# Patient Record
Sex: FEMALE | Race: WHITE | NOT HISPANIC OR LATINO | Employment: FULL TIME | URBAN - METROPOLITAN AREA
[De-identification: names, ages, dates, MRNs, and addresses within clinical notes are randomized per-mention and may not be internally consistent; named-entity substitution may affect disease eponyms.]

---
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
## 2017-01-23 ENCOUNTER — OFFICE VISIT (OUTPATIENT)
Dept: FAMILY MEDICINE | Facility: CLINIC | Age: 52
End: 2017-01-23
Payer: COMMERCIAL

## 2017-01-23 VITALS
TEMPERATURE: 97.1 F | DIASTOLIC BLOOD PRESSURE: 67 MMHG | WEIGHT: 208.5 LBS | BODY MASS INDEX: 31.6 KG/M2 | SYSTOLIC BLOOD PRESSURE: 94 MMHG | HEART RATE: 70 BPM | OXYGEN SATURATION: 95 % | HEIGHT: 68 IN

## 2017-01-23 DIAGNOSIS — F25.9 SCHIZOAFFECTIVE DISORDER, UNSPECIFIED TYPE (H): ICD-10-CM

## 2017-01-23 DIAGNOSIS — E03.2 HYPOTHYROIDISM DUE TO MEDICATION: ICD-10-CM

## 2017-01-23 DIAGNOSIS — F10.21 RECOVERING ALCOHOLIC IN REMISSION (H): ICD-10-CM

## 2017-01-23 DIAGNOSIS — F31.81 BIPOLAR 2 DISORDER (H): ICD-10-CM

## 2017-01-23 DIAGNOSIS — K21.9 GASTROESOPHAGEAL REFLUX DISEASE WITHOUT ESOPHAGITIS: ICD-10-CM

## 2017-01-23 DIAGNOSIS — R73.03 PREDIABETES: ICD-10-CM

## 2017-01-23 DIAGNOSIS — Z00.00 ENCOUNTER FOR ROUTINE ADULT HEALTH EXAMINATION WITHOUT ABNORMAL FINDINGS: ICD-10-CM

## 2017-01-23 DIAGNOSIS — F17.200 NEEDS SMOKING CESSATION EDUCATION: Primary | ICD-10-CM

## 2017-01-23 DIAGNOSIS — M85.80 OSTEOPENIA: ICD-10-CM

## 2017-01-23 DIAGNOSIS — N95.1 POST MENOPAUSAL SYNDROME: ICD-10-CM

## 2017-01-23 DIAGNOSIS — F90.0 ADHD, PREDOMINANTLY INATTENTIVE TYPE: ICD-10-CM

## 2017-01-23 LAB
ALBUMIN SERPL-MCNC: 3.7 G/DL (ref 3.4–5)
ALBUMIN UR-MCNC: NEGATIVE MG/DL
ALP SERPL-CCNC: 128 U/L (ref 40–150)
ALT SERPL W P-5'-P-CCNC: 24 U/L (ref 0–50)
ANION GAP SERPL CALCULATED.3IONS-SCNC: 5 MMOL/L (ref 3–14)
APPEARANCE UR: CLEAR
AST SERPL W P-5'-P-CCNC: 18 U/L (ref 0–45)
BACTERIA #/AREA URNS HPF: ABNORMAL /HPF
BILIRUB SERPL-MCNC: 0.2 MG/DL (ref 0.2–1.3)
BILIRUB UR QL STRIP: NEGATIVE
BUN SERPL-MCNC: 17 MG/DL (ref 7–30)
CALCIUM SERPL-MCNC: 9.4 MG/DL (ref 8.5–10.1)
CHLORIDE SERPL-SCNC: 106 MMOL/L (ref 94–109)
CHOLEST SERPL-MCNC: 159 MG/DL
CO2 SERPL-SCNC: 29 MMOL/L (ref 20–32)
COLOR UR AUTO: YELLOW
CREAT SERPL-MCNC: 0.85 MG/DL (ref 0.52–1.04)
ERYTHROCYTE [DISTWIDTH] IN BLOOD BY AUTOMATED COUNT: 13.9 % (ref 10–15)
GFR SERPL CREATININE-BSD FRML MDRD: 69 ML/MIN/1.7M2
GLUCOSE SERPL-MCNC: 75 MG/DL (ref 70–99)
GLUCOSE UR STRIP-MCNC: NEGATIVE MG/DL
HCT VFR BLD AUTO: 37.7 % (ref 35–47)
HDLC SERPL-MCNC: 54 MG/DL
HGB BLD-MCNC: 11.5 G/DL (ref 11.7–15.7)
HGB UR QL STRIP: NEGATIVE
KETONES UR STRIP-MCNC: NEGATIVE MG/DL
LDLC SERPL CALC-MCNC: 85 MG/DL
LEUKOCYTE ESTERASE UR QL STRIP: ABNORMAL
MCH RBC QN AUTO: 30.9 PG (ref 26.5–33)
MCHC RBC AUTO-ENTMCNC: 30.5 G/DL (ref 31.5–36.5)
MCV RBC AUTO: 101 FL (ref 78–100)
NITRATE UR QL: NEGATIVE
NON-SQ EPI CELLS #/AREA URNS LPF: ABNORMAL /LPF
NONHDLC SERPL-MCNC: 105 MG/DL
PH UR STRIP: 8 PH (ref 5–7)
PLATELET # BLD AUTO: 285 10E9/L (ref 150–450)
POTASSIUM SERPL-SCNC: 4.2 MMOL/L (ref 3.4–5.3)
PROT SERPL-MCNC: 7.4 G/DL (ref 6.8–8.8)
RBC # BLD AUTO: 3.72 10E12/L (ref 3.8–5.2)
RBC #/AREA URNS AUTO: ABNORMAL /HPF (ref 0–2)
SODIUM SERPL-SCNC: 140 MMOL/L (ref 133–144)
SP GR UR STRIP: 1.01 (ref 1–1.03)
T4 FREE SERPL-MCNC: 0.88 NG/DL (ref 0.76–1.46)
TRIGL SERPL-MCNC: 98 MG/DL
TSH SERPL DL<=0.005 MIU/L-ACNC: 8.37 MU/L (ref 0.4–4)
URN SPEC COLLECT METH UR: ABNORMAL
UROBILINOGEN UR STRIP-ACNC: 0.2 EU/DL (ref 0.2–1)
WBC # BLD AUTO: 5.2 10E9/L (ref 4–11)
WBC #/AREA URNS AUTO: ABNORMAL /HPF (ref 0–2)

## 2017-01-23 PROCEDURE — 85027 COMPLETE CBC AUTOMATED: CPT | Performed by: INTERNAL MEDICINE

## 2017-01-23 PROCEDURE — 84443 ASSAY THYROID STIM HORMONE: CPT | Performed by: INTERNAL MEDICINE

## 2017-01-23 PROCEDURE — 36415 COLL VENOUS BLD VENIPUNCTURE: CPT | Performed by: INTERNAL MEDICINE

## 2017-01-23 PROCEDURE — 82306 VITAMIN D 25 HYDROXY: CPT | Performed by: INTERNAL MEDICINE

## 2017-01-23 PROCEDURE — 84439 ASSAY OF FREE THYROXINE: CPT | Performed by: INTERNAL MEDICINE

## 2017-01-23 PROCEDURE — 80053 COMPREHEN METABOLIC PANEL: CPT | Performed by: INTERNAL MEDICINE

## 2017-01-23 PROCEDURE — 99396 PREV VISIT EST AGE 40-64: CPT | Performed by: INTERNAL MEDICINE

## 2017-01-23 PROCEDURE — 81001 URINALYSIS AUTO W/SCOPE: CPT | Performed by: INTERNAL MEDICINE

## 2017-01-23 PROCEDURE — 80061 LIPID PANEL: CPT | Performed by: INTERNAL MEDICINE

## 2017-01-23 NOTE — Clinical Note
[facility]  [address]  [address]  [place]  [address]  Tel: [phone]    February 1, 2017    Gudelia Quintanilla  [address]  [place] [address]      Dear Ms. Quintanilla,    As you will note from your lab studies in general things look good  However,There are a few abnormalities that we need to address. Your thyroid isn't functioning at an appropriate level we need to address this. Bill note that your cholesterol in general is normal however ideally speaking your LDL would be better at less than 100. You could achieve this by reducing the fats in your diet. Your hemoglobin is slightly lower than normal we need to evaluate this further. Please note is not seriously low but I would like to do some other tasks. So please make an appointment at your earliest convenience to address this    If you have any further questions or problems, please contact our office.      Sincerely,    Todd Manzanares MD/saige    Results for orders placed or performed in visit on 01/23/17   CBC with platelets   Result Value Ref Range    WBC 5.2 4.0 - 11.0 10e9/L    RBC Count 3.72 (L) 3.8 - 5.2 10e12/L    Hemoglobin 11.5 (L) 11.7 - 15.7 g/dL    Hematocrit 37.7 35.0 - 47.0 %     (H) 78 - 100 fl    MCH 30.9 26.5 - 33.0 pg    MCHC 30.5 (L) 31.5 - 36.5 g/dL    RDW 13.9 10.0 - 15.0 %    Platelet Count 285 150 - 450 10e9/L   Comprehensive metabolic panel   Result Value Ref Range    Sodium 140 133 - 144 mmol/L    Potassium 4.2 3.4 - 5.3 mmol/L    Chloride 106 94 - 109 mmol/L    Carbon Dioxide 29 20 - 32 mmol/L    Anion Gap 5 3 - 14 mmol/L    Glucose 75 70 - 99 mg/dL    Urea Nitrogen 17 7 - 30 mg/dL    Creatinine 0.85 0.52 - 1.04 mg/dL    GFR Estimate 69 >60 mL/min/1.7m2    GFR Estimate If Black 84 >60 mL/min/1.7m2    Calcium 9.4 8.5 - 10.1 mg/dL    Bilirubin Total 0.2 0.2 - 1.3 mg/dL    Albumin 3.7 3.4 - 5.0 g/dL    Protein Total 7.4 6.8 - 8.8 g/dL    Alkaline Phosphatase 128 40 - 150 U/L    ALT 24 0 - 50 U/L     AST 18 0 - 45 U/L   Lipid panel reflex to direct LDL   Result Value Ref Range    Cholesterol 159 <200 mg/dL    Triglycerides 98 <150 mg/dL    HDL Cholesterol 54 >49 mg/dL    LDL Cholesterol Calculated 85 <100 mg/dL    Non HDL Cholesterol 105 <130 mg/dL   TSH with free T4 reflex   Result Value Ref Range    TSH 8.37 (H) 0.40 - 4.00 mU/L   Vitamin D Deficiency   Result Value Ref Range    Vitamin D Deficiency screening 42 20 - 75 ug/L   UA reflex to Microscopic and Culture   Result Value Ref Range    Color Urine Yellow     Appearance Urine Clear     Glucose Urine Negative NEG mg/dL    Bilirubin Urine Negative NEG    Ketones Urine Negative NEG mg/dL    Specific Gravity Urine 1.015 1.003 - 1.035    Blood Urine Negative NEG    pH Urine 8.0 (H) 5.0 - 7.0 pH    Protein Albumin Urine Negative NEG mg/dL    Urobilinogen Urine 0.2 0.2 - 1.0 EU/dL    Nitrite Urine Negative NEG    Leukocyte Esterase Urine Small (A) NEG    Source Midstream Urine    Urine Microscopic   Result Value Ref Range    WBC Urine 2-5 (A) 0 - 2 /HPF    RBC Urine O - 2 0 - 2 /HPF    Squamous Epithelial /LPF Urine Few FEW /LPF    Bacteria Urine Few (A) NEG /HPF   T4 free   Result Value Ref Range    T4 Free 0.88 0.76 - 1.46 ng/dL           Enclosure: Lab Results

## 2017-01-23 NOTE — NURSING NOTE
"Chief Complaint   Patient presents with     Physical       Initial BP 94/67 mmHg  Pulse 70  Temp(Src) 97.1  F (36.2  C) (Tympanic)  Ht 5' 8\" (1.727 m)  Wt 208 lb 8 oz (94.575 kg)  BMI 31.71 kg/m2  SpO2 95%  LMP 07/20/2006  Breastfeeding? No Estimated body mass index is 31.71 kg/(m^2) as calculated from the following:    Height as of this encounter: 5' 8\" (1.727 m).    Weight as of this encounter: 208 lb 8 oz (94.575 kg).  BP completed using cuff size: large(ZEE)    ABRAM Valero MA January 23, 2017 9:17 AM      "
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
## 2017-01-24 LAB — DEPRECATED CALCIDIOL+CALCIFEROL SERPL-MC: 42 UG/L (ref 20–75)

## 2017-01-24 ASSESSMENT — PATIENT HEALTH QUESTIONNAIRE - PHQ9: SUM OF ALL RESPONSES TO PHQ QUESTIONS 1-9: 13

## 2017-02-06 ENCOUNTER — OFFICE VISIT (OUTPATIENT)
Dept: FAMILY MEDICINE | Facility: CLINIC | Age: 52
End: 2017-02-06
Payer: COMMERCIAL

## 2017-02-06 VITALS
HEIGHT: 68 IN | WEIGHT: 202.9 LBS | BODY MASS INDEX: 30.75 KG/M2 | HEART RATE: 67 BPM | DIASTOLIC BLOOD PRESSURE: 61 MMHG | SYSTOLIC BLOOD PRESSURE: 97 MMHG | TEMPERATURE: 97.2 F | OXYGEN SATURATION: 100 %

## 2017-02-06 DIAGNOSIS — J31.0 CHRONIC RHINITIS: ICD-10-CM

## 2017-02-06 DIAGNOSIS — K21.9 GASTROESOPHAGEAL REFLUX DISEASE WITHOUT ESOPHAGITIS: ICD-10-CM

## 2017-02-06 DIAGNOSIS — G47.33 OSA ON CPAP: ICD-10-CM

## 2017-02-06 DIAGNOSIS — F31.81 BIPOLAR 2 DISORDER (H): ICD-10-CM

## 2017-02-06 DIAGNOSIS — F10.21 RECOVERING ALCOHOLIC IN REMISSION (H): ICD-10-CM

## 2017-02-06 DIAGNOSIS — E66.09 NON MORBID OBESITY DUE TO EXCESS CALORIES: ICD-10-CM

## 2017-02-06 DIAGNOSIS — Z72.0 TOBACCO ABUSE: ICD-10-CM

## 2017-02-06 DIAGNOSIS — E03.2 HYPOTHYROIDISM DUE TO MEDICATION: Primary | ICD-10-CM

## 2017-02-06 DIAGNOSIS — D50.0 IRON DEFICIENCY ANEMIA DUE TO CHRONIC BLOOD LOSS: ICD-10-CM

## 2017-02-06 DIAGNOSIS — R73.03 PREDIABETES: ICD-10-CM

## 2017-02-06 DIAGNOSIS — F17.200 NEEDS SMOKING CESSATION EDUCATION: ICD-10-CM

## 2017-02-06 PROCEDURE — 99213 OFFICE O/P EST LOW 20 MIN: CPT | Performed by: INTERNAL MEDICINE

## 2017-02-06 RX ORDER — LEVOTHYROXINE SODIUM 100 UG/1
100 TABLET ORAL DAILY
Qty: 90 TABLET | Refills: 3 | Status: SHIPPED | OUTPATIENT
Start: 2017-02-06 | End: 2017-08-14

## 2017-02-06 RX ORDER — FLUTICASONE PROPIONATE 50 MCG
2 SPRAY, SUSPENSION (ML) NASAL 2 TIMES DAILY
Qty: 6 BOTTLE | Refills: 3 | Status: SHIPPED | OUTPATIENT
Start: 2017-02-06 | End: 2017-11-14

## 2017-02-06 NOTE — NURSING NOTE
"Chief Complaint   Patient presents with     Follow Up For       Initial BP 97/61 mmHg  Pulse 67  Temp(Src) 97.2  F (36.2  C) (Tympanic)  Ht 5' 8\" (1.727 m)  Wt 202 lb 14.4 oz (92.035 kg)  BMI 30.86 kg/m2  SpO2 100%  LMP 07/20/2006  Breastfeeding? No Estimated body mass index is 30.86 kg/(m^2) as calculated from the following:    Height as of this encounter: 5' 8\" (1.727 m).    Weight as of this encounter: 202 lb 14.4 oz (92.035 kg).  Medication Reconciliation: complete     Large Cuff (L) arm    CDiana Valero MA February 6, 2017 8:58 AM      "

## 2017-02-06 NOTE — PROGRESS NOTES
SUBJECTIVE:                                                    Gudelia Quintanilla is a 51 year old female who presents to clinic today for the following health issues:    Patient presents to the clinic for a follow up on her lab results. She states that she discontinued olanzapine and one other medication. She is smoking 3-5 cigarettes a day and will cut back on 1 cigarette every 2 weeks and she cannot  Smoke while waiting for the bus. She will be increasing her Levothyroxine dosage.    Problem list and histories reviewed & adjusted, as indicated.  Additional history:     Current Outpatient Prescriptions   Medication Sig Dispense Refill     levothyroxine (SYNTHROID, LEVOTHROID) 75 MCG tablet Take 1 tablet (75 mcg) by mouth daily 60 tablet 1     ARIPiprazole (ABILIFY) 30 MG tablet TAKE 1 TABLET BY MOUTH DAILY.  2     OLANZapine (ZYPREXA) 10 MG tablet Take 10 mg by mouth At Bedtime  3     OLANZapine (ZYPREXA) 20 MG tablet Take 20 mg by mouth At Bedtime  3     order for DME Equipment being ordered: compression stockings  Knee high  Compression stockings  20/30 2 Units 0     polyethylene glycol (MIRALAX/GLYCOLAX) packet Take 17 g by mouth 2 times daily as needed for constipation 30 packet 6     simethicone (MYLICON) 80 MG chewable tablet Take 2 tablets (160 mg) by mouth every 6 hours as needed for flatulence or cramping 180 tablet 1     levothyroxine (SYNTHROID, LEVOTHROID) 75 MCG tablet Take 1 tablet (75 mcg) by mouth daily 90 tablet 0     nicotine (NICOTROL) 10 MG inhaler Inhale 2 cartridge into the lungs daily as needed for smoking cessation 6 Box 1     cholecalciferol (VITAMIN D) 1000 UNIT tablet Take 1 tablet (1,000 Units) by mouth daily 100 tablet 3     armodafinil (NUVIGIL) 250 MG TABS Take 1 tablet (250 mg) by mouth every morning 30 tablet 0     lithium 300 MG tablet Takes 300 mg every morning and 600 mg at Noon       pantoprazole (PROTONIX) 40 MG enteric coated tablet Take 1 tablet (40 mg) by mouth 2 times daily  "Take 1.5 hours prior to last meal of the day, and take at bedtime 60 tablet 5     furosemide (LASIX) 40 MG tablet Take 1 tablet (40 mg) by mouth daily 30 tablet 5     fluticasone (FLONASE) 50 MCG/ACT nasal spray Spray 2 sprays into both nostrils daily as needed for rhinitis or allergies 16 g 5     order for DME Equipment being ordered: CPAP  Please dispense Cpap and its supply 1 Units prn     Doxepin HCl 150 MG CAPS Take 300 mg by mouth At Bedtime 30 capsule 0     albuterol (PROAIR HFA, PROVENTIL HFA, VENTOLIN HFA) 108 (90 BASE) MCG/ACT inhaler Inhale 2 puffs into the lungs every 6 hours as needed for shortness of breath / dyspnea or wheezing 1 Inhaler 1     Allergies   Allergen Reactions     1-Methyl 2-Pyrrolidone      Other reaction(s): Swelling     Iodine Swelling     Other reaction(s): Angioedema  Other reaction(s): Swelling  Facial swelling.     Morphine Anxiety and Nausea and Vomiting     Other reaction(s): Behavioral Disturbances       ROS:  Constitutional, HEENT, cardiovascular, pulmonary, gi and gu systems are negative, except as otherwise noted.    This document serves as a record of the services and decisions personally performed and made by Todd Manzanares MD. It was created on his/her behalf by Homa Alvarez, a trained medical scribe. The creation of this document is based the provider's statements to the medical scribe.  Rock Alvarez 9:21 AM, February 6, 2017    OBJECTIVE:                                                    BP 97/61 mmHg  Pulse 67  Temp(Src) 97.2  F (36.2  C) (Tympanic)  Ht 1.727 m (5' 8\")  Wt 92.035 kg (202 lb 14.4 oz)  BMI 30.86 kg/m2  SpO2 100%  LMP 07/20/2006  Breastfeeding? No  Body mass index is 30.86 kg/(m^2).    Reflexes were delayed  Reviewed all labs       ASSESSMENT/PLAN:                                                    1. Hypothyroidism due to medication  -Discussed increasing Levothyroxine  - levothyroxine (SYNTHROID/LEVOTHROID) 100 MCG tablet; Take " 1 tablet (100 mcg) by mouth daily  Dispense: 90 tablet; Refill: 3    2. Prediabetes  Discussed exercise plan and weight management.    3. 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)    4. Gastroesophageal reflux disease without esophagitis  Managed with Pantoprazole 40 mg 1 tablet twice daily    5. Bipolar 2 disorder (H)    6. Non morbid obesity due to excess calories    7. Iron deficiency anemia due to chronic blood loss    8. ROMEO on CPAP    9. Tobacco abuse    10. Needs smoking cessation education  Will be cutting back 1 cigarette every 2 weeks, cannot smoke while waiting for bus    11. Chronic rhinitis  - fluticasone (FLONASE) 50 MCG/ACT spray; Spray 2 sprays into both nostrils 2 times daily  Dispense: 6 Bottle; Refill: 3    Follow up in 2 months to reevaluate thyroid    The information in this document, created by the medical scribe for me, accurately reflects the services I personally performed and the decisions made by me. I have reviewed and approved this document for accuracy prior to leaving the patient care area.  Todd Manzanares MD  20 min  9:21 AM, 02/06/2017    Todd Manzanares MD  [facility]

## 2017-02-07 ASSESSMENT — PATIENT HEALTH QUESTIONNAIRE - PHQ9: SUM OF ALL RESPONSES TO PHQ QUESTIONS 1-9: 9

## 2017-02-10 ENCOUNTER — TELEPHONE (OUTPATIENT)
Dept: FAMILY MEDICINE | Facility: CLINIC | Age: 52
End: 2017-02-10

## 2017-02-10 NOTE — TELEPHONE ENCOUNTER
Reason for Call:  Other call back    Detailed comments: Loya Kasie would like to know when pt had their most recent gf4 lab so they can treat with tamiflu    Phone Number: [phone]    Best Time:     Can we leave a detailed message on this number? YES    Call taken on 2/10/2017 at 10:52 AM by Elias Black

## 2017-02-13 ENCOUNTER — TELEPHONE (OUTPATIENT)
Dept: FAMILY MEDICINE | Facility: CLINIC | Age: 52
End: 2017-02-13

## 2017-02-13 NOTE — TELEPHONE ENCOUNTER
Reason for Call:  Other prescription    Detailed comments: Patient states her Pharmacy faxed over to us a PA   Request for her Pantoprazole 40mg, not seeing that we received it she will  Call the Pharmacy to have them resend it. Patient states it's from [facility]    Phone Number Patient can be reached at: Cell number on file:    Telephone Information:   Mobile [phone]       Best Time: She needs this PA taken care of ASAP, if possible can we put a  Rush on it?  Thank You    Can we leave a detailed message on this number? NO    Call taken on 2/13/2017 at 10:17 AM by Joey Mora

## 2017-02-15 NOTE — TELEPHONE ENCOUNTER
PA has been approved for the pantoprazole 40 mg from today's date and good for 1 year. I called the patient to let her know.    Kleber Mcleod, CMA

## 2017-04-05 ENCOUNTER — OFFICE VISIT (OUTPATIENT)
Dept: FAMILY MEDICINE | Facility: CLINIC | Age: 52
End: 2017-04-05
Payer: COMMERCIAL

## 2017-04-05 VITALS
WEIGHT: 205.8 LBS | BODY MASS INDEX: 31.29 KG/M2 | OXYGEN SATURATION: 97 % | HEART RATE: 73 BPM | SYSTOLIC BLOOD PRESSURE: 124 MMHG | DIASTOLIC BLOOD PRESSURE: 72 MMHG | TEMPERATURE: 97.3 F | RESPIRATION RATE: 18 BRPM

## 2017-04-05 DIAGNOSIS — L08.9 INFECTION OF SKIN OF FINGER: Primary | ICD-10-CM

## 2017-04-05 LAB
GRAM STN SPEC: NORMAL
MICRO REPORT STATUS: NORMAL
SPECIMEN SOURCE: NORMAL

## 2017-04-05 PROCEDURE — 87205 SMEAR GRAM STAIN: CPT | Performed by: NURSE PRACTITIONER

## 2017-04-05 PROCEDURE — 87077 CULTURE AEROBIC IDENTIFY: CPT | Performed by: NURSE PRACTITIONER

## 2017-04-05 PROCEDURE — 99213 OFFICE O/P EST LOW 20 MIN: CPT | Performed by: NURSE PRACTITIONER

## 2017-04-05 PROCEDURE — 87070 CULTURE OTHR SPECIMN AEROBIC: CPT | Performed by: NURSE PRACTITIONER

## 2017-04-05 PROCEDURE — 87186 SC STD MICRODIL/AGAR DIL: CPT | Performed by: NURSE PRACTITIONER

## 2017-04-05 RX ORDER — SULFAMETHOXAZOLE/TRIMETHOPRIM 800-160 MG
1 TABLET ORAL 2 TIMES DAILY
Qty: 20 TABLET | Refills: 0 | Status: SHIPPED | OUTPATIENT
Start: 2017-04-05 | End: 2017-04-05

## 2017-04-05 RX ORDER — SULFAMETHOXAZOLE/TRIMETHOPRIM 800-160 MG
1 TABLET ORAL 2 TIMES DAILY
Qty: 20 TABLET | Refills: 0 | Status: SHIPPED | OUTPATIENT
Start: 2017-04-05 | End: 2017-07-18

## 2017-04-05 NOTE — PATIENT INSTRUCTIONS
For your finger infection, take the antibiotics as prescribed.  Start right now!  You can take ibuprofen or tylenol as needed for pain.  If the wound starts to drain, keep it covered and change the bandage frequently.  For the throbbing pain, elevate your hand and rest for the next 1-2 days.    If any signs of worsening, fever, etc, I would ask you to go to a [facility] ([facility]).    I did do a wound culture. The results will be back in 2-3 business days. If the culture shows that you need a different antibiotic, I will change the antibiotic for you.    Wear rubber gloves when working.

## 2017-04-05 NOTE — MR AVS SNAPSHOT
After Visit Summary   4/5/2017    Gudelia Quintanilla    MRN: [id number]           Patient Information     Date Of Birth          1965        Visit Information        Provider Department      4/5/2017 11:00 AM Viviana Anguiano APRN CNP [facility]        Today's Diagnoses     Infection of skin of finger    -  1      Care Instructions    For your finger infection, take the antibiotics as prescribed.  Start right now!  You can take ibuprofen or tylenol as needed for pain.  If the wound starts to drain, keep it covered and change the bandage frequently.  For the throbbing pain, elevate your hand and rest for the next 1-2 days.    If any signs of worsening, fever, etc, I would ask you to go to a [place] [facility] ([facility]).    I did do a wound culture. The results will be back in 2-3 business days. If the culture shows that you need a different antibiotic, I will change the antibiotic for you.    Wear rubber gloves when working.        Follow-ups after your visit        Your next 10 appointments already scheduled     Apr 05, 2017 11:00 AM CDT   [facility] with INEZ Art C[facility]   [facility]    [address] [place] [id number] [phone]           Bring a current list of meds and any records pertaining to this visit.  For Physicals, please bring immunization records and any forms needing to be filled out.  Please arrive 10 minutes early to complete paperwork.              Who to contact     If you have questions or need follow up information about today's clinic visit or your schedule please contact [facility] directly at [phone].  Normal or non-critical lab and imaging results will be communicated to you by MyChart, letter or phone within 4 business days after the clinic has received the results. If you do not hear from us within 7 days, please  "contact the clinic through [employer] or phone. If you have a critical or abnormal lab result, we will notify you by phone as soon as possible.  Submit refill requests through E[employer] or call your pharmacy and they will forward the refill request to us. Please allow 3 business days for your refill to be completed.          Additional Information About Your Visit        WikidataharPitzi Information     Enevo lets you send messages to your doctor, view your test results, renew your prescriptions, schedule appointments and more. To sign up, go to [email or web address] . Click on \"Log in\" on the left side of the screen, which will take you to the Welcome page. Then click on \"Sign up Now\" on the right side of the page.     You will be asked to enter the access code listed below, as well as some personal information. Please follow the directions to create your username and password.     Your access code is: [email or web address]  Expires: 2017 10:58 AM     Your access code will  in 90 days. If you need help or a new code, please call your [place] clinic or [phone].        Care EveryWhere ID     This is your Care EveryWhere ID. This could be used by other organizations to access your [facility] medical records  [id number]        Your Vitals Were     Pulse Temperature Respirations Last Period Pulse Oximetry Breastfeeding?    73 97.3  F (36.3  C) (Oral) 18 2006 97% No    BMI (Body Mass Index)                   31.29 kg/m2            Blood Pressure from Last 3 Encounters:   17 124/72   17 97/61   17 94/67    Weight from Last 3 Encounters:   17 205 lb 12.8 oz (93.4 kg)   17 202 lb 14.4 oz (92 kg)   17 208 lb 8 oz (94.6 kg)              Today, you had the following     No orders found for display         Today's Medication Changes          These changes are accurate as of: 17 10:59 AM.  If you have any questions, ask your nurse or doctor.               Start taking these medicines.  "       Dose/Directions    sulfamethoxazole-trimethoprim 800-160 MG per tablet   Commonly known as:  BACTRIM DS/SEPTRA DS   Used for:  Infection of skin of finger   Started by:  Viviana Anguiano APRN CNP        Dose:  1 tablet   Take 1 tablet by mouth 2 times daily   Quantity:  20 tablet   Refills:  0            Where to get your medicines      These medications were sent to [facility] - [place], [place] - [address]  [address] [facility], [place] [address]     Phone:  [phone]     sulfamethoxazole-trimethoprim 800-160 MG per tablet                Primary Care Provider Office Phone # Fax #    Todd Manzanares [phone]       [facility] [address] [facility] [address]  [facility] [address]        Thank you!     Thank you for choosing [facility]  for your care. Our goal is always to provide you with excellent care. Hearing back from our patients is one way we can continue to improve our services. Please take a few minutes to complete the written survey that you may receive in the mail after your visit with us. Thank you!             Your Updated Medication List - Protect others around you: Learn how to safely use, store and throw away your medicines at www.disposemymeds.org.          This list is accurate as of: 4/5/17 10:59 AM.  Always use your most recent med list.                   Brand Name Dispense Instructions for use    albuterol 108 (90 BASE) MCG/ACT Inhaler    PROAIR HFA/PROVENTIL HFA/VENTOLIN HFA    1 Inhaler    Inhale 2 puffs into the lungs every 6 hours as needed for shortness of breath / dyspnea or wheezing       ARIPiprazole 30 MG tablet    ABILIFY     TAKE 1 TABLET BY MOUTH DAILY.       armodafinil 250 MG Tabs tablet    NUVIGIL    30 tablet    Take 1 tablet (250 mg) by mouth every morning       cholecalciferol 1000 UNIT tablet    vitamin D    100 tablet    Take 1 tablet (1,000 Units) by mouth daily       Doxepin HCl 150 MG Caps     30  capsule    Take 300 mg by mouth At Bedtime       * fluticasone 50 MCG/ACT spray    FLONASE    16 g    Spray 2 sprays into both nostrils daily as needed for rhinitis or allergies       * fluticasone 50 MCG/ACT spray    FLONASE    6 Bottle    Spray 2 sprays into both nostrils 2 times daily       furosemide 40 MG tablet    LASIX    30 tablet    Take 1 tablet (40 mg) by mouth daily       * levothyroxine 75 MCG tablet    SYNTHROID/LEVOTHROID    90 tablet    Take 1 tablet (75 mcg) by mouth daily       * levothyroxine 75 MCG tablet    SYNTHROID/LEVOTHROID    60 tablet    Take 1 tablet (75 mcg) by mouth daily       * levothyroxine 100 MCG tablet    SYNTHROID/LEVOTHROID    90 tablet    Take 1 tablet (100 mcg) by mouth daily       lithium 300 MG tablet      Takes 300 mg every morning and 600 mg at Noon       nicotine 10 MG Inhaler    NICOTROL    6 Box    Inhale 2 cartridge into the lungs daily as needed for smoking cessation       * OLANZapine 10 MG tablet    zyPREXA     Take 10 mg by mouth At Bedtime Reported on 4/5/2017       * OLANZapine 20 MG tablet    zyPREXA     Take 20 mg by mouth At Bedtime Reported on 4/5/2017       * order for DME     1 Units    Equipment being ordered: CPAP Please dispense Cpap and its supply       * order for DME     2 Units    Equipment being ordered: compression stockings Knee high Compression stockings 20/30       pantoprazole 40 MG EC tablet    PROTONIX    60 tablet    Take 1 tablet (40 mg) by mouth 2 times daily Take 1.5 hours prior to last meal of the day, and take at bedtime       polyethylene glycol Packet    MIRALAX/GLYCOLAX    30 packet    Take 17 g by mouth 2 times daily as needed for constipation       simethicone 80 MG chewable tablet    MYLICON    180 tablet    Take 2 tablets (160 mg) by mouth every 6 hours as needed for flatulence or cramping       sulfamethoxazole-trimethoprim 800-160 MG per tablet    BACTRIM DS/SEPTRA DS    20 tablet    Take 1 tablet by mouth 2 times daily       *  Notice:  This list has 9 medication(s) that are the same as other medications prescribed for you. Read the directions carefully, and ask your doctor or other care provider to review them with you.

## 2017-04-05 NOTE — PROGRESS NOTES
"  SUBJECTIVE:                                                    Gudelia Quintanilla is a 52 year old female who presents to clinic today for the following health issues:    Finger Problem       Duration: 3 days ago Gudelia had a small bump on her right hand little finger. This started as something that looked like a \"bite.\" she picked at it and squeezed at it. The wound quickly got worse. Today, the wound/bump is swollen, red, painful. There is redness and swelling going up into her right hand.  \"the thing that scared me too is that there are 3-5 people in my house that have scabies right now.\"    Description (location/character/radiation): right finger    Intensity:  moderate    Accompanying signs and symptoms: none    History (similar episodes/previous evaluation): No history of similar wounds. She currently lives in [facility] 40 people live there.     Precipitating or alleviating factors: None    Therapies tried and outcome: None         Past Medical History:   Diagnosis Date     Depression      GERD (gastroesophageal reflux disease)      Hemorrhoid      Menstrual disorder     s/p D&C -12/2012     ROMEO on CPAP      Other bipolar disorders     followed by Dr Collazo     Post menopausal syndrome      Tobacco abuse      Current Outpatient Prescriptions   Medication Sig Dispense Refill     levothyroxine (SYNTHROID/LEVOTHROID) 100 MCG tablet Take 1 tablet (100 mcg) by mouth daily 90 tablet 3     fluticasone (FLONASE) 50 MCG/ACT spray Spray 2 sprays into both nostrils 2 times daily 6 Bottle 3     levothyroxine (SYNTHROID, LEVOTHROID) 75 MCG tablet Take 1 tablet (75 mcg) by mouth daily 60 tablet 1     ARIPiprazole (ABILIFY) 30 MG tablet TAKE 1 TABLET BY MOUTH DAILY.  2     order for DME Equipment being ordered: compression stockings  Knee high  Compression stockings  20/30 2 Units 0     polyethylene glycol (MIRALAX/GLYCOLAX) packet Take 17 g by mouth 2 times daily as needed for constipation 30 packet 6     simethicone " (MYLICON) 80 MG chewable tablet Take 2 tablets (160 mg) by mouth every 6 hours as needed for flatulence or cramping 180 tablet 1     levothyroxine (SYNTHROID, LEVOTHROID) 75 MCG tablet Take 1 tablet (75 mcg) by mouth daily 90 tablet 0     cholecalciferol (VITAMIN D) 1000 UNIT tablet Take 1 tablet (1,000 Units) by mouth daily 100 tablet 3     armodafinil (NUVIGIL) 250 MG TABS Take 1 tablet (250 mg) by mouth every morning 30 tablet 0     lithium 300 MG tablet Takes 300 mg every morning and 600 mg at Noon       pantoprazole (PROTONIX) 40 MG enteric coated tablet Take 1 tablet (40 mg) by mouth 2 times daily Take 1.5 hours prior to last meal of the day, and take at bedtime 60 tablet 5     furosemide (LASIX) 40 MG tablet Take 1 tablet (40 mg) by mouth daily 30 tablet 5     fluticasone (FLONASE) 50 MCG/ACT nasal spray Spray 2 sprays into both nostrils daily as needed for rhinitis or allergies 16 g 5     order for DME Equipment being ordered: CPAP  Please dispense Cpap and its supply 1 Units prn     Doxepin HCl 150 MG CAPS Take 300 mg by mouth At Bedtime 30 capsule 0     albuterol (PROAIR HFA, PROVENTIL HFA, VENTOLIN HFA) 108 (90 BASE) MCG/ACT inhaler Inhale 2 puffs into the lungs every 6 hours as needed for shortness of breath / dyspnea or wheezing 1 Inhaler 1     OLANZapine (ZYPREXA) 10 MG tablet Take 10 mg by mouth At Bedtime Reported on 4/5/2017  3     OLANZapine (ZYPREXA) 20 MG tablet Take 20 mg by mouth At Bedtime Reported on 4/5/2017  3     nicotine (NICOTROL) 10 MG inhaler Inhale 2 cartridge into the lungs daily as needed for smoking cessation (Patient not taking: Reported on 4/5/2017) 6 Box 1     Social History   Substance Use Topics     Smoking status: Current Every Day Smoker     Packs/day: 0.25     Types: Cigarettes     Smokeless tobacco: Never Used      Comment: smoke 1PPD now     Alcohol use No       ROS:  7 point Review of systems negative except as stated above.    OBJECTIVE  :/72  Pulse 73  Temp  97.3  F (36.3  C) (Oral)  Resp 18  Wt 205 lb 12.8 oz (93.4 kg)  LMP 07/20/2006  SpO2 97%  Breastfeeding? No  BMI 31.29 kg/m2  GENERAL APPEARANCE: healthy, alert and no distress. Smiling.   SKIN: warm and dry.  Right hand 5th finger with an erythemetous purulent appearing superficial papule to the skin between the MP and PIP joints. ROM of her finger is limited due to swelling.  Mild swelling to this area of her finger as well as there is swelling (without erythema) starting on the dorsal aspect of her hand.  CWMS intact to all right hand fingers.  PSYCH: mentation appears normal and affect normal/bright.  Good eye contact.    ASSESSMENT:  (L08.9) Infection of skin of finger  (primary encounter diagnosis)  Comment: acute  Plan: sulfamethoxazole-trimethoprim (BACTRIM         DS/SEPTRA DS) 800-160 MG per tablet, Wound         Culture Aerobic Bacterial, Gram stain,   Wound culture obtained and sent to the lab.      Patient Instructions   For your finger infection, take the antibiotics as prescribed.  Start right now!  You can take ibuprofen or tylenol as needed for pain.  If the wound starts to drain, keep it covered and change the bandage frequently.  For the throbbing pain, elevate your hand and rest for the next 1-2 days.    If any signs of worsening, fever, etc, I would ask you to go to a [facility] ([facility]).    I did do a wound culture. The results will be back in 2-3 business days. If the culture shows that you need a different antibiotic, I will change the antibiotic for you.    Wear rubber gloves when working.

## 2017-04-05 NOTE — NURSING NOTE
"Chief Complaint   Patient presents with     Finger       Initial /72  Pulse 73  Temp 97.3  F (36.3  C) (Oral)  Resp 18  Wt 205 lb 12.8 oz (93.4 kg)  LMP 07/20/2006  SpO2 97%  Breastfeeding? No  BMI 31.29 kg/m2 Estimated body mass index is 31.29 kg/(m^2) as calculated from the following:    Height as of 2/6/17: 5' 8\" (1.727 m).    Weight as of this encounter: 205 lb 12.8 oz (93.4 kg).  Medication Reconciliation: complete     Myrna Kwon MA    "

## 2017-04-05 NOTE — LETTER
"[facility]   [address], [place]  [address]  [phone]      April 11, 2017      Gudelia Quintanilla  [address]  [place] [address]              Dear MsDiana Quintanilla,    This note is to let you know that the wound culture that was done the day you were in the clinic came back showing a bacteria called MRSA. This is a type of staphylococcus infection. The antibiotic that was prescribed for you is considered \"effective.\" By now I hope your wound is much better.    Let me know if you have any questions.      Results for orders placed or performed in visit on 04/05/17   Wound Culture Aerobic Bacterial   Result Value Ref Range    Specimen Description Finger Right Hand 5TH FINGER Wound     Culture Micro (A)      Light growth Methicillin resistant Staphylococcus aureus (MRSA) This isolate DOES   NOT demonstrate inducible clindamycin resistance in vitro. Clindamycin is   susceptible and could be used when indicated, however, erythromycin is   resistant and should not be used.  Plus Light growth Normal skin kaden      Micro Report Status FINAL 04/07/2017     Organism:       Light growth Methicillin resistant Staphylococcus aureus (MRSA) This isolate DOES   NOT demonstrate inducible clindamycin resistance in vitro. Clindamycin is   susceptible and could be used when indicated, however, erythromycin is   resistant and should not be used.         Susceptibility    Light growth methicillin resistant staphylococcus aureus (mrsa) this isolate does  not demonstrate inducible clindamycin resistance in vitro. clindamycin is  susceptible and could be used when indicat -  (no method available)     CIPROFLOXACIN <=0.5 Susceptible  ug/mL     CLINDAMYCIN <=0.25 Susceptible  ug/mL     ERYTHROMYCIN >=8 Resistant  ug/mL     GENTAMICIN <=0.5 Susceptible  ug/mL     LEVOFLOXACIN 0.25 Susceptible  ug/mL     OXACILLIN >=4 Resistant  ug/mL     PENICILLIN >=0.5 Resistant  ug/mL     TETRACYCLINE <=1 " Susceptible  ug/mL     Trimethoprim/Sulfa <=.5/9.5 Susceptible  ug/mL     VANCOMYCIN <=0.5 Susceptible  ug/mL     LINEZOLID 2 Susceptible  ug/mL   Gram stain   Result Value Ref Range    Specimen Description Finger Right Hand 5TH FINGER Wound     Gram Stain No organisms seen  No PMNs seen       Micro Report Status FINAL 04/05/2017            Sincerely,    Viviana Anguiano, CNP/nr

## 2017-04-07 ENCOUNTER — TELEPHONE (OUTPATIENT)
Dept: FAMILY MEDICINE | Facility: CLINIC | Age: 52
End: 2017-04-07

## 2017-04-07 LAB
BACTERIA SPEC CULT: ABNORMAL
MICRO REPORT STATUS: ABNORMAL
MICROORGANISM SPEC CULT: ABNORMAL
SPECIMEN SOURCE: ABNORMAL

## 2017-04-07 NOTE — TELEPHONE ENCOUNTER
Reason for Call:  Call back    Detailed comments: Patient call and would like a call back regards to  Her medication and dry mouth. She stated that her dentist say that   It is coming to the medication she is taking and it is decaying her teeth  She would like for th doctor to call he ASAP     Phone Number Patient can be reached at: Home number on file [phone] (home)    Best Time: anytime    Can we leave a detailed message on this number? YES    Call taken on 4/7/2017 at 10:12 AM by Soni Meyer

## 2017-04-10 NOTE — TELEPHONE ENCOUNTER
Please call pt & ask her to make an appt w/ psychiatrist to eval if she can change any of her meds.

## 2017-04-10 NOTE — TELEPHONE ENCOUNTER
I called patient she is seeing psychiatry Tuesday she will ask them first. Antoinette OLGUIN CMA

## 2017-05-11 ENCOUNTER — TELEPHONE (OUTPATIENT)
Dept: FAMILY MEDICINE | Facility: CLINIC | Age: 52
End: 2017-05-11

## 2017-05-11 NOTE — TELEPHONE ENCOUNTER
Reason for call:  Patient reporting a symptom    Symptom or request: diarrhea, cramping withing a couple hours after eating, green stools     Duration (how long have symptoms been present): over 5 days    Have you been treated for this before? No not as severe as right now     Additional comments:     Phone Number patient can be reached at:  Home number on file [phone] (home)    Best Time:  any    Can we leave a detailed message on this number:  YES    Call taken on 5/11/2017 at 9:13 AM by Helen Jimenez

## 2017-05-11 NOTE — TELEPHONE ENCOUNTER
"States her bowel urgency is only associated with eating.  Eats a meal, then within very short time, has a loose stool(s).  Seems to resolve quickly.    Currently eating a lot of starchy, carb foods, lots of dairy products where she lives.  \"They barely serve greens\".  States her Dad developed lactose intolerance later in life. Wonders about this.  Denies blood, abdominal pain (cramps only during expulsion).  No fever.  \"I don't feel ill at all\".  Colonoscopy 2014.  Polyp biopsied/negative pathology.    Trial of no dairy for a few days. Advised that powdered milk is in MANY foods, including english muffins, creamed soups (canned), etc.  Read labels AND may need to research a bit more.    Call if not improving with above.  Stay hydrated.  She is agreeable to all.  Ewa Frey RN        "

## 2017-05-17 DIAGNOSIS — G47.10 EXCESSIVE SLEEPINESS: ICD-10-CM

## 2017-05-17 NOTE — TELEPHONE ENCOUNTER
Pending Prescriptions:                       Disp   Refills    armodafinil (NUVIGIL) 250 MG TABS tablet  30 tab*0            Sig: Take 1 tablet (250 mg) by mouth every morning          Last Written Prescription Date: 12/24/15  Last Fill Quantity: 30,  # refills: 0   Last Office Visit with [facility], [facility] or [facility] prescribing provider: 2/6/17

## 2017-05-22 RX ORDER — ARMODAFINIL 250 MG/1
250 TABLET ORAL EVERY MORNING
Qty: 30 TABLET | Refills: 0 | Status: SHIPPED | OUTPATIENT
Start: 2017-05-22 | End: 2017-11-14

## 2017-05-30 ENCOUNTER — TRANSFERRED RECORDS (OUTPATIENT)
Dept: HEALTH INFORMATION MANAGEMENT | Facility: CLINIC | Age: 52
End: 2017-05-30

## 2017-07-18 ENCOUNTER — OFFICE VISIT (OUTPATIENT)
Dept: FAMILY MEDICINE | Facility: CLINIC | Age: 52
End: 2017-07-18
Payer: COMMERCIAL

## 2017-07-18 ENCOUNTER — TELEPHONE (OUTPATIENT)
Dept: FAMILY MEDICINE | Facility: CLINIC | Age: 52
End: 2017-07-18

## 2017-07-18 VITALS
WEIGHT: 212.6 LBS | TEMPERATURE: 97.6 F | SYSTOLIC BLOOD PRESSURE: 107 MMHG | DIASTOLIC BLOOD PRESSURE: 69 MMHG | OXYGEN SATURATION: 98 % | BODY MASS INDEX: 32.22 KG/M2 | HEART RATE: 71 BPM | HEIGHT: 68 IN

## 2017-07-18 DIAGNOSIS — G47.33 OSA ON CPAP: ICD-10-CM

## 2017-07-18 DIAGNOSIS — F33.1 MAJOR DEPRESSIVE DISORDER, RECURRENT EPISODE, MODERATE (H): ICD-10-CM

## 2017-07-18 DIAGNOSIS — E66.09 NON MORBID OBESITY DUE TO EXCESS CALORIES: ICD-10-CM

## 2017-07-18 DIAGNOSIS — F90.0 ADHD, PREDOMINANTLY INATTENTIVE TYPE: ICD-10-CM

## 2017-07-18 DIAGNOSIS — Z72.0 TOBACCO ABUSE: ICD-10-CM

## 2017-07-18 DIAGNOSIS — E03.4 HYPOTHYROIDISM DUE TO ACQUIRED ATROPHY OF THYROID: ICD-10-CM
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
## 2017-07-18 DIAGNOSIS — L02.93 CARBUNCLE: ICD-10-CM

## 2017-07-18 DIAGNOSIS — F10.21 RECOVERING ALCOHOLIC IN REMISSION (H): ICD-10-CM

## 2017-07-18 LAB — TSH SERPL DL<=0.05 MIU/L-ACNC: 17.7 MU/L (ref 0.4–4)

## 2017-07-18 PROCEDURE — 99213 OFFICE O/P EST LOW 20 MIN: CPT | Mod: 25 | Performed by: INTERNAL MEDICINE

## 2017-07-18 PROCEDURE — 36415 COLL VENOUS BLD VENIPUNCTURE: CPT | Performed by: INTERNAL MEDICINE

## 2017-07-18 PROCEDURE — 87077 CULTURE AEROBIC IDENTIFY: CPT | Performed by: INTERNAL MEDICINE

## 2017-07-18 PROCEDURE — 10060 I&D ABSCESS SIMPLE/SINGLE: CPT | Performed by: INTERNAL MEDICINE

## 2017-07-18 PROCEDURE — 87070 CULTURE OTHR SPECIMN AEROBIC: CPT | Performed by: INTERNAL MEDICINE

## 2017-07-18 PROCEDURE — 87186 SC STD MICRODIL/AGAR DIL: CPT | Performed by: INTERNAL MEDICINE

## 2017-07-18 PROCEDURE — 84443 ASSAY THYROID STIM HORMONE: CPT | Performed by: INTERNAL MEDICINE

## 2017-07-18 NOTE — LETTER
[facility]  [address] [facility]  [address]  [place]  [address]  Tel: [phone]    August 2, 2017    Gudelia Quintanilla  [address]  [place] [address]        Dear MsDiana Quintanilla    Gudelia, I have tried contacting you by phone on multiple occasions can ever get a hold of you. In follow-up of your thyroid function we are under dosing you on your thyroid and it needs some adjustment. So please come back to see me as soon as possible so we can follow up on this as I'm sure the adjustment will make you feel better. I look forward to seeing you back sometime soon     If you have any further questions or problems, please contact our office.      Sincerely,    Todd Manzanares MD/ Antoinette OLGUIN CMA  Results for orders placed or performed in visit on 07/18/17   TSH   Result Value Ref Range    TSH 17.70 (H) 0.40 - 4.00 mU/L   Wound Culture Aerobic Bacterial   Result Value Ref Range    Specimen Description Abdominal Wound     Special Requests Specimen collected in eSwab transport (white cap)     Culture Micro (A)      Moderate growth Staphylococcus aureus This isolate DOES NOT demonstrate inducible   clindamycin resistance in vitro. Clindamycin is susceptible and could be used   when indicated, however, erythromycin is resistant and should not be used.      Micro Report Status FINAL 07/21/2017     Organism:       Moderate growth Staphylococcus aureus This isolate DOES NOT demonstrate inducible   clindamycin resistance in vitro. Clindamycin is susceptible and could be used   when indicated, however, erythromycin is resistant and should not be used.         Susceptibility    Moderate growth staphylococcus aureus this isolate does not demonstrate inducible  clindamycin resistance in vitro. clindamycin is susceptible and could be used  when indicated, however, erythromycin  -  (no method available)     CIPROFLOXACIN <=0.5 Susceptible  ug/mL     CLINDAMYCIN <=0.25 Susceptible  ug/mL     ERYTHROMYCIN >=8  Resistant  ug/mL     GENTAMICIN <=0.5 Susceptible  ug/mL     LEVOFLOXACIN 0.25 Susceptible  ug/mL     OXACILLIN 0.5 Susceptible  ug/mL     PENICILLIN >=0.5 Resistant  ug/mL     TETRACYCLINE <=1 Susceptible  ug/mL     Trimethoprim/Sulfa <=.5/9.5 Susceptible  ug/mL     VANCOMYCIN 1 Susceptible  ug/mL               Enclosure: Lab Results

## 2017-07-18 NOTE — PROGRESS NOTES
"  SUBJECTIVE:                                                    Gudelia Quintanilla is a 52 year old female who presents to clinic today for the following health issues:    Mrs. Quintanilla complains of 8 days of swelling and redness following a potential bug bite. She notes she woke with pruritis on the left upper quadrant and area has increasing worsened with formation of furuncle. Patient attempted to evacuate fluid using her fingernails with only minor drainage. She notes left hand had 2 previous areas of redness and swelling which were treated with antibiotics.     And notes her energy level is \"good.\" She notes continuation of excessive dry mouth. She notes lozenges have not helped. She notes at last dentist visit, she had 8 cavities.      Problem list and histories reviewed & adjusted, as indicated.  Additional history: as documented    Current Outpatient Prescriptions   Medication Sig Dispense Refill     armodafinil (NUVIGIL) 250 MG TABS tablet Take 1 tablet (250 mg) by mouth every morning 30 tablet 0     levothyroxine (SYNTHROID/LEVOTHROID) 100 MCG tablet Take 1 tablet (100 mcg) by mouth daily 90 tablet 3     fluticasone (FLONASE) 50 MCG/ACT spray Spray 2 sprays into both nostrils 2 times daily 6 Bottle 3     ARIPiprazole (ABILIFY) 30 MG tablet TAKE 1 TABLET BY MOUTH DAILY.  2     polyethylene glycol (MIRALAX/GLYCOLAX) packet Take 17 g by mouth 2 times daily as needed for constipation 30 packet 6     simethicone (MYLICON) 80 MG chewable tablet Take 2 tablets (160 mg) by mouth every 6 hours as needed for flatulence or cramping 180 tablet 1     cholecalciferol (VITAMIN D) 1000 UNIT tablet Take 1 tablet (1,000 Units) by mouth daily 100 tablet 3     lithium 300 MG tablet Takes 300 mg every morning and 600 mg at Noon       pantoprazole (PROTONIX) 40 MG enteric coated tablet Take 1 tablet (40 mg) by mouth 2 times daily Take 1.5 hours prior to last meal of the day, and take at bedtime 60 tablet 5     furosemide (LASIX) 40 " MG tablet Take 1 tablet (40 mg) by mouth daily 30 tablet 5     order for DME Equipment being ordered: CPAP  Please dispense Cpap and its supply 1 Units prn     Doxepin HCl 150 MG CAPS Take 300 mg by mouth At Bedtime 30 capsule 0     albuterol (PROAIR HFA, PROVENTIL HFA, VENTOLIN HFA) 108 (90 BASE) MCG/ACT inhaler Inhale 2 puffs into the lungs every 6 hours as needed for shortness of breath / dyspnea or wheezing 1 Inhaler 1     [DISCONTINUED] levothyroxine (SYNTHROID, LEVOTHROID) 75 MCG tablet Take 1 tablet (75 mcg) by mouth daily 60 tablet 1     [DISCONTINUED] levothyroxine (SYNTHROID, LEVOTHROID) 75 MCG tablet Take 1 tablet (75 mcg) by mouth daily 90 tablet 0     nicotine (NICOTROL) 10 MG inhaler Inhale 2 cartridge into the lungs daily as needed for smoking cessation 6 Box 1     Allergies   Allergen Reactions     1-Methyl 2-Pyrrolidone      Other reaction(s): Swelling     Iodine Swelling     Other reaction(s): Angioedema  Other reaction(s): Swelling  Facial swelling.     Morphine Anxiety and Nausea and Vomiting     Other reaction(s): Behavioral Disturbances       Reviewed and updated as needed this visit by clinical staff  Tobacco  Allergies  Meds  Soc Hx      Reviewed and updated as needed this visit by Provider         ROS:  Constitutional: She notes weight has fluctuated significantly which is aggravated with current living situation. She notes use of Slimfast resulted in a weight loss of 25 pounds. Patient has gained 10 pounds since stopping a few months ago. Patient has reduced tobacco use to 5 cigarettes or less daily. Denies regular exercise though her job as a  keeps her active.    Constitutional, HEENT, cardiovascular, pulmonary, gi and gu systems are negative, except as otherwise noted.    This document serves as a record of the services and decisions personally performed and made by Todd Manzanares MD. It was created on his/her behalf by Azul Schrader, a trained medical scribe. The creation  "of this document is based the provider's statements to the medical scribe.  Scribe Azul Schrader 9:01 AM, July 18, 2017     OBJECTIVE:     /69 (BP Location: Right arm, Patient Position: Chair, Cuff Size: Adult Large)  Pulse 71  Temp 97.6  F (36.4  C) (Oral)  Ht 1.727 m (5' 8\")  Wt 96.4 kg (212 lb 9.6 oz)  LMP 07/20/2006  SpO2 98%  Breastfeeding? No  BMI 32.33 kg/m2  Body mass index is 32.33 kg/(m^2).  Neck was supple without adenopathy or thyromegaly her carotids were normal without . Thyroids was nontender without enlargement   Chest clear to auscultation and percussion  Cardiovascular S1 and S2 are physiologic without murmurs or gallops  Abdomen bowel sounds were normal.  There is no palpable mass or organomegaly. She has a large baseball sized area of erythema with a central pustular area which is nondraining. The skin was prepped with alcohol and using an 18 gauge needle the central area was lanced with oozing puss which was cultures, the pus was expressed with the area of induration that was larger than a golf ball. Dressed with 4x4s.   Extremities nontender without any edema  Pulses pedal pulses are as described otherwise his pulses are bilaterally symmetrical throughout without bruits  Skin without significant abnormality   DTRs normal.     ASSESSMENT/PLAN:   1. Bug bite, initial encounter  See below, #8.    2. ROMEO on CPAP    3. Tobacco abuse  Patient notes tobacco use has decreased though she is satisfied with current use.    4. 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)    5. Major depressive disorder, recurrent episode, moderate (H)    6. ADHD, predominantly inattentive type    7. Non morbid obesity due to excess calories  She is embarking on a more regimented lifestyle, moving from her assisted living environment to individual living.    8. Carbuncle  Probable initial spider bite followed by infection. Begin Keflex at dose and regimen below. Counseled proper cleaning and bandaging " techniques.  - cephalexin (KEFLEX) 250 MG capsule; Take 2 capsules (500 mg) by mouth 3 times daily  Dispense: 30 capsule; Refill: 0  - Wound Culture Aerobic Bacterial    If symptoms fail to improve over the next 10 days or are worsening return for follow up.     Except otherwise noted as above, all conditions are stable and medications are tolerated well. Continue related medications unchanged.     Todd Manzanares MD  [facility]    The information in this document, created by the medical scribe for me, accurately reflects the services I personally performed and the decisions made by me. I have reviewed and approved this document for accuracy prior to leaving the patient care area.  Todd Manzanares MD  8:58 AM, 07/18/17

## 2017-07-18 NOTE — NURSING NOTE
"Chief Complaint   Patient presents with     Insect Bites       Initial /69 (BP Location: Right arm, Patient Position: Chair, Cuff Size: Adult Large)  Pulse 71  Temp 97.6  F (36.4  C) (Oral)  Ht 5' 8\" (1.727 m)  Wt 212 lb 9.6 oz (96.4 kg)  LMP 07/20/2006  SpO2 98%  Breastfeeding? No  BMI 32.33 kg/m2 Estimated body mass index is 32.33 kg/(m^2) as calculated from the following:    Height as of this encounter: 5' 8\" (1.727 m).    Weight as of this encounter: 212 lb 9.6 oz (96.4 kg).  Medication Reconciliation: complete.  Daisha Dang CMA    "

## 2017-07-18 NOTE — MR AVS SNAPSHOT
"              After Visit Summary   7/18/2017    Gudelia Quintanilla    MRN: [id number]           Patient Information     Date Of Birth          1965        Visit Information        Provider Department      7/18/2017 9:00 AM Todd Manzanares MD [facility]        Today's Diagnoses     Bug bite, initial encounter    -  1    ROMEO on CPAP        Tobacco abuse        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)        Major depressive disorder, recurrent episode, moderate (H)        ADHD, predominantly inattentive type        Non morbid obesity due to excess calories        Carbuncle        Hypothyroidism due to acquired atrophy of thyroid           Follow-ups after your visit        Who to contact     If you have questions or need follow up information about today's clinic visit or your schedule please contact [facility] directly at [phone].  Normal or non-critical lab and imaging results will be communicated to you by MyChart, letter or phone within 4 business days after the clinic has received the results. If you do not hear from us within 7 days, please contact the clinic through MyChart or phone. If you have a critical or abnormal lab result, we will notify you by phone as soon as possible.  Submit refill requests through [employer] or call your pharmacy and they will forward the refill request to us. Please allow 3 business days for your refill to be completed.          Additional Information About Your Visit        MyChart Information     [employer] lets you send messages to your doctor, view your test results, renew your prescriptions, schedule appointments and more. To sign up, go to [email or web address] . Click on \"Log in\" on the left side of the screen, which will take you to the Welcome page. Then click on \"Sign up Now\" on the right side of the page.     You will be asked to enter the access code listed below, as well as some personal information. Please follow the directions to create your " "username and password.     Your access code is: [email or web address]  Expires: 10/17/2017  6:17 AM     Your access code will  in 90 days. If you need help or a new code, please call your [facility] clinic or [phone].        Care EveryWhere ID     This is your Care EveryWhere ID. This could be used by other organizations to access your [facility] medical records  [id number]        Your Vitals Were     Pulse Temperature Height Last Period Pulse Oximetry Breastfeeding?    71 97.6  F (36.4  C) (Oral) 5' 8\" (1.727 m) 2006 98% No    BMI (Body Mass Index)                   32.33 kg/m2            Blood Pressure from Last 3 Encounters:   17 107/69   17 124/72   17 97/61    Weight from Last 3 Encounters:   17 212 lb 9.6 oz (96.4 kg)   17 205 lb 12.8 oz (93.4 kg)   17 202 lb 14.4 oz (92 kg)              We Performed the Following     DRAIN SKIN ABSCESS SIMPLE/SINGLE     TSH     Wound Culture Aerobic Bacterial          Today's Medication Changes          These changes are accurate as of: 17 11:59 PM.  If you have any questions, ask your nurse or doctor.               Start taking these medicines.        Dose/Directions    cephalexin 250 MG capsule   Commonly known as:  KEFLEX   Used for:  Carbuncle   Started by:  Todd Manzanares MD        Dose:  500 mg   Take 2 capsules (500 mg) by mouth 3 times daily   Quantity:  30 capsule   Refills:  0         These medicines have changed or have updated prescriptions.        Dose/Directions    fluticasone 50 MCG/ACT spray   Commonly known as:  FLONASE   This may have changed:  Another medication with the same name was removed. Continue taking this medication, and follow the directions you see here.   Used for:  Chronic rhinitis   Changed by:  Todd Manzanares MD        Dose:  2 spray   Spray 2 sprays into both nostrils 2 times daily   Quantity:  6 Bottle   Refills:  3       levothyroxine 100 MCG tablet   Commonly known as:  " SYNTHROID/LEVOTHROID   This may have changed:  Another medication with the same name was removed. Continue taking this medication, and follow the directions you see here.   Used for:  Hypothyroidism due to medication   Changed by:  Todd Manzanares MD        Dose:  100 mcg   Take 1 tablet (100 mcg) by mouth daily   Quantity:  90 tablet   Refills:  3       order for DME   This may have changed:  Another medication with the same name was removed. Continue taking this medication, and follow the directions you see here.   Used for:  ROMEO on CPAP   Changed by:  Diane Salomon MD        Equipment being ordered: CPAP Please dispense Cpap and its supply   Quantity:  1 Units   Refills:  prn         Stop taking these medicines if you haven't already. Please contact your care team if you have questions.     OLANZapine 10 MG tablet   Commonly known as:  zyPREXA   Stopped by:  Todd Manzanares MD           OLANZapine 20 MG tablet   Commonly known as:  zyPREXA   Stopped by:  Todd Manzanares MD           sulfamethoxazole-trimethoprim 800-160 MG per tablet   Commonly known as:  BACTRIM DS/SEPTRA DS   Stopped by:  Todd Manzanares MD                Where to get your medicines      These medications were sent to [facility] [id number] - [place], [place] - [address] AT [facility]  [address], [place] [address]    Hours:  24-hours Phone:  [phone]     cephalexin 250 MG capsule                Primary Care Provider Office Phone # Fax #    Todd Manzanares [phone]       [facility] [address] [address]        Equal Access to Services     [facility]: Hadii aad ku hadasho Soomaali, waaxda luqadaha, qaybta kaalmada adeegyatito, darrel kay. So [facility] [phone].    ATENCIÓN: Si habla español, tiene a fitzpatrick disposición servicios gratuitos de asistencia lingüística. Llame al [phone].    We comply with applicable  federal civil rights laws and [place] laws. We do not discriminate on the basis of race, color, national origin, age, disability sex, sexual orientation or gender identity.            Thank you!     Thank you for choosing [facility]  for your care. Our goal is always to provide you with excellent care. Hearing back from our patients is one way we can continue to improve our services. Please take a few minutes to complete the written survey that you may receive in the mail after your visit with us. Thank you!             Your Updated Medication List - Protect others around you: Learn how to safely use, store and throw away your medicines at www.disposemymeds.org.          This list is accurate as of: 7/18/17 11:59 PM.  Always use your most recent med list.                   Brand Name Dispense Instructions for use Diagnosis    albuterol 108 (90 BASE) MCG/ACT Inhaler    PROAIR HFA/PROVENTIL HFA/VENTOLIN HFA    1 Inhaler    Inhale 2 puffs into the lungs every 6 hours as needed for shortness of breath / dyspnea or wheezing        ARIPiprazole 30 MG tablet    ABILIFY     TAKE 1 TABLET BY MOUTH DAILY.        armodafinil 250 MG Tabs tablet    NUVIGIL    30 tablet    Take 1 tablet (250 mg) by mouth every morning    Excessive sleepiness       cephalexin 250 MG capsule    KEFLEX    30 capsule    Take 2 capsules (500 mg) by mouth 3 times daily    Carbuncle       cholecalciferol 1000 UNIT tablet    vitamin D    100 tablet    Take 1 tablet (1,000 Units) by mouth daily    Preventive measure       Doxepin HCl 150 MG Caps     30 capsule    Take 300 mg by mouth At Bedtime        fluticasone 50 MCG/ACT spray    FLONASE    6 Bottle    Spray 2 sprays into both nostrils 2 times daily    Chronic rhinitis       furosemide 40 MG tablet    LASIX    30 tablet    Take 1 tablet (40 mg) by mouth daily    Bilateral edema of lower extremity       levothyroxine 100 MCG tablet    SYNTHROID/LEVOTHROID    90 tablet    Take 1 tablet (100  mcg) by mouth daily    Hypothyroidism due to medication       lithium 300 MG tablet      Takes 300 mg every morning and 600 mg at Noon        nicotine 10 MG Inhaler    NICOTROL    6 Box    Inhale 2 cartridge into the lungs daily as needed for smoking cessation    Tobacco use disorder       order for DME     1 Units    Equipment being ordered: CPAP Please dispense Cpap and its supply    ROMEO on CPAP       pantoprazole 40 MG EC tablet    PROTONIX    60 tablet    Take 1 tablet (40 mg) by mouth 2 times daily Take 1.5 hours prior to last meal of the day, and take at bedtime    Gastroesophageal reflux disease without esophagitis       polyethylene glycol Packet    MIRALAX/GLYCOLAX    30 packet    Take 17 g by mouth 2 times daily as needed for constipation    Chronic constipation       simethicone 80 MG chewable tablet    MYLICON    180 tablet    Take 2 tablets (160 mg) by mouth every 6 hours as needed for flatulence or cramping    Bloating symptom

## 2017-07-18 NOTE — TELEPHONE ENCOUNTER
Reason for Call:  Other OV 7/18    Detailed comments: [facility] needed OV notes faxed to [phone] because pt was put on ABX. Fax docs to [facility] and they recvd them. FYI    Phone Number Patient can be reached at: Other phone number:  [phone]    Best Time: any    Can we leave a detailed message on this number? YES    Call taken on 7/18/2017 at 1:34 PM by Brian Weller

## 2017-07-18 NOTE — TELEPHONE ENCOUNTER
Reason for Call:      Detailed comments: pt calling she needs the results of the culture faxed over to [phone]    Phone Number Patient can be reached at: Other phone number:  [phone]    Best Time: anytime     Can we leave a detailed message on this number? Yes     Call taken on 7/18/2017 at 1:53 PM by Hilda Dang

## 2017-07-21 LAB
BACTERIA SPEC CULT: ABNORMAL
Lab: ABNORMAL
MICRO REPORT STATUS: ABNORMAL
MICROORGANISM SPEC CULT: ABNORMAL
SPECIMEN SOURCE: ABNORMAL

## 2017-07-24 ENCOUNTER — MEDICAL CORRESPONDENCE (OUTPATIENT)
Dept: HEALTH INFORMATION MANAGEMENT | Facility: CLINIC | Age: 52
End: 2017-07-24

## 2017-07-25 ENCOUNTER — TRANSFERRED RECORDS (OUTPATIENT)
Dept: HEALTH INFORMATION MANAGEMENT | Facility: CLINIC | Age: 52
End: 2017-07-25

## 2017-08-11 NOTE — PROGRESS NOTES
SUBJECTIVE:                                                    Gudelia Quintanilla is a 52 year old female who presents to clinic today for the following health issues:    Patient presents to the clinic to follow up on her thyroid levels. At her last office visit her labs were checked and her thyroid levels were low. She states that she has been missing doses due to forgetfulness. She takes her medication in the morning and her morning routine isn't always regular. Misses a dose once or twice a week. Discussed options on when she should take her medication. Patient has been really upset with her hair loss due to her hypothyroidism.    Also reports that every 4 days she has diarrhea with associated cramping. She doesn't have a bowel movement daily, and she is constipated until she uses MiraLax which leads to diarrhea.  Problem list and histories reviewed & adjusted, as indicated.  Additional history: as documented    Current Outpatient Prescriptions   Medication Sig Dispense Refill     GABAPENTIN PO        cephalexin (KEFLEX) 250 MG capsule Take 2 capsules (500 mg) by mouth 3 times daily 30 capsule 0     armodafinil (NUVIGIL) 250 MG TABS tablet Take 1 tablet (250 mg) by mouth every morning 30 tablet 0     levothyroxine (SYNTHROID/LEVOTHROID) 100 MCG tablet Take 1 tablet (100 mcg) by mouth daily 90 tablet 3     fluticasone (FLONASE) 50 MCG/ACT spray Spray 2 sprays into both nostrils 2 times daily 6 Bottle 3     ARIPiprazole (ABILIFY) 30 MG tablet TAKE 1 TABLET BY MOUTH DAILY.  2     polyethylene glycol (MIRALAX/GLYCOLAX) packet Take 17 g by mouth 2 times daily as needed for constipation 30 packet 6     simethicone (MYLICON) 80 MG chewable tablet Take 2 tablets (160 mg) by mouth every 6 hours as needed for flatulence or cramping 180 tablet 1     nicotine (NICOTROL) 10 MG inhaler Inhale 2 cartridge into the lungs daily as needed for smoking cessation 6 Box 1     cholecalciferol (VITAMIN D) 1000 UNIT tablet Take 1 tablet  "(1,000 Units) by mouth daily 100 tablet 3     lithium 300 MG tablet Takes 300 mg every morning and 600 mg at Noon       pantoprazole (PROTONIX) 40 MG enteric coated tablet Take 1 tablet (40 mg) by mouth 2 times daily Take 1.5 hours prior to last meal of the day, and take at bedtime 60 tablet 5     furosemide (LASIX) 40 MG tablet Take 1 tablet (40 mg) by mouth daily 30 tablet 5     order for DME Equipment being ordered: CPAP  Please dispense Cpap and its supply 1 Units prn     Doxepin HCl 150 MG CAPS Take 300 mg by mouth At Bedtime (Patient taking differently: Take 150 mg by mouth At Bedtime ) 30 capsule 0     albuterol (PROAIR HFA, PROVENTIL HFA, VENTOLIN HFA) 108 (90 BASE) MCG/ACT inhaler Inhale 2 puffs into the lungs every 6 hours as needed for shortness of breath / dyspnea or wheezing 1 Inhaler 1     Allergies   Allergen Reactions     1-Methyl 2-Pyrrolidone      Other reaction(s): Swelling     Iodine Swelling     Other reaction(s): Angioedema  Other reaction(s): Swelling  Facial swelling.     Morphine Anxiety and Nausea and Vomiting     Other reaction(s): Behavioral Disturbances     Reviewed and updated as needed this visit by clinical staff  Tobacco  Allergies  Meds  Problems  Med Hx  Surg Hx  Fam Hx  Soc Hx        Reviewed and updated as needed this visit by Provider         ROS:  Constitutional, HEENT, cardiovascular, pulmonary, gi and gu systems are negative, except as otherwise noted.    This document serves as a record of the services and decisions personally performed and made by Todd Manzanares MD. It was created on his/her behalf by Homa Alvarez, a trained medical scribe. The creation of this document is based the provider's statements to the medical scribe.  Rock Alvarez 8:46 AM, August 14, 2017    OBJECTIVE:   /69  Pulse 71  Temp 98.1  F (36.7  C) (Oral)  Ht 1.727 m (5' 8\")  Wt 96.6 kg (213 lb)  LMP 07/20/2006  SpO2 100%  Breastfeeding? No  BMI 32.39 kg/m2  Body " mass index is 32.39 kg/(m^2).    She has notable hair loss  Neck was supple without adenopathy or thyromegaly her carotids were normal without bruits  Chest clear to auscultation and percussion  Cardiovascular S1 and S2 are physiologic without murmurs or gallops  Abdomen bowel sounds were normal.  There is no palpable mass or organomegaly  Extremities nontender without any edema, deep tendon reflexes were slightly delayed  Pulses pedal pulses are as described otherwise his pulses are bilaterally symmetrical throughout without bruits  Skin without significant abnormality, dry    Diagnostic Test Results:  No results found for this or any previous visit (from the past 24 hour(s)).    ASSESSMENT/PLAN:     1. Hypothyroidism due to medication  - levothyroxine (SYNTHROID/LEVOTHROID) 125 MCG tablet; Take 1 tablet (125 mcg) by mouth daily  Dispense: 60 tablet; Refill: 1    2. Major depressive disorder, recurrent episode, moderate (H)    3. 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)    4. Bipolar 2 disorder (H)    5. Iron deficiency anemia due to chronic blood loss  Reevaluate upon return with follow-up of hypothyroidism    6. Tobacco abuse  -Status quo, patient has cut down on the amount she smokes.    7. ROMEO on CPAP    8. Non morbid obesity due to excess calories  Reinitiating diet and activity to attempt weight loss    -Patient will start taking her dose of levothyroxine at night before bed. Her dose has also been increased as well. She will also start using MiraLax 1/2 scoop daily to improve her regularity.    The information in this document, created by the medical scribe for me, accurately reflects the services I personally performed and the decisions made by me. I have reviewed and approved this document for accuracy prior to leaving the patient care area.  Todd Manzanares MD  8:59 AM, 08/14/17    Todd Manzanares MD  [facility]

## 2017-08-14 ENCOUNTER — OFFICE VISIT (OUTPATIENT)
Dept: FAMILY MEDICINE | Facility: CLINIC | Age: 52
End: 2017-08-14
Payer: COMMERCIAL

## 2017-08-14 VITALS
TEMPERATURE: 98.1 F | BODY MASS INDEX: 32.28 KG/M2 | DIASTOLIC BLOOD PRESSURE: 69 MMHG | SYSTOLIC BLOOD PRESSURE: 118 MMHG | WEIGHT: 213 LBS | HEART RATE: 71 BPM | HEIGHT: 68 IN | OXYGEN SATURATION: 100 %

## 2017-08-14 DIAGNOSIS — F10.21 RECOVERING ALCOHOLIC IN REMISSION (H): ICD-10-CM

## 2017-08-14 DIAGNOSIS — Z72.0 TOBACCO ABUSE: ICD-10-CM

## 2017-08-14 DIAGNOSIS — E66.09 NON MORBID OBESITY DUE TO EXCESS CALORIES: ICD-10-CM

## 2017-08-14 DIAGNOSIS — E03.2 HYPOTHYROIDISM DUE TO MEDICATION: ICD-10-CM

## 2017-08-14 DIAGNOSIS — G47.33 OSA ON CPAP: Primary | ICD-10-CM

## 2017-08-14 DIAGNOSIS — F31.81 BIPOLAR 2 DISORDER (H): ICD-10-CM

## 2017-08-14 DIAGNOSIS — F33.1 MAJOR DEPRESSIVE DISORDER, RECURRENT EPISODE, MODERATE (H): ICD-10-CM

## 2017-08-14 DIAGNOSIS — D50.0 IRON DEFICIENCY ANEMIA DUE TO CHRONIC BLOOD LOSS: ICD-10-CM

## 2017-08-14 PROCEDURE — 99214 OFFICE O/P EST MOD 30 MIN: CPT | Performed by: INTERNAL MEDICINE

## 2017-08-14 RX ORDER — LEVOTHYROXINE SODIUM 125 UG/1
125 TABLET ORAL DAILY
Qty: 60 TABLET | Refills: 1 | Status: SHIPPED | OUTPATIENT
Start: 2017-08-14 | End: 2017-11-14

## 2017-08-14 ASSESSMENT — PATIENT HEALTH QUESTIONNAIRE - PHQ9: SUM OF ALL RESPONSES TO PHQ QUESTIONS 1-9: 6

## 2017-08-14 NOTE — MR AVS SNAPSHOT
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  Normal or non-critical lab and imaging results will be communicated to you by Weelehart, letter or phone within 4 business days after the clinic has received the results. If you do not hear from us within 7 days, please contact the clinic through Weelehart or phone. If you have a critical or abnormal lab result, we will notify you by phone as soon as possible.  Submit refill requests through Broadlink or call your pharmacy and they will forward the refill request to us. Please allow 3 business days for your refill to be completed.          Additional Information About Your Visit        WeeleharXODIS Information     Broadlink lets you send messages to your doctor, view your test results, renew your prescriptions, schedule appointments and more. To sign up, go to [email or web address] . Click on \"Log in\" on the left side of the screen, which will take you to the Welcome page. Then click on \"Sign up Now\" on the right side of the page.     You will be asked to enter the access code listed below, as well as some personal information. Please follow the directions to create your username and password.   " "  Your access code is: [email or web address]  Expires: 10/17/2017  6:17 AM     Your access code will  in 90 days. If you need help or a new code, please call your [facility] or [phone].        Care EveryWhere ID     This is your Care EveryWhere ID. This could be used by other organizations to access your [place] medical records  [id number]        Your Vitals Were     Pulse Temperature Height Last Period Pulse Oximetry Breastfeeding?    71 98.1  F (36.7  C) (Oral) 5' 8\" (1.727 m) 2006 100% No    BMI (Body Mass Index)                   32.39 kg/m2            Blood Pressure from Last 3 Encounters:   17 118/69   17 107/69   17 124/72    Weight from Last 3 Encounters:   17 213 lb (96.6 kg)   17 212 lb 9.6 oz (96.4 kg)   17 205 lb 12.8 oz (93.4 kg)              Today, you had the following     No orders found for display         Today's Medication Changes          These changes are accurate as of: 17 11:59 PM.  If you have any questions, ask your nurse or doctor.               These medicines have changed or have updated prescriptions.        Dose/Directions    Doxepin HCl 150 MG Caps   This may have changed:  how much to take        Dose:  300 mg   Take 300 mg by mouth At Bedtime   Quantity:  30 capsule   Refills:  0       levothyroxine 125 MCG tablet   Commonly known as:  SYNTHROID/LEVOTHROID   This may have changed:    - medication strength  - how much to take   Used for:  Hypothyroidism due to medication   Changed by:  Todd Manzanares MD        Dose:  125 mcg   Take 1 tablet (125 mcg) by mouth daily   Quantity:  60 tablet   Refills:  1            Where to get your medicines      These medications were sent to [id number] - [place], [place] - [address] AT [facility] [address], [place] [address]    Hours:  24-hours Phone:  [phone]     levothyroxine 125 MCG tablet                Primary Care Provider Office " Phone # Fax #    Todd MARY Manzanares -[phone] [address] [facility] [address]  [facility] [address]        Equal Access to Services     JAILENE REINA : Hadii aad ku hadgracielao Soomaali, waaxda luqadaha, qaybta kaalmada adeegyada, darrel lewissarah kay. So [facility] [phone].    ATENCIÓN: Si habla español, tiene a fitzpatrick disposición servicios gratuitos de asistencia lingüística. Llame al [phone].    We comply with applicable federal civil rights laws and [place] laws. We do not discriminate on the basis of race, color, national origin, age, disability sex, sexual orientation or gender identity.            Thank you!     Thank you for choosing [facility]  for your care. Our goal is always to provide you with excellent care. Hearing back from our patients is one way we can continue to improve our services. Please take a few minutes to complete the written survey that you may receive in the mail after your visit with us. Thank you!             Your Updated Medication List - Protect others around you: Learn how to safely use, store and throw away your medicines at www.disposemymeds.org.          This list is accurate as of: 8/14/17 11:59 PM.  Always use your most recent med list.                   Brand Name Dispense Instructions for use Diagnosis    albuterol 108 (90 BASE) MCG/ACT Inhaler    PROAIR HFA/PROVENTIL HFA/VENTOLIN HFA    1 Inhaler    Inhale 2 puffs into the lungs every 6 hours as needed for shortness of breath / dyspnea or wheezing        ARIPiprazole 30 MG tablet    ABILIFY     TAKE 1 TABLET BY MOUTH DAILY.        armodafinil 250 MG Tabs tablet    NUVIGIL    30 tablet    Take 1 tablet (250 mg) by mouth every morning    Excessive sleepiness       cephalexin 250 MG capsule    KEFLEX    30 capsule    Take 2 capsules (500 mg) by mouth 3 times daily    Carbuncle       Doxepin HCl 150 MG Caps     30 capsule    Take 300 mg by mouth At Bedtime        fluticasone 50  MCG/ACT spray    FLONASE    6 Bottle    Spray 2 sprays into both nostrils 2 times daily    Chronic rhinitis       furosemide 40 MG tablet    LASIX    30 tablet    Take 1 tablet (40 mg) by mouth daily    Bilateral edema of lower extremity       GABAPENTIN PO           levothyroxine 125 MCG tablet    SYNTHROID/LEVOTHROID    60 tablet    Take 1 tablet (125 mcg) by mouth daily    Hypothyroidism due to medication       lithium 300 MG tablet      Takes 300 mg every morning and 600 mg at Noon        nicotine 10 MG Inhaler    NICOTROL    6 Box    Inhale 2 cartridge into the lungs daily as needed for smoking cessation    Tobacco use disorder       order for DME     1 Units    Equipment being ordered: CPAP Please dispense Cpap and its supply    ROMEO on CPAP       pantoprazole 40 MG EC tablet    PROTONIX    60 tablet    Take 1 tablet (40 mg) by mouth 2 times daily Take 1.5 hours prior to last meal of the day, and take at bedtime    Gastroesophageal reflux disease without esophagitis       polyethylene glycol Packet    MIRALAX/GLYCOLAX    30 packet    Take 17 g by mouth 2 times daily as needed for constipation    Chronic constipation       simethicone 80 MG chewable tablet    MYLICON    180 tablet    Take 2 tablets (160 mg) by mouth every 6 hours as needed for flatulence or cramping    Bloating symptom

## 2017-08-14 NOTE — NURSING NOTE
"Chief Complaint   Patient presents with     Follow Up For     thyroid       Initial /69  Pulse 71  Temp 98.1  F (36.7  C) (Oral)  Ht 5' 8\" (1.727 m)  Wt 213 lb (96.6 kg)  LMP 07/20/2006  SpO2 100%  Breastfeeding? No  BMI 32.39 kg/m2 Estimated body mass index is 32.39 kg/(m^2) as calculated from the following:    Height as of this encounter: 5' 8\" (1.727 m).    Weight as of this encounter: 213 lb (96.6 kg).  Medication Reconciliation: complete   Antoinette OLGUIN CMA      "

## 2017-08-17 DIAGNOSIS — Z29.9 PREVENTIVE MEASURE: ICD-10-CM

## 2017-08-17 RX ORDER — CHOLECALCIFEROL (VITAMIN D3) 25 MCG
TABLET ORAL
Qty: 100 TABLET | Refills: 3 | Status: SHIPPED | OUTPATIENT
Start: 2017-08-17 | End: 2017-11-14

## 2017-08-17 NOTE — TELEPHONE ENCOUNTER
Prescription approved per [facility] Refill Protocol.  Gabbi Schultz RN

## 2017-08-17 NOTE — TELEPHONE ENCOUNTER
VITAMIN D3 1000 UNITS tablet        Last Written Prescription Date: 7/26/2016  Last Fill Quantity: 100,  # refills: 3   Last Office Visit with [facility], [facility] or [facility] prescribing provider: 8/14/2017

## 2017-09-14 ENCOUNTER — TRANSFERRED RECORDS (OUTPATIENT)
Dept: HEALTH INFORMATION MANAGEMENT | Facility: CLINIC | Age: 52
End: 2017-09-14

## 2017-09-22 ENCOUNTER — TRANSFERRED RECORDS (OUTPATIENT)
Dept: HEALTH INFORMATION MANAGEMENT | Facility: CLINIC | Age: 52
End: 2017-09-22

## 2017-11-03 ENCOUNTER — TELEPHONE (OUTPATIENT)
Dept: FAMILY MEDICINE | Facility: CLINIC | Age: 52
End: 2017-11-03

## 2017-11-03 NOTE — TELEPHONE ENCOUNTER
To PCP:    1) Patient has developed 2 boils under her right arm (armpit). She would like to know if you want to see her for this or have her try antibiotic as before.    2) She is asking for next step regarding Thyroid medication. She is very frustrated that her hair is still very thin and brittle even with change to thyroid medication.   She really does not want to be on medication. Asking what next step may be.    Please advise.  Thank you.  Gabbi Schultz, RN

## 2017-11-03 NOTE — TELEPHONE ENCOUNTER
Reason for Call:  Other call back    Detailed comments: Pt called this morning and would like a member of Dr. Manzanares's team to give her a call in regards to some boils that have developed underneath her arm and she is wondering if there is any medication she can get for it. Also, pt also has some questions in regards to her thyroid medication and whether or not she still needs to be one it or not. Please give pt a call in regards to this asap. Thank you.    Phone Number Patient can be reached at: Home number on file [phone] (home)    Best Time:     Can we leave a detailed message on this number? YES    Call taken on 11/3/2017 at 9:40 AM by Elizabeth Rosado

## 2017-11-14 ENCOUNTER — OFFICE VISIT (OUTPATIENT)
Dept: FAMILY MEDICINE | Facility: CLINIC | Age: 52
End: 2017-11-14
Payer: COMMERCIAL

## 2017-11-14 VITALS
OXYGEN SATURATION: 96 % | DIASTOLIC BLOOD PRESSURE: 61 MMHG | BODY MASS INDEX: 34.1 KG/M2 | WEIGHT: 225 LBS | TEMPERATURE: 97.2 F | SYSTOLIC BLOOD PRESSURE: 99 MMHG | HEIGHT: 68 IN | HEART RATE: 76 BPM

## 2017-11-14 DIAGNOSIS — F33.1 MAJOR DEPRESSIVE DISORDER, RECURRENT EPISODE, MODERATE (H): ICD-10-CM

## 2017-11-14 DIAGNOSIS — G47.10 EXCESSIVE SLEEPINESS: ICD-10-CM

## 2017-11-14 DIAGNOSIS — E03.2 HYPOTHYROIDISM DUE TO MEDICATION: Primary | ICD-10-CM

## 2017-11-14 DIAGNOSIS — F31.81 BIPOLAR 2 DISORDER (H): ICD-10-CM

## 2017-11-14 DIAGNOSIS — Z72.0 TOBACCO ABUSE: ICD-10-CM

## 2017-11-14 DIAGNOSIS — F10.21 RECOVERING ALCOHOLIC IN REMISSION (H): ICD-10-CM

## 2017-11-14 DIAGNOSIS — Z29.9 PREVENTIVE MEASURE: ICD-10-CM

## 2017-11-14 DIAGNOSIS — R60.0 BILATERAL EDEMA OF LOWER EXTREMITY: ICD-10-CM

## 2017-11-14 DIAGNOSIS — D50.0 IRON DEFICIENCY ANEMIA DUE TO CHRONIC BLOOD LOSS: ICD-10-CM

## 2017-11-14 PROCEDURE — 84439 ASSAY OF FREE THYROXINE: CPT | Performed by: INTERNAL MEDICINE

## 2017-11-14 PROCEDURE — 84443 ASSAY THYROID STIM HORMONE: CPT | Performed by: INTERNAL MEDICINE

## 2017-11-14 PROCEDURE — 99214 OFFICE O/P EST MOD 30 MIN: CPT | Performed by: INTERNAL MEDICINE

## 2017-11-14 PROCEDURE — 36415 COLL VENOUS BLD VENIPUNCTURE: CPT | Performed by: INTERNAL MEDICINE

## 2017-11-14 RX ORDER — ARMODAFINIL 250 MG/1
250 TABLET ORAL EVERY MORNING
Qty: 30 TABLET | Refills: 3 | Status: SHIPPED | OUTPATIENT
Start: 2017-11-14 | End: 2019-05-03

## 2017-11-14 NOTE — NURSING NOTE
"Chief Complaint   Patient presents with     Recheck Medication       Initial BP 99/61 (BP Location: Left arm, Patient Position: Sitting, Cuff Size: Adult Large)  Pulse 76  Temp 97.2  F (36.2  C) (Oral)  Ht 5' 8\" (1.727 m)  Wt 225 lb (102.1 kg)  LMP 07/20/2006  SpO2 96%  Breastfeeding? No  BMI 34.21 kg/m2 Estimated body mass index is 34.21 kg/(m^2) as calculated from the following:    Height as of this encounter: 5' 8\" (1.727 m).    Weight as of this encounter: 225 lb (102.1 kg).  Medication Reconciliation: complete   Lexus Ramírez- DEONDRE      "

## 2017-11-14 NOTE — MR AVS SNAPSHOT
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  Normal or non-critical lab and imaging results will be communicated to you by Kaonetics Technologieshart, letter or phone within 4 business days after the clinic has received the results. If you do not hear from us within 7 days, please contact the clinic through [employer] or phone. If you have a critical or abnormal lab result, we will notify you by phone as soon as possible.  Submit refill requests through The Library or call your pharmacy and they will forward the refill request to us. Please allow 3 business days for your refill to be completed.          Additional Information About Your Visit        Kaonetics Technologieshart Information     The Library lets you send messages to your doctor, view your test results, renew your prescriptions, schedule appointments and more. To sign up, go to [email or web address] . Click on \"Log in\" on the left side of the screen, which will take you to the Welcome page. Then click on \"Sign up Now\" on the right side of the page.     You will be asked to enter the access code listed below, as well as some personal information. Please follow the directions to create your username and password.   " "  Your access code is: [email or web address]  Expires: 2018  4:33 AM     Your access code will  in 90 days. If you need help or a new code, please call your [place] clinic or [phone].        Care EveryWhere ID     This is your Care EveryWhere ID. This could be used by other organizations to access your [place] medical records  [id number]        Your Vitals Were     Pulse Temperature Height Last Period Pulse Oximetry Breastfeeding?    76 97.2  F (36.2  C) (Oral) 5' 8\" (1.727 m) 2006 96% No    BMI (Body Mass Index)                   34.21 kg/m2            Blood Pressure from Last 3 Encounters:   17 99/61   17 118/69   17 107/69    Weight from Last 3 Encounters:   17 225 lb (102.1 kg)   17 213 lb (96.6 kg)   17 212 lb 9.6 oz (96.4 kg)              We Performed the Following     T4 free     TSH with free T4 reflex          Today's Medication Changes          These changes are accurate as of: 17 11:59 PM.  If you have any questions, ask your nurse or doctor.               These medicines have changed or have updated prescriptions.        Dose/Directions    cholecalciferol 1000 UNIT tablet   Commonly known as:  vitamin D3   This may have changed:  See the new instructions.   Used for:  Preventive measure   Changed by:  Todd Manzanares MD        Dose:  1000 Units   Take 1 tablet (1,000 Units) by mouth daily   Quantity:  100 tablet   Refills:  3       Doxepin HCl 150 MG Caps   This may have changed:  how much to take        Dose:  300 mg   Take 300 mg by mouth At Bedtime   Quantity:  30 capsule   Refills:  0       levothyroxine 150 MCG tablet   Commonly known as:  SYNTHROID/LEVOTHROID   This may have changed:    - medication strength  - how much to take   Used for:  Hypothyroidism due to medication   Changed by:  Todd Manzanares MD        Dose:  150 mcg   Take 1 tablet (150 mcg) by mouth daily   Quantity:  60 tablet   Refills:  1            Where to get your " medicines      These medications were sent to [facility] - [place], [place] - [address] AT [facility] [address]  [address], [place] [address]    Hours:  24-hours Phone:  [phone]     cholecalciferol 1000 UNIT tablet    furosemide 40 MG tablet    levothyroxine 150 MCG tablet         Some of these will need a paper prescription and others can be bought over the counter.  Ask your nurse if you have questions.     Bring a paper prescription for each of these medications     armodafinil 250 MG Tabs tablet                Primary Care Provider Office Phone # Fax #    Todd Manzanares -[phone] [address] [address]        Equal Access to Services     JAILENE REINA : Hadii aad ku hadasho Somehdi, waaxda luqadaha, qaybta kaalmada adeegyada, darrel wolfe . So [facility] [phone].    ATENCIÓN: Si habla español, tiene a fitzpatrick disposición servicios gratuitos de asistencia lingüística. LlGood [facility] [phone].    We comply with applicable federal civil rights laws and [place] laws. We do not discriminate on the basis of race, color, national origin, age, disability, sex, sexual orientation, or gender identity.            Thank you!     Thank you for choosing [facility]  for your care. Our goal is always to provide you with excellent care. Hearing back from our patients is one way we can continue to improve our services. Please take a few minutes to complete the written survey that you may receive in the mail after your visit with us. Thank you!             Your Updated Medication List - Protect others around you: Learn how to safely use, store and throw away your medicines at www.disposemymeds.org.          This list is accurate as of: 11/14/17 11:59 PM.  Always use your most recent med list.                   Brand Name Dispense Instructions for use Diagnosis    albuterol 108 (90 BASE) MCG/ACT  Inhaler    PROAIR HFA/PROVENTIL HFA/VENTOLIN HFA    1 Inhaler    Inhale 2 puffs into the lungs every 6 hours as needed for shortness of breath / dyspnea or wheezing        ARIPiprazole 30 MG tablet    ABILIFY     TAKE 1 TABLET BY MOUTH DAILY.        armodafinil 250 MG Tabs tablet    NUVIGIL    30 tablet    Take 1 tablet (250 mg) by mouth every morning    Excessive sleepiness       cholecalciferol 1000 UNIT tablet    vitamin D3    100 tablet    Take 1 tablet (1,000 Units) by mouth daily    Preventive measure       Doxepin HCl 150 MG Caps     30 capsule    Take 300 mg by mouth At Bedtime        furosemide 40 MG tablet    LASIX    30 tablet    Take 1 tablet (40 mg) by mouth daily    Bilateral edema of lower extremity       GABAPENTIN PO           levothyroxine 150 MCG tablet    SYNTHROID/LEVOTHROID    60 tablet    Take 1 tablet (150 mcg) by mouth daily    Hypothyroidism due to medication       lithium 300 MG tablet      300 mg Takes 300 mg every morning        nicotine 10 MG Inhaler    NICOTROL    6 Box    Inhale 2 cartridge into the lungs daily as needed for smoking cessation    Tobacco use disorder       order for DME     1 Units    Equipment being ordered: CPAP Please dispense Cpap and its supply    ROMEO on CPAP       pantoprazole 40 MG EC tablet    PROTONIX    60 tablet    Take 1 tablet (40 mg) by mouth 2 times daily Take 1.5 hours prior to last meal of the day, and take at bedtime    Gastroesophageal reflux disease without esophagitis       polyethylene glycol Packet    MIRALAX/GLYCOLAX    30 packet    Take 17 g by mouth 2 times daily as needed for constipation    Chronic constipation       simethicone 80 MG chewable tablet    MYLICON    180 tablet    Take 2 tablets (160 mg) by mouth every 6 hours as needed for flatulence or cramping    Bloating symptom

## 2017-11-14 NOTE — PROGRESS NOTES
"  SUBJECTIVE:   Gudelia Quintanilla is a 52 year old female who presents to clinic today for the following health issues:    Hypothyroidism Follow-up      Since last visit, patient describes the following symptoms: Weight increase, no hair loss, no skin changes, no constipation, no loose stools    Amount of exercise or physical activity: None    Problems taking medications regularly: No    Medication side effects: none    Diet: regular (no restrictions)    Patient notes increased mental and physical fatigue as she is completing classes and working part time as janitorial staff. Patient reports she has not been following a dietary plan and has not been using her gym pass. Patient notes she has gained back all of the 25 pounds she previously lost. Of note, she is going on 3 years sober and believes she has enough control over her life to change and maintain healthy habits. In regards to her mental mario, she has lower Lithium to 300 mg QD and Doxepin 200 mg at h.s.    Patient notes increased difficulty breathing due to issues with her nose. She states Flonase no longer works as it \"inflames her nose.\"     She has a history of sleep apnea and has only been averaging 3 hours a night of sleep with her CPAP. In order to be compliant with insurance she needs to average 4 hours a night.    Problem list and histories reviewed & adjusted, as indicated.  Additional history: as documented    Current Outpatient Prescriptions   Medication Sig Dispense Refill     GABAPENTIN PO        levothyroxine (SYNTHROID/LEVOTHROID) 125 MCG tablet Take 1 tablet (125 mcg) by mouth daily 60 tablet 1     armodafinil (NUVIGIL) 250 MG TABS tablet Take 1 tablet (250 mg) by mouth every morning 30 tablet 0     ARIPiprazole (ABILIFY) 30 MG tablet TAKE 1 TABLET BY MOUTH DAILY.  2     polyethylene glycol (MIRALAX/GLYCOLAX) packet Take 17 g by mouth 2 times daily as needed for constipation 30 packet 6     simethicone (MYLICON) 80 MG chewable tablet Take 2 " tablets (160 mg) by mouth every 6 hours as needed for flatulence or cramping 180 tablet 1     nicotine (NICOTROL) 10 MG inhaler Inhale 2 cartridge into the lungs daily as needed for smoking cessation 6 Box 1     lithium 300 MG tablet 300 mg Takes 300 mg every morning       pantoprazole (PROTONIX) 40 MG enteric coated tablet Take 1 tablet (40 mg) by mouth 2 times daily Take 1.5 hours prior to last meal of the day, and take at bedtime 60 tablet 5     furosemide (LASIX) 40 MG tablet Take 1 tablet (40 mg) by mouth daily 30 tablet 5     order for DME Equipment being ordered: CPAP  Please dispense Cpap and its supply 1 Units prn     Doxepin HCl 150 MG CAPS Take 300 mg by mouth At Bedtime (Patient taking differently: Take 200 mg by mouth At Bedtime ) 30 capsule 0     albuterol (PROAIR HFA, PROVENTIL HFA, VENTOLIN HFA) 108 (90 BASE) MCG/ACT inhaler Inhale 2 puffs into the lungs every 6 hours as needed for shortness of breath / dyspnea or wheezing 1 Inhaler 1     Allergies   Allergen Reactions     1-Methyl 2-Pyrrolidone      Other reaction(s): Swelling     Iodine Swelling     Other reaction(s): Angioedema  Other reaction(s): Swelling  Facial swelling.     Morphine Anxiety and Nausea and Vomiting     Other reaction(s): Behavioral Disturbances       Reviewed and updated as needed this visit by clinical staffTobacco  Allergies  Soc Hx      Reviewed and updated as needed this visit by Provider         ROS:  Constitutional, HEENT, cardiovascular, pulmonary, gi and gu systems are negative, except as otherwise noted.    This document serves as a record of the services and decisions personally performed and made by Todd Manzanares MD. It was created on his/her behalf by Azul Schrader, a trained medical scribe. The creation of this document is based the provider's statements to the medical scribe.  Rock Schrader 1:58 PM, November 14, 2017     OBJECTIVE:   BP 99/61 (BP Location: Left arm, Patient Position: Sitting,  "Cuff Size: Adult Large)  Pulse 76  Temp 97.2  F (36.2  C) (Oral)  Ht 1.727 m (5' 8\")  Wt 102.1 kg (225 lb)  LMP 07/20/2006  SpO2 96%  Breastfeeding? No  BMI 34.21 kg/m2  Body mass index is 34.21 kg/(m^2).   Weight gain of 20 pounds since 4/5/2017  HEENT: nasal membranes were cakes with dry crusty secretions. No significant bleeding. Throat clear   Neck was supple without adenopathy or thyromegaly her carotids were normal without bruits  Chest clear to auscultation and percussion  Cardiovascular S1 and S2 are physiologic without murmurs or gallops  Abdomen bowel sounds were normal.  There is no palpable mass or organomegaly  Extremities nontender with 1+ pretibial edema  DTRs possibly delayed  Pulses pedal pulses are as described otherwise his pulses are bilaterally symmetrical throughout without bruits  Skin without significant abnormality     ASSESSMENT/PLAN:   1. Tobacco abuse    2. 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    3. Major depressive disorder, recurrent episode, moderate (H)    4. Iron deficiency anemia due to chronic blood loss    5. Bipolar 2 disorder (H)    6. Hypothyroidism due to medication  - TSH with free T4 reflex  - levothyroxine (SYNTHROID/LEVOTHROID) 125 MCG tablet; Take 1 tablet (125 mcg) by mouth daily  Dispense: 60 tablet; Refill: 1    7. Preventive measure  - cholecalciferol (VITAMIN D3) 1000 UNIT tablet; Take 1 tablet (1,000 Units) by mouth daily  Dispense: 100 tablet; Refill: 3    8. Bilateral edema of lower extremity  - furosemide (LASIX) 40 MG tablet; Take 1 tablet (40 mg) by mouth daily  Dispense: 30 tablet; Refill: 5    Begin nasal lavage each evening. Start using bacitracin at least BID.     Except otherwise noted as above, all conditions are stable and medications are tolerated well. Continue related medications unchanged. "     Follow up via phone for possible change in hypothyroid medication.    Todd Manzanares MD  [facility]    The information in this document, created by the medical scribe for me, accurately reflects the services I personally performed and the decisions made by me. I have reviewed and approved this document for accuracy prior to leaving the patient care area.  Todd Manzanares MD  1:43 PM, 11/14/17

## 2017-11-14 NOTE — LETTER
[facility]  [address] [facility]  [address]  [place]  [address]  Tel: [phone]    November 21, 2017    Gudelia Montemayorharman  [address]  [place] [address]        Dear Ms. Dwight    Gudelia, your thyroid replacement hormone is still in need of adjustment so I sent in a new dose your pharmacy. I recommend returning to clinic in two months to reevaluate the situation and hopefully things will be stabilized by then. Give any other questions please contact me.    If you have any further questions or problems, please contact our office.      Sincerely,    Todd Manzanares MD/ Medina Daomn CMA  Results for orders placed or performed in visit on 11/14/17   TSH with free T4 reflex   Result Value Ref Range    TSH 7.32 (H) 0.40 - 4.00 mU/L   T4 free   Result Value Ref Range    T4 Free 0.86 0.76 - 1.46 ng/dL               Enclosure: Lab Results

## 2017-11-15 LAB
T4 FREE SERPL-MCNC: 0.86 NG/DL (ref 0.76–1.46)
TSH SERPL DL<=0.005 MIU/L-ACNC: 7.32 MU/L (ref 0.4–4)

## 2017-11-15 NOTE — TELEPHONE ENCOUNTER
Pending Prescriptions:                       Disp   Refills    armodafinil (NUVIGIL) 250 MG TABS tablet  30 tab*0            Sig: Take 1 tablet (250 mg) by mouth every morning    LOV: 11/14/17 Leighton      Last Written Prescription Date:  5/22/17  Last Fill Quantity: 30,   # refills: 0  Future Office visit:       Routing refill request to provider for review/approval because:  Drug not on the [facility], P or [facility] refill protocol or controlled substance    RT Caity(R)

## 2017-11-20 RX ORDER — LEVOTHYROXINE SODIUM 150 UG/1
150 TABLET ORAL DAILY
Qty: 60 TABLET | Refills: 1 | Status: SHIPPED | OUTPATIENT
Start: 2017-11-20 | End: 2018-02-20

## 2017-11-20 RX ORDER — FUROSEMIDE 40 MG
40 TABLET ORAL DAILY
Qty: 30 TABLET | Refills: 5 | Status: SHIPPED | OUTPATIENT
Start: 2017-11-20 | End: 2019-07-19

## 2017-11-21 ENCOUNTER — TRANSFERRED RECORDS (OUTPATIENT)
Dept: HEALTH INFORMATION MANAGEMENT | Facility: CLINIC | Age: 52
End: 2017-11-21

## 2017-12-12 ENCOUNTER — TRANSFERRED RECORDS (OUTPATIENT)
Dept: HEALTH INFORMATION MANAGEMENT | Facility: CLINIC | Age: 52
End: 2017-12-12

## 2017-12-27 ENCOUNTER — OFFICE VISIT (OUTPATIENT)
Dept: FAMILY MEDICINE | Facility: CLINIC | Age: 52
End: 2017-12-27
Payer: COMMERCIAL

## 2017-12-27 VITALS
BODY MASS INDEX: 35.73 KG/M2 | OXYGEN SATURATION: 100 % | SYSTOLIC BLOOD PRESSURE: 108 MMHG | DIASTOLIC BLOOD PRESSURE: 66 MMHG | RESPIRATION RATE: 20 BRPM | WEIGHT: 235 LBS | TEMPERATURE: 97.9 F | HEART RATE: 79 BPM

## 2017-12-27 DIAGNOSIS — M77.11 RIGHT LATERAL EPICONDYLITIS: Primary | ICD-10-CM

## 2017-12-27 PROCEDURE — 99213 OFFICE O/P EST LOW 20 MIN: CPT | Performed by: FAMILY MEDICINE

## 2017-12-27 NOTE — MR AVS SNAPSHOT
"              After Visit Summary   2017    Gudelia Quintanilla    MRN: [id number]           Patient Information     Date Of Birth          1965        Visit Information        Provider Department      2017 1:20 PM Keesha Bergeron MD [facility]        Today's Diagnoses     Right lateral epicondylitis    -  1       Follow-ups after your visit        Who to contact     If you have questions or need follow up information about today's clinic visit or your schedule please contact [facility] directly at [phone].  Normal or non-critical lab and imaging results will be communicated to you by AppMakrhart, letter or phone within 4 business days after the clinic has received the results. If you do not hear from us within 7 days, please contact the clinic through Ziptrt or phone. If you have a critical or abnormal lab result, we will notify you by phone as soon as possible.  Submit refill requests through Leiyoo or call your pharmacy and they will forward the refill request to us. Please allow 3 business days for your refill to be completed.          Additional Information About Your Visit        MyChart Information     Leiyoo lets you send messages to your doctor, view your test results, renew your prescriptions, schedule appointments and more. To sign up, go to [email or web address] . Click on \"Log in\" on the left side of the screen, which will take you to the Welcome page. Then click on \"Sign up Now\" on the right side of the page.     You will be asked to enter the access code listed below, as well as some personal information. Please follow the directions to create your username and password.     Your access code is: [email or web address]  Expires: 2018  4:33 AM     Your access code will  in 90 days. If you need help or a new code, please call your [facility] or [phone].        Care EveryWhere ID     This is your Care EveryWhere ID. This could be " used by other organizations to access your [facility] medical records  [id number]        Your Vitals Were     Pulse Temperature Respirations Last Period Pulse Oximetry BMI (Body Mass Index)    79 97.9  F (36.6  C) (Oral) 20 07/20/2006 100% 35.73 kg/m2       Blood Pressure from Last 3 Encounters:   12/27/17 108/66   11/14/17 99/61   08/14/17 118/69    Weight from Last 3 Encounters:   12/27/17 235 lb (106.6 kg)   11/14/17 225 lb (102.1 kg)   08/14/17 213 lb (96.6 kg)              We Performed the Following     DEPRESSION ACTION PLAN (DAP)          Today's Medication Changes          These changes are accurate as of: 12/27/17  1:49 PM.  If you have any questions, ask your nurse or doctor.               These medicines have changed or have updated prescriptions.        Dose/Directions    Doxepin HCl 150 MG Caps   This may have changed:  how much to take        Dose:  300 mg   Take 300 mg by mouth At Bedtime   Quantity:  30 capsule   Refills:  0       * order for DME   This may have changed:  Another medication with the same name was added. Make sure you understand how and when to take each.   Used for:  ROMEO on CPAP   Changed by:  Diane Salomon MD        Equipment being ordered: CPAP Please dispense Cpap and its supply   Quantity:  1 Units   Refills:  prn       * order for DME   This may have changed:  You were already taking a medication with the same name, and this prescription was added. Make sure you understand how and when to take each.   Used for:  Right lateral epicondylitis   Changed by:  Keesha Bergeron MD        Equipment being ordered: tennis elbow brace   Quantity:  1 each   Refills:  0       * Notice:  This list has 2 medication(s) that are the same as other medications prescribed for you. Read the directions carefully, and ask your doctor or other care provider to review them with you.         Where to get your medicines      Some of these will need a paper prescription and others can be bought over  the counter.  Ask your nurse if you have questions.     Bring a paper prescription for each of these medications     order for DME                Primary Care Provider Office Phone # Fax #    Todd Manzanares [phone] [address] [place] [address]        Equal Access to Services     [facility] LATOSHA : Hadii aad ku hadasho Soomaali, waaxda luqadaha, qaybta kaalmada adeegyada, waxay naomiin hayaan adebrandy bossmanmax wolfe . So [facility] [phone].    ATENCIÓN: Si habla español, tiene a fitzpatrick disposición servicios gratuitos de asistencia lingüística. Llame al [phone].    We comply with applicable federal civil rights laws and [place] laws. We do not discriminate on the basis of race, color, national origin, age, disability, sex, sexual orientation, or gender identity.            Thank you!     Thank you for choosing [facility]  for your care. Our goal is always to provide you with excellent care. Hearing back from our patients is one way we can continue to improve our services. Please take a few minutes to complete the written survey that you may receive in the mail after your visit with us. Thank you!             Your Updated Medication List - Protect others around you: Learn how to safely use, store and throw away your medicines at www.disposemymeds.org.          This list is accurate as of: 12/27/17  1:49 PM.  Always use your most recent med list.                   Brand Name Dispense Instructions for use Diagnosis    albuterol 108 (90 BASE) MCG/ACT Inhaler    PROAIR HFA/PROVENTIL HFA/VENTOLIN HFA    1 Inhaler    Inhale 2 puffs into the lungs every 6 hours as needed for shortness of breath / dyspnea or wheezing        ARIPiprazole 30 MG tablet    ABILIFY     TAKE 1 TABLET BY MOUTH DAILY.        armodafinil 250 MG Tabs tablet    NUVIGIL    30 tablet    Take 1 tablet (250 mg) by mouth every morning    Excessive sleepiness       cholecalciferol 1000 UNIT tablet     vitamin D3    100 tablet    Take 1 tablet (1,000 Units) by mouth daily    Preventive measure       Doxepin HCl 150 MG Caps     30 capsule    Take 300 mg by mouth At Bedtime        furosemide 40 MG tablet    LASIX    30 tablet    Take 1 tablet (40 mg) by mouth daily    Bilateral edema of lower extremity       GABAPENTIN PO           levothyroxine 150 MCG tablet    SYNTHROID/LEVOTHROID    60 tablet    Take 1 tablet (150 mcg) by mouth daily    Hypothyroidism due to medication       lithium 300 MG tablet      300 mg Takes 300 mg every morning        nicotine 10 MG Inhaler    NICOTROL    6 Box    Inhale 2 cartridge into the lungs daily as needed for smoking cessation    Tobacco use disorder       * order for DME     1 Units    Equipment being ordered: CPAP Please dispense Cpap and its supply    ROMEO on CPAP       * order for DME     1 each    Equipment being ordered: tennis elbow brace    Right lateral epicondylitis       pantoprazole 40 MG EC tablet    PROTONIX    60 tablet    Take 1 tablet (40 mg) by mouth 2 times daily Take 1.5 hours prior to last meal of the day, and take at bedtime    Gastroesophageal reflux disease without esophagitis       polyethylene glycol Packet    MIRALAX/GLYCOLAX    30 packet    Take 17 g by mouth 2 times daily as needed for constipation    Chronic constipation       simethicone 80 MG chewable tablet    MYLICON    180 tablet    Take 2 tablets (160 mg) by mouth every 6 hours as needed for flatulence or cramping    Bloating symptom       * Notice:  This list has 2 medication(s) that are the same as other medications prescribed for you. Read the directions carefully, and ask your doctor or other care provider to review them with you.

## 2017-12-27 NOTE — PROGRESS NOTES
HPI      ROS      Physical Exam      SUBJECTIVE:   Gudelia Quintanilla is a 52 year old female who presents to clinic today for the following health issues:    Musculoskeletal problem/pain      Duration: December 22    Description  Location: right arm    Intensity:  7/10 at its worst     Accompanying signs and symptoms: sharp pain, shooting pain     History  Previous similar problem: no   Previous evaluation:  none    Precipitating or alleviating factors:  Trauma or overuse: YES- a lot of forward and back movement   Aggravating factors include: arm movements: lifting and bending     Therapies tried and outcome: Ibuprofen last night--outcome not effective     5 days of right lateral elbow pain.  Difficult to turn handles, lifted/dump garbage, hard to lift things due to pain, not weakness, no numbness or tingling, pain along the lateral part of her elbow.  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  She is right handed.  They sent her home last night from work bc she wasn't working fast enough due to pain.      I reviewed that she has thyroid disease and recent TSH was elevated-- her PCP has increased her dose of levothyroxine.         Problem list and histories reviewed & adjusted, as indicated.  Additional history: as documented    Patient Active Problem List   Diagnosis     ROMEO on CPAP     Tobacco abuse     Post menopausal syndrome     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)     CARDIOVASCULAR SCREENING; LDL GOAL LESS THAN 160     Major depressive disorder, recurrent episode, moderate (H)     Anemia     Tubular adenoma     Iron deficiency anemia     Hyponatremia     ADHD, predominantly inattentive type     overweight BMI>30     Periumbilical hernia     Health Care Home     Psychosis     Bipolar 2 disorder (H)     Gastroesophageal reflux disease without esophagitis     Prediabetes     Elevated TSH     Hypothyroidism due to medication     Hidradenitis suppurativa of right axilla     Past Surgical  History:   Procedure Laterality Date     COLONOSCOPY  12/24/2013    Procedure: COLONOSCOPY;;  Surgeon: Guy Grant MD;  Location:  GI     COLONOSCOPY  1/9/2014    Procedure: COMBINED COLONOSCOPY, SINGLE BIOPSY/POLYPECTOMY BY BIOPSY;;  Surgeon: Guy Grant MD;  Location:  GI     ESOPHAGOSCOPY, GASTROSCOPY, DUODENOSCOPY (EGD), COMBINED  12/24/2013    Procedure: COMBINED ESOPHAGOSCOPY, GASTROSCOPY, DUODENOSCOPY (EGD), BIOPSY SINGLE OR MULTIPLE;  ESOPHAGOSCOPY, GASTROSCOPY, DUODENOSCOPY, COLONOSCOPY;  Surgeon: Guy Grant MD;  Location:  GI     GENITOURINARY SURGERY      urethra stretched     GYN SURGERY      d&c      LAPAROSCOPIC ASSISTED RECTOPEXY  3/14/2014    Procedure: LAPAROSCOPIC ASSISTED RECTOPEXY;  LAPAROSCOPIC  VENTRAL RECTOPEXY ;  Surgeon: Jennifer Connolly MD;  Location:  OR     ORTHOPEDIC SURGERY      surgery on clavicle,surgery for left leg fracture       Social History   Substance Use Topics     Smoking status: Current Every Day Smoker     Packs/day: 0.25     Types: Cigarettes     Smokeless tobacco: Never Used      Comment: smoke 1PPD now     Alcohol use No     Family History   Problem Relation Age of Onset     CANCER Mother      ovarian cancer     Hypertension Father      Breast Cancer No family hx of      Cancer - colorectal No family hx of          Current Outpatient Prescriptions   Medication Sig Dispense Refill     order for DME Equipment being ordered: tennis elbow brace 1 each 0     levothyroxine (SYNTHROID/LEVOTHROID) 150 MCG tablet Take 1 tablet (150 mcg) by mouth daily 60 tablet 1     furosemide (LASIX) 40 MG tablet Take 1 tablet (40 mg) by mouth daily 30 tablet 5     armodafinil (NUVIGIL) 250 MG TABS tablet Take 1 tablet (250 mg) by mouth every morning 30 tablet 3     GABAPENTIN PO        ARIPiprazole (ABILIFY) 30 MG tablet TAKE 1 TABLET BY MOUTH DAILY.  2     polyethylene glycol (MIRALAX/GLYCOLAX) packet Take 17 g by mouth 2 times daily as needed for constipation 30  packet 6     nicotine (NICOTROL) 10 MG inhaler Inhale 2 cartridge into the lungs daily as needed for smoking cessation 6 Box 1     lithium 300 MG tablet 300 mg Takes 300 mg every morning       pantoprazole (PROTONIX) 40 MG enteric coated tablet Take 1 tablet (40 mg) by mouth 2 times daily Take 1.5 hours prior to last meal of the day, and take at bedtime 60 tablet 5     order for DME Equipment being ordered: CPAP  Please dispense Cpap and its supply 1 Units prn     Doxepin HCl 150 MG CAPS Take 300 mg by mouth At Bedtime (Patient taking differently: Take 200 mg by mouth At Bedtime ) 30 capsule 0     albuterol (PROAIR HFA, PROVENTIL HFA, VENTOLIN HFA) 108 (90 BASE) MCG/ACT inhaler Inhale 2 puffs into the lungs every 6 hours as needed for shortness of breath / dyspnea or wheezing 1 Inhaler 1     cholecalciferol (VITAMIN D3) 1000 UNIT tablet Take 1 tablet (1,000 Units) by mouth daily (Patient not taking: Reported on 12/27/2017) 100 tablet 3     simethicone (MYLICON) 80 MG chewable tablet Take 2 tablets (160 mg) by mouth every 6 hours as needed for flatulence or cramping (Patient not taking: Reported on 12/27/2017) 180 tablet 1     Allergies   Allergen Reactions     1-Methyl 2-Pyrrolidone      Other reaction(s): Swelling     Iodine Swelling     Other reaction(s): Angioedema  Other reaction(s): Swelling  Facial swelling.     Morphine Anxiety and Nausea and Vomiting     Other reaction(s): Behavioral Disturbances         Reviewed and updated as needed this visit by clinical staffTobacco  Allergies  Meds  Med Hx  Surg Hx  Fam Hx  Soc Hx      Reviewed and updated as needed this visit by Provider         ROS:  Constitutional, HEENT, cardiovascular, pulmonary, gi and gu systems are negative, except as otherwise noted.      OBJECTIVE:   /66 (BP Location: Right arm, Patient Position: Sitting, Cuff Size: Adult Large)  Pulse 79  Temp 97.9  F (36.6  C) (Oral)  Resp 20  Wt 235 lb (106.6 kg)  LMP 07/20/2006  SpO2 100%   BMI 35.73 kg/m2  Body mass index is 35.73 kg/(m^2).  GENERAL APPEARANCE: healthy, alert and no distress  RESP: lungs clear to auscultation - no rales, rhonchi or wheezes  CV: regular rates and rhythm, normal S1 S2, no S3 or S4 and no murmur, click or rub  MS: Right elbow: no deformity, swelling or erythema, not really any significant tenderness, pain elicited in the lateral elbow with supination and wrist extension against resistance.      Diagnostic Test Results:  none     ASSESSMENT/PLAN:     Right lateral epicondylitis: discussed that this is an overuse injury likely related to her work and that the treatment is rest, ice, NSAIDs, and if needed, cortisone injection.  Elbow brace given, recommended rest at home for 1-2 days, ice, ibuprofen dosing reviewed on hand out regarding this condition from Sports Medicine Patient Advisor.  F/u 2 weeks, sooner if needed, if not improving.     Keesha Bergeron MD  [facility]

## 2017-12-27 NOTE — LETTER
My Depression Action Plan  Name: Gudelia Quintanilla   Date of Birth 1965  Date: 12/27/2017    My doctor: Todd Manzanares   My clinic: [address] [id number] [phone]          GREEN    ZONE   Good Control    What it looks like:     Things are going generally well. You have normal up s and down s. You may even feel depressed from time to time, but bad moods usually last less than a day.   What you need to do:  1. Continue to care for yourself (see self care plan)  2. Check your depression survival kit and update it as needed  3. Follow your physician s recommendations including any medication.  4. Do not stop taking medication unless you consult with your physician first.           YELLOW         ZONE Getting Worse    What it looks like:     Depression is starting to interfere with your life.     It may be hard to get out of bed; you may be starting to isolate yourself from others.    Symptoms of depression are starting to last most all day and this has happened for several days.     You may have suicidal thoughts but they are not constant.   What you need to do:     1. Call your care team, your response to treatment will improve if you keep your care team informed of your progress. Yellow periods are signs an adjustment may need to be made.     2. Continue your self-care, even if you have to fake it!    3. Talk to someone in your support network    4. Open up your depression survival kit           RED    ZONE Medical Alert - Get Help    What it looks like:     Depression is seriously interfering with your life.     You may experience these or other symptoms: You can t get out of bed most days, can t work or engage in other necessary activities, you have trouble taking care of basic hygiene, or basic responsibilities, thoughts of suicide or death that will not go away, self-injurious behavior.     What you need to do:  1. Call your care team and  request a same-day appointment. If they are not available (weekends or after hours) call your local crisis line, emergency room or 911.      Electronically signed by: Tavon Hanna, December 27, 2017    Depression Self Care Plan / Survival Kit    Self-Care for Depression  Here s the deal. Your body and mind are really not as separate as most people think.  What you do and think affects how you feel and how you feel influences what you do and think. This means if you do things that people who feel good do, it will help you feel better.  Sometimes this is all it takes.  There is also a place for medication and therapy depending on how severe your depression is, so be sure to consult with your medical provider and/ or Behavioral Health Consultant if your symptoms are worsening or not improving.     In order to better manage my stress, I will:    Exercise  Get some form of exercise, every day. This will help reduce pain and release endorphins, the  feel good  chemicals in your brain. This is almost as good as taking antidepressants!  This is not the same as joining a gym and then never going! (they count on that by the way ) It can be as simple as just going for a walk or doing some gardening, anything that will get you moving.      Hygiene   Maintain good hygiene (Get out of bed in the morning, Make your bed, Brush your teeth, Take a shower, and Get dressed like you were going to work, even if you are unemployed).  If your clothes don't fit try to get ones that do.    Diet  I will strive to eat foods that are good for me, drink plenty of water, and avoid excessive sugar, caffeine, alcohol, and other mood-altering substances.  Some foods that are helpful in depression are: complex carbohydrates, B vitamins, flaxseed, fish or fish oil, fresh fruits and vegetables.    Psychotherapy  I agree to participate in Individual Therapy (if recommended).    Medication  If prescribed medications, I agree to take them.  Missing doses can  result in serious side effects.  I understand that drinking alcohol, or other illicit drug use, may cause potential side effects.  I will not stop my medication abruptly without first discussing it with my provider.    Staying Connected With Others  I will stay in touch with my friends, family members, and my primary care provider/team.    Use your imagination  Be creative.  We all have a creative side; it doesn t matter if it s oil painting, sand castles, or mud pies! This will also kick up the endorphins.    Witness Beauty  (AKA stop and smell the roses) Take a look outside, even in mid-winter. Notice colors, textures. Watch the squirrels and birds.     Service to others  Be of service to others.  There is always someone else in need.  By helping others we can  get out of ourselves  and remember the really important things.  This also provides opportunities for practicing all the other parts of the program.    Humor  Laugh and be silly!  Adjust your TV habits for less news and crime-drama and more comedy.    Control your stress  Try breathing deep, massage therapy, biofeedback, and meditation. Find time to relax each day.     My support system    Clinic Contact:  Phone number:    Contact 1:  Phone number:    Contact 2:  Phone number:    Buddhist/:  Phone number:    Therapist:  Phone number:    Local crisis center:    Phone number:    Other community support:  Phone number:

## 2017-12-27 NOTE — LETTER
[facility]  [address] [place] [id number]  Phone: [phone]    December 27, 2017        Gudelia Quintanilla  [address]  [place] [address]          To Whom It May Concern:    RE: Gudelia Quintanilla    Patient was seen and treated today at our clinic.  I have recommended that she rest her arm and not work for the next 1-2 days.  She may return to work on Friday 12/29/17.      Please contact me for questions or concerns.      Sincerely,            Keesha Bergeron MD

## 2018-02-02 ENCOUNTER — TELEPHONE (OUTPATIENT)
Dept: FAMILY MEDICINE | Facility: CLINIC | Age: 53
End: 2018-02-02

## 2018-02-02 NOTE — TELEPHONE ENCOUNTER
Called patient:     Left VM for patient to please call clinic back to speak with a triage nurse.     Freya SALGUERO RN

## 2018-02-02 NOTE — TELEPHONE ENCOUNTER
Reason for call:  Patient reporting a symptom    Symptom or request: Boils under arm    Duration (how long have symptoms been present): 2 weeks    Have you been treated for this before? Yes    Additional comments: wondering since she has been seen for it before if she can just a prescription sent in this time.    Phone Number patient can be reached at:  Home number on file [phone] (home)    Best Time:  Before 2 pm as she has to go to work    Can we leave a detailed message on this number:  YES    Call taken on 2/2/2018 at 10:47 AM by Shereen Vincent

## 2018-02-19 NOTE — PROGRESS NOTES
SUBJECTIVE:   CC: Gudelia Quintanilla is an 52 year old woman who presents for preventive health visit.     Healthy Habits:    Do you get at least three servings of calcium containing foods daily (dairy, green leafy vegetables, etc.)? No-2    Amount of exercise or daily activities, outside of work: NONE    Problems taking medications regularly No    Medication side effects: Yes hair falling out    Have you had an eye exam in the past two years? no    Do you see a dentist twice per year? yes    Do you have sleep apnea, excessive snoring or daytime drowsiness?yes- Sleep apnea DX- Uses CPAP    The patient presents to the clinic for a wellness visit. She has a history of hypothyroidism and was last seen on 11/14/2017 where her TSH was elevated at 7.32. Her levothyroxine dose was increased to 150 mcg.    The patient has been experiencing increased hot flashes and menstrual cycle irregularities for the past five months. She has also been having increased mood swings.     The patient with a history of Bipolar 2 disorder and ADHD has been having increased episodes of mood swings and episodes of depression. She states that her wellbeing is a 4/10. She attributes some of her mood problems to her diet that is provided to her in her current living situation. She does not have an option to eat vegetables. She is also anxious about her application for public housing. She is not sure if she is taking 300 mg of Lithium every morning. She is followed by psychiatry. 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      The patient has a history of sleep apnea with CPAP use and is having difficulty sleeping. She is prescribed Nuvigil but has not taken it for the past three weeks due to insurance issues. Due to this she has had increased fatigue and limited motivation to complete daily activities. She is not currently taking vitamin D.     The patient has a history of tobacco use and is currently smoking 3-5 cigarettes a day. She  states that she does not have the desire to quit smoking at this time. She has not been taking Nicotrol.      The patient states that she has been having frequent sinus and nasal congestion with intermittent episodes of epistasis. She has been applying bacitracin to her nostrils.     The patient has been experiencing low back and neck pain that is worsened by her work as a . She has radiation of the pain to her hips bilaterally. She was given stretching and strengthening exercises but she has not been doing them. She is interested in getting chiropractic adjustments.     The patient has a history of GERD and is currently taking 40 mg of pantoprazole BID. Her symptoms are exacerbated when she eats too quickly after taking the medication.     The patient states that she has been having frequent episodes of constipation. She has also had intermittent episodes of bright red blood in her stool. She has tried taking Miralax in the past which has helped to relieve her symptoms. She is currently drinking 8-10 glasses of water a day. She denies hemorrhoids.     She denies vision changes, neck problems, back problems, headaches, chest pain, chest discomfort, heart palpitations, stomach problems, hematochezia, diarrhea, urination problems, skin changes, rashes, abnormal vaginal discharge, vaginal discomfort, bone pain, muscle pain, joint pain, and weight changes.      Today's PHQ-2 Score:   PHQ-2 ( 1999 Pf[employer]) 2/20/2018 8/14/2017   Q1: Little interest or pleasure in doing things 0 1   Q2: Feeling down, depressed or hopeless 0 0   PHQ-2 Score 0 1       Abuse: Current or Past(Physical, Sexual or Emotional)- NO  Do you feel safe in your environment - YES    Social History   Substance Use Topics     Smoking status: Current Every Day Smoker     Packs/day: 0.25     Types: Cigarettes     Smokeless tobacco: Never Used      Comment: smoke 1PPD now     Alcohol use No     If you drink alcohol do you typically have >3 drinks per  day or >7 drinks per week? No                     Reviewed orders with patient.  Reviewed health maintenance and updated orders accordingly - Yes  Patient Active Problem List   Diagnosis     ROMEO on CPAP     Tobacco abuse     Post menopausal syndrome     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)     CARDIOVASCULAR SCREENING; LDL GOAL LESS THAN 160     Major depressive disorder, recurrent episode, moderate (H)     Anemia     Tubular adenoma     Iron deficiency anemia     Hyponatremia     ADHD, predominantly inattentive type     overweight BMI>30     Periumbilical hernia     Health Care Home     Psychosis     Bipolar 2 disorder (H)     Gastroesophageal reflux disease without esophagitis     Prediabetes     Elevated TSH     Hypothyroidism due to medication     Hidradenitis suppurativa of right axilla     Vitamin D deficiency     Past Surgical History:   Procedure Laterality Date     COLONOSCOPY  12/24/2013    Procedure: COLONOSCOPY;;  Surgeon: Guy Grant MD;  Location:  GI     COLONOSCOPY  1/9/2014    Procedure: COMBINED COLONOSCOPY, SINGLE BIOPSY/POLYPECTOMY BY BIOPSY;;  Surgeon: Guy Grant MD;  Location:  GI     ESOPHAGOSCOPY, GASTROSCOPY, DUODENOSCOPY (EGD), COMBINED  12/24/2013    Procedure: COMBINED ESOPHAGOSCOPY, GASTROSCOPY, DUODENOSCOPY (EGD), BIOPSY SINGLE OR MULTIPLE;  ESOPHAGOSCOPY, GASTROSCOPY, DUODENOSCOPY, COLONOSCOPY;  Surgeon: Guy Grant MD;  Location:  GI     GENITOURINARY SURGERY      urethra stretched     GYN SURGERY      d&c      LAPAROSCOPIC ASSISTED RECTOPEXY  3/14/2014    Procedure: LAPAROSCOPIC ASSISTED RECTOPEXY;  LAPAROSCOPIC  VENTRAL RECTOPEXY ;  Surgeon: Jennifer Connolly MD;  Location:  OR     ORTHOPEDIC SURGERY      surgery on clavicle,surgery for left leg fracture       Social History   Substance Use Topics     Smoking status: Current Every Day Smoker     Packs/day: 0.25     Types: Cigarettes     Smokeless tobacco: Never Used      Comment: smoke 1PPD now     Alcohol  use No     Family History   Problem Relation Age of Onset     CANCER Mother      ovarian cancer     Hypertension Father      Breast Cancer No family hx of      Cancer - colorectal No family hx of          Current Outpatient Prescriptions   Medication Sig Dispense Refill     order for DME Equipment being ordered: tennis elbow brace 1 each 0     levothyroxine (SYNTHROID/LEVOTHROID) 150 MCG tablet Take 1 tablet (150 mcg) by mouth daily 60 tablet 1     cholecalciferol (VITAMIN D3) 1000 UNIT tablet Take 1 tablet (1,000 Units) by mouth daily 100 tablet 3     furosemide (LASIX) 40 MG tablet Take 1 tablet (40 mg) by mouth daily 30 tablet 5     armodafinil (NUVIGIL) 250 MG TABS tablet Take 1 tablet (250 mg) by mouth every morning 30 tablet 3     ARIPiprazole (ABILIFY) 30 MG tablet TAKE 1 TABLET BY MOUTH DAILY.  2     polyethylene glycol (MIRALAX/GLYCOLAX) packet Take 17 g by mouth 2 times daily as needed for constipation 30 packet 6     nicotine (NICOTROL) 10 MG inhaler Inhale 2 cartridge into the lungs daily as needed for smoking cessation 6 Box 1     pantoprazole (PROTONIX) 40 MG enteric coated tablet Take 1 tablet (40 mg) by mouth 2 times daily Take 1.5 hours prior to last meal of the day, and take at bedtime 60 tablet 5     order for DME Equipment being ordered: CPAP  Please dispense Cpap and its supply 1 Units prn     Doxepin HCl 150 MG CAPS Take 300 mg by mouth At Bedtime (Patient taking differently: Take 200 mg by mouth At Bedtime ) 30 capsule 0     albuterol (PROAIR HFA, PROVENTIL HFA, VENTOLIN HFA) 108 (90 BASE) MCG/ACT inhaler Inhale 2 puffs into the lungs every 6 hours as needed for shortness of breath / dyspnea or wheezing 1 Inhaler 1     lithium 300 MG tablet 300 mg Takes 300 mg every morning         Patient over age 50, mutual decision to screen reflected in health maintenance.    Pertinent mammograms are reviewed under the imaging tab.  History of abnormal Pap smear: NO - age 30- 65 PAP every 3 years  "recommended    Reviewed and updated as needed this visit by clinical staff  Tobacco  Allergies         Reviewed and updated as needed this visit by Provider          ROS:  C: NEGATIVE for fever, chills, change in weight  I: NEGATIVE for worrisome rashes, moles or lesions  E: NEGATIVE for vision changes or irritation  ENT: NEGATIVE for ear, mouth and throat problems  R: NEGATIVE for significant cough or SOB  B: NEGATIVE for masses, tenderness or discharge  CV: NEGATIVE for chest pain, palpitations or peripheral edema  GI: NEGATIVE for nausea, abdominal pain, heartburn, or change in bowel habits  : NEGATIVE for unusual urinary or vaginal symptoms. No vaginal bleeding.  M: NEGATIVE for significant arthralgias or myalgia  N: NEGATIVE for weakness, dizziness or paresthesias  P: NEGATIVE for changes in mood or affect   POSITIVE Nocturia x1, constipation, neck pain, low back pain, and heartburn     This document serves as a record of the services and decisions personally performed and made by Todd Manzanares MD. It was created on his behalf by Tania Ruiz, a trained medical scribe. The creation of this document is based the provider's statements to the medical scribe. 2/20/2018  OBJECTIVE:   /68 (BP Location: Left arm, Patient Position: Sitting, Cuff Size: Adult Large)  Pulse 75  Temp 97  F (36.1  C) (Oral)  Ht 1.727 m (5' 8\")  Wt 102.5 kg (226 lb)  LMP 07/20/2006  SpO2 96%  Breastfeeding? No  BMI 34.36 kg/m2  EXAM:  GENERAL APPEARANCE: healthy, alert and no distress  EYES: Eyes grossly normal to inspection, PERRL and conjunctivae and sclerae normal  HENT: ear canals and TM's normal, nose and mouth without ulcers or lesions, oropharynx clear and oral mucous membranes moist  NECK: no adenopathy, no asymmetry, masses, or scars and thyroid normal to palpation  RESP: lungs clear to auscultation - no rales, rhonchi or wheezes  BREAST: normal without masses, tenderness or nipple discharge and no palpable " axillary masses or adenopathy  CV: regular rate and rhythm, normal S1 S2, no S3 or S4, no murmur, click or rub, no peripheral edema and peripheral pulses strong  ABDOMEN: minor left quadrant discomfort without rebound or palpable mass, otherwise soft, nontender, no hepatosplenomegaly, no masses and bowel sounds normal   (female): normal female external genitalia, normal urethral meatus, vaginal vault had white creamy discharge, vaginal mucosal atrophy noted, normal cervix, adnexae, and uterus mid size without masses or abnormal discharge  RECTAL- female: no masses, no hemorrhoids  MS: no musculoskeletal defects are noted and gait is age appropriate without ataxia  SKIN: generalized dry and scaling skin, erythematous papules in left axilla otherwise no suspicious lesions or rashes  NEURO: Normal strength and tone, sensory exam grossly normal, mentation intact and speech normal  PSYCH: mentation appears normal and affect normal/bright    Spirometry completed in the clinic and results were reviewed.     Diagnostic Results:  Pending   Results for orders placed or performed in visit on 02/20/18 (from the past 24 hour(s))   CBC with platelets   Result Value Ref Range    WBC 5.1 4.0 - 11.0 10e9/L    RBC Count 3.74 (L) 3.8 - 5.2 10e12/L    Hemoglobin 11.3 (L) 11.7 - 15.7 g/dL    Hematocrit 35.7 35.0 - 47.0 %    MCV 96 78 - 100 fl    MCH 30.2 26.5 - 33.0 pg    MCHC 31.7 31.5 - 36.5 g/dL    RDW 13.6 10.0 - 15.0 %    Platelet Count 267 150 - 450 10e9/L   Spirometry, Breathing Capacity: Normal Order, Clinic Performed   Result Value Ref Range    FEV-1      FVC      FEV1/FVC      FEF 25/75     KOH prep (skin, hair or nails only)   Result Value Ref Range    Specimen Description Skin     KOH Skin Hair Nails Test No fungal elements seen      ASSESSMENT/PLAN:   Gudelia was seen today for recheck.    Diagnoses and all orders for this visit:    Encounter for routine adult health examination without abnormal findings  Lab results  pending. Up to date on immunizations.   -     *MA Screening Digital Bilateral; Future  -     UA reflex to Microscopic and Culture  -     CBC with platelets  -     Comprehensive metabolic panel  -     Lipid panel reflex to direct LDL Fasting  -     TSH with free T4 reflex  -     Vitamin D Deficiency    Hypothyroidism due to medication  Controlled. Continue medication.  -     TSH with free T4 reflex  -     levothyroxine (SYNTHROID/LEVOTHROID) 150 MCG tablet; Take 1 tablet (150 mcg) by mouth daily    Major depressive disorder, recurrent episode, moderate (H)  Controlled. Continue medication. Patient will report back to the clinic on whether or not she is still taking Lithium. She is followed by psychiatry.  Discussed restarting Nuvigil to help with difficulty sleeping and mood control.   -     Lithium level    Bipolar 2 disorder (H)  -     Lithium level    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) - controlled.    ROMEO on CPAP  Continue with CPAP use.   -     Spirometry, Breathing Capacity: Normal Order, Clinic Performed    Vitamin D deficiency  Lab results pending. Discussed restarting vitamin D supplement.   -     Vitamin D Deficiency    Iron deficiency anemia due to chronic blood loss  Lab results pending.  -     CBC with platelets    Hyponatremia  Lab results pending.   -     Comprehensive metabolic panel    Gastroesophageal reflux disease without esophagitis  Continue with pantoprazole use and avoid taking the medication right before meals.     Recurrent epistaxis  Continue applying Vaseline to nostrils. Discussed starting to use saline nasal washes.     Candidiasis of vulva and vagina  -     KOH prep (skin, hair or nails only) - negative     Hidradenitis suppurativa of right axilla    Encounter for screening mammogram for breast cancer - Schedule mammogram.    CARDIOVASCULAR SCREENING; LDL GOAL LESS THAN 160  -     Lipid panel reflex to direct LDL Fasting    Tobacco abuse - Discussed tobacco cessation. Patient not  "interested at this time.         COUNSELING:   Reviewed preventive health counseling, as reflected in patient instructions       Regular exercise       Healthy diet/nutrition       Vision screening       Hearing screening       Colon cancer screening       Consider Hep C screening for patients born between 1945 and 1965       reports that she has been smoking Cigarettes.  She has been smoking about 0.25 packs per day. She has never used smokeless tobacco.  Tobacco Cessation Action Plan: Information offered: Patient not interested at this time  Estimated body mass index is 34.36 kg/(m^2) as calculated from the following:    Height as of this encounter: 1.727 m (5' 8\").    Weight as of this encounter: 102.5 kg (226 lb).   Weight management plan: Discussed healthy diet and exercise guidelines and patient will follow up in 12 months in clinic to re-evaluate.    Counseling Resources:  ATP IV Guidelines  Pooled Cohorts Equation Calculator  Breast Cancer Risk Calculator  FRAX Risk Assessment  ICSI Preventive Guidelines  Dietary Guidelines for Americans, 2010  USDA's MyPlate  ASA Prophylaxis  Lung CA Screening    Todd Manzanares MD  [facility]    The information in this document, created by the medical scribe for me, accurately reflects the services I personally performed and the decisions made by me. I have reviewed and approved this document for accuracy prior to leaving the patient care area.  Todd Manzanares MD  8:50 AM, 02/20/18    "

## 2018-02-20 ENCOUNTER — OFFICE VISIT (OUTPATIENT)
Dept: FAMILY MEDICINE | Facility: CLINIC | Age: 53
End: 2018-02-20
Payer: COMMERCIAL

## 2018-02-20 VITALS
OXYGEN SATURATION: 96 % | DIASTOLIC BLOOD PRESSURE: 68 MMHG | BODY MASS INDEX: 34.25 KG/M2 | WEIGHT: 226 LBS | HEART RATE: 75 BPM | TEMPERATURE: 97 F | SYSTOLIC BLOOD PRESSURE: 109 MMHG | HEIGHT: 68 IN

## 2018-02-20 DIAGNOSIS — D50.0 IRON DEFICIENCY ANEMIA DUE TO CHRONIC BLOOD LOSS: ICD-10-CM

## 2018-02-20 DIAGNOSIS — E03.2 HYPOTHYROIDISM DUE TO MEDICATION: ICD-10-CM

## 2018-02-20 DIAGNOSIS — Z00.00 ENCOUNTER FOR ROUTINE ADULT HEALTH EXAMINATION WITHOUT ABNORMAL FINDINGS: Primary | ICD-10-CM

## 2018-02-20 DIAGNOSIS — K21.9 GASTROESOPHAGEAL REFLUX DISEASE WITHOUT ESOPHAGITIS: ICD-10-CM

## 2018-02-20 DIAGNOSIS — G47.33 OSA ON CPAP: ICD-10-CM

## 2018-02-20 DIAGNOSIS — F33.1 MAJOR DEPRESSIVE DISORDER, RECURRENT EPISODE, MODERATE (H): ICD-10-CM

## 2018-02-20 DIAGNOSIS — Z72.0 TOBACCO ABUSE: ICD-10-CM

## 2018-02-20 DIAGNOSIS — F10.21 RECOVERING ALCOHOLIC IN REMISSION (H): ICD-10-CM

## 2018-02-20 DIAGNOSIS — Z12.31 ENCOUNTER FOR SCREENING MAMMOGRAM FOR BREAST CANCER: ICD-10-CM

## 2018-02-20 DIAGNOSIS — E87.1 HYPONATREMIA: ICD-10-CM

## 2018-02-20 DIAGNOSIS — F31.81 BIPOLAR 2 DISORDER (H): ICD-10-CM

## 2018-02-20 DIAGNOSIS — B37.31 CANDIDIASIS OF VULVA AND VAGINA: ICD-10-CM

## 2018-02-20 DIAGNOSIS — E55.9 VITAMIN D DEFICIENCY: ICD-10-CM

## 2018-02-20 DIAGNOSIS — L73.2 HIDRADENITIS SUPPURATIVA OF RIGHT AXILLA: ICD-10-CM

## 2018-02-20 DIAGNOSIS — Z13.6 CARDIOVASCULAR SCREENING; LDL GOAL LESS THAN 160: ICD-10-CM

## 2018-02-20 DIAGNOSIS — R04.0 RECURRENT EPISTAXIS: ICD-10-CM

## 2018-02-20 LAB
ALBUMIN SERPL-MCNC: 3.5 G/DL (ref 3.4–5)
ALBUMIN UR-MCNC: NEGATIVE MG/DL
ALP SERPL-CCNC: 121 U/L (ref 40–150)
ALT SERPL W P-5'-P-CCNC: 22 U/L (ref 0–50)
ANION GAP SERPL CALCULATED.3IONS-SCNC: 7 MMOL/L (ref 3–14)
APPEARANCE UR: CLEAR
AST SERPL W P-5'-P-CCNC: 21 U/L (ref 0–45)
BILIRUB SERPL-MCNC: 0.1 MG/DL (ref 0.2–1.3)
BILIRUB UR QL STRIP: NEGATIVE
BUN SERPL-MCNC: 14 MG/DL (ref 7–30)
CALCIUM SERPL-MCNC: 8.8 MG/DL (ref 8.5–10.1)
CHLORIDE SERPL-SCNC: 106 MMOL/L (ref 94–109)
CHOLEST SERPL-MCNC: 156 MG/DL
CO2 SERPL-SCNC: 26 MMOL/L (ref 20–32)
COLOR UR AUTO: YELLOW
CREAT SERPL-MCNC: 0.72 MG/DL (ref 0.52–1.04)
ERYTHROCYTE [DISTWIDTH] IN BLOOD BY AUTOMATED COUNT: 13.6 % (ref 10–15)
FEF 25/75: NORMAL
FEV-1: NORMAL
FEV1/FVC: NORMAL
FVC: NORMAL
GFR SERPL CREATININE-BSD FRML MDRD: 84 ML/MIN/1.7M2
GLUCOSE SERPL-MCNC: 106 MG/DL (ref 70–99)
GLUCOSE UR STRIP-MCNC: NEGATIVE MG/DL
HCT VFR BLD AUTO: 35.7 % (ref 35–47)
HDLC SERPL-MCNC: 47 MG/DL
HGB BLD-MCNC: 11.3 G/DL (ref 11.7–15.7)
HGB UR QL STRIP: ABNORMAL
KETONES UR STRIP-MCNC: NEGATIVE MG/DL
KOH PREP SPEC: NORMAL
LDLC SERPL CALC-MCNC: 73 MG/DL
LEUKOCYTE ESTERASE UR QL STRIP: NEGATIVE
LITHIUM SERPL-SCNC: 0.56 MMOL/L (ref 0.6–1.2)
MCH RBC QN AUTO: 30.2 PG (ref 26.5–33)
MCHC RBC AUTO-ENTMCNC: 31.7 G/DL (ref 31.5–36.5)
MCV RBC AUTO: 96 FL (ref 78–100)
NITRATE UR QL: NEGATIVE
NON-SQ EPI CELLS #/AREA URNS LPF: NORMAL /LPF
NONHDLC SERPL-MCNC: 109 MG/DL
PH UR STRIP: 6 PH (ref 5–7)
PLATELET # BLD AUTO: 267 10E9/L (ref 150–450)
POTASSIUM SERPL-SCNC: 3.8 MMOL/L (ref 3.4–5.3)
PROT SERPL-MCNC: 7.1 G/DL (ref 6.8–8.8)
RBC # BLD AUTO: 3.74 10E12/L (ref 3.8–5.2)
RBC #/AREA URNS AUTO: NORMAL /HPF
SODIUM SERPL-SCNC: 139 MMOL/L (ref 133–144)
SOURCE: ABNORMAL
SP GR UR STRIP: 1.02 (ref 1–1.03)
SPECIMEN SOURCE: NORMAL
TRIGL SERPL-MCNC: 179 MG/DL
TSH SERPL DL<=0.005 MIU/L-ACNC: 1.66 MU/L (ref 0.4–4)
UROBILINOGEN UR STRIP-ACNC: 0.2 EU/DL (ref 0.2–1)
WBC # BLD AUTO: 5.1 10E9/L (ref 4–11)
WBC #/AREA URNS AUTO: NORMAL /HPF

## 2018-02-20 PROCEDURE — 88175 CYTOPATH C/V AUTO FLUID REDO: CPT | Performed by: INTERNAL MEDICINE

## 2018-02-20 PROCEDURE — 80053 COMPREHEN METABOLIC PANEL: CPT | Performed by: INTERNAL MEDICINE

## 2018-02-20 PROCEDURE — 81001 URINALYSIS AUTO W/SCOPE: CPT | Performed by: INTERNAL MEDICINE

## 2018-02-20 PROCEDURE — 82306 VITAMIN D 25 HYDROXY: CPT | Performed by: INTERNAL MEDICINE

## 2018-02-20 PROCEDURE — 80178 ASSAY OF LITHIUM: CPT | Performed by: INTERNAL MEDICINE

## 2018-02-20 PROCEDURE — 85027 COMPLETE CBC AUTOMATED: CPT | Performed by: INTERNAL MEDICINE

## 2018-02-20 PROCEDURE — 84443 ASSAY THYROID STIM HORMONE: CPT | Performed by: INTERNAL MEDICINE

## 2018-02-20 PROCEDURE — 36415 COLL VENOUS BLD VENIPUNCTURE: CPT | Performed by: INTERNAL MEDICINE

## 2018-02-20 PROCEDURE — 99396 PREV VISIT EST AGE 40-64: CPT | Mod: 25 | Performed by: INTERNAL MEDICINE

## 2018-02-20 PROCEDURE — 94010 BREATHING CAPACITY TEST: CPT | Performed by: INTERNAL MEDICINE

## 2018-02-20 PROCEDURE — G0476 HPV COMBO ASSAY CA SCREEN: HCPCS | Performed by: INTERNAL MEDICINE

## 2018-02-20 PROCEDURE — 80061 LIPID PANEL: CPT | Performed by: INTERNAL MEDICINE

## 2018-02-20 PROCEDURE — 87220 TISSUE EXAM FOR FUNGI: CPT | Performed by: INTERNAL MEDICINE

## 2018-02-20 PROCEDURE — 99214 OFFICE O/P EST MOD 30 MIN: CPT | Mod: 25 | Performed by: INTERNAL MEDICINE

## 2018-02-20 RX ORDER — LEVOTHYROXINE SODIUM 150 UG/1
150 TABLET ORAL DAILY
Qty: 60 TABLET | Refills: 1 | Status: SHIPPED | OUTPATIENT
Start: 2018-02-20 | End: 2019-01-16

## 2018-02-20 NOTE — MR AVS SNAPSHOT
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)        ROMEO on CPAP        Vitamin D deficiency        Iron deficiency anemia due to chronic blood loss        Hyponatremia        Gastroesophageal reflux disease without esophagitis        Recurrent epistaxis        Candidiasis of vulva and vagina        Hidradenitis suppurativa of right axilla        Encounter for screening mammogram for breast cancer        CARDIOVASCULAR SCREENING; LDL GOAL LESS THAN 160        Tobacco abuse          Care Instructions      Preventive Health Recommendations  Female Ages 50 - 64    Yearly exam: See your health care provider every year in order to  o Review health changes.   o Discuss preventive care.    o Review your medicines if your doctor has prescribed any.      Get a Pap test every three years (unless you have an abnormal result and your provider advises testing more often).    If you get Pap tests with HPV test, you only need to test every 5 years, unless you have an abnormal result.     You do not need a Pap test if your uterus was removed (hysterectomy) and you have not had cancer.    You should be tested each year for STDs (sexually transmitted diseases) if you're at risk.     Have a mammogram every 1 to 2 years.    Have a colonoscopy at age 50, or have a yearly FIT test (stool test). These exams screen for colon cancer.      Have a cholesterol test every 5 years, or more often if advised.    Have a diabetes test (fasting  "glucose) every three years. If you are at risk for diabetes, you should have this test more often.     If you are at risk for osteoporosis (brittle bone disease), think about having a bone density scan (DEXA).    Shots: Get a flu shot each year. Get a tetanus shot every 10 years.    Nutrition:     Eat at least 5 servings of fruits and vegetables each day.    Eat whole-grain bread, whole-wheat pasta and brown rice instead of white grains and rice.    Talk to your provider about Calcium and Vitamin D.     Lifestyle    Exercise at least 150 minutes a week (30 minutes a day, 5 days a week). This will help you control your weight and prevent disease.    Limit alcohol to one drink per day.    No smoking.     Wear sunscreen to prevent skin cancer.     See your dentist every six months for an exam and cleaning.    See your eye doctor every 1 to 2 years.            Follow-ups after your visit        Your next 10 appointments already scheduled     Mar 02, 2018  9:30 AM CST   (Arrive by 9:15 AM)   MA SCREENING DIGITAL BILATERAL with SHBCMA2   [facility] ([facility] [place])    [address] [place] [address], [address] [place] [id number] [phone]           Do not use any powder, lotion or deodorant under your arms or on your breast. If you do, we will ask you to remove it before your exam.  Wear comfortable, two-piece clothing.  If you have any allergies, tell your care team.  Bring any previous mammograms from other facilities or have them mailed to the breast center. Three-dimensional (3D) mammograms are available at [facility] locations in [facility], [facility], and [place]. Health locations include [facility] in [place]. Benefits of 3D mammograms include: - Improved rate of cancer detection - Decreases your chance of having to go back for more tests, which means fewer: - \"False-positive\" results (This means " "that there is an abnormal area but it isn't cancer.) - Invasive testing procedures, such as a biopsy or surgery - Can provide clearer images of the breast if you have dense breast tissue. 3D mammography is an optional exam that anyone can have with a 2D mammogram. It doesn't replace or take the place of a 2D mammogram. 2D mammograms remain an effective screening test for all women.  Not all insurance companies cover the cost of a 3D mammogram. Check with your insurance.              Who to contact     If you have questions or need follow up information about today's clinic visit or your schedule please contact [facility] directly at [phone].  Normal or non-critical lab and imaging results will be communicated to you by MADShart, letter or phone within 4 business days after the clinic has received the results. If you do not hear from us within 7 days, please contact the clinic through MADShart or phone. If you have a critical or abnormal lab result, we will notify you by phone as soon as possible.  Submit refill requests through Wheelright or call your pharmacy and they will forward the refill request to us. Please allow 3 business days for your refill to be completed.          Additional Information About Your Visit        MyChart Information     Wheelright lets you send messages to your doctor, view your test results, renew your prescriptions, schedule appointments and more. To sign up, go to [email or web address] . Click on \"Log in\" on the left side of the screen, which will take you to the Welcome page. Then click on \"Sign up Now\" on the right side of the page.     You will be asked to enter the access code listed below, as well as some personal information. Please follow the directions to create your username and password.     Your access code is: [email or web address]  Expires: 2018  4:37 AM     Your access code will  in 90 days. If you need help or a new code, please call your [facility] clinic or " "[phone].        Care EveryWhere ID     This is your Care EveryWhere ID. This could be used by other organizations to access your [facility] medical records  [id number]        Your Vitals Were     Pulse Temperature Height Last Period Pulse Oximetry Breastfeeding?    75 97  F (36.1  C) (Oral) 5' 8\" (1.727 m) 07/20/2006 96% No    BMI (Body Mass Index)                   34.36 kg/m2            Blood Pressure from Last 3 Encounters:   02/20/18 109/68   12/27/17 108/66   11/14/17 99/61    Weight from Last 3 Encounters:   02/20/18 226 lb (102.5 kg)   12/27/17 235 lb (106.6 kg)   11/14/17 225 lb (102.1 kg)              We Performed the Following     CBC with platelets     Comprehensive metabolic panel     KOH prep (skin, hair or nails only)     Lipid panel reflex to direct LDL Fasting     Lithium level     Pap imaged thin layer diagnostic with HPV (select HPV order below)     Spirometry, Breathing Capacity: Normal Order, Clinic Performed     TSH with free T4 reflex     UA reflex to Microscopic and Culture     Urine Microscopic     Vitamin D Deficiency          Today's Medication Changes          These changes are accurate as of 2/20/18 11:59 PM.  If you have any questions, ask your nurse or doctor.               These medicines have changed or have updated prescriptions.        Dose/Directions    Doxepin HCl 150 MG Caps   This may have changed:  how much to take        Dose:  300 mg   Take 300 mg by mouth At Bedtime   Quantity:  30 capsule   Refills:  0            Where to get your medicines      These medications were sent to [id number] - [place], [place] - [address] AT [facility] [address]  [address] [place] [address]    Hours:  24-hours Phone:  [phone]     levothyroxine 150 MCG tablet                Primary Care Provider Office Phone # Fax #    Todd Manzanares [phone] [address]  [facility] [address]        Equal Access to " Services     [facility]: Hadii aad ku hadgracielalee Orinmehdi, waaxda luqadaha, qaybta kaalmada sabine, darrel wolfe . So [facility] [phone].    ATENCIÓN: Si habla reina, tiene a fitzpatrick disposición servicios gratuitos de asistencia lingüística. Llame al [phone].    We comply with applicable federal civil rights laws and [place] laws. We do not discriminate on the basis of race, color, national origin, age, disability, sex, sexual orientation, or gender identity.            Thank you!     Thank you for choosing [facility]  for your care. Our goal is always to provide you with excellent care. Hearing back from our patients is one way we can continue to improve our services. Please take a few minutes to complete the written survey that you may receive in the mail after your visit with us. Thank you!             Your Updated Medication List - Protect others around you: Learn how to safely use, store and throw away your medicines at www.disposemymeds.org.          This list is accurate as of 2/20/18 11:59 PM.  Always use your most recent med list.                   Brand Name Dispense Instructions for use Diagnosis    albuterol 108 (90 BASE) MCG/ACT Inhaler    PROAIR HFA/PROVENTIL HFA/VENTOLIN HFA    1 Inhaler    Inhale 2 puffs into the lungs every 6 hours as needed for shortness of breath / dyspnea or wheezing        ARIPiprazole 30 MG tablet    ABILIFY     TAKE 1 TABLET BY MOUTH DAILY.        armodafinil 250 MG Tabs tablet    NUVIGIL    30 tablet    Take 1 tablet (250 mg) by mouth every morning    Excessive sleepiness       cholecalciferol 1000 UNIT tablet    vitamin D3    100 tablet    Take 1 tablet (1,000 Units) by mouth daily    Preventive measure       Doxepin HCl 150 MG Caps     30 capsule    Take 300 mg by mouth At Bedtime        furosemide 40 MG tablet    LASIX    30 tablet    Take 1 tablet (40 mg) by mouth daily    Bilateral edema of lower extremity       levothyroxine  150 MCG tablet    SYNTHROID/LEVOTHROID    60 tablet    Take 1 tablet (150 mcg) by mouth daily    Hypothyroidism due to medication       lithium 300 MG tablet      300 mg Takes 300 mg every morning        nicotine 10 MG Inhaler    NICOTROL    6 Box    Inhale 2 cartridge into the lungs daily as needed for smoking cessation    Tobacco use disorder       * order for DME     1 Units    Equipment being ordered: CPAP Please dispense Cpap and its supply    ROMEO on CPAP       * order for DME     1 each    Equipment being ordered: tennis elbow brace    Right lateral epicondylitis       pantoprazole 40 MG EC tablet    PROTONIX    60 tablet    Take 1 tablet (40 mg) by mouth 2 times daily Take 1.5 hours prior to last meal of the day, and take at bedtime    Gastroesophageal reflux disease without esophagitis       polyethylene glycol Packet    MIRALAX/GLYCOLAX    30 packet    Take 17 g by mouth 2 times daily as needed for constipation    Chronic constipation       * Notice:  This list has 2 medication(s) that are the same as other medications prescribed for you. Read the directions carefully, and ask your doctor or other care provider to review them with you.

## 2018-02-20 NOTE — LETTER
[facility]  [address] [facility]  [address]  [place]  [address]  Tel: [phone]    March 8, 2018    Gudelia Quintanilla  [address]  [place] [address]      Gudelia,     Neeraj is a copy of your report which showed no sign of malignancy or HPV. We should plan on repeating this in three years.   Resulted Orders   UA reflex to Microscopic and Culture   Result Value Ref Range    Color Urine Yellow     Appearance Urine Clear     Glucose Urine Negative NEG^Negative mg/dL    Bilirubin Urine Negative NEG^Negative    Ketones Urine Negative NEG^Negative mg/dL    Specific Gravity Urine 1.020 1.003 - 1.035    Blood Urine Trace (A) NEG^Negative    pH Urine 6.0 5.0 - 7.0 pH    Protein Albumin Urine Negative NEG^Negative mg/dL    Urobilinogen Urine 0.2 0.2 - 1.0 EU/dL    Nitrite Urine Negative NEG^Negative    Leukocyte Esterase Urine Negative NEG^Negative    Source Midstream Urine    CBC with platelets   Result Value Ref Range    WBC 5.1 4.0 - 11.0 10e9/L    RBC Count 3.74 (L) 3.8 - 5.2 10e12/L    Hemoglobin 11.3 (L) 11.7 - 15.7 g/dL    Hematocrit 35.7 35.0 - 47.0 %    MCV 96 78 - 100 fl    MCH 30.2 26.5 - 33.0 pg    MCHC 31.7 31.5 - 36.5 g/dL    RDW 13.6 10.0 - 15.0 %    Platelet Count 267 150 - 450 10e9/L   Comprehensive metabolic panel   Result Value Ref Range    Sodium 139 133 - 144 mmol/L    Potassium 3.8 3.4 - 5.3 mmol/L    Chloride 106 94 - 109 mmol/L    Carbon Dioxide 26 20 - 32 mmol/L    Anion Gap 7 3 - 14 mmol/L    Glucose 106 (H) 70 - 99 mg/dL    Urea Nitrogen 14 7 - 30 mg/dL    Creatinine 0.72 0.52 - 1.04 mg/dL    GFR Estimate 84 >60 mL/min/1.7m2      Comment:      Non  GFR Calc    GFR Estimate If Black >90 >60 mL/min/1.7m2      Comment:       GFR Calc    Calcium 8.8 8.5 - 10.1 mg/dL    Bilirubin Total 0.1 (L) 0.2 - 1.3 mg/dL    Albumin 3.5 3.4 - 5.0 g/dL    Protein Total 7.1 6.8 - 8.8 g/dL    Alkaline Phosphatase 121 40 - 150 U/L    ALT 22 0 - 50 U/L    AST  21 0 - 45 U/L   Lipid panel reflex to direct LDL Fasting   Result Value Ref Range    Cholesterol 156 <200 mg/dL    Triglycerides 179 (H) <150 mg/dL      Comment:      Borderline high:  150-199 mg/dl  High:             200-499 mg/dl  Very high:       >499 mg/dl      HDL Cholesterol 47 (L) >49 mg/dL    LDL Cholesterol Calculated 73 <100 mg/dL      Comment:      Desirable:       <100 mg/dl    Non HDL Cholesterol 109 <130 mg/dL   TSH with free T4 reflex   Result Value Ref Range    TSH 1.66 0.40 - 4.00 mU/L   Vitamin D Deficiency   Result Value Ref Range    Vitamin D Deficiency screening 31 20 - 75 ug/L      Comment:      Season, race, dietary intake, and treatment affect the concentration of   25-hydroxy-Vitamin D. Values may decrease during winter months and increase   during summer months. Values 20-29 ug/L may indicate Vitamin D insufficiency   and values <20 ug/L may indicate Vitamin D deficiency.  Vitamin D determination is routinely performed by an immunoassay specific for   25 hydroxyvitamin D3.  If an individual is on vitamin D2 (ergocalciferol)   supplementation, please specify 25 OH vitamin D2 and D3 level determination by   LCMSMS test VITD23.     Lithium level   Result Value Ref Range    Lithium Level 0.56 (L) 0.60 - 1.20 mmol/L   KOH prep (skin, hair or nails only)   Result Value Ref Range    Specimen Description Skin     KOH Skin Hair Nails Test No fungal elements seen    Urine Microscopic   Result Value Ref Range    WBC Urine O - 2 OTO2^O - 2 /HPF    RBC Urine O - 2 OTO2^O - 2 /HPF    Squamous Epithelial /LPF Urine Few FEW^Few /LPF   Pap imaged thin layer diagnostic with HPV (select HPV order below)   Result Value Ref Range    PAP NIL     Copath Report         Patient Name: MAYRA MELTON  MR#: [id number]  Specimen #: [id number]  Collected: 2/20/2018  Received: 2/22/2018  Reported: 2/26/2018 09:22  Ordering Phy(s): MARCELLO CONDE    For improved result formatting, select 'View Enhanced Report Format'  under   Linked Documents section.    SPECIMEN/STAIN PROCESS:  Pap Imaged thin layer prep diagnostic (SurePath, FocalPoint with guided   screening)       Pap-Cyto x 1, HPV ordered x 1    SOURCE: Cervical  ----------------------------------------------------------------   Pap Imaged thin layer prep diagnostic (SurePath, FocalPoint with guided   screening)  SPECIMEN ADEQUACY:  Satisfactory for evaluation.  -Transformation zone component absent.    CYTOLOGIC INTERPRETATION:    Negative for intraepithelial lesion or malignancy    Electronically signed out by:  MIGUEL Moulton (ASCP)    Processed and screened at [facility],   [facility]    CLINICAL HISTORY:  LMP: 7/20/2006  Previous normal pap  Date of  Last Pap: 12/18/2015,    Papanicolaou Test Limitations:  Cervical cytology is a screening test with   limited sensitivity; regular  screening is critical for cancer prevention; Pap tests are primarily   effective for the diagnosis/prevention of  squamous cell carcinoma, not adenocarcinomas or other cancers.    TESTING LAB LOCATION:  [address]  [address] [phone]    COLLECTION SITE:  Client:  [facility]  Location: [facility]     HPV High Risk Types DNA Cervical   Result Value Ref Range    HPV Source SurePath     HPV 16 DNA Negative NEG^Negative    HPV 18 DNA Negative NEG^Negative    Other HR HPV Negative NEG^Negative    Final Diagnosis This patient's sample is negative for HPV DNA.       Comment:      This test was developed and its performance characteristics determined by the   [facility]. It   has not been cleared or approved by the FDA. The laboratory is regulated under   CLIA as qualified to perform high-complexity testing. This test is used for   clinical purposes. It should not be regarded as investigational or for    research.  (Note)  METHODOLOGY:  The Roche leonid 4800 system uses automated extraction,   simultaneous amplification of HPV (L1 region) and beta-globin,    followed by  real time detection of fluorescent labeled HPV and beta   globin using specific oligonucleotide probes . The test specifically   identifies types HPV 16 DNA and HPV 18 DNA while concurrently   detecting the rest of the high risk types (31, 33, 35, 39, 45, 51,   52, 56, 58, 59, 66 or 68).  COMMENTS:  This test is not intended for use as a screening device   for women under age 30 with normal cervical cytology.  Results should   be correl  ated with cytologic and histologic findings. Close clinical   followup is recommended.      Specimen Description Cervical Cells       Comment:      [id number]         If you have any further questions or problems, please contact our office.      Sincerely,    Todd Manzanares MD / jeny          Enclosure: Lab Results

## 2018-02-20 NOTE — NURSING NOTE
"Chief Complaint   Patient presents with     RECHECK       Initial /68 (BP Location: Left arm, Patient Position: Sitting, Cuff Size: Adult Large)  Pulse 75  Temp 97  F (36.1  C) (Oral)  Ht 5' 8\" (1.727 m)  Wt 226 lb (102.5 kg)  LMP 07/20/2006  SpO2 96%  Breastfeeding? No  BMI 34.36 kg/m2 Estimated body mass index is 34.36 kg/(m^2) as calculated from the following:    Height as of this encounter: 5' 8\" (1.727 m).    Weight as of this encounter: 226 lb (102.5 kg).  Medication Reconciliation: complete   Lexus Ramírez- DEONDRE      "

## 2018-02-20 NOTE — LETTER
[facility]  [address] [facility]  [address]  [place]  [address]  Tel: [phone]    March 5, 2018    Gudelia Quintanilla  [address]  [place] [address]        Dear Gudelia Alberts,    Enclosed are your lab reports from your recent physical exam which generally are good. As I review the specific tests will make a few recommendations pertinent to those labs.    Your urinalysis was within normal limits.  I perform detail each test making sure that you do not have a yeast infection. There is no sign of yeast vaginitis.  I had checked your lithium level which was slightly lower than therapeutic. Your psychiatrist may be interested in this report to evaluate any possible changes in medication.    Your vitamin D level which is important for bone health was within the normal range.    Comprehensive metabolic panel showed that your minerals and electrolytes were normal, your kidney function tests were normal as were your liver function tests. Your fasting blood sugar was 111, any thing less than 100 is normal and if it was higher than 126 that would be worrisome for diabetes.    The TSH which is a sensitive indicator of thyroid function is within the normal range and is at a very good level, much improved from three months ago.    The lipid panel shows that your total cholesterol was 156. As we look at the components that make up this value it gives us the important details. The triglycerides were 179 up from 98 from a year ago. The triglycerides as is your fasting blood sugar are intimately related to carbohydrates in your diet. The important carbohydrates you should moderate would include juices, candies cookies and desserts, breads, pasta, cereal and starches. As you have mentioned to me that you do not have good control over the menu your strategy until you are able to manage the menu would be to moderate portion sizes. Also on modest reduction in your weight would be helpful. The HDL  or good cholesterol was 47 not as good as 54 from before. This value is intimately related to regular aerobic activity. The most important factor the LDL-cholesterol or bad cholesterol was in a very good range of 73. This suggests that your managing the fats in your diet well.    Your complete blood count shows that things are very stable your hemoglobin is 11.3 which we would consider at the lower limits of the normal range. It deserves close monitoring to make sure that there is not anything else causing it to be at this level.    In general things are looking good. A modest increase in activity and dietary discretion of the carbohydrates would be my only recommendations at this time. The byproduct of this would also probably be associated with a slight weight reduction.I recommend coming back to the office in six months, fasting. Give any questions regarding these reports please let me know.    If you have any further questions or problems, please contact our office.      Sincerely,    Todd Manzanares MD/ Medina Damon, CMA  Results for orders placed or performed in visit on 02/20/18   Spirometry, Breathing Capacity: Normal Order, Clinic Performed   Result Value Ref Range    FEV-1      FVC      FEV1/FVC      FEF 25/75     UA reflex to Microscopic and Culture   Result Value Ref Range    Color Urine Yellow     Appearance Urine Clear     Glucose Urine Negative NEG^Negative mg/dL    Bilirubin Urine Negative NEG^Negative    Ketones Urine Negative NEG^Negative mg/dL    Specific Gravity Urine 1.020 1.003 - 1.035    Blood Urine Trace (A) NEG^Negative    pH Urine 6.0 5.0 - 7.0 pH    Protein Albumin Urine Negative NEG^Negative mg/dL    Urobilinogen Urine 0.2 0.2 - 1.0 EU/dL    Nitrite Urine Negative NEG^Negative    Leukocyte Esterase Urine Negative NEG^Negative    Source Midstream Urine    CBC with platelets   Result Value Ref Range    WBC 5.1 4.0 - 11.0 10e9/L    RBC Count 3.74 (L) 3.8 - 5.2 10e12/L    Hemoglobin 11.3 (L)  11.7 - 15.7 g/dL    Hematocrit 35.7 35.0 - 47.0 %    MCV 96 78 - 100 fl    MCH 30.2 26.5 - 33.0 pg    MCHC 31.7 31.5 - 36.5 g/dL    RDW 13.6 10.0 - 15.0 %    Platelet Count 267 150 - 450 10e9/L   Comprehensive metabolic panel   Result Value Ref Range    Sodium 139 133 - 144 mmol/L    Potassium 3.8 3.4 - 5.3 mmol/L    Chloride 106 94 - 109 mmol/L    Carbon Dioxide 26 20 - 32 mmol/L    Anion Gap 7 3 - 14 mmol/L    Glucose 106 (H) 70 - 99 mg/dL    Urea Nitrogen 14 7 - 30 mg/dL    Creatinine 0.72 0.52 - 1.04 mg/dL    GFR Estimate 84 >60 mL/min/1.7m2    GFR Estimate If Black >90 >60 mL/min/1.7m2    Calcium 8.8 8.5 - 10.1 mg/dL    Bilirubin Total 0.1 (L) 0.2 - 1.3 mg/dL    Albumin 3.5 3.4 - 5.0 g/dL    Protein Total 7.1 6.8 - 8.8 g/dL    Alkaline Phosphatase 121 40 - 150 U/L    ALT 22 0 - 50 U/L    AST 21 0 - 45 U/L   Lipid panel reflex to direct LDL Fasting   Result Value Ref Range    Cholesterol 156 <200 mg/dL    Triglycerides 179 (H) <150 mg/dL    HDL Cholesterol 47 (L) >49 mg/dL    LDL Cholesterol Calculated 73 <100 mg/dL    Non HDL Cholesterol 109 <130 mg/dL   TSH with free T4 reflex   Result Value Ref Range    TSH 1.66 0.40 - 4.00 mU/L   Vitamin D Deficiency   Result Value Ref Range    Vitamin D Deficiency screening 31 20 - 75 ug/L   Lithium level   Result Value Ref Range    Lithium Level 0.56 (L) 0.60 - 1.20 mmol/L   Urine Microscopic   Result Value Ref Range    WBC Urine O - 2 OTO2^O - 2 /HPF    RBC Urine O - 2 OTO2^O - 2 /HPF    Squamous Epithelial /LPF Urine Few FEW^Few /LPF   Pap imaged thin layer diagnostic with HPV (select HPV order below)   Result Value Ref Range    PAP NIL     Copath Report         Patient Name: MAYRA MELTON  MR#: [id number]  Specimen #: [id number]  Collected: 2/20/2018  Received: 2/22/2018  Reported: 2/26/2018 09:22  Ordering Phy(s): MARCELLO CONDE    For improved result formatting, select 'View Enhanced Report Format' under   Linked Documents section.    SPECIMEN/STAIN PROCESS:  Pap  Imaged thin layer prep diagnostic (SurePath, FocalPoint with guided   screening)       Pap-Cyto x 1, HPV ordered x 1    SOURCE: Cervical  ----------------------------------------------------------------   Pap Imaged thin layer prep diagnostic (SurePath, FocalPoint with guided   screening)  SPECIMEN ADEQUACY:  Satisfactory for evaluation.  -Transformation zone component absent.    CYTOLOGIC INTERPRETATION:    Negative for intraepithelial lesion or malignancy    Electronically signed out by:  MIGUEL Moulton (ASCP)    Processed and screened at [facility],   [facility]    CLINICAL HISTORY:  LMP: 7/20/2006  Previous normal pap  Date of  Last Pap: 12/18/2015,    Papanicolaou Test Limitations:  Cervical cytology is a screening test with   limited sensitivity; regular  screening is critical for cancer prevention; Pap tests are primarily   effective for the diagnosis/prevention of  squamous cell carcinoma, not adenocarcinomas or other cancers.    TESTING LAB LOCATION:  [address]  [address] [phone]    COLLECTION SITE:  Client:  [facility]  Location: [facility] (S)     HPV High Risk Types DNA Cervical   Result Value Ref Range    HPV Source SurePath     HPV 16 DNA Negative NEG^Negative    HPV 18 DNA Negative NEG^Negative    Other HR HPV Negative NEG^Negative    Final Diagnosis This patient's sample is negative for HPV DNA.     Specimen Description Cervical Cells    KOH prep (skin, hair or nails only)   Result Value Ref Range    Specimen Description Skin     KOH Skin Hair Nails Test No fungal elements seen                Enclosure: Lab Results

## 2018-02-21 LAB — DEPRECATED CALCIDIOL+CALCIFEROL SERPL-MC: 31 UG/L (ref 20–75)

## 2018-02-21 ASSESSMENT — PATIENT HEALTH QUESTIONNAIRE - PHQ9: SUM OF ALL RESPONSES TO PHQ QUESTIONS 1-9: 9

## 2018-02-26 LAB
COPATH REPORT: NORMAL
PAP: NORMAL

## 2018-02-28 LAB
FINAL DIAGNOSIS: NORMAL
HPV HR 12 DNA CVX QL NAA+PROBE: NEGATIVE
HPV16 DNA SPEC QL NAA+PROBE: NEGATIVE
HPV18 DNA SPEC QL NAA+PROBE: NEGATIVE
SPECIMEN DESCRIPTION: NORMAL
SPECIMEN SOURCE CVX/VAG CYTO: NORMAL

## 2018-03-02 ENCOUNTER — HOSPITAL ENCOUNTER (OUTPATIENT)
Dept: MAMMOGRAPHY | Facility: CLINIC | Age: 53
Discharge: HOME | End: 2018-03-02
Attending: INTERNAL MEDICINE | Admitting: INTERNAL MEDICINE
Payer: COMMERCIAL

## 2018-03-02 DIAGNOSIS — Z00.00 ENCOUNTER FOR ROUTINE ADULT HEALTH EXAMINATION WITHOUT ABNORMAL FINDINGS: ICD-10-CM

## 2018-03-02 PROCEDURE — 77067 SCR MAMMO BI INCL CAD: CPT

## 2018-03-14 ENCOUNTER — TELEPHONE (OUTPATIENT)
Dept: FAMILY MEDICINE | Facility: CLINIC | Age: 53
End: 2018-03-14

## 2018-03-14 NOTE — TELEPHONE ENCOUNTER
Reason for Call:  Other Medication info    Detailed comments: Libertad from [facility] had faxed over a letter from their pharmacy consultant letter would say [employer].   Ht was faxed  2/27,  3/7,  and Monday.  It was a suggestion about a change/recommendation  in medication. There was form for Dr Manzanares to fill out and sign.  She will fax it again now to [phone].      Phone Number Patient can be reached at: Other phone number:  [phone]   Main #    Best Time: any    Can we leave a detailed message on this number? Not Applicable    Call taken on 3/14/2018 at 8:32 AM by Helen Jimenez

## 2018-04-26 DIAGNOSIS — E03.2 HYPOTHYROIDISM DUE TO MEDICATION: ICD-10-CM

## 2018-04-26 NOTE — TELEPHONE ENCOUNTER
"Last Written Prescription Date:  2/20/18  Last Fill Quantity: 60 tablet,  # refills: 1   Last office visit: 2/20/2018 with prescribing provider:  [place]   Future Office Visit:      Requested Prescriptions   Pending Prescriptions Disp Refills     levothyroxine (SYNTHROID/LEVOTHROID) 150 MCG tablet [Pharmacy Med Name: LEVOTHYROXINE 0.150MG (150MCG) TAB] 60 tablet 0     Sig: TAKE 1 TABLET(150 MCG) BY MOUTH DAILY    Thyroid Protocol Passed    4/26/2018  1:08 PM       Passed - Patient is 12 years or older       Passed - Recent (12 mo) or future (30 days) visit within the authorizing provider's specialty    Patient had office visit in the last 12 months or has a visit in the next 30 days with authorizing provider or within the authorizing provider's specialty.  See \"Patient Info\" tab in inbasket, or \"Choose Columns\" in Meds & Orders section of the refill encounter.           Passed - Normal TSH on file in past 12 months    Recent Labs   Lab Test  02/20/18   0846   TSH  1.66             Passed - No active pregnancy on record    If patient is pregnant or has had a positive pregnancy test, please check TSH.         Passed - No positive pregnancy test in past 12 months    If patient is pregnant or has had a positive pregnancy test, please check TSH.            "

## 2018-04-27 RX ORDER — LEVOTHYROXINE SODIUM 150 UG/1
TABLET ORAL
Qty: 90 TABLET | Refills: 0 | Status: SHIPPED | OUTPATIENT
Start: 2018-04-27 | End: 2019-07-19

## 2018-04-27 NOTE — TELEPHONE ENCOUNTER
Prescription approved per [facility] Refill Protocol.  Ivette OLGUIN RN

## 2018-05-22 ENCOUNTER — OFFICE VISIT (OUTPATIENT)
Dept: URGENT CARE | Facility: URGENT CARE | Age: 53
End: 2018-05-22
Payer: COMMERCIAL

## 2018-05-22 VITALS
WEIGHT: 226 LBS | HEIGHT: 68 IN | SYSTOLIC BLOOD PRESSURE: 125 MMHG | DIASTOLIC BLOOD PRESSURE: 81 MMHG | BODY MASS INDEX: 34.25 KG/M2 | HEART RATE: 80 BPM | TEMPERATURE: 97.4 F | OXYGEN SATURATION: 99 %

## 2018-05-22 DIAGNOSIS — L02.92 FURUNCLE OF SKIN OR SUBCUTANEOUS TISSUE: Primary | ICD-10-CM

## 2018-05-22 DIAGNOSIS — L73.2 HIDRADENITIS SUPPURATIVA OF RIGHT AXILLA: ICD-10-CM

## 2018-05-22 PROCEDURE — 99213 OFFICE O/P EST LOW 20 MIN: CPT | Performed by: PHYSICIAN ASSISTANT

## 2018-05-22 RX ORDER — SULFAMETHOXAZOLE/TRIMETHOPRIM 800-160 MG
1 TABLET ORAL 2 TIMES DAILY
Qty: 20 TABLET | Refills: 0 | Status: SHIPPED | OUTPATIENT
Start: 2018-05-22 | End: 2018-06-01

## 2018-05-22 NOTE — MR AVS SNAPSHOT
After Visit Summary   5/22/2018    Gudelia Quintanilla    MRN: [id number]           Patient Information     Date Of Birth          1965        Visit Information        Provider Department      5/22/2018 7:15 PM Therese Kuhn PA-C [facility]        Today's Diagnoses     Furuncle of skin or subcutaneous tissue    -  1    Boil          Care Instructions    1. Take Keflex three times daily with food x 10 days.  2. Take Bactrim twice daily with food x 10 days.  3. Take a probiotic while on the antibiotic- once daily. Some brands you could look for would include: Florastor, Culturelle, Floragen. Ask the pharmacist if you need help finding them.  4. No shaving for now. Change razor frequently when you resume shaving the underarms.  5. Follow up with primary care provider if no improvement in 10 days.          Follow-ups after your visit        Follow-up notes from your care team     Return if symptoms worsen or fail to improve.      Who to contact     If you have questions or need follow up information about today's clinic visit or your schedule please contact [facility] directly at [phone].  Normal or non-critical lab and imaging results will be communicated to you by MyChart, letter or phone within 4 business days after the clinic has received the results. If you do not hear from us within 7 days, please contact the clinic through [employer] or phone. If you have a critical or abnormal lab result, we will notify you by phone as soon as possible.  Submit refill requests through [employer] or call your pharmacy and they will forward the refill request to us. Please allow 3 business days for your refill to be completed.          Additional Information About Your Visit        MyChart Information     [employer] lets you send messages to your doctor, view your test results, renew your prescriptions, schedule appointments and more. To sign up, go to  [email or web address] . Click on \"Log in\" on the left side of the screen, which will take you to the Welcome page. Then click on \"Sign up Now\" on the right side of the page.     You will be asked to enter the access code listed below, as well as some personal information. Please follow the directions to create your username and password.     Your access code is: [email or web address]  Expires: 2018  5:37 AM     Your access code will  in 90 days. If you need help or a new code, please call your [facility] clinic or [phone].        Care EveryWhere ID     This is your Care EveryWhere ID. This could be used by other organizations to access your [facility] medical records  [id number]        Your Vitals Were     Pulse Temperature Height Last Period Pulse Oximetry BMI (Body Mass Index)    80 97.4  F (36.3  C) (Tympanic) 5' 8\" (1.727 m) 2006 99% 34.36 kg/m2       Blood Pressure from Last 3 Encounters:   18 125/81   18 109/68   17 108/66    Weight from Last 3 Encounters:   18 226 lb (102.5 kg)   18 226 lb (102.5 kg)   17 235 lb (106.6 kg)              Today, you had the following     No orders found for display         Today's Medication Changes          These changes are accurate as of 18  7:35 PM.  If you have any questions, ask your nurse or doctor.               Start taking these medicines.        Dose/Directions    cephalexin 250 MG capsule   Commonly known as:  KEFLEX   Used for:  Furuncle of skin or subcutaneous tissue   Started by:  Therese Kuhn PA-C        Dose:  250 mg   Take 1 capsule (250 mg) by mouth 3 times daily for 10 days   Quantity:  30 capsule   Refills:  0       sulfamethoxazole-trimethoprim 800-160 MG per tablet   Commonly known as:  BACTRIM DS   Used for:  Furuncle of skin or subcutaneous tissue, Boil   Started by:  Therese Kuhn PA-C        Dose:  1 tablet   Take 1 tablet by mouth 2 times daily for 10 days   Quantity:  20 " tablet   Refills:  0         These medicines have changed or have updated prescriptions.        Dose/Directions    Doxepin HCl 150 MG Caps   This may have changed:  how much to take        Dose:  300 mg   Take 300 mg by mouth At Bedtime   Quantity:  30 capsule   Refills:  0            Where to get your medicines      These medications were sent to [id number] - [place], [place] - [address] AT [facility] [address]  [address], [place] [address]    Hours:  24-hours Phone:  [phone]     cephalexin 250 MG capsule    sulfamethoxazole-trimethoprim 800-160 MG per tablet                Primary Care Provider Office Phone # Fax #    Todd Manzanares [phone] [address]  [place] [address]        Equal Access to Services     JAILENE REINA : Hadii aad lance hadasho Soomaali, waaxda luqadaha, qaybta kaalmada adeegyada, darrel wolfe . So [facility] [phone].    ATENCIÓN: Si habla español, tiene a fitzpatrick disposición servicios gratuitos de asistencia lingüística. Primo al [phone].    We comply with applicable federal civil rights laws and [place] laws. We do not discriminate on the basis of race, color, national origin, age, disability, sex, sexual orientation, or gender identity.            Thank you!     Thank you for choosing [facility]  for your care. Our goal is always to provide you with excellent care. Hearing back from our patients is one way we can continue to improve our services. Please take a few minutes to complete the written survey that you may receive in the mail after your visit with us. Thank you!             Your Updated Medication List - Protect others around you: Learn how to safely use, store and throw away your medicines at www.disposemymeds.org.          This list is accurate as of 5/22/18  7:35 PM.  Always use your most recent med list.                   Brand Name Dispense  Instructions for use Diagnosis    albuterol 108 (90 Base) MCG/ACT Inhaler    PROAIR HFA/PROVENTIL HFA/VENTOLIN HFA    1 Inhaler    Inhale 2 puffs into the lungs every 6 hours as needed for shortness of breath / dyspnea or wheezing        ARIPiprazole 30 MG tablet    ABILIFY     TAKE 1 TABLET BY MOUTH DAILY.        armodafinil 250 MG Tabs tablet    NUVIGIL    30 tablet    Take 1 tablet (250 mg) by mouth every morning    Excessive sleepiness       cephalexin 250 MG capsule    KEFLEX    30 capsule    Take 1 capsule (250 mg) by mouth 3 times daily for 10 days    Furuncle of skin or subcutaneous tissue       cholecalciferol 1000 UNIT tablet    vitamin D3    100 tablet    Take 1 tablet (1,000 Units) by mouth daily    Preventive measure       Doxepin HCl 150 MG Caps     30 capsule    Take 300 mg by mouth At Bedtime        furosemide 40 MG tablet    LASIX    30 tablet    Take 1 tablet (40 mg) by mouth daily    Bilateral edema of lower extremity       * levothyroxine 150 MCG tablet    SYNTHROID/LEVOTHROID    60 tablet    Take 1 tablet (150 mcg) by mouth daily    Hypothyroidism due to medication       * levothyroxine 150 MCG tablet    SYNTHROID/LEVOTHROID    90 tablet    TAKE 1 TABLET(150 MCG) BY MOUTH DAILY    Hypothyroidism due to medication       lithium 300 MG tablet      300 mg Takes 300 mg every morning        nicotine 10 MG Inhaler    NICOTROL    6 Box    Inhale 2 cartridge into the lungs daily as needed for smoking cessation    Tobacco use disorder       order for DME     1 Units    Equipment being ordered: CPAP Please dispense Cpap and its supply    ROMEO on CPAP       order for DME     1 each    Equipment being ordered: tennis elbow brace    Right lateral epicondylitis       pantoprazole 40 MG EC tablet    PROTONIX    60 tablet    Take 1 tablet (40 mg) by mouth 2 times daily Take 1.5 hours prior to last meal of the day, and take at bedtime    Gastroesophageal reflux disease without esophagitis       polyethylene  glycol Packet    MIRALAX/GLYCOLAX    30 packet    Take 17 g by mouth 2 times daily as needed for constipation    Chronic constipation       sulfamethoxazole-trimethoprim 800-160 MG per tablet    BACTRIM DS    20 tablet    Take 1 tablet by mouth 2 times daily for 10 days    Furuncle of skin or subcutaneous tissue, Boil       * Notice:  This list has 2 medication(s) that are the same as other medications prescribed for you. Read the directions carefully, and ask your doctor or other care provider to review them with you.

## 2018-05-23 NOTE — PATIENT INSTRUCTIONS
1. Take Keflex three times daily with food x 10 days.  2. Take Bactrim twice daily with food x 10 days.  3. Take a probiotic while on the antibiotic- once daily. Some brands you could look for would include: Florastor, Culturelle, Floragen. Ask the pharmacist if you need help finding them.  4. No shaving for now. Change razor frequently when you resume shaving the underarms.  5. Follow up with primary care provider if no improvement in 10 days.

## 2018-05-23 NOTE — PROGRESS NOTES
"SUBJECTIVE:   Gudelia Quintanilla is a 53 year old female presenting for evaluation of   Chief Complaint   Patient presents with     Urgent Care     Sore     c/o boils for months    .  She has had \"boils\" under her arms. She has had them for >6 months. She does not know why it all started. She says that her doctor tells her \"they are caused by dry skin.\" Last night, she noted a large on on the side of her right breast. The boils are tender. The boil on the side of the breast was draining last night- she says she popped it by accident. No fever. She does shave her armpits. She has not been changing her razor frequently. No other symptoms or rashes anywhere else on her body, she says. She denies any new deodorant. She is not diabetic. No recent hospitalizations.      ROS  See HPI    PMH:  Past Medical History:   Diagnosis Date     Depression      GERD (gastroesophageal reflux disease)      Hemorrhoid      Menstrual disorder     s/p D&C -12/2012     ROMEO on CPAP      Other bipolar disorders     followed by Dr Collazo     Post menopausal syndrome      Tobacco abuse      Patient Active Problem List    Diagnosis Date Noted     Vitamin D deficiency 02/20/2018     Priority: Medium     Hidradenitis suppurativa of right axilla 08/24/2016     Priority: Medium     Hypothyroidism due to medication 12/22/2015     Priority: Medium     lithium       Prediabetes 12/08/2015     Priority: Medium     Elevated TSH 12/08/2015     Priority: Medium     Gastroesophageal reflux disease without esophagitis 10/13/2015     Priority: Medium     Bipolar 2 disorder (H) 07/28/2015     Priority: Medium     Psychosis 05/08/2015     Priority: Medium     Health Care Home 01/13/2015     Priority: Medium     State Tier Level:  unknown  Status:  n/a-Patient has [facility]  working with her. Jessie Sands at [facility] at [phone]  Care Coordinator:  Viji Thomson      Date:  January 13, 2015           Periumbilical hernia " 06/12/2014     Priority: Medium     overweight BMI>30 05/20/2014     Priority: Medium     ADHD, predominantly inattentive type 05/16/2014     Priority: Medium     Hyponatremia 04/04/2014     Priority: Medium     Tubular adenoma 01/30/2014     Priority: Medium     Seen on colonoscopy dated 1/09/2014       Iron deficiency anemia 01/30/2014     Priority: Medium     Anemia 09/30/2013     Priority: Medium     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) 09/26/2013     Priority: Medium     CARDIOVASCULAR SCREENING; LDL GOAL LESS THAN 160 09/26/2013     Priority: Medium     Major depressive disorder, recurrent episode, moderate (H) 09/26/2013     Priority: Medium     ROMEO on CPAP      Priority: Medium     Tobacco abuse      Priority: Medium     Post menopausal syndrome      Priority: Medium         Current medications:  Current Outpatient Prescriptions   Medication Sig Dispense Refill     albuterol (PROAIR HFA, PROVENTIL HFA, VENTOLIN HFA) 108 (90 BASE) MCG/ACT inhaler Inhale 2 puffs into the lungs every 6 hours as needed for shortness of breath / dyspnea or wheezing 1 Inhaler 1     ARIPiprazole (ABILIFY) 30 MG tablet TAKE 1 TABLET BY MOUTH DAILY.  2     armodafinil (NUVIGIL) 250 MG TABS tablet Take 1 tablet (250 mg) by mouth every morning 30 tablet 3     cephalexin (KEFLEX) 250 MG capsule Take 1 capsule (250 mg) by mouth 3 times daily for 10 days 30 capsule 0     cholecalciferol (VITAMIN D3) 1000 UNIT tablet Take 1 tablet (1,000 Units) by mouth daily 100 tablet 3     Doxepin HCl 150 MG CAPS Take 300 mg by mouth At Bedtime (Patient taking differently: Take 200 mg by mouth At Bedtime ) 30 capsule 0     furosemide (LASIX) 40 MG tablet Take 1 tablet (40 mg) by mouth daily 30 tablet 5     levothyroxine (SYNTHROID/LEVOTHROID) 150 MCG tablet Take 1 tablet (150 mcg) by mouth daily 60 tablet 1     levothyroxine (SYNTHROID/LEVOTHROID) 150 MCG tablet TAKE 1 TABLET(150 MCG) BY MOUTH DAILY 90 tablet 0     lithium 300 MG tablet 300 mg Takes  300 mg every morning       nicotine (NICOTROL) 10 MG inhaler Inhale 2 cartridge into the lungs daily as needed for smoking cessation 6 Box 1     pantoprazole (PROTONIX) 40 MG enteric coated tablet Take 1 tablet (40 mg) by mouth 2 times daily Take 1.5 hours prior to last meal of the day, and take at bedtime 60 tablet 5     polyethylene glycol (MIRALAX/GLYCOLAX) packet Take 17 g by mouth 2 times daily as needed for constipation 30 packet 6     sulfamethoxazole-trimethoprim (BACTRIM DS) 800-160 MG per tablet Take 1 tablet by mouth 2 times daily for 10 days 20 tablet 0     order for DME Equipment being ordered: CPAP  Please dispense Cpap and its supply (Patient not taking: Reported on 5/22/2018) 1 Units prn     order for DME Equipment being ordered: tennis elbow brace (Patient not taking: Reported on 5/22/2018) 1 each 0       Surgical History:  Past Surgical History:   Procedure Laterality Date     COLONOSCOPY  12/24/2013    Procedure: COLONOSCOPY;;  Surgeon: Guy Grant MD;  Location:  GI     COLONOSCOPY  1/9/2014    Procedure: COMBINED COLONOSCOPY, SINGLE BIOPSY/POLYPECTOMY BY BIOPSY;;  Surgeon: Guy Grant MD;  Location:  GI     ESOPHAGOSCOPY, GASTROSCOPY, DUODENOSCOPY (EGD), COMBINED  12/24/2013    Procedure: COMBINED ESOPHAGOSCOPY, GASTROSCOPY, DUODENOSCOPY (EGD), BIOPSY SINGLE OR MULTIPLE;  ESOPHAGOSCOPY, GASTROSCOPY, DUODENOSCOPY, COLONOSCOPY;  Surgeon: Guy Grant MD;  Location:  GI     GENITOURINARY SURGERY      urethra stretched     GYN SURGERY      d&c      LAPAROSCOPIC ASSISTED RECTOPEXY  3/14/2014    Procedure: LAPAROSCOPIC ASSISTED RECTOPEXY;  LAPAROSCOPIC  VENTRAL RECTOPEXY ;  Surgeon: Jennifer Connolly MD;  Location:  OR     ORTHOPEDIC SURGERY      surgery on clavicle,surgery for left leg fracture       Family history:  Family History   Problem Relation Age of Onset     CANCER Mother      ovarian cancer     Hypertension Father      Breast Cancer No family hx of      Cancer -  "colorectal No family hx of          Social History:  Social History   Substance Use Topics     Smoking status: Current Every Day Smoker     Packs/day: 0.25     Types: Cigarettes     Smokeless tobacco: Never Used      Comment: smoke 1PPD now     Alcohol use No         OBJECTIVE  /81  Pulse 80  Temp 97.4  F (36.3  C) (Tympanic)  Ht 5' 8\" (1.727 m)  Wt 226 lb (102.5 kg)  LMP 07/20/2006  SpO2 99%  BMI 34.36 kg/m2    Physical Exam  General: alert, appears well, NAD. Afebrile.  Skin: there is a furuncle on the right lateral breast which has ruptured and has a slight overlying crust. No active drainage. No tenderness around this. There are 2 erythematous furuncles in the right axilla. There are few scars in the left axilla but no actively erythematous furuncles.  Breast: no right breast tenderness, fluctuance, or warmth.           Labs:  No results found for this or any previous visit (from the past 24 hour(s)).      ASSESSMENT:      ICD-10-CM    1. Furuncle of skin or subcutaneous tissue L02.92 cephalexin (KEFLEX) 250 MG capsule     sulfamethoxazole-trimethoprim (BACTRIM DS) 800-160 MG per tablet   2. Hidradenitis suppurativa of right axilla L73.2         Furuncles of right axilla and breast consistent with flare of hidratenitis suppurativa. Patient does not seem to be aware of this diagnosis. I briefly explained it to her. Discussed with her that I could not cure it or explain why she has it, but I would be able to treat this flare of infection with antibiotics.    Keflex and Bactrim prescribed.    Follow up with PCP if no improvement. May need Derm referral if persistent.    Return precautions provided below in patient instructions.  Patient understood and agreed to plan. Patient was appropriate for discharge.      Patient Instructions   1. Take Keflex three times daily with food x 10 days.  2. Take Bactrim twice daily with food x 10 days.  3. Take a probiotic while on the antibiotic- once daily. Some " brands you could look for would include: Florastor, Culturelle, Floragen. Ask the pharmacist if you need help finding them.  4. No shaving for now. Change razor frequently when you resume shaving the underarms.  5. Follow up with primary care provider if no improvement in 10 days.            Therese Kuhn PA-C  05/22/18 7:49 PM

## 2018-08-16 ENCOUNTER — OFFICE VISIT (OUTPATIENT)
Dept: URGENT CARE | Facility: URGENT CARE | Age: 53
End: 2018-08-16
Payer: COMMERCIAL

## 2018-08-16 VITALS
SYSTOLIC BLOOD PRESSURE: 129 MMHG | DIASTOLIC BLOOD PRESSURE: 74 MMHG | TEMPERATURE: 98.8 F | HEART RATE: 88 BPM | WEIGHT: 239 LBS | HEIGHT: 69 IN | OXYGEN SATURATION: 98 % | BODY MASS INDEX: 35.4 KG/M2

## 2018-08-16 DIAGNOSIS — L71.9 ROSACEA: Primary | ICD-10-CM

## 2018-08-16 PROCEDURE — 99213 OFFICE O/P EST LOW 20 MIN: CPT | Performed by: NURSE PRACTITIONER

## 2018-08-16 ASSESSMENT — ENCOUNTER SYMPTOMS
CHILLS: 0
MYALGIAS: 0
EYES NEGATIVE: 1
RESPIRATORY NEGATIVE: 1
FEVER: 0

## 2018-08-16 NOTE — PROGRESS NOTES
SUBJECTIVE:                                                    Gudelia Quintanilla is a 53 year old female who presents to clinic today for the following health issues:    HPI  Face rash x 6 months. Waxes and wanes. Associated symptoms include blistering, peeling cracking. Not aware of any exacerbating factors. Has tried OTC face washes and lotions without improvement. Denies history of similar rash or ever seeking medical care for rash.    Problem list and histories reviewed & adjusted, as indicated.    Past Medical History:   Diagnosis Date     Depression      GERD (gastroesophageal reflux disease)      Hemorrhoid      Menstrual disorder     s/p D&C -12/2012     ROMEO on CPAP      Other bipolar disorders     followed by Dr Collazo     Post menopausal syndrome      Tobacco abuse        Patient Active Problem List   Diagnosis     ROMEO on CPAP     Tobacco abuse     Post menopausal syndrome     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)     CARDIOVASCULAR SCREENING; LDL GOAL LESS THAN 160     Major depressive disorder, recurrent episode, moderate (H)     Anemia     Tubular adenoma     Iron deficiency anemia     Hyponatremia     ADHD, predominantly inattentive type     overweight BMI>30     Periumbilical hernia     Health Care Home     Psychosis     Bipolar 2 disorder (H)     Gastroesophageal reflux disease without esophagitis     Prediabetes     Elevated TSH     Hypothyroidism due to medication     Hidradenitis suppurativa of right axilla     Vitamin D deficiency     Past Surgical History:   Procedure Laterality Date     COLONOSCOPY  12/24/2013    Procedure: COLONOSCOPY;;  Surgeon: Guy Grant MD;  Location:  GI     COLONOSCOPY  1/9/2014    Procedure: COMBINED COLONOSCOPY, SINGLE BIOPSY/POLYPECTOMY BY BIOPSY;;  Surgeon: Guy Grant MD;  Location:  GI     ESOPHAGOSCOPY, GASTROSCOPY, DUODENOSCOPY (EGD), COMBINED  12/24/2013    Procedure: COMBINED ESOPHAGOSCOPY, GASTROSCOPY, DUODENOSCOPY (EGD), BIOPSY SINGLE OR  MULTIPLE;  ESOPHAGOSCOPY, GASTROSCOPY, DUODENOSCOPY, COLONOSCOPY;  Surgeon: Guy Grant MD;  Location:  GI     GENITOURINARY SURGERY      urethra stretched     GYN SURGERY      d&c      LAPAROSCOPIC ASSISTED RECTOPEXY  3/14/2014    Procedure: LAPAROSCOPIC ASSISTED RECTOPEXY;  LAPAROSCOPIC  VENTRAL RECTOPEXY ;  Surgeon: Jennifer Connolly MD;  Location:  OR     ORTHOPEDIC SURGERY      surgery on clavicle,surgery for left leg fracture       Social History   Substance Use Topics     Smoking status: Current Every Day Smoker     Packs/day: 0.25     Types: Cigarettes     Smokeless tobacco: Never Used      Comment: smoke 1PPD now     Alcohol use No     Family History   Problem Relation Age of Onset     Cancer Mother      ovarian cancer     Hypertension Father      Breast Cancer No family hx of      Cancer - colorectal No family hx of          Current Outpatient Prescriptions   Medication Sig Dispense Refill     albuterol (PROAIR HFA, PROVENTIL HFA, VENTOLIN HFA) 108 (90 BASE) MCG/ACT inhaler Inhale 2 puffs into the lungs every 6 hours as needed for shortness of breath / dyspnea or wheezing 1 Inhaler 1     ARIPiprazole (ABILIFY) 30 MG tablet TAKE 1 TABLET BY MOUTH DAILY.  2     armodafinil (NUVIGIL) 250 MG TABS tablet Take 1 tablet (250 mg) by mouth every morning 30 tablet 3     cholecalciferol (VITAMIN D3) 1000 UNIT tablet Take 1 tablet (1,000 Units) by mouth daily 100 tablet 3     Doxepin HCl 150 MG CAPS Take 300 mg by mouth At Bedtime (Patient taking differently: Take 200 mg by mouth At Bedtime ) 30 capsule 0     furosemide (LASIX) 40 MG tablet Take 1 tablet (40 mg) by mouth daily 30 tablet 5     levothyroxine (SYNTHROID/LEVOTHROID) 150 MCG tablet TAKE 1 TABLET(150 MCG) BY MOUTH DAILY 90 tablet 0     levothyroxine (SYNTHROID/LEVOTHROID) 150 MCG tablet Take 1 tablet (150 mcg) by mouth daily 60 tablet 1     lithium 300 MG tablet 300 mg Takes 300 mg every morning       nicotine (NICOTROL) 10 MG inhaler Inhale 2  "cartridge into the lungs daily as needed for smoking cessation 6 Box 1     order for DME Equipment being ordered: CPAP  Please dispense Cpap and its supply 1 Units prn     pantoprazole (PROTONIX) 40 MG enteric coated tablet Take 1 tablet (40 mg) by mouth 2 times daily Take 1.5 hours prior to last meal of the day, and take at bedtime 60 tablet 5     polyethylene glycol (MIRALAX/GLYCOLAX) packet Take 17 g by mouth 2 times daily as needed for constipation 30 packet 6     order for DME Equipment being ordered: tennis elbow brace (Patient not taking: Reported on 8/16/2018) 1 each 0     Allergies   Allergen Reactions     1-Methyl 2-Pyrrolidone      Other reaction(s): Swelling     Iodine Swelling     Other reaction(s): Angioedema  Other reaction(s): Swelling  Facial swelling.     Morphine Anxiety and Nausea and Vomiting     Other reaction(s): Behavioral Disturbances       Review of Systems   Constitutional: Negative for chills, fever and malaise/fatigue.   Eyes: Negative.    Respiratory: Negative.    Musculoskeletal: Negative for myalgias.   Skin: Positive for rash.   Endo/Heme/Allergies: Negative for environmental allergies.         OBJECTIVE:     /74  Pulse 88  Temp 98.8  F (37.1  C) (Tympanic)  Ht 5' 8.5\" (1.74 m)  Wt 239 lb (108.4 kg)  LMP 07/20/2006  SpO2 98%  BMI 35.81 kg/m2  Body mass index is 35.81 kg/(m^2).  Physical Exam   Constitutional: No distress.   HENT:   Head:       Neurological: She is alert.   Skin: Rash noted. Rash is maculopapular. There is erythema (dryness and flaking in areas, no crusting).   Psychiatric: She has a normal mood and affect.         Diagnostic Test Results:  none     ASSESSMENT/PLAN:       ICD-10-CM    1. Rosacea L71.9 metroNIDAZOLE (METROCREAM) 0.75 % cream     DERMATOLOGY REFERRAL       Medical Decision Making:    Differential Diagnosis:  Rash: Acne  Atopic dermatitis  Cellulitis  Inflammation  Pityriasis rosea  Rosecea    Serious Comorbid Conditions:  Adult:  " None    PLAN:    Rash:  antibiotic  Referral to Derm.    Followup:    If not improving or if condition worsens, follow up with your Primary Care Provider    Sacha Moe, MSN, ARNP, FNP-C  [facility]

## 2018-08-17 NOTE — PATIENT INSTRUCTIONS
What Is Rosacea?  Rosacea is a long-term (chronic) skin disease that causes facial redness. Rosacea appears mainly in adults. Light-skinned people who tend to flush are most often affected. It may be made worse by the following:    Sun exposure    Heat    Eating spicy foods    Drinking alcohol    Getting embarrassed  Rosacea is rarely a health risk. But the symptoms of this disease can be painful and hurt your self-image. If you have rosacea, know that treatment and self-care can help.  A disease with changing symptoms  No one knows what causes rosacea. Heredity, flushing, and the presence of mites or bacteria may play a role. Increased blood flow in the facial skin may be involved. So may the use of some medicines. Rosacea has 4 main types of symptoms that affect the skin. They are described below. Some people with rosacea also have problems with their eyes. Your eyelids may be red, swollen, or itchy. You may feel as though there is sand in your eyes. From day to day, symptoms can come and go. They may worsen or improve. They can usually be managed with proper treatment and self-care.  Symptoms of rosacea  Flushing  The central region of the face often flushes. This includes the cheeks, chin, forehead, and nose. The color can range from pink to red. Flushing can often include a burning feeling in the skin, rather than itching. The flushing can come and go. Alcohol or hot drinks can make flushing worse. There are few good treatments for those who have only flushing as the main symptoms of their rosacea. So avoiding triggers is the key.   Dilated blood vessels  Tiny enlarged (dilated) blood vessels (telangiectasia) may form a weblike pattern on 1 or more parts of the face.  Acnelike lesions  Acnelike lesions appear on the face. They are called papules, pustules, and nodules, and they tend to occur above the nose, on the cheeks, and on the chin. They may come and go. Facial redness and tiny dilated blood vessels  tend to persist.  Enlargement of the nose  With severe rosacea, redness and swelling may enlarge the nose (rhinophyma) due to thickening of the skin. Thickened skin may also occur on the forehead, chin, cheeks, and ears. This occurs most often in men.  Date Last Reviewed: 2/1/2017 2000-2017 [employer]. [address] [place] [address]. All rights reserved. This information is not intended as a substitute for professional medical care. Always follow your healthcare professional's instructions.        Treating Rosacea     Use sunscreen with at least SPF 15 or more every day.      There is no cure for rosacea. But rosacea can be controlled in most cases, particularly with medical treatment to manage your symptoms.  Your healthcare provider may prescribe 1 or more treatments to put on your skin each day. You may also be given medicines to take by mouth if you have more severe rosacea symptoms. To relieve rosacea eye symptoms, you may need prescription eye drops. Avoid things that can easily cause your rosacea to flare up. These include spicy foods, alcohol, getting embarrassed, and going from a cold environment to a warm environment. You may have surgery to fix the more severe forms of scarring rosacea of the nose. This is called rhinophyma and means the redness and swelling that cause the nose to enlarge.  Your role in medical treatment  How well your treatment works depends partly on you. Follow your healthcare provider s treatment plan. Rosacea symptoms often get better with medicines. But they tend to get worse again if you stop taking the medicines. If your symptoms continue or get worse, ask about other treatment options, including combinations of treatments.  Rosacea self-care  Besides sticking with your treatment plan, follow these tips to care for your skin:    Wash your face twice a day with a gentle facial cleanser. Rinse your skin well with warm (not hot) water. Pat your skin dry  with a cotton towel.    Don t scrub your skin or use sponges, brushes, or other abrasive tools. Doing so can irritate your skin.    Avoid harsh scrubs or astringents. These products can irritate your skin.    If you shave your face, use an electric razor.    Apply sunscreen with at least SPF 15 or more every day. Sun exposure can make rosacea symptoms worse.    Choose skin care products and cosmetics that do not irritate the skin, and are oil-free and fragrance-free.    Avoid putting steroids on the skin sores. Steroids may make rosacea worse.   Getting good results  Learning about rosacea and treating it early is the first step toward controlling this disease. With proper treatment and self-care, you can manage your symptoms and feel better about your skin. The National Rosacea Society is a great resource for information.   What causes rosacea flare-ups?  It s often hard to pinpoint the factors that cause rosacea flare-ups. Different people can be affected by different triggers. Common triggers include weather extremes, sun exposure, alcoholic or hot beverages, spicy food, physical exertion, stress, illness, some skin products, and medicines. To prevent flare-ups, keep a list of things that seem to make your rosacea worse. Then try to avoid them.  Date Last Reviewed: 2/1/2017 2000-2017 [employer]. [address] [place] [address], [place] [address]. All rights reserved. This information is not intended as a substitute for professional medical care. Always follow your healthcare professional's instructions.

## 2018-09-05 ENCOUNTER — OFFICE VISIT (OUTPATIENT)
Dept: URGENT CARE | Facility: URGENT CARE | Age: 53
End: 2018-09-05
Payer: COMMERCIAL

## 2018-09-05 ENCOUNTER — RADIANT APPOINTMENT (OUTPATIENT)
Dept: GENERAL RADIOLOGY | Facility: CLINIC | Age: 53
End: 2018-09-05
Attending: PHYSICIAN ASSISTANT
Payer: COMMERCIAL

## 2018-09-05 VITALS
TEMPERATURE: 98.2 F | BODY MASS INDEX: 34.36 KG/M2 | OXYGEN SATURATION: 96 % | HEIGHT: 69 IN | HEART RATE: 78 BPM | DIASTOLIC BLOOD PRESSURE: 58 MMHG | SYSTOLIC BLOOD PRESSURE: 109 MMHG | RESPIRATION RATE: 20 BRPM | WEIGHT: 232 LBS

## 2018-09-05 DIAGNOSIS — R07.0 THROAT PAIN: ICD-10-CM

## 2018-09-05 DIAGNOSIS — J18.9 COMMUNITY ACQUIRED PNEUMONIA OF RIGHT MIDDLE LOBE OF LUNG: Primary | ICD-10-CM

## 2018-09-05 DIAGNOSIS — J98.01 ACUTE BRONCHOSPASM: ICD-10-CM

## 2018-09-05 LAB
DEPRECATED S PYO AG THROAT QL EIA: NORMAL
SPECIMEN SOURCE: NORMAL

## 2018-09-05 PROCEDURE — 87880 STREP A ASSAY W/OPTIC: CPT | Performed by: PHYSICIAN ASSISTANT

## 2018-09-05 PROCEDURE — 71046 X-RAY EXAM CHEST 2 VIEWS: CPT

## 2018-09-05 PROCEDURE — 87081 CULTURE SCREEN ONLY: CPT | Performed by: PHYSICIAN ASSISTANT

## 2018-09-05 PROCEDURE — 99214 OFFICE O/P EST MOD 30 MIN: CPT | Performed by: PHYSICIAN ASSISTANT

## 2018-09-05 RX ORDER — ALBUTEROL SULFATE 90 UG/1
2 AEROSOL, METERED RESPIRATORY (INHALATION) EVERY 4 HOURS PRN
Qty: 1 INHALER | Refills: 0 | Status: SHIPPED | OUTPATIENT
Start: 2018-09-05 | End: 2023-03-28

## 2018-09-05 RX ORDER — LEVOFLOXACIN 750 MG/1
750 TABLET, FILM COATED ORAL DAILY
Qty: 7 TABLET | Refills: 0 | Status: SHIPPED | OUTPATIENT
Start: 2018-09-05 | End: 2018-09-12

## 2018-09-05 RX ORDER — ALBUTEROL SULFATE 2.5 MG/3ML
2.5 SOLUTION RESPIRATORY (INHALATION) ONCE
Qty: 1 VIAL | Refills: 0
Start: 2018-09-05 | End: 2020-01-07

## 2018-09-05 RX ORDER — PREDNISONE 20 MG/1
60 TABLET ORAL DAILY
Qty: 15 TABLET | Refills: 0 | Status: SHIPPED | OUTPATIENT
Start: 2018-09-05 | End: 2020-01-07

## 2018-09-05 NOTE — PROGRESS NOTES
"SUBJECTIVE:   Gudelia Quintanilla is a 53 year old female presenting with a chief complaint of   Chief Complaint   Patient presents with     Urgent Care     URI     Patient has had cold symptoms since last Thursday SOB, chest congestion, wheezing, dry cough, and headaches when coughing patient has tried otc [facility] cold/flu medication did not help      URI Adult    Onset of symptoms was 1 week(s) ago.  Course of illness is same.    Severity moderately severe  Current and Associated symptoms: started as nasal congestion and stuffiness last Thursday. Now, she reports \"it has gone down to her chest.\" Cough is dry but she feels like she has some chest congestion.Cough comes in spasms. She states she gets short of breath on exertion. She admits to wheezing. No chest pain, hemoptysis. No fever. No sore throat. No nausea, vomiting. She admits to HA just when she coughs. She admits to fatigue.  Treatment measures tried include: decongestant  Predisposing factors include: no history of COPD or asthma  Smoker x many years    ROS   See HPI    PMH:  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)     CARDIOVASCULAR SCREENING; LDL GOAL LESS THAN 160     Major depressive disorder, recurrent episode, moderate (H)     Anemia     Tubular adenoma     Iron deficiency anemia     Hyponatremia     ADHD, predominantly inattentive type     overweight BMI>30     Periumbilical hernia     Health Care Home     Psychosis     Bipolar 2 disorder (H)     Gastroesophageal reflux disease without esophagitis     Prediabetes     Elevated TSH     Hypothyroidism due to medication "     Hidradenitis suppurativa of right axilla     Vitamin D deficiency       Current Medications:  Current Outpatient Prescriptions   Medication Sig Dispense Refill     albuterol (2.5 MG/3ML) 0.083% Take 1 vial (2.5 mg) by nebulization once for 1 dose 1 vial 0     albuterol (VENTOLIN HFA) 108 (90 Base) MCG/ACT inhaler Inhale 2 puffs into the lungs every 4 hours as needed for shortness of breath / dyspnea or wheezing 1 Inhaler 0     ARIPiprazole (ABILIFY) 30 MG tablet TAKE 1 TABLET BY MOUTH DAILY.  2     armodafinil (NUVIGIL) 250 MG TABS tablet Take 1 tablet (250 mg) by mouth every morning 30 tablet 3     cholecalciferol (VITAMIN D3) 1000 UNIT tablet Take 1 tablet (1,000 Units) by mouth daily 100 tablet 3     Doxepin HCl 150 MG CAPS Take 300 mg by mouth At Bedtime (Patient taking differently: Take 200 mg by mouth At Bedtime ) 30 capsule 0     furosemide (LASIX) 40 MG tablet Take 1 tablet (40 mg) by mouth daily 30 tablet 5     levofloxacin (LEVAQUIN) 750 MG tablet Take 1 tablet (750 mg) by mouth daily for 7 days 7 tablet 0     levothyroxine (SYNTHROID/LEVOTHROID) 150 MCG tablet TAKE 1 TABLET(150 MCG) BY MOUTH DAILY 90 tablet 0     levothyroxine (SYNTHROID/LEVOTHROID) 150 MCG tablet Take 1 tablet (150 mcg) by mouth daily 60 tablet 1     lithium 300 MG tablet 300 mg Takes 300 mg every morning       metroNIDAZOLE (METROCREAM) 0.75 % cream Apply thin film to affected areas on the face BID. 45 g 0     nicotine (NICOTROL) 10 MG inhaler Inhale 2 cartridge into the lungs daily as needed for smoking cessation 6 Box 1     order for DME Equipment being ordered: tennis elbow brace 1 each 0     order for DME Equipment being ordered: CPAP  Please dispense Cpap and its supply 1 Units prn     pantoprazole (PROTONIX) 40 MG enteric coated tablet Take 1 tablet (40 mg) by mouth 2 times daily Take 1.5 hours prior to last meal of the day, and take at bedtime 60 tablet 5     polyethylene glycol (MIRALAX/GLYCOLAX) packet Take 17 g by mouth 2  "times daily as needed for constipation 30 packet 6     predniSONE (DELTASONE) 20 MG tablet Take 3 tablets (60 mg) by mouth daily 15 tablet 0     albuterol (PROAIR HFA, PROVENTIL HFA, VENTOLIN HFA) 108 (90 BASE) MCG/ACT inhaler Inhale 2 puffs into the lungs every 6 hours as needed for shortness of breath / dyspnea or wheezing (Patient not taking: Reported on 9/5/2018) 1 Inhaler 1       Surgical History:  Past Surgical History:   Procedure Laterality Date     COLONOSCOPY  12/24/2013    Procedure: COLONOSCOPY;;  Surgeon: Guy Grant MD;  Location:  GI     COLONOSCOPY  1/9/2014    Procedure: COMBINED COLONOSCOPY, SINGLE BIOPSY/POLYPECTOMY BY BIOPSY;;  Surgeon: Guy Grant MD;  Location:  GI     ESOPHAGOSCOPY, GASTROSCOPY, DUODENOSCOPY (EGD), COMBINED  12/24/2013    Procedure: COMBINED ESOPHAGOSCOPY, GASTROSCOPY, DUODENOSCOPY (EGD), BIOPSY SINGLE OR MULTIPLE;  ESOPHAGOSCOPY, GASTROSCOPY, DUODENOSCOPY, COLONOSCOPY;  Surgeon: Guy Grant MD;  Location:  GI     GENITOURINARY SURGERY      urethra stretched     GYN SURGERY      d&c      LAPAROSCOPIC ASSISTED RECTOPEXY  3/14/2014    Procedure: LAPAROSCOPIC ASSISTED RECTOPEXY;  LAPAROSCOPIC  VENTRAL RECTOPEXY ;  Surgeon: Jennifer Connolly MD;  Location:  OR     ORTHOPEDIC SURGERY      surgery on clavicle,surgery for left leg fracture       Family History:  Family History   Problem Relation Age of Onset     Cancer Mother      ovarian cancer     Hypertension Father      Breast Cancer No family hx of      Cancer - colorectal No family hx of        Social History:  Social History   Substance Use Topics     Smoking status: Current Every Day Smoker     Packs/day: 0.25     Types: Cigarettes     Smokeless tobacco: Never Used      Comment: smoke 1PPD now     Alcohol use No       OBJECTIVE  /58  Pulse 78  Temp 98.2  F (36.8  C) (Oral)  Resp 20  Ht 5' 8.5\" (1.74 m)  Wt 232 lb (105.2 kg)  LMP 07/20/2006  SpO2 96%  BMI 34.76 kg/m2    General: alert, " appears nontoxic, generally well. In NAD. Afebrile.  HEENT: Normocephalic.   Eyes: conjunctiva clear.   Ears: TMs pearly, translucent bilaterally.   Nose: No edema of nasal mucosa. No rhinorrhea.  Oropharynx: MMM. Posterior palatal erythematous papules. No exudate. No tonsillar hypertrophy. Uvula midline.    Neck: supple, no lymphadenopathy.  Respiratory: No distress at rest, but appears mildly distressed upon ambulation to exam table. Diffuse wheezing throughout both lung fields. Both fields coarse, with rales at both bases.  Cardiovascular: RRR. No murmurs, clicks, gallups, or rub.   Neuro: follows commands. Gait steady.        Labs:  Results for orders placed or performed in visit on 09/05/18 (from the past 24 hour(s))   Rapid strep screen   Result Value Ref Range    Specimen Description Throat     Rapid Strep A Screen       NEGATIVE: No Group A streptococcal antigen detected by immunoassay, await culture report.   XR Chest 2 Views    Narrative    CHEST TWO VIEWS    9/5/2018 7:28 PM     HISTORY: Cough. Shortness of breath. Question infiltrate. Acute  bronchospasm.    COMPARISON: None.    FINDINGS: The heart size is normal. There is infiltrate in the right  lung base medially. Minimal scarring at the left lung base. Lungs  otherwise clear. No pneumothorax or pleural effusion. Postoperative  changes in the left clavicle.      Impression    IMPRESSION: Right basilar pneumonia.         X-Ray was done, my findings are: infiltrate obscuring right heart border. ? Infiltrate also in left lower lobe.      ASSESSMENT/PLAN:    ICD-10-CM    1. Community acquired pneumonia of right middle lobe of lung (H) J18.1 predniSONE (DELTASONE) 20 MG tablet     levofloxacin (LEVAQUIN) 750 MG tablet     albuterol (VENTOLIN HFA) 108 (90 Base) MCG/ACT inhaler   2. Acute bronchospasm J98.01 albuterol (2.5 MG/3ML) 0.083%     XR Chest 2 Views     albuterol (VENTOLIN HFA) 108 (90 Base) MCG/ACT inhaler   3. Throat pain R07.0 Rapid strep screen      Beta strep group A culture           Medical Decision Making:    Patient with a many year history of smoking, but no known diagnosis of COPD, presents for cough, wheezing, and SOB x 1 week, with associated URI symptoms preceding.  She was slightly hypoxic at 91% ORA today upon presentation and appeared in mild respiratory distress.   She was given an albuterol neb immediately upon my assessment of her. Upon recheck, wheezing was much improved with albuterol neb, but not totally resolved. Lungs remained somewhat coarse throughout, including in both bases. Her O2 sats improved to 96% ORA and she felt less short of breath.    Differential Diagnosis:   -acute bronchitis- had diffuse wheezing on initial lung exam.  Many year smoker, high risk for bronchitis.  -CAP- CXR showed right basilar infiltrate per my read, also confirmed by radiology.  -Strep - found a fairly significant pharyngitis on exam, despite her reports of no sore throat, I did test for Strep. RST was negative.     We will initiate tx for CAP. Levaquin 750mg daily x 7 days prescribed. Patient considered higher risk due to presence of large infiltrate on CXR and her smoking habit.  Also will treat with inhaled albuterol and prednisone burst x 5 days for wheezing.  Recommended smoking cessation with patient.  Discussed contagiousness of pneumonia.    At the end of the encounter, I discussed all available results, as well as the diagnosis and any associated medications. I discussed red flags for immediate return to clinic/ER, as well as indications for follow up. Refer to patient instructions below, which were all addressed with patient. Patient understood and agreed to plan. Patient was appropriate for discharge.      Patient Instructions   Your chest xray shows a pneumonia in the right lower lung area.    Please take Levaquin once daily with food x 7 days. Take a probiotic while on the antibiotic.    Take prednisone once daily x 5 days for wheezing.  Take this with food as well.    Take albuterol inhaler 2 puffs every 4-6 hours for cough, wheezing, shortness of breath.    Follow up with your doctor within 1 week for a recheck.    Return to clinic sooner if developing fever, worsening cough, shaking chills. Go to the ER if high fever, severe shortness of breath, chest pain, coughing up blood, or any other new, concerning symptoms.    Stay home from work until cough is MUCH improved. This could take 3-5 days. I have written a work note for you to be excused due to contagious illness.              Therese Kuhn PA-C

## 2018-09-05 NOTE — LETTER
September 5, 2018      Gudelia Quintanilla  [address]  [place] [address]        To Whom It May Concern:    Gudelia Quintanilla  was seen on 09/05/18.  Please excuse her absence from work today through 9/9/2018 due to contagious illness. She may return when her symptoms are improved.        Sincerely,        Therese Kuhn PA-C

## 2018-09-05 NOTE — MR AVS SNAPSHOT
After Visit Summary   9/5/2018    Gudelia Quintanilla    MRN: [id number]           Patient Information     Date Of Birth          1965        Visit Information        Provider Department      9/5/2018 6:10 PM Therese Kuhn PA-C [facility]        Today's Diagnoses     Community acquired pneumonia of right middle lobe of lung (H)    -  1    Acute bronchospasm        Throat pain          Care Instructions    Your chest xray shows a pneumonia in the right lower lung area.    Please take Levaquin once daily with food x 7 days. Take a probiotic while on the antibiotic.    Take prednisone once daily x 5 days for wheezing. Take this with food as well.    Take albuterol inhaler 2 puffs every 4-6 hours for cough, wheezing, shortness of breath.    Follow up with your doctor within 1 week for a recheck.    Return to clinic sooner if developing fever, worsening cough, shaking chills. Go to the ER if high fever, severe shortness of breath, chest pain, coughing up blood, or any other new, concerning symptoms.    Stay home from work until cough is MUCH improved. This could take 3-5 days. I have written a work note for you to be excused due to contagious illness.          Follow-ups after your visit        Follow-up notes from your care team     Return in about 1 week (around 9/12/2018).      Who to contact     If you have questions or need follow up information about today's clinic visit or your schedule please contact [facility] directly at [phone].  Normal or non-critical lab and imaging results will be communicated to you by MyChart, letter or phone within 4 business days after the clinic has received the results. If you do not hear from us within 7 days, please contact the clinic through [email or web address] or phone. If you have a critical or abnormal lab result, we will notify you by phone as soon as possible.  Submit refill requests through [employer] or call your  "pharmacy and they will forward the refill request to us. Please allow 3 business days for your refill to be completed.          Additional Information About Your Visit        MyChart Information     [employer] lets you send messages to your doctor, view your test results, renew your prescriptions, schedule appointments and more. To sign up, go to [email or web address] . Click on \"Log in\" on the left side of the screen, which will take you to the Welcome page. Then click on \"Sign up Now\" on the right side of the page.     You will be asked to enter the access code listed below, as well as some personal information. Please follow the directions to create your username and password.     Your access code is: [email or web address]  Expires: 2018  7:10 PM     Your access code will  in 90 days. If you need help or a new code, please call your [place] clinic or [phone].        Care EveryWhere ID     This is your Care EveryWhere ID. This could be used by other organizations to access your [place] medical records  [id number]        Your Vitals Were     Pulse Temperature Respirations Height Last Period Pulse Oximetry    78 98.2  F (36.8  C) (Oral) 20 5' 8.5\" (1.74 m) 2006 96%    BMI (Body Mass Index)                   34.76 kg/m2            Blood Pressure from Last 3 Encounters:   18 109/58   18 129/74   18 125/81    Weight from Last 3 Encounters:   18 232 lb (105.2 kg)   18 239 lb (108.4 kg)   18 226 lb (102.5 kg)              We Performed the Following     Beta strep group A culture     Rapid strep screen     XR Chest 2 Views          Today's Medication Changes          These changes are accurate as of 18  7:45 PM.  If you have any questions, ask your nurse or doctor.               Start taking these medicines.        Dose/Directions    levofloxacin 750 MG tablet   Commonly known as:  LEVAQUIN   Used for:  Community acquired pneumonia of right middle lobe of lung (H) "   Started by:  Therese Kuhn PA-C        Dose:  750 mg   Take 1 tablet (750 mg) by mouth daily for 7 days   Quantity:  7 tablet   Refills:  0       predniSONE 20 MG tablet   Commonly known as:  DELTASONE   Used for:  Community acquired pneumonia of right middle lobe of lung (H)   Started by:  Therese Kuhn PA-C        Dose:  60 mg   Take 3 tablets (60 mg) by mouth daily   Quantity:  15 tablet   Refills:  0         These medicines have changed or have updated prescriptions.        Dose/Directions    * albuterol 108 (90 Base) MCG/ACT inhaler   Commonly known as:  PROAIR HFA/PROVENTIL HFA/VENTOLIN HFA   This may have changed:  Another medication with the same name was added. Make sure you understand how and when to take each.   Changed by:  Therese Kuhn PA-C        Dose:  2 puff   Inhale 2 puffs into the lungs every 6 hours as needed for shortness of breath / dyspnea or wheezing   Quantity:  1 Inhaler   Refills:  1       * albuterol (2.5 MG/3ML) 0.083%   This may have changed:  You were already taking a medication with the same name, and this prescription was added. Make sure you understand how and when to take each.   Used for:  Acute bronchospasm   Changed by:  Therese Kuhn PA-C        Dose:  2.5 mg   Take 1 vial (2.5 mg) by nebulization once for 1 dose   Quantity:  1 vial   Refills:  0       * albuterol 108 (90 Base) MCG/ACT inhaler   Commonly known as:  VENTOLIN HFA   This may have changed:  You were already taking a medication with the same name, and this prescription was added. Make sure you understand how and when to take each.   Used for:  Community acquired pneumonia of right middle lobe of lung (H), Acute bronchospasm   Changed by:  Therese Kuhn PA-C        Dose:  2 puff   Inhale 2 puffs into the lungs every 4 hours as needed for shortness of breath / dyspnea or wheezing   Quantity:  1 Inhaler   Refills:  0       Doxepin HCl 150 MG Caps   This may have  changed:  how much to take        Dose:  300 mg   Take 300 mg by mouth At Bedtime   Quantity:  30 capsule   Refills:  0       * Notice:  This list has 3 medication(s) that are the same as other medications prescribed for you. Read the directions carefully, and ask your doctor or other care provider to review them with you.         Where to get your medicines      These medications were sent to [id number] - [place], [place] - [address] AT [facility] [address]  [address], [place] [address]    Hours:  24-hours Phone:  [phone]     albuterol 108 (90 Base) MCG/ACT inhaler    levofloxacin 750 MG tablet    predniSONE 20 MG tablet         Some of these will need a paper prescription and others can be bought over the counter.  Ask your nurse if you have questions.     You don't need a prescription for these medications     albuterol (2.5 MG/3ML) 0.083%                Primary Care Provider Office Phone # Fax #    Todd Manzanares [phone] [address] [facility] [address]  [facility] [address]        Equal Access to Services     [facility]: Hadii keila akers Somehdi, waaxda lumolly, qaybta kaalmada adeelham, darrel wolfe . So [facility] [phone].    ATENCIÓN: Si habla español, tiene a fitzpatrick disposición servicios gratuitos de asistencia lingüística. [facility] [phone].    We comply with applicable federal civil rights laws and [place] laws. We do not discriminate on the basis of race, color, national origin, age, disability, sex, sexual orientation, or gender identity.            Thank you!     Thank you for choosing [facility]  for your care. Our goal is always to provide you with excellent care. Hearing back from our patients is one way we can continue to improve our services. Please take a few minutes to complete the written survey that you may receive in the mail after your visit with us. Thank you!              Your Updated Medication List - Protect others around you: Learn how to safely use, store and throw away your medicines at www.disposemymeds.org.          This list is accurate as of 9/5/18  7:45 PM.  Always use your most recent med list.                   Brand Name Dispense Instructions for use Diagnosis    * albuterol 108 (90 Base) MCG/ACT inhaler    PROAIR HFA/PROVENTIL HFA/VENTOLIN HFA    1 Inhaler    Inhale 2 puffs into the lungs every 6 hours as needed for shortness of breath / dyspnea or wheezing        * albuterol (2.5 MG/3ML) 0.083%     1 vial    Take 1 vial (2.5 mg) by nebulization once for 1 dose    Acute bronchospasm       * albuterol 108 (90 Base) MCG/ACT inhaler    VENTOLIN HFA    1 Inhaler    Inhale 2 puffs into the lungs every 4 hours as needed for shortness of breath / dyspnea or wheezing    Community acquired pneumonia of right middle lobe of lung (H), Acute bronchospasm       ARIPiprazole 30 MG tablet    ABILIFY     TAKE 1 TABLET BY MOUTH DAILY.        armodafinil 250 MG Tabs tablet    NUVIGIL    30 tablet    Take 1 tablet (250 mg) by mouth every morning    Excessive sleepiness       cholecalciferol 1000 UNIT tablet    vitamin D3    100 tablet    Take 1 tablet (1,000 Units) by mouth daily    Preventive measure       Doxepin HCl 150 MG Caps     30 capsule    Take 300 mg by mouth At Bedtime        furosemide 40 MG tablet    LASIX    30 tablet    Take 1 tablet (40 mg) by mouth daily    Bilateral edema of lower extremity       levofloxacin 750 MG tablet    LEVAQUIN    7 tablet    Take 1 tablet (750 mg) by mouth daily for 7 days    Community acquired pneumonia of right middle lobe of lung (H)       * levothyroxine 150 MCG tablet    SYNTHROID/LEVOTHROID    60 tablet    Take 1 tablet (150 mcg) by mouth daily    Hypothyroidism due to medication       * levothyroxine 150 MCG tablet    SYNTHROID/LEVOTHROID    90 tablet    TAKE 1 TABLET(150 MCG) BY MOUTH DAILY    Hypothyroidism due to medication        lithium 300 MG tablet      300 mg Takes 300 mg every morning        metroNIDAZOLE 0.75 % cream    METROCREAM    45 g    Apply thin film to affected areas on the face BID.    Rosacea       nicotine 10 MG Inhaler    NICOTROL    6 Box    Inhale 2 cartridge into the lungs daily as needed for smoking cessation    Tobacco use disorder       order for DME     1 Units    Equipment being ordered: CPAP Please dispense Cpap and its supply    ROMEO on CPAP       order for DME     1 each    Equipment being ordered: tennis elbow brace    Right lateral epicondylitis       pantoprazole 40 MG EC tablet    PROTONIX    60 tablet    Take 1 tablet (40 mg) by mouth 2 times daily Take 1.5 hours prior to last meal of the day, and take at bedtime    Gastroesophageal reflux disease without esophagitis       polyethylene glycol Packet    MIRALAX/GLYCOLAX    30 packet    Take 17 g by mouth 2 times daily as needed for constipation    Chronic constipation       predniSONE 20 MG tablet    DELTASONE    15 tablet    Take 3 tablets (60 mg) by mouth daily    Community acquired pneumonia of right middle lobe of lung (H)       * Notice:  This list has 5 medication(s) that are the same as other medications prescribed for you. Read the directions carefully, and ask your doctor or other care provider to review them with you.

## 2018-09-06 LAB
BACTERIA SPEC CULT: NORMAL
SPECIMEN SOURCE: NORMAL

## 2018-09-06 NOTE — PATIENT INSTRUCTIONS
Your chest xray shows a pneumonia in the right lower lung area.    Please take Levaquin once daily with food x 7 days. Take a probiotic while on the antibiotic.    Take prednisone once daily x 5 days for wheezing. Take this with food as well.    Take albuterol inhaler 2 puffs every 4-6 hours for cough, wheezing, shortness of breath.    Follow up with your doctor within 1 week for a recheck.    Return to clinic sooner if developing fever, worsening cough, shaking chills. Go to the ER if high fever, severe shortness of breath, chest pain, coughing up blood, or any other new, concerning symptoms.    Stay home from work until cough is MUCH improved. This could take 3-5 days. I have written a work note for you to be excused due to contagious illness.

## 2018-10-17 ENCOUNTER — RECORDS - HEALTHEAST (OUTPATIENT)
Dept: LAB | Facility: CLINIC | Age: 53
End: 2018-10-17

## 2018-10-18 ENCOUNTER — TRANSFERRED RECORDS (OUTPATIENT)
Dept: HEALTH INFORMATION MANAGEMENT | Facility: CLINIC | Age: 53
End: 2018-10-18

## 2018-10-18 LAB
ALBUMIN SERPL-MCNC: 3.5 G/DL (ref 3.5–5)
ALP SERPL-CCNC: 129 U/L (ref 45–120)
ALT SERPL W P-5'-P-CCNC: 16 U/L (ref 0–45)
ALT SERPL-CCNC: 16 U/L (ref 0–45)
ANION GAP SERPL CALCULATED.3IONS-SCNC: 8 MMOL/L (ref 5–18)
AST SERPL W P-5'-P-CCNC: 19 U/L (ref 0–40)
AST SERPL-CCNC: 19 U/L (ref 0–40)
BILIRUB DIRECT SERPL-MCNC: 0.1 MG/DL
BILIRUB SERPL-MCNC: 0.2 MG/DL (ref 0–1)
BUN SERPL-MCNC: 13 MG/DL (ref 8–22)
CALCIUM SERPL-MCNC: 9.4 MG/DL (ref 8.5–10.5)
CHLORIDE BLD-SCNC: 105 MMOL/L (ref 98–107)
CO2 SERPL-SCNC: 28 MMOL/L (ref 22–31)
CREAT SERPL-MCNC: 0.78 MG/DL (ref 0.6–1.1)
CREAT SERPL-MCNC: 0.78 MG/DL (ref 0.6–1.1)
GFR SERPL CREATININE-BSD FRML MDRD: >60 ML/MIN/1.73M2
GFR SERPL CREATININE-BSD FRML MDRD: >60 ML/MIN/1.73M2
GLUCOSE BLD-MCNC: 66 MG/DL (ref 70–125)
GLUCOSE SERPL-MCNC: 66 MG/DL (ref 70–125)
POTASSIUM BLD-SCNC: 3.8 MMOL/L (ref 3.5–5)
POTASSIUM SERPL-SCNC: 3.8 MMOL/L (ref 3.5–5)
PROT SERPL-MCNC: 6.9 G/DL (ref 6–8)
SODIUM SERPL-SCNC: 141 MMOL/L (ref 136–145)
TSH SERPL DL<=0.005 MIU/L-ACNC: 5.93 UIU/ML (ref 0.3–5)
TSH SERPL-ACNC: 5.93 UIU/ML (ref 0.3–6)

## 2018-10-24 ENCOUNTER — RECORDS - HEALTHEAST (OUTPATIENT)
Dept: LAB | Facility: CLINIC | Age: 53
End: 2018-10-24

## 2018-10-25 LAB
ALBUMIN SERPL-MCNC: 3.2 G/DL (ref 3.5–5)
ALP SERPL-CCNC: 116 U/L (ref 45–120)
ALT SERPL W P-5'-P-CCNC: 20 U/L (ref 0–45)
ANION GAP SERPL CALCULATED.3IONS-SCNC: 6 MMOL/L (ref 5–18)
AST SERPL W P-5'-P-CCNC: 17 U/L (ref 0–40)
BASOPHILS # BLD AUTO: 0 THOU/UL (ref 0–0.2)
BASOPHILS NFR BLD AUTO: 1 % (ref 0–2)
BILIRUB DIRECT SERPL-MCNC: <0.1 MG/DL
BILIRUB SERPL-MCNC: 0.2 MG/DL (ref 0–1)
BUN SERPL-MCNC: 16 MG/DL (ref 8–22)
CALCIUM SERPL-MCNC: 8.9 MG/DL (ref 8.5–10.5)
CHLORIDE BLD-SCNC: 105 MMOL/L (ref 98–107)
CO2 SERPL-SCNC: 28 MMOL/L (ref 22–31)
CREAT SERPL-MCNC: 0.77 MG/DL (ref 0.6–1.1)
EOSINOPHIL # BLD AUTO: 0.3 THOU/UL (ref 0–0.4)
EOSINOPHIL NFR BLD AUTO: 6 % (ref 0–6)
ERYTHROCYTE [DISTWIDTH] IN BLOOD BY AUTOMATED COUNT: 14.2 % (ref 11–14.5)
GFR SERPL CREATININE-BSD FRML MDRD: >60 ML/MIN/1.73M2
GLUCOSE BLD-MCNC: 83 MG/DL (ref 70–125)
HCT VFR BLD AUTO: 35.5 % (ref 35–47)
HGB BLD-MCNC: 10.8 G/DL (ref 12–16)
LYMPHOCYTES # BLD AUTO: 1.8 THOU/UL (ref 0.8–4.4)
LYMPHOCYTES NFR BLD AUTO: 35 % (ref 20–40)
MCH RBC QN AUTO: 29.8 PG (ref 27–34)
MCHC RBC AUTO-ENTMCNC: 30.4 G/DL (ref 32–36)
MCV RBC AUTO: 98 FL (ref 80–100)
MONOCYTES # BLD AUTO: 0.5 THOU/UL (ref 0–0.9)
MONOCYTES NFR BLD AUTO: 9 % (ref 2–10)
NEUTROPHILS # BLD AUTO: 2.5 THOU/UL (ref 2–7.7)
NEUTROPHILS NFR BLD AUTO: 50 % (ref 50–70)
PLATELET # BLD AUTO: 252 THOU/UL (ref 140–440)
PMV BLD AUTO: 10 FL (ref 8.5–12.5)
POTASSIUM BLD-SCNC: 4.2 MMOL/L (ref 3.5–5)
PROT SERPL-MCNC: 5.8 G/DL (ref 6–8)
RBC # BLD AUTO: 3.63 MILL/UL (ref 3.8–5.4)
SODIUM SERPL-SCNC: 139 MMOL/L (ref 136–145)
VALPROATE SERPL-MCNC: <2 UG/ML (ref 50–150)
WBC: 5.1 THOU/UL (ref 4–11)

## 2018-10-31 ENCOUNTER — RECORDS - HEALTHEAST (OUTPATIENT)
Dept: LAB | Facility: CLINIC | Age: 53
End: 2018-10-31

## 2018-11-01 LAB — LITHIUM SERPL-SCNC: 0.7 MMOL/L (ref 0.6–1.2)

## 2018-12-06 ENCOUNTER — RECORDS - HEALTHEAST (OUTPATIENT)
Dept: LAB | Facility: CLINIC | Age: 53
End: 2018-12-06

## 2018-12-06 LAB — LITHIUM SERPL-SCNC: 0.6 MMOL/L (ref 0.6–1.2)

## 2018-12-11 ENCOUNTER — ANCILLARY PROCEDURE (OUTPATIENT)
Dept: GENERAL RADIOLOGY | Facility: CLINIC | Age: 53
End: 2018-12-11
Attending: INTERNAL MEDICINE
Payer: COMMERCIAL

## 2018-12-11 ENCOUNTER — OFFICE VISIT (OUTPATIENT)
Dept: FAMILY MEDICINE | Facility: CLINIC | Age: 53
End: 2018-12-11
Payer: COMMERCIAL

## 2018-12-11 VITALS
SYSTOLIC BLOOD PRESSURE: 106 MMHG | DIASTOLIC BLOOD PRESSURE: 73 MMHG | HEIGHT: 68 IN | TEMPERATURE: 97 F | HEART RATE: 70 BPM | BODY MASS INDEX: 36.53 KG/M2 | WEIGHT: 241 LBS | OXYGEN SATURATION: 96 %

## 2018-12-11 DIAGNOSIS — Z72.0 TOBACCO ABUSE: ICD-10-CM

## 2018-12-11 DIAGNOSIS — K21.9 GASTROESOPHAGEAL REFLUX DISEASE WITHOUT ESOPHAGITIS: ICD-10-CM

## 2018-12-11 DIAGNOSIS — E55.9 VITAMIN D DEFICIENCY: ICD-10-CM

## 2018-12-11 DIAGNOSIS — Z23 NEED FOR PROPHYLACTIC VACCINATION AND INOCULATION AGAINST INFLUENZA: ICD-10-CM

## 2018-12-11 DIAGNOSIS — J18.9 PNEUMONIA DUE TO INFECTIOUS ORGANISM, UNSPECIFIED LATERALITY, UNSPECIFIED PART OF LUNG: ICD-10-CM

## 2018-12-11 DIAGNOSIS — E03.2 HYPOTHYROIDISM DUE TO MEDICATION: ICD-10-CM

## 2018-12-11 DIAGNOSIS — D36.9 TUBULAR ADENOMA: ICD-10-CM

## 2018-12-11 DIAGNOSIS — F31.81 BIPOLAR 2 DISORDER (H): ICD-10-CM

## 2018-12-11 DIAGNOSIS — D50.0 IRON DEFICIENCY ANEMIA DUE TO CHRONIC BLOOD LOSS: ICD-10-CM

## 2018-12-11 DIAGNOSIS — N95.1 POST MENOPAUSAL SYNDROME: ICD-10-CM

## 2018-12-11 DIAGNOSIS — L73.2 HIDRADENITIS SUPPURATIVA OF RIGHT AXILLA: ICD-10-CM

## 2018-12-11 DIAGNOSIS — F10.21 RECOVERING ALCOHOLIC IN REMISSION (H): ICD-10-CM

## 2018-12-11 DIAGNOSIS — F33.1 MAJOR DEPRESSIVE DISORDER, RECURRENT EPISODE, MODERATE (H): ICD-10-CM

## 2018-12-11 DIAGNOSIS — F90.0 ADHD, PREDOMINANTLY INATTENTIVE TYPE: ICD-10-CM

## 2018-12-11 DIAGNOSIS — R82.90 NONSPECIFIC FINDING ON EXAMINATION OF URINE: Primary | ICD-10-CM

## 2018-12-11 DIAGNOSIS — R73.03 PREDIABETES: ICD-10-CM

## 2018-12-11 DIAGNOSIS — G47.33 OSA ON CPAP: ICD-10-CM

## 2018-12-11 DIAGNOSIS — Z00.00 ENCOUNTER FOR ROUTINE ADULT HEALTH EXAMINATION WITHOUT ABNORMAL FINDINGS: ICD-10-CM

## 2018-12-11 LAB
ALBUMIN SERPL-MCNC: 4 G/DL (ref 3.4–5)
ALBUMIN UR-MCNC: NEGATIVE MG/DL
ALP SERPL-CCNC: 152 U/L (ref 40–150)
ALT SERPL W P-5'-P-CCNC: 30 U/L (ref 0–50)
ANION GAP SERPL CALCULATED.3IONS-SCNC: 6 MMOL/L (ref 3–14)
APPEARANCE UR: CLEAR
AST SERPL W P-5'-P-CCNC: 24 U/L (ref 0–45)
BACTERIA #/AREA URNS HPF: ABNORMAL /HPF
BILIRUB SERPL-MCNC: 0.2 MG/DL (ref 0.2–1.3)
BILIRUB UR QL STRIP: NEGATIVE
BUN SERPL-MCNC: 16 MG/DL (ref 7–30)
CALCIUM SERPL-MCNC: 9.6 MG/DL (ref 8.5–10.1)
CHLORIDE SERPL-SCNC: 104 MMOL/L (ref 94–109)
CHOLEST SERPL-MCNC: 232 MG/DL
CO2 SERPL-SCNC: 27 MMOL/L (ref 20–32)
COLOR UR AUTO: YELLOW
CREAT SERPL-MCNC: 0.92 MG/DL (ref 0.52–1.04)
DEPRECATED CALCIDIOL+CALCIFEROL SERPL-MC: 33 UG/L (ref 20–75)
ERYTHROCYTE [DISTWIDTH] IN BLOOD BY AUTOMATED COUNT: 14.2 % (ref 10–15)
GFR SERPL CREATININE-BSD FRML MDRD: 63 ML/MIN/1.7M2
GLUCOSE SERPL-MCNC: 91 MG/DL (ref 70–99)
GLUCOSE UR STRIP-MCNC: NEGATIVE MG/DL
HBA1C MFR BLD: 5.1 % (ref 0–5.6)
HCT VFR BLD AUTO: 41.1 % (ref 35–47)
HDLC SERPL-MCNC: 69 MG/DL
HGB BLD-MCNC: 12.7 G/DL (ref 11.7–15.7)
HGB UR QL STRIP: NEGATIVE
KETONES UR STRIP-MCNC: NEGATIVE MG/DL
LDLC SERPL CALC-MCNC: 140 MG/DL
LEUKOCYTE ESTERASE UR QL STRIP: ABNORMAL
MCH RBC QN AUTO: 29.6 PG (ref 26.5–33)
MCHC RBC AUTO-ENTMCNC: 30.9 G/DL (ref 31.5–36.5)
MCV RBC AUTO: 96 FL (ref 78–100)
NITRATE UR QL: NEGATIVE
NON-SQ EPI CELLS #/AREA URNS LPF: ABNORMAL /LPF
NONHDLC SERPL-MCNC: 163 MG/DL
PH UR STRIP: 7.5 PH (ref 5–7)
PLATELET # BLD AUTO: 285 10E9/L (ref 150–450)
POTASSIUM SERPL-SCNC: 3.9 MMOL/L (ref 3.4–5.3)
PROT SERPL-MCNC: 8.2 G/DL (ref 6.8–8.8)
RBC # BLD AUTO: 4.29 10E12/L (ref 3.8–5.2)
RBC #/AREA URNS AUTO: ABNORMAL /HPF
SODIUM SERPL-SCNC: 137 MMOL/L (ref 133–144)
SOURCE: ABNORMAL
SP GR UR STRIP: 1.01 (ref 1–1.03)
T4 FREE SERPL-MCNC: 0.75 NG/DL (ref 0.76–1.46)
TRIGL SERPL-MCNC: 115 MG/DL
TSH SERPL DL<=0.005 MIU/L-ACNC: 14.86 MU/L (ref 0.4–4)
UROBILINOGEN UR STRIP-ACNC: 0.2 EU/DL (ref 0.2–1)
WBC # BLD AUTO: 5.8 10E9/L (ref 4–11)
WBC #/AREA URNS AUTO: ABNORMAL /HPF

## 2018-12-11 PROCEDURE — 80053 COMPREHEN METABOLIC PANEL: CPT | Performed by: INTERNAL MEDICINE

## 2018-12-11 PROCEDURE — 82306 VITAMIN D 25 HYDROXY: CPT | Performed by: INTERNAL MEDICINE

## 2018-12-11 PROCEDURE — 90471 IMMUNIZATION ADMIN: CPT | Performed by: INTERNAL MEDICINE

## 2018-12-11 PROCEDURE — 90686 IIV4 VACC NO PRSV 0.5 ML IM: CPT | Performed by: INTERNAL MEDICINE

## 2018-12-11 PROCEDURE — 85027 COMPLETE CBC AUTOMATED: CPT | Performed by: INTERNAL MEDICINE

## 2018-12-11 PROCEDURE — 99396 PREV VISIT EST AGE 40-64: CPT | Mod: 25 | Performed by: INTERNAL MEDICINE

## 2018-12-11 PROCEDURE — 84439 ASSAY OF FREE THYROXINE: CPT | Performed by: INTERNAL MEDICINE

## 2018-12-11 PROCEDURE — 84443 ASSAY THYROID STIM HORMONE: CPT | Performed by: INTERNAL MEDICINE

## 2018-12-11 PROCEDURE — 80061 LIPID PANEL: CPT | Performed by: INTERNAL MEDICINE

## 2018-12-11 PROCEDURE — 87086 URINE CULTURE/COLONY COUNT: CPT | Performed by: INTERNAL MEDICINE

## 2018-12-11 PROCEDURE — 71046 X-RAY EXAM CHEST 2 VIEWS: CPT

## 2018-12-11 PROCEDURE — 81001 URINALYSIS AUTO W/SCOPE: CPT | Performed by: INTERNAL MEDICINE

## 2018-12-11 PROCEDURE — 83036 HEMOGLOBIN GLYCOSYLATED A1C: CPT | Performed by: INTERNAL MEDICINE

## 2018-12-11 PROCEDURE — 36415 COLL VENOUS BLD VENIPUNCTURE: CPT | Performed by: INTERNAL MEDICINE

## 2018-12-11 RX ORDER — BACITRACIN ZINC 500 [USP'U]/G
OINTMENT TOPICAL PRN
Qty: 453 G | Refills: 1 | Status: SHIPPED | OUTPATIENT
Start: 2018-12-11 | End: 2018-12-18

## 2018-12-11 RX ORDER — GINSENG 100 MG
353 CAPSULE ORAL 2 TIMES DAILY
COMMUNITY
End: 2024-01-10

## 2018-12-11 RX ORDER — ALBUTEROL SULFATE 90 UG/1
2 AEROSOL, METERED RESPIRATORY (INHALATION) EVERY 6 HOURS PRN
Qty: 1 INHALER | Refills: 1 | Status: SHIPPED | OUTPATIENT
Start: 2018-12-11 | End: 2023-03-28

## 2018-12-11 ASSESSMENT — MIFFLIN-ST. JEOR: SCORE: 1736.67

## 2018-12-11 ASSESSMENT — PATIENT HEALTH QUESTIONNAIRE - PHQ9: SUM OF ALL RESPONSES TO PHQ QUESTIONS 1-9: 6

## 2018-12-11 NOTE — PROGRESS NOTES
SUBJECTIVE:   CC: Gudelia Quintanilla is an 53 year old woman who presents for preventive health visit.     Healthy Habits:    Do you get at least three servings of calcium containing foods daily (dairy, green leafy vegetables, etc.)? No, 1-2 servings.    Amount of exercise or daily activities, outside of work: none    Problems taking medications regularly:no    Medication side effects: Possible weight gain    Have you had an eye exam in the past two years? no    Do you see a dentist twice per year? yes    Do you have sleep apnea, excessive snoring or daytime drowsiness?yes- DX sleep apnea, patient uses CPAP    Weight gain with poor dietary discretion and low level of activity    Today's PHQ-2 Score:   PHQ-2 ( 1999 Pfizer) 2/20/2018 2/20/2018   Q1: Little interest or pleasure in doing things 1 0   Q2: Feeling down, depressed or hopeless 1 0   PHQ-2 Score 2 0       Abuse: Current or Past(Physical, Sexual or Emotional)- NO  Do you feel safe in your environment? YES    Social History     Tobacco Use     Smoking status: Current Every Day Smoker     Packs/day: 0.25     Types: Cigarettes     Smokeless tobacco: Never Used     Tobacco comment: smoke 1PPD now   Substance Use Topics     Alcohol use: No     Alcohol/week: 0.0 oz     If you drink alcohol do you typically have >3 drinks per day or >7 drinks per week? Not Applicable                     Reviewed orders with patient.  Reviewed health maintenance and updated orders accordingly -     Current Outpatient Medications   Medication Sig Dispense Refill     albuterol (PROAIR HFA/PROVENTIL HFA/VENTOLIN HFA) 108 (90 Base) MCG/ACT inhaler Inhale 2 puffs into the lungs every 6 hours as needed for shortness of breath / dyspnea or wheezing 1 Inhaler 1     albuterol (VENTOLIN HFA) 108 (90 Base) MCG/ACT inhaler Inhale 2 puffs into the lungs every 4 hours as needed for shortness of breath / dyspnea or wheezing 1 Inhaler 0     ARIPiprazole (ABILIFY) 30 MG tablet TAKE 1 TABLET BY MOUTH  DAILY.  2     armodafinil (NUVIGIL) 250 MG TABS tablet Take 1 tablet (250 mg) by mouth every morning 30 tablet 3     bacitracin 500 UNIT/GM OINT Apply 353 g topically 2 times daily       cholecalciferol (VITAMIN D3) 1000 UNIT tablet Take 1 tablet (1,000 Units) by mouth daily 100 tablet 3     Doxepin HCl 150 MG CAPS Take 300 mg by mouth At Bedtime (Patient taking differently: Take 200 mg by mouth At Bedtime ) 30 capsule 0     furosemide (LASIX) 40 MG tablet Take 1 tablet (40 mg) by mouth daily 30 tablet 5     levothyroxine (SYNTHROID/LEVOTHROID) 150 MCG tablet TAKE 1 TABLET(150 MCG) BY MOUTH DAILY 90 tablet 0     lithium 300 MG tablet 300 mg Takes 300 mg every morning       metroNIDAZOLE (METROCREAM) 0.75 % cream Apply thin film to affected areas on the face BID. 45 g 0     nicotine (NICOTROL) 10 MG inhaler Inhale 2 cartridge into the lungs daily as needed for smoking cessation 6 Box 1     order for DME Equipment being ordered: tennis elbow brace 1 each 0     order for DME Equipment being ordered: CPAP  Please dispense Cpap and its supply 1 Units prn     pantoprazole (PROTONIX) 40 MG enteric coated tablet Take 1 tablet (40 mg) by mouth 2 times daily Take 1.5 hours prior to last meal of the day, and take at bedtime 60 tablet 5     polyethylene glycol (MIRALAX/GLYCOLAX) packet Take 17 g by mouth 2 times daily as needed for constipation 30 packet 6     albuterol (2.5 MG/3ML) 0.083% Take 1 vial (2.5 mg) by nebulization once for 1 dose 1 vial 0     ammonium lactate (AMLACTIN) 12 % external cream Apply 0.25 inches topically 2 times daily 385 g 1     levothyroxine (SYNTHROID/LEVOTHROID) 150 MCG tablet TAKE 1 TABLET(150 MCG) BY MOUTH DAILY 60 tablet 0     predniSONE (DELTASONE) 20 MG tablet Take 3 tablets (60 mg) by mouth daily (Patient not taking: Reported on 12/11/2018.) 15 tablet 0     Allergies   Allergen Reactions     1-Methyl 2-Pyrrolidone      Other reaction(s): Swelling     Iodine Swelling     Other reaction(s):  Angioedema  Other reaction(s): Swelling  Facial swelling.     Morphine Anxiety and Nausea and Vomiting     Other reaction(s): Behavioral Disturbances       Mammogram Screening: Patient over age 50, mutual decision to screen reflected in health maintenance.    Pertinent mammograms are reviewed under the imaging tab.  History of abnormal Pap smear: NO - age 30-65 PAP every 5 years with negative HPV co-testing recommended  PAP / HPV Latest Ref Rng & Units 2/20/2018 12/18/2015   PAP - NIL NIL   HPV 16 DNA NEG:Negative Negative -   HPV 18 DNA NEG:Negative Negative -   OTHER HR HPV NEG:Negative Negative -     Reviewed and updated as needed this visit by clinical staff         Reviewed and updated as needed this visit by Provider            ROS:  CONSTITUTIONAL: NEGATIVE for fever, chills, change in weight  INTEGUMENTARY/SKIN: NEGATIVE for worrisome rashes, moles or lesions  EYES: NEGATIVE for vision changes or irritation  ENT: NEGATIVE for ear, mouth and throat problems  RESP: NEGATIVE for significant cough or SOB  BREAST: NEGATIVE for masses, tenderness or discharge  CV: NEGATIVE for chest pain, palpitations or peripheral edema  GI: NEGATIVE for nausea, abdominal pain, heartburn, or change in bowel habits  : NEGATIVE for unusual urinary or vaginal symptoms. No vaginal bleeding.  MUSCULOSKELETAL: NEGATIVE for significant arthralgias or myalgia  NEURO: NEGATIVE for weakness, dizziness or paresthesias  PSYCHIATRIC: NEGATIVE for changes in mood or affect     OBJECTIVE:   LMP 07/20/2006   EXAM:  GENERAL APPEARANCE: healthy, alert and no distress  EYES: Eyes grossly normal to inspection, PERRL and conjunctivae and sclerae normal  HENT: ear canals and TM's normal, nose and mouth without ulcers or lesions, oropharynx clear and oral mucous membranes moist  NECK: no adenopathy, no asymmetry, masses, or scars and thyroid normal to palpation  RESP: lungs clear to auscultation - no rales, rhonchi or wheezes  BREAST: normal without  masses, tenderness or nipple discharge and no palpable axillary masses or adenopathy  CV: regular rate and rhythm, normal S1 S2, no S3 or S4, no murmur, click or rub, no peripheral edema and peripheral pulses strong  ABDOMEN: soft, nontender, no hepatosplenomegaly, no masses and bowel sounds normal  MS: no musculoskeletal defects are noted and gait is age appropriate without ataxia  SKIN: no suspicious lesions or rashes  NEURO: Normal strength and tone, sensory exam grossly normal, mentation intact and speech normal  PSYCH: mentation appears normal and affect normal/bright        ASSESSMENT/PLAN:   1. 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)    - Urine Microscopic    2. Major depressive disorder, recurrent episode, moderate (H)      3. ROMEO on CPAP      4. Gastroesophageal reflux disease without esophagitis      5. Bipolar 2 disorder (H)      6. Vitamin D deficiency    - Vitamin D Deficiency    7. Hypothyroidism due to medication  - TSH with free T4 reflex    8. Hidradenitis suppurativa of right axilla    - bacitracin 500 UNIT/GM external ointment; Apply topically as needed for wound care  Dispense: 453 g; Refill: 1    9. Post menopausal syndrome      10. Iron deficiency anemia due to chronic blood loss    - CBC with platelets    11. Tobacco abuse  Reducing her cigarette usage down to 3-5 cigarettes/day    12. ADHD, predominantly inattentive type      13. Tubular adenoma      14. Prediabetes    - Hemoglobin A1c    15. Encounter for routine adult health examination without abnormal findings    - UA reflex to Microscopic and Culture  - CBC with platelets  - Comprehensive metabolic panel  - Lipid panel reflex to direct LDL Fasting  - Vitamin D Deficiency  - TSH with free T4 reflex  - Hemoglobin A1c    16. Pneumonia due to infectious organism, unspecified laterality, unspecified part of lung    - XR Chest 2 Views; Future  - albuterol (PROAIR HFA/PROVENTIL HFA/VENTOLIN HFA) 108 (90 Base) MCG/ACT inhaler; Inhale 2 puffs into  "the lungs every 6 hours as needed for shortness of breath / dyspnea or wheezing  Dispense: 1 Inhaler; Refill: 1    17. Need for prophylactic vaccination and inoculation against influenza    - INJECTION INTRAMUSCULAR OR SUB-Q  - FLU VACCINE, SPLIT VIRUS, IM (QUADRIVALENT) [62671]- >3 YRS  - Vaccine Administration, Initial [80104]    18. Nonspecific finding on examination of urine    - Urine Culture Aerobic Bacterial  - T4 free  - T4 free    COUNSELING:   Reviewed preventive health counseling, as reflected in patient instructions       Regular exercise       Healthy diet/nutrition    BP Readings from Last 1 Encounters:   09/05/18 109/58     Estimated body mass index is 34.76 kg/m  as calculated from the following:    Height as of 9/5/18: 1.74 m (5' 8.5\").    Weight as of 9/5/18: 105.2 kg (232 lb).           reports that she has been smoking cigarettes.  She has been smoking about 0.25 packs per day. she has never used smokeless tobacco.      Counseling Resources:  ATP IV Guidelines  Pooled Cohorts Equation Calculator  Breast Cancer Risk Calculator  FRAX Risk Assessment  ICSI Preventive Guidelines  Dietary Guidelines for Americans, 2010  USDA's MyPlate  ASA Prophylaxis  Lung CA Screening    Todd Manzanares MD  [facility] EDINAInjectable Influenza Immunization Documentation    1.  Is the person to be vaccinated sick today?   No    2. Does the person to be vaccinated have an allergy to a component   of the vaccine?   No  Egg Allergy Algorithm Link    3. Has the person to be vaccinated ever had a serious reaction   to influenza vaccine in the past?   No    4. Has the person to be vaccinated ever had Guillain-Barré syndrome?   No    Form completed by patient answered  Akbar Ramírez CMA on 12/11/2018 at 10:26 AM             "

## 2018-12-12 ENCOUNTER — TELEPHONE (OUTPATIENT)
Dept: FAMILY MEDICINE | Facility: CLINIC | Age: 53
End: 2018-12-12

## 2018-12-12 LAB
BACTERIA SPEC CULT: NORMAL
SPECIMEN SOURCE: NORMAL

## 2018-12-12 NOTE — TELEPHONE ENCOUNTER
Prior Authorization Retail Medication Request    Medication/Dose:proventil hfa    ICD code (if different than what is on RX):  J18.9  Previously Tried and Failed:  Albuterol 2.5/3ml  Rationale:  failed    Insurance Name:  [facility]  Insurance ID:  [id number]       Pharmacy Information (if different than what is on RX)  Name[facility]  Phone:  [phone]

## 2018-12-14 NOTE — TELEPHONE ENCOUNTER
A PA is not needed for this medication per the insurance. The pharmacy confirmed that they received a paid claim. No further PA activity needed at this time.    Prior Authorization Not Needed per Insurance    Medication: Proventil HFA  - PA NOT NEEDED   Insurance Company: [employer] - Phone [phone] Fax [phone]  Expected CoPay:      Pharmacy Filling the Rx: [id number] - [place], [place] - [address] AT [facility] [address]  Pharmacy Notified: Yes  Patient Notified: Yes

## 2018-12-20 ENCOUNTER — TELEPHONE (OUTPATIENT)
Dept: FAMILY MEDICINE | Facility: CLINIC | Age: 53
End: 2018-12-20

## 2018-12-20 DIAGNOSIS — F90.0 ADHD (ATTENTION DEFICIT HYPERACTIVITY DISORDER), INATTENTIVE TYPE: Primary | ICD-10-CM

## 2018-12-20 NOTE — TELEPHONE ENCOUNTER
Reason for Call:  Medication or medication refill:    Do you use a [facility]?  Name of the pharmacy and phone number for the current request:      [id number] - [place], [place] - [address] AT [facility] [address].    Name of the medication requested:     1.  Bacitracin  2. Lotion or cream for her toes/feet    Other request: pt. Saw Dr. Manzanares 12/11 and he was going to send prescriptions to her pharmacy for these.  The pharmacy said they never received any new prescriptions.    Please call patient to let her know if they will be sent in or not.    Can we leave a detailed message on this number? YES    Phone number patient can be reached at: Home number on file [phone] (home)    Best Time: anytime    Call taken on 12/20/2018 at 11:36 AM by Viji Zacarias  .

## 2018-12-21 RX ORDER — BACITRACIN ZINC 500 [USP'U]/G
OINTMENT TOPICAL 2 TIMES DAILY
Qty: 15 G | Refills: 1 | Status: SHIPPED | OUTPATIENT
Start: 2018-12-21 | End: 2019-06-09

## 2018-12-21 RX ORDER — AMMONIUM LACTATE 12 G/100G
0.25 CREAM TOPICAL 2 TIMES DAILY
Qty: 385 G | Refills: 1 | Status: SHIPPED | OUTPATIENT
Start: 2018-12-21 | End: 2020-01-07

## 2019-01-16 DIAGNOSIS — E03.2 HYPOTHYROIDISM DUE TO MEDICATION: ICD-10-CM

## 2019-01-16 NOTE — TELEPHONE ENCOUNTER
"levothyroxine (SYNTHROID/LEVOTHROID)    Last Written Prescription Date:  04/27/2018  Last Fill Quantity: 90,  # refills: 0   Last office visit: 12/11/2018 with prescribing provider:  Dr. Manzanares  Future Office Visit:  unknown    Requested Prescriptions   Pending Prescriptions Disp Refills     levothyroxine (SYNTHROID/LEVOTHROID) 150 MCG tablet [Pharmacy Med Name: LEVOTHYROXINE 0.150MG (150MCG) TAB] 60 tablet 0     Sig: TAKE 1 TABLET(150 MCG) BY MOUTH DAILY    Thyroid Protocol Failed - 1/16/2019  3:45 AM       Failed - Normal TSH on file in past 12 months    Recent Labs   Lab Test 12/11/18  1032   TSH 14.86*             Passed - Patient is 12 years or older       Passed - Recent (12 mo) or future (30 days) visit within the authorizing provider's specialty    Patient had office visit in the last 12 months or has a visit in the next 30 days with authorizing provider or within the authorizing provider's specialty.  See \"Patient Info\" tab in inbasket, or \"Choose Columns\" in Meds & Orders section of the refill encounter.             Passed - Medication is active on med list       Passed - No active pregnancy on record    If patient is pregnant or has had a positive pregnancy test, please check TSH.         Passed - No positive pregnancy test in past 12 months    If patient is pregnant or has had a positive pregnancy test, please check TSH.            "

## 2019-01-17 RX ORDER — LEVOTHYROXINE SODIUM 150 UG/1
TABLET ORAL
Qty: 60 TABLET | Refills: 0 | Status: SHIPPED | OUTPATIENT
Start: 2019-01-17 | End: 2019-05-04

## 2019-01-17 NOTE — TELEPHONE ENCOUNTER
Routing refill request to provider for review/approval because:  Labs out of range:  TSH    Ivette OLGUIN RN

## 2019-01-24 ENCOUNTER — TELEPHONE (OUTPATIENT)
Dept: FAMILY MEDICINE | Facility: CLINIC | Age: 54
End: 2019-01-24

## 2019-01-24 NOTE — TELEPHONE ENCOUNTER
[phone] - [facility]     They Faxed papers for clarification on meds     PCP Wrote back - would be leaving fluticasone and albuterol if patient needed seasonally     Did not clarify what to do Simethicone     Did advise him it was stopped from med list here back in February     He looked further and realized there is no active order on it so disregard call    Ivette OLGUIN RN

## 2019-03-01 ENCOUNTER — TELEPHONE (OUTPATIENT)
Dept: FAMILY MEDICINE | Facility: CLINIC | Age: 54
End: 2019-03-01

## 2019-03-01 NOTE — TELEPHONE ENCOUNTER
Prior Authorization Retail Medication Request    Medication/Dose: Nuvigil 250 mg  ICD code (if different than what is on RX):  G47.10  Previously Tried and Failed:  Atomoxetine  Rationale:  Patient stable on current medication    Insurance Name:  \[facility]\""  Insurance ID:  [id number]      Pharmacy Information (if different than what is on RX)  [facility]  Phone:  [phone]

## 2019-03-01 NOTE — TELEPHONE ENCOUNTER
Central Prior Authorization Team   Phone: [phone]      PA Initiation    Medication: Nuvigil-Initiated  Insurance Company: [employer] - Phone [phone] Fax [phone]  [facility]illing the Rx[facility] - [address]  Filling Pharmacy Phone: [phone]  Filling Pharmacy Fax:    Start Date: 3/1/2019

## 2019-03-01 NOTE — TELEPHONE ENCOUNTER
Pt's facility called and requested prior auth for nuvigil, not covered or an alternative that would be covered.  Pt has 3 days left.  Pended pharmacy.   They will also fax over form they received.   Will route to PA pool.    Additionally looks like pcp has not ordered on MAR, but may have signed a written order from facility as they have a script from 11/30/18.  Brie Ariza RN

## 2019-03-01 NOTE — TELEPHONE ENCOUNTER
Prior Authorization Approval    Authorization Effective Date: 1/30/2019  Authorization Expiration Date: 2/29/2020  Medication: Nuvigil-APPROVED  Approved Dose/Quantity:   Reference #:     Insurance Company: [employer] - Phone [phone] Fax [phone]  Expected CoPay:       CoPay Card Available:      Foundation Assistance Needed:    Which Pharmacy is filling the prescription (Not needed for infusion/clinic administered): [facility] - [address].  Pharmacy Notified: Yes  Patient Notified: No    Pharmacy will notify patient when medication is ready.

## 2019-04-15 ENCOUNTER — TELEPHONE (OUTPATIENT)
Dept: FAMILY MEDICINE | Facility: CLINIC | Age: 54
End: 2019-04-15

## 2019-04-15 NOTE — TELEPHONE ENCOUNTER
Reason for Call:  Medication or medication refill: PA    Do you use a [facility]?  Name of the pharmacy and phone number for the current request:      [place] - [address]    Name of the medication requested:   armodafinil (NUVIGIL) 250 MG TABS tablet    pantoprazole (PROTONIX) 40 MG enteric coated tablet    Other request: Pt recently had a PA done for the Nuvigil in March, but due to a frequency change it needs to be redone per residency      Can we leave a detailed message on this number? YES    Phone number patient can be reached at: Cell number on file:    Telephone Information:   Mobile [phone]       Best Time: Any    Call taken on 4/15/2019 at 10:29 AM by Danni Mireles

## 2019-04-17 NOTE — TELEPHONE ENCOUNTER
Spoke with Residency Nursing Dept:  Pt is taking the armodafinil (Nuvigil) 250mg ONCE daily as prescribed. The nurse that I spoke with did not mention that there would be a dose increase.     Called [facility]  Last prescribed by11/20/18- #30,  [facility]  [facility]   [address]   [place] [phone]    Huddled with Prior Conor Authorization Willard who advised to call pharmacy back and request that PA request go to the prescribing provider.     [facility] will send PA request to Esperanza Rowe's office.     Freya SALGUERO RN

## 2019-04-17 NOTE — TELEPHONE ENCOUNTER
Please call residency to see why frequency was changed and who had prescribed the change in frequency. This isn't listed on her medication list other than once daily which is what was marked back in 2017.    Micah Harrison CMA on 4/17/2019 at 3:29 PM

## 2019-04-18 ENCOUNTER — TELEPHONE (OUTPATIENT)
Dept: FAMILY MEDICINE | Facility: CLINIC | Age: 54
End: 2019-04-18

## 2019-04-18 DIAGNOSIS — K21.9 GASTROESOPHAGEAL REFLUX DISEASE WITHOUT ESOPHAGITIS: ICD-10-CM

## 2019-04-18 RX ORDER — PANTOPRAZOLE SODIUM 40 MG/1
40 TABLET, DELAYED RELEASE ORAL 2 TIMES DAILY
Qty: 60 TABLET | Refills: 5 | Status: SHIPPED | OUTPATIENT
Start: 2019-04-18 | End: 2019-06-25

## 2019-04-18 NOTE — TELEPHONE ENCOUNTER
Spoke with patient:     Confirmed that the medication that she is requesting is PANTOPRAZOLE for heartburn (NOT armodafinil (Nuvigil))     Takes 40mg BID and therefore will need PA     Pantoprazole 40mg  Last Written Prescription Date:  12/7/15  Last Fill Quantity: 60,   # refills: 5  Last Office Visit: 12/2018  Future Office visit:       Routing refill request to provider for review/approval because:  A break in medication- Last RX'd 2015        (routing to PA Team to review, as well)

## 2019-04-18 NOTE — TELEPHONE ENCOUNTER
Reason for Call:  Medication or medication refill:    Do you use a [facility]?  Name of the pharmacy and phone number for the current request:       [id number] - [place], [place] - [address] AT [facility] [address]    Name of the medication requested: Pantoprazole  She said this is what she spoke to the nurse about yesterday and that a PA needs to be started on this   She was very angry and yelling at me because I did not see it on her med list and she did not know the name of the medications  Someone where she lives told her finally that Dr Manzanares did not order this and she said he should   So they are waiting for a response from Previous pre scriber but for the future she wants Dr Manzanares to prescribe   And she wants to speak with him  See Previous TE    Other request: Please have Dr Manzanares call her at his convience    Can we leave a detailed message on this number? Not Applicable    Phone number patient can be reached at: Home number on file [phone] (home)    Best Time: anytime    Call taken on 4/18/2019 at 3:08 PM by Carolin White

## 2019-04-19 ENCOUNTER — TELEPHONE (OUTPATIENT)
Dept: FAMILY MEDICINE | Facility: CLINIC | Age: 54
End: 2019-04-19

## 2019-04-19 NOTE — TELEPHONE ENCOUNTER
Prior Authorization Approval    Authorization Effective Date: 4/19/2019  Authorization Expiration Date: 4/19/2020  Medication: Pantoprazole 40 mg- APPROVED  Approved Dose/Quantity:   Reference #: [id number]   Insurance Company: [employer] - Phone [phone] Fax [phone]  Expected CoPay:       CoPay Card Available:      Foundation Assistance Needed:    Which Pharmacy is filling the prescription (Not needed for infusion/clinic administered): [place] - [address].  Pharmacy Notified: Yes  Patient Notified: Yes

## 2019-04-19 NOTE — TELEPHONE ENCOUNTER
Prior Authorization Retail Medication Request    Medication/Dose: Pantoprazole 40 mg  ICD code (if different than what is on RX):  K21.9  Previously Tried and Failed:  Omeprazole, Ranitidine  Rationale:  Patient stable on medication BID for relief of heartburn symptoms since 2014    Insurance Name:  [employer]  Insurance ID:  [id number]      Pharmacy Information (if different than what is on RX)  [facility]  Phone:  [phone]

## 2019-04-19 NOTE — TELEPHONE ENCOUNTER
PA Initiation    Medication: Pantoprazole 40 mg- INITIATED  Insurance Company: [employer] - Phone [phone] Fax [phone]  [facility]illing [facility] Rx[facility] - [address] [facility] [address]  Filling Pharmacy Phone: [phone]  Filling Pharmacy Fax:    Start Date: 4/19/2019    Patient has primary through [employer] (they have her last name as Iker), also has Maribell and MA.

## 2019-04-27 ENCOUNTER — OFFICE VISIT (OUTPATIENT)
Dept: URGENT CARE | Facility: URGENT CARE | Age: 54
End: 2019-04-27
Payer: COMMERCIAL

## 2019-04-27 VITALS
TEMPERATURE: 98.1 F | SYSTOLIC BLOOD PRESSURE: 131 MMHG | BODY MASS INDEX: 36.64 KG/M2 | HEART RATE: 87 BPM | OXYGEN SATURATION: 98 % | WEIGHT: 241 LBS | DIASTOLIC BLOOD PRESSURE: 81 MMHG

## 2019-04-27 DIAGNOSIS — L02.432 CARBUNCLE OF LEFT AXILLA: ICD-10-CM

## 2019-04-27 DIAGNOSIS — M89.8X1 PAIN OF LEFT CLAVICLE: Primary | ICD-10-CM

## 2019-04-27 PROCEDURE — 99214 OFFICE O/P EST MOD 30 MIN: CPT | Performed by: INTERNAL MEDICINE

## 2019-04-27 RX ORDER — SULFAMETHOXAZOLE/TRIMETHOPRIM 800-160 MG
1 TABLET ORAL 2 TIMES DAILY
Qty: 20 TABLET | Refills: 0 | Status: SHIPPED | OUTPATIENT
Start: 2019-04-27 | End: 2019-05-07

## 2019-04-27 NOTE — PROGRESS NOTES
SUBJECTIVE:   Gudelia Quintanilla is a 54 year old female presenting with a chief complaint of   Chief Complaint   Patient presents with     Urgent Care     Shoulder Pain     c/o shoulder pain for 2 weeks and boil on underarm for 1 week       She is an established patient of [facility].    1.  The patient has a history of fracture left clavicle   Plate & 4 screws placed in age 18s  Now having pain left shoulder 2-3 weeks  Arm feels weak with lifting  Weather changes affects pain.    2.  Has 5 boils in left arm pit area  Antibiotic medication has cleared boils in the past up.    Patient states that she is been having armpit boils alternating between left and right side.  Prior to this past year she has never had any problems with oils  She has never had a boil lanced but one time it opened up on itself and drained    No infection for past 6 months, then recurred 2 weeks ago.    Onset of symptoms was 2 week(s) ago.  Course of illness is worsening.    Treatment measures tried include: nothing      Review of Systems    Past Medical History:   Diagnosis Date     Depression      GERD (gastroesophageal reflux disease)      Hemorrhoid      Menstrual disorder     s/p D&C -12/2012     ROMEO on CPAP      Other bipolar disorders     followed by Dr Collazo     Post menopausal syndrome      Tobacco abuse      Family History   Problem Relation Age of Onset     Cancer Mother         ovarian cancer     Hypertension Father      Breast Cancer No family hx of      Cancer - colorectal No family hx of      Current Outpatient Medications   Medication Sig Dispense Refill     albuterol (PROAIR HFA/PROVENTIL HFA/VENTOLIN HFA) 108 (90 Base) MCG/ACT inhaler Inhale 2 puffs into the lungs every 6 hours as needed for shortness of breath / dyspnea or wheezing 1 Inhaler 1     albuterol (VENTOLIN HFA) 108 (90 Base) MCG/ACT inhaler Inhale 2 puffs into the lungs every 4 hours as needed for shortness of breath / dyspnea or wheezing 1 Inhaler 0     ammonium  lactate (AMLACTIN) 12 % external cream Apply 0.25 inches topically 2 times daily 385 g 1     ARIPiprazole (ABILIFY) 30 MG tablet TAKE 1 TABLET BY MOUTH DAILY.  2     armodafinil (NUVIGIL) 250 MG TABS tablet Take 1 tablet (250 mg) by mouth every morning 30 tablet 3     bacitracin 500 UNIT/GM OINT Apply 353 g topically 2 times daily       cholecalciferol (VITAMIN D3) 1000 UNIT tablet Take 1 tablet (1,000 Units) by mouth daily 100 tablet 3     Doxepin HCl 150 MG CAPS Take 300 mg by mouth At Bedtime (Patient taking differently: Take 200 mg by mouth At Bedtime ) 30 capsule 0     furosemide (LASIX) 40 MG tablet Take 1 tablet (40 mg) by mouth daily 30 tablet 5     levothyroxine (SYNTHROID/LEVOTHROID) 150 MCG tablet TAKE 1 TABLET(150 MCG) BY MOUTH DAILY 60 tablet 0     levothyroxine (SYNTHROID/LEVOTHROID) 150 MCG tablet TAKE 1 TABLET(150 MCG) BY MOUTH DAILY 90 tablet 0     lithium 300 MG tablet 300 mg Takes 300 mg every morning       metroNIDAZOLE (METROCREAM) 0.75 % cream Apply thin film to affected areas on the face BID. 45 g 0     nicotine (NICOTROL) 10 MG inhaler Inhale 2 cartridge into the lungs daily as needed for smoking cessation 6 Box 1     order for DME Equipment being ordered: CPAP  Please dispense Cpap and its supply 1 Units prn     pantoprazole (PROTONIX) 40 MG EC tablet Take 1 tablet (40 mg) by mouth 2 times daily Take 1.5 hours prior to last meal of the day, and take at bedtime 60 tablet 5     polyethylene glycol (MIRALAX/GLYCOLAX) packet Take 17 g by mouth 2 times daily as needed for constipation 30 packet 6     predniSONE (DELTASONE) 20 MG tablet Take 3 tablets (60 mg) by mouth daily 15 tablet 0     sulfamethoxazole-trimethoprim (BACTRIM DS/SEPTRA DS) 800-160 MG tablet Take 1 tablet by mouth 2 times daily for 10 days 20 tablet 0     albuterol (2.5 MG/3ML) 0.083% Take 1 vial (2.5 mg) by nebulization once for 1 dose 1 vial 0     order for DME Equipment being ordered: tennis elbow brace (Patient not taking:  Reported on 4/27/2019) 1 each 0     Social History     Tobacco Use     Smoking status: Current Every Day Smoker     Packs/day: 0.25     Types: Cigarettes     Smokeless tobacco: Never Used     Tobacco comment: smoke 1PPD now   Substance Use Topics     Alcohol use: No     Alcohol/week: 0.0 oz       OBJECTIVE  /81   Pulse 87   Temp 98.1  F (36.7  C) (Tympanic)   Wt 109.3 kg (241 lb)   LMP 07/20/2006   SpO2 98%   BMI 36.64 kg/m      Physical Exam   Constitutional: She appears well-developed and well-nourished.   Musculoskeletal:   Examination of the left clavicle area reveals tenderness mid to distal clavicle.  No obvious deformity or swelling noted of the left clavicle.  Palpation and movement of left shoulder is nontender.   Skin:   Left axilla   Patient has red warm oval area skin with greatest area of circumference being 8 cm with 5 P sized carbuncles noted within the red area.  None of these are fluctuant.  Tender to touch.     Vitals reviewed.      Labs:  No results found for this or any previous visit (from the past 24 hour(s)).        ASSESSMENT:      ICD-10-CM    1. Pain of left clavicle M89.8X1 ORTHO  REFERRAL   2. Carbuncle of left axilla L02.432 sulfamethoxazole-trimethoprim (BACTRIM DS/SEPTRA DS) 800-160 MG tablet        Medical Decision Making:  Discussed with patient most likely the remaining hardware and the left clavicle is causing her symptoms.  She may need to consider having the hardware removed.  I recommended her seeing Ortho to discuss potential treatment options for her pain related to hardware placed in the distant past for clavicular fracture.  X-rays deferred to Ortho.    Patient is concerned about having recurrent carbuncles.  Discussed she may get screened with a nasal test for MRSA with her primary after she is off antibiotics and infection has cleared up.    Potentially she may consider bleach baths if she is having recurrent boils.    As boils were nonfluctuant I  placed her on oral Bactrim  Recommended eating yogurt and taking probiotics on ongoing basis      Patient Instructions         Bactrim 2 x day 10 days  Yogurt  Consider nasal screening for MRSA staph with primary  May need bleach baths        Recommend seeing ortho for clavicle pain most likely related to plates & screws.      Patient Education     Understanding Carbuncles    A carbuncle is a painful boil under the skin. It happens when a group of hair follicles become infected. Follicles are the tiny holes from which hair grows out of your skin.  How to say it  Hilary   What causes carbuncles?  A carbuncle is caused by an infection with the bacteria Staphylococcus aureus. They are common on areas of the body where friction and sweat occur. They usually appear on the back of the neck, back, and thighs. This type of infection can also happen when the skin is injured, such as by a cut or bug bite.  The bacteria that causes carbuncles can spread easily from person to person. People at higher risk for boils are those with diabetes or a weak immune system. Drug users who use needles are also more likely to get them.  Symptoms of carbuncles  A carbuncle starts as a small painful bump. But it can grow quickly. It may become:    Red    Swollen    Tender  Carbuncles may ooze pus. They may also cause fever and a general feeling of illness.  Treatment for carbuncles  A carbuncle may heal on its own in a few weeks. But the pus within it needs to come out first. Treatment options include:    Warm compress. Putting a warm, wet washcloth on the boil will help the pus drain out. You should never try to pop a boil. That can cause the infection to spread.    Surgical drainage. If the boil doesn t drain on its own, your healthcare provider may need to cut into it.    Antibiotics. In some cases, oral antibiotics may be prescribed to fight the infection, especially if the carbuncle returns. You will likely have to take the  "medicine for 5 to 7 days. You may need to take it longer for a severe case.    Good hygiene. Proper handwashing can prevent boils from spreading and coming back. Also wash things that have been in contact with the carbuncle in hot water. This includes items such as clothing and towels.  Complications of carbuncles  The main complication of a carbuncle is the spreading of the infection. The bacteria can infect the heart and bone. It can also lead to septic shock, an infection of the entire body.  When to call your healthcare provider  Call your healthcare provider right away if you have any of these:    Fever of 100.4 F (38 C) or higher, or as directed    Redness, swelling, or fluid leaking from a carbuncle that gets worse    Pain that gets worse    Symptoms that don t get better, or get worse    New symptoms   Date Last Reviewed: 5/1/2016 2000-2018 [employer]. [address] [place] [address]. All rights reserved. This information is not intended as a substitute for professional medical care. Always follow your healthcare professional's instructions.           Patient Education   How to Give Your Child a Bleach Bath  What is a bleach bath and what does it do?  A bleach bath is water with a tiny bit of household bleach added. The water thins out (dilutes) the bleach so that the bath water becomes like the water in a swimming pool.  Bleach baths help fight germs on the skin. These germs, or bacteria, can keep skin from healing. A bleach bath can also calm inflamed skin even if it is not infected. Bleach baths are safe for almost everyone as long as you don't use too much bleach.  What kind of bleach should I use?       Use plain, household bleach--the same kind you use to clean your clothes. Clorox brand, store brands, and generic bleach are all OK.    Make sure it is plain bleach. Do NOT use \"splash free, splashless or color safe.\"    Do NOT use bleach with added fragrance, like " lavender.  How do I make a bleach bath?  1. Fill your tub with at least 4 to 6 inches of lukewarm water. Bleach baths work best when your child can soak in the water.  2. Add bleach as the tub fills with water:  For REGULAR bleach:    Adult size tub: Add 1/4 to 1/2 cup of bleach.    Baby tub: Add 2 tablespoons of bleach.  For CONCENTRATED bleach:    Adult size tub: Add 3 tablespoons to 1/3 cup of bleach.    Baby tub: Add 4 teaspoons of bleach.  How do I give a bleach bath?  1. Give your child a bath just like you do every day. But do NOT add any soap or other products to the water.  2. Let your child play while you bathe their body, face and scalp with the water.  3. After about 10 minutes, you may use a gentle cleanser to wash your child if you wish.  4. Rinse off the bleach water with plain water.  5. Dry your child as usual.  Repeat bleach baths as your provider recommends.  What else do I need to know?    It is safe to get the bath water on your face and scalp.    Do NOT pour bleach directly onto the skin.    Do NOT let your child drink the bleach bath water.    Keep the bleach bottle out of your children's reach.    Prepared by the [facility] Division of Pediatric Dermatology. For informational purposes only. Not to replace the advice of your health care provider. Copyright   2017 [facility]. All rights reserved. [employer] [id number] - 2/17.

## 2019-04-27 NOTE — PATIENT INSTRUCTIONS
Bactrim 2 x day 10 days  Yogurt  Consider nasal screening for MRSA staph with primary  May need bleach baths        Recommend seeing ortho for clavicle pain most likely related to plates & screws.      Patient Education     Understanding Carbuncles    A carbuncle is a painful boil under the skin. It happens when a group of hair follicles become infected. Follicles are the tiny holes from which hair grows out of your skin.  How to say it  Hilary   What causes carbuncles?  A carbuncle is caused by an infection with the bacteria Staphylococcus aureus. They are common on areas of the body where friction and sweat occur. They usually appear on the back of the neck, back, and thighs. This type of infection can also happen when the skin is injured, such as by a cut or bug bite.  The bacteria that causes carbuncles can spread easily from person to person. People at higher risk for boils are those with diabetes or a weak immune system. Drug users who use needles are also more likely to get them.  Symptoms of carbuncles  A carbuncle starts as a small painful bump. But it can grow quickly. It may become:    Red    Swollen    Tender  Carbuncles may ooze pus. They may also cause fever and a general feeling of illness.  Treatment for carbuncles  A carbuncle may heal on its own in a few weeks. But the pus within it needs to come out first. Treatment options include:    Warm compress. Putting a warm, wet washcloth on the boil will help the pus drain out. You should never try to pop a boil. That can cause the infection to spread.    Surgical drainage. If the boil doesn t drain on its own, your healthcare provider may need to cut into it.    Antibiotics. In some cases, oral antibiotics may be prescribed to fight the infection, especially if the carbuncle returns. You will likely have to take the medicine for 5 to 7 days. You may need to take it longer for a severe case.    Good hygiene. Proper handwashing can prevent boils  "from spreading and coming back. Also wash things that have been in contact with the carbuncle in hot water. This includes items such as clothing and towels.  Complications of carbuncles  The main complication of a carbuncle is the spreading of the infection. The bacteria can infect the heart and bone. It can also lead to septic shock, an infection of the entire body.  When to call your healthcare provider  Call your healthcare provider right away if you have any of these:    Fever of 100.4 F (38 C) or higher, or as directed    Redness, swelling, or fluid leaking from a carbuncle that gets worse    Pain that gets worse    Symptoms that don t get better, or get worse    New symptoms   Date Last Reviewed: 5/1/2016 2000-2018 [employer]. [address] [place] [address], [place] [address]. All rights reserved. This information is not intended as a substitute for professional medical care. Always follow your healthcare professional's instructions.           Patient Education   How to Give Your Child a Bleach Bath  What is a bleach bath and what does it do?  A bleach bath is water with a tiny bit of household bleach added. The water thins out (dilutes) the bleach so that the bath water becomes like the water in a swimming pool.  Bleach baths help fight germs on the skin. These germs, or bacteria, can keep skin from healing. A bleach bath can also calm inflamed skin even if it is not infected. Bleach baths are safe for almost everyone as long as you don't use too much bleach.  What kind of bleach should I use?       Use plain, household bleach--the same kind you use to clean your clothes. Clorox brand, store brands, and generic bleach are all OK.    Make sure it is plain bleach. Do NOT use \"splash free, splashless or color safe.\"    Do NOT use bleach with added fragrance, like lavender.  How do I make a bleach bath?  1. Fill your tub with at least 4 to 6 inches of lukewarm water. Bleach baths work best when your " child can soak in the water.  2. Add bleach as the tub fills with water:  For REGULAR bleach:    Adult size tub: Add 1/4 to 1/2 cup of bleach.    Baby tub: Add 2 tablespoons of bleach.  For CONCENTRATED bleach:    Adult size tub: Add 3 tablespoons to 1/3 cup of bleach.    Baby tub: Add 4 teaspoons of bleach.  How do I give a bleach bath?  1. Give your child a bath just like you do every day. But do NOT add any soap or other products to the water.  2. Let your child play while you bathe their body, face and scalp with the water.  3. After about 10 minutes, you may use a gentle cleanser to wash your child if you wish.  4. Rinse off the bleach water with plain water.  5. Dry your child as usual.  Repeat bleach baths as your provider recommends.  What else do I need to know?    It is safe to get the bath water on your face and scalp.    Do NOT pour bleach directly onto the skin.    Do NOT let your child drink the bleach bath water.    Keep the bleach bottle out of your children's reach.    Prepared by the [facility] Division of Pediatric Dermatology. For informational purposes only. Not to replace the advice of your health care provider. Copyright   2017 [facility]. All rights reserved. [employer] [id number] - 2/17.

## 2019-05-01 DIAGNOSIS — G47.10 EXCESSIVE SLEEPINESS: ICD-10-CM

## 2019-05-01 NOTE — TELEPHONE ENCOUNTER
Reason for Call:  Other Pa Questions about the pantoprazole    Detailed comments: pantoprazole (PROTONIX) 40 MG EC tablet  They want to know if this is for twice a day?  Then also they wanted to check to see if Dr Manzanares will take onver prescribing the   armodafinil (NUVIGIL) 250 MG TABS tablet    Phone Number Patient can be reached at: Federico at [facility] [phone]    Best Time: anytime        Call taken on 5/1/2019 at 11:09 AM by Carolin White

## 2019-05-03 NOTE — TELEPHONE ENCOUNTER
Federico from [facility] called back to check the status of this and he was wondering when we would be able ot send the medication over there for the Pt. He requests a call back with this information. He can be reached at [phone].

## 2019-05-03 NOTE — TELEPHONE ENCOUNTER
Spoke with staff at [facility]. Informed them that prior authorization is for twice daily medication and that Dr. Manzanares is willing to take over the prescription for patient's Nuvigil. Staff will forward information onto Federico at [facility] so that he is aware as well.    Micah Harrison CMA on 5/3/2019 at 9:24 AM

## 2019-05-04 DIAGNOSIS — E03.2 HYPOTHYROIDISM DUE TO MEDICATION: ICD-10-CM

## 2019-05-06 RX ORDER — LEVOTHYROXINE SODIUM 150 UG/1
TABLET ORAL
Qty: 30 TABLET | Refills: 1 | Status: SHIPPED | OUTPATIENT
Start: 2019-05-06 | End: 2019-07-07

## 2019-05-06 NOTE — TELEPHONE ENCOUNTER
"levothyroxine (SYNTHROID/LEVOTHROID) 150 MCG tablet  Last Written Prescription Date:  1/17/19  Last Fill Quantity: 60,  # refills: 0   Last office visit: 12/11/2018 with prescribing provider:  Leighton    Future Office Visit:      Requested Prescriptions   Pending Prescriptions Disp Refills     levothyroxine (SYNTHROID/LEVOTHROID) 150 MCG tablet [Pharmacy Med Name: LEVOTHYROXINE 0.150MG (150MCG) TAB] 60 tablet 0     Sig: TAKE 1 TABLET(150 MCG) BY MOUTH DAILY       Thyroid Protocol Failed - 5/4/2019  3:45 AM        Failed - Normal TSH on file in past 12 months     Recent Labs   Lab Test 12/11/18  1032   TSH 14.86*              Passed - Patient is 12 years or older        Passed - Recent (12 mo) or future (30 days) visit within the authorizing provider's specialty     Patient had office visit in the last 12 months or has a visit in the next 30 days with authorizing provider or within the authorizing provider's specialty.  See \"Patient Info\" tab in inbasket, or \"Choose Columns\" in Meds & Orders section of the refill encounter.              Passed - Medication is active on med list        Passed - No active pregnancy on record     If patient is pregnant or has had a positive pregnancy test, please check TSH.          Passed - No positive pregnancy test in past 12 months     If patient is pregnant or has had a positive pregnancy test, please check TSH.            "

## 2019-05-06 NOTE — TELEPHONE ENCOUNTER
Routing refill request to provider for review/approval because:  Labs out of range:    Lab Results   Component Value Date    TSH 14.86 12/11/2018     Unable to locate documentation regarding this lab result, RX dose, plan.     Please review and complete.       Thank you,  Jovana WHITLOCK RN,BSN

## 2019-05-09 RX ORDER — ARMODAFINIL 250 MG/1
250 TABLET ORAL EVERY MORNING
Qty: 30 TABLET | Refills: 3 | Status: SHIPPED | OUTPATIENT
Start: 2019-05-09 | End: 2020-01-07

## 2019-05-10 ENCOUNTER — TELEPHONE (OUTPATIENT)
Dept: FAMILY MEDICINE | Facility: CLINIC | Age: 54
End: 2019-05-10

## 2019-05-10 NOTE — TELEPHONE ENCOUNTER
Federico from [facility] called and stated that unbeknownst to them the other doctor who wrote the original RX had already sent in a new one. They called to tell us that the pharmacy will not be processing this and they do not need it going forward.

## 2019-05-10 NOTE — TELEPHONE ENCOUNTER
armodafinil (NUVIGIL) 250 MG TABS tablet    Rx faxed to      [facility] - [place], [place] - [address]

## 2019-06-09 DIAGNOSIS — F90.0 ADHD (ATTENTION DEFICIT HYPERACTIVITY DISORDER), INATTENTIVE TYPE: ICD-10-CM

## 2019-06-10 NOTE — TELEPHONE ENCOUNTER
bacitracin 500 UNIT/GM OINT        Last Written Prescription Date:  Unknown  Last Fill Quantity: Unknown,   # refills: Unknown  Last Office Visit: 12/11/2018  Future Office visit: Unknown    Routing refill request to provider for review/approval because:  Drug not on the [facility], [facility] or [facility] refill protocol or controlled substance

## 2019-06-11 RX ORDER — BACITRACIN ZINC 500 [USP'U]/G
OINTMENT TOPICAL
Qty: 28.4 G | Refills: 1 | Status: SHIPPED | OUTPATIENT
Start: 2019-06-11 | End: 2023-07-26

## 2019-06-11 NOTE — TELEPHONE ENCOUNTER
Routing refill request to provider for review/approval because:  Drug not on the [facility] refill protocol   Added on 1 refill.  Please authorize if appropriate.  Thanks,  Amalia Li RN

## 2019-06-25 DIAGNOSIS — K21.9 GASTROESOPHAGEAL REFLUX DISEASE WITHOUT ESOPHAGITIS: ICD-10-CM

## 2019-06-25 NOTE — TELEPHONE ENCOUNTER
Reason for Call:  Medication or medication refill:    Do you use a [facility]?  Name of the pharmacy and phone number for the current request:  [id number] - [address]     Name of the medication requested: pantoprazole (PROTONIX) 40 MG EC tablet and furosemide (LASIX) 40 MG tablet     Other request: patient said these were being [facility]  [facility]     Can we leave a detailed message on this number? YES    Phone number patient can be reached at: Cell number on file:    Telephone Information:   Mobile [phone]     Best Time: any    Call taken on 6/25/2019 at 8:33 AM by Helen Jimenez

## 2019-06-25 NOTE — TELEPHONE ENCOUNTER
[facility] [place] [facility]    Last office vist Dr. Manzanares 12/2019     We have not filled Lasix since 2017      Pended Pantoprazole with 3 refills     pantoprazole (PROTONIX) 40 MG EC tablet 60 tablet 5 4/18/2019  No   Sig - Route: Take 1 tablet (40 mg) by mouth 2 times daily Take 1.5 hours prior to last meal of the day, and take at bedtime - Oral   Sent to pharmacy as: pantoprazole (PROTONIX) 40 MG EC tablet   Class: E-Prescribe       [facility] [place] [facility] [address]

## 2019-06-27 RX ORDER — PANTOPRAZOLE SODIUM 40 MG/1
40 TABLET, DELAYED RELEASE ORAL 2 TIMES DAILY
Qty: 60 TABLET | Refills: 3 | Status: SHIPPED | OUTPATIENT
Start: 2019-06-27 | End: 2019-12-11

## 2019-06-27 NOTE — TELEPHONE ENCOUNTER
Furosemide: Called patient:             Pantoprazole: Prescription approved per [facility] Refill Protocol.

## 2019-07-07 DIAGNOSIS — E03.2 HYPOTHYROIDISM DUE TO MEDICATION: ICD-10-CM

## 2019-07-09 NOTE — TELEPHONE ENCOUNTER
Routing refill request to provider for review/approval because:  Labs out of range:  TSH  No documentation of dose change/when to recheck  Pended TSH order  Due for recheck now?  Cheri BOUCHER RN

## 2019-07-09 NOTE — TELEPHONE ENCOUNTER
"Last Written Prescription Date:  5/06/19  Last Fill Quantity: 30 tablet,  # refills: 1   Last office visit: 12/11/2018 with prescribing provider:  Leighton   Future Office Visit:      Requested Prescriptions   Pending Prescriptions Disp Refills     levothyroxine (SYNTHROID/LEVOTHROID) 150 MCG tablet [Pharmacy Med Name: LEVOTHYROXINE 0.150MG (150MCG) TAB] 30 tablet 0     Sig: TAKE 1 TABLET(150 MCG) BY MOUTH DAILY       Thyroid Protocol Failed - 7/7/2019  6:02 PM        Failed - Normal TSH on file in past 12 months     Recent Labs   Lab Test 12/11/18  1032   TSH 14.86*              Passed - Patient is 12 years or older        Passed - Recent (12 mo) or future (30 days) visit within the authorizing provider's specialty     Patient had office visit in the last 12 months or has a visit in the next 30 days with authorizing provider or within the authorizing provider's specialty.  See \"Patient Info\" tab in inbasket, or \"Choose Columns\" in Meds & Orders section of the refill encounter.              Passed - Medication is active on med list        Passed - No active pregnancy on record     If patient is pregnant or has had a positive pregnancy test, please check TSH.          Passed - No positive pregnancy test in past 12 months     If patient is pregnant or has had a positive pregnancy test, please check TSH.            "

## 2019-07-10 RX ORDER — LEVOTHYROXINE SODIUM 150 UG/1
TABLET ORAL
Qty: 30 TABLET | Refills: 0 | Status: SHIPPED | OUTPATIENT
Start: 2019-07-10 | End: 2019-07-19

## 2019-07-10 NOTE — TELEPHONE ENCOUNTER
Please let pt know that her last TSH was quite elevated last december. We need to recheck labs ASAP. Sent 30day refill for now but may need ot change dose after results. Result will come directly to me so it will be taken care of.  INEZ Mcallister CNP

## 2019-07-17 NOTE — TELEPHONE ENCOUNTER
Pt called back - notified of Flores's message   Lab-only visit scheduled     Ivette Tyson RN on 7/17/2019 at 9:16 AM

## 2019-07-19 ENCOUNTER — TELEPHONE (OUTPATIENT)
Dept: FAMILY MEDICINE | Facility: CLINIC | Age: 54
End: 2019-07-19

## 2019-07-19 DIAGNOSIS — E03.2 HYPOTHYROIDISM DUE TO MEDICATION: ICD-10-CM

## 2019-07-19 DIAGNOSIS — R60.0 BILATERAL EDEMA OF LOWER EXTREMITY: ICD-10-CM

## 2019-07-19 LAB — TSH SERPL DL<=0.005 MIU/L-ACNC: 0.66 MU/L (ref 0.4–4)

## 2019-07-19 PROCEDURE — 84443 ASSAY THYROID STIM HORMONE: CPT | Performed by: NURSE PRACTITIONER

## 2019-07-19 PROCEDURE — 36415 COLL VENOUS BLD VENIPUNCTURE: CPT | Performed by: NURSE PRACTITIONER

## 2019-07-19 RX ORDER — LEVOTHYROXINE SODIUM 150 UG/1
150 TABLET ORAL DAILY
Qty: 90 TABLET | Refills: 0 | Status: SHIPPED | OUTPATIENT
Start: 2019-07-19 | End: 2019-12-11

## 2019-07-19 NOTE — RESULT ENCOUNTER NOTE
Please let pt know that she had a big swing in the opposite direction so her number is now on the lower side of normal. I sent in a 90 day supply of her current dosing to [facility] in [facility]. We should recheck TSH again in 2-3 months before her next fill. Thanks

## 2019-07-19 NOTE — TELEPHONE ENCOUNTER
Result Notes for TSH with free T4 reflex     Notes recorded by Valeria Arrington CMA on 7/19/2019 at 3:32 PM CDT  No notes recorded by provider  ------    Notes recorded by Flores Lopez APRN CNP on 7/19/2019 at 3:30 PM CDT  Please let pt know that she had a big swing in the opposite direction so her number is now on the lower side of normal. I sent in a 90 day supply of her current dosing to [facility] in [facility]. We should recheck TSH again in 2-3 months before her next fill. Thanks

## 2019-07-19 NOTE — TELEPHONE ENCOUNTER
Pt states she may have either missed or took extra meds in the past for Levothyroxine   Has been better about taking recently - will continue same dose as directed and recheck thyroid labs in 2-3 months     TO PCP:     Pt states she also needs Furosemide refilled, she confirmed she takes 40mg once a day, but per med list there is a gap as last Rx sent in 2017    Rx is pended    Ivette OLGUIN RN

## 2019-07-22 RX ORDER — FUROSEMIDE 40 MG
40 TABLET ORAL DAILY
Qty: 90 TABLET | Refills: 1 | Status: SHIPPED | OUTPATIENT
Start: 2019-07-22 | End: 2020-01-20

## 2019-12-11 DIAGNOSIS — K21.9 GASTROESOPHAGEAL REFLUX DISEASE WITHOUT ESOPHAGITIS: ICD-10-CM

## 2019-12-11 DIAGNOSIS — E03.2 HYPOTHYROIDISM DUE TO MEDICATION: ICD-10-CM

## 2019-12-11 RX ORDER — PANTOPRAZOLE SODIUM 40 MG/1
40 TABLET, DELAYED RELEASE ORAL 2 TIMES DAILY
Qty: 180 TABLET | Refills: 0 | Status: SHIPPED | OUTPATIENT
Start: 2019-12-11 | End: 2020-03-23

## 2019-12-11 RX ORDER — LEVOTHYROXINE SODIUM 150 UG/1
150 TABLET ORAL DAILY
Qty: 90 TABLET | Refills: 0 | Status: SHIPPED | OUTPATIENT
Start: 2019-12-11 | End: 2019-12-23

## 2019-12-11 NOTE — TELEPHONE ENCOUNTER
"    levothyroxine (SYNTHROID/LEVOTHROID) 150 MCG tablet 90 tablet 0 7/19/2019     Last Written Prescription Date:  07/19/2019  Last Fill Quantity: 90,  # refills: 0     pantoprazole (PROTONIX) 40 MG EC tablet 60 tablet 3 6/27/2019       Last Written Prescription Date:  06/27/2019  Last Fill Quantity: 60,  # refills: 3   Last office visit: 12/11/2018 with prescribing provider:     Future Office Visit:   Next 5 appointments (look out 90 days)    Jan 07, 2020  5:30 PM CST  PHYSICAL with [facility]res [facility]  [facility] [address] [address] [phone]           Requested Prescriptions   Pending Prescriptions Disp Refills     pantoprazole (PROTONIX) 40 MG EC tablet 60 tablet 3     Sig: Take 1 tablet (40 mg) by mouth 2 times daily Take 1.5 hours prior to last meal of the day, and take at bedtime       PPI Protocol Passed - 12/11/2019 10:02 AM        Passed - Not on Clopidogrel (unless Pantoprazole ordered)        Passed - No diagnosis of osteoporosis on record        Passed - Recent (12 mo) or future (30 days) visit within the authorizing provider's specialty     Patient has had an office visit with the authorizing provider or a provider within the authorizing providers department within the previous 12 mos or has a future within next 30 days. See \"Patient Info\" tab in inbasket, or \"Choose Columns\" in Meds & Orders section of the refill encounter.              Passed - Medication is active on med list        Passed - Patient is age 18 or older        Passed - No active pregnacy on record        Passed - No positive pregnancy test in past 12 months        levothyroxine (SYNTHROID/LEVOTHROID) 150 MCG tablet 90 tablet 0     Sig: Take 1 tablet (150 mcg) by mouth daily       Thyroid Protocol Passed - 12/11/2019 10:02 AM        Passed - Patient is 12 years or older        Passed - Recent (12 mo) or future (30 days) visit within the authorizing provider's " "specialty     Patient has had an office visit with the authorizing provider or a provider within the authorizing providers department within the previous 12 mos or has a future within next 30 days. See \"Patient Info\" tab in inbasket, or \"Choose Columns\" in Meds & Orders section of the refill encounter.              Passed - Medication is active on med list        Passed - Normal TSH on file in past 12 months     Recent Labs   Lab Test 07/19/19  0958   TSH 0.66              Passed - No active pregnancy on record     If patient is pregnant or has had a positive pregnancy test, please check TSH.          Passed - No positive pregnancy test in past 12 months     If patient is pregnant or has had a positive pregnancy test, please check TSH.            "

## 2019-12-11 NOTE — TELEPHONE ENCOUNTER
Prescription approved per [facility] Refill Protocol.  Cheri BOUCHER RN

## 2019-12-20 DIAGNOSIS — E03.2 HYPOTHYROIDISM DUE TO MEDICATION: ICD-10-CM

## 2019-12-20 NOTE — TELEPHONE ENCOUNTER
"Last Written Prescription Date:  12/11/19  Last Fill Quantity: 90,  # refills: 0   Last office visit: 12/11/2018 with prescribing provider:     Future Office Visit:   Next 5 appointments (look out 90 days)    Jan 07, 2020  5:30 PM CST  PHYSICAL with Todd Manz[facility]res MD  [facility] [address] [facility] [id number] [phone]         Requested Prescriptions   Pending Prescriptions Disp Refills     levothyroxine (SYNTHROID/LEVOTHROID) 150 MCG tablet [Pharmacy Med Name: LEVOTHYROXINE 0.150MG (150MCG) TAB] 90 tablet 0     Sig: TAKE 1 TABLET(150 MCG) BY MOUTH DAILY       Thyroid Protocol Passed - 12/20/2019  5:08 AM        Passed - Patient is 12 years or older        Passed - Recent (12 mo) or future (30 days) visit within the authorizing provider's specialty     Patient has had an office visit with the authorizing provider or a provider within the authorizing providers department within the previous 12 mos or has a future within next 30 days. See \"Patient Info\" tab in inbasket, or \"Choose Columns\" in Meds & Orders section of the refill encounter.              Passed - Medication is active on med list        Passed - Normal TSH on file in past 12 months     Recent Labs   Lab Test 07/19/19  0958   TSH 0.66              Passed - No active pregnancy on record     If patient is pregnant or has had a positive pregnancy test, please check TSH.          Passed - No positive pregnancy test in past 12 months     If patient is pregnant or has had a positive pregnancy test, please check TSH.            "

## 2019-12-23 RX ORDER — LEVOTHYROXINE SODIUM 150 UG/1
TABLET ORAL
Qty: 30 TABLET | Refills: 0 | Status: SHIPPED | OUTPATIENT
Start: 2019-12-23 | End: 2020-05-18

## 2020-01-07 ENCOUNTER — OFFICE VISIT (OUTPATIENT)
Dept: FAMILY MEDICINE | Facility: CLINIC | Age: 55
End: 2020-01-07
Payer: COMMERCIAL

## 2020-01-07 VITALS
BODY MASS INDEX: 37.89 KG/M2 | DIASTOLIC BLOOD PRESSURE: 80 MMHG | HEIGHT: 68 IN | OXYGEN SATURATION: 96 % | HEART RATE: 80 BPM | TEMPERATURE: 97.7 F | SYSTOLIC BLOOD PRESSURE: 119 MMHG | WEIGHT: 250 LBS

## 2020-01-07 DIAGNOSIS — F33.1 MAJOR DEPRESSIVE DISORDER, RECURRENT EPISODE, MODERATE (H): Primary | ICD-10-CM

## 2020-01-07 DIAGNOSIS — E03.2 HYPOTHYROIDISM DUE TO MEDICATION: ICD-10-CM

## 2020-01-07 DIAGNOSIS — R60.0 BILATERAL EDEMA OF LOWER EXTREMITY: ICD-10-CM

## 2020-01-07 DIAGNOSIS — Z23 NEED FOR PROPHYLACTIC VACCINATION AND INOCULATION AGAINST INFLUENZA: ICD-10-CM

## 2020-01-07 DIAGNOSIS — Z12.11 SPECIAL SCREENING FOR MALIGNANT NEOPLASMS, COLON: ICD-10-CM

## 2020-01-07 DIAGNOSIS — F10.21 RECOVERING ALCOHOLIC IN REMISSION (H): ICD-10-CM

## 2020-01-07 DIAGNOSIS — F17.210 CIGARETTE NICOTINE DEPENDENCE WITHOUT COMPLICATION: ICD-10-CM

## 2020-01-07 DIAGNOSIS — D50.0 IRON DEFICIENCY ANEMIA DUE TO CHRONIC BLOOD LOSS: ICD-10-CM

## 2020-01-07 DIAGNOSIS — G47.33 OSA ON CPAP: ICD-10-CM

## 2020-01-07 DIAGNOSIS — F31.81 BIPOLAR 2 DISORDER (H): ICD-10-CM

## 2020-01-07 DIAGNOSIS — F25.9 SCHIZOAFFECTIVE DISORDER, UNSPECIFIED TYPE (H): ICD-10-CM

## 2020-01-07 DIAGNOSIS — K21.9 GASTROESOPHAGEAL REFLUX DISEASE WITHOUT ESOPHAGITIS: ICD-10-CM

## 2020-01-07 DIAGNOSIS — E55.9 VITAMIN D DEFICIENCY: ICD-10-CM

## 2020-01-07 DIAGNOSIS — Z13.6 CARDIOVASCULAR SCREENING; LDL GOAL LESS THAN 130: ICD-10-CM

## 2020-01-07 DIAGNOSIS — R73.03 PREDIABETES: ICD-10-CM

## 2020-01-07 LAB
ALBUMIN UR-MCNC: NEGATIVE MG/DL
APPEARANCE UR: CLEAR
BACTERIA #/AREA URNS HPF: ABNORMAL /HPF
BILIRUB UR QL STRIP: NEGATIVE
COLOR UR AUTO: YELLOW
ERYTHROCYTE [DISTWIDTH] IN BLOOD BY AUTOMATED COUNT: 14.4 % (ref 10–15)
GLUCOSE UR STRIP-MCNC: NEGATIVE MG/DL
HBA1C MFR BLD: 5.4 % (ref 0–5.6)
HCT VFR BLD AUTO: 38.5 % (ref 35–47)
HGB BLD-MCNC: 12.2 G/DL (ref 11.7–15.7)
HGB UR QL STRIP: NEGATIVE
KETONES UR STRIP-MCNC: NEGATIVE MG/DL
LEUKOCYTE ESTERASE UR QL STRIP: ABNORMAL
MCH RBC QN AUTO: 29.3 PG (ref 26.5–33)
MCHC RBC AUTO-ENTMCNC: 31.7 G/DL (ref 31.5–36.5)
MCV RBC AUTO: 92 FL (ref 78–100)
NITRATE UR QL: NEGATIVE
PH UR STRIP: 7 PH (ref 5–7)
PLATELET # BLD AUTO: 260 10E9/L (ref 150–450)
RBC # BLD AUTO: 4.17 10E12/L (ref 3.8–5.2)
RBC #/AREA URNS AUTO: ABNORMAL /HPF
SOURCE: ABNORMAL
SP GR UR STRIP: 1.01 (ref 1–1.03)
UROBILINOGEN UR STRIP-ACNC: 0.2 EU/DL (ref 0.2–1)
WBC # BLD AUTO: 7.1 10E9/L (ref 4–11)
WBC #/AREA URNS AUTO: ABNORMAL /HPF

## 2020-01-07 PROCEDURE — 83036 HEMOGLOBIN GLYCOSYLATED A1C: CPT | Performed by: INTERNAL MEDICINE

## 2020-01-07 PROCEDURE — 82043 UR ALBUMIN QUANTITATIVE: CPT | Performed by: INTERNAL MEDICINE

## 2020-01-07 PROCEDURE — 83540 ASSAY OF IRON: CPT | Performed by: INTERNAL MEDICINE

## 2020-01-07 PROCEDURE — 83550 IRON BINDING TEST: CPT | Performed by: INTERNAL MEDICINE

## 2020-01-07 PROCEDURE — 90471 IMMUNIZATION ADMIN: CPT | Performed by: INTERNAL MEDICINE

## 2020-01-07 PROCEDURE — 80061 LIPID PANEL: CPT | Performed by: INTERNAL MEDICINE

## 2020-01-07 PROCEDURE — 85027 COMPLETE CBC AUTOMATED: CPT | Performed by: INTERNAL MEDICINE

## 2020-01-07 PROCEDURE — 82306 VITAMIN D 25 HYDROXY: CPT | Performed by: INTERNAL MEDICINE

## 2020-01-07 PROCEDURE — 81001 URINALYSIS AUTO W/SCOPE: CPT | Performed by: INTERNAL MEDICINE

## 2020-01-07 PROCEDURE — 90682 RIV4 VACC RECOMBINANT DNA IM: CPT | Performed by: INTERNAL MEDICINE

## 2020-01-07 PROCEDURE — 80053 COMPREHEN METABOLIC PANEL: CPT | Performed by: INTERNAL MEDICINE

## 2020-01-07 PROCEDURE — 99207 C FOOT EXAM  NO CHARGE: CPT | Mod: 25 | Performed by: INTERNAL MEDICINE

## 2020-01-07 PROCEDURE — 36415 COLL VENOUS BLD VENIPUNCTURE: CPT | Performed by: INTERNAL MEDICINE

## 2020-01-07 PROCEDURE — 84443 ASSAY THYROID STIM HORMONE: CPT | Performed by: INTERNAL MEDICINE

## 2020-01-07 PROCEDURE — 99396 PREV VISIT EST AGE 40-64: CPT | Mod: 25 | Performed by: INTERNAL MEDICINE

## 2020-01-07 RX ORDER — BUSPIRONE HYDROCHLORIDE 10 MG/1
10 TABLET ORAL EVERY MORNING
COMMUNITY
Start: 2020-01-06 | End: 2024-01-10

## 2020-01-07 RX ORDER — GABAPENTIN 300 MG/1
300 CAPSULE ORAL AT BEDTIME
COMMUNITY
Start: 2020-01-02 | End: 2024-01-10

## 2020-01-07 RX ORDER — ARIPIPRAZOLE 15 MG/1
TABLET ORAL
COMMUNITY
Start: 2019-12-20 | End: 2020-08-27

## 2020-01-07 RX ORDER — NICOTINE 21 MG/24HR
1 PATCH, TRANSDERMAL 24 HOURS TRANSDERMAL EVERY 24 HOURS
Qty: 28 PATCH | Refills: 1 | Status: SHIPPED | OUTPATIENT
Start: 2020-01-07 | End: 2020-08-27

## 2020-01-07 RX ORDER — NICOTINE 21 MG/24HR
1 PATCH, TRANSDERMAL 24 HOURS TRANSDERMAL EVERY 24 HOURS
Qty: 14 PATCH | Refills: 1 | Status: SHIPPED | OUTPATIENT
Start: 2020-01-07 | End: 2020-02-18

## 2020-01-07 RX ORDER — BUPROPION HYDROCHLORIDE 150 MG/1
450 TABLET ORAL EVERY MORNING
COMMUNITY
Start: 2020-01-02

## 2020-01-07 RX ORDER — ESCITALOPRAM OXALATE 10 MG/1
10 TABLET ORAL EVERY MORNING
COMMUNITY
Start: 2020-01-02

## 2020-01-07 ASSESSMENT — MIFFLIN-ST. JEOR: SCORE: 1772.49

## 2020-01-07 NOTE — LETTER
[facility]  [address] [facility]  [address]  [place]  [address]  Tel: [phone]    February 4, 2020    Gudelia Dong  [address]    [place] [address]    Neeraj Galeas are your lab reports from your recent wellness exam with comments.    The random urine albumin test is a sensitive indicator of kidney function and your test was completely normal.  Your regular urinalysis was normal as well.  Vitamin D is important for bone health and your vitamin D level remains normal at 30.  The TSH is a sensitive indicator of thyroid function and your TSH remains normal and in a very good range.    The lipid profile showed your total cholesterol is 196, better than 232 from a year ago.  The triglycerides were excellent at 91, better than 115 from a year ago and our goal is to keep them under 100.  The triglycerides are intimately related to carbohydrates in your diet.  The HDL or good cholesterol was 62 not quite as good as 69 from a year ago but anything greater than 50 is normal and the higher this value is the better off you are.  The HDL cholesterol is intimately related to regular aerobic activity.  The most important factor is the LDL or bad cholesterol which was 116, better than 140 from a year ago and anything less than 130 is adequate but anything less than 100 would be better.  The LDL cholesterol is intimately related to fats in your diet.  Glad to see the improvements from a year ago, keep up the good work.    The comprehensive metabolic panel showed that your minerals and electrolytes, kidney function and liver function tests were all normal.  Your iron stores are normal, no sign of iron deficiency.  However, your iron stores are at the lower limits of normal suggesting that you should be sure to include iron rich foods in your diet such as meat and spinach.  The CBC shows that your blood cell counts are all normal, no sign of low blood or anemia.    The hemoglobin A1c is a sensitive  indicator tracking your blood sugars and I am happy to report that your blood sugars are within the normal range, no sign of diabetes.            Congratulations on good reports, keep up the good work and if you have any questions regarding these reports please let me know.    Thanks,     Dr. Manzanares / OSVALDO WATKINS      Enclosure: Lab Results  Results for orders placed or performed in visit on 01/07/20   **A1C FUTURE anytime     Status: None   Result Value Ref Range    Hemoglobin A1C 5.4 0 - 5.6 %   Albumin Random Urine Quantitative with Creat Ratio     Status: None   Result Value Ref Range    Creatinine Urine 115 mg/dL    Albumin Urine mg/L 6 mg/L    Albumin Urine mg/g Cr 5.19 0 - 25 mg/g Cr   **CBC with platelets FUTURE anytime     Status: None   Result Value Ref Range    WBC 7.1 4.0 - 11.0 10e9/L    RBC Count 4.17 3.8 - 5.2 10e12/L    Hemoglobin 12.2 11.7 - 15.7 g/dL    Hematocrit 38.5 35.0 - 47.0 %    MCV 92 78 - 100 fl    MCH 29.3 26.5 - 33.0 pg    MCHC 31.7 31.5 - 36.5 g/dL    RDW 14.4 10.0 - 15.0 %    Platelet Count 260 150 - 450 10e9/L   **Comprehensive metabolic panel FUTURE anytime     Status: None   Result Value Ref Range    Sodium 139 133 - 144 mmol/L    Potassium 3.9 3.4 - 5.3 mmol/L    Chloride 106 94 - 109 mmol/L    Carbon Dioxide 28 20 - 32 mmol/L    Anion Gap 5 3 - 14 mmol/L    Glucose 73 70 - 99 mg/dL    Urea Nitrogen 20 7 - 30 mg/dL    Creatinine 0.76 0.52 - 1.04 mg/dL    GFR Estimate 87 >60 mL/min/[1.73_m2]    GFR Estimate If Black >90 >60 mL/min/[1.73_m2]    Calcium 9.1 8.5 - 10.1 mg/dL    Bilirubin Total 0.2 0.2 - 1.3 mg/dL    Albumin 3.6 3.4 - 5.0 g/dL    Protein Total 7.4 6.8 - 8.8 g/dL    Alkaline Phosphatase 139 40 - 150 U/L    ALT 26 0 - 50 U/L    AST 11 0 - 45 U/L   **Iron and iron binding capacity FUTURE anytime     Status: None   Result Value Ref Range    Iron 49 35 - 180 ug/dL    Iron Binding Cap 333 240 - 430 ug/dL    Iron Saturation Index 15 15 - 46 %   Lipid panel reflex to direct LDL  Fasting     Status: Abnormal   Result Value Ref Range    Cholesterol 196 <200 mg/dL    Triglycerides 91 <150 mg/dL    HDL Cholesterol 62 >49 mg/dL    LDL Cholesterol Calculated 116 (H) <100 mg/dL    Non HDL Cholesterol 134 (H) <130 mg/dL   **TSH with free T4 reflex FUTURE anytime     Status: None   Result Value Ref Range    TSH 2.95 0.40 - 4.00 mU/L   **UA reflex to Microscopic FUTURE anytime     Status: Abnormal   Result Value Ref Range    Color Urine Yellow     Appearance Urine Clear     Glucose Urine Negative NEG^Negative mg/dL    Bilirubin Urine Negative NEG^Negative    Ketones Urine Negative NEG^Negative mg/dL    Specific Gravity Urine 1.015 1.003 - 1.035    Blood Urine Negative NEG^Negative    pH Urine 7.0 5.0 - 7.0 pH    Protein Albumin Urine Negative NEG^Negative mg/dL    Urobilinogen Urine 0.2 0.2 - 1.0 EU/dL    Nitrite Urine Negative NEG^Negative    Leukocyte Esterase Urine Trace (A) NEG^Negative    Source Midstream Urine    **Vitamin D Deficiency FUTURE anytime     Status: None   Result Value Ref Range    Vitamin D Deficiency screening 30 20 - 75 ug/L   Urine Microscopic     Status: Abnormal   Result Value Ref Range    WBC Urine 0 - 5 OTO5^0 - 5 /HPF    RBC Urine O - 2 OTO2^O - 2 /HPF    Bacteria Urine Few (A) NEG^Negative /HPF

## 2020-01-07 NOTE — PROGRESS NOTES
SUBJECTIVE:   CC: Gudelia Dong is an 54 year old woman who presents for preventive health visit.     Healthy Habits:    Getting at least 3 servings of Calcium per day:  Yes    Bi-annual eye exam:  NO    Dental care twice a year:  Yes    Sleep apnea or symptoms of sleep apnea:  Sleep apnea    Diet:  Regular (no restrictions)    Frequency of exercise:  None    Duration of exercise:  N/A    Taking medications regularly:  Yes    Barriers to taking medications:  None    Medication side effects:  None    PHQ-2 Total Score:    Additional concerns today:  No  The pt presents to the clinic for a routine physical exam. The pt notes that she just started [employer]. She notes that she plans to start exercising more frequently. She notes that she gets knee pain with exertion.     The pt notes that she has a hx of bronchitis associated with smoking. She states that a few months ago she had congestion in her chest, but was relieved with a decongestant.     Patient denies any headaches, vision changes, neck pain, dyspnea, chest pain, palpitations, heartburn, acid indigestion, bowel changes, hematochezia, urinary issues, nocturia, or skin changes.    Today's PHQ-2 Score:   PHQ-2 ( 1999 [employer]) 1/7/2020   Q1: Little interest or pleasure in doing things 0   Q2: Feeling down, depressed or hopeless 0   PHQ-2 Score 0       Abuse: Current or Past(Physical, Sexual or Emotional)- No  Do you feel safe in your environment? Yes    Have you ever done Advance Care Planning? (For example, a Health Directive, POLST, or a discussion with a medical provider or your loved ones about your wishes): No, advance care planning information given to patient to review.  Patient declined advance care planning discussion at this time.    Social History     Tobacco Use     Smoking status: Current Every Day Smoker     Packs/day: 0.25     Types: Cigarettes     Smokeless tobacco: Never Used     Tobacco comment: smoke 1PPD now   Substance Use Topics      Alcohol use: No     Alcohol/week: 0.0 standard drinks     If you drink alcohol do you typically have >3 drinks per day or >7 drinks per week? No    Alcohol Use 1/7/2020   Prescreen: >3 drinks/day or >7 drinks/week? No       Reviewed orders with patient.  Reviewed health maintenance and updated orders accordingly - Yes  Patient Active Problem List   Diagnosis     ROMEO on CPAP     Tobacco abuse     Post menopausal syndrome     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)     CARDIOVASCULAR SCREENING; LDL GOAL LESS THAN 160     Major depressive disorder, recurrent episode, moderate (H)     Anemia     Tubular adenoma     Iron deficiency anemia     Hyponatremia     ADHD, predominantly inattentive type     overweight BMI>30     Periumbilical hernia     Health Care Home     Psychosis (H)     Bipolar 2 disorder (H)     Gastroesophageal reflux disease without esophagitis     Prediabetes     Elevated TSH     Hypothyroidism due to medication     Hidradenitis suppurativa of right axilla     Vitamin D deficiency     Past Surgical History:   Procedure Laterality Date     COLONOSCOPY  12/24/2013    Procedure: COLONOSCOPY;;  Surgeon: Guy Grant MD;  Location:  GI     COLONOSCOPY  1/9/2014    Procedure: COMBINED COLONOSCOPY, SINGLE BIOPSY/POLYPECTOMY BY BIOPSY;;  Surgeon: Guy Grant MD;  Location:  GI     ESOPHAGOSCOPY, GASTROSCOPY, DUODENOSCOPY (EGD), COMBINED  12/24/2013    Procedure: COMBINED ESOPHAGOSCOPY, GASTROSCOPY, DUODENOSCOPY (EGD), BIOPSY SINGLE OR MULTIPLE;  ESOPHAGOSCOPY, GASTROSCOPY, DUODENOSCOPY, COLONOSCOPY;  Surgeon: Guy Grant MD;  Location:  GI     GENITOURINARY SURGERY      urethra stretched     GYN SURGERY      d&c      LAPAROSCOPIC ASSISTED RECTOPEXY  3/14/2014    Procedure: LAPAROSCOPIC ASSISTED RECTOPEXY;  LAPAROSCOPIC  VENTRAL RECTOPEXY ;  Surgeon: Jennifer Connolly MD;  Location:  OR     ORTHOPEDIC SURGERY      surgery on clavicle,surgery for left leg fracture       Social History      Tobacco Use     Smoking status: Current Every Day Smoker     Packs/day: 0.25     Types: Cigarettes     Smokeless tobacco: Never Used     Tobacco comment: smoke 1PPD now   Substance Use Topics     Alcohol use: No     Alcohol/week: 0.0 standard drinks     Family History   Problem Relation Age of Onset     Cancer Mother         ovarian cancer     Hypertension Father      Breast Cancer No family hx of      Cancer - colorectal No family hx of          Current Outpatient Medications   Medication Sig Dispense Refill     albuterol (PROAIR HFA/PROVENTIL HFA/VENTOLIN HFA) 108 (90 Base) MCG/ACT inhaler Inhale 2 puffs into the lungs every 6 hours as needed for shortness of breath / dyspnea or wheezing 1 Inhaler 1     albuterol (VENTOLIN HFA) 108 (90 Base) MCG/ACT inhaler Inhale 2 puffs into the lungs every 4 hours as needed for shortness of breath / dyspnea or wheezing 1 Inhaler 0     ARIPiprazole (ABILIFY) 15 MG tablet        bacitracin 500 UNIT/GM external ointment APPLY EXTERNALLY TO THE AFFECTED AREA TWICE DAILY FOR 7 DAYS 28.4 g 1     bacitracin 500 UNIT/GM OINT Apply 353 g topically 2 times daily       Doxepin HCl 150 MG CAPS Take 300 mg by mouth At Bedtime (Patient taking differently: Take 200 mg by mouth At Bedtime ) 30 capsule 0     furosemide (LASIX) 40 MG tablet Take 1 tablet (40 mg) by mouth daily 90 tablet 1     levothyroxine (SYNTHROID/LEVOTHROID) 150 MCG tablet TAKE 1 TABLET(150 MCG) BY MOUTH DAILY 30 tablet 0     order for DME Equipment being ordered: CPAP  Please dispense Cpap and its supply 1 Units prn     pantoprazole (PROTONIX) 40 MG EC tablet Take 1 tablet (40 mg) by mouth 2 times daily Take 1.5 hours prior to last meal of the day, and take at bedtime 180 tablet 0     buPROPion (WELLBUTRIN XL) 150 MG 24 hr tablet        busPIRone (BUSPAR) 10 MG tablet        escitalopram (LEXAPRO) 10 MG tablet        gabapentin (NEURONTIN) 300 MG capsule        nicotine (NICOTROL) 10 MG inhaler Inhale 2 cartridge into  the lungs daily as needed for smoking cessation (Patient not taking: Reported on 1/7/2020) 6 Box 1     polyethylene glycol (MIRALAX/GLYCOLAX) packet Take 17 g by mouth 2 times daily as needed for constipation (Patient not taking: Reported on 1/7/2020) 30 packet 6     Allergies   Allergen Reactions     1-Methyl 2-Pyrrolidone      Other reaction(s): Swelling     Iodine Swelling     Other reaction(s): Angioedema  Other reaction(s): Swelling  Facial swelling.     Morphine Anxiety and Nausea and Vomiting     Other reaction(s): Behavioral Disturbances       Mammogram Screening: Patient over age 50, mutual decision to screen reflected in health maintenance.    Pertinent mammograms are reviewed under the imaging tab.  History of abnormal Pap smear: NO - age 30-65 PAP every 5 years with negative HPV co-testing recommended  PAP / HPV Latest Ref Rng & Units 2/20/2018 12/18/2015   PAP - NIL NIL   HPV 16 DNA NEG:Negative Negative -   HPV 18 DNA NEG:Negative Negative -   OTHER HR HPV NEG:Negative Negative -     Reviewed and updated as needed this visit by clinical staff  Tobacco  Allergies  Meds  Problems  Med Hx  Surg Hx  Fam Hx         Reviewed and updated as needed this visit by Provider            Review of Systems  CONSTITUTIONAL: NEGATIVE for fever, chills, change in weight  INTEGUMENTARY/SKIN: NEGATIVE for worrisome rashes, moles or lesions  EYES: NEGATIVE for vision changes or irritation  ENT: POSITIVE for nasal congestion NEGATIVE for ear, mouth and throat problems  RESP: POSITIVE for chest congestion NEGATIVE for significant cough or SOB  BREAST: NEGATIVE for masses, tenderness or discharge  CV: POSITIVE for tachycardia with exertion NEGATIVE for chest pain, palpitations or peripheral edema  GI: NEGATIVE for nausea, abdominal pain, heartburn, or change in bowel habits  : NEGATIVE for unusual urinary or vaginal symptoms. No vaginal bleeding.  MUSCULOSKELETAL: POSITIVE for knee pain, low back pain NEGATIVE for  "significant arthralgias or myalgia  NEURO: NEGATIVE for weakness, dizziness or paresthesias  PSYCHIATRIC: NEGATIVE for changes in mood or affect      This document serves as a record of the services and decisions personally performed and made by Todd Manzanares MD. It was created on his behalf by Geovanny Olsen, a trained medical scribe. The creation of this document is based on the provider's statements to the medical scribe.  Geovanny Olsen January 7, 2020 5:59 PM      OBJECTIVE:   /80 (BP Location: Left arm, Cuff Size: Adult Large)   Pulse 80   Temp 97.7  F (36.5  C) (Tympanic)   Ht 1.727 m (5' 8\")   Wt 113.4 kg (250 lb)   LMP 07/20/2006   SpO2 96%   BMI 38.01 kg/m       Physical Exam   GENERAL APPEARANCE: healthy, alert and no distress  EYES: Eyes grossly normal to inspection, PERRL and conjunctivae and sclerae normal  HENT: alae nasi are congested bilaterally, ear canals and TM's normal, nose and mouth without ulcers or lesions, oropharynx clear and oral mucous membranes moist  NECK: no adenopathy, no asymmetry, masses, or scars and thyroid normal to palpation  RESP: lungs clear to auscultation - no rales, rhonchi or wheezes  BREAST: normal without masses, tenderness or nipple discharge and no palpable axillary masses or adenopathy  CV: regular rate and rhythm, normal S1 S2, no S3 or S4, no murmur, click or rub, no peripheral edema and peripheral pulses strong  ABDOMEN: obese, probable suprapubic rxn from nickel belt, soft, nontender, no hepatosplenomegaly, no masses and bowel sounds normal  MS: no musculoskeletal defects are noted and gait is age appropriate without ataxia, DTR 2/4+ throughout   SKIN: no suspicious lesions or rashes  NEURO: Normal strength and tone, sensory exam grossly normal, mentation intact and speech normal  PSYCH: mentation appears normal and affect normal/bright    Diagnostic Test Results:  Labs reviewed in Epic  No results found for this or any previous visit (from the past 24 " hour(s)).    ASSESSMENT/PLAN:   The pt will receive a report concerning her lab results once they are made available. The pt elected to have an MA screening later this spring.     Major depressive disorder, recurrent episode, moderate (H)    - Lipid panel reflex to direct LDL Fasting    Bipolar 2 disorder (H)    - Lipid panel reflex to direct LDL Fasting    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)    - **Comprehensive metabolic panel FUTURE anytime  - Lipid panel reflex to direct LDL Fasting    Prediabetes    - **A1C FUTURE anytime  - Albumin Random Urine Quantitative with Creat Ratio  - Lipid panel reflex to direct LDL Fasting  - **UA reflex to Microscopic FUTURE anytime  - C FOOT EXAM  NO CHARGE    Iron deficiency anemia due to chronic blood loss    - **CBC with platelets FUTURE anytime  - **Iron and iron binding capacity FUTURE anytime  - Lipid panel reflex to direct LDL Fasting    Schizoaffective disorder, unspecified type (H)    - Lipid panel reflex to direct LDL Fasting    Vitamin D deficiency    - Lipid panel reflex to direct LDL Fasting  - **Vitamin D Deficiency FUTURE anytime    Bilateral edema of lower extremity    - **Comprehensive metabolic panel FUTURE anytime  - Lipid panel reflex to direct LDL Fasting    ROMEO on CPAP    - Lipid panel reflex to direct LDL Fasting    Gastroesophageal reflux disease without esophagitis    - Lipid panel reflex to direct LDL Fasting    Hypothyroidism due to medication    - Lipid panel reflex to direct LDL Fasting  - **TSH with free T4 reflex FUTURE anytime    CARDIOVASCULAR SCREENING; LDL GOAL LESS THAN 130    - Lipid panel reflex to direct LDL Fasting    Special screening for malignant neoplasms, colon  Pt elected to complete colonoscopy  - GASTROENTEROLOGY ADULT REF PROCEDURE ONLY [facility] [phone]; Dr. SHARIF Negro        COUNSELING:  Diet and exercise  Colon CA screen      Estimated body mass index is 38.01 kg/m  as calculated from the following:    Height as  "of this encounter: 1.727 m (5' 8\").    Weight as of this encounter: 113.4 kg (250 lb).    Weight management plan: Discussed healthy diet and exercise guidelines     reports that she has been smoking cigarettes. She has been smoking about 0.25 packs per day. She has never used smokeless tobacco.  Tobacco Cessation Action Plan: Nicoderm patches     Counseling Resources:  ATP IV Guidelines  Pooled Cohorts Equation Calculator  Breast Cancer Risk Calculator  FRAX Risk Assessment  ICSI Preventive Guidelines  Dietary Guidelines for Americans, 2010  USDA's MyPlate  ASA Prophylaxis  Lung CA Screening    The information in this document, created by the medical scribe for me, accurately reflects the services I personally performed and the decisions made by me. I have reviewed and approved this document for accuracy prior to leaving the patient care area.  January 7, 2020 6:26 PM    Todd Manzanares MD  [facility]  "

## 2020-01-08 LAB
ALBUMIN SERPL-MCNC: 3.6 G/DL (ref 3.4–5)
ALP SERPL-CCNC: 139 U/L (ref 40–150)
ALT SERPL W P-5'-P-CCNC: 26 U/L (ref 0–50)
ANION GAP SERPL CALCULATED.3IONS-SCNC: 5 MMOL/L (ref 3–14)
AST SERPL W P-5'-P-CCNC: 11 U/L (ref 0–45)
BILIRUB SERPL-MCNC: 0.2 MG/DL (ref 0.2–1.3)
BUN SERPL-MCNC: 20 MG/DL (ref 7–30)
CALCIUM SERPL-MCNC: 9.1 MG/DL (ref 8.5–10.1)
CHLORIDE SERPL-SCNC: 106 MMOL/L (ref 94–109)
CHOLEST SERPL-MCNC: 196 MG/DL
CO2 SERPL-SCNC: 28 MMOL/L (ref 20–32)
CREAT SERPL-MCNC: 0.76 MG/DL (ref 0.52–1.04)
CREAT UR-MCNC: 115 MG/DL
DEPRECATED CALCIDIOL+CALCIFEROL SERPL-MC: 30 UG/L (ref 20–75)
GFR SERPL CREATININE-BSD FRML MDRD: 87 ML/MIN/{1.73_M2}
GLUCOSE SERPL-MCNC: 73 MG/DL (ref 70–99)
HDLC SERPL-MCNC: 62 MG/DL
IRON SATN MFR SERPL: 15 % (ref 15–46)
IRON SERPL-MCNC: 49 UG/DL (ref 35–180)
LDLC SERPL CALC-MCNC: 116 MG/DL
MICROALBUMIN UR-MCNC: 6 MG/L
MICROALBUMIN/CREAT UR: 5.19 MG/G CR (ref 0–25)
NONHDLC SERPL-MCNC: 134 MG/DL
POTASSIUM SERPL-SCNC: 3.9 MMOL/L (ref 3.4–5.3)
PROT SERPL-MCNC: 7.4 G/DL (ref 6.8–8.8)
SODIUM SERPL-SCNC: 139 MMOL/L (ref 133–144)
TIBC SERPL-MCNC: 333 UG/DL (ref 240–430)
TRIGL SERPL-MCNC: 91 MG/DL
TSH SERPL DL<=0.005 MIU/L-ACNC: 2.95 MU/L (ref 0.4–4)

## 2020-01-19 DIAGNOSIS — R60.0 BILATERAL EDEMA OF LOWER EXTREMITY: ICD-10-CM

## 2020-01-20 RX ORDER — FUROSEMIDE 40 MG
TABLET ORAL
Qty: 90 TABLET | Refills: 3 | Status: SHIPPED | OUTPATIENT
Start: 2020-01-20 | End: 2021-01-26

## 2020-01-20 NOTE — TELEPHONE ENCOUNTER
"furosemide (LASIX) 40 MG tablet  Last Written Prescription Date:  7/22/2019  Last Fill Quantity: 90,  # refills: 1   Last office visit: 1/7/2020 with prescribing provider:  Leighton   Future Office Visit:    Requested Prescriptions   Pending Prescriptions Disp Refills     furosemide (LASIX) 40 MG tablet [Pharmacy Med Name: FUROSEMIDE 40MG TABLETS] 90 tablet 1     Sig: TAKE 1 TABLET BY MOUTH EVERY DAY       Diuretics (Including Combos) Protocol Passed - 1/19/2020  5:16 AM        Passed - Blood pressure under 140/90 in past 12 months     BP Readings from Last 3 Encounters:   01/07/20 119/80   04/27/19 131/81   12/11/18 106/73                 Passed - Recent (12 mo) or future (30 days) visit within the authorizing provider's specialty     Patient has had an office visit with the authorizing provider or a provider within the authorizing providers department within the previous 12 mos or has a future within next 30 days. See \"Patient Info\" tab in inbasket, or \"Choose Columns\" in Meds & Orders section of the refill encounter.              Passed - Medication is active on med list        Passed - Patient is age 18 or older        Passed - No active pregancy on record        Passed - Normal serum creatinine on file in past 12 months     Recent Labs   Lab Test 01/07/20  1840   CR 0.76              Passed - Normal serum potassium on file in past 12 months     Recent Labs   Lab Test 01/07/20  1840   POTASSIUM 3.9                    Passed - Normal serum sodium on file in past 12 months     Recent Labs   Lab Test 01/07/20  1840                 Passed - No positive pregnancy test in past 12 months          "

## 2020-01-23 ENCOUNTER — OFFICE VISIT (OUTPATIENT)
Dept: FAMILY MEDICINE | Facility: CLINIC | Age: 55
End: 2020-01-23
Payer: COMMERCIAL

## 2020-01-23 VITALS
RESPIRATION RATE: 18 BRPM | BODY MASS INDEX: 37.71 KG/M2 | HEART RATE: 87 BPM | DIASTOLIC BLOOD PRESSURE: 72 MMHG | WEIGHT: 248 LBS | TEMPERATURE: 98.1 F | SYSTOLIC BLOOD PRESSURE: 132 MMHG | OXYGEN SATURATION: 95 %

## 2020-01-23 DIAGNOSIS — J10.1 INFLUENZA B: ICD-10-CM

## 2020-01-23 DIAGNOSIS — R05.9 COUGH: Primary | ICD-10-CM

## 2020-01-23 DIAGNOSIS — E66.01 MORBID OBESITY (H): ICD-10-CM

## 2020-01-23 LAB
FLUAV+FLUBV AG SPEC QL: NEGATIVE
FLUAV+FLUBV AG SPEC QL: POSITIVE
SPECIMEN SOURCE: ABNORMAL

## 2020-01-23 PROCEDURE — 87804 INFLUENZA ASSAY W/OPTIC: CPT | Performed by: PREVENTIVE MEDICINE

## 2020-01-23 PROCEDURE — 99213 OFFICE O/P EST LOW 20 MIN: CPT | Performed by: PREVENTIVE MEDICINE

## 2020-01-23 RX ORDER — OSELTAMIVIR PHOSPHATE 75 MG/1
75 CAPSULE ORAL 2 TIMES DAILY
Qty: 10 CAPSULE | Refills: 0 | Status: SHIPPED | OUTPATIENT
Start: 2020-01-23 | End: 2020-01-28

## 2020-01-25 NOTE — PROGRESS NOTES
SUBJECTIVE:  Gudelia Dong is a 54 year old female who presents to the clinic today with a chief complaint of cough  for 1 day(s).  Her cough is described as persistent.    The patient's symptoms are moderate and worsening.  Associated symptoms include congestion, fever and rhinorrhea. The patient's symptoms are exacerbated by no particular triggers  Patient has been using nothing  to improve symptoms.    Past Medical History:   Diagnosis Date     Depression      GERD (gastroesophageal reflux disease)      Hemorrhoid      Menstrual disorder     s/p D&C -12/2012     ROMEO on CPAP      Other bipolar disorders     followed by Dr Collazo     Post menopausal syndrome      Tobacco abuse        Current Outpatient Medications   Medication Sig Dispense Refill     albuterol (PROAIR HFA/PROVENTIL HFA/VENTOLIN HFA) 108 (90 Base) MCG/ACT inhaler Inhale 2 puffs into the lungs every 6 hours as needed for shortness of breath / dyspnea or wheezing 1 Inhaler 1     albuterol (VENTOLIN HFA) 108 (90 Base) MCG/ACT inhaler Inhale 2 puffs into the lungs every 4 hours as needed for shortness of breath / dyspnea or wheezing 1 Inhaler 0     ARIPiprazole (ABILIFY) 15 MG tablet        bacitracin 500 UNIT/GM external ointment APPLY EXTERNALLY TO THE AFFECTED AREA TWICE DAILY FOR 7 DAYS 28.4 g 1     bacitracin 500 UNIT/GM OINT Apply 353 g topically 2 times daily       buPROPion (WELLBUTRIN XL) 150 MG 24 hr tablet        busPIRone (BUSPAR) 10 MG tablet        Doxepin HCl 150 MG CAPS Take 300 mg by mouth At Bedtime (Patient taking differently: Take 200 mg by mouth At Bedtime ) 30 capsule 0     escitalopram (LEXAPRO) 10 MG tablet        furosemide (LASIX) 40 MG tablet TAKE 1 TABLET BY MOUTH EVERY DAY 90 tablet 3     gabapentin (NEURONTIN) 300 MG capsule        levothyroxine (SYNTHROID/LEVOTHROID) 150 MCG tablet TAKE 1 TABLET(150 MCG) BY MOUTH DAILY 30 tablet 0     nicotine (NICODERM CQ) 14 MG/24HR 24 hr patch Place 1 patch onto the skin every 24 hours  14 patch 1     nicotine (NICODERM CQ) 21 MG/24HR 24 hr patch Place 1 patch onto the skin every 24 hours 28 patch 1     nicotine (NICODERM CQ) 7 MG/24HR 24 hr patch Place 1 patch onto the skin every 24 hours 14 patch 1     order for DME Equipment being ordered: CPAP  Please dispense Cpap and its supply 1 Units prn     oseltamivir (TAMIFLU) 75 MG capsule Take 1 capsule (75 mg) by mouth 2 times daily for 5 days 10 capsule 0     pantoprazole (PROTONIX) 40 MG EC tablet Take 1 tablet (40 mg) by mouth 2 times daily Take 1.5 hours prior to last meal of the day, and take at bedtime 180 tablet 0     nicotine (NICOTROL) 10 MG inhaler Inhale 2 cartridge into the lungs daily as needed for smoking cessation (Patient not taking: Reported on 1/7/2020) 6 Box 1     polyethylene glycol (MIRALAX/GLYCOLAX) packet Take 17 g by mouth 2 times daily as needed for constipation (Patient not taking: Reported on 1/7/2020) 30 packet 6       Social History     Tobacco Use     Smoking status: Current Every Day Smoker     Packs/day: 0.25     Types: Cigarettes     Smokeless tobacco: Never Used     Tobacco comment: smoke 1PPD now   Substance Use Topics     Alcohol use: No     Alcohol/week: 0.0 standard drinks       ROS  INTEGUMENTARY/SKIN: NEGATIVE for worrisome rashes, moles or lesions  EYES: NEGATIVE for vision changes or irritation  CV: NEGATIVE for chest pain, palpitations or peripheral edema  GI: NEGATIVE for nausea, abdominal pain, heartburn, or change in bowel habits  MUSCULOSKELETAL: NEGATIVE for significant arthralgias or myalgia  NEURO: NEGATIVE for weakness, dizziness or paresthesias  ENDOCRINE: NEGATIVE for temperature intolerance, skin/hair changes    OBJECTIVE:  /72 (BP Location: Right arm, Patient Position: Sitting, Cuff Size: Adult Large)   Pulse 87   Temp 98.1  F (36.7  C) (Oral)   Resp 18   Wt 112.5 kg (248 lb)   LMP 07/20/2006   SpO2 95%   BMI 37.71 kg/m    GENERAL APPEARANCE: healthy, alert and no distress  EYES:  EOMI,  PERRL, conjunctiva clear  HENT: ear canals and TM's normal.  Nose and mouth without ulcers, erythema or lesions  NECK: supple, nontender, no lymphadenopathy  RESP: lungs clear to auscultation - no rales, rhonchi or wheezes  CV: regular rates and rhythm, normal S1 S2, no murmur noted  ABDOMEN:  soft, nontender, no HSM or masses and bowel sounds normal  NEURO: Normal strength and tone, sensory exam grossly normal,  normal speech and mentation  SKIN: no suspicious lesions or rashes    Influenza B positive    ASSESSMENT:  Influenza B  Tamiflu  Tylenol  Fluids  Rest      PLAN:  See orders in Epic  Symptomatic measures encouraged, humidified air, plenty of fluids.

## 2020-02-17 DIAGNOSIS — F17.210 CIGARETTE NICOTINE DEPENDENCE WITHOUT COMPLICATION: ICD-10-CM

## 2020-02-17 NOTE — TELEPHONE ENCOUNTER
Reason for call:  Medication   If this is a refill request, has the caller requested the refill from the pharmacy already? No  Will the patient be using a [facility]? No  Name of the pharmacy and phone number for the current request: [facility]    Name of the medication requested: Nicotine patch 14mg 2 more boxes.    Other request:     Phone number to reach patient:  Cell number on file:    Telephone Information:   Mobile [phone]       Best Time:  anytime    Can we leave a detailed message on this number?  YES    Travel screening: Negative

## 2020-02-17 NOTE — TELEPHONE ENCOUNTER
"nicotine (NICODERM CQ) 14 MG/24HR 24 hr patch    Last Written Prescription Date:  01/07/2020  Last Fill Quantity: 14,  # refills: 1   Last office visit: 1/23/2020 with prescribing provider:  Lyndon   Future Office Visit:  02/23/2020    Requested Prescriptions   Pending Prescriptions Disp Refills     nicotine (NICODERM CQ) 14 MG/24HR 24 hr patch 14 patch 1     Sig: Place 1 patch onto the skin every 24 hours       Partial Cholinergic Nicotinic Agonist Agents Passed - 2/17/2020  1:59 PM        Passed - Blood pressure under 140/90 in past 12 months     BP Readings from Last 3 Encounters:   01/23/20 132/72   01/07/20 119/80   04/27/19 131/81                 Passed - Recent (12 mo) or future (30 days) visit within the authorizing provider's specialty     Patient has had an office visit with the authorizing provider or a provider within the authorizing providers department within the previous 12 mos or has a future within next 30 days. See \"Patient Info\" tab in inbasket, or \"Choose Columns\" in Meds & Orders section of the refill encounter.              Passed - Medication is active on med list        Passed - Patient is 18 years of age or older        Passed - Patient is not pregnant        Passed - No positive pregnancy test on file in past 12 months          "

## 2020-02-18 RX ORDER — NICOTINE 21 MG/24HR
1 PATCH, TRANSDERMAL 24 HOURS TRANSDERMAL EVERY 24 HOURS
Qty: 14 PATCH | Refills: 1 | Status: SHIPPED | OUTPATIENT
Start: 2020-02-18 | End: 2020-08-27

## 2020-03-21 DIAGNOSIS — K21.9 GASTROESOPHAGEAL REFLUX DISEASE WITHOUT ESOPHAGITIS: ICD-10-CM

## 2020-03-21 NOTE — TELEPHONE ENCOUNTER
Refill request:    Protonix 40 mg.    Summary: Take 1 tablet (40 mg) by mouth 2 times daily Take 1.5 hours prior to last meal of the day, and take at bedtime, Disp-180 tablet,

## 2020-03-23 RX ORDER — PANTOPRAZOLE SODIUM 40 MG/1
40 TABLET, DELAYED RELEASE ORAL 2 TIMES DAILY
Qty: 180 TABLET | Refills: 0 | Status: SHIPPED | OUTPATIENT
Start: 2020-03-23 | End: 2020-06-09

## 2020-03-23 NOTE — TELEPHONE ENCOUNTER
"   pantoprazole (PROTONIX) 40 MG EC tablet  180 tablet  0  12/11/2019          Last Written Prescription Date:  12/11/2019  Last Fill Quantity: 180,  # refills: 0   Last office visit: 1/23/2020 with prescribing provider:     Future Office Visit:  Unknown      Requested Prescriptions   Pending Prescriptions Disp Refills     pantoprazole (PROTONIX) 40 MG EC tablet 180 tablet 0     Sig: Take 1 tablet (40 mg) by mouth 2 times daily Take 1.5 hours prior to last meal of the day, and take at bedtime       PPI Protocol Passed - 3/21/2020 11:24 AM        Passed - Not on Clopidogrel (unless Pantoprazole ordered)        Passed - No diagnosis of osteoporosis on record        Passed - Recent (12 mo) or future (30 days) visit within the authorizing provider's specialty     Patient has had an office visit with the authorizing provider or a provider within the authorizing providers department within the previous 12 mos or has a future within next 30 days. See \"Patient Info\" tab in inbasket, or \"Choose Columns\" in Meds & Orders section of the refill encounter.              Passed - Medication is active on med list        Passed - Patient is age 18 or older        Passed - No active pregnacy on record        Passed - No positive pregnancy test in past 12 months             "

## 2020-05-15 DIAGNOSIS — E03.2 HYPOTHYROIDISM DUE TO MEDICATION: ICD-10-CM

## 2020-05-18 RX ORDER — LEVOTHYROXINE SODIUM 150 UG/1
TABLET ORAL
Qty: 90 TABLET | Refills: 2 | Status: SHIPPED | OUTPATIENT
Start: 2020-05-18 | End: 2021-02-22

## 2020-06-17 ENCOUNTER — TELEPHONE (OUTPATIENT)
Dept: FAMILY MEDICINE | Facility: CLINIC | Age: 55
End: 2020-06-17

## 2020-06-17 NOTE — TELEPHONE ENCOUNTER
Panel Management Review      Patient has the following on her problem list:     Depression / Dysthymia review    Measure:  Needs PHQ-9 score of 4 or less during index window.  Administer PHQ-9 and if score is 5 or more, send encounter to provider for next steps.    5 - 7 month window range: 6    PHQ-9 SCORE 8/14/2017 2/20/2018 12/11/2018   PHQ-9 Total Score - - -   PHQ-9 Total Score 6 9 6       If PHQ-9 recheck is 5 or more, route to provider for next steps.    Patient is due for:  None      Composite cancer screening  Chart review shows that this patient is due/due soon for the following Mammogram and Colonoscopy  Summary:    Patient is due/failing the following:   COLONOSCOPY and MAMMOGRAM    Action needed:       Type of outreach:    Sent letter.    Questions for provider review:                                                                                                                                        Renetta Dixon MA       Chart routed to  .

## 2020-06-17 NOTE — LETTER
[place] [id number] [facility] [address]  [phone]        June 17, 2020  Gudelia Dong  [address]    [place] [address]    Dear Gudelia,    I care about your health and have reviewed your health plan. I have reviewed your medical conditions, medication list, and lab results and am making recommendations based on this review, to better manage your health.    You are in particular need of attention regarding:  -Breast Cancer Screening  -Colon Cancer Screening    I am recommending that you:  -schedule a MAMMOGRAM which is due. We have a mobile mammogram unit that comes to the [facility] every Tuesday from 8:00am to 4:45pm. To schedule just call the clinic at [phone]. Or, you can call [facility] at [phone] to schedule this.  Please disregard this reminder if you have had this exam elsewhere within the last year.  It would be helpful for us to have a copy of your mammogram report in our file so that we can best coordinate your care.  -schedule a COLONOSCOPY to look for colon cancer (due every 10 years or 5 years in higher risk situations.)   Colon cancer is now the second leading cause of death in the [place] for both men and women and there are over 130,000 new cases and 50,000 deaths per year from colon cancer.  Colonoscopies can prevent 90-95% of these deaths.  Problem lesions can be removed before they ever become cancer.  This test is not only looking for cancer, but also getting rid of precancerious lesions.  If you do not wish to do a colonoscopy or cannot afford to do one, at this time, there is another option. It is called a FIT test or Fecal Immunochemical Occult Blood Test (take home stool sample kit).  It does not replace the colonoscopy for colorectal cancer screening, but it can detect hidden bleeding in the lower colon.  It does need to be repeated every year and if a positive result is obtained, you  would be referred for a colonoscopy.  If you have completed either one of these tests at another facility, please have the records sent to our clinic so that we can best coordinate your care.    Here is a list of Health Maintenance topics that are due now or due soon:  Health Maintenance Due   Topic Date Due     HIV SCREENING  02/22/1978     ZOSTER IMMUNIZATION (1 of 2) 02/22/2013     PNEUMOCOCCAL IMMUNIZATION 19-64 MEDIUM RISK (1 of 1 - PPSV23) 01/04/2018     COLORECTAL CANCER SCREENING  01/09/2019     MAMMO SCREENING  03/02/2020       Please call us at [phone] (or use [employer]) to address the above recommendations.     Thank you for trusting [facility] and we appreciate the opportunity to serve you.  We look forward to supporting your healthcare needs in the future.    Healthy Regards,    Todd Manzanares MD    jeny

## 2020-07-28 ENCOUNTER — TRANSFERRED RECORDS (OUTPATIENT)
Dept: HEALTH INFORMATION MANAGEMENT | Facility: CLINIC | Age: 55
End: 2020-07-28

## 2020-08-11 ENCOUNTER — TELEPHONE (OUTPATIENT)
Dept: FAMILY MEDICINE | Facility: CLINIC | Age: 55
End: 2020-08-11

## 2020-08-11 NOTE — TELEPHONE ENCOUNTER
pantoprazole (PROTONIX) 40 MG EC tablet  180 tablet  1  6/9/2020   No    Sig: TAKE ONE TABLET BY MOUTH TWICE DAILY(TK 1.5 HOURS PRIOR TO LAST MEAL AND AT BEDTIME)        Fax from pharmacy    Patient's insurance has daily max of 1 tablet daily    Change medication or start PA?    LOV 1-7-20 Leighton (patient is due for follow up)    RT Marlen (R)

## 2020-08-17 NOTE — TELEPHONE ENCOUNTER
Central Prior Authorization Team   Phone: [phone]      PA Initiation    Medication: Pantoprazole 40 mg-Initiated  Insurance Company: [employer] - Phone [phone] Fax [phone]  Pharmacy Filling the Rx: [id number] - [place], [place] - [address] [place] [address] AT [facility] & [address]  Filling Pharmacy Phone: [phone]  Filling Pharmacy Fax:    Start Date: 8/17/2020

## 2020-08-17 NOTE — TELEPHONE ENCOUNTER
Prior Authorization Retail Medication Request    Medication/Dose: Pantoprazole 40 mg  ICD code (if different than what is on RX):  K21.9  Previously Tried and Failed:  Ranitidine, Omeprazole  Rationale:  Patient stable on BID dosing of medication since 2014    Insurance Name:  [employer]  Insurance ID:  [id number]      Pharmacy Information (if different than what is on RX)  Name:  [id number]  Phone:  [phone]

## 2020-08-17 NOTE — TELEPHONE ENCOUNTER
Prior Authorization Approval    Authorization Effective Date: 8/17/2020  Authorization Expiration Date: 8/17/2021  Medication: Pantoprazole 40 mg-APPROVED  Approved Dose/Quantity:   Reference #:     Insurance Company: [employer] - Phone [phone] Fax [phone]  Expected CoPay:       CoPay Card Available:      Foundation Assistance Needed:    Which Pharmacy is filling the prescription (Not needed for infusion/clinic administered): [id number] - [place], [place] - [address] [place] [address] AT [facility] [address]  Pharmacy Notified: Yes  Patient Notified: No    Pharmacy will notify patient when medication is ready.

## 2020-08-27 ENCOUNTER — OFFICE VISIT (OUTPATIENT)
Dept: FAMILY MEDICINE | Facility: CLINIC | Age: 55
End: 2020-08-27
Payer: COMMERCIAL

## 2020-08-27 VITALS
HEIGHT: 68 IN | TEMPERATURE: 97.8 F | OXYGEN SATURATION: 95 % | WEIGHT: 271 LBS | DIASTOLIC BLOOD PRESSURE: 73 MMHG | HEART RATE: 78 BPM | BODY MASS INDEX: 41.07 KG/M2 | SYSTOLIC BLOOD PRESSURE: 118 MMHG

## 2020-08-27 DIAGNOSIS — E03.2 HYPOTHYROIDISM DUE TO MEDICATION: ICD-10-CM

## 2020-08-27 DIAGNOSIS — E03.4 HYPOTHYROIDISM DUE TO ACQUIRED ATROPHY OF THYROID: ICD-10-CM

## 2020-08-27 DIAGNOSIS — D50.0 IRON DEFICIENCY ANEMIA DUE TO CHRONIC BLOOD LOSS: ICD-10-CM

## 2020-08-27 DIAGNOSIS — E66.01 MORBID OBESITY (H): Primary | ICD-10-CM

## 2020-08-27 DIAGNOSIS — F31.81 BIPOLAR 2 DISORDER (H): ICD-10-CM

## 2020-08-27 DIAGNOSIS — L30.8 OTHER ECZEMA: ICD-10-CM

## 2020-08-27 DIAGNOSIS — K21.9 GASTROESOPHAGEAL REFLUX DISEASE WITHOUT ESOPHAGITIS: ICD-10-CM

## 2020-08-27 LAB
ERYTHROCYTE [DISTWIDTH] IN BLOOD BY AUTOMATED COUNT: 14.6 % (ref 10–15)
HCT VFR BLD AUTO: 35.8 % (ref 35–47)
HGB BLD-MCNC: 11.5 G/DL (ref 11.7–15.7)
MCH RBC QN AUTO: 28 PG (ref 26.5–33)
MCHC RBC AUTO-ENTMCNC: 32.1 G/DL (ref 31.5–36.5)
MCV RBC AUTO: 87 FL (ref 78–100)
PLATELET # BLD AUTO: 266 10E9/L (ref 150–450)
RBC # BLD AUTO: 4.11 10E12/L (ref 3.8–5.2)
WBC # BLD AUTO: 6.5 10E9/L (ref 4–11)

## 2020-08-27 PROCEDURE — 99213 OFFICE O/P EST LOW 20 MIN: CPT | Performed by: INTERNAL MEDICINE

## 2020-08-27 PROCEDURE — 84443 ASSAY THYROID STIM HORMONE: CPT | Performed by: INTERNAL MEDICINE

## 2020-08-27 PROCEDURE — 85027 COMPLETE CBC AUTOMATED: CPT | Performed by: INTERNAL MEDICINE

## 2020-08-27 PROCEDURE — 36415 COLL VENOUS BLD VENIPUNCTURE: CPT | Performed by: INTERNAL MEDICINE

## 2020-08-27 RX ORDER — TRIAMCINOLONE ACETONIDE 1 MG/G
CREAM TOPICAL 2 TIMES DAILY
Qty: 30 G | Refills: 3 | Status: SHIPPED | OUTPATIENT
Start: 2020-08-27 | End: 2022-11-08

## 2020-08-27 RX ORDER — BREXPIPRAZOLE 1 MG/1
TABLET ORAL
COMMUNITY
Start: 2020-08-11 | End: 2023-03-28

## 2020-08-27 RX ORDER — AMMONIUM LACTATE 12 G/100G
CREAM TOPICAL 2 TIMES DAILY
Qty: 385 G | Refills: 1 | Status: SHIPPED | OUTPATIENT
Start: 2020-08-27 | End: 2023-03-28

## 2020-08-27 RX ORDER — ZALEPLON 10 MG/1
CAPSULE ORAL
COMMUNITY
Start: 2020-08-08 | End: 2023-03-28

## 2020-08-27 ASSESSMENT — MIFFLIN-ST. JEOR: SCORE: 1862.75

## 2020-08-27 NOTE — PROGRESS NOTES
"Subjective     Gudelia Dong is a 55 year old female who presents to clinic today for the following health issues:    HPI       Hypothyroidism Follow-up      Since last visit, patient describes the following symptoms: weight gain of 20 lbs, dry skin and constipation        Review of Systems   Constitutional, HEENT, cardiovascular, pulmonary, gi and gu systems are negative, except as otherwise noted.      Objective    /73 (BP Location: Left arm, Patient Position: Sitting)   Pulse 78   Temp 97.8  F (36.6  C) (Tympanic)   Ht 1.727 m (5' 8\")   Wt 122.9 kg (271 lb)   LMP 07/20/2006   SpO2 95%   BMI 41.21 kg/m    Body mass index is 41.21 kg/m .  Physical Exam   GENERAL: healthy, alert and no distress  NECK: no adenopathy, no asymmetry, masses, or scars and thyroid normal to palpation  RESP: lungs clear to auscultation - no rales, rhonchi or wheezes  CV: regular rate and rhythm, normal S1 S2, no S3 or S4, no murmur, click or rub, no peripheral edema and peripheral pulses strong  ABDOMEN: soft, nontender, no hepatosplenomegaly, no masses and bowel sounds normal  MS: no gross musculoskeletal defects noted, no edema            Assessment & Plan     Morbid obesity (H)      Gastroesophageal reflux disease without esophagitis      Hypothyroidism due to medication      Bipolar 2 disorder (H)      Iron deficiency anemia due to chronic blood loss    - CBC with platelets    Other eczema    - ammonium lactate (AMLACTIN) 12 % external cream; Apply topically 2 times daily Apply daily to skin  - triamcinolone (KENALOG) 0.1 % external cream; Apply topically 2 times daily    Hypothyroidism due to acquired atrophy of thyroid    - TSH with free T4 reflex     BMI:   Estimated body mass index is 41.21 kg/m  as calculated from the following:    Height as of this encounter: 1.727 m (5' 8\").    Weight as of this encounter: 122.9 kg (271 lb).           FUTURE APPOINTMENTS:       - Follow-up visit in 2 mo    No follow-ups on " file.    Todd Manzanares MD  [facility]

## 2020-08-27 NOTE — LETTER
September 1, 2020      Gudelia Dong  [address]    [place] [address]        Gudelia,     Neeraj your lab reports from your recent office exam.   The TSH is a sensitive indicator of thyroid function and your thyroid function is in an ideal range.     The CBC is essentially within normal limits.  With hemoglobin is at the lower limits of normal.     I hope you got started on your new improved diet and activity plan.  I recommend rechecking your hemoglobin in 1 to 2 months.  Please schedule an appointment to see me at that time.  If you have any questions regarding these reports please let me know.     Thanks, GB     Resulted Orders   TSH with free T4 reflex   Result Value Ref Range    TSH 0.77 0.40 - 4.00 mU/L   CBC with platelets   Result Value Ref Range    WBC 6.5 4.0 - 11.0 10e9/L    RBC Count 4.11 3.8 - 5.2 10e12/L    Hemoglobin 11.5 (L) 11.7 - 15.7 g/dL    Hematocrit 35.8 35.0 - 47.0 %    MCV 87 78 - 100 fl    MCH 28.0 26.5 - 33.0 pg    MCHC 32.1 31.5 - 36.5 g/dL    RDW 14.6 10.0 - 15.0 %    Platelet Count 266 150 - 450 10e9/L

## 2020-08-28 LAB — TSH SERPL DL<=0.005 MIU/L-ACNC: 0.77 MU/L (ref 0.4–4)

## 2020-09-29 ENCOUNTER — TELEPHONE (OUTPATIENT)
Dept: FAMILY MEDICINE | Facility: CLINIC | Age: 55
End: 2020-09-29
Payer: COMMERCIAL

## 2020-09-29 NOTE — TELEPHONE ENCOUNTER
Tried calling patient: to follow up on symptoms - last visit was 8/27, any new/worsening symptoms? Phone would not ring, just static sound. Will anju recall.     PCP: per below pt thought she was to be getting a diuretic following last visit? No notes in OV or labs results this - please advise.     Ivette OLGUIN RN

## 2020-09-29 NOTE — TELEPHONE ENCOUNTER
Reason for Call:  Medication or medication refill:    Do you use a [facility]?  Name of the pharmacy and phone number for the current request:       [id number] - [place], [place] - [facility] [address] AT [facility] [address]      Name of the medication requested:   Diuretic for swelling    Other request: Swelling was mentioned at her visit on 8/27 has not improved, says Dr Manzanares was going to call in a med for her, but she hasn't seen it yet and she is getting uncomfortable    Can we leave a detailed message on this number? YES    Phone number patient can be reached at: Home number on file [phone] (home)    Best Time: any    Call taken on 9/29/2020 at 10:34 AM by Heather Stephen

## 2020-12-07 DIAGNOSIS — K21.9 GASTROESOPHAGEAL REFLUX DISEASE WITHOUT ESOPHAGITIS: ICD-10-CM

## 2020-12-07 NOTE — TELEPHONE ENCOUNTER
Reason for Call:  Medication or medication refill:    Do you use a [facility]?  Name of the pharmacy and phone number for the current request:     [facility] #[id number] - [place], [place] - [address] [place] [address] [address] AT [facility] [address]    Name of the medication requested: pantoprazole (PROTONIX) 40 MG EC tablet     Other request: Pt works overnights and is hoping for a refill today    Can we leave a detailed message on this number? YES    Phone number patient can be reached at: Cell number on file:    Telephone Information:   Mobile [phone]     Best Time: any    Call taken on 12/7/2020 at 8:41 AM by Aishwarya Newby

## 2020-12-08 RX ORDER — PANTOPRAZOLE SODIUM 40 MG/1
TABLET, DELAYED RELEASE ORAL
Qty: 180 TABLET | Refills: 1 | Status: SHIPPED | OUTPATIENT
Start: 2020-12-08 | End: 2020-12-14

## 2020-12-11 DIAGNOSIS — K21.9 GASTROESOPHAGEAL REFLUX DISEASE WITHOUT ESOPHAGITIS: ICD-10-CM

## 2020-12-11 NOTE — TELEPHONE ENCOUNTER
Reason for call:  Medication   If this is a refill request, has the caller requested the refill from the pharmacy already? Yes  Will the patient be using a [facility]? No  Name of the pharmacy and phone number for the current request: [facility] on [address].    Name of the medication requested: Pantipropozole    Other request: Patient stated a request was made on Monday but she still isn't able to  the medication. She is out of the medication and not feeling well as a result. She is hoping to refill as soon as possible today and/or receive a call about the status of this medication. Please advise.     Phone number to reach patient:  Home number on file [phone] (home)    Best Time:  Asap    Can we leave a detailed message on this number?  YES    Travel screening: Not Applicable

## 2020-12-14 RX ORDER — PANTOPRAZOLE SODIUM 40 MG/1
TABLET, DELAYED RELEASE ORAL
Qty: 180 TABLET | Refills: 1 | Status: SHIPPED | OUTPATIENT
Start: 2020-12-14 | End: 2021-12-21

## 2021-01-21 ENCOUNTER — TELEPHONE (OUTPATIENT)
Dept: FAMILY MEDICINE | Facility: CLINIC | Age: 56
End: 2021-01-21

## 2021-01-21 NOTE — TELEPHONE ENCOUNTER
Panel Management Review      Patient has the following on her problem list:     Depression / Dysthymia review    Measure:  Needs PHQ-9 score of 4 or less during index window.  Administer PHQ-9 and if score is 5 or more, send encounter to provider for next steps.    5 - 7 month window range:     PHQ-9 SCORE 8/14/2017 2/20/2018 12/11/2018   PHQ-9 Total Score - - -   PHQ-9 Total Score 6 9 6       If PHQ-9 recheck is 5 or more, route to provider for next steps.    Patient is due for:  None      Composite cancer screening  Chart review shows that this patient is due/due soon for the following Mammogram and Colonoscopy  Summary:    Patient is due/failing the following:   COLONOSCOPY and MAMMOGRAM    Action needed:   To schedule Mammo and colonoscopy    Type of outreach:    Sent letter.    Questions for provider review:    None                                                                                                                                    Renetta Dixon MA       Chart routed to  .    Renetta Dixon MA

## 2021-01-21 NOTE — LETTER
[facility]  [address] [facility] [address]  [phone]        January 21, 2021  Gudelia Dong  [address]    [place] [address]    Dear Gudelia,    I care about your health and have reviewed your health plan. I have reviewed your medical conditions, medication list, and lab results and am making recommendations based on this review, to better manage your health.    You are in particular need of attention regarding:  -Breast Cancer Screening  -Colon Cancer Screening    I am recommending that you:  -schedule a MAMMOGRAM which is due. We have a mobile mammogram unit that comes to the [facility] every Tuesday from 8:00am to 4:45pm. To schedule just call the clinic at [phone]. Or, you can call [facility] at [phone] to schedule this.  Please disregard this reminder if you have had this exam elsewhere within the last year.  It would be helpful for us to have a copy of your mammogram report in our file so that we can best coordinate your care.  -schedule a COLONOSCOPY to look for colon cancer (due every 10 years or 5 years in higher risk situations.)   Colon cancer is now the second leading cause of death in the [place] for both men and women and there are over 130,000 new cases and 50,000 deaths per year from colon cancer.  Colonoscopies can prevent 90-95% of these deaths.  Problem lesions can be removed before they ever become cancer.  This test is not only looking for cancer, but also getting rid of precancerious lesions.  If you do not wish to do a colonoscopy or cannot afford to do one, at this time, there is another option. It is called a FIT test or Fecal Immunochemical Occult Blood Test (take home stool sample kit).  It does not replace the colonoscopy for colorectal cancer screening, but it can detect hidden bleeding in the lower colon.  It does need to be repeated every year and if a positive result is  obtained, you would be referred for a colonoscopy.  If you have completed either one of these tests at another facility, please have the records sent to our clinic so that we can best coordinate your care.    Here is a list of Health Maintenance topics that are due now or due soon:  Health Maintenance Due   Topic Date Due     HIV SCREENING  02/22/1978     ZOSTER IMMUNIZATION (1 of 2) 02/22/2013     Pneumococcal Vaccine: Pediatrics (0 to 5 Years) and At-Risk Patients (6 to 64 Years) (1 of 1 - PPSV23) 01/04/2018     COLORECTAL CANCER SCREENING  01/09/2019     MAMMO SCREENING  03/02/2020     INFLUENZA VACCINE (1) 09/01/2020     PREVENTIVE CARE VISIT  01/07/2021     HPV TEST  02/20/2021     PAP  02/20/2021       Please call us at [phone] (or use [employer] [place]) to address the above recommendation, and also to schedule an appointment to establish care with a new provider as Dr. Manzanares is retiring at the end of January, 2021.    Thank you for trusting [facility] and we appreciate the opportunity to serve you.  We look forward to supporting your healthcare needs in the future.    Healthy Regards,    Todd Manzanares MD / jeny

## 2021-01-26 DIAGNOSIS — R60.0 BILATERAL EDEMA OF LOWER EXTREMITY: ICD-10-CM

## 2021-01-26 NOTE — TELEPHONE ENCOUNTER
Furosemide 40 mg tablets    Summary: TAKE 1 TABLET BY MOUTH EVERY DAY, Disp-90 tablet, R-3, E-Prescribe   Start: 1/20/2020  Ord/Sold: 1/20/2020

## 2021-01-27 RX ORDER — FUROSEMIDE 40 MG
40 TABLET ORAL DAILY
Qty: 30 TABLET | Refills: 0 | Status: SHIPPED | OUTPATIENT
Start: 2021-01-27 | End: 2021-03-03

## 2021-02-22 DIAGNOSIS — E03.2 HYPOTHYROIDISM DUE TO MEDICATION: ICD-10-CM

## 2021-02-23 NOTE — TELEPHONE ENCOUNTER
Levothyroxine 150 mcg tablet    Summary: TAKE ONE TABLET DAILY, Disp-90 tablet,R-2, E-Prescribe   Start: 5/18/2020  Ord/Sold: 5/18/2020

## 2021-02-24 RX ORDER — LEVOTHYROXINE SODIUM 150 UG/1
150 TABLET ORAL DAILY
Qty: 90 TABLET | Refills: 1 | Status: SHIPPED | OUTPATIENT
Start: 2021-02-24 | End: 2021-04-15

## 2021-04-15 ENCOUNTER — OFFICE VISIT (OUTPATIENT)
Dept: FAMILY MEDICINE | Facility: CLINIC | Age: 56
End: 2021-04-15
Payer: COMMERCIAL

## 2021-04-15 VITALS
OXYGEN SATURATION: 94 % | TEMPERATURE: 96.8 F | HEART RATE: 76 BPM | HEIGHT: 68 IN | BODY MASS INDEX: 42.59 KG/M2 | DIASTOLIC BLOOD PRESSURE: 74 MMHG | SYSTOLIC BLOOD PRESSURE: 130 MMHG | WEIGHT: 281 LBS

## 2021-04-15 DIAGNOSIS — R60.0 BILATERAL EDEMA OF LOWER EXTREMITY: ICD-10-CM

## 2021-04-15 DIAGNOSIS — D64.9 ANEMIA, UNSPECIFIED TYPE: Primary | ICD-10-CM

## 2021-04-15 DIAGNOSIS — E03.2 HYPOTHYROIDISM DUE TO MEDICATION: ICD-10-CM

## 2021-04-15 LAB
BASOPHILS # BLD AUTO: 0 10E9/L (ref 0–0.2)
BASOPHILS NFR BLD AUTO: 0.2 %
DIFFERENTIAL METHOD BLD: ABNORMAL
EOSINOPHIL # BLD AUTO: 0.2 10E9/L (ref 0–0.7)
EOSINOPHIL NFR BLD AUTO: 2.8 %
ERYTHROCYTE [DISTWIDTH] IN BLOOD BY AUTOMATED COUNT: 15.1 % (ref 10–15)
HCT VFR BLD AUTO: 36.8 % (ref 35–47)
HGB BLD-MCNC: 11.7 G/DL (ref 11.7–15.7)
LYMPHOCYTES # BLD AUTO: 1.7 10E9/L (ref 0.8–5.3)
LYMPHOCYTES NFR BLD AUTO: 26.5 %
MCH RBC QN AUTO: 27.6 PG (ref 26.5–33)
MCHC RBC AUTO-ENTMCNC: 31.8 G/DL (ref 31.5–36.5)
MCV RBC AUTO: 87 FL (ref 78–100)
MONOCYTES # BLD AUTO: 0.5 10E9/L (ref 0–1.3)
MONOCYTES NFR BLD AUTO: 7.7 %
NEUTROPHILS # BLD AUTO: 4.1 10E9/L (ref 1.6–8.3)
NEUTROPHILS NFR BLD AUTO: 62.8 %
PLATELET # BLD AUTO: 296 10E9/L (ref 150–450)
RBC # BLD AUTO: 4.24 10E12/L (ref 3.8–5.2)
WBC # BLD AUTO: 6.5 10E9/L (ref 4–11)

## 2021-04-15 PROCEDURE — 83540 ASSAY OF IRON: CPT | Performed by: INTERNAL MEDICINE

## 2021-04-15 PROCEDURE — 36415 COLL VENOUS BLD VENIPUNCTURE: CPT | Performed by: INTERNAL MEDICINE

## 2021-04-15 PROCEDURE — 83550 IRON BINDING TEST: CPT | Performed by: INTERNAL MEDICINE

## 2021-04-15 PROCEDURE — 99396 PREV VISIT EST AGE 40-64: CPT | Performed by: INTERNAL MEDICINE

## 2021-04-15 PROCEDURE — 99214 OFFICE O/P EST MOD 30 MIN: CPT | Mod: 25 | Performed by: INTERNAL MEDICINE

## 2021-04-15 PROCEDURE — 80050 GENERAL HEALTH PANEL: CPT | Performed by: INTERNAL MEDICINE

## 2021-04-15 PROCEDURE — 82465 ASSAY BLD/SERUM CHOLESTEROL: CPT | Performed by: INTERNAL MEDICINE

## 2021-04-15 RX ORDER — FUROSEMIDE 40 MG
40 TABLET ORAL DAILY
Qty: 90 TABLET | Refills: 3 | Status: SHIPPED | OUTPATIENT
Start: 2021-04-15 | End: 2022-04-19

## 2021-04-15 RX ORDER — LEVOTHYROXINE SODIUM 150 UG/1
150 TABLET ORAL DAILY
Qty: 90 TABLET | Refills: 1 | Status: SHIPPED | OUTPATIENT
Start: 2021-04-15 | End: 2022-02-17

## 2021-04-15 ASSESSMENT — MIFFLIN-ST. JEOR: SCORE: 1898.62

## 2021-04-15 NOTE — PATIENT INSTRUCTIONS
Mrs. Dong;  It was very nice to meet you.  Your actually doing well despite it all.  You should congratulate yourself for a very good job.    I am somewhat concerned about your recent issue of anemia.  I will send you for a colonoscopy and an upper GI evaluation.  I do believe you have a bit of a hiatal hernia based on the examination today.  The best treatment for a hiatal hernia is to eat more frequent and smaller meals to reduce pressure in the stomach.    I have sent for a support hose to help reduce your lower extremity edema.    We will obtain some labs today to follow-up on your anemia.  I will evaluate your iron level, cholesterol level, thyroid, and kidney and liver function.    Best regards  Denny

## 2021-04-15 NOTE — PROGRESS NOTES
SUBJECTIVE:   CC: Gudelia Dong is an 56 year old woman who presents for preventive health visit.  Overall doing relatively well.  She occasionally does notice lower extremity edema.  This can relate to diet.  Patient also has a history of hypothyroidism, obesity, and anemia.    She is a recovering alcoholic in remission.  She is doing very well from the standpoint.    She has sleep apnea and currently is on CPAP.  She uses it on a regular basis.    She has a bit of COPD and has been utilizing albuterol as needed.    For her chronic lower extremity edema Lasix has been prescribed*3.  She has had no problems with potassium.    For hypothyroidism she maintains on 100 mcg of levothyroxine.  Energy levels are reasonable level.      Patient has been advised of split billing requirements and indicates understanding: Yes  Healthy Habits:     Getting at least 3 servings of Calcium per day:: Unknown     Bi-annual eye exam:  NO    Dental care twice a year:  Yes    Sleep apnea or symptoms of sleep apnea:  Sleep apnea    Diet:  Regular (no restrictions)    Frequency of exercise:  None    Duration of exercise:  N/A    Taking medications regularly:  Yes    Barriers to taking medications:  None    Medication side effects:  None    PHQ-2 Total Score: 0    Additional concerns today:  No      Today's PHQ-2 Score:   PHQ-2 (  [employer]) 4/15/2021   Q1: Little interest or pleasure in doing things 0   Q2: Feeling down, depressed or hopeless 0   PHQ-2 Score 0       Abuse: Current or Past (Physical, Sexual or Emotional) - No  Do you feel safe in your environment? Yes      Social History     Tobacco Use     Smoking status: Former Smoker     Packs/day: 0.25     Types: Cigarettes     Quit date: 2/3/2020     Years since quittin.1     Smokeless tobacco: Never Used     Tobacco comment: smoke 1PPD now   Substance Use Topics     Alcohol use: No     Alcohol/week: 0.0 standard drinks     If you drink alcohol do you typically have >3 drinks  per day or >7 drinks per week? No    Alcohol Use 1/7/2020   Prescreen: >3 drinks/day or >7 drinks/week? No       Reviewed orders with patient.  Reviewed health maintenance and updated orders accordingly - Yes  Lab work is in process    Breast Cancer Screening:  Any new diagnosis of family breast, ovarian, or bowel cancer?     FSH-7: No flowsheet data found.      Pertinent mammograms are reviewed under the imaging tab.    History of abnormal Pap smear:   PAP / HPV Latest Ref Rng & Units 2/20/2018 12/18/2015   PAP - NIL NIL   HPV 16 DNA NEG:Negative Negative -   HPV 18 DNA NEG:Negative Negative -   OTHER HR HPV NEG:Negative Negative -     Reviewed and updated as needed this visit by clinical staff  Tobacco  Allergies  Meds              Reviewed and updated as needed this visit by Provider                Past Medical History:   Diagnosis Date     Depression      GERD (gastroesophageal reflux disease)      Hemorrhoid      Menstrual disorder     s/p D&C -12/2012     ROMEO on CPAP      Other bipolar disorders     followed by Dr Collazo     Post menopausal syndrome      Tobacco abuse         Review of Systems  CONSTITUTIONAL: NEGATIVE for fever, chills, change in weight  INTEGUMENTARY/SKIN: NEGATIVE for worrisome rashes, moles or lesions  EYES: NEGATIVE for vision changes or irritation  ENT: NEGATIVE for ear, mouth and throat problems  RESP: NEGATIVE for significant cough or SOB  BREAST: NEGATIVE for masses, tenderness or discharge  CV: NEGATIVE for chest pain, palpitations or peripheral edema  GI: NEGATIVE for nausea, abdominal pain, heartburn, or change in bowel habits  : NEGATIVE for unusual urinary or vaginal symptoms. No vaginal bleeding.  MUSCULOSKELETAL: NEGATIVE for significant arthralgias or myalgia  NEURO: NEGATIVE for weakness, dizziness or paresthesias  PSYCHIATRIC: NEGATIVE for changes in mood or affect      OBJECTIVE:   /74 (BP Location: Left arm, Patient Position: Sitting, Cuff Size: Adult Large)    "Pulse 76   Temp 96.8  F (36  C) (Temporal)   Ht 1.72 m (5' 7.72\")   Wt 127.5 kg (281 lb)   LMP 07/20/2006   SpO2 94%   Breastfeeding No   BMI 43.08 kg/m    Physical Exam  GENERAL: healthy, alert and no distress  EYES: Eyes grossly normal to inspection, PERRL and conjunctivae and sclerae normal  HENT: ear canals and TM's normal, nose and mouth without ulcers or lesions  NECK: no adenopathy, no asymmetry, masses, or scars and thyroid normal to palpation  RESP: lungs clear to auscultation - no rales, rhonchi or wheezes  CV: regular rate and rhythm, normal S1 S2, no S3 or S4, no murmur, click or rub, no peripheral edema and peripheral pulses strong  ABDOMEN: soft, nontender, no hepatosplenomegaly, no masses and bowel sounds normal  MS: no gross musculoskeletal defects noted, no edema  SKIN: no suspicious lesions or rashes  NEURO: Normal strength and tone, mentation intact and speech normal  PSYCH: mentation appears normal, affect normal/bright    Diagnostic Test Results:  Labs reviewed in Epic    ASSESSMENT/PLAN:   (D64.9) Anemia, unspecified type  (primary encounter diagnosis)  Comment: Uncertain etiology.  Concerns of gastrointestinal loss.  Patient has symptoms consistent with hiatal hernia.  She also has occasional constipation is overdue colorectal cancer screening.  Plan: Iron and iron binding capacity, CBC with         platelets and differential, GASTROENTEROLOGY         ADULT REF PROCEDURE ONLY            (R60.0) Bilateral edema of lower extremity  Comment: Likely related to obesity and venous insufficiency  Plan: furosemide (LASIX) 40 MG tablet            (E03.2) Hypothyroidism due to medication  Comment: Reevaluate.  Energy levels low normal  Plan: levothyroxine (SYNTHROID/LEVOTHROID) 150 MCG         tablet, Cholesterol, TSH with free T4 reflex,         Comprehensive metabolic panel (BMP + Alb, Alk         Phos, ALT, AST, Total. Bili, TP)              Patient has been advised of split billing " "requirements and indicates understanding: Yes  COUNSELING:  Reviewed preventive health counseling, as reflected in patient instructions    Estimated body mass index is 43.08 kg/m  as calculated from the following:    Height as of this encounter: 1.72 m (5' 7.72\").    Weight as of this encounter: 127.5 kg (281 lb).        She reports that she quit smoking about 14 months ago. Her smoking use included cigarettes. She smoked 0.25 packs per day. She has never used smokeless tobacco.  Tobacco Cessation Action Plan:   Patient working on quitting.  She has done well with alcohol utilization.  Continues to struggle with tobacco.  Is cognizant of what to do and how to approach smoking cessation.      Counseling Resources:  ATP IV Guidelines  Pooled Cohorts Equation Calculator  Breast Cancer Risk Calculator  BRCA-Related Cancer Risk Assessment: FHS-7 Tool  FRAX Risk Assessment  ICSI Preventive Guidelines  Dietary Guidelines for Americans, 2010  USDA's MyPlate  ASA Prophylaxis  Lung CA Screening    Denny Bustamante MD  [facility]  "

## 2021-04-15 NOTE — LETTER
[facility]  [address]  [address]  [place]  [address]  Tel: [phone]    April 19, 2021    Gudeliajonathan Dong  [address]  [place] [address]        Dear Ms. Iker,    You have two abnormalities.   The Cholesterol level is a bit high. Of note, this is a non-fasting cholesterol level so I wouldd recommend a fasting test in the next couple of weeks. I will order this today. Just call lab to set up a lab draw time.   The other slight abnormality is mild iron deficiency. It is important to rule our loss from the intestinal tract. The colonoscopy and endoscopy are important in this regard.   The actual blood levels are a bit better than before, yet the iron continues to be a bit low.     Regards     Denny Bustamante MD /VERONIQUE            Enclosure: Lab Results  Results for orders placed or performed in visit on 04/15/21   Iron and iron binding capacity     Status: Abnormal   Result Value Ref Range    Iron 40 35 - 180 ug/dL    Iron Binding Cap 360 240 - 430 ug/dL    Iron Saturation Index 11 (L) 15 - 46 %   CBC with platelets and differential     Status: Abnormal   Result Value Ref Range    WBC 6.5 4.0 - 11.0 10e9/L    RBC Count 4.24 3.8 - 5.2 10e12/L    Hemoglobin 11.7 11.7 - 15.7 g/dL    Hematocrit 36.8 35.0 - 47.0 %    MCV 87 78 - 100 fl    MCH 27.6 26.5 - 33.0 pg    MCHC 31.8 31.5 - 36.5 g/dL    RDW 15.1 (H) 10.0 - 15.0 %    Platelet Count 296 150 - 450 10e9/L    % Neutrophils 62.8 %    % Lymphocytes 26.5 %    % Monocytes 7.7 %    % Eosinophils 2.8 %    % Basophils 0.2 %    Absolute Neutrophil 4.1 1.6 - 8.3 10e9/L    Absolute Lymphocytes 1.7 0.8 - 5.3 10e9/L    Absolute Monocytes 0.5 0.0 - 1.3 10e9/L    Absolute Eosinophils 0.2 0.0 - 0.7 10e9/L    Absolute Basophils 0.0 0.0 - 0.2 10e9/L    Diff Method Automated Method    Cholesterol     Status: Abnormal   Result Value Ref Range    Cholesterol 221 (H) <200 mg/dL   TSH with free T4 reflex     Status: None   Result Value Ref  Range    TSH 1.34 0.40 - 4.00 mU/L   Comprehensive metabolic panel (BMP + Alb, Alk Phos, ALT, AST, Total. Bili, TP)     Status: None   Result Value Ref Range    Sodium 138 133 - 144 mmol/L    Potassium 4.0 3.4 - 5.3 mmol/L    Chloride 108 94 - 109 mmol/L    Carbon Dioxide 25 20 - 32 mmol/L    Anion Gap 5 3 - 14 mmol/L    Glucose 75 70 - 99 mg/dL    Urea Nitrogen 18 7 - 30 mg/dL    Creatinine 0.79 0.52 - 1.04 mg/dL    GFR Estimate 82 >60 mL/min/[1.73_m2]    GFR Estimate If Black >90 >60 mL/min/[1.73_m2]    Calcium 9.1 8.5 - 10.1 mg/dL    Bilirubin Total 0.3 0.2 - 1.3 mg/dL    Albumin 3.9 3.4 - 5.0 g/dL    Protein Total 7.7 6.8 - 8.8 g/dL    Alkaline Phosphatase 119 40 - 150 U/L    ALT 34 0 - 50 U/L    AST 22 0 - 45 U/L

## 2021-04-16 LAB
ALBUMIN SERPL-MCNC: 3.9 G/DL (ref 3.4–5)
ALP SERPL-CCNC: 119 U/L (ref 40–150)
ALT SERPL W P-5'-P-CCNC: 34 U/L (ref 0–50)
ANION GAP SERPL CALCULATED.3IONS-SCNC: 5 MMOL/L (ref 3–14)
AST SERPL W P-5'-P-CCNC: 22 U/L (ref 0–45)
BILIRUB SERPL-MCNC: 0.3 MG/DL (ref 0.2–1.3)
BUN SERPL-MCNC: 18 MG/DL (ref 7–30)
CALCIUM SERPL-MCNC: 9.1 MG/DL (ref 8.5–10.1)
CHLORIDE SERPL-SCNC: 108 MMOL/L (ref 94–109)
CHOLEST SERPL-MCNC: 221 MG/DL
CO2 SERPL-SCNC: 25 MMOL/L (ref 20–32)
CREAT SERPL-MCNC: 0.79 MG/DL (ref 0.52–1.04)
GFR SERPL CREATININE-BSD FRML MDRD: 82 ML/MIN/{1.73_M2}
GLUCOSE SERPL-MCNC: 75 MG/DL (ref 70–99)
IRON SATN MFR SERPL: 11 % (ref 15–46)
IRON SERPL-MCNC: 40 UG/DL (ref 35–180)
POTASSIUM SERPL-SCNC: 4 MMOL/L (ref 3.4–5.3)
PROT SERPL-MCNC: 7.7 G/DL (ref 6.8–8.8)
SODIUM SERPL-SCNC: 138 MMOL/L (ref 133–144)
TIBC SERPL-MCNC: 360 UG/DL (ref 240–430)
TSH SERPL DL<=0.005 MIU/L-ACNC: 1.34 MU/L (ref 0.4–4)

## 2021-05-14 ENCOUNTER — TRANSFERRED RECORDS (OUTPATIENT)
Dept: HEALTH INFORMATION MANAGEMENT | Facility: CLINIC | Age: 56
End: 2021-05-14

## 2021-05-17 ENCOUNTER — TRANSFERRED RECORDS (OUTPATIENT)
Dept: HEALTH INFORMATION MANAGEMENT | Facility: CLINIC | Age: 56
End: 2021-05-17

## 2021-12-21 ENCOUNTER — TELEPHONE (OUTPATIENT)
Dept: FAMILY MEDICINE | Facility: CLINIC | Age: 56
End: 2021-12-21
Payer: COMMERCIAL

## 2021-12-21 NOTE — TELEPHONE ENCOUNTER
Patient called stating a Prior Auth is needed for the following medication, states previous PA is .    pantoprazole (PROTONIX) 40 MG EC tablet 180 tablet 2 2021  No   Sig: TAKE 1 TABLET BY MOUTH TWICE DAILY(TAKE 1.5 HOURS PRIOR TO LAST MEAL AND AT BEDTIME)   Sent to pharmacy as: Pantoprazole Sodium 40 MG Oral Tablet Delayed Release (PROTONIX     Racquel Lopez RN     Taltz Counseling: I discussed with the patient the risks of ixekizumab including but not limited to immunosuppression, serious infections, worsening of inflammatory bowel disease and drug reactions.  The patient understands that monitoring is required including a PPD at baseline and must alert us or the primary physician if symptoms of infection or other concerning signs are noted.

## 2021-12-24 NOTE — TELEPHONE ENCOUNTER
Prior Authorization Not Needed per Insurance    Medication: Pantoprazole 40 mg  Insurance Company: [employer] - Phone [phone] Fax [phone]  Expected CoPay:      Pharmacy Filling the Rx: [id number] - [place], [place] - [address] [place] [address] AT [facility] [address]  Pharmacy Notified: Yes  Patient Notified: Yes    Per call to pharmacy medication was processed through insurance successfully and patient already picked up.  Closing encounter.

## 2021-12-24 NOTE — TELEPHONE ENCOUNTER
Prior Authorization Retail Medication Request     Medication/Dose: Pantoprazole 40 mg  ICD code (if different than what is on RX):  K21.9  Previously Tried and Failed:  Ranitidine, Omeprazole  Rationale:  Patient stable on BID dosing of medication since 2014     Insurance Name:  [employer]  Insurance ID:  [id number]        Pharmacy Information (if different than what is on RX)  Name:  [id number]  Phone:  [phone]

## 2022-01-05 ENCOUNTER — TELEPHONE (OUTPATIENT)
Dept: FAMILY MEDICINE | Facility: CLINIC | Age: 57
End: 2022-01-05
Payer: COMMERCIAL

## 2022-01-05 NOTE — TELEPHONE ENCOUNTER
Prior Authorization Retail Medication Request    Medication/Dose: pantoprazole (PROTONIX) 40 mg  ICD code (if different than what is on RX):  K21.9  Previously Tried and Failed:  Ranitidine, Omeprazole  Rationale:  Patient stable on BID dosing of medication since 2014    Insurance Name:  [employer]  Insurance ID:  [id number]      Pharmacy Information (if different than what is on RX)  Name:  [id number]  Phone:  [phone]

## 2022-01-05 NOTE — TELEPHONE ENCOUNTER
Reason for Call:  Medication or medication refill:    Do you use a [facility]?  Name of the pharmacy and phone number for the current request:     [facility] #[id number] - [place], [place] - [address] [place], [place] [address]A  [address] [facility]  [address]    Name of the medication requested: pantoprazole (PROTONIX) 40 MG EC tablet     Other request: Pt did not  Rx in 2021 and received new insurance for 2022, which was added to chart. New PA is now needed    Can we leave a detailed message on this number? YES    Phone number patient can be reached at: Cell number on file:    Telephone Information:   Mobile [phone]     Best Time: any    Call taken on 1/5/2022 at 9:29 AM by Aishwarya Newby

## 2022-01-12 NOTE — TELEPHONE ENCOUNTER
Central Prior Authorization Team   Phone: [phone]      Prior Authorization Approval    Authorization Effective Date: 1/5/2022  Authorization Expiration Date: 1/5/2023  Medication: pantoprazole (PROTONIX) 40 mg - APPROVED  Approved Dose/Quantity: 180 FOR 90  Reference #:     Insurance Company: [employer] ([facility]) - Phone [phone] Fax [phone]  Expected CoPay:       CoPay Card Available:      Foundation Assistance Needed:    Which Pharmacy is filling the prescription (Not needed for infusion/clinic administered): [id number] - [place], [place] - [address] [facility] [address]  Pharmacy Notified: Yes  Patient Notified: Yes (**Instructed pharmacy to notify patient when script is ready to /ship.**)

## 2022-02-10 ENCOUNTER — OFFICE VISIT (OUTPATIENT)
Dept: FAMILY MEDICINE | Facility: CLINIC | Age: 57
End: 2022-02-10
Payer: COMMERCIAL

## 2022-02-10 VITALS
TEMPERATURE: 97.5 F | RESPIRATION RATE: 17 BRPM | BODY MASS INDEX: 41.68 KG/M2 | WEIGHT: 275 LBS | DIASTOLIC BLOOD PRESSURE: 81 MMHG | HEART RATE: 103 BPM | SYSTOLIC BLOOD PRESSURE: 145 MMHG | HEIGHT: 68 IN | OXYGEN SATURATION: 98 %

## 2022-02-10 DIAGNOSIS — Z00.00 ENCOUNTER FOR PREVENTIVE CARE: Primary | ICD-10-CM

## 2022-02-10 DIAGNOSIS — N76.3 CHRONIC VULVITIS: ICD-10-CM

## 2022-02-10 LAB
BASOPHILS # BLD AUTO: 0 10E3/UL (ref 0–0.2)
BASOPHILS NFR BLD AUTO: 0 %
CLUE CELLS: PRESENT
EOSINOPHIL # BLD AUTO: 0.2 10E3/UL (ref 0–0.7)
EOSINOPHIL NFR BLD AUTO: 3 %
ERYTHROCYTE [DISTWIDTH] IN BLOOD BY AUTOMATED COUNT: 15.9 % (ref 10–15)
HBA1C MFR BLD: 5.6 % (ref 0–5.6)
HCT VFR BLD AUTO: 39.2 % (ref 35–47)
HGB BLD-MCNC: 12.2 G/DL (ref 11.7–15.7)
LYMPHOCYTES # BLD AUTO: 2.1 10E3/UL (ref 0.8–5.3)
LYMPHOCYTES NFR BLD AUTO: 27 %
MCH RBC QN AUTO: 27.4 PG (ref 26.5–33)
MCHC RBC AUTO-ENTMCNC: 31.1 G/DL (ref 31.5–36.5)
MCV RBC AUTO: 88 FL (ref 78–100)
MONOCYTES # BLD AUTO: 0.6 10E3/UL (ref 0–1.3)
MONOCYTES NFR BLD AUTO: 7 %
NEUTROPHILS # BLD AUTO: 5 10E3/UL (ref 1.6–8.3)
NEUTROPHILS NFR BLD AUTO: 63 %
PLATELET # BLD AUTO: 281 10E3/UL (ref 150–450)
RBC # BLD AUTO: 4.46 10E6/UL (ref 3.8–5.2)
TRICHOMONAS, WET PREP: ABNORMAL
WBC # BLD AUTO: 7.9 10E3/UL (ref 4–11)
WBC'S/HIGH POWER FIELD, WET PREP: ABNORMAL
YEAST, WET PREP: ABNORMAL

## 2022-02-10 PROCEDURE — 36415 COLL VENOUS BLD VENIPUNCTURE: CPT | Performed by: INTERNAL MEDICINE

## 2022-02-10 PROCEDURE — 83718 ASSAY OF LIPOPROTEIN: CPT | Performed by: INTERNAL MEDICINE

## 2022-02-10 PROCEDURE — 83036 HEMOGLOBIN GLYCOSYLATED A1C: CPT | Performed by: INTERNAL MEDICINE

## 2022-02-10 PROCEDURE — 87210 SMEAR WET MOUNT SALINE/INK: CPT | Performed by: INTERNAL MEDICINE

## 2022-02-10 PROCEDURE — 82465 ASSAY BLD/SERUM CHOLESTEROL: CPT | Performed by: INTERNAL MEDICINE

## 2022-02-10 PROCEDURE — 80050 GENERAL HEALTH PANEL: CPT | Performed by: INTERNAL MEDICINE

## 2022-02-10 PROCEDURE — 99396 PREV VISIT EST AGE 40-64: CPT | Performed by: INTERNAL MEDICINE

## 2022-02-10 ASSESSMENT — ENCOUNTER SYMPTOMS
HEADACHES: 0
ARTHRALGIAS: 0
PARESTHESIAS: 0
COUGH: 1
CONSTIPATION: 0
DYSURIA: 0
JOINT SWELLING: 0
NAUSEA: 0
HEMATOCHEZIA: 0
MYALGIAS: 0
FREQUENCY: 1
BREAST MASS: 0
CHILLS: 0
HEARTBURN: 1
SORE THROAT: 0
NERVOUS/ANXIOUS: 0
HEMATURIA: 0
FEVER: 0
ABDOMINAL PAIN: 0
SHORTNESS OF BREATH: 0
DIZZINESS: 0
PALPITATIONS: 0
DIARRHEA: 0
EYE PAIN: 0
WEAKNESS: 0

## 2022-02-10 ASSESSMENT — PAIN SCALES - GENERAL: PAINLEVEL: NO PAIN (0)

## 2022-02-10 ASSESSMENT — MIFFLIN-ST. JEOR: SCORE: 1871.92

## 2022-02-10 NOTE — LETTER
February 12, 2022      Gudelia Iker  [address]    [place] [address]        Dear ,    We are writing to inform you of your test results.    Gudelia;  The labs are looking good. I hope the medication for the yeast and the bacterial vaginitis have been helpful.     Resulted Orders   Wet prep - Clinic Collect   Result Value Ref Range    Trichomonas Absent Absent    Yeast Absent Absent    Clue Cells Present (A) Absent    WBCs/high power field 1+ (A) None   Comprehensive metabolic panel (BMP + Alb, Alk Phos, ALT, AST, Total. Bili, TP)   Result Value Ref Range    Sodium 140 133 - 144 mmol/L    Potassium 4.0 3.4 - 5.3 mmol/L    Chloride 107 94 - 109 mmol/L    Carbon Dioxide (CO2) 27 20 - 32 mmol/L    Anion Gap 6 3 - 14 mmol/L    Urea Nitrogen 15 7 - 30 mg/dL    Creatinine 0.93 0.52 - 1.04 mg/dL    Calcium 8.9 8.5 - 10.1 mg/dL    Glucose 92 70 - 99 mg/dL    Alkaline Phosphatase 143 40 - 150 U/L    AST 15 0 - 45 U/L    ALT 27 0 - 50 U/L    Protein Total 7.7 6.8 - 8.8 g/dL    Albumin 3.6 3.4 - 5.0 g/dL    Bilirubin Total 0.2 0.2 - 1.3 mg/dL    GFR Estimate 71 >60 mL/min/1.73m2      Comment:      Effective December 21, 2021 eGFRcr in adults is calculated using the 2021 CKD-EPI creatinine equation which includes age and gender (Alexey tyler al., NEJ, DOI: 10.1056/LZZUjj9200882)   TSH with free T4 reflex   Result Value Ref Range    TSH 1.39 0.40 - 4.00 mU/L   Cholesterol   Result Value Ref Range    Cholesterol 183 <200 mg/dL      Comment:      Age 0-19 years  Desirable: <170 mg/dL  Borderline high:  170-199 mg/dl  High:            >199 mg/dl    Age 20 years and older  Desirable: <200 mg/dL   HDL cholesterol   Result Value Ref Range    Direct Measure HDL 66 >=50 mg/dL   Hemoglobin A1c   Result Value Ref Range    Hemoglobin A1C 5.6 0.0 - 5.6 %      Comment:      Normal <5.7%   Prediabetes 5.7-6.4%    Diabetes 6.5% or higher     Note: Adopted from ADA consensus guidelines.   CBC with platelets and differential    Result Value Ref Range    WBC Count 7.9 4.0 - 11.0 10e3/uL    RBC Count 4.46 3.80 - 5.20 10e6/uL    Hemoglobin 12.2 11.7 - 15.7 g/dL    Hematocrit 39.2 35.0 - 47.0 %    MCV 88 78 - 100 fL    MCH 27.4 26.5 - 33.0 pg    MCHC 31.1 (L) 31.5 - 36.5 g/dL    RDW 15.9 (H) 10.0 - 15.0 %    Platelet Count 281 150 - 450 10e3/uL    % Neutrophils 63 %    % Lymphocytes 27 %    % Monocytes 7 %    % Eosinophils 3 %    % Basophils 0 %    Absolute Neutrophils 5.0 1.6 - 8.3 10e3/uL    Absolute Lymphocytes 2.1 0.8 - 5.3 10e3/uL    Absolute Monocytes 0.6 0.0 - 1.3 10e3/uL    Absolute Eosinophils 0.2 0.0 - 0.7 10e3/uL    Absolute Basophils 0.0 0.0 - 0.2 10e3/uL       If you have any questions or concerns, please call the clinic at the number listed above.       Sincerely,      Denny Bustamante MD

## 2022-02-10 NOTE — LETTER
February 11, 2022      Gudelia Iker  [address]    [place] [address]        Dear ,    We are writing to inform you of your test results.    Test results indicate you may require additional follow up, see comment below.    You have evidence for bacterial vaginosis.  I will send a medication called metronidazole to your pharmacy.  This medication should be taken twice daily for 7 days.  I will also send 1 tablet of fluconazole for the mild candidiasis of the labia.    Resulted Orders   Wet prep - Clinic Collect   Result Value Ref Range    Trichomonas Absent Absent    Yeast Absent Absent    Clue Cells Present (A) Absent    WBCs/high power field 1+ (A) None   Hemoglobin A1c   Result Value Ref Range    Hemoglobin A1C 5.6 0.0 - 5.6 %      Comment:      Normal <5.7%   Prediabetes 5.7-6.4%    Diabetes 6.5% or higher     Note: Adopted from ADA consensus guidelines.   CBC with platelets and differential   Result Value Ref Range    WBC Count 7.9 4.0 - 11.0 10e3/uL    RBC Count 4.46 3.80 - 5.20 10e6/uL    Hemoglobin 12.2 11.7 - 15.7 g/dL    Hematocrit 39.2 35.0 - 47.0 %    MCV 88 78 - 100 fL    MCH 27.4 26.5 - 33.0 pg    MCHC 31.1 (L) 31.5 - 36.5 g/dL    RDW 15.9 (H) 10.0 - 15.0 %    Platelet Count 281 150 - 450 10e3/uL    % Neutrophils 63 %    % Lymphocytes 27 %    % Monocytes 7 %    % Eosinophils 3 %    % Basophils 0 %    Absolute Neutrophils 5.0 1.6 - 8.3 10e3/uL    Absolute Lymphocytes 2.1 0.8 - 5.3 10e3/uL    Absolute Monocytes 0.6 0.0 - 1.3 10e3/uL    Absolute Eosinophils 0.2 0.0 - 0.7 10e3/uL    Absolute Basophils 0.0 0.0 - 0.2 10e3/uL       If you have any questions or concerns, please call the clinic at the number listed above.       Sincerely,      Denny Bustamante MD

## 2022-02-10 NOTE — PATIENT INSTRUCTIONS
Gudelia;  I would like to check some labs today.  I do believe that your itching relates to a yeast infection.  We will check a wet prep today.  I will treat you appropriately based on the findings of the wet prep.    The usual labs will be obtained.    A mammogram has been ordered.  I specify that you would like this done at the [facility].    Best regards  Denny

## 2022-02-10 NOTE — PROGRESS NOTES
SUBJECTIVE:   CC: Gudelia Dong is an 56 year old woman who presents for preventive health visit.  Patient admits that she has returned to smoking.    Denies chest pain or shortness of breath.  Has had some vulvar itching and mild discharge.    Denies endocrine hematologic or allergic symptomatology.  Denies chest pain.      Patient has been advised of split billing requirements and indicates understanding: Yes  Healthy Habits:     Getting at least 3 servings of Calcium per day:  Yes    Bi-annual eye exam:  Yes    Dental care twice a year:  Yes    Sleep apnea or symptoms of sleep apnea:  Sleep apnea    Diet:  Regular (no restrictions)    Frequency of exercise:  1 day/week    Duration of exercise:  15-30 minutes    Taking medications regularly:  Yes    Medication side effects:  None    PHQ-2 Total Score: 0    Additional concerns today:  Yes              Today's PHQ-2 Score:   PHQ-2 (  [employer]) 2/10/2022   Q1: Little interest or pleasure in doing things -   Q2: Feeling down, depressed or hopeless -   PHQ-2 Score -   PHQ-2 Total Score (12-17 Years)- Positive if 3 or more points; Administer PHQ-A if positive -   Q1: Little interest or pleasure in doing things Not at all   Q2: Feeling down, depressed or hopeless Not at all   PHQ-2 Score Incomplete       Abuse: Current or Past (Physical, Sexual or Emotional) - No  Do you feel safe in your environment? Yes        Social History     Tobacco Use     Smoking status: Former Smoker     Packs/day: 0.25     Types: Cigarettes     Quit date: 2/3/2020     Years since quittin.0     Smokeless tobacco: Never Used     Tobacco comment: smoke 1PPD now   Substance Use Topics     Alcohol use: No     Alcohol/week: 0.0 standard drinks     If you drink alcohol do you typically have >3 drinks per day or >7 drinks per week? No    Alcohol Use 4/15/2021   Prescreen: >3 drinks/day or >7 drinks/week? No       Reviewed orders with patient.  Reviewed health maintenance and updated orders  accordingly - Yes  Lab work is in process    Breast Cancer Screening:  Any new diagnosis of family breast, ovarian, or bowel cancer?     FHS-7: No flowsheet data found.    Mammogram ordered.  Pertinent mammograms are reviewed under the imaging tab.    History of abnormal Pap smear: No  PAP / HPV Latest Ref Rng & Units 2/20/2018 12/18/2015   PAP (Historical) - NIL NIL   HPV16 NEG:Negative Negative -   HPV18 NEG:Negative Negative -   HRHPV NEG:Negative Negative -     Reviewed and updated as needed this visit by clinical staff                Reviewed and updated as needed this visit by Provider               Past Medical History:   Diagnosis Date     Depression      GERD (gastroesophageal reflux disease)      Hemorrhoid      Menstrual disorder     s/p D&C -12/2012     ROMEO on CPAP      Other bipolar disorders     followed by Dr Collazo     Post menopausal syndrome      Tobacco abuse         Review of Systems   Constitutional: Negative for chills and fever.   HENT: Positive for congestion. Negative for ear pain, hearing loss and sore throat.    Eyes: Negative for pain and visual disturbance.   Respiratory: Positive for cough. Negative for shortness of breath.    Cardiovascular: Positive for peripheral edema. Negative for chest pain and palpitations.   Gastrointestinal: Positive for heartburn. Negative for abdominal pain, constipation, diarrhea, hematochezia and nausea.   Breasts:  Negative for tenderness, breast mass and discharge.   Genitourinary: Positive for frequency. Negative for dysuria, genital sores, hematuria, pelvic pain, urgency, vaginal bleeding and vaginal discharge.   Musculoskeletal: Negative for arthralgias, joint swelling and myalgias.   Skin: Negative for rash.   Neurological: Negative for dizziness, weakness, headaches and paresthesias.   Psychiatric/Behavioral: Positive for mood changes. The patient is not nervous/anxious.      CONSTITUTIONAL: NEGATIVE for fever, chills, change in  weight  INTEGUMENTARY/SKIN: NEGATIVE for worrisome rashes, moles or lesions  EYES: NEGATIVE for vision changes or irritation  ENT: NEGATIVE for ear, mouth and throat problems  RESP: NEGATIVE for significant cough or SOB  BREAST: NEGATIVE for masses, tenderness or discharge  CV: NEGATIVE for chest pain, palpitations or peripheral edema  GI: NEGATIVE for nausea, abdominal pain, heartburn, or change in bowel habits  : NEGATIVE for unusual urinary or vaginal symptoms. No vaginal bleeding.  MUSCULOSKELETAL: NEGATIVE for significant arthralgias or myalgia  NEURO: NEGATIVE for weakness, dizziness or paresthesias  PSYCHIATRIC: NEGATIVE for changes in mood or affect      OBJECTIVE:   LMP 07/20/2006   Physical Exam  GENERAL: healthy, alert and no distress  EYES: Eyes grossly normal to inspection, PERRL and conjunctivae and sclerae normal  HENT: ear canals and TM's normal, nose and mouth without ulcers or lesions  NECK: no adenopathy, no asymmetry, masses, or scars and thyroid normal to palpation  RESP: lungs clear to auscultation - no rales, rhonchi or wheezes  BREAST: normal without masses, tenderness or nipple discharge and no palpable axillary masses or adenopathy  CV: regular rate and rhythm, normal S1 S2, no S3 or S4, no murmur, click or rub, no peripheral edema and peripheral pulses strong  ABDOMEN: soft, nontender, no hepatosplenomegaly, no masses and bowel sounds normal  MS: no gross musculoskeletal defects noted, no edema  SKIN: no suspicious lesions or rashes  NEURO: Normal strength and tone, mentation intact and speech normal  PSYCH: mentation appears normal, affect normal/bright    Diagnostic Test Results:  Labs reviewed in Epic    ASSESSMENT/PLAN:   Patient presents for preventive health care today.  The usual labs will be obtained.    The breast examination was normal.  Mammogram will be ordered.    Pap smear revealed no evidence of adnexal mass.  Cervical appearance was normal.  Of note, there was some vulvar  "inflammation consistent with candidiasis and thin whitish discharge which was sent for wet prep.        COUNSELING:      Estimated body mass index is 43.08 kg/m  as calculated from the following:    Height as of 4/15/21: 1.72 m (5' 7.72\").    Weight as of 4/15/21: 127.5 kg (281 lb).        She reports that she quit smoking about 2 years ago. Her smoking use included cigarettes. She smoked 0.25 packs per day. She has never used smokeless tobacco.      Counseling Resources:  ATP IV Guidelines  Pooled Cohorts Equation Calculator  Breast Cancer Risk Calculator  BRCA-Related Cancer Risk Assessment: FHS-7 Tool  FRAX Risk Assessment  ICSI Preventive Guidelines  Dietary Guidelines for Americans, 2010  USDA's MyPlate  ASA Prophylaxis  Lung CA Screening    Denny Bustamante MD  [facility]  "

## 2022-02-11 ENCOUNTER — TELEPHONE (OUTPATIENT)
Dept: FAMILY MEDICINE | Facility: CLINIC | Age: 57
End: 2022-02-11
Payer: COMMERCIAL

## 2022-02-11 LAB
ALBUMIN SERPL-MCNC: 3.6 G/DL (ref 3.4–5)
ALP SERPL-CCNC: 143 U/L (ref 40–150)
ALT SERPL W P-5'-P-CCNC: 27 U/L (ref 0–50)
ANION GAP SERPL CALCULATED.3IONS-SCNC: 6 MMOL/L (ref 3–14)
AST SERPL W P-5'-P-CCNC: 15 U/L (ref 0–45)
BILIRUB SERPL-MCNC: 0.2 MG/DL (ref 0.2–1.3)
BUN SERPL-MCNC: 15 MG/DL (ref 7–30)
CALCIUM SERPL-MCNC: 8.9 MG/DL (ref 8.5–10.1)
CHLORIDE BLD-SCNC: 107 MMOL/L (ref 94–109)
CHOLEST SERPL-MCNC: 183 MG/DL
CO2 SERPL-SCNC: 27 MMOL/L (ref 20–32)
CREAT SERPL-MCNC: 0.93 MG/DL (ref 0.52–1.04)
GFR SERPL CREATININE-BSD FRML MDRD: 71 ML/MIN/1.73M2
GLUCOSE BLD-MCNC: 92 MG/DL (ref 70–99)
HDLC SERPL-MCNC: 66 MG/DL
POTASSIUM BLD-SCNC: 4 MMOL/L (ref 3.4–5.3)
PROT SERPL-MCNC: 7.7 G/DL (ref 6.8–8.8)
SODIUM SERPL-SCNC: 140 MMOL/L (ref 133–144)
TSH SERPL DL<=0.005 MIU/L-ACNC: 1.39 MU/L (ref 0.4–4)

## 2022-02-11 PROCEDURE — 87624 HPV HI-RISK TYP POOLED RSLT: CPT | Performed by: INTERNAL MEDICINE

## 2022-02-11 PROCEDURE — G0145 SCR C/V CYTO,THINLAYER,RESCR: HCPCS | Performed by: INTERNAL MEDICINE

## 2022-02-11 RX ORDER — METRONIDAZOLE 500 MG/1
500 TABLET ORAL 2 TIMES DAILY
Qty: 14 TABLET | Refills: 0 | Status: SHIPPED | OUTPATIENT
Start: 2022-02-11 | End: 2022-02-18

## 2022-02-11 NOTE — TELEPHONE ENCOUNTER
Patient with Bacterial Vaginosis. I have written a letter. Please inform her that Flagyl has been sent to her pharmacy.  Denny Bustamante MD on 2/11/2022 at 8:35 AM

## 2022-02-14 ENCOUNTER — TELEPHONE (OUTPATIENT)
Dept: FAMILY MEDICINE | Facility: CLINIC | Age: 57
End: 2022-02-14
Payer: COMMERCIAL

## 2022-02-14 DIAGNOSIS — G47.00 INSOMNIA, UNSPECIFIED TYPE: ICD-10-CM

## 2022-02-14 DIAGNOSIS — F17.200 TOBACCO DEPENDENCE SYNDROME: Primary | ICD-10-CM

## 2022-02-14 RX ORDER — NICOTINE 21 MG/24HR
1 PATCH, TRANSDERMAL 24 HOURS TRANSDERMAL EVERY 24 HOURS
Qty: 30 PATCH | Refills: 1 | Status: SHIPPED | OUTPATIENT
Start: 2022-02-14 | End: 2023-03-28

## 2022-02-14 RX ORDER — LANOLIN ALCOHOL/MO/W.PET/CERES
3 CREAM (GRAM) TOPICAL
Qty: 30 TABLET | Refills: 1 | Status: SHIPPED | OUTPATIENT
Start: 2022-02-14 | End: 2022-04-26

## 2022-02-14 NOTE — TELEPHONE ENCOUNTER
Dr. Bustamante       Pt asking for smoking patches and sleep medication to be sent into pharmacy asap that were discussed at 2/10/22 visit.    Pharm pended  Brie Aponte, RN  [facility] RN Triage Team

## 2022-02-14 NOTE — TELEPHONE ENCOUNTER
Reviewed this with pt - She will  script at pharm  Brie Aopnte, RN  [facility] RN Triage Team

## 2022-02-15 LAB
BKR LAB AP GYN ADEQUACY: NORMAL
BKR LAB AP GYN INTERPRETATION: NORMAL
BKR LAB AP HPV REFLEX: NORMAL
BKR LAB AP PREVIOUS ABNORMAL: NORMAL
PATH REPORT.COMMENTS IMP SPEC: NORMAL
PATH REPORT.COMMENTS IMP SPEC: NORMAL
PATH REPORT.RELEVANT HX SPEC: NORMAL

## 2022-02-16 DIAGNOSIS — E03.2 HYPOTHYROIDISM DUE TO MEDICATION: ICD-10-CM

## 2022-02-17 LAB
HUMAN PAPILLOMA VIRUS 16 DNA: NEGATIVE
HUMAN PAPILLOMA VIRUS 18 DNA: NEGATIVE
HUMAN PAPILLOMA VIRUS FINAL DIAGNOSIS: NORMAL
HUMAN PAPILLOMA VIRUS OTHER HR: NEGATIVE

## 2022-02-17 RX ORDER — LEVOTHYROXINE SODIUM 150 UG/1
150 TABLET ORAL DAILY
Qty: 90 TABLET | Refills: 3 | Status: SHIPPED | OUTPATIENT
Start: 2022-02-17 | End: 2023-03-10

## 2022-02-17 NOTE — TELEPHONE ENCOUNTER
Levothyroxine 150 mcg tablet    Summary: Take 1 tablet (150 mcg) by mouth daily, Disp-90 tablet, R-1, E-Prescribe   Dose, Route, Frequency: 150 mcg, Oral, DAILY  Start: 4/15/2021  Ord/Sold: 4/15/2021

## 2022-02-21 ENCOUNTER — TELEPHONE (OUTPATIENT)
Dept: FAMILY MEDICINE | Facility: CLINIC | Age: 57
End: 2022-02-21
Payer: COMMERCIAL

## 2022-02-21 NOTE — TELEPHONE ENCOUNTER
MAs sent us note to review Wet Prep     Forwarding to PCP to review and advise on this     Ivette OLGUIN Triage RN  [facility]       Wet prep - Clinic Collect   Dx: Chronic vulvitis    Specimen Information: Vagina; Swab         0 Result Notes     1 Follow-up Encounter     Component Ref Range & Units 11 d ago     Trichomonas Absent Absent     Yeast Absent Absent     Clue Cells Absent Present Abnormal      WBCs/high power field None 1+ Abnormal     Resulting Agency  [facility]

## 2022-02-21 NOTE — PROGRESS NOTES
Will route in separate tele encounter to PCP     Ivette OLGUIN, Triage RN  [facility]

## 2022-02-22 DIAGNOSIS — B37.2 CANDIDIASIS OF SKIN: Primary | ICD-10-CM

## 2022-02-22 RX ORDER — FLUCONAZOLE 150 MG/1
150 TABLET ORAL ONCE
Qty: 1 TABLET | Refills: 0 | Status: SHIPPED | OUTPATIENT
Start: 2022-02-22 | End: 2022-03-08

## 2022-02-22 RX ORDER — METRONIDAZOLE 250 MG/1
250 TABLET ORAL 2 TIMES DAILY
Qty: 14 TABLET | Refills: 0 | Status: CANCELLED | OUTPATIENT
Start: 2022-02-22

## 2022-02-22 NOTE — TELEPHONE ENCOUNTER
Was sent.  Still available?  Order  metroNIDAZOLE (FLAGYL) 500 MG tablet [[id number]] (Order [id number])    Order Information    Ordered Status Priority Ordering User Department   02/11/22 Sent (none) Denny Bustamante MD  [facility]     Order History  Outpatient  Date/Time Action Taken User Additional Information   02/11/22 0830 Sign Denny Bustamante MD      Outpatient Medication Detail     Disp Refills Start End ELISABETH   metroNIDAZOLE (FLAGYL) 500 MG tablet 14 tablet 0 2/11/2022 2/18/2022 --   Sig - Route: Take 1 tablet (500 mg) by mouth 2 times daily for 7 days - Oral   Sent to pharmacy as: metroNIDAZOLE 500 MG Oral Tablet (FLAGYL)   Class: E-Prescribe   Order: [id number]   E-Prescribing Status: Receipt confirmed by pharmacy (2/11/2022  8:30 AM CST)     Printout Tracking    External Result Report     Pharmacy    [id number] - [place], [place] - [address] AT [facility] [address]

## 2022-02-22 NOTE — TELEPHONE ENCOUNTER
Dr. Bustamante, no medication called into her pharmacy for Bacterial vaginosis.      Could you please check the dosage and approve?     Afia Dillon RN  -[facility]

## 2022-02-22 NOTE — TELEPHONE ENCOUNTER
"Spoke with patient     States she finished a course of the Flagyl     But states \"he never sent in 1 tablet of fluconazole for my mild candidiasis\" - pended     Also states had bacterial vaginosis for 2 months prior to visit -  WADE OLGUIN, Triage RN  [facility]     "

## 2022-03-08 ENCOUNTER — TELEPHONE (OUTPATIENT)
Dept: FAMILY MEDICINE | Facility: CLINIC | Age: 57
End: 2022-03-08
Payer: COMMERCIAL

## 2022-03-08 DIAGNOSIS — B37.2 CANDIDIASIS OF SKIN: ICD-10-CM

## 2022-03-08 RX ORDER — FLUCONAZOLE 150 MG/1
150 TABLET ORAL ONCE
Qty: 1 TABLET | Refills: 0 | Status: SHIPPED | OUTPATIENT
Start: 2022-03-08 | End: 2022-03-08

## 2022-03-08 NOTE — TELEPHONE ENCOUNTER
Dr. Bustamante,     Patient calling in to request an additional dose of the fluconazole be sent into [facility] on [address]. Pt reports she is still having vaginal itching.     Can we leave a detailed message on this number? YES  Phone number patient can be reached at: Home number on file [phone] (home)    Ange Conley RN  [facility] Triage

## 2022-03-28 ENCOUNTER — OFFICE VISIT (OUTPATIENT)
Dept: FAMILY MEDICINE | Facility: CLINIC | Age: 57
End: 2022-03-28
Payer: COMMERCIAL

## 2022-03-28 ENCOUNTER — NURSE TRIAGE (OUTPATIENT)
Dept: FAMILY MEDICINE | Facility: CLINIC | Age: 57
End: 2022-03-28

## 2022-03-28 VITALS
SYSTOLIC BLOOD PRESSURE: 123 MMHG | DIASTOLIC BLOOD PRESSURE: 74 MMHG | TEMPERATURE: 99.7 F | HEART RATE: 87 BPM | BODY MASS INDEX: 41.36 KG/M2 | HEIGHT: 68 IN | WEIGHT: 272.9 LBS | OXYGEN SATURATION: 95 %

## 2022-03-28 DIAGNOSIS — B35.1 TOENAIL FUNGUS: ICD-10-CM

## 2022-03-28 DIAGNOSIS — N95.2 ATROPHIC VAGINITIS: ICD-10-CM

## 2022-03-28 DIAGNOSIS — N76.0 VAGINITIS AND VULVOVAGINITIS: Primary | ICD-10-CM

## 2022-03-28 DIAGNOSIS — N89.8 VAGINAL IRRITATION: ICD-10-CM

## 2022-03-28 DIAGNOSIS — Z11.4 SCREENING FOR HIV (HUMAN IMMUNODEFICIENCY VIRUS): ICD-10-CM

## 2022-03-28 LAB
ALBUMIN UR-MCNC: NEGATIVE MG/DL
APPEARANCE UR: CLEAR
BILIRUB UR QL STRIP: NEGATIVE
CLUE CELLS: ABNORMAL
COLOR UR AUTO: YELLOW
GLUCOSE UR STRIP-MCNC: NEGATIVE MG/DL
HGB UR QL STRIP: NEGATIVE
KETONES UR STRIP-MCNC: NEGATIVE MG/DL
LEUKOCYTE ESTERASE UR QL STRIP: ABNORMAL
NITRATE UR QL: NEGATIVE
PH UR STRIP: 7.5 [PH] (ref 5–7)
RBC #/AREA URNS AUTO: NORMAL /HPF
SP GR UR STRIP: 1.01 (ref 1–1.03)
TRICHOMONAS, WET PREP: ABNORMAL
UROBILINOGEN UR STRIP-ACNC: 0.2 E.U./DL
WBC #/AREA URNS AUTO: NORMAL /HPF
WBC'S/HIGH POWER FIELD, WET PREP: ABNORMAL
YEAST, WET PREP: ABNORMAL

## 2022-03-28 PROCEDURE — 87591 N.GONORRHOEAE DNA AMP PROB: CPT | Performed by: PHYSICIAN ASSISTANT

## 2022-03-28 PROCEDURE — 81001 URINALYSIS AUTO W/SCOPE: CPT | Performed by: PHYSICIAN ASSISTANT

## 2022-03-28 PROCEDURE — 87389 HIV-1 AG W/HIV-1&-2 AB AG IA: CPT | Performed by: PHYSICIAN ASSISTANT

## 2022-03-28 PROCEDURE — 99214 OFFICE O/P EST MOD 30 MIN: CPT | Performed by: PHYSICIAN ASSISTANT

## 2022-03-28 PROCEDURE — 87210 SMEAR WET MOUNT SALINE/INK: CPT | Performed by: PHYSICIAN ASSISTANT

## 2022-03-28 PROCEDURE — 36415 COLL VENOUS BLD VENIPUNCTURE: CPT | Performed by: PHYSICIAN ASSISTANT

## 2022-03-28 RX ORDER — ESTRADIOL 0.1 MG/G
2 CREAM VAGINAL
Qty: 42.5 G | Refills: 1 | Status: SHIPPED | OUTPATIENT
Start: 2022-03-28 | End: 2024-01-10

## 2022-03-28 ASSESSMENT — PAIN SCALES - GENERAL: PAINLEVEL: NO PAIN (0)

## 2022-03-28 NOTE — TELEPHONE ENCOUNTER
"Pt called last OV was dx with bacterial vaginosis and yeast infection when she c/o vaginal itching     Was given a \"seven day course of something and a 1 day course of something\"     Continued to have itching and \"was given the 1 day course again\"     Is having itching and now noticing an eraser sized lump - on labia    Lump is tender to touch but not painful     Per protocol, advised OV today - scheduled with team     Appointments in Next Year    Mar 28, 2022 10:30 AM  (Arrive by 10:10 AM)  Provider Visit with Lauren Myers PA-C  [facility] ([facility] ) [phone]      Ivette Tyson RN on 3/28/2022 at 8:52 AM        Additional Information    Negative: Pain or burning with passing urine (urination) is main symptom    Negative: Vaginal discharge is the main symptom    Negative: Pubic lice suspected    Negative: STD exposure and prevention, question about    Negative: Patient sounds very sick or weak to the triager    Negative: SEVERE pain (e.g., excruciating)    Negative: Genital area looks infected (e.g., draining sore, spreading redness)    Negative: Rash with painful tiny water blisters    Negative: Patient wants to be seen    Tender lump (swelling or 'ball') at vaginal opening    Negative: MODERATE-SEVERE itching (i.e., interferes with school, work, or sleep)    Negative: Rash (e.g., redness, tiny bumps, sore) of genital area present > 24 hours    Protocols used: VULVAR SYMPTOMS-A-OH      "

## 2022-03-28 NOTE — PROGRESS NOTES
Assessment and Plan:     (N76.0) Vaginitis and vulvovaginitis  (primary encounter diagnosis)  Comment: has had itching and irritation for about a month, was treated for BV and yeast, on exam suspect symptoms 2/2 to atrophic vaginitis, no h/o gyn cancer  Plan: Ob/Gyn Referral  Will try estrace cream     (N89.8) Vaginal irritation  Comment: see above, recently treated for BV and yeast, hasn't been sexually active in years, also has small inflamed follicle of left labia  Plan: Wet prep - Clinic Collect, NEISSERIA GONORRHOEA        PCR, CHLAMYDIA TRACHOMATIS PCR        Will screen for STD after discussion but unlikely, see above  Warm compress 2-3 x per day for folliculitis     (N95.2) Atrophic vaginitis  Comment: vaginal tissue dry and irriatated  Plan: estrace cream trial, ob/gyn referral    (B35.1) Toenail fungus  Comment: can't cut toe nails on left due to thickened/yellow  Plan: Orthopedic  Referral    (Z11.4) Screening for HIV (human immunodeficiency virus)  Comment: would like screening today  Plan: HIV Antigen Antibody Combo      Lauren Myers PA-C  30 minutes on the day of the encounter doing chart review, history and exam, documentation and further activities as noted above.  marianna Galeas is a 57 year old who presents for the following health issues    HPI     About a month ago noticed an itchy area around labial  She saw Dr. Bustamante and was diagnosed with bacterial vaginitis and yeast   She completed oral flagyl then diflucan    The itchy symptoms mildly improved and now seem to be back  She also noticed a small bump on left labia yesterday    She denies dysuria, frequency, discharge, abdominal pain, back pain, fever/chills, nausea/vomiting     She denies any new partners, she hasn't been sexually active for >10 years     Would also like referral for podiatry for thickened nails    No history of breast or other gyn cancers     Review of Systems   See above      Objective    LMP  "07/20/2006      /74 (BP Location: Right arm, Cuff Size: Adult Large)   Pulse 87   Temp 99.7  F (37.6  C) (Tympanic)   Ht 1.721 m (5' 7.75\")   Wt 123.8 kg (272 lb 14.4 oz)   LMP 07/20/2006   SpO2 95%   BMI 41.80 kg/m      Physical Exam     GENERAL: healthy, alert and no distress  RESP: lungs clear to auscultation - no rales, no rhonchi, no wheezes  CV: regular rates and rhythm, normal S1 S2, no S3 or S4 and no murmur, no click or rub   ABD: soft, NT, +Bs, no masses  : dry vaginal mucosa, small inflamed follicle left labia, no abscess, scant white discharge in vaginal vault, no lesions  MS: extremities- no gross deformities noted, no edema, thickened yellow toe nails left foot, no open sores   SKIN: no suspicious lesions, no rashes          "

## 2022-03-28 NOTE — PATIENT INSTRUCTIONS
Warm compress to left labia 2-3 times per day    Try estrace cream for itching    Labs today    Podiatry referral

## 2022-03-29 LAB — N GONORRHOEA DNA SPEC QL NAA+PROBE: NEGATIVE

## 2022-03-29 NOTE — RESULT ENCOUNTER NOTE
Please let patient know wet prep negative for bacterial vaginosis and urine does not look infected.  She should schedule with women's health if her symptoms persist.  I placed a referral when I saw her yesterday.  Thanks.

## 2022-03-30 LAB — HIV 1+2 AB+HIV1 P24 AG SERPL QL IA: NONREACTIVE

## 2022-04-17 DIAGNOSIS — R60.0 BILATERAL EDEMA OF LOWER EXTREMITY: ICD-10-CM

## 2022-04-19 RX ORDER — FUROSEMIDE 40 MG
TABLET ORAL
Qty: 90 TABLET | Refills: 3 | Status: SHIPPED | OUTPATIENT
Start: 2022-04-19 | End: 2023-03-28

## 2022-04-24 DIAGNOSIS — G47.00 INSOMNIA, UNSPECIFIED TYPE: ICD-10-CM

## 2022-04-24 DIAGNOSIS — F17.200 TOBACCO DEPENDENCE SYNDROME: ICD-10-CM

## 2022-04-26 RX ORDER — LANOLIN ALCOHOL/MO/W.PET/CERES
CREAM (GRAM) TOPICAL
Qty: 30 TABLET | Refills: 1 | Status: SHIPPED | OUTPATIENT
Start: 2022-04-26 | End: 2022-07-15

## 2022-06-07 ENCOUNTER — TELEPHONE (OUTPATIENT)
Dept: FAMILY MEDICINE | Facility: CLINIC | Age: 57
End: 2022-06-07
Payer: COMMERCIAL

## 2022-06-07 DIAGNOSIS — F17.200 TOBACCO DEPENDENCE SYNDROME: Primary | ICD-10-CM

## 2022-06-07 RX ORDER — NICOTINE 21 MG/24HR
1 PATCH, TRANSDERMAL 24 HOURS TRANSDERMAL EVERY 24 HOURS
Qty: 30 PATCH | Refills: 0 | Status: SHIPPED | OUTPATIENT
Start: 2022-06-07 | End: 2022-07-07

## 2022-06-07 NOTE — TELEPHONE ENCOUNTER
Pt called states in April was given a Nicotine patches     In the past was given tapering doses of this, and asking to try that instead:     21mg x1 month   14mg x1 month  Then down to 7mg x1 month   Then off     Asking if PCP would Rx that way? Pended     Ivette OLGUIN, Triage RN  [facility]

## 2022-06-08 NOTE — TELEPHONE ENCOUNTER
"Left a message notifying patient \"your requested scripts have been approved to your pharmacy\"    Ivette OLGUIN, Triage RN  [facility]     "

## 2022-06-13 ENCOUNTER — PRE VISIT (OUTPATIENT)
Dept: PODIATRY | Facility: CLINIC | Age: 57
End: 2022-06-13
Payer: COMMERCIAL

## 2022-06-13 ENCOUNTER — OFFICE VISIT (OUTPATIENT)
Dept: ORTHOPEDICS | Facility: CLINIC | Age: 57
End: 2022-06-13
Payer: COMMERCIAL

## 2022-06-13 DIAGNOSIS — B35.3 TINEA PEDIS OF BOTH FEET: Primary | ICD-10-CM

## 2022-06-13 DIAGNOSIS — B35.1 OM (ONYCHOMYCOSIS): ICD-10-CM

## 2022-06-13 PROCEDURE — 99214 OFFICE O/P EST MOD 30 MIN: CPT | Performed by: PODIATRIST

## 2022-06-13 RX ORDER — ECONAZOLE NITRATE 10 MG/G
CREAM TOPICAL DAILY
Qty: 85 G | Refills: 5 | Status: SHIPPED | OUTPATIENT
Start: 2022-06-13 | End: 2023-11-24

## 2022-06-13 RX ORDER — TERBINAFINE HYDROCHLORIDE 250 MG/1
250 TABLET ORAL DAILY
Qty: 90 TABLET | Refills: 0 | Status: SHIPPED | OUTPATIENT
Start: 2022-06-13 | End: 2022-09-11

## 2022-06-13 NOTE — NURSING NOTE
Reason For Visit:   Chief Complaint   Patient presents with     Consult     Fungal nails. Cracking skin. Ingrown nails.        Pain Assessment  Patient Currently in Pain: Denies        Allergies   Allergen Reactions     1-Methyl 2-Pyrrolidone Swelling     Iodine Swelling     Other reaction(s): Angioedema  Facial swelling.     Morphine Anxiety and Nausea and Vomiting     Other reaction(s): Behavioral Disturbances           Dorcas Stevenson LPN

## 2022-06-13 NOTE — LETTER
6/13/2022         RE: Gudelia Dong  [address]  [address]  [place] [address]        Dear Colleague,    Thank you for referring your patient, Gudelia Dong, to the [facility] [place]. Please see a copy of my visit note below.    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)     CARDIOVASCULAR SCREENING; LDL GOAL LESS THAN 160     Major depressive disorder, recurrent episode, moderate (H)     Anemia     Tubular adenoma     Iron deficiency anemia     Hyponatremia     ADHD, predominantly inattentive type     overweight BMI>30     Periumbilical hernia     Health Care Home     Psychosis (H)     Bipolar 2 disorder (H)     Gastroesophageal reflux disease without esophagitis     Prediabetes     Elevated TSH     Hypothyroidism due to medication     Hidradenitis suppurativa of right axilla     Vitamin D deficiency     Obesity (BMI 35.0-39.9) with comorbidity (H)     Past Surgical History:   Procedure Laterality Date     COLONOSCOPY  12/24/2013    Procedure: COLONOSCOPY;;  Surgeon: Guy Grant MD;  Location: [facility]     COLONOSCOPY  1/9/2014    Procedure: COMBINED COLONOSCOPY, SINGLE BIOPSY/POLYPECTOMY BY BIOPSY;;  Surgeon: Guy Grant MD;  Location: [facility]     ESOPHAGOSCOPY, GASTROSCOPY, DUODENOSCOPY (EGD), COMBINED  12/24/2013    Procedure: COMBINED ESOPHAGOSCOPY, GASTROSCOPY, DUODENOSCOPY (EGD), BIOPSY SINGLE OR MULTIPLE;  ESOPHAGOSCOPY, GASTROSCOPY, DUODENOSCOPY, COLONOSCOPY;  Surgeon: Guy Grant MD;  Location:  GI     GENITOURINARY SURGERY      urethra stretched     GYN SURGERY      d&c       LAPAROSCOPIC ASSISTED RECTOPEXY  3/14/2014    Procedure: LAPAROSCOPIC ASSISTED RECTOPEXY;  LAPAROSCOPIC  VENTRAL RECTOPEXY ;  Surgeon: Jennifer Connolly MD;  Location: [facility] OR     ORTHOPEDIC SURGERY      surgery on clavicle,surgery for left leg fracture     Social History     Socioeconomic History     Marital status: Single     Spouse name: Not on file     Number of children: Not on file     Years of education: Not on file     Highest education level: Not on file   Occupational History     Not on file   Tobacco Use     Smoking status: Former Smoker     Packs/day: 0.25     Types: Cigarettes     Quit date: 2/3/2020     Years since quittin.3     Smokeless tobacco: Never Used     Tobacco comment: quite for year, restarted , 3/2022 reports on patch   Substance and Sexual Activity     Alcohol use: No     Alcohol/week: 0.0 standard drinks     Drug use: No     Sexual activity: Yes     Partners: Male   Other Topics Concern     Parent/sibling w/ CABG, MI or angioplasty before 65F 55M? Not Asked   Social History Narrative    2013    /single/    Children-    Work-    Tobacco-    ETOH-    Exercise-         Social Determinants of Health     Financial Resource Strain: Not on file   Food Insecurity: Not on file   Transportation Needs: Not on file   Physical Activity: Not on file   Stress: Not on file   Social Connections: Not on file   Intimate Partner Violence: Not on file   Housing Stability: Not on file     Family History   Problem Relation Age of Onset     Cancer Mother         ovarian cancer     Hypertension Father      Breast Cancer No family hx of      Cancer - colorectal No family hx of      Last Comprehensive Metabolic Panel:  Sodium   Date Value Ref Range Status   02/10/2022 140 133 - 144 mmol/L Final   04/15/2021 138 133 - 144 mmol/L Final     Potassium   Date Value Ref Range Status   02/10/2022 4.0 3.4 - 5.3 mmol/L Final   04/15/2021 4.0 3.4 - 5.3 mmol/L Final     Chloride   Date Value Ref Range  Status   02/10/2022 107 94 - 109 mmol/L Final   04/15/2021 108 94 - 109 mmol/L Final     Carbon Dioxide   Date Value Ref Range Status   04/15/2021 25 20 - 32 mmol/L Final     Carbon Dioxide (CO2)   Date Value Ref Range Status   02/10/2022 27 20 - 32 mmol/L Final     Anion Gap   Date Value Ref Range Status   02/10/2022 6 3 - 14 mmol/L Final   04/15/2021 5 3 - 14 mmol/L Final     Glucose   Date Value Ref Range Status   02/10/2022 92 70 - 99 mg/dL Final   04/15/2021 75 70 - 99 mg/dL Final     Urea Nitrogen   Date Value Ref Range Status   02/10/2022 15 7 - 30 mg/dL Final   04/15/2021 18 7 - 30 mg/dL Final     Creatinine   Date Value Ref Range Status   02/10/2022 0.93 0.52 - 1.04 mg/dL Final   04/15/2021 0.79 0.52 - 1.04 mg/dL Final     GFR Estimate   Date Value Ref Range Status   02/10/2022 71 >60 mL/min/1.73m2 Final     Comment:     Effective December 21, 2021 eGFRcr in adults is calculated using the 2021 CKD-EPI creatinine equation which includes age and gender (Alexey et al., NEJ, DOI: 10.1056/TSJCij6702641)   04/15/2021 82 >60 mL/min/[1.73_m2] Final     Comment:     Non  GFR Calc  Starting 12/18/2018, serum creatinine based estimated GFR (eGFR) will be   calculated using the Chronic Kidney Disease Epidemiology Collaboration   (CKD-EPI) equation.       Calcium   Date Value Ref Range Status   02/10/2022 8.9 8.5 - 10.1 mg/dL Final   04/15/2021 9.1 8.5 - 10.1 mg/dL Final     Lab Results   Component Value Date    AST 15 02/10/2022    AST 22 04/15/2021     Lab Results   Component Value Date    ALT 27 02/10/2022    ALT 34 04/15/2021     Lab Results   Component Value Date    BILICONJ 0.0 07/17/2009      Lab Results   Component Value Date    BILITOTAL 0.2 02/10/2022    BILITOTAL 0.3 04/15/2021     Lab Results   Component Value Date    ALBUMIN 3.6 02/10/2022    ALBUMIN 3.9 04/15/2021     Lab Results   Component Value Date    PROTTOTAL 7.7 02/10/2022    PROTTOTAL 7.7 04/15/2021      Lab Results   Component  Value Date    ALKPHOS 143 02/10/2022    ALKPHOS 119 04/15/2021     HISTORY OF PRESENT ILLNESS FINDINGS:  A 57-year-old female who presents for toenail fungus and dry cracking skin.  She relates that she has used AmLactin the past and that helps, but when she stops using it, it just comes right back.  She relates it has been going on for years.  No injuries.  No specific relieving or aggravating factors.  She is wondering if there is a pill she can take.    OBJECTIVE FINDINGS:  DP and PT are 2/4 bilaterally.  She has dry, scaly, cracked skin, mostly in the forefoot, but on the feet bilaterally with hyperkeratosis.  She has incurvated, thickened nails with subungual debris, dystrophy and discoloration 1 through 5 bilaterally.  There is no erythema, no drainage, no odor, no calor.  She relates she does get tinea pedis in the winter.    ASSESSMENT:  Onychomycosis bilaterally, tinea pedis bilaterally. Diagnosis and treatment options discussed with the patient.  She relates she would like to take Lamisil.  She relates she has had no liver disease in the past.  She did have labs done in February.  We reviewed those.  Diagnosis and treatment options discussed with her.  Prescription for econazole cream and Lamisil pills given and use discussed with her.  Return to clinic in see me in 6 weeks.  She was reviewed here by Dr. Guerin in Family Practice.  Dr. Guerin's 03/28/2022 note reviewed.          Moderate level of medical decision making.      Again, thank you for allowing me to participate in the care of your patient.        Sincerely,        Thierry Lord DPM

## 2022-06-13 NOTE — PROGRESS NOTES
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)     CARDIOVASCULAR SCREENING; LDL GOAL LESS THAN 160     Major depressive disorder, recurrent episode, moderate (H)     Anemia     Tubular adenoma     Iron deficiency anemia     Hyponatremia     ADHD, predominantly inattentive type     overweight BMI>30     Periumbilical hernia     Health Care Home     Psychosis (H)     Bipolar 2 disorder (H)     Gastroesophageal reflux disease without esophagitis     Prediabetes     Elevated TSH     Hypothyroidism due to medication     Hidradenitis suppurativa of right axilla     Vitamin D deficiency     Obesity (BMI 35.0-39.9) with comorbidity (H)     Past Surgical History:   Procedure Laterality Date     COLONOSCOPY  12/24/2013    Procedure: COLONOSCOPY;;  Surgeon: Guy Grant MD;  Location:  GI     COLONOSCOPY  1/9/2014    Procedure: COMBINED COLONOSCOPY, SINGLE BIOPSY/POLYPECTOMY BY BIOPSY;;  Surgeon: Guy Grant MD;  Location:  GI     ESOPHAGOSCOPY, GASTROSCOPY, DUODENOSCOPY (EGD), COMBINED  12/24/2013    Procedure: COMBINED ESOPHAGOSCOPY, GASTROSCOPY, DUODENOSCOPY (EGD), BIOPSY SINGLE OR MULTIPLE;  ESOPHAGOSCOPY, GASTROSCOPY, DUODENOSCOPY, COLONOSCOPY;  Surgeon: Guy Grant MD;  Location:  GI     GENITOURINARY SURGERY      urethra stretched     GYN SURGERY      d&c      LAPAROSCOPIC ASSISTED RECTOPEXY  3/14/2014    Procedure: LAPAROSCOPIC ASSISTED RECTOPEXY;  LAPAROSCOPIC  VENTRAL RECTOPEXY ;  Surgeon: Jennifer Connolly MD;  Location:  OR     ORTHOPEDIC SURGERY      surgery on clavicle,surgery for left leg fracture     Social History      Socioeconomic History     Marital status: Single     Spouse name: Not on file     Number of children: Not on file     Years of education: Not on file     Highest education level: Not on file   Occupational History     Not on file   Tobacco Use     Smoking status: Former Smoker     Packs/day: 0.25     Types: Cigarettes     Quit date: 2/3/2020     Years since quittin.3     Smokeless tobacco: Never Used     Tobacco comment: quite for year, restarted , 3/2022 reports on patch   Substance and Sexual Activity     Alcohol use: No     Alcohol/week: 0.0 standard drinks     Drug use: No     Sexual activity: Yes     Partners: Male   Other Topics Concern     Parent/sibling w/ CABG, MI or angioplasty before 65F 55M? Not Asked   Social History Narrative    2013    /single/    Children-    Work-    Tobacco-    ETOH-    Exercise-         Social Determinants of Health     Financial Resource Strain: Not on file   Food Insecurity: Not on file   Transportation Needs: Not on file   Physical Activity: Not on file   Stress: Not on file   Social Connections: Not on file   Intimate Partner Violence: Not on file   Housing Stability: Not on file     Family History   Problem Relation Age of Onset     Cancer Mother         ovarian cancer     Hypertension Father      Breast Cancer No family hx of      Cancer - colorectal No family hx of      Last Comprehensive Metabolic Panel:  Sodium   Date Value Ref Range Status   02/10/2022 140 133 - 144 mmol/L Final   04/15/2021 138 133 - 144 mmol/L Final     Potassium   Date Value Ref Range Status   02/10/2022 4.0 3.4 - 5.3 mmol/L Final   04/15/2021 4.0 3.4 - 5.3 mmol/L Final     Chloride   Date Value Ref Range Status   02/10/2022 107 94 - 109 mmol/L Final   04/15/2021 108 94 - 109 mmol/L Final     Carbon Dioxide   Date Value Ref Range Status   04/15/2021 25 20 - 32 mmol/L Final     Carbon Dioxide (CO2)   Date Value Ref Range Status   02/10/2022 27 20 - 32 mmol/L Final     Anion  Gap   Date Value Ref Range Status   02/10/2022 6 3 - 14 mmol/L Final   04/15/2021 5 3 - 14 mmol/L Final     Glucose   Date Value Ref Range Status   02/10/2022 92 70 - 99 mg/dL Final   04/15/2021 75 70 - 99 mg/dL Final     Urea Nitrogen   Date Value Ref Range Status   02/10/2022 15 7 - 30 mg/dL Final   04/15/2021 18 7 - 30 mg/dL Final     Creatinine   Date Value Ref Range Status   02/10/2022 0.93 0.52 - 1.04 mg/dL Final   04/15/2021 0.79 0.52 - 1.04 mg/dL Final     GFR Estimate   Date Value Ref Range Status   02/10/2022 71 >60 mL/min/1.73m2 Final     Comment:     Effective December 21, 2021 eGFRcr in adults is calculated using the 2021 CKD-EPI creatinine equation which includes age and gender (Alexey tyler al., NE, DOI: 10.1056/PDSDdm9501010)   04/15/2021 82 >60 mL/min/[1.73_m2] Final     Comment:     Non  GFR Calc  Starting 12/18/2018, serum creatinine based estimated GFR (eGFR) will be   calculated using the Chronic Kidney Disease Epidemiology Collaboration   (CKD-EPI) equation.       Calcium   Date Value Ref Range Status   02/10/2022 8.9 8.5 - 10.1 mg/dL Final   04/15/2021 9.1 8.5 - 10.1 mg/dL Final     Lab Results   Component Value Date    AST 15 02/10/2022    AST 22 04/15/2021     Lab Results   Component Value Date    ALT 27 02/10/2022    ALT 34 04/15/2021     Lab Results   Component Value Date    BILICONJ 0.0 07/17/2009      Lab Results   Component Value Date    BILITOTAL 0.2 02/10/2022    BILITOTAL 0.3 04/15/2021     Lab Results   Component Value Date    ALBUMIN 3.6 02/10/2022    ALBUMIN 3.9 04/15/2021     Lab Results   Component Value Date    PROTTOTAL 7.7 02/10/2022    PROTTOTAL 7.7 04/15/2021      Lab Results   Component Value Date    ALKPHOS 143 02/10/2022    ALKPHOS 119 04/15/2021     HISTORY OF PRESENT ILLNESS FINDINGS:  A 57-year-old female who presents for toenail fungus and dry cracking skin.  She relates that she has used AmLactin the past and that helps, but when she stops using it, it  just comes right back.  She relates it has been going on for years.  No injuries.  No specific relieving or aggravating factors.  She is wondering if there is a pill she can take.    OBJECTIVE FINDINGS:  DP and PT are 2/4 bilaterally.  She has dry, scaly, cracked skin, mostly in the forefoot, but on the feet bilaterally with hyperkeratosis.  She has incurvated, thickened nails with subungual debris, dystrophy and discoloration 1 through 5 bilaterally.  There is no erythema, no drainage, no odor, no calor.  She relates she does get tinea pedis in the winter.    ASSESSMENT:  Onychomycosis bilaterally, tinea pedis bilaterally. Diagnosis and treatment options discussed with the patient.  She relates she would like to take Lamisil.  She relates she has had no liver disease in the past.  She did have labs done in February.  We reviewed those.  Diagnosis and treatment options discussed with her.  Prescription for econazole cream and Lamisil pills given and use discussed with her.  Return to clinic in see me in 6 weeks.  She was reviewed here by Dr. Guerin in Family Practice.  Dr. Guerin's 03/28/2022 note reviewed.          Moderate level of medical decision making.

## 2022-07-14 ENCOUNTER — OFFICE VISIT (OUTPATIENT)
Dept: OBGYN | Facility: CLINIC | Age: 57
End: 2022-07-14
Payer: COMMERCIAL

## 2022-07-14 VITALS
SYSTOLIC BLOOD PRESSURE: 130 MMHG | DIASTOLIC BLOOD PRESSURE: 67 MMHG | BODY MASS INDEX: 40.77 KG/M2 | HEIGHT: 68 IN | HEART RATE: 83 BPM | OXYGEN SATURATION: 95 % | WEIGHT: 269 LBS

## 2022-07-14 DIAGNOSIS — L29.2 VULVAR ITCHING: ICD-10-CM

## 2022-07-14 DIAGNOSIS — N76.0 BACTERIAL VAGINOSIS: Primary | ICD-10-CM

## 2022-07-14 DIAGNOSIS — B96.89 BACTERIAL VAGINOSIS: ICD-10-CM

## 2022-07-14 DIAGNOSIS — B96.89 BACTERIAL VAGINOSIS: Primary | ICD-10-CM

## 2022-07-14 DIAGNOSIS — F17.200 TOBACCO DEPENDENCE SYNDROME: ICD-10-CM

## 2022-07-14 DIAGNOSIS — G47.00 INSOMNIA, UNSPECIFIED TYPE: ICD-10-CM

## 2022-07-14 DIAGNOSIS — N76.0 BACTERIAL VAGINOSIS: ICD-10-CM

## 2022-07-14 DIAGNOSIS — L30.4 INTERTRIGO: Primary | ICD-10-CM

## 2022-07-14 LAB
CLUE CELLS: PRESENT
TRICHOMONAS, WET PREP: ABNORMAL
WBC'S/HIGH POWER FIELD, WET PREP: ABNORMAL
YEAST, WET PREP: ABNORMAL

## 2022-07-14 PROCEDURE — 99203 OFFICE O/P NEW LOW 30 MIN: CPT | Performed by: OBSTETRICS & GYNECOLOGY

## 2022-07-14 PROCEDURE — 87210 SMEAR WET MOUNT SALINE/INK: CPT | Performed by: OBSTETRICS & GYNECOLOGY

## 2022-07-14 RX ORDER — NYSTATIN 100000 [USP'U]/G
POWDER TOPICAL DAILY
Qty: 60 G | Refills: 11 | Status: SHIPPED | OUTPATIENT
Start: 2022-07-14

## 2022-07-14 RX ORDER — CLOTRIMAZOLE 1 %
CREAM (GRAM) TOPICAL 2 TIMES DAILY
Qty: 60 G | Refills: 11 | Status: SHIPPED | OUTPATIENT
Start: 2022-07-14 | End: 2024-01-10

## 2022-07-14 RX ORDER — METRONIDAZOLE 500 MG/1
500 TABLET ORAL 2 TIMES DAILY
Qty: 14 TABLET | Refills: 0 | Status: SHIPPED | OUTPATIENT
Start: 2022-07-14 | End: 2022-07-21

## 2022-07-14 NOTE — PROGRESS NOTES
GYN Clinic Visit  2022  CC: vulvar itching    HPI:  Gudelia Dong is a 57 year old  postmenopausal female who presents complaining of vulvar itching.    Vulvar itching for 1 year. Initially diagnosed with vaginal yeast infection  Started on estrace cream in March, not really helping so stopped using it.  Not sexually active. No sex since .  is in long-term  Itching on vulva, not vagina. No vaginal discharge.  Distribution of itching is along underwear line in groin and bilateral labia  Wears loose shorts when she gets home from work without underwear and that helps  Itchy at first when takes off underwear, scratches it and it feels better  thinks it might be jock itch - has been using athlete's foot medication Lotrimin spray    Last pap 2022 NIL neg HR HPV    Menopause - periods stopped in her 40s     h/o TAB x1    Past Medical History:   Diagnosis Date     Depression      GERD (gastroesophageal reflux disease)      Hemorrhoid      Menstrual disorder     s/p D&C -2012     ROMEO on CPAP      Other bipolar disorders     followed by Dr Collazo     Post menopausal syndrome      Tobacco abuse        Past Surgical History:   Procedure Laterality Date     COLONOSCOPY  2013    Procedure: COLONOSCOPY;;  Surgeon: Guy Grant MD;  Location:  GI     COLONOSCOPY  2014    Procedure: COMBINED COLONOSCOPY, SINGLE BIOPSY/POLYPECTOMY BY BIOPSY;;  Surgeon: Guy Grant MD;  Location:  GI     ESOPHAGOSCOPY, GASTROSCOPY, DUODENOSCOPY (EGD), COMBINED  2013    Procedure: COMBINED ESOPHAGOSCOPY, GASTROSCOPY, DUODENOSCOPY (EGD), BIOPSY SINGLE OR MULTIPLE;  ESOPHAGOSCOPY, GASTROSCOPY, DUODENOSCOPY, COLONOSCOPY;  Surgeon: Guy Grant MD;  Location:  GI     GENITOURINARY SURGERY      urethra stretched     GYN SURGERY      d&c      LAPAROSCOPIC ASSISTED RECTOPEXY  3/14/2014    Procedure: LAPAROSCOPIC ASSISTED RECTOPEXY;  LAPAROSCOPIC  VENTRAL RECTOPEXY ;  Surgeon: Jennifer Connolly  MD Sarah;  Location:  OR     ORTHOPEDIC SURGERY      surgery on clavicle,surgery for left leg fracture       Current Outpatient Medications   Medication     albuterol (PROAIR HFA/PROVENTIL HFA/VENTOLIN HFA) 108 (90 Base) MCG/ACT inhaler     albuterol (VENTOLIN HFA) 108 (90 Base) MCG/ACT inhaler     ammonium lactate (AMLACTIN) 12 % external cream     bacitracin 500 UNIT/GM external ointment     bacitracin 500 UNIT/GM OINT     buPROPion (WELLBUTRIN XL) 150 MG 24 hr tablet     busPIRone (BUSPAR) 10 MG tablet     Doxepin HCl 150 MG CAPS     econazole nitrate 1 % external cream     escitalopram (LEXAPRO) 10 MG tablet     estradiol (ESTRACE) 0.1 MG/GM vaginal cream     furosemide (LASIX) 40 MG tablet     gabapentin (NEURONTIN) 300 MG capsule     levothyroxine (SYNTHROID/LEVOTHROID) 150 MCG tablet     melatonin 3 MG tablet     nicotine (NICODERM CQ) 21 MG/24HR 24 hr patch     nicotine (NICOTROL) 10 MG inhaler     order for DME     pantoprazole (PROTONIX) 40 MG EC tablet     REXULTI 1 MG tablet     terbinafine (LAMISIL) 250 MG tablet     triamcinolone (KENALOG) 0.1 % external cream     zaleplon (SONATA) 10 MG capsule     No current facility-administered medications for this visit.       Allergies   Allergen Reactions     1-Methyl 2-Pyrrolidone Swelling     Iodine Swelling     Other reaction(s): Angioedema  Facial swelling.     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 Lives in [place].  Tobacco Use     Smoking status: Former Smoker     Packs/day: 0.25     Types: Cigarettes     Quit date: 2/3/2020     Years since quittin.4     Smokeless tobacco: Never Used     Tobacco comment: quite for year, restarted , 3/2022 reports on patch   Substance Use Topics     Alcohol use: No     Alcohol/week: 0.0 standard drinks     Drug use: No         Objective:  Vitals:    22 0854   BP: 130/67  "  Pulse: 83   SpO2: 95%   Weight: 122 kg (269 lb)   Height: 1.721 m (5' 7.75\")     Body mass index is 41.2 kg/m .    General: alert, oriented, no distress  Breasts: erythema under breasts in inframammary folds bilaterally  Abd: soft, nontender, nondistended  :   - vulva: mild erythema and bright pink/red erythema on bilateral labia majora and minora. Slight excoriations present. Mild erythema in groin creases. Otherwise normal external genitalia, normal hair pattern, no lesions, normal [place]tholin's, normal [place]'s. urethera appears normal and is well supported.   - vagina: normal vaginal mucosa with rugae, no irritation, thin yellow discharge present    Microscopic wet-mount exam shows clue cells.      Assessment:  Gudelia Dong is a 57 year old with vulvar itching.     Plan:   1. Intertrigo  Discussed behavior modifications - trying to keep vulva dry, no soap. Stop using athlete's foot spray and use ointment instead. Can use powder under breasts and in groin folds. Apply clotrimazole to vulva BID.  - clotrimazole (LOTRIMIN) 1 % external cream; Apply topically 2 times daily  Dispense: 60 g; Refill: 11  - nystatin (MYCOSTATIN) 530595 UNIT/GM external powder; Apply topically daily  Dispense: 60 g; Refill: 11    2. Vulvar itching  - Wet preparation; Future  - Wet preparation    3. Bacterial vaginosis  rx for flagyl sent in a different encounter    Jennifer Lassiter MD                "

## 2022-07-15 RX ORDER — LANOLIN ALCOHOL/MO/W.PET/CERES
CREAM (GRAM) TOPICAL
Qty: 30 TABLET | Refills: 1 | Status: SHIPPED | OUTPATIENT
Start: 2022-07-15 | End: 2023-03-28

## 2022-09-12 ENCOUNTER — OFFICE VISIT (OUTPATIENT)
Dept: ORTHOPEDICS | Facility: CLINIC | Age: 57
End: 2022-09-12
Payer: COMMERCIAL

## 2022-09-12 ENCOUNTER — LAB (OUTPATIENT)
Dept: LAB | Facility: CLINIC | Age: 57
End: 2022-09-12

## 2022-09-12 DIAGNOSIS — B35.1 OM (ONYCHOMYCOSIS): ICD-10-CM

## 2022-09-12 DIAGNOSIS — B35.1 OM (ONYCHOMYCOSIS): Primary | ICD-10-CM

## 2022-09-12 DIAGNOSIS — B35.3 TINEA PEDIS OF BOTH FEET: ICD-10-CM

## 2022-09-12 LAB
ALBUMIN SERPL BCG-MCNC: 4.3 G/DL (ref 3.5–5.2)
ALP SERPL-CCNC: 123 U/L (ref 35–104)
ALT SERPL W P-5'-P-CCNC: 17 U/L (ref 10–35)
AST SERPL W P-5'-P-CCNC: 19 U/L (ref 10–35)
BILIRUB DIRECT SERPL-MCNC: <0.2 MG/DL (ref 0–0.3)
BILIRUB SERPL-MCNC: <0.2 MG/DL
PROT SERPL-MCNC: 7.5 G/DL (ref 6.4–8.3)

## 2022-09-12 PROCEDURE — 99213 OFFICE O/P EST LOW 20 MIN: CPT | Performed by: PODIATRIST

## 2022-09-12 PROCEDURE — 36415 COLL VENOUS BLD VENIPUNCTURE: CPT | Performed by: PATHOLOGY

## 2022-09-12 PROCEDURE — 80076 HEPATIC FUNCTION PANEL: CPT | Performed by: PATHOLOGY

## 2022-09-12 RX ORDER — BREXPIPRAZOLE 2 MG/1
2 TABLET ORAL EVERY MORNING
COMMUNITY
Start: 2022-05-02

## 2022-09-12 NOTE — NURSING NOTE
Reason For Visit:   Chief Complaint   Patient presents with     Follow Up     Onychomycosis bilaterally, tinea pedis bilaterally. Prescription for econazole cream and Lamisil was given to patient at last visit on 6-.                 Allergies   Allergen Reactions     1-Methyl 2-Pyrrolidone Swelling     Contrast Dye Shortness Of Breath     Iodine Swelling     Other reaction(s): Angioedema  Facial swelling.     Morphine Anxiety and Nausea and Vomiting     Other reaction(s): Behavioral Disturbances  Other reaction(s): Anxiety, vomiting           Dorcas Stevenson LPN

## 2022-09-12 NOTE — PROGRESS NOTES
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)     CARDIOVASCULAR SCREENING; LDL GOAL LESS THAN 160     Major depressive disorder, recurrent episode, moderate (H)     Anemia     Tubular adenoma     Iron deficiency anemia     Hyponatremia     ADHD, predominantly inattentive type     overweight BMI>30     Periumbilical hernia     Health Care Home     Psychosis (H)     Bipolar 2 disorder (H)     Gastroesophageal reflux disease without esophagitis     Prediabetes     Elevated TSH     Hypothyroidism due to medication     Hidradenitis suppurativa of right axilla     Vitamin D deficiency     Obesity (BMI 35.0-39.9) with comorbidity (H)     Past Surgical History:   Procedure Laterality Date     COLONOSCOPY  12/24/2013    Procedure: COLONOSCOPY;;  Surgeon: Guy Grant MD;  Location:  GI     COLONOSCOPY  1/9/2014    Procedure: COMBINED COLONOSCOPY, SINGLE BIOPSY/POLYPECTOMY BY BIOPSY;;  Surgeon: Guy Grant MD;  Location:  GI     ESOPHAGOSCOPY, GASTROSCOPY, DUODENOSCOPY (EGD), COMBINED  12/24/2013    Procedure: COMBINED ESOPHAGOSCOPY, GASTROSCOPY, DUODENOSCOPY (EGD), BIOPSY SINGLE OR MULTIPLE;  ESOPHAGOSCOPY, GASTROSCOPY, DUODENOSCOPY, COLONOSCOPY;  Surgeon: Guy Grant MD;  Location:  GI     GENITOURINARY SURGERY      urethra stretched     GYN SURGERY      d&c      LAPAROSCOPIC ASSISTED RECTOPEXY  3/14/2014    Procedure: LAPAROSCOPIC ASSISTED RECTOPEXY;  LAPAROSCOPIC  VENTRAL RECTOPEXY ;  Surgeon: Jennifer Connolly MD;  Location:  OR     ORTHOPEDIC SURGERY      surgery on clavicle,surgery for left leg fracture     Social History      Socioeconomic History     Marital status: Single     Spouse name: Not on file     Number of children: Not on file     Years of education: Not on file     Highest education level: Not on file   Occupational History     Not on file   Tobacco Use     Smoking status: Former Smoker     Packs/day: 0.25     Types: Cigarettes     Quit date: 2/3/2020     Years since quittin.6     Smokeless tobacco: Never Used     Tobacco comment: quite for year, restarted , 3/2022 reports on patch   Substance and Sexual Activity     Alcohol use: No     Alcohol/week: 0.0 standard drinks     Drug use: No     Sexual activity: Yes     Partners: Male   Other Topics Concern     Parent/sibling w/ CABG, MI or angioplasty before 65F 55M? Not Asked   Social History Narrative    2013    /single/    Children-    Work-    Tobacco-    ETOH-    Exercise-         Social Determinants of Health     Financial Resource Strain: Not on file   Food Insecurity: Not on file   Transportation Needs: Not on file   Physical Activity: Not on file   Stress: Not on file   Social Connections: Not on file   Intimate Partner Violence: Not on file   Housing Stability: Not on file     Family History   Problem Relation Age of Onset     Cancer Mother         ovarian cancer     Hypertension Father      Breast Cancer No family hx of      Cancer - colorectal No family hx of        Lab Results   Component Value Date    AST 15 02/10/2022    AST 22 04/15/2021     Lab Results   Component Value Date    ALT 27 02/10/2022    ALT 34 04/15/2021     Lab Results   Component Value Date    BILICONJ 0.0 2009      Lab Results   Component Value Date    BILITOTAL 0.2 02/10/2022    BILITOTAL 0.3 04/15/2021     Lab Results   Component Value Date    ALBUMIN 3.6 02/10/2022    ALBUMIN 3.9 04/15/2021     Lab Results   Component Value Date    PROTTOTAL 7.7 02/10/2022    PROTTOTAL 7.7 04/15/2021      Lab Results   Component Value Date    ALKPHOS 143 02/10/2022    ALKPHOS 119  04/15/2021     Lab Results   Component Value Date    AST 19 09/12/2022    AST 22 04/15/2021     Lab Results   Component Value Date    ALT 17 09/12/2022    ALT 34 04/15/2021     Lab Results   Component Value Date    BILICONJ 0.0 07/17/2009      Lab Results   Component Value Date    BILITOTAL <0.2 09/12/2022    BILITOTAL 0.3 04/15/2021     Lab Results   Component Value Date    ALBUMIN 4.3 09/12/2022    ALBUMIN 3.6 02/10/2022    ALBUMIN 3.9 04/15/2021     Lab Results   Component Value Date    PROTTOTAL 7.5 09/12/2022    PROTTOTAL 7.7 04/15/2021      Lab Results   Component Value Date    ALKPHOS 123 09/12/2022    ALKPHOS 119 04/15/2021         SUBJECTIVE FINDINGS:  A 57 year old returns to clinic for onychomycosis and tinea pedis bilaterally.  She relates she is doing well.  She is taking the Lamisil with no problems and using the econazole cream.    OBJECTIVE FINDINGS:  Proximal nails, and the hallux is growing in clear.  She has decreased cracked, scaly skin bilaterally.  There is no erythema, no edema bilaterally.    ASSESSMENT AND PLAN:  Onychomycosis bilaterally.  Tinea pedis bilaterally.  This is improved.  Diagnosis and treatment options discussed with her.  Finish the Lamisil.  Labs ordered and use discussed with her for hepatic panel.  Continue the econazole cream.  Return to clinic and see me in 3 months.  Previous notes reviewed.      Called patient and reviewed her labs with her, Alkaline Phosphatase is elevated, advised her to stop the Lamisil pills, she related she was finished with the Lamisil as she missed her last appointment, return to clinic in two weeks for lab recheck, discussed referral to primary care physician.      10/05/22- Called patient and reviewed lab results with her and her Alkaline Phosphatase is still elevated.  She relates she will be getting a new primary care physician because Dr. Bustamante is essence.  She related she would see her new primary care doctor for further follow up on  this.  I sent message to Dr. Bustamante as well.  Follow up with me as scheduled previously.

## 2022-09-12 NOTE — LETTER
9/12/2022         RE: Gudelia Dong  [address]  [address]  [place] [address]        Dear Colleague,    Thank you for referring your patient, Gudelia Dong, to the [facility] [place]. Please see a copy of my visit note below.    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)     CARDIOVASCULAR SCREENING; LDL GOAL LESS THAN 160     Major depressive disorder, recurrent episode, moderate (H)     Anemia     Tubular adenoma     Iron deficiency anemia     Hyponatremia     ADHD, predominantly inattentive type     overweight BMI>30     Periumbilical hernia     Health Care Home     Psychosis (H)     Bipolar 2 disorder (H)     Gastroesophageal reflux disease without esophagitis     Prediabetes     Elevated TSH     Hypothyroidism due to medication     Hidradenitis suppurativa of right axilla     Vitamin D deficiency     Obesity (BMI 35.0-39.9) with comorbidity (H)     Past Surgical History:   Procedure Laterality Date     COLONOSCOPY  12/24/2013    Procedure: COLONOSCOPY;;  Surgeon: Guy Grant MD;  Location: [facility]     COLONOSCOPY  1/9/2014    Procedure: COMBINED COLONOSCOPY, SINGLE BIOPSY/POLYPECTOMY BY BIOPSY;;  Surgeon: Guy Grant MD;  Location: [facility]     ESOPHAGOSCOPY, GASTROSCOPY, DUODENOSCOPY (EGD), COMBINED  12/24/2013    Procedure: COMBINED ESOPHAGOSCOPY, GASTROSCOPY, DUODENOSCOPY (EGD), BIOPSY SINGLE OR MULTIPLE;  ESOPHAGOSCOPY, GASTROSCOPY, DUODENOSCOPY, COLONOSCOPY;  Surgeon: Guy Grant MD;  Location:  GI     GENITOURINARY SURGERY      urethra stretched     GYN SURGERY      d&c       LAPAROSCOPIC ASSISTED RECTOPEXY  3/14/2014    Procedure: LAPAROSCOPIC ASSISTED RECTOPEXY;  LAPAROSCOPIC  VENTRAL RECTOPEXY ;  Surgeon: Jennifer Connolly MD;  Location: [facility] OR     ORTHOPEDIC SURGERY      surgery on clavicle,surgery for left leg fracture     Social History     Socioeconomic History     Marital status: Single     Spouse name: Not on file     Number of children: Not on file     Years of education: Not on file     Highest education level: Not on file   Occupational History     Not on file   Tobacco Use     Smoking status: Former Smoker     Packs/day: 0.25     Types: Cigarettes     Quit date: 2/3/2020     Years since quittin.6     Smokeless tobacco: Never Used     Tobacco comment: quite for year, restarted , 3/2022 reports on patch   Substance and Sexual Activity     Alcohol use: No     Alcohol/week: 0.0 standard drinks     Drug use: No     Sexual activity: Yes     Partners: Male   Other Topics Concern     Parent/sibling w/ CABG, MI or angioplasty before 65F 55M? Not Asked   Social History Narrative    2013    /single/    Children-    Work-    Tobacco-    ETOH-    Exercise-         Social Determinants of Health     Financial Resource Strain: Not on file   Food Insecurity: Not on file   Transportation Needs: Not on file   Physical Activity: Not on file   Stress: Not on file   Social Connections: Not on file   Intimate Partner Violence: Not on file   Housing Stability: Not on file     Family History   Problem Relation Age of Onset     Cancer Mother         ovarian cancer     Hypertension Father      Breast Cancer No family hx of      Cancer - colorectal No family hx of        Lab Results   Component Value Date    AST 15 02/10/2022    AST 22 04/15/2021     Lab Results   Component Value Date    ALT 27 02/10/2022    ALT 34 04/15/2021     Lab Results   Component Value Date    BILICONJ 0.0 2009      Lab Results   Component Value Date    BILITOTAL 0.2 02/10/2022    BILITOTAL 0.3  04/15/2021     Lab Results   Component Value Date    ALBUMIN 3.6 02/10/2022    ALBUMIN 3.9 04/15/2021     Lab Results   Component Value Date    PROTTOTAL 7.7 02/10/2022    PROTTOTAL 7.7 04/15/2021      Lab Results   Component Value Date    ALKPHOS 143 02/10/2022    ALKPHOS 119 04/15/2021     Lab Results   Component Value Date    AST 19 09/12/2022    AST 22 04/15/2021     Lab Results   Component Value Date    ALT 17 09/12/2022    ALT 34 04/15/2021     Lab Results   Component Value Date    BILICONJ 0.0 07/17/2009      Lab Results   Component Value Date    BILITOTAL <0.2 09/12/2022    BILITOTAL 0.3 04/15/2021     Lab Results   Component Value Date    ALBUMIN 4.3 09/12/2022    ALBUMIN 3.6 02/10/2022    ALBUMIN 3.9 04/15/2021     Lab Results   Component Value Date    PROTTOTAL 7.5 09/12/2022    PROTTOTAL 7.7 04/15/2021      Lab Results   Component Value Date    ALKPHOS 123 09/12/2022    ALKPHOS 119 04/15/2021         SUBJECTIVE FINDINGS:  A 57 year old returns to clinic for onychomycosis and tinea pedis bilaterally.  She relates she is doing well.  She is taking the Lamisil with no problems and using the econazole cream.    OBJECTIVE FINDINGS:  Proximal nails, and the hallux is growing in clear.  She has decreased cracked, scaly skin bilaterally.  There is no erythema, no edema bilaterally.    ASSESSMENT AND PLAN:  Onychomycosis bilaterally.  Tinea pedis bilaterally.  This is improved.  Diagnosis and treatment options discussed with her.  Finish the Lamisil.  Labs ordered and use discussed with her for hepatic panel.  Continue the econazole cream.  Return to clinic and see me in 3 months.  Previous notes reviewed.      Called patient and reviewed her labs with her, Alkaline Phosphatase is elevated, advised her to stop the Lamisil pills, she related she was finished with the Lamisil as she missed her last appointment, return to clinic in two weeks for lab recheck, discussed referral to primary care physician.      Again,  thank you for allowing me to participate in the care of your patient.        Sincerely,        Thierry Lord DPM

## 2022-09-16 DIAGNOSIS — K21.9 GASTROESOPHAGEAL REFLUX DISEASE WITHOUT ESOPHAGITIS: ICD-10-CM

## 2022-09-20 RX ORDER — PANTOPRAZOLE SODIUM 40 MG/1
TABLET, DELAYED RELEASE ORAL
Qty: 180 TABLET | Refills: 0 | Status: SHIPPED | OUTPATIENT
Start: 2022-09-20 | End: 2022-12-23

## 2022-09-20 NOTE — TELEPHONE ENCOUNTER
Prescription approved per [facility] Refill Protocol.    Melia Keating, RN BSN MSN  [facility]

## 2022-09-22 NOTE — TELEPHONE ENCOUNTER
Patient called following up on refill request.   Writer notified patient that script sent on 9/20/22 to preferred pharmacy.       Gabriela Ardon RN  [facility]

## 2022-10-04 ENCOUNTER — LAB (OUTPATIENT)
Dept: LAB | Facility: CLINIC | Age: 57
End: 2022-10-04
Payer: COMMERCIAL

## 2022-10-04 ENCOUNTER — NURSE TRIAGE (OUTPATIENT)
Dept: NURSING | Facility: CLINIC | Age: 57
End: 2022-10-04

## 2022-10-04 DIAGNOSIS — B35.3 TINEA PEDIS OF BOTH FEET: ICD-10-CM

## 2022-10-04 DIAGNOSIS — B35.1 OM (ONYCHOMYCOSIS): ICD-10-CM

## 2022-10-04 LAB
ALBUMIN SERPL BCG-MCNC: 4.4 G/DL (ref 3.5–5.2)
ALP SERPL-CCNC: 139 U/L (ref 35–104)
ALT SERPL W P-5'-P-CCNC: 23 U/L (ref 10–35)
AST SERPL W P-5'-P-CCNC: 23 U/L (ref 10–35)
BILIRUB DIRECT SERPL-MCNC: <0.2 MG/DL (ref 0–0.3)
BILIRUB SERPL-MCNC: 0.3 MG/DL
PROT SERPL-MCNC: 7.8 G/DL (ref 6.4–8.3)

## 2022-10-04 PROCEDURE — 80076 HEPATIC FUNCTION PANEL: CPT | Performed by: PATHOLOGY

## 2022-10-04 PROCEDURE — 36415 COLL VENOUS BLD VENIPUNCTURE: CPT | Performed by: PATHOLOGY

## 2022-10-04 NOTE — TELEPHONE ENCOUNTER
"Pt. calling in to nurse triage today with vulvar itching and labia boils. Pt reports that she is at her \"wits end\"  and has had itch in crotch area for almost a year ago. Pt reports that she 1st assumed it was related to weigh change. Was seen in  for this problem last in July 2022. Prescribed;    clotrimazole (LOTRIMIN) 1 % external cream  nystatin (MYCOSTATIN) 209557 UNIT/GM external powder    Also an issue within the last year has developed boils on labia ( 2 separate instances ) that have never disappeared/resolved  all the way. Bilateral on both labia, have reappeared twice -  Pt has 1 on the right currently. Bigger than a pea when fully swollen it gets tender and sensitive. PCP changed   Dr. Casanova retired and  Current PCP Dr. Bustamante is leaving.  Needs to get set up with new PCP    Has been treated for mild yeast infection and vaginosis. No resolve to current situation. Disposition to be seen today or tomorrow. Pt will go to  in  today or tomorrow. Transferred to scheduling to set up follow up with new PCP.  Pt amenable to plan and verbalizes agreement and understanding.    Gricel Roldan RN, [place], PHN on 10/4/2022 at 1:25 PM  Many Nurse Advisors  RN utilized sound nursing judgement based on facility triage protocols during this encounter.                  Reason for Disposition    Tender lump (swelling or 'ball') at vaginal opening    Additional Information    Negative: Pain or burning with passing urine (urination) is main symptom    Negative: Vaginal discharge is main symptom    Negative: Pubic lice suspected    Negative: STI exposure and prevention, question about    Negative: Patient sounds very sick or weak to the triager    Negative: SEVERE pain (e.g., excruciating)    Negative: Genital area looks infected (e.g., draining sore, spreading redness)    Negative: Rash with painful tiny water blisters    Negative: Patient wants to be seen    Negative: MODERATE-SEVERE itching (i.e., " "interferes with school, work, or sleep)    Negative: Rash (e.g., redness, tiny bumps, sore) of genital area present > 24 hours    Answer Assessment - Initial Assessment Questions  1. SYMPTOM: \"What's the main symptom you're concerned about?\" (e.g., rash, itching, swelling, dryness)      Vulvar itching and boils that recur     2. LOCATION: \"Where is the  Itching and boils located?\" (e.g., inside/outside, left/right)      Outside labia  On lips of labia ( boils ) patches of dead skin on labia- may be medication   3. ONSET: \"When did the Itching  start?\"      1 year itching - boils started May 2022  4. PAIN: \"Is there any pain?\" If Yes, ask: \"How bad is it?\" (Scale: 1-10; mild, moderate, severe)    -  MILD (1-3): doesn't interfere with normal activities     -  MODERATE (4-7): interferes with normal activities (e.g., work or school) or awakens from sleep      -  SEVERE (8-10): excruciating pain, unable to do any normal activities      No pain but when it is itched at then you get heat / inflammation     5. CAUSE: \"What do you think is causing the symptoms?\"      Unknown    6. OTHER SYMPTOMS: \"Do you have any other symptoms?\" (e.g., fever, vaginal bleeding, pain with urination)      Nothing reported   7. PREGNANCY: \"Is there any chance you are pregnant?\" \"When was your last menstrual period?\"      N/A    Protocols used: VULVAR SYMPTOMS-A-OH    COVID 19 Nurse Triage Plan/Patient Instructions    Please be aware that novel coronavirus (COVID-19) may be circulating in the community. If you develop symptoms such as fever, cough, or SOB or if you have concerns about the presence of another infection including coronavirus (COVID-19), please contact your health care provider or visit [email or web address].     Disposition/Instructions    In-Person Visit with provider recommended. Reference Visit Selection Guide.    Thank you for taking steps to prevent the spread of this virus.  o Limit your contact with others.  o Wear a " simple mask to cover your cough.  o Wash your hands well and often.    Resources    [facility]: About COVID-19: [email or web address]    CDC: What to Do If You're Sick: www.cdc.gov/coronavirus/2019-ncov/about/steps-when-sick.html    CDC: Ending Home Isolation: www.cdc.gov/coronavirus/2019-ncov/hcp/disposition-in-home-patients.html     CDC: Caring for Someone: www.cdc.gov/coronavirus/2019-ncov/if-you-are-sick/care-for-someone.html     [facility]: Interim Guidance for Hospital Discharge to Home: www.[email or web address].[place]/diseases/coronavirus/hcp/hospdischarge.pdf    [facility] clinical trials (COVID-19 research studies): clinicalaffairs.[facility]-clinical-trials     Below are the COVID-19 hotlines at the [place] [facility] ([facility]). Interpreters are available.   o For health questions: Call 041-125-8951 or 1-949.445.5594 (7 a.m. to 7 p.m.)  o For questions about schools and childcare: Call 973-891-4670 or 1-488.878.7291 (7 a.m. to 7 p.m.)

## 2022-11-08 ENCOUNTER — OFFICE VISIT (OUTPATIENT)
Dept: FAMILY MEDICINE | Facility: CLINIC | Age: 57
End: 2022-11-08
Payer: COMMERCIAL

## 2022-11-08 VITALS
HEART RATE: 77 BPM | OXYGEN SATURATION: 97 % | RESPIRATION RATE: 20 BRPM | BODY MASS INDEX: 41.52 KG/M2 | DIASTOLIC BLOOD PRESSURE: 80 MMHG | WEIGHT: 274 LBS | TEMPERATURE: 97.5 F | HEIGHT: 68 IN | SYSTOLIC BLOOD PRESSURE: 119 MMHG

## 2022-11-08 DIAGNOSIS — G56.22 ULNAR NEUROPATHY OF LEFT UPPER EXTREMITY: ICD-10-CM

## 2022-11-08 DIAGNOSIS — L30.4 INTERTRIGO: Primary | ICD-10-CM

## 2022-11-08 PROCEDURE — 99214 OFFICE O/P EST MOD 30 MIN: CPT | Performed by: FAMILY MEDICINE

## 2022-11-08 RX ORDER — KETOCONAZOLE 20 MG/G
CREAM TOPICAL 2 TIMES DAILY
Qty: 60 G | Refills: 0 | Status: SHIPPED | OUTPATIENT
Start: 2022-11-08 | End: 2024-01-10

## 2022-11-08 RX ORDER — TRIAMCINOLONE ACETONIDE 1 MG/G
CREAM TOPICAL 2 TIMES DAILY
Qty: 30 G | Refills: 0 | Status: SHIPPED | OUTPATIENT
Start: 2022-11-08 | End: 2024-02-20

## 2022-11-08 RX ORDER — FLUCONAZOLE 150 MG/1
150 TABLET ORAL
Qty: 7 TABLET | Refills: 0 | Status: SHIPPED | OUTPATIENT
Start: 2022-11-08 | End: 2022-11-27

## 2022-11-08 ASSESSMENT — PATIENT HEALTH QUESTIONNAIRE - PHQ9
SUM OF ALL RESPONSES TO PHQ QUESTIONS 1-9: 7
10. IF YOU CHECKED OFF ANY PROBLEMS, HOW DIFFICULT HAVE THESE PROBLEMS MADE IT FOR YOU TO DO YOUR WORK, TAKE CARE OF THINGS AT HOME, OR GET ALONG WITH OTHER PEOPLE: NOT DIFFICULT AT ALL
SUM OF ALL RESPONSES TO PHQ QUESTIONS 1-9: 7

## 2022-11-08 NOTE — PROGRESS NOTES
"  Assessment & Plan     Intertrigo  Her weight and moisture may be major culprit but as her symptoms are persistent, she should see derm if not improving with current plan. Reviewed previous notes, ob notes. Home care and home hygiene discussed.   - fluconazole every 3 days for 7 doses, topical antifungal for long term and topical steroid for few weeks along with it. Understands not ideal to use it long term. She understood. Does not drink alcohol.     - fluconazole (DIFLUCAN) 150 MG tablet; Take 1 tablet (150 mg) by mouth every 3 days for 7 doses  - ketoconazole (NIZORAL) 2 % external cream; Apply topically 2 times daily  - triamcinolone (KENALOG) 0.1 % external cream; Apply topically 2 times daily Only for 2 weeks.  - Adult Dermatology Referral; Future    Ulnar neuropathy of left upper extremity  From her symptoms limited to wrist and fingers. Will start with hand therapy and if not improving, consider ortho referral. She agreed. Activity modification discussed.   - Occupational Therapy Referral; Future             BMI:   Estimated body mass index is 42.26 kg/m  as calculated from the following:    Height as of this encounter: 1.715 m (5' 7.52\").    Weight as of this encounter: 124.3 kg (274 lb).   Weight management plan: Discussed healthy diet and exercise guidelines         No follow-ups on file.    Puma Matt MD, MD  [facility]    Bryan Galeas is a 57 year old, presenting for the following health issues:  Vaginal Problem      Vaginal Problem     History of Present Illness       Reason for visit:  I have had terrible itch in my crotch area for over 1 year nothing they seem to give me clears it up    She eats 2-3 servings of fruits and vegetables daily.She consumes 0 sweetened beverage(s) daily.She exercises with enough effort to increase her heart rate 10 to 19 minutes per day.  She exercises with enough effort to increase her heart rate 4 days per week.   She is " "taking medications regularly.    Today's PHQ-9         PHQ-9 Total Score: 7    PHQ-9 Q9 Thoughts of better off dead/self-harm past 2 weeks :   Not at all    How difficult have these problems made it for you to do your work, take care of things at home, or get along with other people: Not difficult at all     Going on for a year now.   Been to PCP in [place]. Now need to change doctor as he is leaving.   Had yeast infection that was treated and it did not help.   Was seen by obgyn - was tested again - had bv. Got powder and lotion.     Gained weight - sweaty in groin region a lot. Medication did not help.     Notices dry skin under nails when itching. Mostly at night time.     Mostly in groin region but spreading towards anal area.     Left ring and small finger numbness. On computer and leena. Love to leena and wishes to continue same.   Not waking up from sleep with numbness.    is weaker.     Review of Systems   Genitourinary: Positive for vaginal discharge.            Objective    /80 (BP Location: Right arm, Patient Position: Sitting, Cuff Size: Adult Large)   Pulse 77   Temp 97.5  F (36.4  C) (Temporal)   Resp 20   Ht 1.715 m (5' 7.52\")   Wt 124.3 kg (274 lb)   LMP 07/20/2006   SpO2 97%   BMI 42.26 kg/m    Body mass index is 42.26 kg/m .  Physical Exam   Both crural folds erythema present. Just below belly button excoriating skin changes from scratching skin.                     "

## 2022-12-07 ENCOUNTER — THERAPY VISIT (OUTPATIENT)
Dept: OCCUPATIONAL THERAPY | Facility: CLINIC | Age: 57
End: 2022-12-07
Payer: COMMERCIAL

## 2022-12-07 DIAGNOSIS — R20.0 NUMBNESS AND TINGLING IN LEFT HAND: ICD-10-CM

## 2022-12-07 DIAGNOSIS — R20.2 NUMBNESS AND TINGLING IN LEFT HAND: ICD-10-CM

## 2022-12-07 PROCEDURE — 97112 NEUROMUSCULAR REEDUCATION: CPT | Mod: GO | Performed by: OCCUPATIONAL THERAPIST

## 2022-12-07 PROCEDURE — 97165 OT EVAL LOW COMPLEX 30 MIN: CPT | Mod: GO | Performed by: OCCUPATIONAL THERAPIST

## 2022-12-07 PROCEDURE — 97535 SELF CARE MNGMENT TRAINING: CPT | Mod: GO | Performed by: OCCUPATIONAL THERAPIST

## 2022-12-07 NOTE — PROGRESS NOTES
Hand Therapy Initial Evaluation    Current Date:  12/7/2022    Diagnosis: Left ulnar neuropathy  DOI: About a month ago (Order date: 11/8/2022)  Referring physician: Puma Matt MD    Precautions: None    Subjective:  Gudelia Dong is a 57 year old female.    Patient reports symptoms of the left elbow which occurred due to unknown. Since onset symptoms are Unchanged Pertinent medical history includes:  Past Medical History:   Diagnosis Date     Depression      GERD (gastroesophageal reflux disease)      Hemorrhoid      Menstrual disorder     s/p D&C -12/2012     ROMEO on CPAP      Other bipolar disorders     followed by Dr Collazo     Post menopausal syndrome      Tobacco abuse      Past Surgical History:   Procedure Laterality Date     COLONOSCOPY  12/24/2013    Procedure: COLONOSCOPY;;  Surgeon: Guy Grant MD;  Location:  GI     COLONOSCOPY  1/9/2014    Procedure: COMBINED COLONOSCOPY, SINGLE BIOPSY/POLYPECTOMY BY BIOPSY;;  Surgeon: Guy Grant MD;  Location:  GI     ESOPHAGOSCOPY, GASTROSCOPY, DUODENOSCOPY (EGD), COMBINED  12/24/2013    Procedure: COMBINED ESOPHAGOSCOPY, GASTROSCOPY, DUODENOSCOPY (EGD), BIOPSY SINGLE OR MULTIPLE;  ESOPHAGOSCOPY, GASTROSCOPY, DUODENOSCOPY, COLONOSCOPY;  Surgeon: Guy Grant MD;  Location:  GI     GENITOURINARY SURGERY      urethra stretched     GYN SURGERY      d&c      LAPAROSCOPIC ASSISTED RECTOPEXY  3/14/2014    Procedure: LAPAROSCOPIC ASSISTED RECTOPEXY;  LAPAROSCOPIC  VENTRAL RECTOPEXY ;  Surgeon: Jennifer Connolly MD;  Location:  OR     ORTHOPEDIC SURGERY      surgery on clavicle,surgery for left leg fracture     Current Outpatient Medications   Medication     albuterol (PROAIR HFA/PROVENTIL HFA/VENTOLIN HFA) 108 (90 Base) MCG/ACT inhaler     albuterol (VENTOLIN HFA) 108 (90 Base) MCG/ACT inhaler     ammonium lactate (AMLACTIN) 12 % external cream     bacitracin 500 UNIT/GM external ointment     bacitracin 500 UNIT/GM OINT     buPROPion (WELLBUTRIN  XL) 150 MG 24 hr tablet     busPIRone (BUSPAR) 10 MG tablet     clotrimazole (LOTRIMIN) 1 % external cream     Doxepin HCl 150 MG CAPS     econazole nitrate 1 % external cream     escitalopram (LEXAPRO) 10 MG tablet     estradiol (ESTRACE) 0.1 MG/GM vaginal cream     furosemide (LASIX) 40 MG tablet     gabapentin (NEURONTIN) 300 MG capsule     ketoconazole (NIZORAL) 2 % external cream     levothyroxine (SYNTHROID/LEVOTHROID) 150 MCG tablet     melatonin 3 MG tablet     nicotine (NICODERM CQ) 21 MG/24HR 24 hr patch     nicotine (NICOTROL) 10 MG inhaler     nystatin (MYCOSTATIN) 092880 UNIT/GM external powder     order for DME     pantoprazole (PROTONIX) 40 MG EC tablet     REXULTI 1 MG tablet     REXULTI 2 MG tablet     triamcinolone (KENALOG) 0.1 % external cream     zaleplon (SONATA) 10 MG capsule     No current facility-administered medications for this visit.     Allergies   Allergen Reactions     1-Methyl 2-Pyrrolidone Swelling     Contrast Dye Shortness Of Breath     Iodine Swelling     Other reaction(s): Angioedema  Facial swelling.     Morphine Anxiety and Nausea and Vomiting     Other reaction(s): Behavioral Disturbances  Other reaction(s): Anxiety, vomiting       Occupational Profile Information:  Right hand dominant  Current occupation: Crew 2, primarily desk work  Job Tasks: Computer Work, Prolonged Sitting, Repetitive Tasks  Currently working in normal job without restrictions  Prior functional level:  No limitations  Mobility: No difficulty  Transportation: Drives  Barriers include: None  Leisure activities/hobbies: Crocheting    Patient reports symptoms of weakness/loss of strength, numbness and tingling   Patient reported occupational performance deficits: reaching  Special tests:  None  Previous treatment: Self-cares, household chores, work , and leisure activities  Other: Patient noted to lean/rest on left elbow throughout the visit.      Functional Outcome Measure:   Upper Extremity Functional  Index Score:  SCORE: 57/80   (A lower score indicates greater disability.)        Objective:  Pain Level (Scale 0-10):   12/7/2022   At Rest 0/10   With Use 0/10     Edema  None noted on exam.    Sensation  Decreased ulnar nerve distribution per patient report. Patient describes constant numbness of the ring and small fingers.    Appearance  Observation:  - none  + mild    ++ moderate    +++ severe    12/7/2022   Clawing None noted on exam, but patient reports she has noticed this 2 or 3 times   Hypothenar Atrophy Not noted   Intrinsic Atrophy Not noted     Special Tests  Pain Report: - none  + mild    ++ moderate    +++ severe    12/7/2022   Tinels at cubital tunnel R: -  L: +   Elbow Flexion Test R: NT  L: +   Froment's Test R: -  L: +   Wartenberg's sign R: -  L: +     MMT Ulnar Nerve  Scale 0-5/5   12/7/2022   FDP Ring and Small R: 5/5  L: 4/5   Palmar Interossei R: 5/5  L: 4/5   Dorsal Interossei R: 5/5   L: 4/5     Strength   (Measured in pounds)  Pain Report: - none  + mild    ++ moderate    +++ severe    12/7/2022 12/7/2022   Trials Right Left   1  2 55  59 41  40     Average 57 40.5     Lat Pinch 12/7/2022 12/7/2022   Trials Right Left   1 12 5.5     3 Pt Pinch 12/7/2022 12/7/2022   Trials Right Left   1 10 2.5       Assessment:  Patient presents with symptoms consistent with diagnosis of the above condition, with conservative intervention.     Patient's limitations or Problem List includes:  Weakness, Sensory disturbance, Decreased , Decreased pinch, Decreased coordination and Decreased dexterity of the left hand which interferes with the patient's ability to perform Self Care Tasks (dressing, eating, bathing, hygiene/toileting), Work Tasks, Sleep Patterns, Recreational Activities, Household Chores and Driving  as compared to previous level of function.    Rehab Potential:  Excellent - Return to full activity, no limitations    Patient will benefit from skilled Occupational Therapy to increase   strength, pinch strength, coordination, dexterity and sensation to return to previous activity level and resume normal daily tasks and to reach their rehab potential.    Barriers to Learning:  No barrier    Communication Issues:  Patient appears to be able to clearly communicate and understand verbal and written communication and follow directions correctly.    Chart Review: Chart Review    Identified Performance Deficits: bathing/showering, dressing, feeding, hygiene and grooming, driving and community mobility, home establishment and management, meal preparation and cleanup, shopping, sleep, work and leisure activities    Assessment of Occupational Performance:  1-3 Performance Deficits    Clinical Decision Making (Complexity): Low complexity    Treatment Explanation:  The following has been discussed with the patient:  RX ordered/plan of care  Anticipated outcomes  Possible risks and side effects    Plan:  Frequency:  2 X a month, once daily  Duration:  for 2 months    Treatment Plan:    Modalities:    US   Therapeutic Exercise:    AROM, PROM, Tendon Gliding, Isotonics and Isometrics  Neuromuscular re-ed:   Nerve Gliding and Kinesiotaping  Manual Techniques:   Myofascial release  Orthotic Fabrication:    Elbow flexion block orthosis  Self Care:    Cubital tunnel precautions/prevention    Discharge Plan:  Achieve all LTG.  Independent in home treatment program.  Reach maximal therapeutic benefit.    Home Exercise Program:  Ulnar nerve gliding  Intrinsic and lateral pinch strengthening    Next Visit:  Progress nerve gliding  Fabricate elbow flexion block for nighttime wear  Kinesiotaping for cubital tunnel

## 2022-12-23 DIAGNOSIS — K21.9 GASTROESOPHAGEAL REFLUX DISEASE WITHOUT ESOPHAGITIS: ICD-10-CM

## 2022-12-23 RX ORDER — PANTOPRAZOLE SODIUM 40 MG/1
TABLET, DELAYED RELEASE ORAL
Qty: 180 TABLET | Refills: 0 | Status: SHIPPED | OUTPATIENT
Start: 2022-12-23 | End: 2023-03-28

## 2023-01-20 ENCOUNTER — TRANSFERRED RECORDS (OUTPATIENT)
Dept: HEALTH INFORMATION MANAGEMENT | Facility: CLINIC | Age: 58
End: 2023-01-20

## 2023-03-10 DIAGNOSIS — E03.2 HYPOTHYROIDISM DUE TO MEDICATION: ICD-10-CM

## 2023-03-10 RX ORDER — LEVOTHYROXINE SODIUM 150 UG/1
TABLET ORAL
Qty: 90 TABLET | Refills: 3 | Status: SHIPPED | OUTPATIENT
Start: 2023-03-10 | End: 2023-05-13

## 2023-03-28 ENCOUNTER — OFFICE VISIT (OUTPATIENT)
Dept: FAMILY MEDICINE | Facility: CLINIC | Age: 58
End: 2023-03-28
Payer: COMMERCIAL

## 2023-03-28 VITALS
HEIGHT: 68 IN | HEART RATE: 68 BPM | OXYGEN SATURATION: 98 % | SYSTOLIC BLOOD PRESSURE: 154 MMHG | TEMPERATURE: 97.3 F | WEIGHT: 281.9 LBS | BODY MASS INDEX: 42.72 KG/M2 | RESPIRATION RATE: 16 BRPM | DIASTOLIC BLOOD PRESSURE: 83 MMHG

## 2023-03-28 DIAGNOSIS — N89.8 VAGINAL IRRITATION: ICD-10-CM

## 2023-03-28 DIAGNOSIS — E03.2 HYPOTHYROIDISM DUE TO MEDICATION: ICD-10-CM

## 2023-03-28 DIAGNOSIS — Z00.00 ROUTINE HISTORY AND PHYSICAL EXAMINATION OF ADULT: Primary | ICD-10-CM

## 2023-03-28 DIAGNOSIS — Z12.11 SCREEN FOR COLON CANCER: ICD-10-CM

## 2023-03-28 DIAGNOSIS — F10.21 RECOVERING ALCOHOLIC IN REMISSION (H): Chronic | ICD-10-CM

## 2023-03-28 DIAGNOSIS — R73.03 PREDIABETES: ICD-10-CM

## 2023-03-28 DIAGNOSIS — Z72.0 TOBACCO ABUSE: Chronic | ICD-10-CM

## 2023-03-28 DIAGNOSIS — K44.9 HIATAL HERNIA: ICD-10-CM

## 2023-03-28 DIAGNOSIS — K21.9 GASTROESOPHAGEAL REFLUX DISEASE WITHOUT ESOPHAGITIS: ICD-10-CM

## 2023-03-28 DIAGNOSIS — Z12.31 VISIT FOR SCREENING MAMMOGRAM: ICD-10-CM

## 2023-03-28 DIAGNOSIS — Z23 HIGH PRIORITY FOR 2019-NCOV VACCINE: ICD-10-CM

## 2023-03-28 DIAGNOSIS — F31.81 BIPOLAR 2 DISORDER (H): Chronic | ICD-10-CM

## 2023-03-28 DIAGNOSIS — G47.00 INSOMNIA, UNSPECIFIED TYPE: ICD-10-CM

## 2023-03-28 DIAGNOSIS — L30.8 OTHER ECZEMA: ICD-10-CM

## 2023-03-28 DIAGNOSIS — K21.9 GASTROESOPHAGEAL REFLUX DISEASE, UNSPECIFIED WHETHER ESOPHAGITIS PRESENT: ICD-10-CM

## 2023-03-28 LAB
ALBUMIN SERPL BCG-MCNC: 4.3 G/DL (ref 3.5–5.2)
ALP SERPL-CCNC: 123 U/L (ref 35–104)
ALT SERPL W P-5'-P-CCNC: 21 U/L (ref 10–35)
ANION GAP SERPL CALCULATED.3IONS-SCNC: 14 MMOL/L (ref 7–15)
AST SERPL W P-5'-P-CCNC: 21 U/L (ref 10–35)
BASOPHILS # BLD AUTO: 0 10E3/UL (ref 0–0.2)
BASOPHILS NFR BLD AUTO: 1 %
BILIRUB SERPL-MCNC: 0.2 MG/DL
BUN SERPL-MCNC: 15.4 MG/DL (ref 8–23)
CALCIUM SERPL-MCNC: 9.3 MG/DL (ref 8.8–10.2)
CHLORIDE SERPL-SCNC: 105 MMOL/L (ref 98–107)
CHOLEST SERPL-MCNC: 174 MG/DL
CREAT SERPL-MCNC: 0.83 MG/DL (ref 0.51–0.95)
DEPRECATED HCO3 PLAS-SCNC: 26 MMOL/L (ref 22–29)
EOSINOPHIL # BLD AUTO: 0.3 10E3/UL (ref 0–0.7)
EOSINOPHIL NFR BLD AUTO: 5 %
ERYTHROCYTE [DISTWIDTH] IN BLOOD BY AUTOMATED COUNT: 15.7 % (ref 10–15)
GFR SERPL CREATININE-BSD FRML MDRD: 80 ML/MIN/1.73M2
GLUCOSE SERPL-MCNC: 94 MG/DL (ref 70–99)
HBA1C MFR BLD: 5.8 % (ref 0–5.6)
HCT VFR BLD AUTO: 37.5 % (ref 35–47)
HDLC SERPL-MCNC: 69 MG/DL
HGB BLD-MCNC: 11.7 G/DL (ref 11.7–15.7)
IMM GRANULOCYTES # BLD: 0 10E3/UL
IMM GRANULOCYTES NFR BLD: 0 %
LDLC SERPL CALC-MCNC: 89 MG/DL
LYMPHOCYTES # BLD AUTO: 1.8 10E3/UL (ref 0.8–5.3)
LYMPHOCYTES NFR BLD AUTO: 36 %
MCH RBC QN AUTO: 27.7 PG (ref 26.5–33)
MCHC RBC AUTO-ENTMCNC: 31.2 G/DL (ref 31.5–36.5)
MCV RBC AUTO: 89 FL (ref 78–100)
MONOCYTES # BLD AUTO: 0.4 10E3/UL (ref 0–1.3)
MONOCYTES NFR BLD AUTO: 8 %
NEUTROPHILS # BLD AUTO: 2.6 10E3/UL (ref 1.6–8.3)
NEUTROPHILS NFR BLD AUTO: 50 %
NONHDLC SERPL-MCNC: 105 MG/DL
PLATELET # BLD AUTO: 298 10E3/UL (ref 150–450)
POTASSIUM SERPL-SCNC: 4.1 MMOL/L (ref 3.4–5.3)
PROT SERPL-MCNC: 7.6 G/DL (ref 6.4–8.3)
RBC # BLD AUTO: 4.23 10E6/UL (ref 3.8–5.2)
SODIUM SERPL-SCNC: 145 MMOL/L (ref 136–145)
TRIGL SERPL-MCNC: 82 MG/DL
TSH SERPL DL<=0.005 MIU/L-ACNC: 0.85 UIU/ML (ref 0.3–4.2)
WBC # BLD AUTO: 5.1 10E3/UL (ref 4–11)

## 2023-03-28 PROCEDURE — 80050 GENERAL HEALTH PANEL: CPT | Performed by: INTERNAL MEDICINE

## 2023-03-28 PROCEDURE — 36415 COLL VENOUS BLD VENIPUNCTURE: CPT | Performed by: INTERNAL MEDICINE

## 2023-03-28 PROCEDURE — 83036 HEMOGLOBIN GLYCOSYLATED A1C: CPT | Performed by: INTERNAL MEDICINE

## 2023-03-28 PROCEDURE — 99396 PREV VISIT EST AGE 40-64: CPT | Mod: 25 | Performed by: INTERNAL MEDICINE

## 2023-03-28 PROCEDURE — 91312 COVID-19 VACCINE BIVALENT BOOSTER 12+ (PFIZER): CPT | Performed by: INTERNAL MEDICINE

## 2023-03-28 PROCEDURE — 99214 OFFICE O/P EST MOD 30 MIN: CPT | Mod: 25 | Performed by: INTERNAL MEDICINE

## 2023-03-28 PROCEDURE — 0124A COVID-19 VACCINE BIVALENT BOOSTER 12+ (PFIZER): CPT | Performed by: INTERNAL MEDICINE

## 2023-03-28 PROCEDURE — 80061 LIPID PANEL: CPT | Performed by: INTERNAL MEDICINE

## 2023-03-28 RX ORDER — DOXEPIN HYDROCHLORIDE 100 MG/1
100 CAPSULE ORAL AT BEDTIME
COMMUNITY
End: 2024-01-10

## 2023-03-28 RX ORDER — AMMONIUM LACTATE 12 G/100G
CREAM TOPICAL DAILY PRN
Qty: 385 G | Refills: 1 | Status: SHIPPED | OUTPATIENT
Start: 2023-03-28

## 2023-03-28 RX ORDER — NICOTINE 21 MG/24HR
1 PATCH, TRANSDERMAL 24 HOURS TRANSDERMAL EVERY 24 HOURS
Qty: 30 PATCH | Refills: 1 | Status: SHIPPED | OUTPATIENT
Start: 2023-03-28 | End: 2023-05-09

## 2023-03-28 RX ORDER — LAMOTRIGINE 100 MG/1
100 TABLET ORAL EVERY MORNING
COMMUNITY
Start: 2023-03-25

## 2023-03-28 RX ORDER — BUSPIRONE HYDROCHLORIDE 30 MG/1
1 TABLET ORAL
COMMUNITY
Start: 2023-03-04

## 2023-03-28 RX ORDER — PANTOPRAZOLE SODIUM 40 MG/1
40 TABLET, DELAYED RELEASE ORAL 2 TIMES DAILY
Qty: 180 TABLET | Refills: 3 | Status: SHIPPED | OUTPATIENT
Start: 2023-03-28 | End: 2023-05-15

## 2023-03-28 ASSESSMENT — ENCOUNTER SYMPTOMS
FREQUENCY: 1
BREAST MASS: 0

## 2023-03-28 ASSESSMENT — PATIENT HEALTH QUESTIONNAIRE - PHQ9
SUM OF ALL RESPONSES TO PHQ QUESTIONS 1-9: 5
10. IF YOU CHECKED OFF ANY PROBLEMS, HOW DIFFICULT HAVE THESE PROBLEMS MADE IT FOR YOU TO DO YOUR WORK, TAKE CARE OF THINGS AT HOME, OR GET ALONG WITH OTHER PEOPLE: SOMEWHAT DIFFICULT
SUM OF ALL RESPONSES TO PHQ QUESTIONS 1-9: 5

## 2023-03-28 ASSESSMENT — PAIN SCALES - GENERAL: PAINLEVEL: NO PAIN (0)

## 2023-03-28 NOTE — LETTER
March 28, 2023      Gudelia Dong  [address]   [place] [address]        Dear ,    We are writing to inform you of your test results.    Well controlled thyroid   Well controlled cholesterol   Prediabetes, not diabetic. Continue efforts toward healthy food choices and regular exercise   Otherwise stable lab results       Resulted Orders   Comprehensive metabolic panel   Result Value Ref Range    Sodium 145 136 - 145 mmol/L    Potassium 4.1 3.4 - 5.3 mmol/L    Chloride 105 98 - 107 mmol/L    Carbon Dioxide (CO2) 26 22 - 29 mmol/L    Anion Gap 14 7 - 15 mmol/L    Urea Nitrogen 15.4 8.0 - 23.0 mg/dL    Creatinine 0.83 0.51 - 0.95 mg/dL    Calcium 9.3 8.8 - 10.2 mg/dL    Glucose 94 70 - 99 mg/dL    Alkaline Phosphatase 123 (H) 35 - 104 U/L    AST 21 10 - 35 U/L    ALT 21 10 - 35 U/L    Protein Total 7.6 6.4 - 8.3 g/dL    Albumin 4.3 3.5 - 5.2 g/dL    Bilirubin Total 0.2 <=1.2 mg/dL    GFR Estimate 80 >60 mL/min/1.73m2      Comment:      eGFR calculated using 2021 CKD-EPI equation.   Hemoglobin A1c   Result Value Ref Range    Hemoglobin A1C 5.8 (H) 0.0 - 5.6 %      Comment:      Normal <5.7%   Prediabetes 5.7-6.4%    Diabetes 6.5% or higher     Note: Adopted from ADA consensus guidelines.   Lipid panel reflex to direct LDL Fasting   Result Value Ref Range    Cholesterol 174 <200 mg/dL    Triglycerides 82 <150 mg/dL    Direct Measure HDL 69 >=50 mg/dL    LDL Cholesterol Calculated 89 <=100 mg/dL    Non HDL Cholesterol 105 <130 mg/dL    Narrative    Cholesterol  Desirable:  <200 mg/dL    Triglycerides  Normal:  Less than 150 mg/dL  Borderline High:  150-199 mg/dL  High:  200-499 mg/dL  Very High:  Greater than or equal to 500 mg/dL    Direct Measure HDL  Female:  Greater than or equal to 50 mg/dL   Male:  Greater than or equal to 40 mg/dL    LDL Cholesterol  Desirable:  <100mg/dL  Above Desirable:  100-129 mg/dL   Borderline High:  130-159 mg/dL   High:  160-189 mg/dL   Very High:  >= 190  mg/dL    Non HDL Cholesterol  Desirable:  130 mg/dL  Above Desirable:  130-159 mg/dL  Borderline High:  160-189 mg/dL  High:  190-219 mg/dL  Very High:  Greater than or equal to 220 mg/dL   TSH with free T4 reflex   Result Value Ref Range    TSH 0.85 0.30 - 4.20 uIU/mL   CBC with platelets and differential   Result Value Ref Range    WBC Count 5.1 4.0 - 11.0 10e3/uL    RBC Count 4.23 3.80 - 5.20 10e6/uL    Hemoglobin 11.7 11.7 - 15.7 g/dL    Hematocrit 37.5 35.0 - 47.0 %    MCV 89 78 - 100 fL    MCH 27.7 26.5 - 33.0 pg    MCHC 31.2 (L) 31.5 - 36.5 g/dL    RDW 15.7 (H) 10.0 - 15.0 %    Platelet Count 298 150 - 450 10e3/uL    % Neutrophils 50 %    % Lymphocytes 36 %    % Monocytes 8 %    % Eosinophils 5 %    % Basophils 1 %    % Immature Granulocytes 0 %    Absolute Neutrophils 2.6 1.6 - 8.3 10e3/uL    Absolute Lymphocytes 1.8 0.8 - 5.3 10e3/uL    Absolute Monocytes 0.4 0.0 - 1.3 10e3/uL    Absolute Eosinophils 0.3 0.0 - 0.7 10e3/uL    Absolute Basophils 0.0 0.0 - 0.2 10e3/uL    Absolute Immature Granulocytes 0.0 <=0.4 10e3/uL       If you have any questions or concerns, please call the clinic at the number listed above.       Sincerely,      Dr. Selena Bella, DO

## 2023-03-28 NOTE — PROGRESS NOTES
SUBJECTIVE:   CC: Gudelia is an 58 year old who presents for preventive health visit.   No flowsheet data found.  Patient has been advised of split billing requirements and indicates understanding: Yes  Healthy Habits:     Getting at least 3 servings of Calcium per day:  NO    Bi-annual eye exam:  Yes    Dental care twice a year:  NO    Sleep apnea or symptoms of sleep apnea:  Sleep apnea    Diet:  Regular (no restrictions)    Frequency of exercise:  None    Taking medications regularly:  Yes    Medication side effects:  Not applicable    PHQ-2 Total Score: 2    Additional concerns today:  Yes        Today's PHQ-2 Score:   PHQ-2 ( 1999 Pf[employer]) 3/28/2023   Q1: Little interest or pleasure in doing things 1   Q2: Feeling down, depressed or hopeless 1   PHQ-2 Score 2   PHQ-2 Total Score (12-17 Years)- Positive if 3 or more points; Administer PHQ-A if positive -   Q1: Little interest or pleasure in doing things Several days   Q2: Feeling down, depressed or hopeless Several days   PHQ-2 Score 2           Social History     Tobacco Use     Smoking status: Former     Packs/day: 0.25     Types: Cigarettes     Quit date: 2/3/2020     Years since quitting: 3.1     Smokeless tobacco: Never     Tobacco comments:     quite for year, restarted 2022, 3/2022 reports on patch   Substance Use Topics     Alcohol use: No     Alcohol/week: 0.0 standard drinks         Alcohol Use 3/28/2023   Prescreen: >3 drinks/day or >7 drinks/week? Not Applicable     Reviewed orders with patient.  Reviewed health maintenance and updated orders accordingly -       Breast Cancer Screening:    FHS-7:   Breast CA Risk Assessment (FHS-7) 2/10/2022 3/28/2023   Did any of your first-degree relatives have breast or ovarian cancer? Yes Yes   Did any of your relatives have bilateral breast cancer? No No   Did any man in your family have breast cancer? No No   Did any woman in your family have breast and ovarian cancer? Yes Yes   Did any woman in your family  "have breast cancer before age 50 y? No No   Do you have 2 or more relatives with breast and/or ovarian cancer? No No   Do you have 2 or more relatives with breast and/or bowel cancer? No Unknown         Pertinent mammograms are reviewed under the imaging tab.    History of abnormal Pap smear:   PAP / HPV Latest Ref Rng & Units 2/11/2022 2/20/2018 12/18/2015   PAP   Negative for Intraepithelial Lesion or Malignancy (NILM) - -   PAP (Historical) - - NIL NIL   HPV16 Negative Negative Negative -   HPV18 Negative Negative Negative -   HRHPV Negative Negative Negative -     Reviewed and updated as needed this visit by clinical staff   Tobacco  Allergies  Meds              Reviewed and updated as needed this visit by Provider                 Former PCP: Dianna Manzanares  Specialists: Psychiatrist (Dr Boykin), therapist ([facility]; sleep (jordi meléndez)    Does not feel lasix helps with edema and does not desire to continue it. Denies sob/cp. States has noticed edema is weather based.    Majority of visit spent reviewing her health history to establish care and reconciling medications (some of which she is not sure of dosage as managed by her mental health provider). Recently started lamotrigine. Thinks she now takes doxepin 100mg nightly and thinks buspar is now 30mg BID.    Takes PPI BID, states has hiatal hernia and GERD so BID necessary instead of daily.      Review of Systems   Cardiovascular: Positive for peripheral edema.   Breasts:  Negative for tenderness, breast mass and discharge.   Genitourinary: Positive for frequency. Negative for pelvic pain, vaginal bleeding and vaginal discharge.          OBJECTIVE:   BP (!) 154/83 (BP Location: Right arm, Patient Position: Sitting, Cuff Size: Adult Large)   Pulse 68   Temp 97.3  F (36.3  C)   Resp 16   Ht 1.715 m (5' 7.52\")   Wt 127.9 kg (281 lb 14.4 oz)   LMP 07/20/2006   SpO2 98%   BMI 43.47 kg/m    Physical Exam    GENERAL " "APPEARANCE: AAOx3, no distress. Well developed.    RESP: Lungs CTA bilaterally. No w/r/r. No distress     CV: RRR, S1/S2 present. No m/r/c.     ABDOMEN:  soft, nontender, no distention. No rebound or guarding.     EXT: No c/c/e in lower extremities b/l. No rashes or deformities noted.    PSYCH: appropriate mood and affect.   BREAST: bilateral exam without masses, tenderness or nipple discharge; no palpable axillary masses or adenopathy      ASSESSMENT/PLAN:   Gudelia was seen today for physical.    Diagnoses and all orders for this visit:    Routine history and physical examination of adult  COVID booster administered today  Discussed shingrix vaccine due-she will consider doing through pharmacy after checking on insurance coverage  Update labs  -     Comprehensive metabolic panel; Future  -     CBC with Platelets & Differential; Future  -     Hemoglobin A1c; Future  -     Lipid panel reflex to direct LDL Fasting; Future    Vaginal irritation  Chronic, states was told \"pH balance issue\"-has upcoming derm appt already planned. Recommend follow-up with gyn for next steps as well for their opinion.  -     Ob/Gyn Referral; Future    Screen for colon cancer  Hiatal hernia  Gastroesophageal reflux disease, unspecified whether esophagitis present   Overdue for cscope however had obstacles with last one that was performed and has concerns about hiatal hernia/GERD. Requires PPI BID. Recommend consult with GI for next steps for best option for colon cancer screening and evaluation of her symptoms.  -     Adult GI  Referral - Consult Only; Future    Visit for screening mammogram  Overdue, recommend yearly. Discussed:  -     MA SCREENING DIGITAL BILAT - Future  (s+30); Future    Hypothyroidism due to medication  -     TSH with free T4 reflex; Future    Bipolar 2 disorder (H)  Insomnia, unspecified type   Follows psychiatrist and therapist   On medication management per mental health team    Tobacco abuse  Smokes " primarily cigars at this point due to lower cost per patient. Recommend cessation. She would like refill on nicotine patch which was effective in the past:  -     nicotine (NICODERM CQ) 21 MG/24HR 24 hr patch; Place 1 patch onto the skin every 24 hours    Other eczema  Refill for prn use on feet:  -     ammonium lactate (AMLACTIN) 12 % external cream; Apply topically daily as needed for dry skin    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)   Remains in remission, congratulated her    Prediabetes   Check a1c      > 45 min spent on the date of this encounter doing chart review, documentation, history/exam, and further activities as noted above      She reports that she quit smoking about 3 years ago. Her smoking use included cigarettes. She smoked an average of .25 packs per day. She has never used smokeless tobacco.    Selena Bella DO  [facility]

## 2023-04-03 ENCOUNTER — TELEPHONE (OUTPATIENT)
Dept: GASTROENTEROLOGY | Facility: CLINIC | Age: 58
End: 2023-04-03

## 2023-04-03 NOTE — TELEPHONE ENCOUNTER
[facility] Call Center    Phone Message    May a detailed message be left on voicemail: yes     Reason for Call: Appointment Intake    Referring Provider Name: Selena Bella MD  Diagnosis and/or Symptoms: Screen for colon cancer [Z12.11] Gastroesophageal reflux disease without esophagitis [K21.9] Hiatal hernia [K44.9]    Per triage notes, patient is to be seen as a GI Urgent, but is currently scheduled on 06/13/2023 with Dr. Sarmad Reed. Current appointment is outside requested time frame. Please review for further follow up per scheduling guidelines. Thanks!     Action Taken: Message routed to:  [facility] ([facility] GI    Travel Screening: Not Applicable

## 2023-05-03 NOTE — TELEPHONE ENCOUNTER
Writer called Pt back. Pt is now scheduled for 5/19/2023 at 8:30am with Rosalie Martínez for an in-person clinic visit.

## 2023-05-08 ENCOUNTER — MYC MEDICAL ADVICE (OUTPATIENT)
Dept: GASTROENTEROLOGY | Facility: CLINIC | Age: 58
End: 2023-05-08

## 2023-05-08 DIAGNOSIS — Z72.0 TOBACCO ABUSE: Chronic | ICD-10-CM

## 2023-05-09 RX ORDER — NICOTINE 21 MG/24HR
1 PATCH, TRANSDERMAL 24 HOURS TRANSDERMAL EVERY 24 HOURS
Qty: 30 PATCH | Refills: 1 | Status: SHIPPED | OUTPATIENT
Start: 2023-05-09 | End: 2024-07-22

## 2023-05-12 NOTE — TELEPHONE ENCOUNTER
Called to remind patient of their upcoming appointment with our GI clinic, on Friday, May 19th at 8:30am with Rosalie Martínez . This appointment is scheduled as an in-person appt. Please arrive 15 minutes early to check in for your appointment. , if your appointment is virtual (video or telephone) you need to be in [place] for the visit. To reschedule or cancel patient to call [phone].    Ni Cooper

## 2023-05-13 DIAGNOSIS — K21.9 GASTROESOPHAGEAL REFLUX DISEASE WITHOUT ESOPHAGITIS: ICD-10-CM

## 2023-05-13 DIAGNOSIS — E03.2 HYPOTHYROIDISM DUE TO MEDICATION: ICD-10-CM

## 2023-05-15 RX ORDER — PANTOPRAZOLE SODIUM 40 MG/1
40 TABLET, DELAYED RELEASE ORAL 2 TIMES DAILY
Qty: 180 TABLET | Refills: 2 | Status: SHIPPED | OUTPATIENT
Start: 2023-05-15 | End: 2024-01-08

## 2023-05-15 RX ORDER — LEVOTHYROXINE SODIUM 150 UG/1
150 TABLET ORAL DAILY
Qty: 90 TABLET | Refills: 2 | Status: SHIPPED | OUTPATIENT
Start: 2023-05-15 | End: 2024-03-26

## 2023-05-18 ENCOUNTER — HOSPITAL ENCOUNTER (OUTPATIENT)
Dept: MAMMOGRAPHY | Facility: CLINIC | Age: 58
Discharge: HOME | End: 2023-05-18
Attending: INTERNAL MEDICINE | Admitting: INTERNAL MEDICINE
Payer: COMMERCIAL

## 2023-05-18 DIAGNOSIS — Z12.31 VISIT FOR SCREENING MAMMOGRAM: ICD-10-CM

## 2023-05-18 PROCEDURE — 77067 SCR MAMMO BI INCL CAD: CPT

## 2023-05-19 ENCOUNTER — OFFICE VISIT (OUTPATIENT)
Dept: GASTROENTEROLOGY | Facility: CLINIC | Age: 58
End: 2023-05-19
Attending: INTERNAL MEDICINE
Payer: COMMERCIAL

## 2023-05-19 ENCOUNTER — PRE VISIT (OUTPATIENT)
Dept: GASTROENTEROLOGY | Facility: CLINIC | Age: 58
End: 2023-05-19

## 2023-05-19 VITALS
WEIGHT: 288.6 LBS | SYSTOLIC BLOOD PRESSURE: 143 MMHG | OXYGEN SATURATION: 96 % | BODY MASS INDEX: 43.74 KG/M2 | HEART RATE: 84 BPM | DIASTOLIC BLOOD PRESSURE: 75 MMHG | HEIGHT: 68 IN

## 2023-05-19 DIAGNOSIS — E66.813 CLASS 3 SEVERE OBESITY WITH BODY MASS INDEX (BMI) OF 40.0 TO 44.9 IN ADULT, UNSPECIFIED OBESITY TYPE, UNSPECIFIED WHETHER SERIOUS COMORBIDITY PRESENT (H): Primary | ICD-10-CM

## 2023-05-19 DIAGNOSIS — Z12.11 SCREEN FOR COLON CANCER: ICD-10-CM

## 2023-05-19 DIAGNOSIS — K21.9 GASTROESOPHAGEAL REFLUX DISEASE WITHOUT ESOPHAGITIS: ICD-10-CM

## 2023-05-19 DIAGNOSIS — E66.01 CLASS 3 SEVERE OBESITY WITH BODY MASS INDEX (BMI) OF 40.0 TO 44.9 IN ADULT, UNSPECIFIED OBESITY TYPE, UNSPECIFIED WHETHER SERIOUS COMORBIDITY PRESENT (H): Primary | ICD-10-CM

## 2023-05-19 DIAGNOSIS — K44.9 HIATAL HERNIA: ICD-10-CM

## 2023-05-19 PROCEDURE — 99205 OFFICE O/P NEW HI 60 MIN: CPT | Performed by: PHYSICIAN ASSISTANT

## 2023-05-19 ASSESSMENT — PAIN SCALES - GENERAL: PAINLEVEL: NO PAIN (0)

## 2023-05-19 NOTE — LETTER
"    5/19/2023         RE: Gudelia Dong  [address]  [place] [address]        Dear Colleague,    Thank you for referring your patient, Gudelia Dong, to the [facility] [place]. Please see a copy of my visit note below.    Gastroenterology Visit for: Gudelia Dong 1965   MRN: [id number]     Reason for Visit:  chief complaint    Referred by: Shayne  / [id number] / [place] [address]  Patient Care Team:  Selena Bella DO as PCP - General (Internal Medicine)  Thierry Lord DPM as Assigned Musculoskeletal Provider  Jennifer Lassiter MD as Assigned OBGYN Provider  Mendel Abbasi MD as Resident (Student in organized health care education/training program)  Sarmad Reed MD as MD (Gastroenterology)  Selena Bella DO as Scribe  Selena Bella DO as Assigned PCP    History of Present Illness:   Gudelia Dong is a 58 year old female with significant past medical history pertinent for insomnia, depression, ADHD, bipolar type II, tobacco use, history of alcoholism, psychosis, HydroDIURIL suppurativa, history of iron deficiency anemia, obesity, prediabetes, hypothyroidism, ROMEO on CPAP, hiatal hernia and GERD who is presenting as a new patient in consultation at the request of Dr. Bella for CRC screening, GERD and hiatal hernia with a chief complaint of heartburn/regurgiation/dysphagia and belching.    Prior labs from March of this year include TSH, hemoglobin A1c, CBC and CMP that are grossly unremarkable other than an A1c of 5.8.      Gudelia reports history of long standing reflux consistent of both heartburn/regurgitation. Currently she is taking Protonix 40 mg twice daily with adequate control of her heartburn/regurgiation. Approximately once per month will have vomiting. Specifically she notes that she is unable to drink liquid with eating. If she does \"this will get stuck and result in throwing up.\" Also has dysphagia to solids. Specific trigger " foods include meats. Denies dysphagia to semi solids and liquids. Associated with belching that can last 30 - 45 minutes.     Has a bowel movement twice per week that is consistent with [place] Stool Scale Type 2-3. Between she may have small volume stools consistent with [place] Stool Scale Type 1. Associated with intermittent pushing/strianing. Denies digitation, vaginal splinting or sensation of incomplete evacuation.      In the past 5 years has gained 50+ lbs.     Denies weight loss, odynophagia, dysphonia/hoarseness, chronic cough, neck pain/swelling/mass, abdominal pain, diarrhea, constipation, nocturnal stooling, incontinence, hematochezia and BRBR.     Denies use of ETOH and recreational drug use. Had 18 months of refraining from smoking. Currently she is on the patch and will have 4 cigarettes every three days.     No family history or GI related malignancy (esophageal, gastric, pancreatic, liver or colon) or family history of IBD/celiac disease.     Previously did not tolerate MAC sedation.     Esophageal Questionnaire(s)    BEDQ Questionnaire       View : No data to display.                   View : No data to display.                Eckardt Questionnaire       View : No data to display.                Promis 10 Questionnaire       View : No data to display.                    STUDIES & PROCEDURES:    EGD:     5/14/2021 [facility]         8/2015 [facility]         Colonoscopy:    5/14/2021 [facility]     1/2014  Findings:        The digital rectal exam was abnormal. Findings include decreased        sphincter tone. Pertinent negatives include no palpable rectal lesions.        The terminal ileum appeared normal. A benign appearing sessile polyp was        found in the rectum. The polyp was 5 mm in size. The polyp was removed        with a hot snare. Resection and retrieval were complete. Estimated blood        loss was minimal. A segmental area of moderately congested and        erythematous mucosa was found in the  distal rectum. Biopsies were taken        with a cold forceps for histology. Estimated blood loss was minimal.                                                                                    Impression:               - Rectal exam revealed decreased sphincter tone.                             - The examined portion of the ileum was normal.                             - One 5 mm polyp in the rectum (benign appearing).                             Resected and retrieved.                             - Congested and erythematous mucosa distal rectum.                             This was biopsied.                             - Rectal prolapse.                             - The clinical history suggests pelvic floor                             dysfunction with rectal prolapse and urinary                             retention requiring digital manipulation of the                             pelvic floor to facilitate bladder emptying. I                             believe further evaluation of the perineum at the                             pelvic floor center is indicated.        EndoFLIP directed at the UES or LES (8cm (EF-325) balloon length or 16cm (EF-322) balloon length):   Date:  8cm balloon  Balloon inflation Balloon pressure CSA (mm^2) DI (mm^2/mmHg) Dmin (mm) Compliance   20 (ladmark ID)        30        40        50           16cm balloon  Balloon inflation Balloon pressure CSA (mm^2) DI (mm^2/mmHg) Dmin (mm) Compliance   30 (ladmark ID)        40        50        60        70           High Resolution Manometry:    PH/Impedance:     Bravo:    CT:    Esophagram:    FL VSS:     GES:    U/S:     XRAY:      Prior medical records were reviewed including, but not limited to, notes from referring providers, lab work, radiographic tests, and other diagnostic tests. Pertinent results were summarized above.     History     Past Medical History:   Diagnosis Date    Depression     GERD (gastroesophageal reflux disease)      Hemorrhoid     Menstrual disorder     s/p D&C -12/2012    ROMEO on CPAP     Other bipolar disorders     followed by Dr Collazo    Post menopausal syndrome     Tobacco abuse        Past Surgical History:   Procedure Laterality Date    COLONOSCOPY  12/24/2013    Procedure: COLONOSCOPY;;  Surgeon: Guy Grant MD;  Location:  GI    COLONOSCOPY  1/9/2014    Procedure: COMBINED COLONOSCOPY, SINGLE BIOPSY/POLYPECTOMY BY BIOPSY;;  Surgeon: Guy Grant MD;  Location:  GI    ESOPHAGOSCOPY, GASTROSCOPY, DUODENOSCOPY (EGD), COMBINED  12/24/2013    Procedure: COMBINED ESOPHAGOSCOPY, GASTROSCOPY, DUODENOSCOPY (EGD), BIOPSY SINGLE OR MULTIPLE;  ESOPHAGOSCOPY, GASTROSCOPY, DUODENOSCOPY, COLONOSCOPY;  Surgeon: Guy Grant MD;  Location:  GI    GENITOURINARY SURGERY      urethra stretched    GYN SURGERY      d&c     LAPAROSCOPIC ASSISTED RECTOPEXY  3/14/2014    Procedure: LAPAROSCOPIC ASSISTED RECTOPEXY;  LAPAROSCOPIC  VENTRAL RECTOPEXY ;  Surgeon: Jennifer Connolly MD;  Location:  OR    ORTHOPEDIC SURGERY      surgery on clavicle,surgery for left leg fracture       Social History     Socioeconomic History    Marital status:      Spouse name: Not on file    Number of children: Not on file    Years of education: Not on file    Highest education level: Not on file   Occupational History    Not on file   Tobacco Use    Smoking status: Former     Packs/day: 0.25     Types: Cigarettes     Quit date: 2/3/2020     Years since quitting: 3.2    Smokeless tobacco: Never    Tobacco comments:     quite for year, restarted 2022, 3/2022 reports on patch   Vaping Use    Vaping status: Not on file   Substance and Sexual Activity    Alcohol use: No     Alcohol/week: 0.0 standard drinks of alcohol    Drug use: No    Sexual activity: Yes     Partners: Male   Other Topics Concern    Parent/sibling w/ CABG, MI or angioplasty before 65F 55M? Not Asked   Social History Narrative    8/2013    /single/    Children-     Work-    Tobacco-    ETOH-    Exercise-         Social Determinants of Health     Financial Resource Strain: Not on file   Food Insecurity: Not on file   Transportation Needs: Not on file   Physical Activity: Not on file   Stress: Not on file   Social Connections: Not on file   Intimate Partner Violence: Not on file   Housing Stability: Not on file       Family History   Problem Relation Age of Onset    Cancer Mother         ovarian cancer    Hypertension Father     Breast Cancer No family hx of     Cancer - colorectal No family hx of      Family history reviewed and edited as appropriate    Medications and Allergies:     Outpatient Encounter Medications as of 5/19/2023   Medication Sig Dispense Refill    ammonium lactate (AMLACTIN) 12 % external cream Apply topically daily as needed for dry skin 385 g 1    bacitracin 500 UNIT/GM external ointment APPLY EXTERNALLY TO THE AFFECTED AREA TWICE DAILY FOR 7 DAYS 28.4 g 1    bacitracin 500 UNIT/GM OINT Apply 353 g topically 2 times daily      buPROPion (WELLBUTRIN XL) 150 MG 24 hr tablet 300 mg daily      busPIRone (BUSPAR) 10 MG tablet 10 mg daily      busPIRone HCl (BUSPAR) 30 MG tablet Take 1 tablet by mouth 2 times daily      clotrimazole (LOTRIMIN) 1 % external cream Apply topically 2 times daily 60 g 11    doxepin (SINEQUAN) 100 MG capsule Take 100 mg by mouth At Bedtime      Doxepin HCl 150 MG CAPS Take 300 mg by mouth At Bedtime (Patient not taking: Reported on 11/8/2022) 30 capsule 0    econazole nitrate 1 % external cream Apply topically daily To feet and toenails. 85 g 5    escitalopram (LEXAPRO) 10 MG tablet       estradiol (ESTRACE) 0.1 MG/GM vaginal cream Place 2 g vaginally twice a week 42.5 g 1    gabapentin (NEURONTIN) 300 MG capsule 300 mg At Bedtime (per psychiatrist)      ketoconazole (NIZORAL) 2 % external cream Apply topically 2 times daily 60 g 0    lamoTRIgine (LAMICTAL) 25 MG tablet TAKE 1 TABLET BY MOUTH DAILY FOR 2 WEEKS. INCREASE TO 2 TABLET  DAILY      levothyroxine (SYNTHROID/LEVOTHROID) 150 MCG tablet Take 1 tablet (150 mcg) by mouth daily 90 tablet 2    nicotine (NICODERM CQ) 21 MG/24HR 24 hr patch Place 1 patch onto the skin every 24 hours 30 patch 1    nystatin (MYCOSTATIN) 351614 UNIT/GM external powder Apply topically daily 60 g 11    order for DME Equipment being ordered: CPAP  Please dispense Cpap and its supply (Patient not taking: Reported on 3/28/2022) 1 Units prn    pantoprazole (PROTONIX) 40 MG EC tablet Take 1 tablet (40 mg) by mouth 2 times daily 180 tablet 2    REXULTI 2 MG tablet Take 2 mg by mouth daily      triamcinolone (KENALOG) 0.1 % external cream Apply topically 2 times daily Only for 2 weeks. 30 g 0     No facility-administered encounter medications on file as of 5/19/2023.        Allergies   Allergen Reactions    Contrast Dye Shortness Of Breath    Methylpyrrolidone Swelling    Iodine Swelling     Other reaction(s): Angioedema  Facial swelling.    Morphine Anxiety and Nausea and Vomiting     Other reaction(s): Behavioral Disturbances  Other reaction(s): Anxiety, vomiting        Review of systems:  A full 10 point review of systems was obtained and was negative except for the pertinent positives and negatives stated within the HPI.    Objective Findings:   Physical Exam:    Constitutional: LMP 07/20/2006   General: Alert, cooperative, no distress, well-appearing  Head: Atraumatic, normocephalic, no obvious abnormalities   Eyes: Sclera anicteric, no obvious conjunctival hemorrhage   Nose: Nares normal, no obvious malformation, no obvious rhinorrhea   Respiratory: Resting comfortably, no apparent distress, no cough.   Gastrointestinal: Soft, non-distended  Skin: No jaundice, no obvious rash  Neurologic: AAOx3, no obvious neurologic abnormality  Psychiatric: Normal Affect, appropriate mood  Extremities: No obvious edema, no obvious malformation     Labs, Radiology, Pathology     Lab Results   Component Value Date    WBC 5.1  03/28/2023    WBC 7.9 02/10/2022    WBC 6.5 04/15/2021    HGB 11.7 03/28/2023    HGB 12.2 02/10/2022    HGB 11.7 04/15/2021     03/28/2023     02/10/2022     04/15/2021    CHOL 174 03/28/2023    CHOL 183 02/10/2022    CHOL 221 (H) 04/15/2021    TRIG 82 03/28/2023    TRIG 91 01/07/2020    TRIG 115 12/11/2018    HDL 69 03/28/2023    HDL 66 02/10/2022    HDL 62 01/07/2020    ALT 21 03/28/2023    ALT 23 10/04/2022    ALT 17 09/12/2022    AST 21 03/28/2023    AST 23 10/04/2022    AST 19 09/12/2022     03/28/2023     02/10/2022     04/15/2021    BUN 15.4 03/28/2023    BUN 15 02/10/2022    BUN 18 04/15/2021    CO2 26 03/28/2023    CO2 27 02/10/2022    CO2 25 04/15/2021    TSH 0.85 03/28/2023    TSH 1.39 02/10/2022    TSH 1.34 04/15/2021    INR 0.82 (L) 04/24/2006        Liver Function Studies -   Recent Labs   Lab Test 03/28/23  0911   PROTTOTAL 7.6   ALBUMIN 4.3   BILITOTAL 0.2   ALKPHOS 123*   AST 21   ALT 21          Patient Active Problem List    Diagnosis Date Noted    Hiatal hernia 03/28/2023     Priority: Medium    Insomnia, unspecified type 03/28/2023     Priority: Medium    Numbness and tingling in left hand 12/07/2022     Priority: Medium    Obesity (BMI 35.0-39.9) with comorbidity (H) 01/23/2020     Priority: Medium    Vitamin D deficiency 02/20/2018     Priority: Medium    Hidradenitis suppurativa of right axilla 08/24/2016     Priority: Medium    Hypothyroidism due to medication 12/22/2015     Priority: Medium     lithium      Prediabetes 12/08/2015     Priority: Medium    Gastroesophageal reflux disease without esophagitis 10/13/2015     Priority: Medium    Bipolar 2 disorder (H) 07/28/2015     Priority: Medium    Psychosis (H) 05/08/2015     Priority: Medium    Health Care Home 01/13/2015     Priority: Medium     State Tier Level:  unknown  Status:  n/a-[facility]  working with her. Jessie Sands at [facility] at  [phone]  Care Coordinator:  Viji Thomson      Date:  January 13, 2015          Periumbilical hernia 06/12/2014     Priority: Medium    overweight BMI>30 05/20/2014     Priority: Medium    ADHD, predominantly inattentive type 05/16/2014     Priority: Medium    Hyponatremia 04/04/2014     Priority: Medium    Tubular adenoma 01/30/2014     Priority: Medium     Seen on colonoscopy dated 1/09/2014      Iron deficiency anemia 01/30/2014     Priority: Medium    Anemia 09/30/2013     Priority: Medium    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) 09/26/2013     Priority: Medium    CARDIOVASCULAR SCREENING; LDL GOAL LESS THAN 160 09/26/2013     Priority: Medium    Major depressive disorder, recurrent episode, moderate (H) 09/26/2013     Priority: Medium    ROMEO on CPAP      Priority: Medium    Tobacco abuse      Priority: Medium    Post menopausal syndrome      Priority: Medium      Assessment and Plan   Assessment/Plan:    Gudelia Dong is a 58 year old female with significant past medical history pertinent for insomnia, depression, ADHD, bipolar type II, tobacco use, history of alcoholism, psychosis, HydroDIURIL suppurativa, history of iron deficiency anemia, obesity, prediabetes, hypothyroidism, ROMEO on CPAP, hiatal hernia and GERD who is presenting as a new patient in consultation at the request of Dr. Bella for CRC screening, GERD and hiatal hernia with a chief complaint of heartburn/regurgiation/dysphagia and belching.    #Reflux  #Hiatal Hernia  #Grade C Erosive Esophagitis    Prior evaluation includes:    EGD with [facility] 5/14/2021 with LA grade C erosive esophagitis and a large hiatal hernia.  Biopsies were obtained of the distal esophagus that were consistent with mild nonspecific inflammatory changes.  Negative for intestinal metaplasia/dysplasia.  Gastric biopsies and duodenal biopsies were unremarkable negative for H. pylori and celiac disease respectively.    Currently Gudelia is prescribed Protonix 40 mg twice  daily with which she reports to be adequate control of her heartburn symptoms. She continues to struggle with regurgitation, intermittent emesis as well as solid food dysphagia.  Differential for dysphagia includes reflux, peptic stricture or additional changes of the esophagus secondary to chronic reflux disease, large hiatal hernia, intrinsic motility disorder of the esophagus vs other     As she has history of LA grade C erosive esophagitis with no recall EGD which she was directed to continue twice daily PPI therapy for minimum of 8 to 12 weeks with repeat upper endoscopy to evaluate for resolution/Cruz's esophagus.  It should be of note that she reports a 50 pound weight gain gradually over the past 5 years.  She was interested in consultation for medical weight management. After evaluation with additional imaging and endoscopic evaluation patient should consider consultation with the bariatrics team to discuss hiatal hernia repair vs bariatric surgery.    - Timed barium esophagram with tablet to further characterize esophageal motility/hiatal hernia anatomy  - Consultation to medical weight management  - Continue Protonix 40mg twice daily this is best taken on an empty stomach a least 30 - 60 minutes prior to meals  - Upper endoscopy after being treated with Protonix 40 mg twice daily for 8 to 12 weeks to evaluate for resolution of esophagitis/for Cruz's esophagus    #Colorectal Cancer Screening   #Constipation   Colonoscopy May 2021 with [facility] that was described to be of poor quality examination secondary to difficulties with sedation/constant patient movement, poor prep and redundant colon.  No large lesions were appreciated.  Recall 1 year was given.  Prior colonoscopy 2014 with one 5 mm rectal polyp of unknown etiology.  No family history of colorectal cancer.    Additionally, Gudelia reports having bowel movement twice per week that is consistent with [place] Stool Scale Type 2-3. Between she may  have small volume stools consistent with [place] Stool Scale Type 1. Associated with intermittent pushing/strianing. Denies digitation, vaginal splinting or sensation of incomplete evacuation.     - Colonoscopy with general anesthesia after evaluation with PACs clinic  - Please follow reflux lifestyle modifications as directed below   - Start MiraLAX 17g (1 capful) daily this can be increased to twice daily dosing if needed  - Please start taking daily fiber supplementation. Start with 1 - 2 teaspoons daily which can be slowly uptitrated to 2 - 3 tablespoons as needed          Follow up plan:   Return to clinic 3 months and as needed.    The risks and benefits of my recommendations, as well as other treatment options were discussed with the patient and any available family today. All questions were answered.     Follow up: As planned above. Today, I personally spent 45 minutes in direct face to face time with the patient, of which greater than 50% of the time was spent in patient education and counseling as described above. Approximately 16 minutes were spent on indirect care associated with the patient's consultation including but not limited to review of: patient medical records to date, clinic visits, hospital records, lab results, imaging studies, procedural documentation, and coordinating care with other providers. The findings from this review are summarized in the above note. All of the above accounted for a cumulative time of 61 minutes and was performed on the date of service.     The patient verbalized understanding of the plan and was appreciative for the time spent and information provided during the office visit.           Rosalie Martínez PA-C  Division of Gastroenterology, Hepatology, and Nutrition  [facility]       Documentation assisted by voice recognition and documentation system.

## 2023-05-19 NOTE — PATIENT INSTRUCTIONS
It was a pleasure taking care of you today.  I've included a brief summary of our discussion and care plan from today's visit below.  Please review this information with your primary care provider.  _______________________________________________________________________    My recommendations are summarized as follows:    - Both upper and lower endoscopy. Please have evaluation with the PACs clinic (anesthesia) prior to procedure as you have previously not tolerated MAC anesthesia.   - Timed barium esophagram   - Consultation to medical weight management  - Continue Protonix 40mg twice daily this is best taken on an empty stomach a least 30 - 60 minutes prior to meals   - Please follow reflux lifestyle modifications as directed below   - Start MiraLAX 17g (1 capful) daily this can be increased to twice daily dosing if needed  - Please start taking daily fiber supplementation. Start with 1 - 2 teaspoons daily which can be slowly uptitrated to 2 - 3 tablespoons as needed      Gastroesophageal Reflux Disease (GERD) Lifestyle Modifications:   If taking acid suppression therapy (PPI ie Pantoprazole, Lansoprazole, Omeprazole, Esomeprazole, Rabeprazole, Dexlansoprazole) it should be taken 30 - 60 minutes prior to meals on an empty stomach to have maximum effect  Avoid triggers for reflux such as coffee, chocolate, carbonated beverages, spicy foods, acidic foods (tomato based/citrus and foods with high fat content   Abstinence from alcohol and cessation of all tobacco products is recommended   Studies have shown that weight loss, exercise and maintaining a healthy BMI significantly reduce GERD symptoms   Remain upright while eating and immediately after meals  Do not eat or drink at least 3 hours prior to laying down supine/laying down for bed   Avoid late night/middle of the night snacking    Consider obtaining a wedge pillow or elevating the head end of the bed while sleeping   Avoid sleeping right side down as this can  place the lower part of the esophagus/lower esophageal sphincter in a dependent position that favors reflux   Attempting to eat smaller more frequent meals may improve symptoms         Fiber Recommendations:  -- Recommend starting supplementation with a powdered soluble fiber. When used on a daily basis, this can help regulate the consistency of your stools. Metamucil (psyllium) and Citrucel are preferred examples. You can start with 1-2 teaspoons per day, with goal to gradually increase to 1 tablespoon daily. You can increase up to 1 tablespoon three times daily if needed. It is important to stay well-hydrated with use of fiber supplementation and to make sure that the fiber powder is well mixed with water as directed on the label. You may experience some bloating with initiation of fiber, which will improve over the first few weeks. A good trial to evaluate the effect is 3-6 months. Of note, many of the fiber products contain artificial sweeteners, which can cause bloating, gas, and diarrhea in those who may be sensitive to artificial sweeteners. If this is the case, recommend trying Metamucil Premium Blend (with Stevia), Metamucil 4-in-1 without Added Sweeteners, or Bellway (sweetened with Monk fruit extract).      To schedule endoscopic procedures you may call: [phone]  To schedule radiology (imaging) tests you may call: [phone]  To schedule an ENT appointment you may call: [phone]    Please call my nurse Radha ([phone]), Kristi ([phone]) with any questions or concerns.      Return to GI Clinic in 3 months to review your progress.    _______________________________________________________________________    Who do I call with any questions after my visit?  Please be in touch if there are any further questions that arise following today's visit.  There are multiple ways to contact your gastroenterology care team.      During business hours, you may reach a Gastroenterology nurse at  [phone] and choose option 3.       To schedule or reschedule an appointment, please call [phone].     You can always send a secure message through [employer]  [employer] messages are answered by your nurse or doctor typically within 24 hours.  Please allow extra time on weekends and holidays.      For urgent/emergent questions after business hours, you may reach the on-call GI Fellow by contacting the [facility]  at [phone].     How will I get the results of any tests ordered?    You will receive all of your results.  If you have signed up for [employer], any tests ordered at your visit will be available to you after your physician reviews them.  Typically this takes 1-2 weeks.  If there are urgent results that require a change in your care plan, your physician or nurse will call you to discuss the next steps.      What is [employer]  [employer] is a secure way for you to access all of your healthcare records from the [facility].  It is a web based computer program, so you can sign on to it from any location.  It also allows you to send secure messages to your care team.  I recommend signing up for [employer] access if you have not already done so and are comfortable with using a computer.      How to I schedule a follow-up visit?  If you did not schedule a follow-up visit today, please call [phone] to schedule a follow-up office visit.      If you feel you received exceptional care and are interested in supporting the clinical and research goals of Rosalie Martínez PA-C or the Division of Gastroenterology, Hepatology, and Nutrition please contact [email or web address] from the [facility] to discuss opportunities to donate.    Sincerely,    Rosalie Martínez PA-C  Division of Gastroenterology, Hepatology, and Nutrition  [facility]

## 2023-05-19 NOTE — PROGRESS NOTES
"Gastroenterology Visit for: Gudelia Dong 1965   MRN: [id number]     Reason for Visit:  chief complaint    Referred by: Shayne  / [id number] / [place] [address]  Patient Care Team:  Selena Bella DO as PCP - General (Internal Medicine)  Thierry Lord DPM as Assigned Musculoskeletal Provider  Jennifer Lassiter MD as Assigned OBGYN Provider  Mendel Abbasi MD as Resident (Student in organized health care education/training program)  Sarmad Reed MD as MD (Gastroenterology)  Selena Bella DO as ScribSelena Mayo DO as Assigned PCP    History of Present Illness:   Gudelia Dong is a 58 year old female with significant past medical history pertinent for insomnia, depression, ADHD, bipolar type II, tobacco use, history of alcoholism, psychosis, HydroDIURIL suppurativa, history of iron deficiency anemia, obesity, prediabetes, hypothyroidism, ROMEO on CPAP, hiatal hernia and GERD who is presenting as a new patient in consultation at the request of Dr. Bella for CRC screening, GERD and hiatal hernia with a chief complaint of heartburn/regurgiation/dysphagia and belching.    Prior labs from March of this year include TSH, hemoglobin A1c, CBC and CMP that are grossly unremarkable other than an A1c of 5.8.      Gudelia reports history of long standing reflux consistent of both heartburn/regurgitation. Currently she is taking Protonix 40 mg twice daily with adequate control of her heartburn/regurgiation. Approximately once per month will have vomiting. Specifically she notes that she is unable to drink liquid with eating. If she does \"this will get stuck and result in throwing up.\" Also has dysphagia to solids. Specific trigger foods include meats. Denies dysphagia to semi solids and liquids. Associated with belching that can last 30 - 45 minutes.     Has a bowel movement twice per week that is consistent with [place] Stool Scale Type 2-3. Between she may have small volume stools consistent with " Bleck[place] Stool Scale Type 1. Associated with intermittent pushing/strianing. Denies digitation, vaginal splinting or sensation of incomplete evacuation.      In the past 5 years has gained 50+ lbs.     Denies weight loss, odynophagia, dysphonia/hoarseness, chronic cough, neck pain/swelling/mass, abdominal pain, diarrhea, constipation, nocturnal stooling, incontinence, hematochezia and BRBR.     Denies use of ETOH and recreational drug use. Had 18 months of refraining from smoking. Currently she is on the patch and will have 4 cigarettes every three days.     No family history or GI related malignancy (esophageal, gastric, pancreatic, liver or colon) or family history of IBD/celiac disease.     Previously did not tolerate MAC sedation.     Esophageal Questionnaire(s)    BEDQ Questionnaire       View : No data to display.                   View : No data to display.                Eckardt Questionnaire       View : No data to display.                Promis 10 Questionnaire       View : No data to display.                    STUDIES & PROCEDURES:    EGD:     5/14/2021 [facility]         8/2015 [facility]         Colonoscopy:    5/14/2021 [facility]     1/2014  Findings:        The digital rectal exam was abnormal. Findings include decreased        sphincter tone. Pertinent negatives include no palpable rectal lesions.        The terminal ileum appeared normal. A benign appearing sessile polyp was        found in the rectum. The polyp was 5 mm in size. The polyp was removed        with a hot snare. Resection and retrieval were complete. Estimated blood        loss was minimal. A segmental area of moderately congested and        erythematous mucosa was found in the distal rectum. Biopsies were taken        with a cold forceps for histology. Estimated blood loss was minimal.                                                                                    Impression:               - Rectal exam revealed decreased sphincter tone.                              - The examined portion of the ileum was normal.                             - One 5 mm polyp in the rectum (benign appearing).                             Resected and retrieved.                             - Congested and erythematous mucosa distal rectum.                             This was biopsied.                             - Rectal prolapse.                             - The clinical history suggests pelvic floor                             dysfunction with rectal prolapse and urinary                             retention requiring digital manipulation of the                             pelvic floor to facilitate bladder emptying. I                             believe further evaluation of the perineum at the                             pelvic floor center is indicated.        EndoFLIP directed at the UES or LES (8cm (EF-325) balloon length or 16cm (EF-322) balloon length):   Date:  8cm balloon  Balloon inflation Balloon pressure CSA (mm^2) DI (mm^2/mmHg) Dmin (mm) Compliance   20 (ladmark ID)        30        40        50           16cm balloon  Balloon inflation Balloon pressure CSA (mm^2) DI (mm^2/mmHg) Dmin (mm) Compliance   30 (ladmark ID)        40        50        60        70           High Resolution Manometry:    PH/Impedance:     Bravo:    CT:    Esophagram:    FL VSS:     GES:    U/S:     XRAY:      Prior medical records were reviewed including, but not limited to, notes from referring providers, lab work, radiographic tests, and other diagnostic tests. Pertinent results were summarized above.     History     Past Medical History:   Diagnosis Date     Depression      GERD (gastroesophageal reflux disease)      Hemorrhoid      Menstrual disorder     s/p D&C -12/2012     ROMEO on CPAP      Other bipolar disorders     followed by Dr Collazo     Post menopausal syndrome      Tobacco abuse        Past Surgical History:   Procedure Laterality Date     COLONOSCOPY  12/24/2013     Procedure: COLONOSCOPY;;  Surgeon: Guy Grant MD;  Location:  GI     COLONOSCOPY  1/9/2014    Procedure: COMBINED COLONOSCOPY, SINGLE BIOPSY/POLYPECTOMY BY BIOPSY;;  Surgeon: Guy Grant MD;  Location:  GI     ESOPHAGOSCOPY, GASTROSCOPY, DUODENOSCOPY (EGD), COMBINED  12/24/2013    Procedure: COMBINED ESOPHAGOSCOPY, GASTROSCOPY, DUODENOSCOPY (EGD), BIOPSY SINGLE OR MULTIPLE;  ESOPHAGOSCOPY, GASTROSCOPY, DUODENOSCOPY, COLONOSCOPY;  Surgeon: Guy Grant MD;  Location:  GI     GENITOURINARY SURGERY      urethra stretched     GYN SURGERY      d&c      LAPAROSCOPIC ASSISTED RECTOPEXY  3/14/2014    Procedure: LAPAROSCOPIC ASSISTED RECTOPEXY;  LAPAROSCOPIC  VENTRAL RECTOPEXY ;  Surgeon: Jennifer Connolly MD;  Location:  OR     ORTHOPEDIC SURGERY      surgery on clavicle,surgery for left leg fracture       Social History     Socioeconomic History     Marital status:      Spouse name: Not on file     Number of children: Not on file     Years of education: Not on file     Highest education level: Not on file   Occupational History     Not on file   Tobacco Use     Smoking status: Former     Packs/day: 0.25     Types: Cigarettes     Quit date: 2/3/2020     Years since quitting: 3.2     Smokeless tobacco: Never     Tobacco comments:     quite for year, restarted 2022, 3/2022 reports on patch   Vaping Use     Vaping status: Not on file   Substance and Sexual Activity     Alcohol use: No     Alcohol/week: 0.0 standard drinks of alcohol     Drug use: No     Sexual activity: Yes     Partners: Male   Other Topics Concern     Parent/sibling w/ CABG, MI or angioplasty before 65F 55M? Not Asked   Social History Narrative    8/2013    /single/    Children-    Work-    Tobacco-    ETOH-    Exercise-         Social Determinants of Health     Financial Resource Strain: Not on file   Food Insecurity: Not on file   Transportation Needs: Not on file   Physical Activity: Not on file   Stress: Not on  file   Social Connections: Not on file   Intimate Partner Violence: Not on file   Housing Stability: Not on file       Family History   Problem Relation Age of Onset     Cancer Mother         ovarian cancer     Hypertension Father      Breast Cancer No family hx of      Cancer - colorectal No family hx of      Family history reviewed and edited as appropriate    Medications and Allergies:     Outpatient Encounter Medications as of 5/19/2023   Medication Sig Dispense Refill     ammonium lactate (AMLACTIN) 12 % external cream Apply topically daily as needed for dry skin 385 g 1     bacitracin 500 UNIT/GM external ointment APPLY EXTERNALLY TO THE AFFECTED AREA TWICE DAILY FOR 7 DAYS 28.4 g 1     bacitracin 500 UNIT/GM OINT Apply 353 g topically 2 times daily       buPROPion (WELLBUTRIN XL) 150 MG 24 hr tablet 300 mg daily       busPIRone (BUSPAR) 10 MG tablet 10 mg daily       busPIRone HCl (BUSPAR) 30 MG tablet Take 1 tablet by mouth 2 times daily       clotrimazole (LOTRIMIN) 1 % external cream Apply topically 2 times daily 60 g 11     doxepin (SINEQUAN) 100 MG capsule Take 100 mg by mouth At Bedtime       Doxepin HCl 150 MG CAPS Take 300 mg by mouth At Bedtime (Patient not taking: Reported on 11/8/2022) 30 capsule 0     econazole nitrate 1 % external cream Apply topically daily To feet and toenails. 85 g 5     escitalopram (LEXAPRO) 10 MG tablet        estradiol (ESTRACE) 0.1 MG/GM vaginal cream Place 2 g vaginally twice a week 42.5 g 1     gabapentin (NEURONTIN) 300 MG capsule 300 mg At Bedtime (per psychiatrist)       ketoconazole (NIZORAL) 2 % external cream Apply topically 2 times daily 60 g 0     lamoTRIgine (LAMICTAL) 25 MG tablet TAKE 1 TABLET BY MOUTH DAILY FOR 2 WEEKS. INCREASE TO 2 TABLET DAILY       levothyroxine (SYNTHROID/LEVOTHROID) 150 MCG tablet Take 1 tablet (150 mcg) by mouth daily 90 tablet 2     nicotine (NICODERM CQ) 21 MG/24HR 24 hr patch Place 1 patch onto the skin every 24 hours 30 patch 1      nystatin (MYCOSTATIN) 149876 UNIT/GM external powder Apply topically daily 60 g 11     order for DME Equipment being ordered: CPAP  Please dispense Cpap and its supply (Patient not taking: Reported on 3/28/2022) 1 Units prn     pantoprazole (PROTONIX) 40 MG EC tablet Take 1 tablet (40 mg) by mouth 2 times daily 180 tablet 2     REXULTI 2 MG tablet Take 2 mg by mouth daily       triamcinolone (KENALOG) 0.1 % external cream Apply topically 2 times daily Only for 2 weeks. 30 g 0     No facility-administered encounter medications on file as of 5/19/2023.        Allergies   Allergen Reactions     Contrast Dye Shortness Of Breath     Methylpyrrolidone Swelling     Iodine Swelling     Other reaction(s): Angioedema  Facial swelling.     Morphine Anxiety and Nausea and Vomiting     Other reaction(s): Behavioral Disturbances  Other reaction(s): Anxiety, vomiting        Review of systems:  A full 10 point review of systems was obtained and was negative except for the pertinent positives and negatives stated within the HPI.    Objective Findings:   Physical Exam:    Constitutional: [facility] 07/20/2006   General: Alert, cooperative, no distress, well-appearing  Head: Atraumatic, normocephalic, no obvious abnormalities   Eyes: Sclera anicteric, no obvious conjunctival hemorrhage   Nose: Nares normal, no obvious malformation, no obvious rhinorrhea   Respiratory: Resting comfortably, no apparent distress, no cough.   Gastrointestinal: Soft, non-distended  Skin: No jaundice, no obvious rash  Neurologic: AAOx3, no obvious neurologic abnormality  Psychiatric: Normal Affect, appropriate mood  Extremities: No obvious edema, no obvious malformation     Labs, Radiology, Pathology     Lab Results   Component Value Date    WBC 5.1 03/28/2023    WBC 7.9 02/10/2022    WBC 6.5 04/15/2021    HGB 11.7 03/28/2023    HGB 12.2 02/10/2022    HGB 11.7 04/15/2021     03/28/2023     02/10/2022     04/15/2021    CHOL 174 03/28/2023     CHOL 183 02/10/2022    CHOL 221 (H) 04/15/2021    TRIG 82 03/28/2023    TRIG 91 01/07/2020    TRIG 115 12/11/2018    HDL 69 03/28/2023    HDL 66 02/10/2022    HDL 62 01/07/2020    ALT 21 03/28/2023    ALT 23 10/04/2022    ALT 17 09/12/2022    AST 21 03/28/2023    AST 23 10/04/2022    AST 19 09/12/2022     03/28/2023     02/10/2022     04/15/2021    BUN 15.4 03/28/2023    BUN 15 02/10/2022    BUN 18 04/15/2021    CO2 26 03/28/2023    CO2 27 02/10/2022    CO2 25 04/15/2021    TSH 0.85 03/28/2023    TSH 1.39 02/10/2022    TSH 1.34 04/15/2021    INR 0.82 (L) 04/24/2006        Liver Function Studies -   Recent Labs   Lab Test 03/28/23  0911   PROTTOTAL 7.6   ALBUMIN 4.3   BILITOTAL 0.2   ALKPHOS 123*   AST 21   ALT 21          Patient Active Problem List    Diagnosis Date Noted     Hiatal hernia 03/28/2023     Priority: Medium     Insomnia, unspecified type 03/28/2023     Priority: Medium     Numbness and tingling in left hand 12/07/2022     Priority: Medium     Obesity (BMI 35.0-39.9) with comorbidity (H) 01/23/2020     Priority: Medium     Vitamin D deficiency 02/20/2018     Priority: Medium     Hidradenitis suppurativa of right axilla 08/24/2016     Priority: Medium     Hypothyroidism due to medication 12/22/2015     Priority: Medium     lithium       Prediabetes 12/08/2015     Priority: Medium     Gastroesophageal reflux disease without esophagitis 10/13/2015     Priority: Medium     Bipolar 2 disorder (H) 07/28/2015     Priority: Medium     Psychosis (H) 05/08/2015     Priority: Medium     Health Care Home 01/13/2015     Priority: Medium     State Tier Level:  unknown  Status:  n/a-Patient has [facility]  working with her. Jessie Sands at [facility] at [phone]  Care Coordinator:  Viji Thomson      Date:  January 13, 2015           Periumbilical hernia 06/12/2014     Priority: Medium     overweight BMI>30 05/20/2014     Priority: Medium     ADHD,  predominantly inattentive type 05/16/2014     Priority: Medium     Hyponatremia 04/04/2014     Priority: Medium     Tubular adenoma 01/30/2014     Priority: Medium     Seen on colonoscopy dated 1/09/2014       Iron deficiency anemia 01/30/2014     Priority: Medium     Anemia 09/30/2013     Priority: Medium     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) 09/26/2013     Priority: Medium     CARDIOVASCULAR SCREENING; LDL GOAL LESS THAN 160 09/26/2013     Priority: Medium     Major depressive disorder, recurrent episode, moderate (H) 09/26/2013     Priority: Medium     ROMEO on CPAP      Priority: Medium     Tobacco abuse      Priority: Medium     Post menopausal syndrome      Priority: Medium      Assessment and Plan   Assessment/Plan:    Gudelia Dong is a 58 year old female with significant past medical history pertinent for insomnia, depression, ADHD, bipolar type II, tobacco use, history of alcoholism, psychosis, HydroDIURIL suppurativa, history of iron deficiency anemia, obesity, prediabetes, hypothyroidism, ROMEO on CPAP, hiatal hernia and GERD who is presenting as a new patient in consultation at the request of Dr. Bella for CRC screening, GERD and hiatal hernia with a chief complaint of heartburn/regurgiation/dysphagia and belching.    #Reflux  #Hiatal Hernia  #Grade C Erosive Esophagitis    Prior evaluation includes:    EGD with [facility] 5/14/2021 with LA grade C erosive esophagitis and a large hiatal hernia.  Biopsies were obtained of the distal esophagus that were consistent with mild nonspecific inflammatory changes.  Negative for intestinal metaplasia/dysplasia.  Gastric biopsies and duodenal biopsies were unremarkable negative for H. pylori and celiac disease respectively.    Currently Gudelia is prescribed Protonix 40 mg twice daily with which she reports to be adequate control of her heartburn symptoms. She continues to struggle with regurgitation, intermittent emesis as well as solid food dysphagia.  Differential  for dysphagia includes reflux, peptic stricture or additional changes of the esophagus secondary to chronic reflux disease, large hiatal hernia, intrinsic motility disorder of the esophagus vs other     As she has history of LA grade C erosive esophagitis with no recall EGD which she was directed to continue twice daily PPI therapy for minimum of 8 to 12 weeks with repeat upper endoscopy to evaluate for resolution/Cruz's esophagus.  It should be of note that she reports a 50 pound weight gain gradually over the past 5 years.  She was interested in consultation for medical weight management. After evaluation with additional imaging and endoscopic evaluation patient should consider consultation with the bariatrics team to discuss hiatal hernia repair vs bariatric surgery.    - Timed barium esophagram with tablet to further characterize esophageal motility/hiatal hernia anatomy  - Consultation to medical weight management  - Continue Protonix 40mg twice daily this is best taken on an empty stomach a least 30 - 60 minutes prior to meals  - Upper endoscopy after being treated with Protonix 40 mg twice daily for 8 to 12 weeks to evaluate for resolution of esophagitis/for Cruz's esophagus    #Colorectal Cancer Screening   #Constipation   Colonoscopy May 2021 with [facility] that was described to be of poor quality examination secondary to difficulties with sedation/constant patient movement, poor prep and redundant colon.  No large lesions were appreciated.  Recall 1 year was given.  Prior colonoscopy 2014 with one 5 mm rectal polyp of unknown etiology.  No family history of colorectal cancer.    Additionally, Gudelia reports having bowel movement twice per week that is consistent with Barrow Stool Scale Type 2-3. Between she may have small volume stools consistent with [place] Stool Scale Type 1. Associated with intermittent pushing/strianing. Denies digitation, vaginal splinting or sensation of incomplete evacuation.      - Colonoscopy with general anesthesia after evaluation with PACs clinic  - Please follow reflux lifestyle modifications as directed below   - Start MiraLAX 17g (1 capful) daily this can be increased to twice daily dosing if needed  - Please start taking daily fiber supplementation. Start with 1 - 2 teaspoons daily which can be slowly uptitrated to 2 - 3 tablespoons as needed          Follow up plan:   Return to clinic 3 months and as needed.    The risks and benefits of my recommendations, as well as other treatment options were discussed with the patient and any available family today. All questions were answered.     o Follow up: As planned above. Today, I personally spent 45 minutes in direct face to face time with the patient, of which greater than 50% of the time was spent in patient education and counseling as described above. Approximately 16 minutes were spent on indirect care associated with the patient's consultation including but not limited to review of: patient medical records to date, clinic visits, hospital records, lab results, imaging studies, procedural documentation, and coordinating care with other providers. The findings from this review are summarized in the above note. All of the above accounted for a cumulative time of 61 minutes and was performed on the date of service.     The patient verbalized understanding of the plan and was appreciative for the time spent and information provided during the office visit.           Rosalie Martínez PA-C  Division of Gastroenterology, Hepatology, and Nutrition  [facility]       Documentation assisted by voice recognition and documentation system.

## 2023-05-19 NOTE — NURSING NOTE
"Chief Complaint   Patient presents with     New Patient       Vitals:    05/19/23 0824   BP: (!) 143/75   Pulse: 84   SpO2: 96%   Weight: 130.9 kg (288 lb 9.6 oz)   Height: 1.715 m (5' 7.52\")       Body mass index is 44.51 kg/m .    Nicole Joy MA    "

## 2023-05-22 ENCOUNTER — PATIENT OUTREACH (OUTPATIENT)
Dept: GASTROENTEROLOGY | Facility: CLINIC | Age: 58
End: 2023-05-22

## 2023-05-22 NOTE — TELEPHONE ENCOUNTER
Returned pt call regarding POC set up from recent visit with Rosalie BANGURA.  Discussed plan and messaged provider to move forward. Will mychart pt with further scheduling information.

## 2023-05-31 ENCOUNTER — TELEPHONE (OUTPATIENT)
Dept: GASTROENTEROLOGY | Facility: CLINIC | Age: 58
End: 2023-05-31

## 2023-05-31 NOTE — TELEPHONE ENCOUNTER
Screening Questions  BLUE  KIND OF PREP RED  LOCATION [review exclusion criteria] GREEN  SEDATION TYPE        Yes Are you active on mychart?       [employer] Ordering/Referring Provider?        [email or web address] What type of coverage do you have?      N Have you had a positive covid test in the last 14 days?     44.51 1. BMI  [BMI 40+ - review exclusion criteria& smart-phrase document]    Yes  2. Are you able to give consent for your medical care? [IF NO,RN REVIEW]          N  3. Are you taking any prescription pain medications on a routine schedule   (ex narcotics: oxycodone, roxicodone, oxycontin,  and percocet)? [RN Review]        NA  3a. EXTENDED PREP What kind of prescription?     N 4. Do you have any chemical dependencies such as alcohol, street drugs, or methadone?        **If yes 3- 5 , please schedule with MAC sedation.**          IF YES TO ANY 6 - 10 - HOSPITAL SETTING ONLY.     N 6.   Do you need assistance transferring?     N 7.   Have you had a heart or lung transplant?    N 8.   Are you currently on dialysis?   N 9.   Do you use daily home oxygen?   N 10. Do you take nitroglycerin?   10a. NA If yes, how often?     NA 11. Are you currently pregnant?    11a. NA If yes, how many weeks? [ Greater than 12 weeks, OR NEEDED]    N 12. Do you have Pulmonary Hypertension? *NEED PAC APPT AT [facility] w/ MAC*     N 13. [review exclusion criteria]  Do you have any implantable devices in your body (pacemaker, defib, LVAD)?    N 14. In the past 6 months, have you had any heart related issues including cardiomyopathy or heart attack?     14a. N If yes, did it require cardiac stenting if so when?     N 15. Have you had a stroke or Transient ischemic attack (TIA - aka  mini stroke ) within 6 months?      Yes-uses cpap    16. Do you have mod to severe Obstructive Sleep Apnea?  [Hospital only]    N 17. Do you have SEVERE AND UNCONTROLLED asthma? *NEED PAC APPT AT [facility] w/MAC*     18.Do you take blood thinners?  No    N 19. Do you take  "any of the following medications?    NPhentermine    NOzempic    NWegovy (Semaglutide)      19a. If yes, \"Hold for 7 days before procedure.  Please consult your prescribing provider if you have questions about holding this medication.\"     N  20. Do you have chronic kidney disease?      N  21. Do you have a diagnosis of diabetes?     Yes  22. On a regular basis do you go 3-5 days between bowel movements?      23. Preferred LOCAL Pharmacy for Pre Prescription      [facility]      - CLOSING REMINDERS -    You will receive a call from a Nurse to review instructions and health history.  This assessment must be completed prior to your procedure.  Failure to complete the Nurse assessment may result in the procedure being cancelled.      On the day of your procedure, please designatean adult(s) who can drive you home stay with you for the next 24 hours. The medicines used in the exam will make you sleepy. You will not be able to drive.      You cannot take public transportation, ride share services, or non-medical taxi service without a responsible caregiver.  Medical transport services are allowed with the requirement that a responsible caregiver will receive you at your destination.  We require that drivers and caregivers are confirmed prior to your procedure.      - SCHEDULING DETAILS -  Yes & ROMEO Hospital Setting Required & If yes, what is the exclusion?   Michael-per order  Surgeon    8/8/23  Date of Procedure  Upper and Lower Endoscopy [EGD and Colonoscopy]  Type of Procedure Scheduled  [facility] - If you answer yes to questions #1, #3, #22 (De Wiliamen and CF pts)Which Colonoscopy Prep was Sent?   BMI and constipation  General per order Sedation Type     Yes-PAC appt scheduled on 7/24/23  PAC / Pre-op Required                 "

## 2023-06-05 NOTE — TELEPHONE ENCOUNTER
FUTURE VISIT INFORMATION      SURGERY INFORMATION:    Date: 23    [facility]    Surgeon:  René Rodriguez DO    Anesthesia Type:  general    Procedure: Esophagoscopy, gastroscopy, duodenoscopy (EGD), combined Colonoscopy    RECORDS REQUESTED FROM:       Primary Care Provider: [facility]    Pertinent Medical History: ROMEO    Most recent EKG+ Tracin/28/15    Most recent ECHO: 5/8/15    Most recent Sleep Study:  17

## 2023-07-10 ENCOUNTER — HOSPITAL ENCOUNTER (OUTPATIENT)
Dept: MAMMOGRAPHY | Facility: CLINIC | Age: 58
Discharge: HOME | End: 2023-07-10
Attending: INTERNAL MEDICINE
Payer: COMMERCIAL

## 2023-07-10 DIAGNOSIS — R92.8 ABNORMAL MAMMOGRAM: ICD-10-CM

## 2023-07-10 PROCEDURE — 76642 ULTRASOUND BREAST LIMITED: CPT | Mod: RT

## 2023-07-24 ENCOUNTER — VIRTUAL VISIT (OUTPATIENT)
Dept: SURGERY | Facility: CLINIC | Age: 58
End: 2023-07-24
Payer: COMMERCIAL

## 2023-07-24 ENCOUNTER — ANESTHESIA EVENT (OUTPATIENT)
Dept: GASTROENTEROLOGY | Facility: CLINIC | Age: 58
End: 2023-07-24
Payer: COMMERCIAL

## 2023-07-24 ENCOUNTER — MYC MEDICAL ADVICE (OUTPATIENT)
Dept: FAMILY MEDICINE | Facility: CLINIC | Age: 58
End: 2023-07-24

## 2023-07-24 ENCOUNTER — PRE VISIT (OUTPATIENT)
Dept: SURGERY | Facility: CLINIC | Age: 58
End: 2023-07-24

## 2023-07-24 DIAGNOSIS — K44.9 HIATAL HERNIA: ICD-10-CM

## 2023-07-24 DIAGNOSIS — F90.0 ADHD (ATTENTION DEFICIT HYPERACTIVITY DISORDER), INATTENTIVE TYPE: ICD-10-CM

## 2023-07-24 DIAGNOSIS — K21.9 GASTROESOPHAGEAL REFLUX DISEASE WITHOUT ESOPHAGITIS: ICD-10-CM

## 2023-07-24 DIAGNOSIS — Z12.11 SCREEN FOR COLON CANCER: ICD-10-CM

## 2023-07-24 DIAGNOSIS — Z01.818 PREOP EXAMINATION: Primary | ICD-10-CM

## 2023-07-24 PROCEDURE — 99203 OFFICE O/P NEW LOW 30 MIN: CPT | Mod: VID | Performed by: NURSE PRACTITIONER

## 2023-07-24 ASSESSMENT — LIFESTYLE VARIABLES: TOBACCO_USE: 1

## 2023-07-24 NOTE — PROGRESS NOTES
Gudelia is a 58 year old who is being evaluated via a billable video visit.      How would you like to obtain your AVS? Nava Adams   Gudelia is a 58 year old, presenting for the following health issues:  Pre-Op Exam (/)    HPI           Review of Systems       Physical Exam     TEX Orosco LPN

## 2023-07-24 NOTE — OR NURSING
Was asked by Aeluros to go over medications with Gudelia Adams for the upcoming GI procedure on 8-8-23. Verbally given medication instructions and instructions sent via My Chart. Gudelia has no questions at this time.

## 2023-07-24 NOTE — PATIENT INSTRUCTIONS
Name:  Gudelia Dong   MRN:  [id number]   :  1965   Today's Date:  2023     GI Lab procedures:    A representative from the GI Lab will contact you regarding date, arrival time, location, and give you further instructions.      You were seen today in the PAC Clinic.   (Pre-operative Anesthesia Assessment Center)  [facility]  [address] [place] [address]  [address]   phone [phone]    You had a pre-operative assessment done.    Anesthesia recommendations for medications:    Hold Aspirin for 2 days before procedure.  Hold Multivitamins for 7 days before procedure.   Hold Herbal medications and Supplements for 7 days before procedure.  Hold Ibuprofen for 2 days before procedure.   Hold Naproxen for 2 days before procedure.   Acetaminophen (Tylenol) is okay to take if needed.    No alcohol or cannabis products for 24 hours before your procedure      Please hold the following medications the day of procedure:  All ointments, creams, and gels for the skin.      Please take these medications the day of procedure:  Bupropion (Wellbutrin XL)  Buspirone HCl (Buspar)  Escitalopram (Lexapro)  Lamotrigine (Lamictal)  Levothyroxine (Synthroid)  Nicotine patch  Pantoprazole (Protonix)  Rexulti        For questions or appointments, call:  [facility]: [phone], option 2.  Monday through Friday, 8 a.m. to 4:30 p.m.  (If it is after hours, please reach out to the clinic or provider that scheduled your appointment)

## 2023-07-24 NOTE — H&P (VIEW-ONLY)
Pre-Operative H & P     CC:  Preoperative exam to assess for increased cardiopulmonary risk while undergoing surgery and anesthesia.    Date of Encounter: 7/24/2023  Primary Care Physician:  Selena Bella     Reason for visit:   Encounter Diagnoses   Name Primary?     Preop examination Yes     Gastroesophageal reflux disease without esophagitis      Screen for colon cancer      Hiatal hernia        HPI  Gudelia Dong is a 58 year old female who presents for pre-operative H & P in preparation for  Procedure Information     Case: [id number] Date/Time: 08/08/23 0830    Procedures:       Esophagoscopy, gastroscopy, duodenoscopy (EGD), combined (Esophagus) - Screen for colon cancer [Z12.11]  Gastroesophageal reflux disease without esophagitis [K21.9]  Hiatal hernia [K44.9]  Class 3 severe obesity with body mass index (BMI) of 40.0 to 44.9 in adult, unsp...      Colonoscopy (Anus) - Screen for colon cancer [Z12.11]  Gastroesophageal reflux disease without esophagitis [K21.9]  Hiatal hernia [K44.9]  Class 3 severe obesity with body mass index (BMI) of 40.0 to 44.9 in adult, unsp...    Anesthesia type: General    Diagnosis:       Screen for colon cancer [Z12.11]      Gastroesophageal reflux disease without esophagitis [K21.9]      Hiatal hernia [K44.9]      Class 3 severe obesity with body mass index (BMI) of 40.0 to 44.9 in adult, unspecified obesity type, unspecified whether serious comorbidity present (H) [E66.01, Z68.41]    Pre-op diagnosis:       Screen for colon cancer [Z12.11]      Gastroesophageal reflux disease without esophagitis [K21.9]      Hiatal hernia [K44.9]      Class 3 severe obesity with body mass index (BMI) of 40.0 to 44.9 in adult, unspecified obesity type, unspecified whether serious comorbidity present (H) [E66.01, Z68.41]    Location:  GI 02 /  GI    Providers: René Rodriguez DO          The patient was recently seen by PADMINI Villegas in new patient Gastroenterology consultation for evaluation  of CRC screening, GERD and hiatal hernia with a chief complaint of heartburn/regurgiation/dysphagia and belching.  The above procedures have been scheduled for further evaluation of patient's symptoms.     The patient presents to the PAC virtually in preparation for the above procedure with comorbid conditions including ROMEO on CPAP, GERD, morbid obesity, prediabetes, hypothyroidism, h/o hyponatrima, iron deficiency anemia, MRSA, hidradentis suppurativa, bipolar depression, ADHD, insomnia, h/o psychosis, nicotine dependence, and h/o alcohol use disorder.     History is obtained from the patient and chart review    Hx of abnormal bleeding or anti-platelet use: denies    Menstrual history: Patient's last menstrual period was 07/20/2006.:      Past Medical History  Past Medical History:   Diagnosis Date     Depression      GERD (gastroesophageal reflux disease)      Hemorrhoid      Menstrual disorder     s/p D&C -12/2012     ROMEO on CPAP      Other bipolar disorders     followed by Dr Collazo     Post menopausal syndrome      Tobacco abuse        Past Surgical History  Past Surgical History:   Procedure Laterality Date     COLONOSCOPY  12/24/2013    Procedure: COLONOSCOPY;;  Surgeon: Guy Grant MD;  Location:  GI     COLONOSCOPY  1/9/2014    Procedure: COMBINED COLONOSCOPY, SINGLE BIOPSY/POLYPECTOMY BY BIOPSY;;  Surgeon: Guy Grant MD;  Location:  GI     ESOPHAGOSCOPY, GASTROSCOPY, DUODENOSCOPY (EGD), COMBINED  12/24/2013    Procedure: COMBINED ESOPHAGOSCOPY, GASTROSCOPY, DUODENOSCOPY (EGD), BIOPSY SINGLE OR MULTIPLE;  ESOPHAGOSCOPY, GASTROSCOPY, DUODENOSCOPY, COLONOSCOPY;  Surgeon: Guy Grant MD;  Location:  GI     GENITOURINARY SURGERY      urethra stretched     GYN SURGERY      d&c      LAPAROSCOPIC ASSISTED RECTOPEXY  3/14/2014    Procedure: LAPAROSCOPIC ASSISTED RECTOPEXY;  LAPAROSCOPIC  VENTRAL RECTOPEXY ;  Surgeon: Jennifer Connolly MD;  Location:  OR     ORTHOPEDIC SURGERY      surgery on  clavicle,surgery for left leg fracture       Prior to Admission Medications  Current Outpatient Medications   Medication Sig Dispense Refill     ammonium lactate (AMLACTIN) 12 % external cream Apply topically daily as needed for dry skin 385 g 1     bacitracin 500 UNIT/GM external ointment APPLY EXTERNALLY TO THE AFFECTED AREA TWICE DAILY FOR 7 DAYS 28.4 g 1     bacitracin 500 UNIT/GM OINT Apply 353 g topically 2 times daily       buPROPion (WELLBUTRIN XL) 150 MG 24 hr tablet 300 mg every morning       busPIRone HCl (BUSPAR) 30 MG tablet Take 1 tablet by mouth 2 times daily       clotrimazole (LOTRIMIN) 1 % external cream Apply topically 2 times daily 60 g 11     doxepin (SINEQUAN) 100 MG capsule Take 100 mg by mouth At Bedtime       econazole nitrate 1 % external cream Apply topically daily To feet and toenails. 85 g 5     escitalopram (LEXAPRO) 10 MG tablet Take 10 mg by mouth every morning       estradiol (ESTRACE) 0.1 MG/GM vaginal cream Place 2 g vaginally twice a week 42.5 g 1     gabapentin (NEURONTIN) 300 MG capsule Take 300 mg by mouth At Bedtime (per psychiatrist)       ketoconazole (NIZORAL) 2 % external cream Apply topically 2 times daily 60 g 0     lamoTRIgine (LAMICTAL) 100 MG tablet Take 100 mg by mouth every morning       levothyroxine (SYNTHROID/LEVOTHROID) 150 MCG tablet Take 1 tablet (150 mcg) by mouth daily (Patient taking differently: Take 150 mcg by mouth every morning) 90 tablet 2     nicotine (NICODERM CQ) 21 MG/24HR 24 hr patch Place 1 patch onto the skin every 24 hours 30 patch 1     nystatin (MYCOSTATIN) 597387 UNIT/GM external powder Apply topically daily 60 g 11     pantoprazole (PROTONIX) 40 MG EC tablet Take 1 tablet (40 mg) by mouth 2 times daily 180 tablet 2     REXULTI 2 MG tablet Take 2 mg by mouth every morning       triamcinolone (KENALOG) 0.1 % external cream Apply topically 2 times daily Only for 2 weeks. 30 g 0     busPIRone (BUSPAR) 10 MG tablet Take 10 mg by mouth every  morning       Doxepin HCl 150 MG CAPS Take 300 mg by mouth At Bedtime (Patient taking differently: Take 300 mg by mouth At Bedtime) 30 capsule 0     order for DME Equipment being ordered: CPAP  Please dispense Cpap and its supply 1 Units prn       Allergies  Allergies   Allergen Reactions     Contrast Dye Shortness Of Breath     Methylpyrrolidone Swelling     Iodine Swelling     Other reaction(s): Angioedema  Facial swelling.     Morphine Anxiety and Nausea and Vomiting     Other reaction(s): Behavioral Disturbances  Other reaction(s): Anxiety, vomiting       Social History  Social History     Socioeconomic History     Marital status:      Spouse name: Not on file     Number of children: Not on file     Years of education: Not on file     Highest education level: Not on file   Occupational History     Not on file   Tobacco Use     Smoking status: Some Days     Packs/day: 0.25     Types: Cigarettes     Passive exposure: Current     Smokeless tobacco: Never     Tobacco comments:     quite for year, restarted 2022.       2023: Now smokes a couple cigarettes per day - trying to quit.    Substance and Sexual Activity     Alcohol use: No     Comment: Severe alcohol use disorder for 20 years.  2023:  Reports sober for past five years     Drug use: No     Sexual activity: Yes     Partners: Male   Other Topics Concern     Parent/sibling w/ CABG, MI or angioplasty before 65F 55M? Not Asked   Social History Narrative    8/2013    /single/    Children-    Work-    Tobacco-    ETOH-    Exercise-         Social Determinants of Health     Financial Resource Strain: Not on file   Food Insecurity: Not on file   Transportation Needs: Not on file   Physical Activity: Not on file   Stress: Not on file   Social Connections: Not on file   Intimate Partner Violence: Not on file   Housing Stability: Not on file       Family History  Family History   Problem Relation Age of Onset     Cancer Mother         ovarian cancer      Hypertension Father      Breast Cancer No family hx of      Cancer - colorectal No family hx of        Review of Systems  The complete review of systems is negative other than noted in the HPI or here.   Anesthesia Evaluation   Pt has had prior anesthetic. Type: General and MAC.    History of anesthetic complications (Difficult to stay asleep. )   ROMEO.    ROS/MED HX  ENT/Pulmonary:     (+) sleep apnea, uses CPAP, tobacco use (Trying to quit smoking.  ), Current use,     Neurologic:  - neg neurologic ROS     Cardiovascular: Comment: Denies cardiac symptoms including chest pain, SOB, palpitations, syncope, SANCHEZ, orthopnea, or PND.      (+) -----Previous cardiac testing  (-) taking anticoagulants/antiplatelets   METS/Exercise Tolerance: >4 METS Comment: Able to walk 1/2 mile.    Lives in an apartment and does all errands either by train, bus or walking.    Hematologic:     (+) anemia,     Musculoskeletal: Comment: S/p left LE surgery with hardware.       GI/Hepatic: Comment: Dysphagia and belching    (+) GERD, Asymptomatic on medication, hiatal hernia,     Renal/Genitourinary:  - neg Renal ROS     Endo: Comment: Prediabetes     (+) thyroid problem, hypothyroidism, Obesity,     Psychiatric/Substance Use:     (+) psychiatric history depression, bipolar and other (comment) (ADHD.  Reports being stable. Follows with Psychiatrist. ) alcohol abuse (Alcohol use disorder - sober for 5 years. )  (-) chronic opioid use history   Infectious Disease:     (+) MRSA,     Malignancy:  - neg malignancy ROS     Other:  - neg other ROS          Virtual visit -  No vitals were obtained    Physical Exam  Constitutional: Awake, alert, cooperative, no apparent distress, and appears stated age.  Eyes: Pupils equal  HENT: Normocephalic  Respiratory: non labored breathing   Neurologic: Awake, alert, oriented to name, place and time.   Neuropsychiatric: Calm, cooperative. Normal affect.      Prior Labs/Diagnostic Studies   All labs and  imaging personally reviewed    Latest Reference Range & Units 03/28/23 09:11   Sodium 136 - 145 mmol/L 145   Potassium 3.4 - 5.3 mmol/L 4.1   Chloride 98 - 107 mmol/L 105   Carbon Dioxide (CO2) 22 - 29 mmol/L 26   Urea Nitrogen 8.0 - 23.0 mg/dL 15.4   Creatinine 0.51 - 0.95 mg/dL 0.83   GFR Estimate >60 mL/min/1.73m2 80   Calcium 8.8 - 10.2 mg/dL 9.3   Anion Gap 7 - 15 mmol/L 14   Albumin 3.5 - 5.2 g/dL 4.3   Protein Total 6.4 - 8.3 g/dL 7.6   Alkaline Phosphatase 35 - 104 U/L 123 (H)   ALT 10 - 35 U/L 21   AST 10 - 35 U/L 21   Bilirubin Total <=1.2 mg/dL 0.2   Cholesterol <200 mg/dL 174   Glucose 70 - 99 mg/dL 94   HDL Cholesterol >=50 mg/dL 69   Hemoglobin A1C 0.0 - 5.6 % 5.8 (H)   LDL Cholesterol Calculated <=100 mg/dL 89   Non HDL Cholesterol <130 mg/dL 105   Triglycerides <150 mg/dL 82   TSH 0.30 - 4.20 uIU/mL 0.85   WBC 4.0 - 11.0 10e3/uL 5.1   Hemoglobin 11.7 - 15.7 g/dL 11.7   Hematocrit 35.0 - 47.0 % 37.5   Platelet Count 150 - 450 10e3/uL 298   RBC Count 3.80 - 5.20 10e6/uL 4.23   MCV 78 - 100 fL 89   MCH 26.5 - 33.0 pg 27.7   MCHC 31.5 - 36.5 g/dL 31.2 (L)   RDW 10.0 - 15.0 % 15.7 (H)   % Neutrophils % 50   % Lymphocytes % 36   % Monocytes % 8   % Eosinophils % 5   % Basophils % 1   Absolute Basophils 0.0 - 0.2 10e3/uL 0.0   Absolute Eosinophils 0.0 - 0.7 10e3/uL 0.3   Absolute Immature Granulocytes <=0.4 10e3/uL 0.0   Absolute Lymphocytes 0.8 - 5.3 10e3/uL 1.8   Absolute Monocytes 0.0 - 1.3 10e3/uL 0.4   % Immature Granulocytes % 0   Absolute Neutrophils 1.6 - 8.3 10e3/uL 2.6   (H): Data is abnormally high  (L): Data is abnormally low    PROCEDURES     Echocardiogram 2015  Narrative & Impression     Interpretation Summary  Global and regional left ventricular function is normal with an EF of 60-65%.   Right ventricular function, chamber size, wall motion, and thickness are   normal. Pulmonary artery systolic pressure cannot be assessed. The inferior   vena cava  is normal. No pericardial effusion is  present.  PatientHeight: 68 in  PatientWeight: 208 lbs  BSA 2.1 m^2    EKG 2015  NSR      The patient's records and results personally reviewed by this provider.     Outside records reviewed from: Care Everywhere      Assessment  Gudelia Dong is a 58 year old female seen as a PAC referral for risk assessment and optimization for anesthesia.    Plan/Recommendations  Pt will be optimized for the proposed procedure.  See below for details on the assessment, risk, and preoperative recommendations    NEUROLOGY  - No history of TIA, CVA or seizure    -Post Op delirium risk factors:  No risk identified    ENT  - Reports recent chipped right back upper molar   Mallampati: Unable to assess  TM: Unable to assess    CARDIAC  - Denies known coronary artery disease.  Denies cardiac symptoms.    - METS (Metabolic Equivalents)  Patient performs 4 or more METS exercise without symptoms            Total Score: 0       - RCRI-Very low risk: Class 1 0.4% complication rate            Total Score: 0        PULMONARY  - Obstructive Sleep Apnea  ROMEO with home CPAP.  Patient will be instructed to bring their home CPAP device to the hospital with them.  ROMEO Medium Risk            Total Score: 3    ROMEO: BMI over 35 kg/m2    ROMEO: Over 50 ys old    ROMEO: Neck Circum >16 in      - Denies asthma or inhaler use     - Tobacco History   ~ Attempting to quit.  Down to ~ 2 cigarettes per day.   ~ Encouraged on going smoking cessation.      History   Smoking Status     Some Days     Packs/day: 0.25     Types: Cigarettes   Smokeless Tobacco     Never       GI  - GERD, dysphagia and belching with known hiatal hernia   ~ taking Protonix 40 mg twice day and  Not controlled on medications    ~ above procedure scheduled    PONV Low Risk  Total Score: 1           1 AN PONV: Pt is Female        /RENAL  - Baseline Creatinine  See above     ENDOCRINE    - BMI: Estimated body mass index is 44.51 kg/m  as calculated from the following:    Height as of 5/19/23:  "1.715 m (5' 7.52\").    Weight as of 5/19/23: 130.9 kg (288 lb 9.6 oz).  Class 3 Obesity (BMI > 40)    - Elevated A1C 5.8    HEME  - VTE Low Risk 0.26%            Total Score: 1    VTE: BMI greater than 39      - No history of abnormal bleeding or antiplatelet use.    - Iron deficient anemia   ~ hgb 11.7    PSYCH  - Bipolar depression and ADHD   ~ reports doing well on current medications and followed by psychiatry.  Continue mental health medications as prescribed DOS    - h/o Alcohol use disorder for >20 years with many admission for detoxification.  Now in remission for past five years. Congratulated her on her sobriety.     - Record indicates a h/o psychosis but patient does not recall this.    ID  - MRSA (+)   ~ contact precautions    Different anesthesia methods/types have been discussed with the patient, but they are aware that the final plan will be decided by the assigned anesthesia provider on the date of service.      The patient is optimized for their procedure. Information on surgery time/arrival time, preparation for procedure and NPO status will be provided by  GI nursing staff. PAC nursing staff to provide medication instructions. No further diagnostic testing indicated.    Please refer to the physical examination documented by the anesthesiologist in the anesthesia record on the day of surgery.    Video-Visit Details    Type of service:  Video Visit    Provider received verbal consent for a Video Visit from the patient? Yes     Originating Location (pt. Location): Home  Distant Location (provider location):  Off-site  Mode of Communication:  Video Conference via [employer]  On the day of service:     Prep time: 8 minutes  Visit time: 19 minutes  Documentation time: 13 minutes  ------------------------------------------  Total time: 40 minutes      INEZ Kaur CNP  Preoperative Assessment Center  [facility]  Phone: [phone]  Fax: [phone]  "

## 2023-07-25 ENCOUNTER — TELEPHONE (OUTPATIENT)
Dept: GASTROENTEROLOGY | Facility: CLINIC | Age: 58
End: 2023-07-25

## 2023-07-25 DIAGNOSIS — Z12.11 ENCOUNTER FOR SCREENING COLONOSCOPY: Primary | ICD-10-CM

## 2023-07-25 RX ORDER — BISACODYL 5 MG/1
TABLET, DELAYED RELEASE ORAL
Qty: 4 TABLET | Refills: 0 | Status: SHIPPED | OUTPATIENT
Start: 2023-07-25 | End: 2024-07-22

## 2023-07-25 NOTE — TELEPHONE ENCOUNTER
Pre visit planning completed.      Procedure details:    Patient scheduled for Colonoscopy/Upper endoscopy (EGD) on 8/8/2023.     Arrival time: 0700. Procedure time 0830    Pre op exam needed? Yes. PAC eval is needed    Facility location: [facility]; [address] [place] [address], [address] Floor, [place] [address]    Sedation type: General    Indication for procedure:   Screen for colon cancer   Gastroesophageal reflux disease without esophagitis   Hiatal hernia       Chart review:     Electronic implanted devices? No    Diabetic? Prediabetic    Diabetic medication HOLDING recommendations: (if applicable)  Oral diabetic medications: N/A  Diabetic injectables: N/A  Insulin: N/A      Medication review:    Anticoagulants? No    NSAIDS? No NSAID medications per patient's medication list.  RN will verify with pre-assessment call.    Other medication HOLDING recommendations:  N/A      Prep for procedure:     Bowel prep recommendation: Extended prep Golytely    Due to:  BMI > 40. , constipation noted or reported.  and poor prep/inadequate bowel prep in the past.     Prep instructions sent via [employer]     Bowel prep script sent to [facility] RN  Endoscopy Procedure Pre Assessment RN  [phone] option 4

## 2023-07-25 NOTE — TELEPHONE ENCOUNTER
Pre assessment completed for upcoming procedure.    Procedure details:    Arrival time and facility location reviewed.    Pre op exam needed? Yes.  Patient is aware they need a PAC eval prior to procedure. Pt warm transferred to PAC clinic for scheduling    Designated  policy reviewed. Instructed to have someone stay 24 hours post procedure.     COVID policy reviewed.      Chart review:     Electronic implanted devices? No    Diabetic? Prediabetic    Medication review:    Diabetic medication HOLDING recommendations: (if applicable)  Oral diabetic medications: N/A  Diabetic injectables: N/A  Insulin: N/A    Anticoagulants? No    NSAIDS? Yes.  Ibuprofen (Advil, Motrin).  Holding interval of 1 day before procedure.    Other medication HOLDING recommendations:  N/A      Prep for procedure:     Reviewed procedure prep instructions.     Patient verbalized understanding and had no questions or concerns at this time.        Alida Wu RN  Endoscopy Procedure Pre Assessment RN  [phone] option 4

## 2023-07-26 RX ORDER — BACITRACIN ZINC 500 [USP'U]/G
OINTMENT TOPICAL
Qty: 28.4 G | Refills: 1 | Status: SHIPPED | OUTPATIENT
Start: 2023-07-26 | End: 2024-01-10

## 2023-07-27 ENCOUNTER — PRE VISIT (OUTPATIENT)
Dept: SURGERY | Facility: CLINIC | Age: 58
End: 2023-07-27

## 2023-08-08 ENCOUNTER — HOSPITAL ENCOUNTER (OUTPATIENT)
Facility: CLINIC | Age: 58
Discharge: HOME | End: 2023-08-08
Attending: INTERNAL MEDICINE | Admitting: INTERNAL MEDICINE
Payer: COMMERCIAL

## 2023-08-08 ENCOUNTER — ANESTHESIA (OUTPATIENT)
Dept: ANESTHESIOLOGY | Facility: CLINIC | Age: 58
End: 2023-08-08
Payer: COMMERCIAL

## 2023-08-08 ENCOUNTER — MYC MEDICAL ADVICE (OUTPATIENT)
Dept: FAMILY MEDICINE | Facility: CLINIC | Age: 58
End: 2023-08-08

## 2023-08-08 VITALS
DIASTOLIC BLOOD PRESSURE: 59 MMHG | HEART RATE: 64 BPM | OXYGEN SATURATION: 94 % | TEMPERATURE: 97.9 F | RESPIRATION RATE: 16 BRPM | SYSTOLIC BLOOD PRESSURE: 117 MMHG

## 2023-08-08 LAB
COLONOSCOPY: NORMAL
UPPER GI ENDOSCOPY: NORMAL

## 2023-08-08 PROCEDURE — 370N000017 HC ANESTHESIA TECHNICAL FEE, PER MIN: Performed by: INTERNAL MEDICINE

## 2023-08-08 PROCEDURE — 45385 COLONOSCOPY W/LESION REMOVAL: CPT | Mod: PT | Performed by: INTERNAL MEDICINE

## 2023-08-08 PROCEDURE — 250N000009 HC RX 250: Performed by: REGISTERED NURSE

## 2023-08-08 PROCEDURE — 250N000011 HC RX IP 250 OP 636: Performed by: REGISTERED NURSE

## 2023-08-08 PROCEDURE — 43239 EGD BIOPSY SINGLE/MULTIPLE: CPT | Performed by: INTERNAL MEDICINE

## 2023-08-08 PROCEDURE — 250N000025 HC SEVOFLURANE, PER MIN: Performed by: INTERNAL MEDICINE

## 2023-08-08 PROCEDURE — 88305 TISSUE EXAM BY PATHOLOGIST: CPT | Mod: 26 | Performed by: PATHOLOGY

## 2023-08-08 PROCEDURE — 45380 COLONOSCOPY AND BIOPSY: CPT | Performed by: INTERNAL MEDICINE

## 2023-08-08 PROCEDURE — 88305 TISSUE EXAM BY PATHOLOGIST: CPT | Mod: TC | Performed by: INTERNAL MEDICINE

## 2023-08-08 PROCEDURE — 258N000003 HC RX IP 258 OP 636: Performed by: REGISTERED NURSE

## 2023-08-08 RX ORDER — HALOPERIDOL 5 MG/ML
1 INJECTION INTRAMUSCULAR
Status: DISCONTINUED | OUTPATIENT
Start: 2023-08-08 | End: 2023-08-08 | Stop reason: HOSPADM

## 2023-08-08 RX ORDER — HYDRALAZINE HYDROCHLORIDE 20 MG/ML
2.5-5 INJECTION INTRAMUSCULAR; INTRAVENOUS EVERY 10 MIN PRN
Status: DISCONTINUED | OUTPATIENT
Start: 2023-08-08 | End: 2023-08-08 | Stop reason: HOSPADM

## 2023-08-08 RX ORDER — FENTANYL CITRATE 50 UG/ML
50 INJECTION, SOLUTION INTRAMUSCULAR; INTRAVENOUS EVERY 5 MIN PRN
Status: DISCONTINUED | OUTPATIENT
Start: 2023-08-08 | End: 2023-08-08 | Stop reason: HOSPADM

## 2023-08-08 RX ORDER — ONDANSETRON 4 MG/1
4 TABLET, ORALLY DISINTEGRATING ORAL EVERY 30 MIN PRN
Status: DISCONTINUED | OUTPATIENT
Start: 2023-08-08 | End: 2023-08-08 | Stop reason: HOSPADM

## 2023-08-08 RX ORDER — ONDANSETRON 2 MG/ML
4 INJECTION INTRAMUSCULAR; INTRAVENOUS EVERY 30 MIN PRN
Status: DISCONTINUED | OUTPATIENT
Start: 2023-08-08 | End: 2023-08-08 | Stop reason: HOSPADM

## 2023-08-08 RX ORDER — SODIUM CHLORIDE, SODIUM LACTATE, POTASSIUM CHLORIDE, CALCIUM CHLORIDE 600; 310; 30; 20 MG/100ML; MG/100ML; MG/100ML; MG/100ML
INJECTION, SOLUTION INTRAVENOUS CONTINUOUS
Status: DISCONTINUED | OUTPATIENT
Start: 2023-08-08 | End: 2023-08-08 | Stop reason: HOSPADM

## 2023-08-08 RX ORDER — OXYCODONE HYDROCHLORIDE 5 MG/1
5 TABLET ORAL EVERY 4 HOURS PRN
Status: DISCONTINUED | OUTPATIENT
Start: 2023-08-08 | End: 2023-08-08 | Stop reason: HOSPADM

## 2023-08-08 RX ORDER — LABETALOL HYDROCHLORIDE 5 MG/ML
10 INJECTION, SOLUTION INTRAVENOUS
Status: DISCONTINUED | OUTPATIENT
Start: 2023-08-08 | End: 2023-08-08 | Stop reason: HOSPADM

## 2023-08-08 RX ORDER — NALOXONE HYDROCHLORIDE 0.4 MG/ML
0.2 INJECTION, SOLUTION INTRAMUSCULAR; INTRAVENOUS; SUBCUTANEOUS
Status: DISCONTINUED | OUTPATIENT
Start: 2023-08-08 | End: 2023-08-08 | Stop reason: HOSPADM

## 2023-08-08 RX ORDER — ALBUTEROL SULFATE 0.83 MG/ML
2.5 SOLUTION RESPIRATORY (INHALATION) EVERY 4 HOURS PRN
Status: DISCONTINUED | OUTPATIENT
Start: 2023-08-08 | End: 2023-08-08 | Stop reason: HOSPADM

## 2023-08-08 RX ORDER — ACETAMINOPHEN 325 MG/1
975 TABLET ORAL EVERY 6 HOURS PRN
Status: DISCONTINUED | OUTPATIENT
Start: 2023-08-08 | End: 2023-08-08 | Stop reason: HOSPADM

## 2023-08-08 RX ORDER — DEXAMETHASONE SODIUM PHOSPHATE 4 MG/ML
INJECTION, SOLUTION INTRA-ARTICULAR; INTRALESIONAL; INTRAMUSCULAR; INTRAVENOUS; SOFT TISSUE PRN
Status: DISCONTINUED | OUTPATIENT
Start: 2023-08-08 | End: 2023-08-08

## 2023-08-08 RX ORDER — FENTANYL CITRATE 50 UG/ML
25 INJECTION, SOLUTION INTRAMUSCULAR; INTRAVENOUS EVERY 5 MIN PRN
Status: DISCONTINUED | OUTPATIENT
Start: 2023-08-08 | End: 2023-08-08 | Stop reason: HOSPADM

## 2023-08-08 RX ORDER — NALOXONE HYDROCHLORIDE 0.4 MG/ML
0.4 INJECTION, SOLUTION INTRAMUSCULAR; INTRAVENOUS; SUBCUTANEOUS
Status: DISCONTINUED | OUTPATIENT
Start: 2023-08-08 | End: 2023-08-08 | Stop reason: HOSPADM

## 2023-08-08 RX ORDER — ONDANSETRON 2 MG/ML
INJECTION INTRAMUSCULAR; INTRAVENOUS PRN
Status: DISCONTINUED | OUTPATIENT
Start: 2023-08-08 | End: 2023-08-08

## 2023-08-08 RX ORDER — ONDANSETRON 2 MG/ML
4 INJECTION INTRAMUSCULAR; INTRAVENOUS EVERY 6 HOURS PRN
Status: DISCONTINUED | OUTPATIENT
Start: 2023-08-08 | End: 2023-08-08 | Stop reason: HOSPADM

## 2023-08-08 RX ORDER — FENTANYL CITRATE 50 UG/ML
INJECTION, SOLUTION INTRAMUSCULAR; INTRAVENOUS PRN
Status: DISCONTINUED | OUTPATIENT
Start: 2023-08-08 | End: 2023-08-08

## 2023-08-08 RX ORDER — ONDANSETRON 4 MG/1
4 TABLET, ORALLY DISINTEGRATING ORAL EVERY 6 HOURS PRN
Status: DISCONTINUED | OUTPATIENT
Start: 2023-08-08 | End: 2023-08-08 | Stop reason: HOSPADM

## 2023-08-08 RX ORDER — PROCHLORPERAZINE MALEATE 10 MG
10 TABLET ORAL EVERY 6 HOURS PRN
Status: DISCONTINUED | OUTPATIENT
Start: 2023-08-08 | End: 2023-08-08 | Stop reason: HOSPADM

## 2023-08-08 RX ORDER — PROPOFOL 10 MG/ML
INJECTION, EMULSION INTRAVENOUS PRN
Status: DISCONTINUED | OUTPATIENT
Start: 2023-08-08 | End: 2023-08-08

## 2023-08-08 RX ORDER — LIDOCAINE HYDROCHLORIDE 20 MG/ML
INJECTION, SOLUTION INFILTRATION; PERINEURAL PRN
Status: DISCONTINUED | OUTPATIENT
Start: 2023-08-08 | End: 2023-08-08

## 2023-08-08 RX ORDER — SODIUM CHLORIDE, SODIUM LACTATE, POTASSIUM CHLORIDE, CALCIUM CHLORIDE 600; 310; 30; 20 MG/100ML; MG/100ML; MG/100ML; MG/100ML
INJECTION, SOLUTION INTRAVENOUS CONTINUOUS PRN
Status: DISCONTINUED | OUTPATIENT
Start: 2023-08-08 | End: 2023-08-08

## 2023-08-08 RX ORDER — HYDROMORPHONE HYDROCHLORIDE 1 MG/ML
0.4 INJECTION, SOLUTION INTRAMUSCULAR; INTRAVENOUS; SUBCUTANEOUS EVERY 5 MIN PRN
Status: DISCONTINUED | OUTPATIENT
Start: 2023-08-08 | End: 2023-08-08 | Stop reason: HOSPADM

## 2023-08-08 RX ORDER — FLUMAZENIL 0.1 MG/ML
0.2 INJECTION, SOLUTION INTRAVENOUS
Status: DISCONTINUED | OUTPATIENT
Start: 2023-08-08 | End: 2023-08-08 | Stop reason: HOSPADM

## 2023-08-08 RX ORDER — HYDROMORPHONE HYDROCHLORIDE 1 MG/ML
0.2 INJECTION, SOLUTION INTRAMUSCULAR; INTRAVENOUS; SUBCUTANEOUS EVERY 5 MIN PRN
Status: DISCONTINUED | OUTPATIENT
Start: 2023-08-08 | End: 2023-08-08 | Stop reason: HOSPADM

## 2023-08-08 RX ADMIN — FENTANYL CITRATE 50 MCG: 50 INJECTION, SOLUTION INTRAMUSCULAR; INTRAVENOUS at 09:05

## 2023-08-08 RX ADMIN — PROPOFOL 200 MG: 10 INJECTION, EMULSION INTRAVENOUS at 09:06

## 2023-08-08 RX ADMIN — ONDANSETRON 4 MG: 2 INJECTION INTRAMUSCULAR; INTRAVENOUS at 09:08

## 2023-08-08 RX ADMIN — NOREPINEPHRINE BITARTRATE 6.4 MCG: 1 INJECTION, SOLUTION, CONCENTRATE INTRAVENOUS at 09:44

## 2023-08-08 RX ADMIN — SUGAMMADEX 300 MG: 100 INJECTION, SOLUTION INTRAVENOUS at 09:48

## 2023-08-08 RX ADMIN — DEXAMETHASONE SODIUM PHOSPHATE 4 MG: 4 INJECTION, SOLUTION INTRA-ARTICULAR; INTRALESIONAL; INTRAMUSCULAR; INTRAVENOUS; SOFT TISSUE at 09:08

## 2023-08-08 RX ADMIN — NOREPINEPHRINE BITARTRATE 12.8 MCG: 1 INJECTION, SOLUTION, CONCENTRATE INTRAVENOUS at 09:38

## 2023-08-08 RX ADMIN — SODIUM CHLORIDE, POTASSIUM CHLORIDE, SODIUM LACTATE AND CALCIUM CHLORIDE: 600; 310; 30; 20 INJECTION, SOLUTION INTRAVENOUS at 08:58

## 2023-08-08 RX ADMIN — PHENYLEPHRINE HYDROCHLORIDE 100 MCG: 10 INJECTION INTRAVENOUS at 09:26

## 2023-08-08 RX ADMIN — PHENYLEPHRINE HYDROCHLORIDE 200 MCG: 10 INJECTION INTRAVENOUS at 09:32

## 2023-08-08 RX ADMIN — LIDOCAINE HYDROCHLORIDE 100 MG: 20 INJECTION, SOLUTION INFILTRATION; PERINEURAL at 09:05

## 2023-08-08 RX ADMIN — ROCURONIUM BROMIDE 50 MG: 50 INJECTION, SOLUTION INTRAVENOUS at 09:06

## 2023-08-08 RX ADMIN — PHENYLEPHRINE HYDROCHLORIDE 200 MCG: 10 INJECTION INTRAVENOUS at 09:37

## 2023-08-08 ASSESSMENT — ACTIVITIES OF DAILY LIVING (ADL)
ADLS_ACUITY_SCORE: 37

## 2023-08-08 ASSESSMENT — LIFESTYLE VARIABLES: TOBACCO_USE: 1

## 2023-08-08 NOTE — ANESTHESIA PROCEDURE NOTES
Airway       Patient location during procedure: OR       Procedure Start/Stop Times: 8/8/2023 9:07 AM  Staff -        Anesthesiologist:  Mary Bergeron MD       CRNA: Erin Mcdowell APRN CRNA       Performed By: CRNA  Consent for Airway        Urgency: elective  Indications and Patient Condition       Indications for airway management: jone-procedural       Induction type:intravenous       Mask difficulty assessment: 2 - vent by mask + OA or adjuvant +/- NMBA    Final Airway Details       Final airway type: endotracheal airway       Successful airway: ETT - single  Endotracheal Airway Details        ETT size (mm): 7.0       Cuffed: yes       Successful intubation technique: direct laryngoscopy       DL Blade Type: MAC 3       Grade View of Cords: 2       Adjucts: stylet       Position: Right       Measured from: lips       Secured at (cm): 22       Bite Block used: bite block for endoscope.    Post intubation assessment        Placement verified by: capnometry, equal breath sounds and chest rise        Number of attempts at approach: 1       Number of other approaches attempted: 0       Secured with: plastic tape and pink tape       Ease of procedure: easy       Dentition: Intact and Unchanged    Medication(s) Administered   Medication Administration Time: 8/8/2023 9:07 AM

## 2023-08-08 NOTE — ANESTHESIA POSTPROCEDURE EVALUATION
Patient: Gudelia Dong    Procedure: Procedure(s):  ESOPHAGOGASTRODUODENOSCOPY, WITH BIOPSY  COLONOSCOPY, WITH POLYPECTOMY AND BIOPSY       Anesthesia Type:  General    Note:  Disposition: Outpatient   Postop Pain Control: Uneventful            Sign Out: Well controlled pain   PONV: No   Neuro/Psych: Uneventful            Sign Out: Acceptable/Baseline neuro status   Airway/Respiratory: Uneventful            Sign Out: Acceptable/Baseline resp. status   CV/Hemodynamics: Uneventful            Sign Out: Acceptable CV status; No obvious hypovolemia; No obvious fluid overload   Other NRE: NONE   DID A NON-ROUTINE EVENT OCCUR? No           Last vitals:  Vitals Value Taken Time   /71 08/08/23 1015   Temp     Pulse 69 08/08/23 1016   Resp 34 08/08/23 1016   SpO2 95 % 08/08/23 1016   Vitals shown include unvalidated device data.    Electronically Signed By: Mary Bergeron MD  August 8, 2023  10:17 AM

## 2023-08-08 NOTE — ANESTHESIA PREPROCEDURE EVALUATION
Anesthesia Pre-Procedure Evaluation    Patient: Gudelia Dong   MRN: [id number] : 1965        Procedure : Procedure(s):  Esophagoscopy, gastroscopy, duodenoscopy (EGD), combined  Colonoscopy          Past Medical History:   Diagnosis Date    Depression     GERD (gastroesophageal reflux disease)     Hemorrhoid     Menstrual disorder     s/p D&C -2012    ROMEO on CPAP     Other bipolar disorders     followed by Dr Collazo    Post menopausal syndrome     Tobacco abuse       Past Surgical History:   Procedure Laterality Date    COLONOSCOPY  2013    Procedure: COLONOSCOPY;;  Surgeon: Guy Grant MD;  Location:  GI    COLONOSCOPY  2014    Procedure: COMBINED COLONOSCOPY, SINGLE BIOPSY/POLYPECTOMY BY BIOPSY;;  Surgeon: Guy Grant MD;  Location:  GI    ESOPHAGOSCOPY, GASTROSCOPY, DUODENOSCOPY (EGD), COMBINED  2013    Procedure: COMBINED ESOPHAGOSCOPY, GASTROSCOPY, DUODENOSCOPY (EGD), BIOPSY SINGLE OR MULTIPLE;  ESOPHAGOSCOPY, GASTROSCOPY, DUODENOSCOPY, COLONOSCOPY;  Surgeon: Guy Grant MD;  Location:  GI    GENITOURINARY SURGERY      urethra stretched    GYN SURGERY      d&c     LAPAROSCOPIC ASSISTED RECTOPEXY  3/14/2014    Procedure: LAPAROSCOPIC ASSISTED RECTOPEXY;  LAPAROSCOPIC  VENTRAL RECTOPEXY ;  Surgeon: Jennifer Connolly MD;  Location:  OR    ORTHOPEDIC SURGERY      surgery on clavicle,surgery for left leg fracture      Allergies   Allergen Reactions    Contrast Dye Shortness Of Breath    Methylpyrrolidone Swelling    Iodine Swelling     Other reaction(s): Angioedema  Facial swelling.    Morphine Anxiety and Nausea and Vomiting     Other reaction(s): Behavioral Disturbances  Other reaction(s): Anxiety, vomiting      Social History     Tobacco Use    Smoking status: Some Days     Packs/day: 0.25     Types: Cigarettes     Passive exposure: Current    Smokeless tobacco: Never    Tobacco comments:     Quit for year, restarted .       : Now smokes a couple  cigarettes per day - trying to quit.    Substance Use Topics    Alcohol use: No     Comment: Severe alcohol use disorder for 20 years.  2023:  Reports sober for past five years      Wt Readings from Last 1 Encounters:   05/19/23 130.9 kg (288 lb 9.6 oz)        Anesthesia Evaluation   Pt has had prior anesthetic. Type: General and MAC.    History of anesthetic complications (Difficult to stay asleep. )  - .  ROMEO.    ROS/MED HX  ENT/Pulmonary:     (+) sleep apnea, uses CPAP,              tobacco use (Trying to quit smoking.  ), Current use,                      Neurologic:  - neg neurologic ROS     Cardiovascular: Comment: Denies cardiac symptoms including chest pain, SOB, palpitations, syncope, SANCHEZ, orthopnea, or PND.      (+)  - -   -  - -                                 Previous cardiac testing  (-) taking anticoagulants/antiplatelets   METS/Exercise Tolerance: >4 METS Comment: Able to walk 1/2 mile.    Lives in an apartment and does all errands either by train, bus or walking.    Hematologic:     (+)      anemia,          Musculoskeletal: Comment: S/p left LE surgery with hardware.       GI/Hepatic: Comment: Dysphagia and belching    (+) GERD, Asymptomatic on medication,    hiatal hernia,              Renal/Genitourinary:  - neg Renal ROS     Endo: Comment: Prediabetes     (+)          thyroid problem, hypothyroidism,    Obesity,       Psychiatric/Substance Use:     (+) psychiatric history depression, bipolar and other (comment) (ADHD.  Reports being stable. Follows with Psychiatrist. ) alcohol abuse (Alcohol use disorder - sober for 5 years. )   (-) chronic opioid use history   Infectious Disease:     (+)   MRSA,         Malignancy:  - neg malignancy ROS     Other:  - neg other ROS          Physical Exam    Airway        Mallampati: II   TM distance: > 3 FB   Neck ROM: full   Mouth opening: > 3 cm    Respiratory Devices and Support         Dental       (+) Minor Abnormalities - some fillings, tiny  chips      Cardiovascular             Pulmonary                   OUTSIDE LABS:  CBC:   Lab Results   Component Value Date    WBC 5.1 03/28/2023    WBC 7.9 02/10/2022    HGB 11.7 03/28/2023    HGB 12.2 02/10/2022    HCT 37.5 03/28/2023    HCT 39.2 02/10/2022     03/28/2023     02/10/2022     BMP:   Lab Results   Component Value Date     03/28/2023     02/10/2022    POTASSIUM 4.1 03/28/2023    POTASSIUM 4.0 02/10/2022    CHLORIDE 105 03/28/2023    CHLORIDE 107 02/10/2022    CO2 26 03/28/2023    CO2 27 02/10/2022    BUN 15.4 03/28/2023    BUN 15 02/10/2022    CR 0.83 03/28/2023    CR 0.93 02/10/2022    GLC 94 03/28/2023    GLC 92 02/10/2022     COAGS:   Lab Results   Component Value Date    INR 0.82 (L) 04/24/2006     POC:   Lab Results   Component Value Date    HCG Negative 07/28/2015     HEPATIC:   Lab Results   Component Value Date    ALBUMIN 4.3 03/28/2023    PROTTOTAL 7.6 03/28/2023    ALT 21 03/28/2023    AST 21 03/28/2023    GGT 19 07/05/2006    ALKPHOS 123 (H) 03/28/2023    BILITOTAL 0.2 03/28/2023     OTHER:   Lab Results   Component Value Date    A1C 5.8 (H) 03/28/2023    PURVI 9.3 03/28/2023    PHOS 3.5 03/19/2014    MAG 1.7 03/19/2014    LIPASE 50 06/07/2014    AMYLASE 54 06/27/2009    TSH 0.85 03/28/2023    T4 0.75 (L) 12/11/2018       Anesthesia Plan    ASA Status:  3    NPO Status:  NPO Appropriate    Anesthesia Type: General.     - Airway: ETT   Induction: Intravenous.   Maintenance: Inhalation.        Consents    Anesthesia Plan(s) and associated risks, benefits, and realistic alternatives discussed. Questions answered and patient/representative(s) expressed understanding.     - Discussed:     - Discussed with:  Patient            Postoperative Care    Pain management: Multi-modal analgesia.   PONV prophylaxis: Ondansetron (or other 5HT-3), Dexamethasone or Solumedrol     Comments:                Mary Bergeron MD

## 2023-08-08 NOTE — H&P
Gudelia Dong  [id number]  female  58 year old      Reason for procedure/surgery: egd, colonoscopy, history of colon polyps, dysphagia and hernia.     Patient Active Problem List   Diagnosis    ROMEO on CPAP    Tobacco abuse    Post menopausal syndrome    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)    CARDIOVASCULAR SCREENING; LDL GOAL LESS THAN 160    Major depressive disorder, recurrent episode, moderate (H)    Anemia    Tubular adenoma    Iron deficiency anemia    Hyponatremia    ADHD, predominantly inattentive type    overweight BMI>30    Periumbilical hernia    Health Care Home    Psychosis (H)    Bipolar 2 disorder (H)    Gastroesophageal reflux disease without esophagitis    Prediabetes    Hypothyroidism due to medication    Hidradenitis suppurativa of right axilla    Vitamin D deficiency    Obesity (BMI 35.0-39.9) with comorbidity (H)    Numbness and tingling in left hand    Hiatal hernia    Insomnia, unspecified type       Past Surgical History:    Past Surgical History:   Procedure Laterality Date    COLONOSCOPY  12/24/2013    Procedure: COLONOSCOPY;;  Surgeon: Guy Grant MD;  Location:  GI    COLONOSCOPY  1/9/2014    Procedure: COMBINED COLONOSCOPY, SINGLE BIOPSY/POLYPECTOMY BY BIOPSY;;  Surgeon: Guy Grant MD;  Location:  GI    ESOPHAGOSCOPY, GASTROSCOPY, DUODENOSCOPY (EGD), COMBINED  12/24/2013    Procedure: COMBINED ESOPHAGOSCOPY, GASTROSCOPY, DUODENOSCOPY (EGD), BIOPSY SINGLE OR MULTIPLE;  ESOPHAGOSCOPY, GASTROSCOPY, DUODENOSCOPY, COLONOSCOPY;  Surgeon: Guy Grant MD;  Location:  GI    GENITOURINARY SURGERY      urethra stretched    GYN SURGERY      d&c     LAPAROSCOPIC ASSISTED RECTOPEXY  3/14/2014    Procedure: LAPAROSCOPIC ASSISTED RECTOPEXY;  LAPAROSCOPIC  VENTRAL RECTOPEXY ;  Surgeon: Jennifer Connolly MD;  Location:  OR    ORTHOPEDIC SURGERY      surgery on clavicle,surgery for left leg fracture       Past Medical History:   Past Medical History:   Diagnosis Date    Depression      GERD (gastroesophageal reflux disease)     Hemorrhoid     Menstrual disorder     s/p D&C -12/2012    ROMEO on CPAP     Other bipolar disorders     followed by Dr Collazo    Post menopausal syndrome     Tobacco abuse        Social History:   Social History     Tobacco Use    Smoking status: Some Days     Packs/day: 0.25     Types: Cigarettes     Passive exposure: Current    Smokeless tobacco: Never    Tobacco comments:     Quit for year, restarted 2022.       2023: Now smokes a couple cigarettes per day - trying to quit.    Substance Use Topics    Alcohol use: No     Comment: Severe alcohol use disorder for 20 years.  2023:  Reports sober for past five years       Family History:   Family History   Problem Relation Age of Onset    Cancer Mother         ovarian cancer    Hypertension Father     Breast Cancer No family hx of     Cancer - colorectal No family hx of        Allergies:   Allergies   Allergen Reactions    Contrast Dye Shortness Of Breath    Methylpyrrolidone Swelling    Iodine Swelling     Other reaction(s): Angioedema  Facial swelling.    Morphine Anxiety and Nausea and Vomiting     Other reaction(s): Behavioral Disturbances  Other reaction(s): Anxiety, vomiting       Active Medications:   No current outpatient medications on file.       Systemic Review:   CONSTITUTIONAL: NEGATIVE for fever, chills, change in weight  ENT/MOUTH: NEGATIVE for ear, mouth and throat problems  RESP: NEGATIVE for significant cough or SOB  CV: NEGATIVE for chest pain, palpitations or peripheral edema    Physical Examination:   Vital Signs: /81   Pulse 84   Temp 98.4  F (36.9  C)   Resp 16   LMP 07/20/2006   GENERAL: healthy, alert and no distress  NECK: no adenopathy, no asymmetry, masses, or scars  RESP: lungs clear to auscultation - no rales, rhonchi or wheezes  CV: regular rate and rhythm, normal S1 S2, no S3 or S4, no murmur, click or rub, no peripheral edema and peripheral pulses strong  ABDOMEN: soft, nontender,  no hepatosplenomegaly, no masses and bowel sounds normal  MS: no gross musculoskeletal defects noted, no edema    Plan: Appropriate to proceed as scheduled.      René Rodriguez DO  8/8/2023    PCP:  No Ref-Primary, Physician

## 2023-08-08 NOTE — ANESTHESIA CARE TRANSFER NOTE
Patient: Gudelia Dong    Procedure: Procedure(s):  ESOPHAGOGASTRODUODENOSCOPY, WITH BIOPSY  COLONOSCOPY, WITH POLYPECTOMY AND BIOPSY       Diagnosis: Screen for colon cancer [Z12.11]  Gastroesophageal reflux disease without esophagitis [K21.9]  Hiatal hernia [K44.9]  Class 3 severe obesity with body mass index (BMI) of 40.0 to 44.9 in adult, unspecified obesity type, unspecified whether serious comorbidity present (H) [E66.01, Z68.41]  Diagnosis Additional Information: No value filed.    Anesthesia Type:   General     Note:    Oropharynx: oropharynx clear of all foreign objects and spontaneously breathing  Level of Consciousness: awake  Oxygen Supplementation: room air    Independent Airway: airway patency satisfactory and stable  Dentition: dentition unchanged  Vital Signs Stable: post-procedure vital signs reviewed and stable  Report to RN Given: handoff report given  Patient transferred to: PACU    Handoff Report: Identifed the Patient, Identified the Reponsible Provider, Reviewed the pertinent medical history, Discussed the surgical course, Reviewed Intra-OP anesthesia mangement and issues during anesthesia, Set expectations for post-procedure period and Allowed opportunity for questions and acknowledgement of understanding      Vitals:  Vitals Value Taken Time   /73 08/08/23 1000   Temp     Pulse 77 08/08/23 1002   Resp 21 08/08/23 1002   SpO2 97 % 08/08/23 1002   Vitals shown include unvalidated device data.    Electronically Signed By: INEZ Hodge CRNA  August 8, 2023  10:03 AM

## 2023-08-10 LAB
PATH REPORT.COMMENTS IMP SPEC: NORMAL
PATH REPORT.COMMENTS IMP SPEC: NORMAL
PATH REPORT.FINAL DX SPEC: NORMAL
PATH REPORT.GROSS SPEC: NORMAL
PATH REPORT.MICROSCOPIC SPEC OTHER STN: NORMAL
PATH REPORT.RELEVANT HX SPEC: NORMAL
PHOTO IMAGE: NORMAL

## 2023-08-28 ENCOUNTER — VIRTUAL VISIT (OUTPATIENT)
Dept: ENDOCRINOLOGY | Facility: CLINIC | Age: 58
End: 2023-08-28
Payer: COMMERCIAL

## 2023-08-28 ENCOUNTER — TELEPHONE (OUTPATIENT)
Dept: ENDOCRINOLOGY | Facility: CLINIC | Age: 58
End: 2023-08-28

## 2023-08-28 VITALS — HEIGHT: 68 IN | BODY MASS INDEX: 43.65 KG/M2 | WEIGHT: 288 LBS

## 2023-08-28 DIAGNOSIS — K21.9 GASTROESOPHAGEAL REFLUX DISEASE WITHOUT ESOPHAGITIS: ICD-10-CM

## 2023-08-28 DIAGNOSIS — E66.813 CLASS 3 SEVERE OBESITY WITH BODY MASS INDEX (BMI) OF 40.0 TO 44.9 IN ADULT, UNSPECIFIED OBESITY TYPE, UNSPECIFIED WHETHER SERIOUS COMORBIDITY PRESENT (H): ICD-10-CM

## 2023-08-28 DIAGNOSIS — Z71.3 NUTRITIONAL COUNSELING: Primary | ICD-10-CM

## 2023-08-28 DIAGNOSIS — E66.01 CLASS 3 SEVERE OBESITY WITH BODY MASS INDEX (BMI) OF 40.0 TO 44.9 IN ADULT, UNSPECIFIED OBESITY TYPE, UNSPECIFIED WHETHER SERIOUS COMORBIDITY PRESENT (H): ICD-10-CM

## 2023-08-28 DIAGNOSIS — K44.9 HIATAL HERNIA: ICD-10-CM

## 2023-08-28 PROCEDURE — 99204 OFFICE O/P NEW MOD 45 MIN: CPT | Mod: VID | Performed by: INTERNAL MEDICINE

## 2023-08-28 PROCEDURE — 97802 MEDICAL NUTRITION INDIV IN: CPT | Mod: 95 | Performed by: DIETITIAN, REGISTERED

## 2023-08-28 PROCEDURE — 99207 PR NO CHARGE LOS: CPT | Mod: 95 | Performed by: DIETITIAN, REGISTERED

## 2023-08-28 ASSESSMENT — PAIN SCALES - GENERAL
PAINLEVEL: NO PAIN (0)
PAINLEVEL: NO PAIN (0)
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
## 2023-08-28 NOTE — NURSING NOTE
Is the patient currently in the state of [place]? YES    Visit mode:VIDEO    If the visit is dropped, the patient can be reconnected by: VIDEO VISIT: Text to cell phone:   Telephone Information:   Mobile [phone]       Will anyone else be joining the visit? NO  (If patient encounters technical issues they should call [phone]    How would you like to obtain your AVS? MyChart    Are changes needed to the allergy or medication list? No, Pt stated no changes to allergies, and Pt stated no med changes    Reason for visit: Consult (BRIAN)    Pauly CUENCA

## 2023-08-28 NOTE — PROGRESS NOTES
Virtual Visit Details    Joined the call at 8/28/2023, 11:02:03 am.  Left the call at 8/28/2023, 11:19:49 am.    Originating Location (pt. Location): Home    Distant Location (provider location):  Off-site  Platform used for Video Visit: Irene

## 2023-08-28 NOTE — PATIENT INSTRUCTIONS
Nutrition Resources:  Plate method can be used for general guidance on balanced meals/portion sizes (see link below for picture/more information)                 Make 1/2 your plate non starchy vegetable                3+ oz lean protein sources     (Note: 3 oz = deck of cards size)                 1/2 to 1 cup carbohydrate choices such as whole grain starches or starchy vegetables or fruit.               Choose ~0-2 added fat servings at a meal (avocados, nut butters, nuts or seeds, olive oil, vegetable oils).    The Plate Method:  https://www.cdc.gov/diabetes/images/managing/Diabetes-Manage-Eat-Well-Plate-Graphic_600px.jpg    Can use different seasons, citrus, herbs to help add flavor.     Grocery list ideas:   - Broccoli   - [place] sprouts   - Raw spinach    - Green peppers   - Cucumbers    - Cauliflower    - Asparagus    - Green peas   - Peas (starchy vegetable)    - Carrots   - Fish    - Strawberries   - Apples   - Grapes   - Raisins (recommend no sugar added)   - Blueberries   - Raspberries   - Blackberries   - Turkey   - Chicken    Follow up: 2 months, pending coverage    IVELISSE Vanessa, RD, LD  Clinic #: [phone]

## 2023-08-28 NOTE — NURSING NOTE
Is the patient currently in the state of [place]? YES    Visit mode:VIDEO    If the visit is dropped, the patient can be reconnected by: VIDEO VISIT: Text to cell phone:   Telephone Information:   Mobile [phone]       Will anyone else be joining the visit? NO  (If patient encounters technical issues they should call [phone]    How would you like to obtain your AVS? MyChart    Are changes needed to the allergy or medication list? No, Pt stated no changes to allergies, and Pt stated no med changes    Reason for visit: Consult (BRIAN Nutrition)    Pauly CUENCA

## 2023-08-28 NOTE — TELEPHONE ENCOUNTER
PA Initiation    Medication: WEGOVY 0.25 MG/0.5ML SC SOAJ  Insurance Company: [employer] ([facility]) - Phone [phone] Fax [phone]  Pharmacy Filling the Rx:    Filling Pharmacy Phone:    Filling Pharmacy Fax:    Start Date: 8/28/2023
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
## 2023-08-28 NOTE — PROGRESS NOTES
"Video-Visit Details    Type of service:  Video Visit    Video Start Time: 11:28 am   Video End Time: 12:00 pm    Originating Location (pt. Location): Home    Distant Location (provider location):  Offsite (providers home)     Platform used for Video Visit: [employer]      New Weight Management Nutrition Consultation    Gudelia Dong is a 58 year old female presents today for new weight management nutrition consultation.  Patient referred by Dr. Rg on 2023.    Patient with Co-morbidities of obesity includin/28/2023    10:35 AM   --   I have the following health issues associated with obesity Pre-Diabetes    Sleep Apnea    GERD (Reflux)   I have the following symptoms associated with obesity Depression    Lower Extremity Swelling         Anthropometrics:  Weight 23: 288 lbs with BMI 44.44    Estimated body mass index is 44.44 kg/m  as calculated from the following:    Height as of an earlier encounter on 23: 1.715 m (5' 7.5\").    Weight as of an earlier encounter on 23: 130.6 kg (288 lb).    Medications for Weight Loss:  Wegovy    NUTRITION HISTORY    Food choices limited by GERD. Working with Rosalie Martínez in GI. Known hiatal hernia   Has to separate fluids/from or will feel stuck and might vomit.   Eats white rice really slow or feels stuck.  Vitamin/Mineral Supplements: Did not discuss today  Previous methods of diet modification for weight loss: weight loss program (went well - whole foods and portion control)  RD before: Not sure but did a program called [facility] weight [facility]    Pt reports she was hx thin then started eating more sweets and snacking and gained weight through a program called [facility] weight program about 20 years ago. Went well, lost about 100 lbs (from 225 to 132 lbs). Has slowed gained back.    Was homeless for 6 months per pt in [id number] - lost her storage locker that had roller skates, bikes, etc. Has not been able to replenish those things. [place] [facility] [place] and hard to find places to exercise. Just got a car about a month ago. Does have a [employer] down the road that she is hoping to get a membership at.    Pt goals: become more active again, decrease snacking, get back on more regular eating plan, portion control   - Pt reports her breasts carry a lot of weight and make it hard to move. Lower back pain.    Eats 1-2 meals/day and snacks  Typical foods consumed  B - cereal  D - meat, potatoes, vegetables OR salad  Snacks - pretzels, cookies, ice cream, cheese    PA: walking    - Limited by pain   - Hx a very active person (bike, water ski, etc)    Barriers: ADHD    Additional information:  FT -     Lives alone        8/28/2023    10:35 AM   Diet Recall Review with Patient   If you do eat breakfast, what types of food do you eat? cereal   If you do eat supper, what types of food do you typically eat? meat potatos vegatable or salads   If you do snack, what types of food do you typically eat? pretzels, cookies, ice crea   How many glasses of juice do you drink in a typical day? 8   How many of glasses of milk do you drink in a typical day? 1   If you do drink milk, what type? 1%   How many 8oz glasses of sugar containing drinks such as Junito-Aid/sweet tea do you drink in a day? 5   How many cans/bottles of sugar pop/soda/tea/sports drinks do you drink in a day? 0   How many cans/bottles of diet pop/soda/tea or sports drink do you drink in a day? 0   How often do you have a drink of alcohol? Never           8/28/2023    10:35 AM   Eating Habits   Generally, my meals include foods like these bread, pasta, rice, potatoes, corn, crackers, sweet dessert, pop, or juice A Few Times a Week   Generally, my meals include foods like these fried meats, brats, burgers, french fries, pizza, cheese, chips, or ice cream A Few Times a Week   Eat fast food (like McDonal[employer], [employer], Taco Bell) Less Than Weekly   Eat at a buffet or  sit-down restaurant Less Than Weekly   Eat most of my meals in front of the TV or computer Everyday   Often skip meals, eat at random times, have no regular eating times Almost Everyday   Rarely sit down for a meal but snack or graze throughout A Few Times a Week   Eat extra snacks between meals Almost Everyday   Eat most of my food at the end of the day Almost Everyday   Eat in the middle of the night or wake up at night to eat A Few Times a Week   Eat extra snacks to prevent or correct low blood sugar Never   Eat to prevent acid reflux or stomach pain Less Than Weekly   Worry about not having enough food to eat Never   I eat when I am depressed A Few Times a Week   I eat when I am stressed A Few Times a Week   I eat when I am bored A Few Times a Week   I eat when I am anxious Never   I eat when I am happy or as a reward A Few Times a Week   I feel hungry all the time even if I just have eaten Never   Feeling full is important to me Never   I finish all the food on my plate even if I am already full Everyday   I can't resist eating delicious food or walk past the good food/smell Less Than Weekly   I eat/snack without noticing that I am eating Never   I eat when I am preparing the meal Less Than Weekly   I eat more than usual when I see others eating Less Than Weekly   I have trouble not eating sweets, ice cream, cookies, or chips if they are around the house Almost Everyday   I think about food all day Less Than Weekly   What foods, if any, do you crave? Cheese   Please list any other foods you crave? chocolate, chips a lot of cheese           8/28/2023    10:35 AM   Amount of Food   I feel out of control when eating Almost Everyday   I eat a large amount of food, like a loaf of bread, a box of cookies, a pint/quart of ice cream, all at once Weekly   I eat a large amount of food even when I am not hungry Weekly   I eat rapidly Everyday   I eat alone because I feel embarrassed and do not want others to see how much  I have eaten Monthly   I eat until I am uncomfortably full Everyday   I feel bad, disgusted, or guilty after I overeat Everyday       Physical Activity:        8/28/2023    10:35 AM   Activity/Exercise History   How much of a typical 12 hour day do you spend sitting? Most of the Day   How much of a typical 12 hour day do you spend lying down? Half the Day   How much of a typical day do you spend walking/standing? Less Than Half the Day   How many hours (not including work) do you spend on the TV/Video Games/Computer/Tablet/Phone? 6 Hours or More   How many times a week are you active for the purpose of exercise? Never   What keeps you from being more active? Pain    Shortness of Breath    Other   How many total minutes do you spend doing some activity for the purpose of exercising when you exercise? 15-30 Minutes       Nutrition Prescription  Recommended energy/nutrient modification.    Nutrition Diagnosis  Obesity r/t long history of positive energy balance aeb BMI >30.    Nutrition Intervention  Reviewed and modified previous goals  Answered pt questions  Coordination of care   Nutrition education   AVS and handouts via [employer]    Expected Engagement: good    Nutrition Resources:  Plate method can be used for general guidance on balanced meals/portion sizes (see link below for picture/more information)                 Make 1/2 your plate non starchy vegetable                3+ oz lean protein sources     (Note: 3 oz = deck of cards size)                 1/2 to 1 cup carbohydrate choices such as whole grain starches or starchy vegetables or fruit.               Choose ~0-2 added fat servings at a meal (avocados, nut butters, nuts or seeds, olive oil, vegetable oils).    The Plate Method:  https://www.cdc.gov/diabetes/images/managing/Diabetes-Manage-Eat-Well-Plate-Graphic_600px.jpg    Can use different seasons, citrus, herbs to help add flavor.     Grocery list ideas:   - Broccoli   - Hazel Green sprouts   - Raw  spinach    - Green peppers   - Cucumbers    - Cauliflower    - Asparagus    - Green peas   - Peas (starchy vegetable)    - Carrots   - Fish    - Strawberries   - Apples   - Grapes   - Raisins (recommend no sugar added)   - Blueberries   - Raspberries   - Blackberries   - Turkey   - Chicken    Follow-Up:  PRN    Time spent with patient: 32 minutes.  IVELISSE Vazquez, RD, LD

## 2023-08-31 NOTE — TELEPHONE ENCOUNTER
PRIOR AUTHORIZATION DENIED    Medication: WEGOVY 0.25 MG/0.5ML SC SOAJ  Insurance Company: [employer] ([facility]) - Phone [phone] Fax [phone]  Denial Date: 8/30/2023  Denial Rational:   Appeal Information:   Patient Notified:

## 2023-09-06 ENCOUNTER — TELEPHONE (OUTPATIENT)
Dept: ENDOCRINOLOGY | Facility: CLINIC | Age: 58
End: 2023-09-06

## 2023-09-12 RX ORDER — TOPIRAMATE 25 MG/1
TABLET, FILM COATED ORAL
Qty: 90 TABLET | Refills: 4 | Status: SHIPPED | OUTPATIENT
Start: 2023-09-12 | End: 2024-07-22

## 2023-09-21 ENCOUNTER — TELEPHONE (OUTPATIENT)
Dept: ENDOCRINOLOGY | Facility: CLINIC | Age: 58
End: 2023-09-21

## 2023-09-21 NOTE — TELEPHONE ENCOUNTER
Secure email sent to Dr. Rg for next steps.     Wegovy denied because she needs to try/fail Qsymia and Contrave first. Wegovy is on nationwide shortage currently, so even if approved, she would not be able to get started.     [employer] message sent to patient with an update.

## 2023-09-21 NOTE — TELEPHONE ENCOUNTER
Medication Question or Refill    Contacts         Type Contact Phone/Fax    09/21/2023 11:29 AM CDT Phone (Incoming) Gudelia Dong (Self) [phone] (M)            What medication are you calling about (include dose and sig)?:     Semaglutide-Weight Management (WEGOVY) 0.25 MG/0.5ML pen     Preferred Pharmacy:  [id number] - [place], [place] - [address] AT [facility] [address]  [facility] [address]  [place] [address]  Phone: [phone] Fax: [phone]      Controlled Substance Agreement on file:   CSA -- Patient Level:    CSA: None found at the patient level.       Who prescribed the medication?: Arcenio      Do you have any questions or concerns?  Yes:     Pt rec'd letter from insur [employer] cvg denied, Asks for appeal process to start  Pharm on script needs changed to the above [facility]  Please just mych msg her when complete      Could we send this information to you in [employer] or would you prefer to receive a phone call?:   Patient would like to be contacted via [employer]

## 2023-09-27 ENCOUNTER — VIRTUAL VISIT (OUTPATIENT)
Dept: GASTROENTEROLOGY | Facility: CLINIC | Age: 58
End: 2023-09-27
Payer: COMMERCIAL

## 2023-09-27 DIAGNOSIS — K44.9 HIATAL HERNIA: ICD-10-CM

## 2023-09-27 DIAGNOSIS — K59.00 OBSTIPATION: ICD-10-CM

## 2023-09-27 DIAGNOSIS — R19.7 OVERFLOW DIARRHEA: Primary | ICD-10-CM

## 2023-09-27 DIAGNOSIS — K21.9 GASTROESOPHAGEAL REFLUX DISEASE WITHOUT ESOPHAGITIS: ICD-10-CM

## 2023-09-27 DIAGNOSIS — K22.10 EROSIVE ESOPHAGITIS: ICD-10-CM

## 2023-09-27 PROCEDURE — 99215 OFFICE O/P EST HI 40 MIN: CPT | Mod: VID | Performed by: PHYSICIAN ASSISTANT

## 2023-09-27 RX ORDER — POLYETHYLENE GLYCOL 3350 17 G/17G
1 POWDER, FOR SOLUTION ORAL DAILY
Qty: 510 G | Refills: 3 | Status: SHIPPED | OUTPATIENT
Start: 2023-09-27 | End: 2024-03-25

## 2023-09-27 NOTE — LETTER
"    9/27/2023         RE: Gudelia Dong  [address]  [place] [address]        Dear Colleague,    Thank you for referring your patient, Gudelia Dong, to the [facility] [place]. Please see a copy of my visit note below.  Gastroenterology Visit for: Gudelia Dong 1965   MRN: [id number]     Reason for Visit:  chief complaint    Referred by: Shayne  / [id number] / [place] [address]  Patient Care Team:  No Ref-Primary, Physician as PCP - General  Thierry Lord DPM as Assigned Musculoskeletal Provider  Jennifer Lassiter MD as Assigned OBGYN Provider  Mendel Abbasi MD as Resident (Student in organized health care education/training program)  Sarmad Reed MD as MD (Gastroenterology)  Selena Bella DO as Selena Lopez DO as Assigned PCP  Denise Bland PA-C as Physician Assistant (Dermatology)  Ashwini Smith APRN CNS as Clinical Nurse Specialist (Anesthesiology)  René Rodriguez DO as Physician (Gastroenterology)  Tiarra Elizabeth RD as Registered Dietitian (Dietitian, Registered)    History of Present Illness:   Gudelia Dong is a 58 year old female with significant past medical history pertinent for insomnia, depression, ADHD, bipolar type II, tobacco use, history of alcoholism, psychosis, HydroDIURIL suppurativa, history of iron deficiency anemia, obesity, prediabetes, hypothyroidism, ROMEO on CPAP, hiatal hernia and GERD who is presenting as a follow up patient who was originally seen in consultation at the request of Dr. Bella for CRC screening, GERD and hiatal hernia with a chief complaint of heartburn/regurgiation/dysphagia and belching.    Interval History September 27, 2023:    Has had an isolated episode of emesis a few days prior. Noting \"I felt I was swimming in liquid and I was choking so I had to take care of that right away.\" To relieve this sensation she had induced emesis. This has occurred 5-6 times since last " "seen in May. The fluid consists of small amount of food particles as well as what is described to be stomach acid. Denies seeing bile.     Heartburn/regurgiation symptoms have been stable. Has had an isolated \"couple\" times where she has had heartburn occurring in the late afternoon between 4 - 9 pm. When further clarifying she states she only experiences the heartburn occurring once per week if not less. Noting she is not taking her second dose of PPI (Protonix 40 mg) until prior to bed.      Dysphagia - stable. Continuing to has intermittent solid food dysphagia to dry breads/meats. Noting meat is a trigger and she has limited this recently.     As for her bowel habits since the colonoscopy she notes that she is struggling with constipation. Noting she would not have a BM for 2-3 days followed by having large amounts of diarrhea.     Has had intentional weight loss of 8 lbs through dietary changes.      Denies odynophagia, abdominal pain, nocturnal stooling, incontinence, steatorrhea, melena, hematochezia and BRBR.     Continues to smoke 4 cigarillos every three days.     Notes \"big changes going on at my job.\" Her partner at work is loosing her job and there is concern that she may also loose her job and therefore lack of insurance.   -----------------------------------------------------------------------------------------------------------------------------------------------------------------------    5/19/2023 HPI:     Prior labs from March of this year include TSH, hemoglobin A1c, CBC and CMP that are grossly unremarkable other than an A1c of 5.8.      Gudelia reports history of long standing reflux consistent of both heartburn/regurgitation. Currently she is taking Protonix 40 mg twice daily with adequate control of her heartburn/regurgiation. Approximately once per month will have vomiting. Specifically she notes that she is unable to drink liquid with eating. If she does \"this will get stuck and result in " "throwing up.\" Also has dysphagia to solids. Specific trigger foods include meats. Denies dysphagia to semi solids and liquids. Associated with belching that can last 30 - 45 minutes.     Has a bowel movement twice per week that is consistent with [place] Stool Scale Type 2-3. Between she may have small volume stools consistent with [place] Stool Scale Type 1. Associated with intermittent pushing/strianing. Denies digitation, vaginal splinting or sensation of incomplete evacuation.      In the past 5 years has gained 50+ lbs.     Denies weight loss, odynophagia, dysphonia/hoarseness, chronic cough, neck pain/swelling/mass, abdominal pain, diarrhea, constipation, nocturnal stooling, incontinence, hematochezia and BRBR.     Denies use of ETOH and recreational drug use. Had 18 months of refraining from smoking. Currently she is on the patch and will have 4 cigarettes every three days.     No family history or GI related malignancy (esophageal, gastric, pancreatic, liver or colon) or family history of IBD/celiac disease.     Previously did not tolerate MAC sedation.     Esophageal Questionnaire(s)    BEDQ Questionnaire      9/20/2023     9:29 AM   BEDQ Questionnaire: How Often Have You Had the Following?   Trouble eating solid food (meat, bread, vegetables) 1   Trouble eating soft foods (yogurt, jello, pudding) 0   Trouble swallowing liquids 0   Pain while swallowing 1   Coughing or choking while swallowing foods or liquids 2   Total Score: 4         9/20/2023     9:29 AM   BEDQ Questionnaire: Discomfort/Pain Ratings   Eating solid food (meat, bread, vegetables) 2   Eating soft foods (yogurt, jello, pudding) 0   Drinking liquid 1   Total Score: 3       Eckardt Questionnaire      9/20/2023     9:30 AM   Eckardt Questionnaire   Dysphagia 1   Regurgitation 1   Retrosternal Pain 1   Weight Loss (kg) 2   Total Score:  5       Promis 10 Questionnaire      8/28/2023     9:57 AM 9/20/2023     9:31 AM   PROMIS 10 FLOWSHEET DATA "   In general, would you say your health is: 3 2   In general, would you say your quality of life is: 2 1   In general, how would you rate your physical health? 2 1   In general, how would you rate your mental health, including your mood and your ability to think? 3 3   In general, how would you rate your satisfaction with your social activities and relationships? 1 3   In general, please rate how well you carry out your usual social activities and roles. (This includes activities at home, at work and in your community, and responsibilities as a parent, child, spouse, employee, friend, etc.) 2 2   To what extent are you able to carry out your everyday physical activities such as walking, climbing stairs, carrying groceries, or moving a chair? 3 3   In the past 7 days, how often have you been bothered by emotional problems such as feeling anxious, depressed, or irritable? 4 3   In the past 7 days, how would you rate your fatigue on average? 3 3   In the past 7 days, how would you rate your pain on average, where 0 means no pain, and 10 means worst imaginable pain? 4 5   Mental health question re-calculation - no clinical value 2 3   Physical health question re-calculation - no clinical value 3 3   Pain question re-calculation - no clinical value 3 3   Global Mental Health Score 8 10   Global Physical Health Score 11 10   PROMIS TOTAL - SUBSCORES 19 20       STUDIES & PROCEDURES:    EGD:     8/8/2023  Findings:        The lumen of the esophagus was moderately dilated. Biopsies were taken        with a cold forceps for histology in the distal and proximal esophagus.        Verification of patient identification for the specimen was done.        LA Grade C (one or more mucosal breaks continuous between tops of 2 or        more mucosal folds, less than 75% circumference) esophagitis with no        bleeding was found.        The gastroesophageal flap valve was visualized endoscopically and        classified as Hill Grade  IV (no fold, wide open lumen, hiatal hernia        present).        A 7 cm hiatal hernia was found. The proximal extent of the gastric folds        (end of tubular esophagus) was 37 cm from the incisors. The hiatal        narrowing was 44 cm from the incisors. The Z-line was 37 cm from the        incisors.        Multiple sessile polyps with no bleeding and no stigmata of recent        bleeding were found in the gastric body. benign fundic gland appearing        <1cm        The duodenal bulb, first portion of the duodenum, second portion of the        duodenum and examined duodenum were normal.                                                                                     Impression:            - Dilation in the entire esophagus. Biopsied.                          - LA Grade C reflux esophagitis with no bleeding.                          - Gastroesophageal flap valve classified as Hill Grade                          IV (no fold, wide open lumen, hiatal hernia present).                          - 7 cm hiatal hernia.                          - Multiple gastric polyps.                          - Normal duodenal bulb, first portion of the duodenum,                          second portion of the duodenum and examined duodenum.      Final Diagnosis   A.  ESOPHAGUS, DISTAL, BIOPSY:  - Squamous mucosa with minimal chronic inflammation   - Negative for intestinal metaplasia or dysplasia     B.  ESOPHAGUS, PROXIMAL, BIOPSY:  - Squamous epithelium with no significant histopathologic abnormality  - No evidence of eosinophilic esophagitis       5/14/2021 [facility]         8/2015 [facility]         Colonoscopy:    8/8/2023  Findings:        The perianal and digital rectal examinations were normal.        A 4 mm polyp was found in the ascending colon. The polyp was sessile.        The polyp was removed with a cold snare. Resection and retrieval were        complete. Verification of patient identification for the specimen was         done. Estimated blood loss: none.        A 2 mm polyp was found in the recto-sigmoid colon. The polyp was        sessile. The polyp was removed with a jumbo cold forceps. Resection and        retrieval were complete. Verification of patient identification for the        specimen was done. Estimated blood loss: none.        A single small-mouthed diverticulum was found in the ascending colon.        There was no evidence of diverticular bleeding.        The retroflexed view of the distal rectum and anal verge was normal and        showed no anal or rectal abnormalities.                                                                                     Impression:            - One 4 mm polyp in the ascending colon, removed with                          a cold snare. Resected and retrieved.                          - One 2 mm polyp at the recto-sigmoid colon, removed                          with a jumbo cold forceps. Resected and retrieved.                          - Diverticulosis in the ascending colon. There was no                          evidence of diverticular bleeding.                          - The distal rectum and anal verge are normal on                          retroflexion view.     Final Diagnosis     C.  COLON, ASCENDING, POLYP:  - Tubular adenoma; negative for high-grade dysplasia     D.  COLON, SIGMOID/RECTUM, POLYP:  - Hyperplastic polyp          5/14/2021 [facility]     1/2014  Findings:        The digital rectal exam was abnormal. Findings include decreased        sphincter tone. Pertinent negatives include no palpable rectal lesions.        The terminal ileum appeared normal. A benign appearing sessile polyp was        found in the rectum. The polyp was 5 mm in size. The polyp was removed        with a hot snare. Resection and retrieval were complete. Estimated blood        loss was minimal. A segmental area of moderately congested and        erythematous mucosa was found in the distal rectum.  Biopsies were taken        with a cold forceps for histology. Estimated blood loss was minimal.                                                                                    Impression:               - Rectal exam revealed decreased sphincter tone.                             - The examined portion of the ileum was normal.                             - One 5 mm polyp in the rectum (benign appearing).                             Resected and retrieved.                             - Congested and erythematous mucosa distal rectum.                             This was biopsied.                             - Rectal prolapse.                             - The clinical history suggests pelvic floor                             dysfunction with rectal prolapse and urinary                             retention requiring digital manipulation of the                             pelvic floor to facilitate bladder emptying. I                             believe further evaluation of the perineum at the                             pelvic floor center is indicated.        EndoFLIP directed at the UES or LES (8cm (EF-325) balloon length or 16cm (EF-322) balloon length):   Date:  8cm balloon  Balloon inflation Balloon pressure CSA (mm^2) DI (mm^2/mmHg) Dmin (mm) Compliance   20 (ladmark ID)        30        40        50           16cm balloon  Balloon inflation Balloon pressure CSA (mm^2) DI (mm^2/mmHg) Dmin (mm) Compliance   30 (ladmark ID)        40        50        60        70           High Resolution Manometry:    PH/Impedance:     Bravo:    CT:    Esophagram:    FL VSS:     GES:    U/S:     XRAY:      Prior medical records were reviewed including, but not limited to, notes from referring providers, lab work, radiographic tests, and other diagnostic tests. Pertinent results were summarized above.     History     Past Medical History:   Diagnosis Date    Depression     GERD (gastroesophageal reflux disease)     Hemorrhoid      Menstrual disorder     s/p D&C -12/2012    ROMEO on CPAP     Other bipolar disorders     followed by Dr Collazo    Post menopausal syndrome     Tobacco abuse        Past Surgical History:   Procedure Laterality Date    COLONOSCOPY  12/24/2013    Procedure: COLONOSCOPY;;  Surgeon: Guy Grant MD;  Location: [facility]    COLONOSCOPY  1/9/2014    Procedure: COMBINED COLONOSCOPY, SINGLE BIOPSY/POLYPECTOMY BY BIOPSY;;  Surgeon: Guy Grant MD;  Location:  GI    COLONOSCOPY N/A 8/8/2023    Procedure: COLONOSCOPY, WITH POLYPECTOMY AND BIOPSY;  Surgeon: René Rodriguez DO;  Location: [facility]    ESOPHAGOSCOPY, GASTROSCOPY, DUODENOSCOPY (EGD), COMBINED  12/24/2013    Procedure: COMBINED ESOPHAGOSCOPY, GASTROSCOPY, DUODENOSCOPY (EGD), BIOPSY SINGLE OR MULTIPLE;  ESOPHAGOSCOPY, GASTROSCOPY, DUODENOSCOPY, COLONOSCOPY;  Surgeon: Gyu Grant MD;  Location: [facility]    ESOPHAGOSCOPY, GASTROSCOPY, DUODENOSCOPY (EGD), COMBINED N/A 8/8/2023    Procedure: ESOPHAGOGASTRODUODENOSCOPY, WITH BIOPSY;  Surgeon: René Rodriguez DO;  Location:  GI    GENITOURINARY SURGERY      urethra stretched    GYN SURGERY      d&c     LAPAROSCOPIC ASSISTED RECTOPEXY  3/14/2014    Procedure: LAPAROSCOPIC ASSISTED RECTOPEXY;  LAPAROSCOPIC  VENTRAL RECTOPEXY ;  Surgeon: Jennifer Connolly MD;  Location:  OR    ORTHOPEDIC SURGERY      surgery on clavicle,surgery for left leg fracture       Social History     Socioeconomic History    Marital status:      Spouse name: Not on file    Number of children: Not on file    Years of education: Not on file    Highest education level: Not on file   Occupational History    Not on file   Tobacco Use    Smoking status: Some Days     Packs/day: 0.25     Types: Cigarettes     Passive exposure: Current    Smokeless tobacco: Never    Tobacco comments:     Quit for year, restarted 2022.       2023: Now smokes a couple cigarettes per day - trying to quit.    Vaping Use    Vaping Use: Never used   Substance and  Sexual Activity    Alcohol use: No     Comment: Severe alcohol use disorder for 20 years.  2023:  Reports sober for past five years    Drug use: No    Sexual activity: Yes     Partners: Male   Other Topics Concern    Parent/sibling w/ CABG, MI or angioplasty before 65F 55M? Not Asked   Social History Narrative    8/2013    /single/    Children-    Work-    Tobacco-    ETOH-    Exercise-         Social Determinants of Health     Financial Resource Strain: Not on file   Food Insecurity: Not on file   Transportation Needs: Not on file   Physical Activity: Not on file   Stress: Not on file   Social Connections: Not on file   Interpersonal Safety: Not on file   Housing Stability: Not on file       Family History   Problem Relation Age of Onset    Cancer Mother         ovarian cancer    Hypertension Father     Breast Cancer No family hx of     Cancer - colorectal No family hx of      Family history reviewed and edited as appropriate    Medications and Allergies:     Outpatient Encounter Medications as of 9/27/2023   Medication Sig Dispense Refill    ammonium lactate (AMLACTIN) 12 % external cream Apply topically daily as needed for dry skin 385 g 1    bacitracin 500 UNIT/GM external ointment APPLY EXTERNALLY TO THE AFFECTED AREA TWICE DAILY FOR 7 DAYS 28.4 g 1    bacitracin 500 UNIT/GM OINT Apply 353 g topically 2 times daily      bisacodyl (DULCOLAX) 5 MG EC tablet Two days prior to exam take two (2) tablets at 4pm. One day prior to exam take two (2) tablets at 4pm 4 tablet 0    buPROPion (WELLBUTRIN XL) 150 MG 24 hr tablet 300 mg every morning      busPIRone (BUSPAR) 10 MG tablet Take 10 mg by mouth every morning      busPIRone HCl (BUSPAR) 30 MG tablet Take 1 tablet by mouth 2 times daily      clotrimazole (LOTRIMIN) 1 % external cream Apply topically 2 times daily 60 g 11    doxepin (SINEQUAN) 100 MG capsule Take 100 mg by mouth At Bedtime      Doxepin HCl 150 MG CAPS Take 300 mg by mouth At Bedtime  (Patient taking differently: Take 300 mg by mouth At Bedtime) 30 capsule 0    econazole nitrate 1 % external cream Apply topically daily To feet and toenails. 85 g 5    escitalopram (LEXAPRO) 10 MG tablet Take 10 mg by mouth every morning      estradiol (ESTRACE) 0.1 MG/GM vaginal cream Place 2 g vaginally twice a week 42.5 g 1    gabapentin (NEURONTIN) 300 MG capsule Take 300 mg by mouth At Bedtime (per psychiatrist)      ketoconazole (NIZORAL) 2 % external cream Apply topically 2 times daily 60 g 0    lamoTRIgine (LAMICTAL) 100 MG tablet Take 100 mg by mouth every morning      levothyroxine (SYNTHROID/LEVOTHROID) 150 MCG tablet Take 1 tablet (150 mcg) by mouth daily (Patient taking differently: Take 150 mcg by mouth every morning) 90 tablet 2    nicotine (NICODERM CQ) 21 MG/24HR 24 hr patch Place 1 patch onto the skin every 24 hours 30 patch 1    nystatin (MYCOSTATIN) 733306 UNIT/GM external powder Apply topically daily 60 g 11    order for DME Equipment being ordered: CPAP  Please dispense Cpap and its supply 1 Units prn    pantoprazole (PROTONIX) 40 MG EC tablet Take 1 tablet (40 mg) by mouth 2 times daily 180 tablet 2    polyethylene glycol (GOLYTELY) 236 g suspension Take as directed. Two days before your exam fill the first container with water. Cover and shake until mixed well. At 5:00pm drink one 8oz glass every 10-15 minutes until half (1/2) of the first container is empty. Store the remainder in the refrigerator. One day before your exam at 5:00pm drink the second half of the first container until it is gone. Before you go to bed mix the second container with water and put in refrigerator. Six hours before your check in time drink one 8oz glass every 10-15 minutes until half of container is empty. Discard the remainder of solution. 8000 mL 0    REXULTI 2 MG tablet Take 2 mg by mouth every morning      Semaglutide-Weight Management (WEGOVY) 0.25 MG/0.5ML pen Inject 0.25 mg Subcutaneous once a week 2 mL 11     topiramate (TOPAMAX) 25 MG tablet 25 mg at bedtime for 1 week, 50 mg at bedtime for 1 week and 75 mg daily at bedtime thereafter 90 tablet 4    triamcinolone (KENALOG) 0.1 % external cream Apply topically 2 times daily Only for 2 weeks. 30 g 0     No facility-administered encounter medications on file as of 9/27/2023.        Allergies   Allergen Reactions    Contrast Dye Shortness Of Breath    Methylpyrrolidone Swelling    Iodine Swelling     Other reaction(s): Angioedema  Facial swelling.    Morphine Anxiety and Nausea and Vomiting     Other reaction(s): Behavioral Disturbances  Other reaction(s): Anxiety, vomiting        Review of systems:  A full 10 point review of systems was obtained and was negative except for the pertinent positives and negatives stated within the HPI.    Objective Findings:   Physical Exam:    Constitutional: [facility] 07/20/2006   General: Alert, cooperative, no distress, well-appearing  Head: Atraumatic, normocephalic, no obvious abnormalities   Eyes: Sclera anicteric, no obvious conjunctival hemorrhage   Nose: Nares normal, no obvious malformation, no obvious rhinorrhea   Respiratory: Resting comfortably, no apparent distress, no cough.   Gastrointestinal: Soft, non-distended  Skin: No jaundice, no obvious rash  Neurologic: AAOx3, no obvious neurologic abnormality  Psychiatric: Normal Affect, appropriate mood  Extremities: No obvious edema, no obvious malformation     Labs, Radiology, Pathology     Lab Results   Component Value Date    WBC 5.1 03/28/2023    WBC 7.9 02/10/2022    WBC 6.5 04/15/2021    HGB 11.7 03/28/2023    HGB 12.2 02/10/2022    HGB 11.7 04/15/2021     03/28/2023     02/10/2022     04/15/2021    CHOL 174 03/28/2023    CHOL 183 02/10/2022    CHOL 221 (H) 04/15/2021    TRIG 82 03/28/2023    TRIG 91 01/07/2020    TRIG 115 12/11/2018    HDL 69 03/28/2023    HDL 66 02/10/2022    HDL 62 01/07/2020    ALT 21 03/28/2023    ALT 23 10/04/2022    ALT 17 09/12/2022     AST 21 03/28/2023    AST 23 10/04/2022    AST 19 09/12/2022     03/28/2023     02/10/2022     04/15/2021    BUN 15.4 03/28/2023    BUN 15 02/10/2022    BUN 18 04/15/2021    CO2 26 03/28/2023    CO2 27 02/10/2022    CO2 25 04/15/2021    TSH 0.85 03/28/2023    TSH 1.39 02/10/2022    TSH 1.34 04/15/2021    INR 0.82 (L) 04/24/2006        Liver Function Studies -   Recent Labs   Lab Test 03/28/23  0911   PROTTOTAL 7.6   ALBUMIN 4.3   BILITOTAL 0.2   ALKPHOS 123*   AST 21   ALT 21          Patient Active Problem List    Diagnosis Date Noted    Hiatal hernia 03/28/2023     Priority: Medium    Insomnia, unspecified type 03/28/2023     Priority: Medium    Numbness and tingling in left hand 12/07/2022     Priority: Medium    Obesity (BMI 35.0-39.9) with comorbidity (H) 01/23/2020     Priority: Medium    Vitamin D deficiency 02/20/2018     Priority: Medium    Hidradenitis suppurativa of right axilla 08/24/2016     Priority: Medium    Hypothyroidism due to medication 12/22/2015     Priority: Medium     lithium      Prediabetes 12/08/2015     Priority: Medium    Gastroesophageal reflux disease without esophagitis 10/13/2015     Priority: Medium    Bipolar 2 disorder (H) 07/28/2015     Priority: Medium    Psychosis (H) 05/08/2015     Priority: Medium    Health Care Home 01/13/2015     Priority: Medium     State Tier Level:  unknown  Status:  n/a-Patient has [facility]  working with her. Jessie Sands at [facility] at [phone]  Care Coordinator:  Viji Thomson      Date:  January 13, 2015          Periumbilical hernia 06/12/2014     Priority: Medium    overweight BMI>30 05/20/2014     Priority: Medium    ADHD, predominantly inattentive type 05/16/2014     Priority: Medium    Hyponatremia 04/04/2014     Priority: Medium    Tubular adenoma 01/30/2014     Priority: Medium     Seen on colonoscopy dated 1/09/2014      Iron deficiency anemia 01/30/2014     Priority: Medium     Anemia 09/30/2013     Priority: Medium    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) 09/26/2013     Priority: Medium    CARDIOVASCULAR SCREENING; LDL GOAL LESS THAN 160 09/26/2013     Priority: Medium    Major depressive disorder, recurrent episode, moderate (H) 09/26/2013     Priority: Medium    ROMEO on CPAP      Priority: Medium    Tobacco abuse      Priority: Medium    Post menopausal syndrome      Priority: Medium      Assessment and Plan   Assessment/Plan:    Gudelia Dong is a 58 year old female with significant past medical history pertinent for insomnia, depression, ADHD, bipolar type II, tobacco use, history of alcoholism, psychosis, HydroDIURIL suppurativa, history of iron deficiency anemia, obesity, prediabetes, hypothyroidism, ROMEO on CPAP, hiatal hernia and GERD who is presenting as a new patient in consultation at the request of Dr. Bella for CRC screening, GERD and hiatal hernia with a chief complaint of heartburn/regurgiation/dysphagia and belching.    #Reflux  #Large Hiatal Hernia - 7 cm   #Grade C Erosive Esophagitis    Prior evaluation includes:    EGD with [facility] 5/14/2021 with LA grade C erosive esophagitis and a large hiatal hernia.  Biopsies were obtained of the distal esophagus that were consistent with mild nonspecific inflammatory changes.  Negative for intestinal metaplasia/dysplasia.  Gastric biopsies and duodenal biopsies were unremarkable negative for H. pylori and celiac disease respectively.    EGD 8/8/2023 with LA grade C erosive esophagitis and a 7 cm hiatal hernia.  Distal esophageal biopsies with minimal chronic inflammation negative for intestinal aplasia or dysplasia.  Proximal esophageal biopsies no significant histopathologic abnormality.    Currently Gudelia is currently taking Protonix 40 mg twice daily with adequate control of her heartburn symptoms.  She is taking the Protonix once in the morning prior to the first meal of the day and then in the evening prior to bed. We discussed  this is most efficacious when used 30 to 60 minutes prior to meals. With her nonhealing erosive esophagitis alternative PPI will be trialed and she should return to bariatric surgery to discuss hiatal hernia repair with possible bariatric surgical intervention. She continues to also have intermittent solid food dysphagia which is likely secondary to reflux with persistent erosive esophagitis, large hiatal hernia however intrinsic motility disorder of the esophagus other should also be considered    - Timed barium esophagram with tablet to further characterize esophageal motility/hiatal hernia   - Continued evaluation with medical weight management and bariatric surgery for hiatal hernia repair and consideration of bariatric surgery   - Discontinue Protonix 40mg twice daily. Start Esomeprazole (Nexium) 20 mg twice daily this is best taken on an empty stomach a least 30 - 60 minutes prior to meals  - Upper endoscopy after being treated with Protonix 40 mg twice daily for 8 to 12 weeks to evaluate for resolution of esophagitis/for Cruz's esophagus  - With history of erosive esophagitis patient will need to remain on PPI indefinitely   - Please follow reflux lifestyle modifications as directed in AVS    #Gastric Polyps  EGD 8/8/2023 with multiple sessile polyps found within the gastric body less than 1 cm.  Appearance was consistent with benign fundic gland polyp per performing endoscopist.    #Colorectal Cancer Screening   #Constipation   8/8/2023 colonoscopy with one 4mm tubular adenoma within the ascending colon and a 2 mm hyperplastic polyp.   Colonoscopy May 2021 with [facility] that was described to be of poor quality examination secondary to difficulties with sedation/constant patient movement, poor prep and redundant colon.  No large lesions were appreciated. Prior colonoscopy 2014 with one 5 mm rectal polyp of unknown etiology.  No family history of colorectal cancer. Per the most recent update by the [place]  Multi-Society Task Force on Colorectal Cancer recall should be 7 years, 2030 or sooner if otherwise indicated.     Additionally, Gudelia now is presenting with symptoms consistent with obstipation with overflow diarrhea. Previoulsy she was struggling with constipation associated with intermittent pushing/strianing. Denies digitation, vaginal splinting or sensation of incomplete evacuation. Future considerations would be consultation to the pelvic floor center.     - Recall colonoscopy for routine colon cancer screening 2030  - Start MiraLAX 17g (1 capful) daily this can be increased to twice daily dosing if needed  - Please start taking daily fiber supplementation. Start with 1 - 2 teaspoons daily which can be slowly uptitrated to 2 - 3 tablespoons as needed      Follow up plan:   Return to clinic 3 months and as needed.    The risks and benefits of my recommendations, as well as other treatment options were discussed with the patient and any available family today. All questions were answered.     Follow up: As planned above. Today, I personally spent 24 minutes in direct face to face time with the patient, of which greater than 50% of the time was spent in patient education and counseling as described above. Approximately 17 minutes were spent on indirect care associated with the patient's consultation including but not limited to review of: patient medical records to date, clinic visits, hospital records, lab results, imaging studies, procedural documentation, and coordinating care with other providers. The findings from this review are summarized in the above note. All of the above accounted for a cumulative time of 41 minutes and was performed on the date of service.     The patient verbalized understanding of the plan and was appreciative for the time spent and information provided during the office visit.        Documentation assisted by voice recognition and documentation system.        Again, thank you for  allowing me to participate in the care of your patient.      Sincerely,    Rosalie Martínez PA-C

## 2023-09-27 NOTE — PATIENT INSTRUCTIONS
It was a pleasure taking care of you today.  I've included a brief summary of our discussion and care plan from today's visit below.  Please review this information with your primary care provider.  _______________________________________________________________________    My recommendations are summarized as follows:    - Timed barium esophagram with tablet to further characterize esophageal motility/hiatal hernia anatomy. Please call to schedule   - Continued evaluation with medical weight management and bariatric surgery for consideration of hiatal hernia repair and possible bariatric surgery   - Discontinue Protonix 40mg twice daily. Start Esomeprazole (Nexium) 20 mg twice daily this is best taken on an empty stomach a least 30 - 60 minutes prior to meals  - Upper endoscopy after being treated with Nexium 20 mg twice daily for 8 to 12 weeks to evaluate for resolution of esophagitis/for Cruz's esophagus  - With history of erosive esophagitis patient will need to remain on PPI indefinitely   - Start MiraLAX 17g (1 capful) daily this can be increased to twice daily dosing if needed  - Please start taking daily fiber supplementation. Start with 1 - 2 teaspoons daily which can be slowly uptitrated to 2 - 3 tablespoons as needed      Gastroesophageal Reflux Disease (GERD) Lifestyle Modifications:   If taking acid suppression therapy (PPI ie Pantoprazole, Lansoprazole, Omeprazole, Esomeprazole, Rabeprazole, Dexlansoprazole) it should be taken 30 - 60 minutes prior to meals on an empty stomach to have maximum effect  Avoid triggers for reflux such as coffee, chocolate, carbonated beverages, spicy foods, acidic foods (tomato based/citrus and foods with high fat content   Abstinence from alcohol and cessation of all tobacco products is recommended   Studies have shown that weight loss, exercise and maintaining a healthy BMI significantly reduce GERD symptoms   Remain upright while eating and immediately after meals  Do  not eat or drink at least 3 hours prior to laying down supine/laying down for bed   Avoid late night/middle of the night snacking    Consider obtaining a wedge pillow or elevating the head end of the bed while sleeping   Avoid sleeping right side down as this can place the lower part of the esophagus/lower esophageal sphincter in a dependent position that favors reflux   Attempting to eat smaller more frequent meals may improve symptoms         To schedule endoscopic procedures you may call: [phone]  To schedule radiology (imaging) tests you may call: [phone]  To schedule an ENT appointment you may call: [phone]    Please call my nurse Radha ([phone]), Kristi ([phone]) with any questions or concerns.      Return to GI Clinic in 3 months to review your progress.    _______________________________________________________________________    Who do I call with any questions after my visit?  Please be in touch if there are any further questions that arise following today's visit.  There are multiple ways to contact your gastroenterology care team.      During business hours, you may reach a Gastroenterology nurse at [phone] and choose option 3.       To schedule or reschedule an appointment, please call [phone].     You can always send a secure message through [employer].  [employer] messages are answered by your nurse or doctor typically within 24 hours.  Please allow extra time on weekends and holidays.      For urgent/emergent questions after business hours, you may reach the on-call GI Fellow by contacting the [facility] at [phone].     How will I get the results of any tests ordered?    You will receive all of your results.  If you have signed up for [employer], any tests ordered at your visit will be available to you after your physician reviews them.  Typically this takes 1-2 weeks.  If there are urgent results that require a change in your care plan, your  physician or nurse will call you to discuss the next steps.      What is [employer]?  [employer] is a secure way for you to access all of your healthcare records from the [facility].  It is a web based computer program, so you can sign on to it from any location.  It also allows you to send secure messages to your care team.  I recommend signing up for [employer] access if you have not already done so and are comfortable with using a computer.      How to I schedule a follow-up visit?  If you did not schedule a follow-up visit today, please call [phone] to schedule a follow-up office visit.      If you feel you received exceptional care and are interested in supporting the clinical and research goals of Rosalie Martínez PA-C or the Division of Gastroenterology, Hepatology, and Nutrition please contact [email or web address] from the [facility] to discuss opportunities to donate.    Sincerely,    Rosalie Martínez PA-C  Division of Gastroenterology, Hepatology, and Nutrition  [facility]

## 2023-09-27 NOTE — PROGRESS NOTES
"Virtual Visit Details    Type of service:  Video Visit     Originating Location (pt. Location): Home    Distant Location (provider location):  Off-site  Platform used for Video Visit: [facility]   Gastroenterology Visit for: Gudelia Dong 1965   MRN: [id number]     Reason for Visit:  chief complaint    Referred by: Shayne  / [id number] / [place] [address]  Patient Care Team:  No Ref-Primary, Physician as PCP - General  Thierry Lord DPM as Assigned Musculoskeletal Provider  Jennifer Lassiter MD as Assigned OBGYN Provider  Mendel Abbasi MD as Resident (Student in organized health care education/training program)  Sarmad Reed MD as MD (Gastroenterology)  Selena Bella DO as Selena Lopez DO as Assigned PCP  Denise Bland PA-C as Physician Assistant (Dermatology)  Ashwini Smith APRN CNS as Clinical Nurse Specialist (Anesthesiology)  René Rodriguez DO as Physician (Gastroenterology)  Tiarra Elizabeth RD as Registered Dietitian (Dietitian, Registered)    History of Present Illness:   Gudelia Dong is a 58 year old female with significant past medical history pertinent for insomnia, depression, ADHD, bipolar type II, tobacco use, history of alcoholism, psychosis, HydroDIURIL suppurativa, history of iron deficiency anemia, obesity, prediabetes, hypothyroidism, ROMEO on CPAP, hiatal hernia and GERD who is presenting as a follow up patient who was originally seen in consultation at the request of Dr. Bella for CRC screening, GERD and hiatal hernia with a chief complaint of heartburn/regurgiation/dysphagia and belching.    Interval History September 27, 2023:    Has had an isolated episode of emesis a few days prior. Noting \"I felt I was swimming in liquid and I was choking so I had to take care of that right away.\" To relieve this sensation she had induced emesis. This has occurred 5-6 times since last seen in May. The fluid consists of small amount of food particles as well as " "what is described to be stomach acid. Denies seeing bile.     Heartburn/regurgiation symptoms have been stable. Has had an isolated \"couple\" times where she has had heartburn occurring in the late afternoon between 4 - 9 pm. When further clarifying she states she only experiences the heartburn occurring once per week if not less. Noting she is not taking her second dose of PPI (Protonix 40 mg) until prior to bed.      Dysphagia - stable. Continuing to has intermittent solid food dysphagia to dry breads/meats. Noting meat is a trigger and she has limited this recently.     As for her bowel habits since the colonoscopy she notes that she is struggling with constipation. Noting she would not have a BM for 2-3 days followed by having large amounts of diarrhea.     Has had intentional weight loss of 8 lbs through dietary changes.      Denies odynophagia, abdominal pain, nocturnal stooling, incontinence, steatorrhea, melena, hematochezia and BRBR.     Continues to smoke 4 cigarillos every three days.     Notes \"big changes going on at my job.\" Her partner at work is loosing her job and there is concern that she may also loose her job and therefore lack of insurance.   -----------------------------------------------------------------------------------------------------------------------------------------------------------------------    5/19/2023 HPI:     Prior labs from March of this year include TSH, hemoglobin A1c, CBC and CMP that are grossly unremarkable other than an A1c of 5.8.      Gudelia reports history of long standing reflux consistent of both heartburn/regurgitation. Currently she is taking Protonix 40 mg twice daily with adequate control of her heartburn/regurgiation. Approximately once per month will have vomiting. Specifically she notes that she is unable to drink liquid with eating. If she does \"this will get stuck and result in throwing up.\" Also has dysphagia to solids. Specific trigger foods include " meats. Denies dysphagia to semi solids and liquids. Associated with belching that can last 30 - 45 minutes.     Has a bowel movement twice per week that is consistent with [place] Stool Scale Type 2-3. Between she may have small volume stools consistent with [place] Stool Scale Type 1. Associated with intermittent pushing/strianing. Denies digitation, vaginal splinting or sensation of incomplete evacuation.      In the past 5 years has gained 50+ lbs.     Denies weight loss, odynophagia, dysphonia/hoarseness, chronic cough, neck pain/swelling/mass, abdominal pain, diarrhea, constipation, nocturnal stooling, incontinence, hematochezia and BRBR.     Denies use of ETOH and recreational drug use. Had 18 months of refraining from smoking. Currently she is on the patch and will have 4 cigarettes every three days.     No family history or GI related malignancy (esophageal, gastric, pancreatic, liver or colon) or family history of IBD/celiac disease.     Previously did not tolerate MAC sedation.     Esophageal Questionnaire(s)    BEDQ Questionnaire      9/20/2023     9:29 AM   BEDQ Questionnaire: How Often Have You Had the Following?   Trouble eating solid food (meat, bread, vegetables) 1   Trouble eating soft foods (yogurt, jello, pudding) 0   Trouble swallowing liquids 0   Pain while swallowing 1   Coughing or choking while swallowing foods or liquids 2   Total Score: 4         9/20/2023     9:29 AM   BEDQ Questionnaire: Discomfort/Pain Ratings   Eating solid food (meat, bread, vegetables) 2   Eating soft foods (yogurt, jello, pudding) 0   Drinking liquid 1   Total Score: 3       Eckardt Questionnaire      9/20/2023     9:30 AM   Eckardt Questionnaire   Dysphagia 1   Regurgitation 1   Retrosternal Pain 1   Weight Loss (kg) 2   Total Score:  5       Promis 10 Questionnaire      8/28/2023     9:57 AM 9/20/2023     9:31 AM   PROMIS 10 FLOWSHEET DATA   In general, would you say your health is: 3 2   In general, would you say  your quality of life is: 2 1   In general, how would you rate your physical health? 2 1   In general, how would you rate your mental health, including your mood and your ability to think? 3 3   In general, how would you rate your satisfaction with your social activities and relationships? 1 3   In general, please rate how well you carry out your usual social activities and roles. (This includes activities at home, at work and in your community, and responsibilities as a parent, child, spouse, employee, friend, etc.) 2 2   To what extent are you able to carry out your everyday physical activities such as walking, climbing stairs, carrying groceries, or moving a chair? 3 3   In the past 7 days, how often have you been bothered by emotional problems such as feeling anxious, depressed, or irritable? 4 3   In the past 7 days, how would you rate your fatigue on average? 3 3   In the past 7 days, how would you rate your pain on average, where 0 means no pain, and 10 means worst imaginable pain? 4 5   Mental health question re-calculation - no clinical value 2 3   Physical health question re-calculation - no clinical value 3 3   Pain question re-calculation - no clinical value 3 3   Global Mental Health Score 8 10   Global Physical Health Score 11 10   PROMIS TOTAL - SUBSCORES 19 20       STUDIES & PROCEDURES:    EGD:     8/8/2023  Findings:        The lumen of the esophagus was moderately dilated. Biopsies were taken        with a cold forceps for histology in the distal and proximal esophagus.        Verification of patient identification for the specimen was done.        LA Grade C (one or more mucosal breaks continuous between tops of 2 or        more mucosal folds, less than 75% circumference) esophagitis with no        bleeding was found.        The gastroesophageal flap valve was visualized endoscopically and        classified as Hill Grade IV (no fold, wide open lumen, hiatal hernia        present).        A 7 cm  hiatal hernia was found. The proximal extent of the gastric folds        (end of tubular esophagus) was 37 cm from the incisors. The hiatal        narrowing was 44 cm from the incisors. The Z-line was 37 cm from the        incisors.        Multiple sessile polyps with no bleeding and no stigmata of recent        bleeding were found in the gastric body. benign fundic gland appearing        <1cm        The duodenal bulb, first portion of the duodenum, second portion of the        duodenum and examined duodenum were normal.                                                                                     Impression:            - Dilation in the entire esophagus. Biopsied.                          - LA Grade C reflux esophagitis with no bleeding.                          - Gastroesophageal flap valve classified as Hill Grade                          IV (no fold, wide open lumen, hiatal hernia present).                          - 7 cm hiatal hernia.                          - Multiple gastric polyps.                          - Normal duodenal bulb, first portion of the duodenum,                          second portion of the duodenum and examined duodenum.      Final Diagnosis   A.  ESOPHAGUS, DISTAL, BIOPSY:  - Squamous mucosa with minimal chronic inflammation   - Negative for intestinal metaplasia or dysplasia     B.  ESOPHAGUS, PROXIMAL, BIOPSY:  - Squamous epithelium with no significant histopathologic abnormality  - No evidence of eosinophilic esophagitis       5/14/2021 [facility]         8/2015 [facility]         Colonoscopy:    8/8/2023  Findings:        The perianal and digital rectal examinations were normal.        A 4 mm polyp was found in the ascending colon. The polyp was sessile.        The polyp was removed with a cold snare. Resection and retrieval were        complete. Verification of patient identification for the specimen was        done. Estimated blood loss: none.        A 2 mm polyp was found in the  recto-sigmoid colon. The polyp was        sessile. The polyp was removed with a jumbo cold forceps. Resection and        retrieval were complete. Verification of patient identification for the        specimen was done. Estimated blood loss: none.        A single small-mouthed diverticulum was found in the ascending colon.        There was no evidence of diverticular bleeding.        The retroflexed view of the distal rectum and anal verge was normal and        showed no anal or rectal abnormalities.                                                                                     Impression:            - One 4 mm polyp in the ascending colon, removed with                          a cold snare. Resected and retrieved.                          - One 2 mm polyp at the recto-sigmoid colon, removed                          with a jumbo cold forceps. Resected and retrieved.                          - Diverticulosis in the ascending colon. There was no                          evidence of diverticular bleeding.                          - The distal rectum and anal verge are normal on                          retroflexion view.     Final Diagnosis     C.  COLON, ASCENDING, POLYP:  - Tubular adenoma; negative for high-grade dysplasia     D.  COLON, SIGMOID/RECTUM, POLYP:  - Hyperplastic polyp          5/14/2021 [facility]     1/2014  Findings:        The digital rectal exam was abnormal. Findings include decreased        sphincter tone. Pertinent negatives include no palpable rectal lesions.        The terminal ileum appeared normal. A benign appearing sessile polyp was        found in the rectum. The polyp was 5 mm in size. The polyp was removed        with a hot snare. Resection and retrieval were complete. Estimated blood        loss was minimal. A segmental area of moderately congested and        erythematous mucosa was found in the distal rectum. Biopsies were taken        with a cold forceps for histology. Estimated blood  loss was minimal.                                                                                    Impression:               - Rectal exam revealed decreased sphincter tone.                             - The examined portion of the ileum was normal.                             - One 5 mm polyp in the rectum (benign appearing).                             Resected and retrieved.                             - Congested and erythematous mucosa distal rectum.                             This was biopsied.                             - Rectal prolapse.                             - The clinical history suggests pelvic floor                             dysfunction with rectal prolapse and urinary                             retention requiring digital manipulation of the                             pelvic floor to facilitate bladder emptying. I                             believe further evaluation of the perineum at the                             pelvic floor center is indicated.        EndoFLIP directed at the UES or LES (8cm (EF-325) balloon length or 16cm (EF-322) balloon length):   Date:  8cm balloon  Balloon inflation Balloon pressure CSA (mm^2) DI (mm^2/mmHg) Dmin (mm) Compliance   20 (ladmark ID)        30        40        50           16cm balloon  Balloon inflation Balloon pressure CSA (mm^2) DI (mm^2/mmHg) Dmin (mm) Compliance   30 (ladmark ID)        40        50        60        70           High Resolution Manometry:    PH/Impedance:     Bravo:    CT:    Esophagram:    FL VSS:     GES:    U/S:     XRAY:      Prior medical records were reviewed including, but not limited to, notes from referring providers, lab work, radiographic tests, and other diagnostic tests. Pertinent results were summarized above.     History     Past Medical History:   Diagnosis Date     Depression      GERD (gastroesophageal reflux disease)      Hemorrhoid      Menstrual disorder     s/p D&C -12/2012     ROMEO on CPAP      Other  bipolar disorders     followed by Dr Collazo     Post menopausal syndrome      Tobacco abuse        Past Surgical History:   Procedure Laterality Date     COLONOSCOPY  12/24/2013    Procedure: COLONOSCOPY;;  Surgeon: Guy Grant MD;  Location: [facility]     COLONOSCOPY  1/9/2014    Procedure: COMBINED COLONOSCOPY, SINGLE BIOPSY/POLYPECTOMY BY BIOPSY;;  Surgeon: Guy Grant MD;  Location: [facility]     COLONOSCOPY N/A 8/8/2023    Procedure: COLONOSCOPY, WITH POLYPECTOMY AND BIOPSY;  Surgeon: René Rodriguze DO;  Location: [facility]     ESOPHAGOSCOPY, GASTROSCOPY, DUODENOSCOPY (EGD), COMBINED  12/24/2013    Procedure: COMBINED ESOPHAGOSCOPY, GASTROSCOPY, DUODENOSCOPY (EGD), BIOPSY SINGLE OR MULTIPLE;  ESOPHAGOSCOPY, GASTROSCOPY, DUODENOSCOPY, COLONOSCOPY;  Surgeon: Guy Grant MD;  Location: [facility]     ESOPHAGOSCOPY, GASTROSCOPY, DUODENOSCOPY (EGD), COMBINED N/A 8/8/2023    Procedure: ESOPHAGOGASTRODUODENOSCOPY, WITH BIOPSY;  Surgeon: René Rodriguez DO;  Location:  GI     GENITOURINARY SURGERY      urethra stretched     GYN SURGERY      d&c      LAPAROSCOPIC ASSISTED RECTOPEXY  3/14/2014    Procedure: LAPAROSCOPIC ASSISTED RECTOPEXY;  LAPAROSCOPIC  VENTRAL RECTOPEXY ;  Surgeon: Jennifer Connolly MD;  Location:  OR     ORTHOPEDIC SURGERY      surgery on clavicle,surgery for left leg fracture       Social History     Socioeconomic History     Marital status:      Spouse name: Not on file     Number of children: Not on file     Years of education: Not on file     Highest education level: Not on file   Occupational History     Not on file   Tobacco Use     Smoking status: Some Days     Packs/day: 0.25     Types: Cigarettes     Passive exposure: Current     Smokeless tobacco: Never     Tobacco comments:     Quit for year, restarted 2022.       2023: Now smokes a couple cigarettes per day - trying to quit.    Vaping Use     Vaping Use: Never used   Substance and Sexual Activity     Alcohol use: No     Comment:  Severe alcohol use disorder for 20 years.  2023:  Reports sober for past five years     Drug use: No     Sexual activity: Yes     Partners: Male   Other Topics Concern     Parent/sibling w/ CABG, MI or angioplasty before 65F 55M? Not Asked   Social History Narrative    8/2013    /single/    Children-    Work-    Tobacco-    ETOH-    Exercise-         Social Determinants of Health     Financial Resource Strain: Not on file   Food Insecurity: Not on file   Transportation Needs: Not on file   Physical Activity: Not on file   Stress: Not on file   Social Connections: Not on file   Interpersonal Safety: Not on file   Housing Stability: Not on file       Family History   Problem Relation Age of Onset     Cancer Mother         ovarian cancer     Hypertension Father      Breast Cancer No family hx of      Cancer - colorectal No family hx of      Family history reviewed and edited as appropriate    Medications and Allergies:     Outpatient Encounter Medications as of 9/27/2023   Medication Sig Dispense Refill     ammonium lactate (AMLACTIN) 12 % external cream Apply topically daily as needed for dry skin 385 g 1     bacitracin 500 UNIT/GM external ointment APPLY EXTERNALLY TO THE AFFECTED AREA TWICE DAILY FOR 7 DAYS 28.4 g 1     bacitracin 500 UNIT/GM OINT Apply 353 g topically 2 times daily       bisacodyl (DULCOLAX) 5 MG EC tablet Two days prior to exam take two (2) tablets at 4pm. One day prior to exam take two (2) tablets at 4pm 4 tablet 0     buPROPion (WELLBUTRIN XL) 150 MG 24 hr tablet 300 mg every morning       busPIRone (BUSPAR) 10 MG tablet Take 10 mg by mouth every morning       busPIRone HCl (BUSPAR) 30 MG tablet Take 1 tablet by mouth 2 times daily       clotrimazole (LOTRIMIN) 1 % external cream Apply topically 2 times daily 60 g 11     doxepin (SINEQUAN) 100 MG capsule Take 100 mg by mouth At Bedtime       Doxepin HCl 150 MG CAPS Take 300 mg by mouth At Bedtime (Patient taking differently: Take 300  mg by mouth At Bedtime) 30 capsule 0     econazole nitrate 1 % external cream Apply topically daily To feet and toenails. 85 g 5     escitalopram (LEXAPRO) 10 MG tablet Take 10 mg by mouth every morning       estradiol (ESTRACE) 0.1 MG/GM vaginal cream Place 2 g vaginally twice a week 42.5 g 1     gabapentin (NEURONTIN) 300 MG capsule Take 300 mg by mouth At Bedtime (per psychiatrist)       ketoconazole (NIZORAL) 2 % external cream Apply topically 2 times daily 60 g 0     lamoTRIgine (LAMICTAL) 100 MG tablet Take 100 mg by mouth every morning       levothyroxine (SYNTHROID/LEVOTHROID) 150 MCG tablet Take 1 tablet (150 mcg) by mouth daily (Patient taking differently: Take 150 mcg by mouth every morning) 90 tablet 2     nicotine (NICODERM CQ) 21 MG/24HR 24 hr patch Place 1 patch onto the skin every 24 hours 30 patch 1     nystatin (MYCOSTATIN) 172491 UNIT/GM external powder Apply topically daily 60 g 11     order for DME Equipment being ordered: CPAP  Please dispense Cpap and its supply 1 Units prn     pantoprazole (PROTONIX) 40 MG EC tablet Take 1 tablet (40 mg) by mouth 2 times daily 180 tablet 2     polyethylene glycol (GOLYTELY) 236 g suspension Take as directed. Two days before your exam fill the first container with water. Cover and shake until mixed well. At 5:00pm drink one 8oz glass every 10-15 minutes until half (1/2) of the first container is empty. Store the remainder in the refrigerator. One day before your exam at 5:00pm drink the second half of the first container until it is gone. Before you go to bed mix the second container with water and put in refrigerator. Six hours before your check in time drink one 8oz glass every 10-15 minutes until half of container is empty. Discard the remainder of solution. 8000 mL 0     REXULTI 2 MG tablet Take 2 mg by mouth every morning       Semaglutide-Weight Management (WEGOVY) 0.25 MG/0.5ML pen Inject 0.25 mg Subcutaneous once a week 2 mL 11     topiramate (TOPAMAX)  25 MG tablet 25 mg at bedtime for 1 week, 50 mg at bedtime for 1 week and 75 mg daily at bedtime thereafter 90 tablet 4     triamcinolone (KENALOG) 0.1 % external cream Apply topically 2 times daily Only for 2 weeks. 30 g 0     No facility-administered encounter medications on file as of 9/27/2023.        Allergies   Allergen Reactions     Contrast Dye Shortness Of Breath     Methylpyrrolidone Swelling     Iodine Swelling     Other reaction(s): Angioedema  Facial swelling.     Morphine Anxiety and Nausea and Vomiting     Other reaction(s): Behavioral Disturbances  Other reaction(s): Anxiety, vomiting        Review of systems:  A full 10 point review of systems was obtained and was negative except for the pertinent positives and negatives stated within the HPI.    Objective Findings:   Physical Exam:    Constitutional: LMP 07/20/2006   General: Alert, cooperative, no distress, well-appearing  Head: Atraumatic, normocephalic, no obvious abnormalities   Eyes: Sclera anicteric, no obvious conjunctival hemorrhage   Nose: Nares normal, no obvious malformation, no obvious rhinorrhea   Respiratory: Resting comfortably, no apparent distress, no cough.   Gastrointestinal: Soft, non-distended  Skin: No jaundice, no obvious rash  Neurologic: AAOx3, no obvious neurologic abnormality  Psychiatric: Normal Affect, appropriate mood  Extremities: No obvious edema, no obvious malformation     Labs, Radiology, Pathology     Lab Results   Component Value Date    WBC 5.1 03/28/2023    WBC 7.9 02/10/2022    WBC 6.5 04/15/2021    HGB 11.7 03/28/2023    HGB 12.2 02/10/2022    HGB 11.7 04/15/2021     03/28/2023     02/10/2022     04/15/2021    CHOL 174 03/28/2023    CHOL 183 02/10/2022    CHOL 221 (H) 04/15/2021    TRIG 82 03/28/2023    TRIG 91 01/07/2020    TRIG 115 12/11/2018    HDL 69 03/28/2023    HDL 66 02/10/2022    HDL 62 01/07/2020    ALT 21 03/28/2023    ALT 23 10/04/2022    ALT 17 09/12/2022    AST 21  03/28/2023    AST 23 10/04/2022    AST 19 09/12/2022     03/28/2023     02/10/2022     04/15/2021    BUN 15.4 03/28/2023    BUN 15 02/10/2022    BUN 18 04/15/2021    CO2 26 03/28/2023    CO2 27 02/10/2022    CO2 25 04/15/2021    TSH 0.85 03/28/2023    TSH 1.39 02/10/2022    TSH 1.34 04/15/2021    INR 0.82 (L) 04/24/2006        Liver Function Studies -   Recent Labs   Lab Test 03/28/23  0911   PROTTOTAL 7.6   ALBUMIN 4.3   BILITOTAL 0.2   ALKPHOS 123*   AST 21   ALT 21          Patient Active Problem List    Diagnosis Date Noted     Hiatal hernia 03/28/2023     Priority: Medium     Insomnia, unspecified type 03/28/2023     Priority: Medium     Numbness and tingling in left hand 12/07/2022     Priority: Medium     Obesity (BMI 35.0-39.9) with comorbidity (H) 01/23/2020     Priority: Medium     Vitamin D deficiency 02/20/2018     Priority: Medium     Hidradenitis suppurativa of right axilla 08/24/2016     Priority: Medium     Hypothyroidism due to medication 12/22/2015     Priority: Medium     lithium       Prediabetes 12/08/2015     Priority: Medium     Gastroesophageal reflux disease without esophagitis 10/13/2015     Priority: Medium     Bipolar 2 disorder (H) 07/28/2015     Priority: Medium     Psychosis (H) 05/08/2015     Priority: Medium     Health Care Home 01/13/2015     Priority: Medium     State Tier Level:  unknown  Status:  n/a-Patient has [facility]  working with her. Jessie Sands at [facility] at [phone]  Care Coordinator:  Viji Thomson      Date:  January 13, 2015           Periumbilical hernia 06/12/2014     Priority: Medium     overweight BMI>30 05/20/2014     Priority: Medium     ADHD, predominantly inattentive type 05/16/2014     Priority: Medium     Hyponatremia 04/04/2014     Priority: Medium     Tubular adenoma 01/30/2014     Priority: Medium     Seen on colonoscopy dated 1/09/2014       Iron deficiency anemia 01/30/2014     Priority:  Medium     Anemia 09/30/2013     Priority: Medium     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) 09/26/2013     Priority: Medium     CARDIOVASCULAR SCREENING; LDL GOAL LESS THAN 160 09/26/2013     Priority: Medium     Major depressive disorder, recurrent episode, moderate (H) 09/26/2013     Priority: Medium     ROMEO on CPAP      Priority: Medium     Tobacco abuse      Priority: Medium     Post menopausal syndrome      Priority: Medium      Assessment and Plan   Assessment/Plan:    Gudelia Dong is a 58 year old female with significant past medical history pertinent for insomnia, depression, ADHD, bipolar type II, tobacco use, history of alcoholism, psychosis, HydroDIURIL suppurativa, history of iron deficiency anemia, obesity, prediabetes, hypothyroidism, ROMEO on CPAP, hiatal hernia and GERD who is presenting as a new patient in consultation at the request of Dr. Bella for CRC screening, GERD and hiatal hernia with a chief complaint of heartburn/regurgiation/dysphagia and belching.    #Reflux  #Large Hiatal Hernia - 7 cm   #Grade C Erosive Esophagitis    Prior evaluation includes:    EGD with [facility] 5/14/2021 with LA grade C erosive esophagitis and a large hiatal hernia.  Biopsies were obtained of the distal esophagus that were consistent with mild nonspecific inflammatory changes.  Negative for intestinal metaplasia/dysplasia.  Gastric biopsies and duodenal biopsies were unremarkable negative for H. pylori and celiac disease respectively.    EGD 8/8/2023 with LA grade C erosive esophagitis and a 7 cm hiatal hernia.  Distal esophageal biopsies with minimal chronic inflammation negative for intestinal aplasia or dysplasia.  Proximal esophageal biopsies no significant histopathologic abnormality.    Currently Gudelia is currently taking Protonix 40 mg twice daily with adequate control of her heartburn symptoms.  She is taking the Protonix once in the morning prior to the first meal of the day and then in the evening prior to  bed. We discussed this is most efficacious when used 30 to 60 minutes prior to meals. With her nonhealing erosive esophagitis alternative PPI will be trialed and she should return to bariatric surgery to discuss hiatal hernia repair with possible bariatric surgical intervention. She continues to also have intermittent solid food dysphagia which is likely secondary to reflux with persistent erosive esophagitis, large hiatal hernia however intrinsic motility disorder of the esophagus other should also be considered    - Timed barium esophagram with tablet to further characterize esophageal motility/hiatal hernia   - Continued evaluation with medical weight management and bariatric surgery for hiatal hernia repair and consideration of bariatric surgery   - Discontinue Protonix 40mg twice daily. Start Esomeprazole (Nexium) 20 mg twice daily this is best taken on an empty stomach a least 30 - 60 minutes prior to meals  - Upper endoscopy after being treated with PPI twice daily for 8 to 12 weeks to evaluate for resolution of esophagitis/for Cruz's esophagus  - With history of erosive esophagitis patient will need to remain on PPI indefinitely   - Please follow reflux lifestyle modifications as directed in AVS    #Gastric Polyps  EGD 8/8/2023 with multiple sessile polyps found within the gastric body less than 1 cm.  Appearance was consistent with benign fundic gland polyp per performing endoscopist.    #Colorectal Cancer Screening   #Constipation   8/8/2023 colonoscopy with one 4mm tubular adenoma within the ascending colon and a 2 mm hyperplastic polyp.   Colonoscopy May 2021 with [facility] that was described to be of poor quality examination secondary to difficulties with sedation/constant patient movement, poor prep and redundant colon.  No large lesions were appreciated. Prior colonoscopy 2014 with one 5 mm rectal polyp of unknown etiology.  No family history of colorectal cancer. Per the most recent update by the [place]  Multi-Society Task Force on Colorectal Cancer recall should be 7 years, 2030 or sooner if otherwise indicated.     Additionally, Gudelia now is presenting with symptoms consistent with obstipation with overflow diarrhea. Previoulsy she was struggling with constipation associated with intermittent pushing/strianing. Denies digitation, vaginal splinting or sensation of incomplete evacuation. Future considerations would be consultation to the pelvic floor center.     - Recall colonoscopy for routine colon cancer screening 2030  - Start MiraLAX 17g (1 capful) daily this can be increased to twice daily dosing if needed  - Please start taking daily fiber supplementation. Start with 1 - 2 teaspoons daily which can be slowly uptitrated to 2 - 3 tablespoons as needed      Follow up plan:   Return to clinic 3 months and as needed.    The risks and benefits of my recommendations, as well as other treatment options were discussed with the patient and any available family today. All questions were answered.     o Follow up: As planned above. Today, I personally spent 24 minutes in direct face to face time with the patient, of which greater than 50% of the time was spent in patient education and counseling as described above. Approximately 17 minutes were spent on indirect care associated with the patient's consultation including but not limited to review of: patient medical records to date, clinic visits, hospital records, lab results, imaging studies, procedural documentation, and coordinating care with other providers. The findings from this review are summarized in the above note. All of the above accounted for a cumulative time of 41 minutes and was performed on the date of service.     The patient verbalized understanding of the plan and was appreciative for the time spent and information provided during the office visit.           Rosalie Martínez PA-C  Division of Gastroenterology, Hepatology, and Nutrition  [facility]  [place]       Documentation assisted by voice recognition and documentation system.

## 2023-09-27 NOTE — NURSING NOTE
Is the patient currently in the state of [place]? YES    Visit mode:VIDEO    If the visit is dropped, the patient can be reconnected by: VIDEO VISIT: Send to e-mail at: [email or web address]    Will anyone else be joining the visit? NO  (If patient encounters technical issues they should call [phone]    How would you like to obtain your AVS? MyChart    Are changes needed to the allergy or medication list? No    Reason for visit: RECHECK    Luis CUENCA

## 2023-09-29 ENCOUNTER — TELEPHONE (OUTPATIENT)
Dept: GASTROENTEROLOGY | Facility: CLINIC | Age: 58
End: 2023-09-29

## 2023-09-29 ENCOUNTER — TELEPHONE (OUTPATIENT)
Dept: ENDOCRINOLOGY | Facility: CLINIC | Age: 58
End: 2023-09-29

## 2023-09-29 NOTE — TELEPHONE ENCOUNTER
"Endoscopy Scheduling Screen    Have you had a positive Covid test in the last 14 days?  No    Are you active on MyChart?   Yes    What insurance is in the chart?  Other:  PO    Ordering/Referring Provider: Rosalie Martínez PA-C     (If ordering provider performs procedure, schedule with ordering provider unless otherwise instructed. )    BMI: Estimated body mass index is 44.44 kg/m  as calculated from the following:    Height as of 8/28/23: 1.715 m (5' 7.5\").    Weight as of 8/28/23: 130.6 kg (288 lb).     Sedation Ordered  MAC/deep sedation.   BMI<= 45 45 < BMI <= 48 48 < BMI < = 50  BMI > 50   No Restrictions No [facility]  No [facility]  [facility] with exceptions Hospital Only OR Only       Are you taking any prescription medications for pain 3 or more times per week?   No    Do you have a history of malignant hyperthermia or adverse reaction to anesthesia?  No    (Females) Are you currently pregnant?   No     Have you been diagnosed or told you have pulmonary hypertension?   No    Do you have an LVAD?  No    Have you been told you have moderate to severe sleep apnea?  Yes (RN Review required for scheduling unless scheduling in Hospital.)    Have you been told you have COPD, asthma, or any other lung disease?  No    Do you have any heart conditions?  No     Have you ever had an organ transplant?   No    Have you ever had or are you awaiting a heart or lung transplant?   No    Have you had a stroke or transient ischemic attack (TIA aka \"mini stroke\" in the last 6 months?   No    Have you been diagnosed with or been told you have cirrhosis of the liver?   No    Are you currently on dialysis?   No    Do you need assistance transferring?   No    BMI: Estimated body mass index is 44.44 kg/m  as calculated from the following:    Height as of 8/28/23: 1.715 m (5' 7.5\").    Weight as of 8/28/23: 130.6 kg (288 lb).     Is patients BMI > 40 and scheduling location [facility]?  No    Do you take an injectable medication for weight loss " or diabetes (excluding insulin)?  No    Do you take the medication Naltrexone?  No    Do you take blood thinners?  No       Prep   Are you currently on dialysis or do you have chronic kidney disease?  No    Do you have a diagnosis of diabetes?  No    Do you have a diagnosis of cystic fibrosis (CF)?  No    On a regular basis do you go 3 -5 days between bowel movements?  Yes (Extended Prep)    BMI > 40?  No    Preferred Pharmacy:      [id number] - [place], [place] - [address] AT [facility] [address]  [address]  [place] [address]  Phone: [phone] Fax: [phone]      Final Scheduling Details   Colonoscopy prep sent?  N/A    Procedure scheduled  Upper endoscopy (EGD)    Surgeon:  NITZA     Date of procedure:  1/18     Pre-OP / PAC:   No - Not required for this site.    Location  [facility] - Patient preference.    Sedation   MAC/Deep Sedation - Per order.      Patient Reminders:   You will receive a call from a Nurse to review instructions and health history.  This assessment must be completed prior to your procedure.  Failure to complete the Nurse assessment may result in the procedure being cancelled.      On the day of your procedure, please designate an adult(s) who can drive you home stay with you for the next 24 hours. The medicines used in the exam will make you sleepy. You will not be able to drive.      You cannot take public transportation, ride share services, or non-medical taxi service without a responsible caregiver.  Medical transport services are allowed with the requirement that a responsible caregiver will receive you at your destination.  We require that drivers and caregivers are confirmed prior to your procedure.

## 2023-10-05 ENCOUNTER — TELEPHONE (OUTPATIENT)
Dept: GASTROENTEROLOGY | Facility: CLINIC | Age: 58
End: 2023-10-05
Payer: COMMERCIAL

## 2023-10-05 NOTE — TELEPHONE ENCOUNTER
Attempted to assist patient in scheduling follow up appointment. Patient answered the phone and then had conversations with others in the background for the entire call. Writer attempted multiple times to talk to patient and assist in scheduling and patient did not respond back to writer and continued to talk to others. Ended call and sent mychart with CC number.    Schedule:  Follow-up appointment with Rosalie Martínez (after procedure on 1/18/24). RTN ESO, in person or virtual.

## 2023-10-09 ENCOUNTER — TELEPHONE (OUTPATIENT)
Dept: FAMILY MEDICINE | Facility: CLINIC | Age: 58
End: 2023-10-09
Payer: COMMERCIAL

## 2023-10-09 NOTE — TELEPHONE ENCOUNTER
Pt wants to stop smoking and is requesting Rx for nicotine patch. Triage advised VV to discuss Rx. Appt was scheduled.

## 2023-10-10 ENCOUNTER — VIRTUAL VISIT (OUTPATIENT)
Dept: FAMILY MEDICINE | Facility: CLINIC | Age: 58
End: 2023-10-10
Payer: COMMERCIAL

## 2023-10-10 DIAGNOSIS — Z71.6 ENCOUNTER FOR SMOKING CESSATION COUNSELING: Primary | ICD-10-CM

## 2023-10-10 PROCEDURE — 99213 OFFICE O/P EST LOW 20 MIN: CPT | Mod: VID | Performed by: PHYSICIAN ASSISTANT

## 2023-10-10 RX ORDER — NICOTINE 21 MG/24HR
1 PATCH, TRANSDERMAL 24 HOURS TRANSDERMAL EVERY 24 HOURS
Qty: 42 PATCH | Refills: 0 | Status: SHIPPED | OUTPATIENT
Start: 2023-10-10 | End: 2023-11-21

## 2023-10-10 RX ORDER — NICOTINE 21 MG/24HR
1 PATCH, TRANSDERMAL 24 HOURS TRANSDERMAL EVERY 24 HOURS
Qty: 14 PATCH | Refills: 0 | Status: SHIPPED | OUTPATIENT
Start: 2023-10-10 | End: 2023-10-24

## 2023-10-10 NOTE — PROGRESS NOTES
"Gudelia is a 58 year old who is being evaluated via a billable video visit.      How would you like to obtain your AVS? MyChart  If the video visit is dropped, the invitation should be resent by: Text to cell phone: [phone]  Will anyone else be joining your video visit? No          Assessment & Plan     Encounter for smoking cessation counseling  Had success with NicoDerm nicotine patches.  Start 21 mg for 6 weeks and then gradually taper down to 14 mg for 2 weeks and then additionally 7 mg for 2 weeks.  We discussed that we will check in in approximately 4 weeks to assess her progress.  Encouraged her to reach out sooner as needed.  Reviewed side effects of nicotine patch as well as expectations.    - nicotine (NICODERM CQ) 21 MG/24HR 24 hr patch  Dispense: 42 patch; Refill: 0  - nicotine (NICODERM CQ) 14 MG/24HR 24 hr patch  Dispense: 14 patch; Refill: 0  - nicotine (NICODERM CQ) 7 MG/24HR 24 hr patch  Dispense: 14 patch; Refill: 0    20 minutes spent by me on the date of the encounter doing chart review, review of test results, interpretation of tests, patient visit, and documentation        Nicotine/Tobacco Cessation:  She reports that she has been smoking cigarettes. She has been smoking an average of .25 packs per day. She has been exposed to tobacco smoke. She has never used smokeless tobacco.  Nicotine/Tobacco Cessation Plan:   Pharmacotherapies : Nicotine patch      BMI:   Estimated body mass index is 44.44 kg/m  as calculated from the following:    Height as of 8/28/23: 1.715 m (5' 7.5\").    Weight as of 8/28/23: 130.6 kg (288 lb).   Weight management plan: Discussed healthy diet and exercise guidelines    The likelihood of other entities in the differential is insufficient to justify any further testing for them at this time. This was explained to the patient. The patient was advised that persistent or worsening symptoms would require further evaluation. Patient advised to call the office and if " unable to reach to go to the emergency room if they develop any new or worsening symptoms. Expressed understanding and agreement with above stated plan.     ALEJANDRO Argueta [facility] ISRRAEL Galeas is a 58 year old, presenting for the following health issues:  Smoking Cessation    Here today to discuss smoking cessation.  States she is a former patient of Dr. Manzanares and was able to quit smoking for a number of years following a gradual stepdown using nicotine patches.  She is currently on Wellbutrin for mental health.  No longer smoking cigarettes but is smoking Reading sweets. 4-6 daily.  Motivated to quit given upcoming surgery.    Review of Systems   Constitutional, HEENT, cardiovascular, pulmonary, GI, , musculoskeletal, neuro, skin, endocrine and psych systems are negative, except as otherwise noted.      Objective           Vitals:  No vitals were obtained today due to virtual visit.    Physical Exam   GENERAL: Healthy, alert and no distress  EYES: Eyes grossly normal to inspection.  No discharge or erythema, or obvious scleral/conjunctival abnormalities.  RESP: No audible wheeze, cough, or visible cyanosis.  No visible retractions or increased work of breathing.    SKIN: Visible skin clear. No significant rash, abnormal pigmentation or lesions.  NEURO: Cranial nerves grossly intact.  Mentation and speech appropriate for age.  PSYCH: Mentation appears normal, affect normal/bright, judgement and insight intact, normal speech and appearance well-groomed.        Video-Visit Details    Type of service:  Video Visit     Originating Location (pt. Location): Home    Distant Location (provider location):  On-site  Platform used for Video Visit: Irene

## 2023-10-12 ENCOUNTER — TELEPHONE (OUTPATIENT)
Dept: ENDOCRINOLOGY | Facility: CLINIC | Age: 58
End: 2023-10-12
Payer: COMMERCIAL

## 2023-10-12 NOTE — TELEPHONE ENCOUNTER
Called and left message for patient to return call to further discuss medication questions. Left call back number.

## 2023-10-12 NOTE — TELEPHONE ENCOUNTER
General Call    Contacts         Type Contact Phone/Fax    10/12/2023 11:33 AM CDT Phone (Incoming) Gudelia Dong (Self) [phone] (M)          Reason for Call:  WEGOVY    What are your questions or concerns:  pt would like a call back regarding her medication Wegovy and Topamax    Could we send this information to you in [facility] or would you prefer to receive a phone call?:   Patient would prefer a phone call   Okay to leave a detailed message?: Yes at Cell number on file:    Telephone Information:   Mobile [phone]

## 2023-10-18 ENCOUNTER — TELEPHONE (OUTPATIENT)
Dept: GASTROENTEROLOGY | Facility: CLINIC | Age: 58
End: 2023-10-18
Payer: COMMERCIAL

## 2023-10-18 ENCOUNTER — TELEPHONE (OUTPATIENT)
Dept: ENDOCRINOLOGY | Facility: CLINIC | Age: 58
End: 2023-10-18
Payer: COMMERCIAL

## 2023-10-18 NOTE — TELEPHONE ENCOUNTER
[facility] Call Center    Phone Message    May a detailed message be left on voicemail: yes     Reason for Call: Other: Pt is requesting a call back please to discuss a heartburn medication, Pt states it was suppose to be sent in for her after her last visit but she has not gotten it yet. Please advise. Thank you!     Action Taken: Message routed to:  [facility] GI    Travel Screening: Not Applicable

## 2023-10-18 NOTE — TELEPHONE ENCOUNTER
General Call    Contacts         Type Contact Phone/Fax    10/18/2023 09:40 AM CDT Phone (Incoming) Gudelia Dong (Self)           Reason for Call:  Tlyer Ruiz. Pt returned your call from last week to discuss what's going on with WEGOVY script or p/a.      Please Don't call between [phone] today.      Could we send this information to you in [employer] or would you prefer to receive a phone call?:   Patient would prefer a phone call   Okay to leave a detailed message?: Yes at Cell number on file:    Telephone Information:   Mobile [phone]

## 2023-10-23 ENCOUNTER — OFFICE VISIT (OUTPATIENT)
Dept: DERMATOLOGY | Facility: CLINIC | Age: 58
End: 2023-10-23
Payer: COMMERCIAL

## 2023-10-23 DIAGNOSIS — R21 RASH: Primary | ICD-10-CM

## 2023-10-23 PROCEDURE — 88312 SPECIAL STAINS GROUP 1: CPT | Mod: TC | Performed by: PHYSICIAN ASSISTANT

## 2023-10-23 PROCEDURE — 11104 PUNCH BX SKIN SINGLE LESION: CPT | Performed by: PHYSICIAN ASSISTANT

## 2023-10-23 PROCEDURE — 88312 SPECIAL STAINS GROUP 1: CPT | Mod: 26 | Performed by: PATHOLOGY

## 2023-10-23 PROCEDURE — 88305 TISSUE EXAM BY PATHOLOGIST: CPT | Mod: 26 | Performed by: PATHOLOGY

## 2023-10-23 ASSESSMENT — PAIN SCALES - GENERAL: PAINLEVEL: NO PAIN (0)

## 2023-10-23 NOTE — PATIENT INSTRUCTIONS
Wound Care After a Biopsy    What is a skin biopsy?  A skin biopsy allows the doctor to examine a very small piece of tissue under the microscope to determine the diagnosis and the best treatment for the skin condition. A local anesthetic (numbing medicine) is injected with a very small needle into the skin area to be tested. A small piece of skin is taken from the area. Sometimes a suture (stitch) is used.     What are the risks of a skin biopsy?  I will experience scar, bleeding, swelling, pain, crusting and redness. I may experience incomplete removal or recurrence. Risks of this procedure are excessive bleeding, bruising, infection, nerve damage, numbness, thick (hypertrophic or keloidal) scar and non-diagnostic biopsy.    How should I care for my wound for the first 24 hours?  Keep the wound dry and covered for 24 hours  If it bleeds, hold direct pressure on the area for 15 minutes. If bleeding does not stop, call us or go to the emergency room  Avoid strenuous exercise the first 1-2 days or as your doctor instructs you    How should I care for the wound after 24 hours?  After 24 hours, remove the bandage  You may bathe or shower as normal  If you had a scalp biopsy, you can shampoo as usual and can use shower water to clean the biopsy site daily  Clean the wound once a day with gentle soap and water  Do not scrub, be gentle  Apply white petroleum/Vaseline after cleaning the wound with a cotton swab or a clean finger, and keep the site covered with a Bandaid /bandage. Bandages are not necessary with a scalp biopsy  If you are unable to cover the site with a Bandaid /bandage, re-apply ointment 2-3 times a day to keep the site moist. Moisture will help with healing  Avoid strenuous activity for first 1-2 days  Avoid lakes, rivers, pools, and oceans until the stitches are removed or the site is healed    How do I clean my wound?  Wash hands thoroughly with soap or use hand  before all wound care  Clean  the wound with gentle soap and water  Apply white petroleum/Vaseline  to wound after it is clean  Replace the Bandaid /bandage to keep the wound covered for the first few days or as instructed by your doctor  If you had a scalp biopsy, warm shower water to the area on a daily basis should suffice    What should I use to clean my wound?   Cotton-tipped applicators (Qtips )  White petroleum jelly (Vaseline ). Use a clean new container and use Q-tips to apply.  Bandaids  as needed  Gentle soap     How should I care for my wound long term?  Do not get your wound dirty  Keep up with wound care for one week or until the area is healed.    A small scab will form and fall off by itself when the area is completely healed. The area will be red and will become pink in color as it heals. Sun protection is very important for how your scar will turn out. Sunscreen with an SPF 30 or greater is recommended once the area is healed.  You should have some soreness but it should be mild and slowly go away over several days. Talk to your doctor about using tylenol for pain,    When should I call my doctor?  If you have increased:   Pain or swelling  Pus or drainage (clear or slightly yellow drainage is ok)  Temperature over 100F  Spreading redness or warmth around wound    When will I hear about my results?  The biopsy results can take 2 weeks to come back.  Your results will automatically release to [employer] before your provider has even reviewed them.  The clinic will call you with the results, send you a [employer] message, or have you schedule a follow-up clinic or phone time to discuss the results.  Contact our clinics if you do not hear from us in 2 weeks.    Who should I call with questions?  [facility]: [phone]  [facility]: [phone]  For urgent needs outside of business hours call the [facility] at [phone] and ask for the dermatology resident on  call

## 2023-10-23 NOTE — PROGRESS NOTES
[facility] Dermatology Note  Encounter Date: Oct 23, 2023  Office Visit     Dermatology Problem List:  1. Rash, rule out Lichen Sclerosis vs Lichen Simplex Chronicus vs other, labia majora and perineum  - s/p punch biopsy 10/23/23    ____________________________________________    Assessment & Plan:     # Rash, rule out Lichen Sclerosis vs Lichen Simplex Chronicus vs other, labia majora and perineum     - Punch biopsy performed today (see procedure note(s) below).  - Recommended patient switch to a non-scented detergent, such as Tide Free and Clear   -Avoid using soap in the groin area, use water to cleanse.       Procedures Performed:   - Punch biopsy procedure note, location(s): 1. After discussion of benefits and risks including but not limited to bleeding, infection, scar, incomplete removal, recurrence, and non-diagnostic biopsy, written consent and photographs were obtained. The area was cleaned with isopropyl alcohol. 0.5mL of 1% lidocaine with epinephrine was injected to obtain adequate anesthesia and a 4 mm punch biopsy was performed at site(s). 5-0 Catgut absorbable sutures were utilized to approximate the epidermal edges.White petrolatum ointment and a bandage was applied to the wound. Explicit verbal and written wound care instructions were provided. The patient left the dermatology clinic in good condition.     Follow-up: 4 month(s) in-person, or earlier for new or changing lesions    Staff and Scribe:     Scribe Disclosure:   I, LAUREN CH, am serving as a scribe; to document services personally performed by Denise Bland PA-C -based on data collection and the provider's statements to me.   Provider Disclosure:   The documentation recorded by the scribe accurately reflects the services I personally performed and the decisions made by me.    All risks, benefits and alternatives were discussed with patient.  Patient is in agreement and understands the assessment and  plan.  All questions were answered.  Sun Screen Education was given.   Return to Clinic in 4 months or sooner as needed.   Denise Bland PA-C   [facility] Dermatology [facility]      ____________________________________________    CC: Derm Problem (Gudelia is here today for a itching In the groin area. - had for about 2 years. )    HPI:  Ms. Gudelia Dong is a(n) 58 year old female who presents today as a new patient for pruritus near the groin area that has been occurring for two years. Patient states that the medication she was on (Ketoconazole 2%, Fluconazole) helped for a while, but the effects lessened.Patient states another provider told her she had a yeast infection. Patient uses Ivory soap on her groin area.     Patient works as a  for [employer]; this is a hybrid job. When she wears undergarments throughout the day, she finds dead skin near the labia. She uses scented laundry TIDE detergent.     Patient is unaware of any family history of psoriasis. Patient states she was lean growing up, but at age 40, her weight increased to 225 lbs, and has now increased to 275 lbs.     Patient is otherwise feeling well, without additional skin concerns.     Labs Reviewed:  N/A    Physical Exam:  Vitals: [facility] 07/20/2006   SKIN: Focused examination of the groin was performed.  - There are lichenified pink plaques on labia majora and perineum.   - No other lesions of concern on areas examined.         Medications:  Current Outpatient Medications   Medication    ammonium lactate (AMLACTIN) 12 % external cream    bacitracin 500 UNIT/GM OINT    buPROPion (WELLBUTRIN XL) 150 MG 24 hr tablet    busPIRone HCl (BUSPAR) 30 MG tablet    clotrimazole (LOTRIMIN) 1 % external cream    econazole nitrate 1 % external cream    escitalopram (LEXAPRO) 10 MG tablet    esomeprazole (NEXIUM) 20 MG DR capsule    estradiol (ESTRACE) 0.1 MG/GM vaginal cream    gabapentin (NEURONTIN) 300 MG capsule    ketoconazole  (NIZORAL) 2 % external cream    lamoTRIgine (LAMICTAL) 100 MG tablet    levothyroxine (SYNTHROID/LEVOTHROID) 150 MCG tablet    nicotine (NICODERM CQ) 14 MG/24HR 24 hr patch    nicotine (NICODERM CQ) 21 MG/24HR 24 hr patch    nicotine (NICODERM CQ) 7 MG/24HR 24 hr patch    nystatin (MYCOSTATIN) 654468 UNIT/GM external powder    order for DME    pantoprazole (PROTONIX) 40 MG EC tablet    polyethylene glycol (MIRALAX) 17 GM/Dose powder    bacitracin 500 UNIT/GM external ointment    bisacodyl (DULCOLAX) 5 MG EC tablet    busPIRone (BUSPAR) 10 MG tablet    doxepin (SINEQUAN) 100 MG capsule    Doxepin HCl 150 MG CAPS    nicotine (NICODERM CQ) 21 MG/24HR 24 hr patch    polyethylene glycol (GOLYTELY) 236 g suspension    REXULTI 2 MG tablet    Semaglutide-Weight Management (WEGOVY) 0.25 MG/0.5ML pen    topiramate (TOPAMAX) 25 MG tablet    triamcinolone (KENALOG) 0.1 % external cream     No current facility-administered medications for this visit.      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)    CARDIOVASCULAR SCREENING; LDL GOAL LESS THAN 160    Major depressive disorder, recurrent episode, moderate (H)    Anemia    Tubular adenoma    Iron deficiency anemia    Hyponatremia    ADHD, predominantly inattentive type    overweight BMI>30    Periumbilical hernia    Health Care Home    Psychosis (H)    Bipolar 2 disorder (H)    Gastroesophageal reflux disease without esophagitis    Prediabetes    Hypothyroidism due to medication    Hidradenitis suppurativa of right axilla    Vitamin D deficiency    Obesity (BMI 35.0-39.9) with comorbidity (H)    Numbness and tingling in left hand    Hiatal hernia    Insomnia, unspecified type     Past Medical History:   Diagnosis Date    Depression     GERD (gastroesophageal reflux disease)     Hemorrhoid     Menstrual disorder     s/p D&C -12/2012    ROMEO on CPAP     Other  bipolar disorders     followed by Dr Collazo    Post menopausal syndrome     Tobacco abuse        CC Puma Matt MD  [address] [place] [address] on close of this encounter.

## 2023-10-23 NOTE — NURSING NOTE
Dermatology Rooming Note    Gudelia Dong's goals for this visit include:   Chief Complaint   Patient presents with    Derm Problem     Gudelia is here today for a itching In the groin area. - had for about 2 years.      ROLA Ramos

## 2023-10-23 NOTE — LETTER
10/23/2023       RE: Gudelia Dong  [address]  [place] [address]     Dear Colleague,    Thank you for referring your patient, Gudelia Dong, to the [facility] DERMATOLOGY [facility]IC [place] at [facility]. Please see a copy of my visit note below.    [facility] Dermatology Note  Encounter Date: Oct 23, 2023  Office Visit     Dermatology Problem List:  1. Rash, rule out Lichen Sclerosis vs Lichen Simplex Chronicus vs other, labia majora and perineum  - s/p punch biopsy 10/23/23    ____________________________________________    Assessment & Plan:     # Rash, rule out Lichen Sclerosis vs Lichen Simplex Chronicus vs other, labia majora and perineum     - Punch biopsy performed today (see procedure note(s) below).  - Recommended patient switch to a non-scented detergent, such as Tide Free and Clear   -Avoid using soap in the groin area, use water to cleanse.       Procedures Performed:   - Punch biopsy procedure note, location(s): 1. After discussion of benefits and risks including but not limited to bleeding, infection, scar, incomplete removal, recurrence, and non-diagnostic biopsy, written consent and photographs were obtained. The area was cleaned with isopropyl alcohol. 0.5mL of 1% lidocaine with epinephrine was injected to obtain adequate anesthesia and a 4 mm punch biopsy was performed at site(s). 5-0 Catgut absorbable sutures were utilized to approximate the epidermal edges.White petrolatum ointment and a bandage was applied to the wound. Explicit verbal and written wound care instructions were provided. The patient left the dermatology clinic in good condition.     Follow-up: 4 month(s) in-person, or earlier for new or changing lesions    Staff and Scribe:     Scribe Disclosure:   ILAUREN, am serving as a scribe; to document services personally performed by Denise Bland PA-C -based on data collection  and the provider's statements to me.   Provider Disclosure:   The documentation recorded by the scribe accurately reflects the services I personally performed and the decisions made by me.    All risks, benefits and alternatives were discussed with patient.  Patient is in agreement and understands the assessment and plan.  All questions were answered.  Sun Screen Education was given.   Return to Clinic in 4 months or sooner as needed.   Denise Bland PA-C   [facility]ermat[facility]      ____________________________________________    CC: Derm Problem (Gudelia is here today for a itching In the groin area. - had for about 2 years. )    HPI:  Ms. Gudelia Dong is a(n) 58 year old female who presents today as a new patient for pruritus near the groin area that has been occurring for two years. Patient states that the medication she was on (Ketoconazole 2%, Fluconazole) helped for a while, but the effects lessened.Patient states another provider told her she had a yeast infection. Patient uses Ivory soap on her groin area.     Patient works as a  for [employer]; this is a hybrid job. When she wears undergarments throughout the day, she finds dead skin near the labia. She uses scented laundry TIDE detergent.     Patient is unaware of any family history of psoriasis. Patient states she was lean growing up, but at age 40, her weight increased to 225 lbs, and has now increased to 275 lbs.     Patient is otherwise feeling well, without additional skin concerns.     Labs Reviewed:  N/A    Physical Exam:  Vitals: [facility] 07/20/2006   SKIN: Focused examination of the groin was performed.  - There are lichenified pink plaques on labia majora and perineum.   - No other lesions of concern on areas examined.         Medications:  Current Outpatient Medications   Medication    ammonium lactate (AMLACTIN) 12 % external cream    bacitracin 500 UNIT/GM OINT    buPROPion (WELLBUTRIN XL) 150 MG 24 hr tablet     busPIRone HCl (BUSPAR) 30 MG tablet    clotrimazole (LOTRIMIN) 1 % external cream    econazole nitrate 1 % external cream    escitalopram (LEXAPRO) 10 MG tablet    esomeprazole (NEXIUM) 20 MG DR capsule    estradiol (ESTRACE) 0.1 MG/GM vaginal cream    gabapentin (NEURONTIN) 300 MG capsule    ketoconazole (NIZORAL) 2 % external cream    lamoTRIgine (LAMICTAL) 100 MG tablet    levothyroxine (SYNTHROID/LEVOTHROID) 150 MCG tablet    nicotine (NICODERM CQ) 14 MG/24HR 24 hr patch    nicotine (NICODERM CQ) 21 MG/24HR 24 hr patch    nicotine (NICODERM CQ) 7 MG/24HR 24 hr patch    nystatin (MYCOSTATIN) [id number] UNIT/GM external powder    order for DME    pantoprazole (PROTONIX) 40 MG EC tablet    polyethylene glycol (MIRALAX) 17 GM/Dose powder    bacitracin 500 UNIT/GM external ointment    bisacodyl (DULCOLAX) 5 MG EC tablet    busPIRone (BUSPAR) 10 MG tablet    doxepin (SINEQUAN) 100 MG capsule    Doxepin HCl 150 MG CAPS    nicotine (NICODERM CQ) 21 MG/24HR 24 hr patch    polyethylene glycol (GOLYTELY) 236 g suspension    REXULTI 2 MG tablet    Semaglutide-Weight Management (WEGOVY) 0.25 MG/0.5ML pen    topiramate (TOPAMAX) 25 MG tablet    triamcinolone (KENALOG) 0.1 % external cream     No current facility-administered medications for this visit.      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)    CARDIOVASCULAR SCREENING; LDL GOAL LESS THAN 160    Major depressive disorder, recurrent episode, moderate (H)    Anemia    Tubular adenoma    Iron deficiency anemia    Hyponatremia    ADHD, predominantly inattentive type    overweight BMI>30    Periumbilical hernia    Health Care Home    Psychosis (H)    Bipolar 2 disorder (H)    Gastroesophageal reflux disease without esophagitis    Prediabetes    Hypothyroidism due to medication    Hidradenitis suppurativa of right axilla    Vitamin D deficiency    Obesity  (BMI 35.0-39.9) with comorbidity (H)    Numbness and tingling in left hand    Hiatal hernia    Insomnia, unspecified type     Past Medical History:   Diagnosis Date    Depression     GERD (gastroesophageal reflux disease)     Hemorrhoid     Menstrual disorder     s/p D&C -12/2012    ROMEO on CPAP     Other bipolar disorders     followed by Dr Collazo    Post menopausal syndrome     Tobacco abuse        CC Puma Matt MD  [address] [place] [address] on close of this encounter.

## 2023-10-24 ENCOUNTER — TELEPHONE (OUTPATIENT)
Dept: DERMATOLOGY | Facility: CLINIC | Age: 58
End: 2023-10-24
Payer: COMMERCIAL

## 2023-10-25 ENCOUNTER — TELEPHONE (OUTPATIENT)
Dept: GASTROENTEROLOGY | Facility: CLINIC | Age: 58
End: 2023-10-25
Payer: COMMERCIAL

## 2023-10-25 DIAGNOSIS — K44.9 HIATAL HERNIA: ICD-10-CM

## 2023-10-25 DIAGNOSIS — K21.9 GASTROESOPHAGEAL REFLUX DISEASE WITHOUT ESOPHAGITIS: Primary | ICD-10-CM

## 2023-10-25 LAB
PATH REPORT.COMMENTS IMP SPEC: NORMAL
PATH REPORT.FINAL DX SPEC: NORMAL
PATH REPORT.GROSS SPEC: NORMAL
PATH REPORT.MICROSCOPIC SPEC OTHER STN: NORMAL
PATH REPORT.RELEVANT HX SPEC: NORMAL

## 2023-10-25 NOTE — TELEPHONE ENCOUNTER
Prior Authorization Retail Medication Request    Medication/Dose: esomeprazole (Nexium) 20 mg BID  ICD code (if different than what is on RX):    Previously Tried and Failed:  pantoprazole 40 mg BID  Rationale:  Breakthrough symptoms on alternate PPI BID dosing    Insurance Name:  [employer]  Insurance ID:  [id number]       Pharmacy Information (if different than what is on RX)  Name:    Phone:

## 2023-10-25 NOTE — TELEPHONE ENCOUNTER
Spoke with pt regarding Nexium Rx.  Pt understands that PA is in place and they will hear from their pharmacy if/when it is approved.  They will call back as needed.

## 2023-10-26 ENCOUNTER — TELEPHONE (OUTPATIENT)
Dept: DERMATOLOGY | Facility: CLINIC | Age: 58
End: 2023-10-26
Payer: COMMERCIAL

## 2023-10-26 NOTE — TELEPHONE ENCOUNTER
Via phone patient was scheduled for th following:    Appointment type: Return    Provider: Denise Bland    Return date: 02-20-24    Specialty phone number: [phone]

## 2023-10-27 NOTE — TELEPHONE ENCOUNTER
PA Initiation    Medication: ESOMEPRAZOLE MAGNESIUM 20 MG PO CPDR  Insurance Company: [employer] ([facility]) - Phone [phone] Fax [phone]  Pharmacy Filling the Rx: [id number] - [place], [place] - [address] AT [facility] [address]  Filling Pharmacy Phone: [phone]  Filling Pharmacy Fax: [phone]  Start Date: 10/27/2023

## 2023-10-30 DIAGNOSIS — L28.0 LICHEN SIMPLEX CHRONICUS: Primary | ICD-10-CM

## 2023-10-30 RX ORDER — TRIAMCINOLONE ACETONIDE 0.25 MG/G
OINTMENT TOPICAL 2 TIMES DAILY
Qty: 30 G | Refills: 2 | Status: SHIPPED | OUTPATIENT
Start: 2023-10-30 | End: 2024-02-20

## 2023-10-31 NOTE — TELEPHONE ENCOUNTER
PRIOR AUTHORIZATION DENIED    Medication: ESOMEPRAZOLE MAGNESIUM 20 MG PO CPDR  Insurance Company: [employer] ([facility]) - Phone [phone] Fax [phone]  Denial Date: 10/30/2023  Denial Rational:     Appeal Information:     Patient Notified: No, care team must notify on denials

## 2023-10-31 NOTE — TELEPHONE ENCOUNTER
Left message for pt to call back if willing to do suspension of esomeprazole instead of pill form as insurance is denying the pill/capsule form.

## 2023-11-09 ENCOUNTER — TELEPHONE (OUTPATIENT)
Dept: GASTROENTEROLOGY | Facility: CLINIC | Age: 58
End: 2023-11-09
Payer: COMMERCIAL

## 2023-11-09 NOTE — TELEPHONE ENCOUNTER
"Pre Assessment RN Review     Focused Assessments     Received request from  to review for proper sedation d/t previous procedure performed under general but order requests MAC.     Previous EGD was performed under general anesthesia and per 9/27/23 virtual visit note: \"Previously did not tolerate MAC sedation\".         Scheduling Status & Recommendations     Will need a hospital setting and a PAC eval if scheduled at [facility] d/t BMI >40.     Delay scheduling, awaiting clinical input. Message sent to referring provider, Rosalie Martínez PA-C.    Addendum: Una Stone RN on 11/14/2023 at 9:54 AM  Per BARB Medrano, she is going to reach out to the patient to discuss sedation and she will let us know if general is needed. Awaiting response from RN[facility].  "

## 2023-11-09 NOTE — MR AVS SNAPSHOT
After Visit Summary   8/16/2018    Gudelia Quintanilla    MRN: [id number]           Patient Information     Date Of Birth          1965        Visit Information        Provider Department      8/16/2018 6:10 PM Sacha Moe NP [facility]        Today's Diagnoses     Rosacea    -  1      Care Instructions      What Is Rosacea?  Rosacea is a long-term (chronic) skin disease that causes facial redness. Rosacea appears mainly in adults. Light-skinned people who tend to flush are most often affected. It may be made worse by the following:    Sun exposure    Heat    Eating spicy foods    Drinking alcohol    Getting embarrassed  Rosacea is rarely a health risk. But the symptoms of this disease can be painful and hurt your self-image. If you have rosacea, know that treatment and self-care can help.  A disease with changing symptoms  No one knows what causes rosacea. Heredity, flushing, and the presence of mites or bacteria may play a role. Increased blood flow in the facial skin may be involved. So may the use of some medicines. Rosacea has 4 main types of symptoms that affect the skin. They are described below. Some people with rosacea also have problems with their eyes. Your eyelids may be red, swollen, or itchy. You may feel as though there is sand in your eyes. From day to day, symptoms can come and go. They may worsen or improve. They can usually be managed with proper treatment and self-care.  Symptoms of rosacea  Flushing  The central region of the face often flushes. This includes the cheeks, chin, forehead, and nose. The color can range from pink to red. Flushing can often include a burning feeling in the skin, rather than itching. The flushing can come and go. Alcohol or hot drinks can make flushing worse. There are few good treatments for those who have only flushing as the main symptoms of their rosacea. So avoiding triggers is the key.   Dilated blood  vessels  Tiny enlarged (dilated) blood vessels (telangiectasia) may form a weblike pattern on 1 or more parts of the face.  Acnelike lesions  Acnelike lesions appear on the face. They are called papules, pustules, and nodules, and they tend to occur above the nose, on the cheeks, and on the chin. They may come and go. Facial redness and tiny dilated blood vessels tend to persist.  Enlargement of the nose  With severe rosacea, redness and swelling may enlarge the nose (rhinophyma) due to thickening of the skin. Thickened skin may also occur on the forehead, chin, cheeks, and ears. This occurs most often in men.  Date Last Reviewed: 2/1/2017 2000-2017 [employer]. [address] [place] [address], [place] [address]. All rights reserved. This information is not intended as a substitute for professional medical care. Always follow your healthcare professional's instructions.        Treating Rosacea     Use sunscreen with at least SPF 15 or more every day.      There is no cure for rosacea. But rosacea can be controlled in most cases, particularly with medical treatment to manage your symptoms.  Your healthcare provider may prescribe 1 or more treatments to put on your skin each day. You may also be given medicines to take by mouth if you have more severe rosacea symptoms. To relieve rosacea eye symptoms, you may need prescription eye drops. Avoid things that can easily cause your rosacea to flare up. These include spicy foods, alcohol, getting embarrassed, and going from a cold environment to a warm environment. You may have surgery to fix the more severe forms of scarring rosacea of the nose. This is called rhinophyma and means the redness and swelling that cause the nose to enlarge.  Your role in medical treatment  How well your treatment works depends partly on you. Follow your healthcare provider s treatment plan. Rosacea symptoms often get better with medicines. But they tend to get worse again if you stop  taking the medicines. If your symptoms continue or get worse, ask about other treatment options, including combinations of treatments.  Rosacea self-care  Besides sticking with your treatment plan, follow these tips to care for your skin:    Wash your face twice a day with a gentle facial cleanser. Rinse your skin well with warm (not hot) water. Pat your skin dry with a cotton towel.    Don t scrub your skin or use sponges, brushes, or other abrasive tools. Doing so can irritate your skin.    Avoid harsh scrubs or astringents. These products can irritate your skin.    If you shave your face, use an electric razor.    Apply sunscreen with at least SPF 15 or more every day. Sun exposure can make rosacea symptoms worse.    Choose skin care products and cosmetics that do not irritate the skin, and are oil-free and fragrance-free.    Avoid putting steroids on the skin sores. Steroids may make rosacea worse.   Getting good results  Learning about rosacea and treating it early is the first step toward controlling this disease. With proper treatment and self-care, you can manage your symptoms and feel better about your skin. The National Rosacea Society is a great resource for information.   What causes rosacea flare-ups?  It s often hard to pinpoint the factors that cause rosacea flare-ups. Different people can be affected by different triggers. Common triggers include weather extremes, sun exposure, alcoholic or hot beverages, spicy food, physical exertion, stress, illness, some skin products, and medicines. To prevent flare-ups, keep a list of things that seem to make your rosacea worse. Then try to avoid them.  Date Last Reviewed: 2/1/2017 2000-2017 [employer]. [address] [place] [address]. All rights reserved. This information is not intended as a substitute for professional medical care. Always follow your healthcare professional's instructions.                Follow-ups after your  "visit        Additional Services     DERMATOLOGY REFERRAL       Your provider has referred you to: Dermatology    Please be aware that coverage of these services is subject to the terms and limitations of your health insurance plan.  Call member services at your health plan with any benefit or coverage questions.      Please bring the following with you to your appointment:    (1) Any X-Rays, CTs or MRIs which have been performed.  Contact the facility where they were done to arrange for  prior to your scheduled appointment.    (2) List of current medications  (3) This referral request   (4) Any documents/labs given to you for this referral                  Who to contact     If you have questions or need follow up information about today's clinic visit or your schedule please contact [facility] directly at [phone].  Normal or non-critical lab and imaging results will be communicated to you by MyChart, letter or phone within 4 business days after the clinic has received the results. If you do not hear from us within 7 days, please contact the clinic through MyChart or phone. If you have a critical or abnormal lab result, we will notify you by phone as soon as possible.  Submit refill requests through [employer] or call your pharmacy and they will forward the refill request to us. Please allow 3 business days for your refill to be completed.          Additional Information About Your Visit        [employer] Information     [employer] lets you send messages to your doctor, view your test results, renew your prescriptions, schedule appointments and more. To sign up, go to [email or web address] . Click on \"Log in\" on the left side of the screen, which will take you to the Welcome page. Then click on \"Sign up Now\" on the right side of the page.     You will be asked to enter the access code listed below, as well as some personal information. Please follow the directions to create your " "username and password.     Your access code is: [email or web address]  Expires: 2018  7:10 PM     Your access code will  in 90 days. If you need help or a new code, please call your [facility] or [phone].        Care EveryWhere ID     This is your Care EveryWhere ID. This could be used by other organizations to access your [facility] medical records  [id number]        Your Vitals Were     Pulse Temperature Height Last Period Pulse Oximetry BMI (Body Mass Index)    88 98.8  F (37.1  C) (Tympanic) 5' 8.5\" (1.74 m) 2006 98% 35.81 kg/m2       Blood Pressure from Last 3 Encounters:   18 129/74   18 125/81   18 109/68    Weight from Last 3 Encounters:   18 239 lb (108.4 kg)   18 226 lb (102.5 kg)   18 226 lb (102.5 kg)              We Performed the Following     DERMATOLOGY REFERRAL          Today's Medication Changes          These changes are accurate as of 18  7:10 PM.  If you have any questions, ask your nurse or doctor.               Start taking these medicines.        Dose/Directions    metroNIDAZOLE 0.75 % cream   Commonly known as:  METROCREAM   Used for:  Rosacea   Started by:  Sacha Moe NP        Apply thin film to affected areas on the face BID.   Quantity:  45 g   Refills:  0         These medicines have changed or have updated prescriptions.        Dose/Directions    Doxepin HCl 150 MG Caps   This may have changed:  how much to take        Dose:  300 mg   Take 300 mg by mouth At Bedtime   Quantity:  30 capsule   Refills:  0            Where to get your medicines      These medications were sent to [facility] - [place], [place] - [address] AT [facility] [address], [place] [address]    Hours:  24-hours Phone:  [phone]     metroNIDAZOLE 0.75 % cream                Primary Care Provider Office Phone # Fax #    Todd Manzanares [phone] [address]  [facility] [address]        Equal Access to Services     JAILENE REINA : Hadii keila lucas katielee Donteali, waaxda luqcarolha, qaybta kasierratito regalado, darrel condecassidymax kay. So [facility] [phone].    ATENCIÓN: Si habla español, tiene a fitzpatrick disposición servicios gratuitos de asistencia lingüística. Llame al [phone].    We comply with applicable federal civil rights laws and [place] laws. We do not discriminate on the basis of race, color, national origin, age, disability, sex, sexual orientation, or gender identity.            Thank you!     Thank you for choosing [facility]  for your care. Our goal is always to provide you with excellent care. Hearing back from our patients is one way we can continue to improve our services. Please take a few minutes to complete the written survey that you may receive in the mail after your visit with us. Thank you!             Your Updated Medication List - Protect others around you: Learn how to safely use, store and throw away your medicines at www.disposemymeds.org.          This list is accurate as of 8/16/18  7:10 PM.  Always use your most recent med list.                   Brand Name Dispense Instructions for use Diagnosis    albuterol 108 (90 Base) MCG/ACT inhaler    PROAIR HFA/PROVENTIL HFA/VENTOLIN HFA    1 Inhaler    Inhale 2 puffs into the lungs every 6 hours as needed for shortness of breath / dyspnea or wheezing        ARIPiprazole 30 MG tablet    ABILIFY     TAKE 1 TABLET BY MOUTH DAILY.        armodafinil 250 MG Tabs tablet    NUVIGIL    30 tablet    Take 1 tablet (250 mg) by mouth every morning    Excessive sleepiness       cholecalciferol 1000 UNIT tablet    vitamin D3    100 tablet    Take 1 tablet (1,000 Units) by mouth daily    Preventive measure       Doxepin HCl 150 MG Caps     30 capsule    Take 300 mg by mouth At Bedtime        furosemide 40 MG tablet    LASIX    30 tablet    Take 1 tablet (40 mg) by mouth  daily    Bilateral edema of lower extremity       * levothyroxine 150 MCG tablet    SYNTHROID/LEVOTHROID    60 tablet    Take 1 tablet (150 mcg) by mouth daily    Hypothyroidism due to medication       * levothyroxine 150 MCG tablet    SYNTHROID/LEVOTHROID    90 tablet    TAKE 1 TABLET(150 MCG) BY MOUTH DAILY    Hypothyroidism due to medication       lithium 300 MG tablet      300 mg Takes 300 mg every morning        metroNIDAZOLE 0.75 % cream    METROCREAM    45 g    Apply thin film to affected areas on the face BID.    Rosacea       nicotine 10 MG Inhaler    NICOTROL    6 Box    Inhale 2 cartridge into the lungs daily as needed for smoking cessation    Tobacco use disorder       order for DME     1 Units    Equipment being ordered: CPAP Please dispense Cpap and its supply    ROMEO on CPAP       order for DME     1 each    Equipment being ordered: tennis elbow brace    Right lateral epicondylitis       pantoprazole 40 MG EC tablet    PROTONIX    60 tablet    Take 1 tablet (40 mg) by mouth 2 times daily Take 1.5 hours prior to last meal of the day, and take at bedtime    Gastroesophageal reflux disease without esophagitis       polyethylene glycol Packet    MIRALAX/GLYCOLAX    30 packet    Take 17 g by mouth 2 times daily as needed for constipation    Chronic constipation       * Notice:  This list has 2 medication(s) that are the same as other medications prescribed for you. Read the directions carefully, and ask your doctor or other care provider to review them with you.       per SICU PA/attending note-patient was admitted to [facility] and was [facility] at [facility]  night PTA. DOA he was more delirious and lethargic than usual with increased swelling.  His niece states that he was at [facility] for colitis and acute pneumonia.  Patient has bedsores on his buttocks.  He had hemodialysis day prior but not a lot of fluid was being taken off because his blood pressure was low. She states that 2 weeks ago the pt was doing well. He is normally A+Ox3, sat up and had a conversation the past Sunday.  Admitted for  1. Acute toxic metabolic encephalopathy 2/2  2. Acute hypoxic, hypercapnic respiratory failure vs RLL PNA  3. elevated ammonia  4. Pulmonary edema in setting of ESRD  5. Aspiration PNA  6. Chronic hypotension on midodrine.   on BIPAP at night, may need corpak if MS doesn't improve

## 2023-11-09 NOTE — TELEPHONE ENCOUNTER
Reason for Reschedule/Cancellation (please be detailed, any staff messages or encounters to note?): NITZA HARRIS      Prior to reschedule please review:  Ordering Provider:     ALESHA GUNTER     Sedation per order: MAC  Does patient have any ASC Exclusions, please identify?: Y - ROMEO  PAC EVAL REQUIRED DUE TO BMI OVER 40 with MAC      Notes on Cancelled Procedure:  Procedure: Upper Endoscopy [EGD]   Date: 01/18/2024  Location: [facility]; [address] [place] [address], 3rd Floor, [place] [address]  Surgeon: NITZA      Rescheduled: No  I asked for an RN review due to concerns for the required sedation for EGD. Pt's last EGD was under General anesthesia. RN review will contact ordering provider to determine proper sedation.

## 2023-11-09 NOTE — TELEPHONE ENCOUNTER
"Pre Assessment RN Review    Focused Assessments    Received request from  to review for proper sedation d/t previous procedure performed under general but order requests MAC.    Previous EGD was performed under general anesthesia and per 9/27/23 virtual visit note: \"Previously did not tolerate MAC sedation\".       Scheduling Status & Recommendations    Will need a hospital setting and a PAC eval if scheduled at [facility] d/t BMI >40.    Delay scheduling, awaiting clinical input. Message sent to referring provider, Rosalie Martínez PA-C.    "

## 2023-11-13 NOTE — TELEPHONE ENCOUNTER
Rescheduled: No    1ST ATTEMPT TO RESCHEDULE, LVM TO CALL BACK AND SENT MYCHART.    CASE IN DEPOT - PT REQUIRES GENERAL.

## 2023-11-14 ENCOUNTER — TELEPHONE (OUTPATIENT)
Dept: GASTROENTEROLOGY | Facility: CLINIC | Age: 58
End: 2023-11-14
Payer: COMMERCIAL

## 2023-11-14 RX ORDER — ESOMEPRAZOLE MAGNESIUM 10 MG/1
20 GRANULE, FOR SUSPENSION, EXTENDED RELEASE ORAL
Qty: 180 EACH | Refills: 1 | Status: SHIPPED | OUTPATIENT
Start: 2023-11-14 | End: 2024-01-10

## 2023-11-14 NOTE — TELEPHONE ENCOUNTER
Rescheduled: Yes  Procedure: Lower Endoscopy [Colonoscopy]   Date: 02/15/2024  Location: [facility]; [address], [address], [place] [address]  Surgeon: FLAKO POLK  Sedation Level Scheduled  GENERAL,  Reason for Sedation Level PER RN REVIEW  Prep/Instructions updated and sent: [facility]       Send In - basket message to Panc - Rk Pool if EUS  procedure is canceled or rescheduled: [ N/A, YES or NO] NA

## 2023-11-14 NOTE — TELEPHONE ENCOUNTER
Pre Assessment RN Review    Received message from Kristi Cope, AMBREENCC. Patient reports she has woken up during procedures with MAC sedation. Pt reported she did have a history of drug and alcohol abuse which she attributes to likely the cause of this issue. 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      Patient will need general anesthesia with next available luminal provider per Rosalie Martínez PA-C, referring provider.     Scheduling Status & Recommendations    Sedation: General Anesthesia - Per provider recommendation.  Location Type: Hospital - Per exclusion criteria.  PAC: Yes - will need PAC eval if scheduled at [facility].    Staff message sent to .     Una Stone RN  Endoscopy Procedure Pre-Assessment RN

## 2023-11-14 NOTE — TELEPHONE ENCOUNTER
Updated prescription to be esomeprazole suspension. Provider recommended BID 20 mg dosing due to pt's LA Grade C esophagitis despite omeprazole BID dosing.

## 2023-11-14 NOTE — TELEPHONE ENCOUNTER
Provider requested follow up in terms of non-tolerance to MAC sedation in the past, general anesthesia procedures are currently booking into new year. Left message for pt to call back regarding sedation for procedure and esomeprazole prescription.

## 2023-11-15 ENCOUNTER — VIRTUAL VISIT (OUTPATIENT)
Dept: FAMILY MEDICINE | Facility: CLINIC | Age: 58
End: 2023-11-15
Payer: COMMERCIAL

## 2023-11-15 DIAGNOSIS — Z71.6 ENCOUNTER FOR SMOKING CESSATION COUNSELING: ICD-10-CM

## 2023-11-15 DIAGNOSIS — R05.9 COUGH, UNSPECIFIED TYPE: Primary | ICD-10-CM

## 2023-11-15 DIAGNOSIS — R06.2 WHEEZING: ICD-10-CM

## 2023-11-15 PROCEDURE — 99213 OFFICE O/P EST LOW 20 MIN: CPT | Mod: VID | Performed by: PHYSICIAN ASSISTANT

## 2023-11-15 RX ORDER — AZITHROMYCIN 250 MG/1
TABLET, FILM COATED ORAL
Qty: 6 TABLET | Refills: 0 | Status: SHIPPED | OUTPATIENT
Start: 2023-11-15 | End: 2023-11-20

## 2023-11-15 RX ORDER — ALBUTEROL SULFATE 90 UG/1
2 AEROSOL, METERED RESPIRATORY (INHALATION) EVERY 6 HOURS PRN
Qty: 18 G | Refills: 0 | Status: SHIPPED | OUTPATIENT
Start: 2023-11-15 | End: 2023-12-09

## 2023-11-15 NOTE — TELEPHONE ENCOUNTER
FUTURE VISIT INFORMATION      SURGERY INFORMATION:  Date: 2/15/24  [facility]  Surgeon:  Sarmad Reed MD   Anesthesia Type:  general  Procedure: Esophagoscopy, gastroscopy, duodenoscopy (EGD), combined     RECORDS REQUESTED FROM:       Primary Care Provider: [facility]    Pertinent Medical History: fior    Most recent EKG+ Tracin/28/15    Most recent ECHO: 5/8/15    Most recent PFT's: 17

## 2023-11-15 NOTE — PROGRESS NOTES
Gudelia is a 58 year old who is being evaluated via a billable video visit.      How would you like to obtain your AVS? MyChart  If the video visit is dropped, the invitation should be resent by: Text to cell phone: [phone]  Will anyone else be joining your video visit? No        Assessment & Plan     Cough, unspecified type  Wheezing    Test for COVID. Sent Zpak to start if negative and albuterol to help symptomatically. OTC meds for symptomatic care. Reviewed signs and symptoms that warrant re-eval.     - azithromycin (ZITHROMAX) 250 MG tablet  Dispense: 6 tablet; Refill: 0  - albuterol (PROAIR HFA/PROVENTIL HFA/VENTOLIN HFA) 108 (90 Base) MCG/ACT inhaler  Dispense: 18 g; Refill: 0  - Symptomatic COVID-19 Virus (Coronavirus) by PCR Nasopharyngeal    Encounter for smoking cessation counseling  Continue nicotine patches.       20 minutes spent by me on the date of the encounter doing chart review, review of test results, interpretation of tests, patient visit, and documentation        The likelihood of other entities in the differential is insufficient to justify any further testing for them at this time. This was explained to the patient. The patient was advised that persistent or worsening symptoms would require further evaluation. Patient advised to call the office and if unable to reach to go to the emergency room if they develop any new or worsening symptoms. Expressed understanding and agreement with above stated plan.       ALEJANDRO Argueta [facility] ISRRAEL Galeas is a 58 year old, presenting for the following health issues:  Follow Up, Chills, and Cough    1-2 week history of feeling hot/cold with associated cough in addition to wheezing and sinus congestion. No recorded fever. Appetite stable. No SOB, CP, leg swelling, palpitations. Has not tested for COVID. Hx of PNA.     Still working on quitting smoking, using patches. Minimal progress due to above illness.      2 years ago for PNA.      Review of Systems   Constitutional, HEENT, cardiovascular, pulmonary, GI, , musculoskeletal, neuro, skin, endocrine and psych systems are negative, except as otherwise noted.      Objective           Vitals:  No vitals were obtained today due to virtual visit.    Physical Exam   GENERAL: Healthy, alert and no distress  EYES: Eyes grossly normal to inspection.  No discharge or erythema, or obvious scleral/conjunctival abnormalities.  RESP: No audible wheeze, cough, or visible cyanosis.  No visible retractions or increased work of breathing.    SKIN: Visible skin clear. No significant rash, abnormal pigmentation or lesions.  NEURO: Cranial nerves grossly intact.  Mentation and speech appropriate for age.  PSYCH: Mentation appears normal, affect normal/bright, judgement and insight intact, normal speech and appearance well-groomed.        Video-Visit Details    Type of service:  Video Visit     Originating Location (pt. Location): Home    Distant Location (provider location):  On-site  Platform used for Video Visit: Irene

## 2023-11-16 ENCOUNTER — APPOINTMENT (OUTPATIENT)
Dept: LAB | Facility: CLINIC | Age: 58
End: 2023-11-16
Payer: COMMERCIAL

## 2023-11-16 ENCOUNTER — VIRTUAL VISIT (OUTPATIENT)
Dept: ENDOCRINOLOGY | Facility: CLINIC | Age: 58
End: 2023-11-16
Payer: COMMERCIAL

## 2023-11-16 VITALS — HEIGHT: 68 IN | BODY MASS INDEX: 40.92 KG/M2 | WEIGHT: 270 LBS

## 2023-11-16 DIAGNOSIS — Z71.3 NUTRITIONAL COUNSELING: Primary | ICD-10-CM

## 2023-11-16 DIAGNOSIS — K21.9 GASTROESOPHAGEAL REFLUX DISEASE WITHOUT ESOPHAGITIS: ICD-10-CM

## 2023-11-16 DIAGNOSIS — K44.9 HIATAL HERNIA: ICD-10-CM

## 2023-11-16 DIAGNOSIS — E66.01 CLASS 3 SEVERE OBESITY WITH BODY MASS INDEX (BMI) OF 40.0 TO 44.9 IN ADULT, UNSPECIFIED OBESITY TYPE, UNSPECIFIED WHETHER SERIOUS COMORBIDITY PRESENT (H): ICD-10-CM

## 2023-11-16 DIAGNOSIS — E66.813 CLASS 3 SEVERE OBESITY WITH BODY MASS INDEX (BMI) OF 40.0 TO 44.9 IN ADULT, UNSPECIFIED OBESITY TYPE, UNSPECIFIED WHETHER SERIOUS COMORBIDITY PRESENT (H): ICD-10-CM

## 2023-11-16 PROCEDURE — 87635 SARS-COV-2 COVID-19 AMP PRB: CPT | Mod: VID | Performed by: PHYSICIAN ASSISTANT

## 2023-11-16 PROCEDURE — 97803 MED NUTRITION INDIV SUBSEQ: CPT | Mod: 95 | Performed by: DIETITIAN, REGISTERED

## 2023-11-16 PROCEDURE — 99207 PR NO CHARGE LOS: CPT | Mod: 95 | Performed by: DIETITIAN, REGISTERED

## 2023-11-16 ASSESSMENT — PAIN SCALES - GENERAL: PAINLEVEL: NO PAIN (0)

## 2023-11-16 NOTE — LETTER
"2023       RE: Gudelia Dong  [address]  [place] [address]     Dear Colleague,    Thank you for referring your patient, Gudelia Dong, to the [facility] [place] [facility]. Please see a copy of my visit note below.    Video-Visit Details    Type of service:  Video Visit    Video Start Time: 9:05 am  Video End Time: 9:25 am    Originating Location (pt. Location): Home    Distant Location (provider location):  Offsite (providers home)     Platform used for Video Visit: Gigturn      Weight Management Nutrition Consultation    Gudelia Dong is a 58 year old female presents today for weight management nutrition consultation.  Patient referred by Dr. Rg on 2023.    Patient with Co-morbidities of obesity includin/28/2023    10:35 AM   --   I have the following health issues associated with obesity Pre-Diabetes    Sleep Apnea    GERD (Reflux)   I have the following symptoms associated with obesity Depression    Lower Extremity Swelling         Anthropometrics:  Weight 23: 288 lbs with BMI 44.44    Estimated body mass index is 41.66 kg/m  as calculated from the following:    Height as of this encounter: 1.715 m (5' 7.5\").    Weight as of this encounter: 122.5 kg (270 lb).    Current weight: 270 lbs, pt report    Medications for Weight Loss:  Wegovy - Not taking due to shortage/denial   Topiramate 25 mg    NUTRITION HISTORY    Food choices limited by GERD. Working with Rosalie Martínez in GI. Known hiatal hernia   Has to separate fluids/from or will feel stuck and might vomit.   Eats white rice really slow or feels stuck.  Vitamin/Mineral Supplements: Did not discuss today  Previous methods of diet modification for weight loss: weight loss program (went well - whole foods and portion control)  RD before: Not sure but did a program called [place] weight loss    Pt reports she was hx thin then " started eating more sweets and snacking and gained. Went through a program called [place] weight program about 20 years ago. Went well, lost about 100 lbs (from 225 to 132 lbs). Has slowed gained back.    Was homeless for 6 months per pt in 2088-2253 - lost her storage locker that had roller skates, bikes, etc. Has not been able to replenish those things. Now lives in [place] and hard to find places to exercise. Just got a car about a month ago. Does have a [employer] down the road that she is hoping to get a membership at.    Pt goals: become more active again, decrease snacking, get back on more regular eating plan, portion control   - Pt reports her breasts carry a lot of weight and make it hard to move. Lower back pain.    Eats 1-2 meals/day and snacks  Typical foods consumed  B - cereal  D - meat, potatoes, vegetables OR salad  Snacks - pretzels, cookies, ice cream, cheese    PA: walking    - Limited by pain   - Hx a very active person (bike, water ski, etc)    Barriers: ADHD    Nov 2023:  Reports recent weight loss associated with being sick/not eating much. Saw doctor yesterday.      Nervous about Thanksgiving next week. Really enjoys stuffing and pumpkin pie. Is someone that will keep eating the pie if at home. Thinking of ordering a dinner through [employer] to help with portion control.     Feels the topiramate is helping with cravings. Feeling satisfied off a smaller amount of pretzels.     Eating 1 meal/day typically. Wants to work on more regular eating.    Additional information:  FT -     Lives alone        8/28/2023    10:35 AM   Diet Recall Review with Patient   If you do eat breakfast, what types of food do you eat? cereal   If you do eat supper, what types of food do you typically eat? meat potatos vegatable or salads   If you do snack, what types of food do you typically eat? pretzels, cookies, ice crea   How many glasses of juice do you drink in  a typical day? 8   How many of glasses of milk do you drink in a typical day? 1   If you do drink milk, what type? 1%   How many 8oz glasses of sugar containing drinks such as Junito-Aid/sweet tea do you drink in a day? 5   How many cans/bottles of sugar pop/soda/tea/sports drinks do you drink in a day? 0   How many cans/bottles of diet pop/soda/tea or sports drink do you drink in a day? 0   How often do you have a drink of alcohol? Never           8/28/2023    10:35 AM   Eating Habits   Generally, my meals include foods like these bread, pasta, rice, potatoes, corn, crackers, sweet dessert, pop, or juice A Few Times a Week   Generally, my meals include foods like these fried meats, brats, burgers, french fries, pizza, cheese, chips, or ice cream A Few Times a Week   Eat fast food (like McDonalds, Burger Anthony, Taco Bell) Less Than Weekly   Eat at a buffet or sit-down restaurant Less Than Weekly   Eat most of my meals in front of the TV or computer Everyday   Often skip meals, eat at random times, have no regular eating times Almost Everyday   Rarely sit down for a meal but snack or graze throughout A Few Times a Week   Eat extra snacks between meals Almost Everyday   Eat most of my food at the end of the day Almost Everyday   Eat in the middle of the night or wake up at night to eat A Few Times a Week   Eat extra snacks to prevent or correct low blood sugar Never   Eat to prevent acid reflux or stomach pain Less Than Weekly   Worry about not having enough food to eat Never   I eat when I am depressed A Few Times a Week   I eat when I am stressed A Few Times a Week   I eat when I am bored A Few Times a Week   I eat when I am anxious Never   I eat when I am happy or as a reward A Few Times a Week   I feel hungry all the time even if I just have eaten Never   Feeling full is important to me Never   I finish all the food on my plate even if I am already full Everyday   I can't resist eating delicious food or walk past the  good food/smell Less Than Weekly   I eat/snack without noticing that I am eating Never   I eat when I am preparing the meal Less Than Weekly   I eat more than usual when I see others eating Less Than Weekly   I have trouble not eating sweets, ice cream, cookies, or chips if they are around the house Almost Everyday   I think about food all day Less Than Weekly   What foods, if any, do you crave? Cheese   Please list any other foods you crave? chocolate, chips a lot of cheese           8/28/2023    10:35 AM   Amount of Food   I feel out of control when eating Almost Everyday   I eat a large amount of food, like a loaf of bread, a box of cookies, a pint/quart of ice cream, all at once Weekly   I eat a large amount of food even when I am not hungry Weekly   I eat rapidly Everyday   I eat alone because I feel embarrassed and do not want others to see how much I have eaten Monthly   I eat until I am uncomfortably full Everyday   I feel bad, disgusted, or guilty after I overeat Everyday       Physical Activity:        8/28/2023    10:35 AM   Activity/Exercise History   How much of a typical 12 hour day do you spend sitting? Most of the Day   How much of a typical 12 hour day do you spend lying down? Half the Day   How much of a typical day do you spend walking/standing? Less Than Half the Day   How many hours (not including work) do you spend on the TV/Video Games/Computer/Tablet/Phone? 6 Hours or More   How many times a week are you active for the purpose of exercise? Never   What keeps you from being more active? Pain    Shortness of Breath    Other   How many total minutes do you spend doing some activity for the purpose of exercising when you exercise? 15-30 Minutes       Nutrition Prescription  Recommended energy/nutrient modification.    Nutrition Diagnosis  Obesity r/t long history of positive energy balance aeb BMI >30.    Nutrition Intervention  Reviewed and modified previous goals  Answered pt  questions  Coordination of care   Nutrition education   AVS and handouts via [employer]    Expected Engagement: good    Nutrition Goals/Resources:  Aim to get one slice of pie from [employer] as opposed to having a whole buy  2. Aim to eat 3x/day even if something small (5-6 am, 12:30-1:30-pm, 3:30-5:00 pm)  3. Aim to do some meal planning on the weekend    Plate method can be used for general guidance on balanced meals/portion sizes (see link below for picture/more information)                 Make 1/2 your plate non starchy vegetable                3+ oz lean protein sources     (Note: 3 oz = deck of cards size)                 1/2 to 1 cup carbohydrate choices such as whole grain starches or starchy vegetables or fruit.               Choose ~0-2 added fat servings at a meal (avocados, nut butters, nuts or seeds, olive oil, vegetable oils).    The Plate Method:  https://www.cdc.gov/diabetes/images/managing/Diabetes-Manage-Eat-Well-Plate-Graphic_600px.jpg    Can use different seasons, citrus, herbs to help add flavor.     Grocery list ideas:   - Broccoli   - [place] sprouts   - Raw spinach    - Green peppers   - Cucumbers    - Cauliflower    - Asparagus    - Green peas   - Peas (starchy vegetable)    - Carrots   - Fish    - Strawberries   - Apples   - Grapes   - Raisins (recommend no sugar added)   - Blueberries   - Raspberries   - Blackberries   - Turkey   - Chicken    Follow-Up:  Will call to schedule ([phone])    Time spent with patient: 20 minutes.  IVELISSE Vazquez, RD, LD

## 2023-11-16 NOTE — NURSING NOTE
Is the patient currently in the state of [place]? YES    Visit mode:VIDEO    If the visit is dropped, the patient can be reconnected by: VIDEO VISIT: Text to cell phone:   Telephone Information:   Mobile [phone]       Will anyone else be joining the visit? NO  (If patient encounters technical issues they should call [phone]    How would you like to obtain your AVS? MyChart    Are changes needed to the allergy or medication list? Yes Pt states duplicates need to be removed from med list.    Reason for visit: RECHECK    Sree CUENCA

## 2023-11-16 NOTE — PROGRESS NOTES
"Video-Visit Details    Type of service:  Video Visit    Video Start Time: 9:05 am  Video End Time: 9:25 am    Originating Location (pt. Location): Home    Distant Location (provider location):  Offsite (providers home)     Platform used for Video Visit: Well      Weight Management Nutrition Consultation    Gudelia Dong is a 58 year old female presents today for weight management nutrition consultation.  Patient referred by Dr. Rg on 2023.    Patient with Co-morbidities of obesity includin/28/2023    10:35 AM   --   I have the following health issues associated with obesity Pre-Diabetes    Sleep Apnea    GERD (Reflux)   I have the following symptoms associated with obesity Depression    Lower Extremity Swelling         Anthropometrics:  Weight 23: 288 lbs with BMI 44.44    Estimated body mass index is 41.66 kg/m  as calculated from the following:    Height as of this encounter: 1.715 m (5' 7.5\").    Weight as of this encounter: 122.5 kg (270 lb).    Current weight: 270 lbs, pt report    Medications for Weight Loss:  Wegovy - Not taking due to shortage/denial   Topiramate 25 mg    NUTRITION HISTORY    Food choices limited by GERD. Working with oRsalie Martínez in GI. Known hiatal hernia   Has to separate fluids/from or will feel stuck and might vomit.   Eats white rice really slow or feels stuck.  Vitamin/Mineral Supplements: Did not discuss today  Previous methods of diet modification for weight loss: weight loss program (went well - whole foods and portion control)  RD before: Not sure but did a program called [place] weight loss    Pt reports she was hx thin then started eating more sweets and snacking and gained. Went through a program called [place] weight program about 20 years ago. Went well, lost about 100 lbs (from 225 to 132 lbs). Has slowed gained back.    Was homeless for 6 months per pt in 4852-2184 - lost her storage locker that had roller skates, bikes, etc. Has not been able to " replenish those things. Now lives in [facility] [place] [facility] [place] and hard to find places to exercise. Just got a car about a month ago. Does have a [employer] down the road that she is hoping to get a membership at.    Pt goals: become more active again, decrease snacking, get back on more regular eating plan, portion control   - Pt reports her breasts carry a lot of weight and make it hard to move. Lower back pain.    Eats 1-2 meals/day and snacks  Typical foods consumed  B - cereal  D - meat, potatoes, vegetables OR salad  Snacks - pretzels, cookies, ice cream, cheese    PA: walking    - Limited by pain   - Hx a very active person (bike, water ski, etc)    Barriers: ADHD    Nov 2023:  Reports recent weight loss associated with being sick/not eating much. Saw doctor yesterday.      Nervous about Thanksgiving next week. Really enjoys stuffing and pumpkin pie. Is someone that will keep eating the pie if at home. Thinking of ordering a dinner through [employer] to help with portion control.     Feels the topiramate is helping with cravings. Feeling satisfied off a smaller amount of pretzels.     Eating 1 meal/day typically. Wants to work on more regular eating.    Additional information:  FT -     Lives alone        8/28/2023    10:35 AM   Diet Recall Review with Patient   If you do eat breakfast, what types of food do you eat? cereal   If you do eat supper, what types of food do you typically eat? meat potatos vegatable or salads   If you do snack, what types of food do you typically eat? pretzels, cookies, ice crea   How many glasses of juice do you drink in a typical day? 8   How many of glasses of milk do you drink in a typical day? 1   If you do drink milk, what type? 1%   How many 8oz glasses of sugar containing drinks such as Junito-Aid/sweet tea do you drink in a day? 5   How many cans/bottles of sugar pop/soda/tea/sports drinks do you drink in a day? 0   How many cans/bottles  of diet pop/soda/tea or sports drink do you drink in a day? 0   How often do you have a drink of alcohol? Never           8/28/2023    10:35 AM   Eating Habits   Generally, my meals include foods like these bread, pasta, rice, potatoes, corn, crackers, sweet dessert, pop, or juice A Few Times a Week   Generally, my meals include foods like these fried meats, brats, burgers, french fries, pizza, cheese, chips, or ice cream A Few Times a Week   Eat fast food (like McDonalds, Burger Anthony, Taco Bell) Less Than Weekly   Eat at a buffet or sit-down restaurant Less Than Weekly   Eat most of my meals in front of the TV or computer Everyday   Often skip meals, eat at random times, have no regular eating times Almost Everyday   Rarely sit down for a meal but snack or graze throughout A Few Times a Week   Eat extra snacks between meals Almost Everyday   Eat most of my food at the end of the day Almost Everyday   Eat in the middle of the night or wake up at night to eat A Few Times a Week   Eat extra snacks to prevent or correct low blood sugar Never   Eat to prevent acid reflux or stomach pain Less Than Weekly   Worry about not having enough food to eat Never   I eat when I am depressed A Few Times a Week   I eat when I am stressed A Few Times a Week   I eat when I am bored A Few Times a Week   I eat when I am anxious Never   I eat when I am happy or as a reward A Few Times a Week   I feel hungry all the time even if I just have eaten Never   Feeling full is important to me Never   I finish all the food on my plate even if I am already full Everyday   I can't resist eating delicious food or walk past the good food/smell Less Than Weekly   I eat/snack without noticing that I am eating Never   I eat when I am preparing the meal Less Than Weekly   I eat more than usual when I see others eating Less Than Weekly   I have trouble not eating sweets, ice cream, cookies, or chips if they are around the house Almost Everyday   I think  about food all day Less Than Weekly   What foods, if any, do you crave? Cheese   Please list any other foods you crave? chocolate, chips a lot of cheese           8/28/2023    10:35 AM   Amount of Food   I feel out of control when eating Almost Everyday   I eat a large amount of food, like a loaf of bread, a box of cookies, a pint/quart of ice cream, all at once Weekly   I eat a large amount of food even when I am not hungry Weekly   I eat rapidly Everyday   I eat alone because I feel embarrassed and do not want others to see how much I have eaten Monthly   I eat until I am uncomfortably full Everyday   I feel bad, disgusted, or guilty after I overeat Everyday       Physical Activity:        8/28/2023    10:35 AM   Activity/Exercise History   How much of a typical 12 hour day do you spend sitting? Most of the Day   How much of a typical 12 hour day do you spend lying down? Half the Day   How much of a typical day do you spend walking/standing? Less Than Half the Day   How many hours (not including work) do you spend on the TV/Video Games/Computer/Tablet/Phone? 6 Hours or More   How many times a week are you active for the purpose of exercise? Never   What keeps you from being more active? Pain    Shortness of Breath    Other   How many total minutes do you spend doing some activity for the purpose of exercising when you exercise? 15-30 Minutes       Nutrition Prescription  Recommended energy/nutrient modification.    Nutrition Diagnosis  Obesity r/t long history of positive energy balance aeb BMI >30.    Nutrition Intervention  Reviewed and modified previous goals  Answered pt questions  Coordination of care   Nutrition education   AVS and handouts via [employer]    Expected Engagement: good    Nutrition Goals/Resources:  Aim to get one slice of pie from [employer] as opposed to having a whole buy  2. Aim to eat 3x/day even if something small (5-6 am, 12:30-1:30-pm, 3:30-5:00 pm)  3. Aim to do some meal planning on  the weekend    Plate method can be used for general guidance on balanced meals/portion sizes (see link below for picture/more information)                 Make 1/2 your plate non starchy vegetable                3+ oz lean protein sources     (Note: 3 oz = deck of cards size)                 1/2 to 1 cup carbohydrate choices such as whole grain starches or starchy vegetables or fruit.               Choose ~0-2 added fat servings at a meal (avocados, nut butters, nuts or seeds, olive oil, vegetable oils).    The Plate Method:  https://www.cdc.gov/diabetes/images/managing/Diabetes-Manage-Eat-Well-Plate-Graphic_600px.jpg    Can use different seasons, citrus, herbs to help add flavor.     Grocery list ideas:   - Broccoli   - [place] sprouts   - Raw spinach    - Green peppers   - Cucumbers    - Cauliflower    - Asparagus    - Green peas   - Peas (starchy vegetable)    - Carrots   - Fish    - Strawberries   - Apples   - Grapes   - Raisins (recommend no sugar added)   - Blueberries   - Raspberries   - Blackberries   - Turkey   - Chicken    Follow-Up:  Will call to schedule ([phone])    Time spent with patient: 20 minutes.  IVELISSE Vazquez, RD, LD

## 2023-11-17 LAB — SARS-COV-2 RNA RESP QL NAA+PROBE: NEGATIVE

## 2023-11-17 NOTE — RESULT ENCOUNTER NOTE
Angela Galeas,    Negative for COVID!  Stick to same plan from yesterday.    Let me know if you have any questions or concerns,     Mendel Mireles PA-C  [facility]

## 2023-11-24 DIAGNOSIS — B35.1 OM (ONYCHOMYCOSIS): ICD-10-CM

## 2023-11-24 DIAGNOSIS — B35.3 TINEA PEDIS OF BOTH FEET: ICD-10-CM

## 2023-11-24 RX ORDER — ECONAZOLE NITRATE 10 MG/G
CREAM TOPICAL
Qty: 85 G | Refills: 5 | Status: SHIPPED | OUTPATIENT
Start: 2023-11-24 | End: 2024-07-22

## 2023-12-09 DIAGNOSIS — R05.9 COUGH, UNSPECIFIED TYPE: ICD-10-CM

## 2023-12-09 DIAGNOSIS — R06.2 WHEEZING: ICD-10-CM

## 2023-12-11 RX ORDER — ALBUTEROL SULFATE 90 UG/1
2 AEROSOL, METERED RESPIRATORY (INHALATION) EVERY 6 HOURS PRN
Qty: 18 G | Refills: 0 | Status: SHIPPED | OUTPATIENT
Start: 2023-12-11

## 2024-01-01 ENCOUNTER — PRE VISIT (OUTPATIENT)
Dept: SURGERY | Facility: CLINIC | Age: 59
End: 2024-01-01
Payer: COMMERCIAL

## 2024-01-05 ENCOUNTER — MYC MEDICAL ADVICE (OUTPATIENT)
Dept: GASTROENTEROLOGY | Facility: CLINIC | Age: 59
End: 2024-01-05
Payer: COMMERCIAL

## 2024-01-06 DIAGNOSIS — K21.9 GASTROESOPHAGEAL REFLUX DISEASE WITHOUT ESOPHAGITIS: ICD-10-CM

## 2024-01-08 RX ORDER — PANTOPRAZOLE SODIUM 40 MG/1
TABLET, DELAYED RELEASE ORAL
Qty: 180 TABLET | Refills: 0 | Status: SHIPPED | OUTPATIENT
Start: 2024-01-08 | End: 2024-04-16 | Stop reason: ALTCHOICE

## 2024-01-09 NOTE — TELEPHONE ENCOUNTER
The patient to review MyChart message.  We reviewed the indications for the repeat upper endoscopy.  Previous findings of [place] grade C erosive esophagitis in setting of a large hiatal hernia and repeat upper endoscopy for the assessment of mucosal healing should be obtained.  Details regarding surgery timing is to be determined by the bariatrics team which she is scheduled for an appointment in February.  She was directed to follow-up with their team if there was any additional questions or concerns.

## 2024-01-10 ENCOUNTER — OFFICE VISIT (OUTPATIENT)
Dept: FAMILY MEDICINE | Facility: CLINIC | Age: 59
End: 2024-01-10
Payer: COMMERCIAL

## 2024-01-10 VITALS
HEIGHT: 68 IN | OXYGEN SATURATION: 95 % | SYSTOLIC BLOOD PRESSURE: 132 MMHG | RESPIRATION RATE: 16 BRPM | BODY MASS INDEX: 42.74 KG/M2 | DIASTOLIC BLOOD PRESSURE: 85 MMHG | HEART RATE: 74 BPM | TEMPERATURE: 98.1 F | WEIGHT: 282 LBS

## 2024-01-10 DIAGNOSIS — L91.8 SKIN TAG: Primary | ICD-10-CM

## 2024-01-10 PROCEDURE — 99213 OFFICE O/P EST LOW 20 MIN: CPT | Performed by: PHYSICIAN ASSISTANT

## 2024-01-10 ASSESSMENT — PATIENT HEALTH QUESTIONNAIRE - PHQ9
10. IF YOU CHECKED OFF ANY PROBLEMS, HOW DIFFICULT HAVE THESE PROBLEMS MADE IT FOR YOU TO DO YOUR WORK, TAKE CARE OF THINGS AT HOME, OR GET ALONG WITH OTHER PEOPLE: SOMEWHAT DIFFICULT
SUM OF ALL RESPONSES TO PHQ QUESTIONS 1-9: 12
SUM OF ALL RESPONSES TO PHQ QUESTIONS 1-9: 12

## 2024-01-10 ASSESSMENT — PAIN SCALES - GENERAL: PAINLEVEL: NO PAIN (0)

## 2024-01-10 NOTE — PROGRESS NOTES
Assessment & Plan     Skin tag  Discussed the normal appearance of the skin tags today she was pointing out.  Discussed while I do not perform shave biopsies I can perform cryotherapy over the affected area.  Discussed expectation/outcomes.  Apply cryotherapy to 6 different skin tags.  Tolerated procedure well.  Recommended close follow-up with dermatology for full body skin check if she is due + any follow-up needed for the skin tags.  Comfortable with plan.    Smoking cessation  Commended her on her smoking cessation.  Continue nicotine patches.    20 minutes spent by me on the date of the encounter on chart review, review of test results, interpretation of tests, patient visit, and documentation         No follow-ups on file.    The likelihood of other entities in the differential is insufficient to justify any further testing for them at this time. This was explained to the patient. The patient was advised that persistent or worsening symptoms would require further evaluation. Patient advised to call the office and if unable to reach to go to the emergency room if they develop any new or worsening symptoms. Expressed understanding and agreement with above stated plan.     Mendel Mireles PA-C  [facility]   Gudelia Dong is a 58 year old female presenting for the following health issues:  Patient presents with:  Derm Problem: Moles on back and bra line      Here today for evaluation of skin tag removal.  Notes multiple ongoing skin tags around her shoulder/lower back area.  Will be irritated easily by clothes.   Former PCP Dr. Manzanares removed multiple for her previously with combination of cryotherapy/shave biopsies.  Is established now with dermatology for history of lichen simplex chronicus.    Additionally, has now been smoke-free for approximately 3 and half weeks which I commended her on.  Using nicotine patches.  Currently 21 mg.    Review of Systems   Constitutional,  "HEENT, cardiovascular, pulmonary, GI, , musculoskeletal, neuro, skin, endocrine and psych systems are negative, except as otherwise noted.      Objective    /85 (BP Location: Right arm, Patient Position: Sitting, Cuff Size: Adult Large)   Pulse 74   Temp 98.1  F (36.7  C) (Oral)   Resp 16   Ht 1.715 m (5' 7.5\")   Wt 127.9 kg (282 lb)   LMP 07/20/2006   SpO2 95%   BMI 43.52 kg/m    5' 7.5\"  282 lbs 0 oz    Physical Exam   EXAM:  GENERAL APPEARANCE: healthy, alert and no distress  EYES: Eyes grossly normal to inspection, PERRL and conjunctivae and sclerae normal  NECK: no asymmetry, masses, or scars  RESP: lungs clear to auscultation - no rales, rhonchi or wheezes  CV: regular rates and rhythm, normal S1 S2, no S3 or S4 and no murmur, click or rub  MS: extremities normal- no gross deformities noted  SKIN: Scattered normal appearing nevi throughout posterior torso.  No suspicious lesions or rashes  NEURO: Normal strength and tone, mentation intact and speech normal  PSYCH: mentation appears normal and affect normal        "

## 2024-02-01 ENCOUNTER — VIRTUAL VISIT (OUTPATIENT)
Dept: SURGERY | Facility: CLINIC | Age: 59
End: 2024-02-01
Payer: COMMERCIAL

## 2024-02-01 ENCOUNTER — ANESTHESIA EVENT (OUTPATIENT)
Dept: GASTROENTEROLOGY | Facility: CLINIC | Age: 59
End: 2024-02-01
Payer: COMMERCIAL

## 2024-02-01 ENCOUNTER — TELEPHONE (OUTPATIENT)
Dept: GASTROENTEROLOGY | Facility: CLINIC | Age: 59
End: 2024-02-01

## 2024-02-01 DIAGNOSIS — K44.9 HIATAL HERNIA: ICD-10-CM

## 2024-02-01 DIAGNOSIS — Z01.818 PREOP EXAMINATION: Primary | ICD-10-CM

## 2024-02-01 DIAGNOSIS — K21.9 GASTROESOPHAGEAL REFLUX DISEASE WITHOUT ESOPHAGITIS: Chronic | ICD-10-CM

## 2024-02-01 PROCEDURE — 99215 OFFICE O/P EST HI 40 MIN: CPT | Mod: 95

## 2024-02-01 ASSESSMENT — LIFESTYLE VARIABLES: TOBACCO_USE: 1

## 2024-02-01 ASSESSMENT — COPD QUESTIONNAIRES: COPD: 0

## 2024-02-01 NOTE — H&P
Pre-Operative H & P     CC:  Preoperative exam to assess for increased cardiopulmonary risk while undergoing surgery and anesthesia.    Date of Encounter: 2/1/2024  Primary Care Physician:  Benjamin Munoz     Reason for visit:   Encounter Diagnoses   Name Primary?    Preop examination Yes    Gastroesophageal reflux disease without esophagitis     Hiatal hernia        HPI  Gudelia Dong is a 58 year old female who presents for pre-operative H & P in preparation for  Procedure Information       Case: [id number] Date/Time: 02/15/24 1130    Procedure: Esophagoscopy, gastroscopy, duodenoscopy (EGD), combined (Esophagus) - EGD with LA Grade C esophagitis non healing on Protonix 40mg twice daily. Large hiatal hernia. Meeting wtih bariatrics.    Anesthesia type: General    Diagnosis:       Gastroesophageal reflux disease without esophagitis [K21.9]      Hiatal hernia [K44.9]    Pre-op diagnosis:       Gastroesophageal reflux disease without esophagitis [K21.9]      Hiatal hernia [K44.9]    Location:  GI 03 /  GI    Providers: Sarmad Reed MD            Gastroenterology consultation for history of GERD and hiatal hernia with ongoing symptoms of heartburn/regurgiation/dysphagia and belching.  The patient reports she was started on a PPI this past fall with some improvement in symptoms. The above procedures have been scheduled for further evaluation.      The patient presents to the PAC virtually in preparation for the above procedure with comorbid conditions including ROMEO on CPAP, GERD, morbid obesity, prediabetes, hypothyroidism, h/o hyponatrima, iron deficiency anemia, MRSA, hidradentis suppurativa, bipolar depression, ADHD, insomnia, h/o psychosis, nicotine dependence, and h/o alcohol use disorder.     History is obtained from the patient and chart review    Hx of abnormal bleeding or anti-platelet use:     Menstrual history: Patient's last menstrual period was 07/20/2006.:     Past Medical History  Past Medical  History:   Diagnosis Date    Depression     GERD (gastroesophageal reflux disease)     Hemorrhoid     Menstrual disorder     s/p D&C -12/2012    ROMEO on CPAP     Other bipolar disorders     followed by Dr Collazo    Post menopausal syndrome     Tobacco abuse        Past Surgical History  Past Surgical History:   Procedure Laterality Date    COLONOSCOPY  12/24/2013    Procedure: COLONOSCOPY;;  Surgeon: Guy Grant MD;  Location: [facility]    COLONOSCOPY  1/9/2014    Procedure: COMBINED COLONOSCOPY, SINGLE BIOPSY/POLYPECTOMY BY BIOPSY;;  Surgeon: Guy Grant MD;  Location:  GI    COLONOSCOPY N/A 8/8/2023    Procedure: COLONOSCOPY, WITH POLYPECTOMY AND BIOPSY;  Surgeon: René Rodriguez DO;  Location:  GI    ESOPHAGOSCOPY, GASTROSCOPY, DUODENOSCOPY (EGD), COMBINED  12/24/2013    Procedure: COMBINED ESOPHAGOSCOPY, GASTROSCOPY, DUODENOSCOPY (EGD), BIOPSY SINGLE OR MULTIPLE;  ESOPHAGOSCOPY, GASTROSCOPY, DUODENOSCOPY, COLONOSCOPY;  Surgeon: Guy Grant MD;  Location: [facility]    ESOPHAGOSCOPY, GASTROSCOPY, DUODENOSCOPY (EGD), COMBINED N/A 8/8/2023    Procedure: ESOPHAGOGASTRODUODENOSCOPY, WITH BIOPSY;  Surgeon: René Rodriguez DO;  Location:  GI    GENITOURINARY SURGERY      urethra stretched    GYN SURGERY      d&c     LAPAROSCOPIC ASSISTED RECTOPEXY  3/14/2014    Procedure: LAPAROSCOPIC ASSISTED RECTOPEXY;  LAPAROSCOPIC  VENTRAL RECTOPEXY ;  Surgeon: Jennifer Connolly MD;  Location:  OR    ORTHOPEDIC SURGERY      surgery on clavicle,surgery for left leg fracture       Prior to Admission Medications  Current Outpatient Medications   Medication Sig Dispense Refill    albuterol (PROAIR HFA/PROVENTIL HFA/VENTOLIN HFA) 108 (90 Base) MCG/ACT inhaler Inhale 2 puffs into the lungs every 6 hours as needed for shortness of breath, wheezing or cough 18 g 0    buPROPion (WELLBUTRIN XL) 150 MG 24 hr tablet 300 mg every morning      busPIRone HCl (BUSPAR) 30 MG tablet Take 1 tablet by mouth 2 times daily      Doxepin HCl  150 MG CAPS Take 300 mg by mouth At Bedtime 30 capsule 0    escitalopram (LEXAPRO) 10 MG tablet Take 10 mg by mouth every morning      esomeprazole (NEXIUM) 20 MG DR capsule Take 1 capsule (20 mg) by mouth 2 times daily for 180 days Take 30-60 minutes before eating. 180 capsule 1    lamoTRIgine (LAMICTAL) 100 MG tablet Take 100 mg by mouth every morning      levothyroxine (SYNTHROID/LEVOTHROID) 150 MCG tablet Take 1 tablet (150 mcg) by mouth daily (Patient taking differently: Take 150 mcg by mouth every morning) 90 tablet 2    nicotine (NICODERM CQ) 21 MG/24HR 24 hr patch Place 1 patch onto the skin every 24 hours 30 patch 1    pantoprazole (PROTONIX) 40 MG EC tablet TAKE 1 TABLET BY MOUTH 1 1/2 HOURS PRIOR TO LAST MEAL AND 1 TABLET AT BEDTIME. (Patient taking differently: 40 mg 2 times daily) 180 tablet 0    polyethylene glycol (MIRALAX) 17 GM/Dose powder Take 17 g (1 Capful) by mouth daily for 180 days (Patient taking differently: Take 1 Capful by mouth as needed) 510 g 3    REXULTI 2 MG tablet Take 2 mg by mouth every morning      topiramate (TOPAMAX) 25 MG tablet 25 mg at bedtime for 1 week, 50 mg at bedtime for 1 week and 75 mg daily at bedtime thereafter (Patient taking differently: Take 25 mg by mouth at bedtime 25 mg at bedtime for 1 week, 50 mg at bedtime for 1 week and 75 mg daily at bedtime thereafter) 90 tablet 4    triamcinolone (KENALOG) 0.025 % external ointment Apply topically 2 times daily To affected area in the groin as needed up to 2 weeks at a time. 30 g 2    triamcinolone (KENALOG) 0.1 % external cream Apply topically 2 times daily Only for 2 weeks. 30 g 0    ammonium lactate (AMLACTIN) 12 % external cream Apply topically daily as needed for dry skin 385 g 1    bisacodyl (DULCOLAX) 5 MG EC tablet Two days prior to exam take two (2) tablets at 4pm. One day prior to exam take two (2) tablets at 4pm 4 tablet 0    econazole nitrate 1 % external cream APPLY TOPICALLY TO FEET AND TOENAILS DAILY 85 g  5    nystatin (MYCOSTATIN) 382087 UNIT/GM external powder Apply topically daily 60 g 11    order for DME Equipment being ordered: CPAP  Please dispense Cpap and its supply 1 Units prn    polyethylene glycol (GOLYTELY) 236 g suspension Take as directed. Two days before your exam fill the first container with water. Cover and shake until mixed well. At 5:00pm drink one 8oz glass every 10-15 minutes until half (1/2) of the first container is empty. Store the remainder in the refrigerator. One day before your exam at 5:00pm drink the second half of the first container until it is gone. Before you go to bed mix the second container with water and put in refrigerator. Six hours before your check in time drink one 8oz glass every 10-15 minutes until half of container is empty. Discard the remainder of solution. (Patient not taking: Reported on 1/10/2024) 8000 mL 0       Allergies  Allergies   Allergen Reactions    Contrast Dye Shortness Of Breath    Methylpyrrolidone Swelling    Iodine Swelling     Other reaction(s): Angioedema  Facial swelling.    Morphine Anxiety and Nausea and Vomiting     Other reaction(s): Behavioral Disturbances  Other reaction(s): Anxiety, vomiting       Social History  Social History     Socioeconomic History    Marital status:      Spouse name: Not on file    Number of children: Not on file    Years of education: Not on file    Highest education level: Not on file   Occupational History    Not on file   Tobacco Use    Smoking status: Former     Packs/day: .25     Types: Cigarettes     Quit date: 2023     Years since quittin.1     Passive exposure: Past    Smokeless tobacco: Never    Tobacco comments:     Quit for year, restarted .       : Now smokes a couple cigarettes per day - trying to quit.      quit 3 weeks and 3 days 1/10/24.   Vaping Use    Vaping Use: Never used   Substance and Sexual Activity    Alcohol use: No     Comment: Severe alcohol use disorder for 20  years.  2023:  Reports sober for past five years    Drug use: No    Sexual activity: Yes     Partners: Male   Other Topics Concern    Parent/sibling w/ CABG, MI or angioplasty before 65F 55M? Not Asked   Social History Narrative    8/2013    /single/    Children-    Work-    Tobacco-    ETOH-    Exercise-         Social Determinants of Health     Financial Resource Strain: Not on file   Food Insecurity: Not on file   Transportation Needs: Not on file   Physical Activity: Not on file   Stress: Not on file   Social Connections: Not on file   Interpersonal Safety: Not on file   Housing Stability: Not on file       Family History  Family History   Problem Relation Age of Onset    Cancer Mother         ovarian cancer    Hypertension Father     Breast Cancer No family hx of     Cancer - colorectal No family hx of     Anesthesia Reaction No family hx of     Venous thrombosis No family hx of        Review of Systems  The complete review of systems is negative other than noted in the HPI or here.   Anesthesia Evaluation   Pt has had prior anesthetic. Type: General and MAC.    History of anesthetic complications (Difficult to stay asleep. )  - .  ROMEO.    ROS/MED HX  ENT/Pulmonary:     (+) sleep apnea, uses CPAP,              tobacco use (Trying to quit smoking.  ), Past use,                    (-) asthma and COPD   Neurologic:  - neg neurologic ROS     Cardiovascular: Comment: Denies cardiac symptoms including chest pain, SOB, palpitations, syncope, SANCHEZ, orthopnea, or PND.      (+)  - -   -  - -                                 Previous cardiac testing   Echo: Date: 2015 Results:  Interpretation Summary  Global and regional left ventricular function is normal with an EF of 60-65%.   Right ventricular function, chamber size, wall motion, and thickness are   normal. Pulmonary artery systolic pressure cannot be assessed. The inferior   vena cava  is normal. No pericardial effusion is present.  PatientHeight: 68  in  PatientWeight: 208 lbs  BSA 2.1 m^2    Stress Test:  Date: Results:    ECG Reviewed:  Date: 2015 Results:  SR  Cath:  Date: Results:   (-) taking anticoagulants/antiplatelets   METS/Exercise Tolerance: >4 METS Comment: Able to walk x 30 mins at grocery store without exertional symptoms        Hematologic:     (+)      anemia,          Musculoskeletal: Comment: S/p left LE surgery with hardware.       GI/Hepatic: Comment: Dysphagia and belching    (+) GERD, Asymptomatic on medication,    hiatal hernia,              Renal/Genitourinary:  - neg Renal ROS     Endo: Comment: Prediabetes     (+)          thyroid problem, hypothyroidism,    Obesity,       Psychiatric/Substance Use:     (+) psychiatric history depression, bipolar and other (comment) (ADHD.  Reports being stable. Follows with Psychiatrist.) alcohol abuse (Alcohol use disorder - sober for 5 years. )   (-) chronic opioid use history   Infectious Disease:     (+)   MRSA,         Malignancy:  - neg malignancy ROS     Other:  - neg other ROS          Virtual visit -  No vitals were obtained    Physical Exam  Constitutional: Awake, alert, cooperative, no apparent distress, and appears stated age.  Eyes: Pupils equal  HENT: Normocephalic  Respiratory: non labored breathing   Neurologic: Awake, alert, oriented to name, place and time.   Neuropsychiatric: Calm, cooperative. Normal affect.      Prior Labs/Diagnostic Studies   All labs and imaging personally reviewed     Component      Latest Ref Rng 3/28/2023  9:11 AM   WBC      4.0 - 11.0 10e3/uL 5.1    RBC Count      3.80 - 5.20 10e6/uL 4.23    Hemoglobin      11.7 - 15.7 g/dL 11.7    Hematocrit      35.0 - 47.0 % 37.5    MCV      78 - 100 fL 89    MCH      26.5 - 33.0 pg 27.7    MCHC      31.5 - 36.5 g/dL 31.2 (L)    RDW      10.0 - 15.0 % 15.7 (H)    Platelet Count      150 - 450 10e3/uL 298    % Neutrophils      % 50    % Lymphocytes      % 36    % Monocytes      % 8    % Eosinophils      % 5    % Basophils       % 1    % Immature Granulocytes      % 0    Absolute Neutrophils      1.6 - 8.3 10e3/uL 2.6    Absolute Lymphocytes      0.8 - 5.3 10e3/uL 1.8    Absolute Monocytes      0.0 - 1.3 10e3/uL 0.4    Absolute Eosinophils      0.0 - 0.7 10e3/uL 0.3    Absolute Basophils      0.0 - 0.2 10e3/uL 0.0    Absolute Immature Granulocytes      <=0.4 10e3/uL 0.0    Sodium      136 - 145 mmol/L 145    Potassium      3.4 - 5.3 mmol/L 4.1    Chloride      98 - 107 mmol/L 105    Carbon Dioxide (CO2)      22 - 29 mmol/L 26    Anion Gap      7 - 15 mmol/L 14    Urea Nitrogen      8.0 - 23.0 mg/dL 15.4    Creatinine      0.51 - 0.95 mg/dL 0.83    Calcium      8.8 - 10.2 mg/dL 9.3    Glucose      70 - 99 mg/dL 94    Alkaline Phosphatase      35 - 104 U/L 123 (H)    AST      10 - 35 U/L 21    ALT      10 - 35 U/L 21    Protein Total      6.4 - 8.3 g/dL 7.6    Albumin      3.5 - 5.2 g/dL 4.3    Bilirubin Total      <=1.2 mg/dL 0.2    GFR Estimate      >60 mL/min/1.73m2 80    Hemoglobin A1C      0.0 - 5.6 % 5.8 (H)       Legend:  (L) Low  (H) High    EKG/ stress test - if available please see in ROS above     The patient's records and results personally reviewed by this provider.     Outside records reviewed from: Care Everywhere      Assessment    Gudelia Dong is a 58 year old female seen as a PAC referral for risk assessment and optimization for anesthesia.    Plan/Recommendations  Pt will be optimized for the proposed procedure.  See below for details on the assessment, risk, and preoperative recommendations    NEUROLOGY  - No history of TIA, CVA or seizure  -Post Op delirium risk factors:  No risk identified    ENT  - No current airway concerns.  Will need to be reassessed day of surgery.  Mallampati: Unable to assess  TM: Unable to assess    CARDIAC  - No history of CAD, Hypertension, and Afib Patient denies any symptoms of CP, chest tightness, or SOB.    - METS (Metabolic Equivalents)  Patient performs 4 or more METS exercise without  "symptoms            Total Score: 0      RCRI-Very low risk: Class 1 0.4% complication rate            Total Score: 0        PULMONARY  - Obstructive Sleep Apnea  ROMEO with home CPAP.  Patient will be instructed to bring their home CPAP device to the hospital with them.    - Denies asthma or inhaler use  - Tobacco History    History   Smoking Status    Former    Packs/day: 0.25    Types: Cigarettes    Quit date: 12/17/2023   Smokeless Tobacco    Never       GI  - GERD. Patient reports an improvement in GERD and dysphagia since starting PPI this past fall. Procedure as above for further evaluation.   Controlled on medications: Proton Pump Inhibitor    - Hiatal hernia. Patient reports plan to have this repaired in the future.     PONV Medium Risk  Total Score: 2           1 AN PONV: Pt is Female    1 AN PONV: Patient is not a current smoker        /RENAL  - Baseline Creatinine  WNL    ENDOCRINE    - BMI: Estimated body mass index is 43.52 kg/m  as calculated from the following:    Height as of 1/10/24: 1.715 m (5' 7.5\").    Weight as of 1/10/24: 127.9 kg (282 lb).  Class 3 Obesity (BMI > 40)    - Prediabetes  - Thyroid disorder  Continue home replacement while hospitalized.    HEME  VTE Low Risk 0.5%            Total Score: 2    VTE: Greater than 59 yrs old    VTE: BMI greater than 39      - No history of abnormal bleeding or antiplatelet use.    PSYCH  - Bipolar depression and ADHD              ~ reports doing well on current medications and followed by psychiatry.  Continue mental health medications as prescribed DOS     - h/o Alcohol use disorder for >20 years with many admission for detoxification.  Now in remission for past five years    ID  - MRSA (+)              ~ contact precautions    Different anesthesia methods/types have been discussed with the patient, but they are aware that the final plan will be decided by the assigned anesthesia provider on the date of service.    The patient is optimized for their " procedure. AVS with information on surgery time/arrival time, meds and NPO status given by nursing staff. No further diagnostic testing indicated.    Please refer to the physical examination documented by the anesthesiologist in the anesthesia record on the day of surgery.    Video-Visit Details    Type of service:  Video Visit    Provider received verbal consent for a Video Visit from the patient? Yes     Originating Location (pt. Location): Home    Distant Location (provider location):  Off-site  Mode of Communication:  Video Conference via [employer]  On the day of service:     Prep time: 15 minutes  Visit time: 12 minutes  Documentation time: 15 minutes  ------------------------------------------  Total time: 42 minutes      INEZ Michelle CNP  Preoperative Assessment Center  [facility]  Clinic and Surgery Center  Phone: [phone]  Fax: [phone]

## 2024-02-01 NOTE — PROGRESS NOTES
Gudelia is a 58 year old who is being evaluated via a billable video visit.      How would you like to obtain your AVS? Nava      Bryan Galeas is a 58 year old, presenting for the following health issues:  Pre-Op Exam (/)      TEX Orosco LPN

## 2024-02-01 NOTE — PATIENT INSTRUCTIONS
Name:  Gudelia Dong   MRN:  [id number]   :  1965   Today's Date:  2024     GI Lab procedures:    A representative from the GI Lab will contact you regarding arrival date and time.      You were seen today in the PAC Clinic.   (Pre-operative Anesthesia Assessment Center)  [address]  [address]   phone [phone]    You had a pre-operative assessment done.    Anesthesia recommendations for medications:    Hold Aspirin for 2 days before procedure.  Hold Multivitamins for 7 days before procedure.   Hold Herbal medications and Supplements for 7 days before procedure.  Hold Ibuprofen for 2 days before procedure.   Hold Naproxen for 2 days before procedure.     No alcohol or cannabis products for 24 hours before your procedure      Please hold the following medications the day of procedure:    Creams/Lotions/Powders      Please take these medications per your usual routine:    Albuterol inhaler if needed and bring this with you day of procedure  Nicoderm patch if needed  Wellbutrin  Buspar  Doxepin  Lexapro  Nexium  Lamictal  Levothyroxine  Protonix  Topamax  Rexulti          For questions or appointments, call:  [facility]: [phone], option 2.  Monday through Friday, 8 a.m. to 4:30 p.m.  (If it is after hours, please reach out to the clinic or provider that scheduled your appointment)

## 2024-02-01 NOTE — TELEPHONE ENCOUNTER
Pre assessment completed for upcoming procedure.   (Please see previous telephone encounter notes for complete details)      Procedure details:    Arrival time and facility location reviewed.    Pre op exam needed? Yes. PAC eval completed today     Designated  policy reviewed. Instructed to have someone stay 24  hours post procedure.     COVID policy reviewed.      Medication review:    Medications reviewed. Please see supporting documentation below. Holding recommendations discussed (if applicable).       Prep for procedure:     Procedure prep instructions reviewed.        Any additional information needed:  N/A      Patient  verbalized understanding and had no questions or concerns at this time.      Alida Wu RN  Endoscopy Procedure Pre Assessment RN  [phone] option 4

## 2024-02-01 NOTE — TELEPHONE ENCOUNTER
Pre visit planning completed.      Procedure details:    Patient scheduled for Upper endoscopy (EGD) on 2/15/2024.     Arrival time: 1000. Procedure time 1130    Pre op exam needed? Yes. PAC eval scheduled for 2/1/2024    Facility location: [facility]; [address] [place] [address], [address] Floor, [place] [address]    Sedation type: General    Indication for procedure:   Gastroesophageal reflux disease without esophagitis [K21.9]      Hiatal hernia [K44.9]            Chart review:     Electronic implanted devices? No    Recent diagnosis of diverticulitis within the last 6 weeks? N/A    Diabetic? No    Diabetic medication HOLDING recommendations: (if applicable)  Oral diabetic medications: N/A  Diabetic injectables: N/A  Insulin: N/A      Medication review:    Anticoagulants? No    NSAIDS? No NSAID medications per patient's medication list.  RN will verify with pre-assessment call.    Other medication HOLDING recommendations:  N/A      Prep for procedure:     Prep instructions sent via [employer]     Alida Wu RN  Endoscopy Procedure Pre Assessment RN  [phone] option 4

## 2024-02-12 ENCOUNTER — VIRTUAL VISIT (OUTPATIENT)
Dept: ENDOCRINOLOGY | Facility: CLINIC | Age: 59
End: 2024-02-12
Payer: COMMERCIAL

## 2024-02-12 VITALS — WEIGHT: 282 LBS | BODY MASS INDEX: 42.74 KG/M2 | HEIGHT: 68 IN

## 2024-02-12 DIAGNOSIS — E66.813 CLASS 3 SEVERE OBESITY WITH BODY MASS INDEX (BMI) OF 40.0 TO 44.9 IN ADULT, UNSPECIFIED OBESITY TYPE, UNSPECIFIED WHETHER SERIOUS COMORBIDITY PRESENT (H): Primary | ICD-10-CM

## 2024-02-12 DIAGNOSIS — E66.01 CLASS 3 SEVERE OBESITY WITH BODY MASS INDEX (BMI) OF 40.0 TO 44.9 IN ADULT, UNSPECIFIED OBESITY TYPE, UNSPECIFIED WHETHER SERIOUS COMORBIDITY PRESENT (H): Primary | ICD-10-CM

## 2024-02-12 PROCEDURE — 99213 OFFICE O/P EST LOW 20 MIN: CPT | Mod: 95 | Performed by: INTERNAL MEDICINE

## 2024-02-12 ASSESSMENT — PAIN SCALES - GENERAL: PAINLEVEL: NO PAIN (0)

## 2024-02-12 NOTE — NURSING NOTE
Is the patient currently in the state of [place]? YES    Visit mode:VIDEO    If the visit is dropped, the patient can be reconnected by: VIDEO VISIT: Text to cell phone:   Telephone Information:   Mobile [phone]       Will anyone else be joining the visit? NO  (If patient encounters technical issues they should call [phone])    How would you like to obtain your AVS? MyChart    Are changes needed to the allergy or medication list? No    Reason for visit: RECHECK    Ruth Ann CUENCA

## 2024-02-12 NOTE — LETTER
"2024       RE: Gudelia Dong  [address]  [place] [address]     Dear Colleague,    Thank you for referring your patient, Gudelia Dong, to the [facility] WEIGHT MANAGEMENT CLINIC [place] at [facility]. Please see a copy of my visit note below.    Virtual Visit Details    Joined the call at 2024, 10:51:02 am.  Left the call at 2024, 10:55:19 am    Originating Location (pt. Location): Home    Distant Location (provider location):  Off-site  Platform used for Video Visit: [facility] Medical Weight Management Note     Gudelia Dong  MRN:  [id number]  :  1965  AKSHAT:  2024    Dear Mendel Mireles PA-C,    I had the pleasure of seeing your patient Gudelia Dong.  She is a 58 year old female who I am continuing to see for treatment of obesity related to:      2023    10:35 AM   --   I have the following health issues associated with obesity Pre-Diabetes    Sleep Apnea    GERD (Reflux)   I have the following symptoms associated with obesity Depression    Lower Extremity Swelling     CURRENT WEIGHT:   282 lbs 0 oz    Wt Readings from Last 4 Encounters:   24 127.9 kg (282 lb)   01/10/24 127.9 kg (282 lb)   23 122.5 kg (270 lb)   23 130.6 kg (288 lb)     Height:  5' 7.52\"  Body Mass Index:  Body mass index is 43.49 kg/m .  Vitals:  B/P: Data Unavailable, P: Data Unavailable    Initial consult weight was 288 on 23.  Weight change since last seen on 2023 is down 6 pounds.   Total loss is 6 pounds.    INTERVAL HISTORY:  Diet hasn't changed due to marriage and job stresses. Emotional eater.        2023    10:35 AM   Diet Recall Review with Patient   If you do eat breakfast, what types of food do you eat? cereal   If you do eat supper, what types of food do you typically eat? meat potatos vegatable or salads   If you do snack, what types of food do you typically eat? christina, " cookies, ice crea   How many glasses of juice do you drink in a typical day? 8   How many of glasses of milk do you drink in a typical day? 1   If you do drink milk, what type? 1%   How many 8oz glasses of sugar containing drinks such as Junito-Aid/sweet tea do you drink in a day? 5   How many cans/bottles of sugar pop/soda/tea/sports drinks do you drink in a day? 0   How many cans/bottles of diet pop/soda/tea or sports drink do you drink in a day? 0   How often do you have a drink of alcohol? Never     MEDICATIONS:   Current Outpatient Medications   Medication    albuterol (PROAIR HFA/PROVENTIL HFA/VENTOLIN HFA) 108 (90 Base) MCG/ACT inhaler    ammonium lactate (AMLACTIN) 12 % external cream    bisacodyl (DULCOLAX) 5 MG EC tablet    buPROPion (WELLBUTRIN XL) 150 MG 24 hr tablet    busPIRone HCl (BUSPAR) 30 MG tablet    Doxepin HCl 150 MG CAPS    econazole nitrate 1 % external cream    escitalopram (LEXAPRO) 10 MG tablet    esomeprazole (NEXIUM) 20 MG DR capsule    lamoTRIgine (LAMICTAL) 100 MG tablet    levothyroxine (SYNTHROID/LEVOTHROID) 150 MCG tablet    nicotine (NICODERM CQ) 21 MG/24HR 24 hr patch    nystatin (MYCOSTATIN) 467009 UNIT/GM external powder    order for DME    pantoprazole (PROTONIX) 40 MG EC tablet    polyethylene glycol (GOLYTELY) 236 g suspension    polyethylene glycol (MIRALAX) 17 GM/Dose powder    REXULTI 2 MG tablet    topiramate (TOPAMAX) 25 MG tablet    triamcinolone (KENALOG) 0.025 % external ointment    triamcinolone (KENALOG) 0.1 % external cream     No current facility-administered medications for this visit.         2/12/2024    10:37 AM   Weight Loss Medication History Reviewed With Patient   Which weight loss medications are you currently taking on a regular basis? Topamax (topiramate)   Are you having any side effects from the weight loss medication that we have prescribed you? No     ASSESSMENT:   No the best time for her to lose weight, can come back to that.    FOLLOW-UP:    12  weeks.    Sincerely,  Ortiz Rg MD

## 2024-02-12 NOTE — PROGRESS NOTES
Virtual Visit Details    Joined the call at 2/12/2024, 10:51:02 am.  Left the call at 2/12/2024, 10:55:19 am    Originating Location (pt. Location): Home    Distant Location (provider location):  Off-site  Platform used for Video Visit: Irene

## 2024-02-12 NOTE — PROGRESS NOTES
"    Return Medical Weight Management Note     Gudelia Dong  MRN:  [id number]  :  1965  AKSHAT:  2024    Dear Mendel Mireles PA-C,    I had the pleasure of seeing your patient Gudelia Dong.  She is a 58 year old female who I am continuing to see for treatment of obesity related to:      2023    10:35 AM   --   I have the following health issues associated with obesity Pre-Diabetes    Sleep Apnea    GERD (Reflux)   I have the following symptoms associated with obesity Depression    Lower Extremity Swelling     CURRENT WEIGHT:   282 lbs 0 oz    Wt Readings from Last 4 Encounters:   24 127.9 kg (282 lb)   01/10/24 127.9 kg (282 lb)   23 122.5 kg (270 lb)   23 130.6 kg (288 lb)     Height:  5' 7.52\"  Body Mass Index:  Body mass index is 43.49 kg/m .  Vitals:  B/P: Data Unavailable, P: Data Unavailable    Initial consult weight was 288 on 23.  Weight change since last seen on 2023 is down 6 pounds.   Total loss is 6 pounds.    INTERVAL HISTORY:  Diet hasn't changed due to marriage and job stresses. Emotional eater.        2023    10:35 AM   Diet Recall Review with Patient   If you do eat breakfast, what types of food do you eat? cereal   If you do eat supper, what types of food do you typically eat? meat potatos vegatable or salads   If you do snack, what types of food do you typically eat? pretzels, cookies, ice crea   How many glasses of juice do you drink in a typical day? 8   How many of glasses of milk do you drink in a typical day? 1   If you do drink milk, what type? 1%   How many 8oz glasses of sugar containing drinks such as Junito-Aid/sweet tea do you drink in a day? 5   How many cans/bottles of sugar pop/soda/tea/sports drinks do you drink in a day? 0   How many cans/bottles of diet pop/soda/tea or sports drink do you drink in a day? 0   How often do you have a drink of alcohol? Never     MEDICATIONS:   Current Outpatient Medications   Medication    " albuterol (PROAIR HFA/PROVENTIL HFA/VENTOLIN HFA) 108 (90 Base) MCG/ACT inhaler    ammonium lactate (AMLACTIN) 12 % external cream    bisacodyl (DULCOLAX) 5 MG EC tablet    buPROPion (WELLBUTRIN XL) 150 MG 24 hr tablet    busPIRone HCl (BUSPAR) 30 MG tablet    Doxepin HCl 150 MG CAPS    econazole nitrate 1 % external cream    escitalopram (LEXAPRO) 10 MG tablet    esomeprazole (NEXIUM) 20 MG DR capsule    lamoTRIgine (LAMICTAL) 100 MG tablet    levothyroxine (SYNTHROID/LEVOTHROID) 150 MCG tablet    nicotine (NICODERM CQ) 21 MG/24HR 24 hr patch    nystatin (MYCOSTATIN) 009653 UNIT/GM external powder    order for DME    pantoprazole (PROTONIX) 40 MG EC tablet    polyethylene glycol (GOLYTELY) 236 g suspension    polyethylene glycol (MIRALAX) 17 GM/Dose powder    REXULTI 2 MG tablet    topiramate (TOPAMAX) 25 MG tablet    triamcinolone (KENALOG) 0.025 % external ointment    triamcinolone (KENALOG) 0.1 % external cream     No current facility-administered medications for this visit.         2/12/2024    10:37 AM   Weight Loss Medication History Reviewed With Patient   Which weight loss medications are you currently taking on a regular basis? Topamax (topiramate)   Are you having any side effects from the weight loss medication that we have prescribed you? No     ASSESSMENT:   No the best time for her to lose weight, can come back to that.    FOLLOW-UP:    12 weeks.    Sincerely,  Ortiz Rg MD

## 2024-02-15 ENCOUNTER — ANESTHESIA (OUTPATIENT)
Dept: GASTROENTEROLOGY | Facility: CLINIC | Age: 59
End: 2024-02-15
Payer: COMMERCIAL

## 2024-02-15 ENCOUNTER — HOSPITAL ENCOUNTER (OUTPATIENT)
Facility: CLINIC | Age: 59
Discharge: HOME | End: 2024-02-15
Attending: INTERNAL MEDICINE | Admitting: INTERNAL MEDICINE
Payer: COMMERCIAL

## 2024-02-15 VITALS
RESPIRATION RATE: 10 BRPM | TEMPERATURE: 97.6 F | HEART RATE: 66 BPM | OXYGEN SATURATION: 98 % | SYSTOLIC BLOOD PRESSURE: 127 MMHG | DIASTOLIC BLOOD PRESSURE: 77 MMHG

## 2024-02-15 LAB — UPPER GI ENDOSCOPY: NORMAL

## 2024-02-15 PROCEDURE — 258N000003 HC RX IP 258 OP 636: Performed by: NURSE ANESTHETIST, CERTIFIED REGISTERED

## 2024-02-15 PROCEDURE — 43235 EGD DIAGNOSTIC BRUSH WASH: CPT | Performed by: INTERNAL MEDICINE

## 2024-02-15 PROCEDURE — 250N000009 HC RX 250: Performed by: NURSE ANESTHETIST, CERTIFIED REGISTERED

## 2024-02-15 PROCEDURE — 250N000011 HC RX IP 250 OP 636: Performed by: NURSE ANESTHETIST, CERTIFIED REGISTERED

## 2024-02-15 PROCEDURE — 250N000025 HC SEVOFLURANE, PER MIN: Performed by: INTERNAL MEDICINE

## 2024-02-15 PROCEDURE — 370N000017 HC ANESTHESIA TECHNICAL FEE, PER MIN: Performed by: INTERNAL MEDICINE

## 2024-02-15 RX ORDER — FENTANYL CITRATE 50 UG/ML
25 INJECTION, SOLUTION INTRAMUSCULAR; INTRAVENOUS EVERY 5 MIN PRN
Status: CANCELLED | OUTPATIENT
Start: 2024-02-15

## 2024-02-15 RX ORDER — FENTANYL CITRATE 50 UG/ML
50 INJECTION, SOLUTION INTRAMUSCULAR; INTRAVENOUS EVERY 5 MIN PRN
Status: CANCELLED | OUTPATIENT
Start: 2024-02-15

## 2024-02-15 RX ORDER — FENTANYL CITRATE 50 UG/ML
INJECTION, SOLUTION INTRAMUSCULAR; INTRAVENOUS PRN
Status: DISCONTINUED | OUTPATIENT
Start: 2024-02-15 | End: 2024-02-15

## 2024-02-15 RX ORDER — ONDANSETRON 2 MG/ML
4 INJECTION INTRAMUSCULAR; INTRAVENOUS EVERY 30 MIN PRN
Status: CANCELLED | OUTPATIENT
Start: 2024-02-15

## 2024-02-15 RX ORDER — DEXAMETHASONE SODIUM PHOSPHATE 4 MG/ML
INJECTION, SOLUTION INTRA-ARTICULAR; INTRALESIONAL; INTRAMUSCULAR; INTRAVENOUS; SOFT TISSUE PRN
Status: DISCONTINUED | OUTPATIENT
Start: 2024-02-15 | End: 2024-02-15

## 2024-02-15 RX ORDER — ONDANSETRON 4 MG/1
4 TABLET, ORALLY DISINTEGRATING ORAL EVERY 30 MIN PRN
Status: CANCELLED | OUTPATIENT
Start: 2024-02-15

## 2024-02-15 RX ORDER — PROPOFOL 10 MG/ML
INJECTION, EMULSION INTRAVENOUS PRN
Status: DISCONTINUED | OUTPATIENT
Start: 2024-02-15 | End: 2024-02-15

## 2024-02-15 RX ORDER — ONDANSETRON 2 MG/ML
INJECTION INTRAMUSCULAR; INTRAVENOUS PRN
Status: DISCONTINUED | OUTPATIENT
Start: 2024-02-15 | End: 2024-02-15

## 2024-02-15 RX ORDER — SODIUM CHLORIDE, SODIUM LACTATE, POTASSIUM CHLORIDE, CALCIUM CHLORIDE 600; 310; 30; 20 MG/100ML; MG/100ML; MG/100ML; MG/100ML
INJECTION, SOLUTION INTRAVENOUS CONTINUOUS
Status: CANCELLED | OUTPATIENT
Start: 2024-02-15

## 2024-02-15 RX ORDER — HYDROMORPHONE HCL IN WATER/PF 6 MG/30 ML
0.2 PATIENT CONTROLLED ANALGESIA SYRINGE INTRAVENOUS EVERY 5 MIN PRN
Status: CANCELLED | OUTPATIENT
Start: 2024-02-15

## 2024-02-15 RX ORDER — SODIUM CHLORIDE, SODIUM LACTATE, POTASSIUM CHLORIDE, CALCIUM CHLORIDE 600; 310; 30; 20 MG/100ML; MG/100ML; MG/100ML; MG/100ML
INJECTION, SOLUTION INTRAVENOUS CONTINUOUS PRN
Status: DISCONTINUED | OUTPATIENT
Start: 2024-02-15 | End: 2024-02-15

## 2024-02-15 RX ORDER — LIDOCAINE HYDROCHLORIDE 20 MG/ML
INJECTION, SOLUTION INFILTRATION; PERINEURAL PRN
Status: DISCONTINUED | OUTPATIENT
Start: 2024-02-15 | End: 2024-02-15

## 2024-02-15 RX ORDER — HYDROMORPHONE HCL IN WATER/PF 6 MG/30 ML
0.4 PATIENT CONTROLLED ANALGESIA SYRINGE INTRAVENOUS EVERY 5 MIN PRN
Status: CANCELLED | OUTPATIENT
Start: 2024-02-15

## 2024-02-15 RX ADMIN — PROPOFOL 150 MG: 10 INJECTION, EMULSION INTRAVENOUS at 12:21

## 2024-02-15 RX ADMIN — DEXAMETHASONE SODIUM PHOSPHATE 6 MG: 4 INJECTION, SOLUTION INTRA-ARTICULAR; INTRALESIONAL; INTRAMUSCULAR; INTRAVENOUS; SOFT TISSUE at 12:27

## 2024-02-15 RX ADMIN — FENTANYL CITRATE 100 MCG: 50 INJECTION INTRAMUSCULAR; INTRAVENOUS at 12:20

## 2024-02-15 RX ADMIN — ONDANSETRON 4 MG: 2 INJECTION INTRAMUSCULAR; INTRAVENOUS at 12:27

## 2024-02-15 RX ADMIN — SODIUM CHLORIDE, POTASSIUM CHLORIDE, SODIUM LACTATE AND CALCIUM CHLORIDE: 600; 310; 30; 20 INJECTION, SOLUTION INTRAVENOUS at 12:18

## 2024-02-15 RX ADMIN — LIDOCAINE HYDROCHLORIDE 60 MG: 20 INJECTION, SOLUTION INFILTRATION; PERINEURAL at 12:21

## 2024-02-15 RX ADMIN — SUCCINYLCHOLINE CHLORIDE 140 MG: 20 INJECTION, SOLUTION INTRAMUSCULAR; INTRAVENOUS; PARENTERAL at 12:22

## 2024-02-15 ASSESSMENT — ACTIVITIES OF DAILY LIVING (ADL)
ADLS_ACUITY_SCORE: 37
ADLS_ACUITY_SCORE: 37

## 2024-02-15 NOTE — ANESTHESIA CARE TRANSFER NOTE
Patient: Gudelia Dong    Procedure: Procedure(s):  Esophagoscopy, gastroscopy, duodenoscopy (EGD), combined       Diagnosis: Gastroesophageal reflux disease without esophagitis [K21.9]  Hiatal hernia [K44.9]  Diagnosis Additional Information: No value filed.    Anesthesia Type:   General     Note:    Oropharynx: oropharynx clear of all foreign objects and spontaneously breathing  Level of Consciousness: awake  Oxygen Supplementation: room air    Independent Airway: airway patency satisfactory and stable  Dentition: dentition unchanged  Vital Signs Stable: post-procedure vital signs reviewed and stable  Report to RN Given: handoff report given  Patient transferred to: PACU    Handoff Report: Identifed the Patient, Identified the Reponsible Provider, Reviewed the pertinent medical history, Discussed the surgical course, Reviewed Intra-OP anesthesia mangement and issues during anesthesia, Set expectations for post-procedure period and Allowed opportunity for questions and acknowledgement of understanding      Vitals:  Vitals Value Taken Time   /68    Temp     Pulse 73 02/15/24 1244   Resp 15    SpO2 96 % 02/15/24 1244   Vitals shown include unfiled device data.    Electronically Signed By: INEZ Burns CRNA  February 15, 2024  12:45 PM

## 2024-02-15 NOTE — ANESTHESIA PREPROCEDURE EVALUATION
Anesthesia Pre-Procedure Evaluation    Patient: Gudleia Dong   MRN: [id number] : 1965        Procedure : Procedure(s):  Esophagoscopy, gastroscopy, duodenoscopy (EGD), combined          Past Medical History:   Diagnosis Date    Depression     GERD (gastroesophageal reflux disease)     Hemorrhoid     Menstrual disorder     s/p D&C -2012    ROMEO on CPAP     Other bipolar disorders     followed by Dr Collazo    Post menopausal syndrome     Tobacco abuse       Past Surgical History:   Procedure Laterality Date    COLONOSCOPY  2013    Procedure: COLONOSCOPY;;  Surgeon: Guy Grant MD;  Location:  GI    COLONOSCOPY  2014    Procedure: COMBINED COLONOSCOPY, SINGLE BIOPSY/POLYPECTOMY BY BIOPSY;;  Surgeon: Guy Grant MD;  Location:  GI    COLONOSCOPY N/A 2023    Procedure: COLONOSCOPY, WITH POLYPECTOMY AND BIOPSY;  Surgeon: René Rodriguez DO;  Location: [facility]    ESOPHAGOSCOPY, GASTROSCOPY, DUODENOSCOPY (EGD), COMBINED  2013    Procedure: COMBINED ESOPHAGOSCOPY, GASTROSCOPY, DUODENOSCOPY (EGD), BIOPSY SINGLE OR MULTIPLE;  ESOPHAGOSCOPY, GASTROSCOPY, DUODENOSCOPY, COLONOSCOPY;  Surgeon: Guy Grant MD;  Location: [facility]    ESOPHAGOSCOPY, GASTROSCOPY, DUODENOSCOPY (EGD), COMBINED N/A 2023    Procedure: ESOPHAGOGASTRODUODENOSCOPY, WITH BIOPSY;  Surgeon: René Rodriguez DO;  Location:  GI    GENITOURINARY SURGERY      urethra stretched    GYN SURGERY      d&c     LAPAROSCOPIC ASSISTED RECTOPEXY  3/14/2014    Procedure: LAPAROSCOPIC ASSISTED RECTOPEXY;  LAPAROSCOPIC  VENTRAL RECTOPEXY ;  Surgeon: Jennifer Connolly MD;  Location:  OR    ORTHOPEDIC SURGERY      surgery on clavicle,surgery for left leg fracture      Allergies   Allergen Reactions    Contrast Dye Shortness Of Breath    Methylpyrrolidone Swelling    Iodine Swelling     Other reaction(s): Angioedema  Facial swelling.    Morphine Anxiety and Nausea and Vomiting     Other reaction(s): Behavioral Disturbances  Other  reaction(s): Anxiety, vomiting      Social History     Tobacco Use    Smoking status: Former     Packs/day: .25     Types: Cigarettes     Quit date: 2023     Years since quittin.1     Passive exposure: Past    Smokeless tobacco: Never    Tobacco comments:     Quit for year, restarted .       : Now smokes a couple cigarettes per day - trying to quit.      quit 3 weeks and 3 days 1/10/24.   Substance Use Topics    Alcohol use: No     Comment: Severe alcohol use disorder for 20 years.  :  Reports sober for past five years      Wt Readings from Last 1 Encounters:   24 127.9 kg (282 lb)        Anesthesia Evaluation   Pt has had prior anesthetic. Type: General.        ROS/MED HX  ENT/Pulmonary:     (+) sleep apnea, uses CPAP,                                      Neurologic:  - neg neurologic ROS     Cardiovascular:       METS/Exercise Tolerance: >4 METS    Hematologic:  - neg hematologic  ROS     Musculoskeletal:  - neg musculoskeletal ROS     GI/Hepatic:     (+) GERD, Asymptomatic on medication,    hiatal hernia,              Renal/Genitourinary:  - neg Renal ROS     Endo:     (+)          thyroid problem, hypothyroidism,    Obesity,       Psychiatric/Substance Use: Comment: Sober for 9 years    (+) psychiatric history bipolar alcohol abuse      Infectious Disease:  - neg infectious disease ROS     Malignancy:  - neg malignancy ROS     Other:            Physical Exam    Airway        Mallampati: III   TM distance: > 3 FB   Neck ROM: full   Mouth opening: > 3 cm    Respiratory Devices and Support         Dental       (+) Multiple visibly decayed, broken teeth      Cardiovascular   cardiovascular exam normal          Pulmonary   pulmonary exam normal                OUTSIDE LABS:  CBC:   Lab Results   Component Value Date    WBC 5.1 2023    WBC 7.9 02/10/2022    HGB 11.7 2023    HGB 12.2 02/10/2022    HCT 37.5 2023    HCT 39.2 02/10/2022     2023      02/10/2022     BMP:   Lab Results   Component Value Date     03/28/2023     02/10/2022    POTASSIUM 4.1 03/28/2023    POTASSIUM 4.0 02/10/2022    CHLORIDE 105 03/28/2023    CHLORIDE 107 02/10/2022    CO2 26 03/28/2023    CO2 27 02/10/2022    BUN 15.4 03/28/2023    BUN 15 02/10/2022    CR 0.83 03/28/2023    CR 0.93 02/10/2022    GLC 94 03/28/2023    GLC 92 02/10/2022     COAGS:   Lab Results   Component Value Date    INR 0.82 (L) 04/24/2006     POC:   Lab Results   Component Value Date    HCG Negative 07/28/2015     HEPATIC:   Lab Results   Component Value Date    ALBUMIN 4.3 03/28/2023    PROTTOTAL 7.6 03/28/2023    ALT 21 03/28/2023    AST 21 03/28/2023    GGT 19 07/05/2006    ALKPHOS 123 (H) 03/28/2023    BILITOTAL 0.2 03/28/2023     OTHER:   Lab Results   Component Value Date    A1C 5.8 (H) 03/28/2023    PURVI 9.3 03/28/2023    PHOS 3.5 03/19/2014    MAG 1.7 03/19/2014    LIPASE 50 06/07/2014    AMYLASE 54 06/27/2009    TSH 0.85 03/28/2023    T4 0.75 (L) 12/11/2018       Anesthesia Plan    ASA Status:  3    NPO Status:  NPO Appropriate    Anesthesia Type: General.     - Airway: ETT   Induction: Propofol.   Maintenance: TIVA.        Consents    Anesthesia Plan(s) and associated risks, benefits, and realistic alternatives discussed. Questions answered and patient/representative(s) expressed understanding.     - Discussed:     - Discussed with:  Patient      - Extended Intubation/Ventilatory Support Discussed: Yes.      - Patient is DNR/DNI Status: No     Use of blood products discussed: No .     Postoperative Care    Pain management: Multi-modal analgesia.   PONV prophylaxis: Ondansetron (or other 5HT-3)     Comments:              PAC Discussion and Assessment    ASA Classification: 3    Anesthetic techniques and relevant risks discussed: GA  Invasive monitoring and risk discussed: No    Possibility and Risk of blood transfusion discussed: No  NPO instructions given: NPO after midnight    Needs early  "admission to pre-op area: No                                            Quiana Branham MD    I have reviewed the pertinent notes and labs in the chart from the past 30 days and (re)examined the patient.  Any updates or changes from those notes are reflected in this note.              # Severe Obesity: Estimated body mass index is 43.49 kg/m  as calculated from the following:    Height as of 2/12/24: 1.715 m (5' 7.52\").    Weight as of 2/12/24: 127.9 kg (282 lb).      "

## 2024-02-15 NOTE — H&P
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)    CARDIOVASCULAR SCREENING; LDL GOAL LESS THAN 160    Major depressive disorder, recurrent episode, moderate (H)    Anemia    Tubular adenoma    Iron deficiency anemia    Hyponatremia    ADHD, predominantly inattentive type    overweight BMI>30    Periumbilical hernia    Health Care Home    Psychosis (H)    Bipolar 2 disorder (H)    Gastroesophageal reflux disease without esophagitis    Prediabetes    Hypothyroidism due to medication    Hidradenitis suppurativa of right axilla    Vitamin D deficiency    Obesity (BMI 35.0-39.9) with comorbidity (H)    Numbness and tingling in left hand    Hiatal hernia    Insomnia, unspecified type       Past Surgical History:    Past Surgical History:   Procedure Laterality Date    COLONOSCOPY  12/24/2013    Procedure: COLONOSCOPY;;  Surgeon: Guy Grant MD;  Location: [facility]    COLONOSCOPY  1/9/2014    Procedure: COMBINED COLONOSCOPY, SINGLE BIOPSY/POLYPECTOMY BY BIOPSY;;  Surgeon: Guy Grant MD;  Location: [facility]    COLONOSCOPY N/A 8/8/2023    Procedure: COLONOSCOPY, WITH POLYPECTOMY AND BIOPSY;  Surgeon: René Rodriguez DO;  Location: [facility]    ESOPHAGOSCOPY, GASTROSCOPY, DUODENOSCOPY (EGD), COMBINED  12/24/2013    Procedure: COMBINED ESOPHAGOSCOPY, GASTROSCOPY, DUODENOSCOPY (EGD), BIOPSY SINGLE OR MULTIPLE;  ESOPHAGOSCOPY, GASTROSCOPY, DUODENOSCOPY, COLONOSCOPY;  Surgeon: Guy Grant MD;  Location: [facility]    ESOPHAGOSCOPY, GASTROSCOPY, DUODENOSCOPY (EGD), COMBINED N/A 8/8/2023    Procedure: ESOPHAGOGASTRODUODENOSCOPY, WITH BIOPSY;  Surgeon: René Rodriguez DO;  Location:  GI    GENITOURINARY SURGERY      urethra stretched    GYN SURGERY      d&c     LAPAROSCOPIC ASSISTED RECTOPEXY  3/14/2014    Procedure: LAPAROSCOPIC  ASSISTED RECTOPEXY;  LAPAROSCOPIC  VENTRAL RECTOPEXY ;  Surgeon: Jennifer Connolly MD;  Location: [facility] OR    ORTHOPEDIC SURGERY      surgery on clavicle,surgery for left leg fracture       Past Medical History:   Past Medical History:   Diagnosis Date    Depression     GERD (gastroesophageal reflux disease)     Hemorrhoid     Menstrual disorder     s/p D&C -2012    ROMEO on CPAP     Other bipolar disorders     followed by Dr Collazo    Post menopausal syndrome     Tobacco abuse        Social History:   Social History     Tobacco Use    Smoking status: Former     Packs/day: .25     Types: Cigarettes     Quit date: 2023     Years since quittin.1     Passive exposure: Past    Smokeless tobacco: Never    Tobacco comments:     Quit for year, restarted .       : Now smokes a couple cigarettes per day - trying to quit.      quit 3 weeks and 3 days 1/10/24.   Substance Use Topics    Alcohol use: No     Comment: Severe alcohol use disorder for 20 years.  :  Reports sober for past five years       Family History:   Family History   Problem Relation Age of Onset    Cancer Mother         ovarian cancer    Hypertension Father     Breast Cancer No family hx of     Cancer - colorectal No family hx of     Anesthesia Reaction No family hx of     Venous thrombosis No family hx of        Allergies:   Allergies   Allergen Reactions    Contrast Dye Shortness Of Breath    Methylpyrrolidone Swelling    Iodine Swelling     Other reaction(s): Angioedema  Facial swelling.    Morphine Anxiety and Nausea and Vomiting     Other reaction(s): Behavioral Disturbances  Other reaction(s): Anxiety, vomiting       Active Medications:   No current outpatient medications on file.       Systemic Review:   CONSTITUTIONAL: NEGATIVE for fever, chills, change in weight  ENT/MOUTH: NEGATIVE for ear, mouth and throat problems  RESP: NEGATIVE for significant cough or SOB  CV: NEGATIVE for chest pain, palpitations or peripheral  edema    Physical Examination:   Vital Signs: LMP 07/20/2006   GENERAL: healthy, alert and no distress  NECK: no adenopathy, no asymmetry, masses, or scars  RESP: lungs clear to auscultation - no rales, rhonchi or wheezes  CV: regular rate and rhythm, normal S1 S2, no S3 or S4, no murmur, click or rub, no peripheral edema and peripheral pulses strong  ABDOMEN: soft, nontender, no hepatosplenomegaly, no masses and bowel sounds normal  MS: no gross musculoskeletal defects noted, no edema    ASA: 2    Mallampati Score: 2    Plan: Appropriate to proceed as scheduled.      Sarmad Reed MD  2/15/2024    PCP:  Mendel Mireles

## 2024-02-15 NOTE — ANESTHESIA POSTPROCEDURE EVALUATION
Patient: Gudelia Dong    Procedure: Procedure(s):  Esophagoscopy, gastroscopy, duodenoscopy (EGD), combined       Anesthesia Type:  General    Note:  Disposition: Outpatient   Postop Pain Control: Uneventful            Sign Out: Well controlled pain   PONV: No   Neuro/Psych: Uneventful            Sign Out: Acceptable/Baseline neuro status   Airway/Respiratory: Uneventful            Sign Out: Acceptable/Baseline resp. status   CV/Hemodynamics: Uneventful            Sign Out: Acceptable CV status; No obvious hypovolemia; No obvious fluid overload   Other NRE: NONE   DID A NON-ROUTINE EVENT OCCUR? No           Last vitals:  Vitals Value Taken Time   /77 02/15/24 1318   Temp 36.4  C (97.6  F) 02/15/24 1310   Pulse 66 02/15/24 1319   Resp 57 02/15/24 1320   SpO2 98 % 02/15/24 1320   Vitals shown include unfiled device data.    Electronically Signed By: Quiana Branham MD  February 15, 2024  1:22 PM

## 2024-02-15 NOTE — OR NURSING
Procedure: EGD no interventions  Sedation: general   Specimens: none.   O2: per CRNA  Tolerated procedure: well  Pt to recovery area in stable condition accompanied by RN.   Other:  none    Dorcas Sinclair RN

## 2024-02-15 NOTE — OR NURSING
Sign out will be done by Dr Licona. Pt can return to Endoscopy for discharge. Provation note will be given to the pt in endoscopy with her proceduralist's findings. ( For op note)

## 2024-02-15 NOTE — ANESTHESIA PROCEDURE NOTES
Airway       Patient location during procedure: OR       Procedure Start/Stop Times: 2/15/2024 12:24 PM  Staff -        CRNA: Luis Fernando aBker APRN CRNA       Performed By: CRNAIndications and Patient Condition       Indications for airway management: jone-procedural       Induction type:RSI       Mask difficulty assessment: 0 - not attempted    Final Airway Details       Final airway type: endotracheal airway       Successful airway: ETT - single  Endotracheal Airway Details        ETT size (mm): 7.0       Cuffed: yes       Cuff volume (mL): 10       Successful intubation technique: video laryngoscopy       VL Blade Size: Glidescope 3       Grade View of Cords: 1       Adjucts: stylet       Position: Right       Measured from: lips       Secured at (cm): 21       Bite block used: None    Post intubation assessment        Placement verified by: capnometry, equal breath sounds and chest rise        Number of attempts at approach: 1       Number of other approaches attempted: 0       Secured with: pink tape       Ease of procedure: easy       Dentition: Intact and Unchanged    Medication(s) Administered   Medication Administration Time: 2/15/2024 12:24 PM

## 2024-02-20 ENCOUNTER — PATIENT OUTREACH (OUTPATIENT)
Dept: ONCOLOGY | Facility: CLINIC | Age: 59
End: 2024-02-20

## 2024-02-20 ENCOUNTER — OFFICE VISIT (OUTPATIENT)
Dept: FAMILY MEDICINE | Facility: CLINIC | Age: 59
End: 2024-02-20
Attending: PHYSICIAN ASSISTANT
Payer: COMMERCIAL

## 2024-02-20 ENCOUNTER — VIRTUAL VISIT (OUTPATIENT)
Dept: GASTROENTEROLOGY | Facility: CLINIC | Age: 59
End: 2024-02-20
Attending: PHYSICIAN ASSISTANT
Payer: COMMERCIAL

## 2024-02-20 DIAGNOSIS — L28.0 LICHEN SIMPLEX CHRONICUS: ICD-10-CM

## 2024-02-20 DIAGNOSIS — K22.10 EROSIVE ESOPHAGITIS: ICD-10-CM

## 2024-02-20 DIAGNOSIS — K44.9 HIATAL HERNIA: Primary | Chronic | ICD-10-CM

## 2024-02-20 DIAGNOSIS — K44.9 HIATAL HERNIA: ICD-10-CM

## 2024-02-20 DIAGNOSIS — R19.7 OVERFLOW DIARRHEA: ICD-10-CM

## 2024-02-20 DIAGNOSIS — K21.9 GASTROESOPHAGEAL REFLUX DISEASE WITHOUT ESOPHAGITIS: ICD-10-CM

## 2024-02-20 DIAGNOSIS — K59.00 OBSTIPATION: ICD-10-CM

## 2024-02-20 DIAGNOSIS — L28.0 LICHEN SIMPLEX, CHRONIC: Primary | ICD-10-CM

## 2024-02-20 DIAGNOSIS — R21 RASH: ICD-10-CM

## 2024-02-20 PROCEDURE — 99215 OFFICE O/P EST HI 40 MIN: CPT | Mod: 95 | Performed by: PHYSICIAN ASSISTANT

## 2024-02-20 PROCEDURE — 99213 OFFICE O/P EST LOW 20 MIN: CPT | Performed by: PHYSICIAN ASSISTANT

## 2024-02-20 RX ORDER — TRIAMCINOLONE ACETONIDE 0.25 MG/G
OINTMENT TOPICAL 2 TIMES DAILY
Qty: 30 G | Refills: 3 | Status: SHIPPED | OUTPATIENT
Start: 2024-02-20

## 2024-02-20 ASSESSMENT — PAIN SCALES - GENERAL: PAINLEVEL: NO PAIN (0)

## 2024-02-20 NOTE — NURSING NOTE
Is the patient currently in the state of [place]? YES    Visit mode:VIDEO    If the visit is dropped, the patient can be reconnected by: VIDEO VISIT: Text to cell phone:   Telephone Information:   Mobile [phone]       Will anyone else be joining the visit? NO  (If patient encounters technical issues they should call [phone]    How would you like to obtain your AVS? MyChart    Are changes needed to the allergy or medication list? No    Reason for visit: RECHECK    Luis CUENCA

## 2024-02-20 NOTE — PROGRESS NOTES
[facility] Dermatology Note  Encounter Date: Feb 20, 2024  Office Visit     Dermatology Problem List:  1. 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 Skin, right labia, punch:  - Epidermal hyperplasia and mild perivascular inflammation - (see comment)       ____________________________________________    Assessment & Plan:     # Lichen Simplex Chronicus, stable, improving  - Recommended patient switch to a non-scented detergent, such as Tide Free and Clear   - Avoid using soap in the groin area, use water to cleanse.   - refilled topicals today, triamcinolone 0.025% ointment twice daily to affected areas until resolved.  And as needed      Procedures Performed:   N/A    Follow-up: 1 year(s) in-person if more medication is needed, otherwise PRN    Staff and Scribe:   Scribe Disclosure:   By signing my name below, I, Leslie Mae, attest that this documentation has been prepared under the direction and in the presence of Denise Bland PA-C.  - Electronically Signed: Leslie Mae 02/20/24       Provider Disclosure:  I agree with above History, Review of Systems, Physical exam and Plan.  I have reviewed the content of the documentation and have edited it as needed. I have personally performed the services documented here and the documentation accurately represents those services and the decisions I have made.      Electronically signed by:  All risk, benefits and alternatives were discussed with patient.  Patient is in agreement and understands the assessment and plan.  All questions were answered.  Sun Screen Education was given.   Return to Clinic annually or sooner as needed.   Denise Bland PA-C        ____________________________________________    CC: Derm Problem (Rash follow up)    HPI:  Ms. Gudelia Dong is a(n) 58 year old female who presents today for follow-up  on rash.    Patient reports the Triamcinolone did not take long to start  working. She reports it started working within 2 weeks and worked very well. She is extremely satisfied with the Triamcinolone. She notes she will occasionally get itching and will use the Triamcinolone, and it relieves the symptoms. She has no itching today.    Patient is unaware of any family history of psoriasis. Patient states she was lean growing up, but at age 40, her weight increased to 225 lbs, and at the time of her last visit, had increased to 275 lbs.     Patient is otherwise feeling well, without additional skin concerns.     Labs Reviewed:  N/A    Physical exam:  Vitals: LMP 07/20/2006   GEN: This is a well developed, well-nourished female in no acute distress, in a pleasant mood.    SKIN: Focused examination of the labia and perineum was performed.  - no active eruption  - No other lesions of concern on areas examined.           Medications:  Current Outpatient Medications   Medication    albuterol (PROAIR HFA/PROVENTIL HFA/VENTOLIN HFA) 108 (90 Base) MCG/ACT inhaler    ammonium lactate (AMLACTIN) 12 % external cream    bisacodyl (DULCOLAX) 5 MG EC tablet    buPROPion (WELLBUTRIN XL) 150 MG 24 hr tablet    busPIRone HCl (BUSPAR) 30 MG tablet    Doxepin HCl 150 MG CAPS    econazole nitrate 1 % external cream    escitalopram (LEXAPRO) 10 MG tablet    lamoTRIgine (LAMICTAL) 100 MG tablet    levothyroxine (SYNTHROID/LEVOTHROID) 150 MCG tablet    nicotine (NICODERM CQ) 21 MG/24HR 24 hr patch    nystatin (MYCOSTATIN) 075536 UNIT/GM external powder    pantoprazole (PROTONIX) 40 MG EC tablet    polyethylene glycol (MIRALAX) 17 GM/Dose powder    REXULTI 2 MG tablet    topiramate (TOPAMAX) 25 MG tablet    esomeprazole (NEXIUM) 20 MG DR capsule    order for DME    polyethylene glycol (GOLYTELY) 236 g suspension    triamcinolone (KENALOG) 0.025 % external ointment    triamcinolone (KENALOG) 0.1 % external cream     No current facility-administered medications for this visit.      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)    CARDIOVASCULAR SCREENING; LDL GOAL LESS THAN 160    Major depressive disorder, recurrent episode, moderate (H)    Anemia    Tubular adenoma    Iron deficiency anemia    Hyponatremia    ADHD, predominantly inattentive type    overweight BMI>30    Periumbilical hernia    Health Care Home    Psychosis (H)    Bipolar 2 disorder (H)    Gastroesophageal reflux disease without esophagitis    Prediabetes    Hypothyroidism due to medication    Hidradenitis suppurativa of right axilla    Vitamin D deficiency    Obesity (BMI 35.0-39.9) with comorbidity (H)    Numbness and tingling in left hand    Hiatal hernia    Insomnia, unspecified type     Past Medical History:   Diagnosis Date    Depression     GERD (gastroesophageal reflux disease)     Hemorrhoid     Menstrual disorder     s/p D&C -12/2012    ROMEO on CPAP     Other bipolar disorders     followed by Dr Collazo    Post menopausal syndrome     Tobacco abuse        CC Puma Matt MD  [address] [facility]  [place] [address] on close of this encounter.

## 2024-02-20 NOTE — PATIENT INSTRUCTIONS
Gentle Skin Care    Below is a list of products our providers recommend for gentle skin care.    Moisturizers:  Lighter: Exederm Intensive Moisture Cream, Cetaphil Cream, CeraVe, Aveeno Positively Radiant and Vanicream Light   Thicker: Aquaphor Ointment, Vaseline, Petroleum Jelly, Eucerin Original Healing Cream, Vanicream Cream, CeraVe Healing Ointment, Aquaphor Body Spray  Avoid Lotions (too thin)  Mild Cleansers:  Dove Fragrance-free Bar or Wash  CeraVe   Vanicream Cleansing Bar  Cetaphil Cleanser   Aquaphor 2-in-1 Gentle Wash and Shampoo  Dove Baby Wash  Exederm Body Wash       Laundry Products:  All Free and Clear Products  Cheer Free  Generic brands are okay as long as they are fragrance-free  Avoid fabric softeners and dryer sheets   Sunscreens: SPF 30 or greater   Sunscreens that contain zinc oxide and/or titanium dioxide should be applied. These are physical blockers. One or both of these should be listed in the active Ingredients.  Any other listed ingredients under the active ingredients would be a chemically-based sunscreen, which might be irritating.  Spray sunscreens should be avoided because these are typically chemical sunscreens.      Shampoo and Conditioners:  Free and Clear by Vanicream  Aquaphor 2-in-1 Gentle Wash and Shampoo   Oils:  Mineral Oil   Emu Oil   For some patients: coconut (raw, unrefined, organic) and sunflower seed oil      Keep in mind:  Generic products are an okay substitute, but make sure they are fragrance-free.  Reading the product ingredients list is very important.  Avoid product that have fragrance added to them.   Organic does not mean fragrance-free. In fact, patients with sensitive skin can become quite irritated by some organic products.     Recommendations:  Daily bathing. Make sure you are applying a good moisturizer after bathing every time.  Apply moisturizing creams at least twice daily to the whole body. Your provider may recommend a lighter or heavier  moisturizer based on the severity of your skin condition and that time of year it is.  Creams are more moisturizing than lotions.     Care Plan:  Keep bathing and showering short, less than 15 minutes.  Always use lukewarm warm when possible. AVOID hot or cold water.  DO NOT use bubble bath.  Limit the use of soaps. Focus on skin folds, face, armpits, groin, and feet towards the end of the bath.  Do NOT vigorously scrub when you cleanse the skin.  After bathing, PAT your skin lightly with a towel. DO NOT rub or scrub when drying.  ALWAYS apply a moisturizer immediately after bathing. This helps to lock in moisture. *IF YOU WERE PRESCRIBED A TOPICAL MEDICATION, APPLY YOUR MEDICATION FIRST, AND THEN COVER WITH YOUR DAILY MOISTURIZER.*  Reapply moisturizing agents to your whole body at least twice daily.    Other helpful tips:  Do not use products such as powders, perfumes, or colognes on your skin.  Diffusers can be harsh on sensitive skin. Use with caution if you have sensitive skin.  Avoid saunas and steam baths. This temperature is too hot.  Avoid tight or scratchy clothing, such as wool.  Always wash new clothing before wearing them for the first time.  Sometimes a humidifier or vaporizer can be used at night can help the dry skin. Remember to keep these items clean to avoid mold growth.    Who should I call with questions?  [facility]: [phone]  [facility]: [phone]  For urgent needs outside of business hours call the [facility] at [phone] and ask for the dermatology resident on call.

## 2024-02-20 NOTE — PATIENT INSTRUCTIONS
It was a pleasure taking care of you today.  I've included a brief summary of our discussion and care plan from today's visit below.  Please review this information with your primary care provider.  _______________________________________________________________________    My recommendations are summarized as follows:    - Timed barium esophagram with tablet to further characterize esophageal motility/hiatal hernia. To schedule imaging, please call [phone]   - Consultation to thoracic surgery   - Discontinue Protonix 40 mg twice daily. Start Esomeprazole (Nexium) 20 mg twice daily this is best taken on an empty stomach a least 30 - 60 minutes prior to meals  - Upper endoscopy after being treated with Nexium 8 to 12 weeks to evaluate for resolution of esophagitis/for Cruz's esophagus  - With history of erosive esophagitis patient will need to remain on PPI indefinitely   - Please follow reflux lifestyle modifications as directed in AVS   - Please sleep with the head end of the bed elevated to at least 30 degrees or with a wedge pillow   - Attempt to not eat or drink 3-4 hours prior to laying down for bed or prior to laying down flat    - Recall colonoscopy for routine colon cancer screening 2030  - Increase MiraLAX to 17g (1 capful) daily this can be increased to twice daily dosing if needed        Gastroesophageal Reflux Disease (GERD) Lifestyle Modifications:   If taking acid suppression therapy (PPI ie Pantoprazole, Lansoprazole, Omeprazole, Esomeprazole, Rabeprazole, Dexlansoprazole) it should be taken 30 - 60 minutes prior to meals on an empty stomach to have maximum effect  Avoid triggers for reflux such as coffee, chocolate, carbonated beverages, spicy foods, acidic foods (tomato based/citrus and foods with high fat content   Abstinence from alcohol and cessation of all tobacco products is recommended   Studies have shown that weight loss, exercise and maintaining a healthy BMI significantly reduce  GERD symptoms   Remain upright while eating and immediately after meals  Do not eat or drink at least 3 hours prior to laying down supine/laying down for bed   Avoid late night/middle of the night snacking    Consider obtaining a wedge pillow or elevating the head end of the bed while sleeping   Avoid sleeping right side down as this can place the lower part of the esophagus/lower esophageal sphincter in a dependent position that favors reflux   Attempting to eat smaller more frequent meals may improve symptoms       To schedule endoscopic procedures you may call: [phone]  To schedule radiology (imaging) tests you may call: [phone]  To schedule an ENT appointment you may call: [phone]    Please call my nurse Radha ([phone]), Kristi ([phone]) with any questions or concerns.      Return to GI Clinic in 3 months to review your progress.    _______________________________________________________________________    Who do I call with any questions after my visit?  Please be in touch if there are any further questions that arise following today's visit.  There are multiple ways to contact your gastroenterology care team.      During business hours, you may reach a Gastroenterology nurse at [phone] and choose option 3.       To schedule or reschedule an appointment, please call [phone].     You can always send a secure message through [employer].  [employer] messages are answered by your nurse or doctor typically within 24 hours.  Please allow extra time on weekends and holidays.      For urgent/emergent questions after business hours, you may reach the on-call GI Fellow by contacting the [facility]  at [phone].     How will I get the results of any tests ordered?    You will receive all of your results.  If you have signed up for [employer], any tests ordered at your visit will be available to you after your physician reviews them.  Typically this takes 1-2 weeks.  If there  are urgent results that require a change in your care plan, your physician or nurse will call you to discuss the next steps.      What is [facility]?  [employer] is a secure way for you to access all of your healthcare records from the [facility].  It is a web based computer program, so you can sign on to it from any location.  It also allows you to send secure messages to your care team.  I recommend signing up for [employer] access if you have not already done so and are comfortable with using a computer.      How to I schedule a follow-up visit?  If you did not schedule a follow-up visit today, please call [phone] to schedule a follow-up office visit.      If you feel you received exceptional care and are interested in supporting the clinical and research goals of Rosalie Martínez PA-C or the Division of Gastroenterology, Hepatology, and Nutrition please contact [email or web address] from the [facility] to discuss opportunities to donate.    Sincerely,    Rosalie Martínez PA-C  Division of Gastroenterology, Hepatology, and Nutrition  [facility]

## 2024-02-20 NOTE — LETTER
2/20/2024         RE: Gudelia Dong  [address]  [place] [address]        Dear Colleague,    Thank you for referring your patient, Gudelia Dong, to the [facility] [place]. Please see a copy of my visit note below.    Gastroenterology Visit for: Gudelia Dong 1965   MRN: [id number]     Reason for Visit:  chief complaint    Referred by: Shayne  / [address] / [place] [address]  Patient Care Team:  Mendel Mireles PA-C as PCP - General (Physician Assistant - Medical)  Thierry Lord DPM as Assigned Musculoskeletal Provider  Mendel Abbasi MD as Resident (Student in organized health care education/training program)  Sarmad Reed MD as MD (Gastroenterology)  Selena Bella MD as Denise Busby PA-C as Physician Assistant (Dermatology)  Ashwini Smith APRN CNS as Clinical Nurse Specialist (Anesthesiology)  René Rodriguez DO as Physician (Gastroenterology)  Tiarra Elizabeth RD as Registered Dietitian (Dietitian, Registered)  Ortiz Rg MD as Assigned Endocrinology Provider  Mendel Mireles PA-C as Assigned PCP  Denise Bland PA-C as Assigned Surgical Provider  Rosalie Martínez PA-C as Assigned Gastroenterology Provider    History of Present Illness:   Gudelia Dong is a 58 year old female with significant past medical history pertinent for insomnia, depression, ADHD, bipolar type II, tobacco use, history of alcoholism, psychosis, HydroDIURIL suppurativa, history of iron deficiency anemia, obesity, prediabetes, hypothyroidism, ROMEO on CPAP, hiatal hernia and GERD who is presenting as a follow up patient with a chief complaint of heartburn/regurgiation and dysphagia.    Interval History February 20, 2024:    Had been taking Protonix 40 mg twice daily rather than Nexium 20 mg twice daily as discussed at last office visit.     Heartburn/Regurgitation- Noting she is inducing emesis as she is developing  "coughing when she was laying down to sleep. Explaining \"I just feel full of liquid.\" Noting she sleeps with two pillows although not with a wedge pillow or with gradual decline.     Dysphagia- Stable and occuring to specific solids once per week. Trigger foods include: lean pork, chicken and rice. This is associated with transient substernal chest discomfort. Denies dysphagia to semi solids, liquids and pills.     Bowel Patterns- With the use of Miralax 17g as needed multiple times throughout the week ~ 3 with stools occurring every other day that are consistent with Mayer Stool Scale Type 1-4.      Tobacco Use- Cessation approximately 2 months prior.     Denies unintentional weight loss, odynophagia, abdominal pain, diarrhea, constipation, nocturnal stooling, incontinence, melena, hematochezia and BRBR.     -----------------------------------------------------------------------------------------------------------------------------------------------------------------------------------------------------------------  Interval History September 27, 2023:    Has had an isolated episode of emesis a few days prior. Noting \"I felt I was swimming in liquid and I was choking so I had to take care of that right away.\" To relieve this sensation she had induced emesis. This has occurred 5-6 times since last seen in May. The fluid consists of small amount of food particles as well as what is described to be stomach acid. Denies seeing bile.     Heartburn/regurgiation symptoms have been stable. Has had an isolated \"couple\" times where she has had heartburn occurring in the late afternoon between 4 - 9 pm. When further clarifying she states she only experiences the heartburn occurring once per week if not less. Noting she is not taking her second dose of PPI (Protonix 40 mg) until prior to bed.      Dysphagia - stable. Continuing to has intermittent solid food dysphagia to dry breads/meats. Noting meat is a trigger and she has " "limited this recently.     As for her bowel habits since the colonoscopy she notes that she is struggling with constipation. Noting she would not have a BM for 2-3 days followed by having large amounts of diarrhea.     Has had intentional weight loss of 8 lbs through dietary changes.      Denies odynophagia, abdominal pain, nocturnal stooling, incontinence, steatorrhea, melena, hematochezia and BRBR.     Continues to smoke 4 cigarillos every three days.     Notes \"big changes going on at my job.\" Her partner at work is loosing her job and there is concern that she may also loose her job and therefore lack of insurance.   --------------------------------------------------------------------------------------------------------------------------------------------------------------------------------------------------------------    5/19/2023 HPI:     Prior labs from March of this year include TSH, hemoglobin A1c, CBC and CMP that are grossly unremarkable other than an A1c of 5.8.      Gudelia reports history of long standing reflux consistent of both heartburn/regurgitation. Currently she is taking Protonix 40 mg twice daily with adequate control of her heartburn/regurgiation. Approximately once per month will have vomiting. Specifically she notes that she is unable to drink liquid with eating. If she does \"this will get stuck and result in throwing up.\" Also has dysphagia to solids. Specific trigger foods include meats. Denies dysphagia to semi solids and liquids. Associated with belching that can last 30 - 45 minutes.     Has a bowel movement twice per week that is consistent with [place] Rey Stool Scale Type 2-3. Between she may have small volume stools consistent with [place] Stool Scale Type 1. Associated with intermittent pushing/strianing. Denies digitation, vaginal splinting or sensation of incomplete evacuation.      In the past 5 years has gained 50+ lbs.     Denies weight loss, odynophagia, dysphonia/hoarseness, " chronic cough, neck pain/swelling/mass, abdominal pain, diarrhea, constipation, nocturnal stooling, incontinence, hematochezia and BRBR.     Denies use of ETOH and recreational drug use. Had 18 months of refraining from smoking. Currently she is on the patch and will have 4 cigarettes every three days.     No family history or GI related malignancy (esophageal, gastric, pancreatic, liver or colon) or family history of IBD/celiac disease.     Previously did not tolerate MAC sedation.     Esophageal Questionnaire(s)    BEDQ Questionnaire      9/20/2023     9:29 AM 2/20/2024     6:59 AM   BEDQ Questionnaire: How Often Have You Had the Following?   Trouble eating solid food (meat, bread, vegetables) 1 4   Trouble eating soft foods (yogurt, jello, pudding) 0 0   Trouble swallowing liquids 0 0   Pain while swallowing 1 2   Coughing or choking while swallowing foods or liquids 2 2   Total Score: 4 8         9/20/2023     9:29 AM 2/20/2024     6:59 AM   BEDQ Questionnaire: Discomfort/Pain Ratings   Eating solid food (meat, bread, vegetables) 2 4   Eating soft foods (yogurt, jello, pudding) 0 0   Drinking liquid 1 0   Total Score: 3 4       Eckardt Questionnaire      9/20/2023     9:30 AM 2/20/2024     6:59 AM   Eckardt Questionnaire   Dysphagia 1 1   Regurgitation 1 1   Retrosternal Pain 1 1   Weight Loss (kg) 2 1   Total Score:  5 4       Promis 10 Questionnaire      8/28/2023     9:57 AM 9/20/2023     9:31 AM 2/20/2024     7:01 AM   PROMIS 10 FLOWSHEET DATA   In general, would you say your health is: 3 2 2   In general, would you say your quality of life is: 2 1 2   In general, how would you rate your physical health? 2 1 2   In general, how would you rate your mental health, including your mood and your ability to think? 3 3 2   In general, how would you rate your satisfaction with your social activities and relationships? 1 3 2   In general, please rate how well you carry out your usual social activities and roles.  (This includes activities at home, at work and in your community, and responsibilities as a parent, child, spouse, employee, friend, etc.) 2 2 2   To what extent are you able to carry out your everyday physical activities such as walking, climbing stairs, carrying groceries, or moving a chair? 3 3 3   In the past 7 days, how often have you been bothered by emotional problems such as feeling anxious, depressed, or irritable? 4 3 3   In the past 7 days, how would you rate your fatigue on average? 3 3 3   In the past 7 days, how would you rate your pain on average, where 0 means no pain, and 10 means worst imaginable pain? 4 5 4   Mental health question re-calculation - no clinical value 2 3 3   Physical health question re-calculation - no clinical value 3 3 3   Pain question re-calculation - no clinical value 3 3 3   Global Mental Health Score 8 10 9   Global Physical Health Score 11 10 11   PROMIS TOTAL - SUBSCORES 19 20 20       STUDIES & PROCEDURES:    EGD:     2/15/2024 Protonix 40mg BID   Findings:        Esophagogastric landmarks were identified: the Z-line was found at 35        cm, the gastroesophageal junction was found at 35 cm and the site of the        diaphragmatic hiatus was found at 42 cm from the incisors.        Patially- healed, without eschars on ulcers, - LA Grade C (one or more        mucosal breaks continuous between tops of 2 or more mucosal folds, less        than 75% circumference) esophagitis with no bleeding was found 32- 35 cm        from the incisors.        The exam of the esophagus was otherwise normal.        A 7 cm hiatal hernia was present.        Multiple medium sessile polyps were found in the gastric body.        The exam of the stomach was otherwise normal.        The examined duodenum was normal.                                                                                     Impression:            - Esophagogastric landmarks identified.                          - LA Grade C  reflux esophagitis with no bleeding.                          - 7 cm hiatal hernia.                          - Multiple gastric polyps.                          - Normal examined duodenum.                          - No specimens collected.     8/8/2023  Findings:        The lumen of the esophagus was moderately dilated. Biopsies were taken        with a cold forceps for histology in the distal and proximal esophagus.        Verification of patient identification for the specimen was done.        LA Grade C (one or more mucosal breaks continuous between tops of 2 or        more mucosal folds, less than 75% circumference) esophagitis with no        bleeding was found.        The gastroesophageal flap valve was visualized endoscopically and        classified as Hill Grade IV (no fold, wide open lumen, hiatal hernia        present).        A 7 cm hiatal hernia was found. The proximal extent of the gastric folds        (end of tubular esophagus) was 37 cm from the incisors. The hiatal        narrowing was 44 cm from the incisors. The Z-line was 37 cm from the        incisors.        Multiple sessile polyps with no bleeding and no stigmata of recent        bleeding were found in the gastric body. benign fundic gland appearing        <1cm        The duodenal bulb, first portion of the duodenum, second portion of the        duodenum and examined duodenum were normal.                                                                                     Impression:            - Dilation in the entire esophagus. Biopsied.                          - LA Grade C reflux esophagitis with no bleeding.                          - Gastroesophageal flap valve classified as Hill Grade                          IV (no fold, wide open lumen, hiatal hernia present).                          - 7 cm hiatal hernia.                          - Multiple gastric polyps.                          - Normal duodenal bulb, first portion of the duodenum,                           second portion of the duodenum and examined duodenum.      Final Diagnosis   A.  ESOPHAGUS, DISTAL, BIOPSY:  - Squamous mucosa with minimal chronic inflammation   - Negative for intestinal metaplasia or dysplasia     B.  ESOPHAGUS, PROXIMAL, BIOPSY:  - Squamous epithelium with no significant histopathologic abnormality  - No evidence of eosinophilic esophagitis       5/14/2021 [facility]         8/2015 [facility]         Colonoscopy:    8/8/2023  Findings:        The perianal and digital rectal examinations were normal.        A 4 mm polyp was found in the ascending colon. The polyp was sessile.        The polyp was removed with a cold snare. Resection and retrieval were        complete. Verification of patient identification for the specimen was        done. Estimated blood loss: none.        A 2 mm polyp was found in the recto-sigmoid colon. The polyp was        sessile. The polyp was removed with a jumbo cold forceps. Resection and        retrieval were complete. Verification of patient identification for the        specimen was done. Estimated blood loss: none.        A single small-mouthed diverticulum was found in the ascending colon.        There was no evidence of diverticular bleeding.        The retroflexed view of the distal rectum and anal verge was normal and        showed no anal or rectal abnormalities.                                                                                     Impression:            - One 4 mm polyp in the ascending colon, removed with                          a cold snare. Resected and retrieved.                          - One 2 mm polyp at the recto-sigmoid colon, removed                          with a jumbo cold forceps. Resected and retrieved.                          - Diverticulosis in the ascending colon. There was no                          evidence of diverticular bleeding.                          - The distal rectum and anal verge are normal on                           retroflexion view.     Final Diagnosis     C.  COLON, ASCENDING, POLYP:  - Tubular adenoma; negative for high-grade dysplasia     D.  COLON, SIGMOID/RECTUM, POLYP:  - Hyperplastic polyp          5/14/2021 [facility]     1/2014  Findings:        The digital rectal exam was abnormal. Findings include decreased        sphincter tone. Pertinent negatives include no palpable rectal lesions.        The terminal ileum appeared normal. A benign appearing sessile polyp was        found in the rectum. The polyp was 5 mm in size. The polyp was removed        with a hot snare. Resection and retrieval were complete. Estimated blood        loss was minimal. A segmental area of moderately congested and        erythematous mucosa was found in the distal rectum. Biopsies were taken        with a cold forceps for histology. Estimated blood loss was minimal.                                                                                    Impression:               - Rectal exam revealed decreased sphincter tone.                             - The examined portion of the ileum was normal.                             - One 5 mm polyp in the rectum (benign appearing).                             Resected and retrieved.                             - Congested and erythematous mucosa distal rectum.                             This was biopsied.                             - Rectal prolapse.                             - The clinical history suggests pelvic floor                             dysfunction with rectal prolapse and urinary                             retention requiring digital manipulation of the                             pelvic floor to facilitate bladder emptying. I                             believe further evaluation of the perineum at the                             pelvic floor center is indicated.        EndoFLIP directed at the UES or LES (8cm (EF-325) balloon length or 16cm (EF-322) balloon length):    Date:  8cm balloon  Balloon inflation Balloon pressure CSA (mm^2) DI (mm^2/mmHg) Dmin (mm) Compliance   20 (ladmark ID)        30        40        50           16cm balloon  Balloon inflation Balloon pressure CSA (mm^2) DI (mm^2/mmHg) Dmin (mm) Compliance   30 (ladmark ID)        40        50        60        70           High Resolution Manometry:    PH/Impedance:     Bravo:    CT:    Esophagram:    FL VSS:     GES:    U/S:     XRAY:      Prior medical records were reviewed including, but not limited to, notes from referring providers, lab work, radiographic tests, and other diagnostic tests. Pertinent results were summarized above.     History     Past Medical History:   Diagnosis Date    Depression     GERD (gastroesophageal reflux disease)     Hemorrhoid     Menstrual disorder     s/p D&C -12/2012    ROMEO on CPAP     Other bipolar disorders     followed by Dr Collazo    Post menopausal syndrome     Tobacco abuse        Past Surgical History:   Procedure Laterality Date    COLONOSCOPY  12/24/2013    Procedure: COLONOSCOPY;;  Surgeon: Guy Grant MD;  Location: [facility]    COLONOSCOPY  1/9/2014    Procedure: COMBINED COLONOSCOPY, SINGLE BIOPSY/POLYPECTOMY BY BIOPSY;;  Surgeon: Guy Grant MD;  Location: [facility]    COLONOSCOPY N/A 8/8/2023    Procedure: COLONOSCOPY, WITH POLYPECTOMY AND BIOPSY;  Surgeon: René Rodriguez DO;  Location: [facility]    ESOPHAGOSCOPY, GASTROSCOPY, DUODENOSCOPY (EGD), COMBINED  12/24/2013    Procedure: COMBINED ESOPHAGOSCOPY, GASTROSCOPY, DUODENOSCOPY (EGD), BIOPSY SINGLE OR MULTIPLE;  ESOPHAGOSCOPY, GASTROSCOPY, DUODENOSCOPY, COLONOSCOPY;  Surgeon: Guy Grant MD;  Location: [facility]    ESOPHAGOSCOPY, GASTROSCOPY, DUODENOSCOPY (EGD), COMBINED N/A 8/8/2023    Procedure: ESOPHAGOGASTRODUODENOSCOPY, WITH BIOPSY;  Surgeon: René Rodriguez DO;  Location:  GI    ESOPHAGOSCOPY, GASTROSCOPY, DUODENOSCOPY (EGD), COMBINED N/A 2/15/2024    Procedure: Esophagoscopy, gastroscopy, duodenoscopy (EGD),  combined;  Surgeon: Sarmad Reed MD;  Location:  GI    GENITOURINARY SURGERY      urethra stretched    GYN SURGERY      d&c     LAPAROSCOPIC ASSISTED RECTOPEXY  3/14/2014    Procedure: LAPAROSCOPIC ASSISTED RECTOPEXY;  LAPAROSCOPIC  VENTRAL RECTOPEXY ;  Surgeon: Jennifer Connolly MD;  Location:  OR    ORTHOPEDIC SURGERY      surgery on clavicle,surgery for left leg fracture       Social History     Socioeconomic History    Marital status:      Spouse name: Not on file    Number of children: Not on file    Years of education: Not on file    Highest education level: Not on file   Occupational History    Not on file   Tobacco Use    Smoking status: Former     Packs/day: .25     Types: Cigarettes     Quit date: 2023     Years since quittin.1     Passive exposure: Past    Smokeless tobacco: Never    Tobacco comments:     Quit for year, restarted .       : Now smokes a couple cigarettes per day - trying to quit.      quit 3 weeks and 3 days 1/10/24.   Vaping Use    Vaping Use: Never used   Substance and Sexual Activity    Alcohol use: No     Comment: Severe alcohol use disorder for 20 years.  :  Reports sober for past five years    Drug use: No    Sexual activity: Yes     Partners: Male   Other Topics Concern    Parent/sibling w/ CABG, MI or angioplasty before 65F 55M? Not Asked   Social History Narrative    2013    /single/    Children-    Work-    Tobacco-    ETOH-    Exercise-         Social Determinants of Health     Financial Resource Strain: Not on file   Food Insecurity: Not on file   Transportation Needs: Not on file   Physical Activity: Not on file   Stress: Not on file   Social Connections: Not on file   Interpersonal Safety: Not on file   Housing Stability: Not on file       Family History   Problem Relation Age of Onset    Cancer Mother         ovarian cancer    Hypertension Father     Breast Cancer No family hx of     Cancer - colorectal No family hx of      Anesthesia Reaction No family hx of     Venous thrombosis No family hx of      Family history reviewed and edited as appropriate    Medications and Allergies:     Outpatient Encounter Medications as of 2/20/2024   Medication Sig Dispense Refill    albuterol (PROAIR HFA/PROVENTIL HFA/VENTOLIN HFA) 108 (90 Base) MCG/ACT inhaler Inhale 2 puffs into the lungs every 6 hours as needed for shortness of breath, wheezing or cough 18 g 0    ammonium lactate (AMLACTIN) 12 % external cream Apply topically daily as needed for dry skin 385 g 1    bisacodyl (DULCOLAX) 5 MG EC tablet Two days prior to exam take two (2) tablets at 4pm. One day prior to exam take two (2) tablets at 4pm 4 tablet 0    buPROPion (WELLBUTRIN XL) 150 MG 24 hr tablet 300 mg every morning      busPIRone HCl (BUSPAR) 30 MG tablet Take 1 tablet by mouth 2 times daily      Doxepin HCl 150 MG CAPS Take 300 mg by mouth At Bedtime 30 capsule 0    econazole nitrate 1 % external cream APPLY TOPICALLY TO FEET AND TOENAILS DAILY 85 g 5    escitalopram (LEXAPRO) 10 MG tablet Take 10 mg by mouth every morning      esomeprazole (NEXIUM) 20 MG DR capsule Take 1 capsule (20 mg) by mouth 2 times daily for 180 days Take 30-60 minutes before eating. 180 capsule 1    lamoTRIgine (LAMICTAL) 100 MG tablet Take 100 mg by mouth every morning      levothyroxine (SYNTHROID/LEVOTHROID) 150 MCG tablet Take 1 tablet (150 mcg) by mouth daily (Patient taking differently: Take 150 mcg by mouth every morning) 90 tablet 2    nicotine (NICODERM CQ) 21 MG/24HR 24 hr patch Place 1 patch onto the skin every 24 hours 30 patch 1    nystatin (MYCOSTATIN) 473520 UNIT/GM external powder Apply topically daily 60 g 11    order for DME Equipment being ordered: CPAP  Please dispense Cpap and its supply 1 Units prn    pantoprazole (PROTONIX) 40 MG EC tablet TAKE 1 TABLET BY MOUTH 1 1/2 HOURS PRIOR TO LAST MEAL AND 1 TABLET AT BEDTIME. (Patient taking differently: 40 mg 2 times daily) 180 tablet 0     polyethylene glycol (GOLYTELY) 236 g suspension Take as directed. Two days before your exam fill the first container with water. Cover and shake until mixed well. At 5:00pm drink one 8oz glass every 10-15 minutes until half (1/2) of the first container is empty. Store the remainder in the refrigerator. One day before your exam at 5:00pm drink the second half of the first container until it is gone. Before you go to bed mix the second container with water and put in refrigerator. Six hours before your check in time drink one 8oz glass every 10-15 minutes until half of container is empty. Discard the remainder of solution. 8000 mL 0    polyethylene glycol (MIRALAX) 17 GM/Dose powder Take 17 g (1 Capful) by mouth daily for 180 days (Patient taking differently: Take 1 Capful by mouth as needed) 510 g 3    REXULTI 2 MG tablet Take 2 mg by mouth every morning      topiramate (TOPAMAX) 25 MG tablet 25 mg at bedtime for 1 week, 50 mg at bedtime for 1 week and 75 mg daily at bedtime thereafter (Patient taking differently: Take 25 mg by mouth at bedtime 25 mg at bedtime for 1 week, 50 mg at bedtime for 1 week and 75 mg daily at bedtime thereafter) 90 tablet 4    triamcinolone (KENALOG) 0.025 % external ointment Apply topically 2 times daily To affected area in the groin as needed up to 2 weeks at a time. 30 g 2    triamcinolone (KENALOG) 0.1 % external cream Apply topically 2 times daily Only for 2 weeks. 30 g 0     No facility-administered encounter medications on file as of 2/20/2024.        Allergies   Allergen Reactions    Contrast Dye Shortness Of Breath    Methylpyrrolidone Swelling    Iodine Swelling     Other reaction(s): Angioedema  Facial swelling.    Morphine Anxiety and Nausea and Vomiting     Other reaction(s): Behavioral Disturbances  Other reaction(s): Anxiety, vomiting        Review of systems:  A full 10 point review of systems was obtained and was negative except for the pertinent positives and negatives  stated within the HPI.    Objective Findings:   Physical Exam:    Constitutional: LMP 07/20/2006   General: Alert, cooperative, no distress, well-appearing  Head: Atraumatic, normocephalic, no obvious abnormalities   Eyes: Sclera anicteric, no obvious conjunctival hemorrhage   Nose: Nares normal, no obvious malformation, no obvious rhinorrhea   Respiratory: Resting comfortably, no apparent distress, no cough.   Skin: No jaundice, no obvious rash  Neurologic: AAOx3, no obvious neurologic abnormality  Psychiatric: Normal Affect, appropriate mood  Extremities: No obvious edema, no obvious malformation     Labs, Radiology, Pathology     Lab Results   Component Value Date    WBC 5.1 03/28/2023    WBC 7.9 02/10/2022    WBC 6.5 04/15/2021    HGB 11.7 03/28/2023    HGB 12.2 02/10/2022    HGB 11.7 04/15/2021     03/28/2023     02/10/2022     04/15/2021    CHOL 174 03/28/2023    CHOL 183 02/10/2022    CHOL 221 (H) 04/15/2021    TRIG 82 03/28/2023    TRIG 91 01/07/2020    TRIG 115 12/11/2018    HDL 69 03/28/2023    HDL 66 02/10/2022    HDL 62 01/07/2020    ALT 21 03/28/2023    ALT 23 10/04/2022    ALT 17 09/12/2022    AST 21 03/28/2023    AST 23 10/04/2022    AST 19 09/12/2022     03/28/2023     02/10/2022     04/15/2021    BUN 15.4 03/28/2023    BUN 15 02/10/2022    BUN 18 04/15/2021    CO2 26 03/28/2023    CO2 27 02/10/2022    CO2 25 04/15/2021    TSH 0.85 03/28/2023    TSH 1.39 02/10/2022    TSH 1.34 04/15/2021    INR 0.82 (L) 04/24/2006        Liver Function Studies -   Recent Labs   Lab Test 03/28/23  0911   PROTTOTAL 7.6   ALBUMIN 4.3   BILITOTAL 0.2   ALKPHOS 123*   AST 21   ALT 21          Patient Active Problem List    Diagnosis Date Noted    Hiatal hernia 03/28/2023     Priority: Medium    Insomnia, unspecified type 03/28/2023     Priority: Medium    Numbness and tingling in left hand 12/07/2022     Priority: Medium    Obesity (BMI 35.0-39.9) with comorbidity (H) 01/23/2020      Priority: Medium    Vitamin D deficiency 02/20/2018     Priority: Medium    Hidradenitis suppurativa of right axilla 08/24/2016     Priority: Medium    Hypothyroidism due to medication 12/22/2015     Priority: Medium     lithium      Prediabetes 12/08/2015     Priority: Medium    Gastroesophageal reflux disease without esophagitis 10/13/2015     Priority: Medium    Bipolar 2 disorder (H) 07/28/2015     Priority: Medium    Psychosis (H) 05/08/2015     Priority: Medium    Health Care Home 01/13/2015     Priority: Medium     State Tier Level:  unknown  Status:  n/a-Patient has [facility]  working with her. 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) 09/26/2013     Priority: Medium    CARDIOVASCULAR SCREENING; LDL GOAL LESS THAN 160 09/26/2013     Priority: Medium    Major depressive disorder, recurrent episode, moderate (H) 09/26/2013     Priority: Medium    ROMEO on CPAP      Priority: Medium    Tobacco abuse      Priority: Medium    Post menopausal syndrome      Priority: Medium      Assessment and Plan   Assessment/Plan:    Gudelia Dong is a 58 year old female with significant past medical history pertinent for insomnia, depression, ADHD, bipolar type II, tobacco use, history of alcoholism, psychosis, HydroDIURIL suppurativa, history of iron deficiency anemia, obesity, prediabetes, hypothyroidism, ROMEO on CPAP, hiatal hernia and GERD who is presenting as a  follow up patient with a chief complaint of heartburn/regurgiation and dysphagia.    #Reflux  #Large Hiatal Hernia - 7 cm   #Grade C Erosive Esophagitis    Prior evaluation includes:    EGD with [facility] 5/14/2021 with LA grade C erosive esophagitis and a large hiatal hernia.  Biopsies were obtained of the distal esophagus that were consistent with mild nonspecific inflammatory changes.  Negative for intestinal metaplasia/dysplasia.  Gastric biopsies and duodenal biopsies were unremarkable negative for H. pylori and celiac disease respectively.    EGD 8/8/2023 with LA grade C erosive esophagitis and a 7 cm hiatal hernia.  Distal esophageal biopsies with minimal chronic inflammation negative for intestinal aplasia or dysplasia.  Proximal esophageal biopsies no significant histopathologic abnormality.      2/15/2023 EGD with LA Grade C Esophagitis despite Protonix 40mg BID and a 7cm hiatal hernia.    Currently Gudelia is taking Protonix 40 mg twice daily with adequate control of her heartburn symptoms.  At our last office visit she was asked to discontinue Protonix and switch to Nexium 20 mg twice daily as she continued to have erosive esophagitis on endoscopy.  However, she continued to receive a prescription for Protonix an alternative provider and did not start the Nexium.  At the time of her last endoscopy she was taking Protonix 40 mg once daily. Previously consultation was placed to the bariatric surgery team for consideration of hiatal hernia repair with possible bariatric surgical intervention. Today patient expresses that she is no longer interested in weight loss surgery and wishes to proceed with medical weight management.  Consultation will be placed to thoracic surgery for repair of hiatal hernia.  Additionally, she continues to also have intermittent solid food dysphagia which is likely secondary to reflux with persistent erosive esophagitis, large hiatal hernia however intrinsic motility disorder of the  esophagus other should also be considered. Evaluation with time barium esophagram and high-resolution manometry study should be completed prior to surgical intervention.    - Timed barium esophagram with tablet to further characterize esophageal motility/hiatal hernia. To schedule imaging, please call [phone]   - High resolution manometry study   - Consultation to thoracic surgery   - Discontinue Protonix 40 mg twice daily. Start Esomeprazole (Nexium) 20 mg twice daily this is best taken on an empty stomach a least 30 - 60 minutes prior to meals  - Upper endoscopy after being treated with Nexium 8 to 12 weeks to evaluate for resolution of esophagitis/for Cruz's esophagus  - With history of erosive esophagitis patient will need to remain on PPI indefinitely   - Please follow reflux lifestyle modifications as directed in AVS   - Please sleep with the head end of the bed elevated to at least 30 degrees or with a wedge pillow   - Attempt to not eat or drink 3-4 hours prior to laying down for bed or prior to laying down flat    #Gastric Polyps  EGD 8/8/2023 with multiple sessile polyps found within the gastric body less than 1 cm.  Appearance was consistent with benign fundic gland polyp per performing endoscopist.  Again, noted on endoscopy 2/15/2023.    #Colorectal Cancer Screening   #Constipation   8/8/2023 colonoscopy with one 4mm tubular adenoma within the ascending colon and a 2 mm hyperplastic polyp.     Colonoscopy May 2021 with [facility] that was described to be of poor quality examination secondary to difficulties with sedation/constant patient movement, poor prep and redundant colon.  No large lesions were appreciated.     Colonoscopy 2014 with one 5 mm rectal polyp of unknown etiology.     No family history of colorectal cancer. Per the most recent update by the [place] Multi-Society Task Force on Colorectal Cancer recall should be 7 years, 2030 or sooner if otherwise indicated.     At our last visit Gudelia  had presented with symptoms consistent with obstipation with overflow diarrhea. Previoulsy she was struggling with constipation associated with intermittent pushing/strianing.  She had denied rectal digitation, vaginal splinting or sensation of incomplete evacuation. Future considerations would be consultation to the pelvic floor center.  Now with the use of MiraLAX 17 g 3x per week she is stooling every other day which is consistent with Rapides stool scale 1-4.    - Recall colonoscopy for routine colon cancer screening 2030  - Increase MiraLAX to 17g (1 capful) daily this can be increased to twice daily dosing if needed      Follow up plan:   Return to clinic 3 months and as needed.    The risks and benefits of my recommendations, as well as other treatment options were discussed with the patient and any available family today. All questions were answered.     Follow up: As planned above. Today, I personally spent 23 minutes in direct face to face time with the patient, of which greater than 50% of the time was spent in patient education and counseling as described above. Approximately 20 minutes were spent on indirect care associated with the patient's consultation including but not limited to review of: patient medical records to date, clinic visits, hospital records, lab results, imaging studies, procedural documentation, and coordinating care with other providers. The findings from this review are summarized in the above note. All of the above accounted for a cumulative time of 43 minutes and was performed on the date of service.     The patient verbalized understanding of the plan and was appreciative for the time spent and information provided during the office visit.     Documentation assisted by voice recognition and documentation system.        Again, thank you for allowing me to participate in the care of your patient.      Sincerely,    Rosalie Martínez PA-C

## 2024-02-20 NOTE — PROGRESS NOTES
"Virtual Visit Details    Type of service:  Video Visit     Originating Location (pt. Location): Home    Distant Location (provider location):  Off-site  Platform used for Video Visit: [facility]      Gastroenterology Visit for: Gudelia Dong 1965   MRN: [id number]     Reason for Visit:  chief complaint    Referred by: Shayne  / [id number] / [place] [address]  Patient Care Team:  Mendel Mireles PA-C as PCP - General (Physician Assistant - Medical)  Thierry Lord DPM as Assigned Musculoskeletal Provider  Mendel Abbasi MD as Resident (Student in organized health care education/training program)  Sarmad Reed MD as MD (Gastroenterology)  Selena Bella MD as Denise Busby PA-C as Physician Assistant (Dermatology)  Ashwini Smith APRN CNS as Clinical Nurse Specialist (Anesthesiology)  René Rodriguez DO as Physician (Gastroenterology)  Tiarra Elizabeth RD as Registered Dietitian (Dietitian, Registered)  Ortiz Rg MD as Assigned Endocrinology Provider  Mendel Mireles PA-C as Assigned PCP  Denise Bland PA-C as Assigned Surgical Provider  Rosalie Martínez PA-C as Assigned Gastroenterology Provider    History of Present Illness:   Gudelia Dong is a 58 year old female with significant past medical history pertinent for insomnia, depression, ADHD, bipolar type II, tobacco use, history of alcoholism, psychosis, HydroDIURIL suppurativa, history of iron deficiency anemia, obesity, prediabetes, hypothyroidism, ROMEO on CPAP, hiatal hernia and GERD who is presenting as a follow up patient with a chief complaint of heartburn/regurgiation and dysphagia.    Interval History February 20, 2024:    Had been taking Protonix 40 mg twice daily rather than Nexium 20 mg twice daily as discussed at last office visit.     Heartburn/Regurgitation- Noting she is inducing emesis as she is developing coughing when she was laying down to sleep. Explaining \"I just feel full of " "liquid.\" Noting she sleeps with two pillows although not with a wedge pillow or with gradual decline.     Dysphagia- Stable and occuring to specific solids once per week. Trigger foods include: lean pork, chicken and rice. This is associated with transient substernal chest discomfort. Denies dysphagia to semi solids, liquids and pills.     Bowel Patterns- With the use of Miralax 17g as needed multiple times throughout the week ~ 3 with stools occurring every other day that are consistent with [place] Stool Scale Type 1-4.      Tobacco Use- Cessation approximately 2 months prior.     Denies unintentional weight loss, odynophagia, abdominal pain, diarrhea, constipation, nocturnal stooling, incontinence, melena, hematochezia and BRBR.     -----------------------------------------------------------------------------------------------------------------------------------------------------------------------------------------------------------------  Interval History September 27, 2023:    Has had an isolated episode of emesis a few days prior. Noting \"I felt I was swimming in liquid and I was choking so I had to take care of that right away.\" To relieve this sensation she had induced emesis. This has occurred 5-6 times since last seen in May. The fluid consists of small amount of food particles as well as what is described to be stomach acid. Denies seeing bile.     Heartburn/regurgiation symptoms have been stable. Has had an isolated \"couple\" times where she has had heartburn occurring in the late afternoon between 4 - 9 pm. When further clarifying she states she only experiences the heartburn occurring once per week if not less. Noting she is not taking her second dose of PPI (Protonix 40 mg) until prior to bed.      Dysphagia - stable. Continuing to has intermittent solid food dysphagia to dry breads/meats. Noting meat is a trigger and she has limited this recently.     As for her bowel habits since the colonoscopy she " "notes that she is struggling with constipation. Noting she would not have a BM for 2-3 days followed by having large amounts of diarrhea.     Has had intentional weight loss of 8 lbs through dietary changes.      Denies odynophagia, abdominal pain, nocturnal stooling, incontinence, steatorrhea, melena, hematochezia and BRBR.     Continues to smoke 4 cigarillos every three days.     Notes \"big changes going on at my job.\" Her partner at work is loosing her job and there is concern that she may also loose her job and therefore lack of insurance.   --------------------------------------------------------------------------------------------------------------------------------------------------------------------------------------------------------------    5/19/2023 HPI:     Prior labs from March of this year include TSH, hemoglobin A1c, CBC and CMP that are grossly unremarkable other than an A1c of 5.8.      Gudelia reports history of long standing reflux consistent of both heartburn/regurgitation. Currently she is taking Protonix 40 mg twice daily with adequate control of her heartburn/regurgiation. Approximately once per month will have vomiting. Specifically she notes that she is unable to drink liquid with eating. If she does \"this will get stuck and result in throwing up.\" Also has dysphagia to solids. Specific trigger foods include meats. Denies dysphagia to semi solids and liquids. Associated with belching that can last 30 - 45 minutes.     Has a bowel movement twice per week that is consistent with [place] Stool Scale Type 2-3. Between she may have small volume stools consistent with [place] Stool Scale Type 1. Associated with intermittent pushing/strianing. Denies digitation, vaginal splinting or sensation of incomplete evacuation.      In the past 5 years has gained 50+ lbs.     Denies weight loss, odynophagia, dysphonia/hoarseness, chronic cough, neck pain/swelling/mass, abdominal pain, diarrhea, constipation, " nocturnal stooling, incontinence, hematochezia and BRBR.     Denies use of ETOH and recreational drug use. Had 18 months of refraining from smoking. Currently she is on the patch and will have 4 cigarettes every three days.     No family history or GI related malignancy (esophageal, gastric, pancreatic, liver or colon) or family history of IBD/celiac disease.     Previously did not tolerate MAC sedation.     Esophageal Questionnaire(s)    BEDQ Questionnaire      9/20/2023     9:29 AM 2/20/2024     6:59 AM   BEDQ Questionnaire: How Often Have You Had the Following?   Trouble eating solid food (meat, bread, vegetables) 1 4   Trouble eating soft foods (yogurt, jello, pudding) 0 0   Trouble swallowing liquids 0 0   Pain while swallowing 1 2   Coughing or choking while swallowing foods or liquids 2 2   Total Score: 4 8         9/20/2023     9:29 AM 2/20/2024     6:59 AM   BEDQ Questionnaire: Discomfort/Pain Ratings   Eating solid food (meat, bread, vegetables) 2 4   Eating soft foods (yogurt, jello, pudding) 0 0   Drinking liquid 1 0   Total Score: 3 4       Eckardt Questionnaire      9/20/2023     9:30 AM 2/20/2024     6:59 AM   Eckardt Questionnaire   Dysphagia 1 1   Regurgitation 1 1   Retrosternal Pain 1 1   Weight Loss (kg) 2 1   Total Score:  5 4       Promis 10 Questionnaire      8/28/2023     9:57 AM 9/20/2023     9:31 AM 2/20/2024     7:01 AM   PROMIS 10 FLOWSHEET DATA   In general, would you say your health is: 3 2 2   In general, would you say your quality of life is: 2 1 2   In general, how would you rate your physical health? 2 1 2   In general, how would you rate your mental health, including your mood and your ability to think? 3 3 2   In general, how would you rate your satisfaction with your social activities and relationships? 1 3 2   In general, please rate how well you carry out your usual social activities and roles. (This includes activities at home, at work and in your community, and  responsibilities as a parent, child, spouse, employee, friend, etc.) 2 2 2   To what extent are you able to carry out your everyday physical activities such as walking, climbing stairs, carrying groceries, or moving a chair? 3 3 3   In the past 7 days, how often have you been bothered by emotional problems such as feeling anxious, depressed, or irritable? 4 3 3   In the past 7 days, how would you rate your fatigue on average? 3 3 3   In the past 7 days, how would you rate your pain on average, where 0 means no pain, and 10 means worst imaginable pain? 4 5 4   Mental health question re-calculation - no clinical value 2 3 3   Physical health question re-calculation - no clinical value 3 3 3   Pain question re-calculation - no clinical value 3 3 3   Global Mental Health Score 8 10 9   Global Physical Health Score 11 10 11   PROMIS TOTAL - SUBSCORES 19 20 20       STUDIES & PROCEDURES:    EGD:     2/15/2024 Protonix 40mg BID   Findings:        Esophagogastric landmarks were identified: the Z-line was found at 35        cm, the gastroesophageal junction was found at 35 cm and the site of the        diaphragmatic hiatus was found at 42 cm from the incisors.        Patially- healed, without eschars on ulcers, - LA Grade C (one or more        mucosal breaks continuous between tops of 2 or more mucosal folds, less        than 75% circumference) esophagitis with no bleeding was found 32- 35 cm        from the incisors.        The exam of the esophagus was otherwise normal.        A 7 cm hiatal hernia was present.        Multiple medium sessile polyps were found in the gastric body.        The exam of the stomach was otherwise normal.        The examined duodenum was normal.                                                                                     Impression:            - Esophagogastric landmarks identified.                          - LA Grade C reflux esophagitis with no bleeding.                          - 7 cm  hiatal hernia.                          - Multiple gastric polyps.                          - Normal examined duodenum.                          - No specimens collected.     8/8/2023  Findings:        The lumen of the esophagus was moderately dilated. Biopsies were taken        with a cold forceps for histology in the distal and proximal esophagus.        Verification of patient identification for the specimen was done.        LA Grade C (one or more mucosal breaks continuous between tops of 2 or        more mucosal folds, less than 75% circumference) esophagitis with no        bleeding was found.        The gastroesophageal flap valve was visualized endoscopically and        classified as Hill Grade IV (no fold, wide open lumen, hiatal hernia        present).        A 7 cm hiatal hernia was found. The proximal extent of the gastric folds        (end of tubular esophagus) was 37 cm from the incisors. The hiatal        narrowing was 44 cm from the incisors. The Z-line was 37 cm from the        incisors.        Multiple sessile polyps with no bleeding and no stigmata of recent        bleeding were found in the gastric body. benign fundic gland appearing        <1cm        The duodenal bulb, first portion of the duodenum, second portion of the        duodenum and examined duodenum were normal.                                                                                     Impression:            - Dilation in the entire esophagus. Biopsied.                          - LA Grade C reflux esophagitis with no bleeding.                          - Gastroesophageal flap valve classified as Hill Grade                          IV (no fold, wide open lumen, hiatal hernia present).                          - 7 cm hiatal hernia.                          - Multiple gastric polyps.                          - Normal duodenal bulb, first portion of the duodenum,                          second portion of the duodenum and examined  duodenum.      Final Diagnosis   A.  ESOPHAGUS, DISTAL, BIOPSY:  - Squamous mucosa with minimal chronic inflammation   - Negative for intestinal metaplasia or dysplasia     B.  ESOPHAGUS, PROXIMAL, BIOPSY:  - Squamous epithelium with no significant histopathologic abnormality  - No evidence of eosinophilic esophagitis       5/14/2021 [facility]         8/2015 [facility]         Colonoscopy:    8/8/2023  Findings:        The perianal and digital rectal examinations were normal.        A 4 mm polyp was found in the ascending colon. The polyp was sessile.        The polyp was removed with a cold snare. Resection and retrieval were        complete. Verification of patient identification for the specimen was        done. Estimated blood loss: none.        A 2 mm polyp was found in the recto-sigmoid colon. The polyp was        sessile. The polyp was removed with a jumbo cold forceps. Resection and        retrieval were complete. Verification of patient identification for the        specimen was done. Estimated blood loss: none.        A single small-mouthed diverticulum was found in the ascending colon.        There was no evidence of diverticular bleeding.        The retroflexed view of the distal rectum and anal verge was normal and        showed no anal or rectal abnormalities.                                                                                     Impression:            - One 4 mm polyp in the ascending colon, removed with                          a cold snare. Resected and retrieved.                          - One 2 mm polyp at the recto-sigmoid colon, removed                          with a jumbo cold forceps. Resected and retrieved.                          - Diverticulosis in the ascending colon. There was no                          evidence of diverticular bleeding.                          - The distal rectum and anal verge are normal on                          retroflexion view.     Final Diagnosis     C.   COLON, ASCENDING, POLYP:  - Tubular adenoma; negative for high-grade dysplasia     D.  COLON, SIGMOID/RECTUM, POLYP:  - Hyperplastic polyp          5/14/2021 [facility]     1/2014  Findings:        The digital rectal exam was abnormal. Findings include decreased        sphincter tone. Pertinent negatives include no palpable rectal lesions.        The terminal ileum appeared normal. A benign appearing sessile polyp was        found in the rectum. The polyp was 5 mm in size. The polyp was removed        with a hot snare. Resection and retrieval were complete. Estimated blood        loss was minimal. A segmental area of moderately congested and        erythematous mucosa was found in the distal rectum. Biopsies were taken        with a cold forceps for histology. Estimated blood loss was minimal.                                                                                    Impression:               - Rectal exam revealed decreased sphincter tone.                             - The examined portion of the ileum was normal.                             - One 5 mm polyp in the rectum (benign appearing).                             Resected and retrieved.                             - Congested and erythematous mucosa distal rectum.                             This was biopsied.                             - Rectal prolapse.                             - The clinical history suggests pelvic floor                             dysfunction with rectal prolapse and urinary                             retention requiring digital manipulation of the                             pelvic floor to facilitate bladder emptying. I                             believe further evaluation of the perineum at the                             pelvic floor center is indicated.        EndoFLIP directed at the UES or LES (8cm (EF-325) balloon length or 16cm (EF-322) balloon length):   Date:  8cm balloon  Balloon inflation Balloon pressure CSA (mm^2)  DI (mm^2/mmHg) Dmin (mm) Compliance   20 (ladmark ID)        30        40        50           16cm balloon  Balloon inflation Balloon pressure CSA (mm^2) DI (mm^2/mmHg) Dmin (mm) Compliance   30 (ladmark ID)        40        50        60        70           High Resolution Manometry:    PH/Impedance:     Bravo:    CT:    Esophagram:    FL VSS:     GES:    U/S:     XRAY:      Prior medical records were reviewed including, but not limited to, notes from referring providers, lab work, radiographic tests, and other diagnostic tests. Pertinent results were summarized above.     History     Past Medical History:   Diagnosis Date     Depression      GERD (gastroesophageal reflux disease)      Hemorrhoid      Menstrual disorder     s/p D&C -12/2012     ROMEO on CPAP      Other bipolar disorders     followed by Dr Collazo     Post menopausal syndrome      Tobacco abuse        Past Surgical History:   Procedure Laterality Date     COLONOSCOPY  12/24/2013    Procedure: COLONOSCOPY;;  Surgeon: Guy Grant MD;  Location: [facility]     COLONOSCOPY  1/9/2014    Procedure: COMBINED COLONOSCOPY, SINGLE BIOPSY/POLYPECTOMY BY BIOPSY;;  Surgeon: Guy Grant MD;  Location: [facility]     COLONOSCOPY N/A 8/8/2023    Procedure: COLONOSCOPY, WITH POLYPECTOMY AND BIOPSY;  Surgeon: René Rodriguez DO;  Location: [facility]     ESOPHAGOSCOPY, GASTROSCOPY, DUODENOSCOPY (EGD), COMBINED  12/24/2013    Procedure: COMBINED ESOPHAGOSCOPY, GASTROSCOPY, DUODENOSCOPY (EGD), BIOPSY SINGLE OR MULTIPLE;  ESOPHAGOSCOPY, GASTROSCOPY, DUODENOSCOPY, COLONOSCOPY;  Surgeon: Guy Grant MD;  Location: [facility]     ESOPHAGOSCOPY, GASTROSCOPY, DUODENOSCOPY (EGD), COMBINED N/A 8/8/2023    Procedure: ESOPHAGOGASTRODUODENOSCOPY, WITH BIOPSY;  Surgeon: René Rodriguez DO;  Location: [facility]     ESOPHAGOSCOPY, GASTROSCOPY, DUODENOSCOPY (EGD), COMBINED N/A 2/15/2024    Procedure: Esophagoscopy, gastroscopy, duodenoscopy (EGD), combined;  Surgeon: Sarmad Reed MD;  Location:   GI     GENITOURINARY SURGERY      urethra stretched     GYN SURGERY      d&c      LAPAROSCOPIC ASSISTED RECTOPEXY  3/14/2014    Procedure: LAPAROSCOPIC ASSISTED RECTOPEXY;  LAPAROSCOPIC  VENTRAL RECTOPEXY ;  Surgeon: Jennifer Connolly MD;  Location: [facility]     ORTHOPEDIC SURGERY      surgery on clavicle,surgery for left leg fracture       Social History     Socioeconomic History     Marital status:      Spouse name: Not on file     Number of children: Not on file     Years of education: Not on file     Highest education level: Not on file   Occupational History     Not on file   Tobacco Use     Smoking status: Former     Packs/day: .25     Types: Cigarettes     Quit date: 2023     Years since quittin.1     Passive exposure: Past     Smokeless tobacco: Never     Tobacco comments:     Quit for year, restarted .       : Now smokes a couple cigarettes per day - trying to quit.      quit 3 weeks and 3 days 1/10/24.   Vaping Use     Vaping Use: Never used   Substance and Sexual Activity     Alcohol use: No     Comment: Severe alcohol use disorder for 20 years.  :  Reports sober for past five years     Drug use: No     Sexual activity: Yes     Partners: Male   Other Topics Concern     Parent/sibling w/ CABG, MI or angioplasty before 65F 55M? Not Asked   Social History Narrative    2013    /single/    Children-    Work-    Tobacco-    ETOH-    Exercise-         Social Determinants of Health     Financial Resource Strain: Not on file   Food Insecurity: Not on file   Transportation Needs: Not on file   Physical Activity: Not on file   Stress: Not on file   Social Connections: Not on file   Interpersonal Safety: Not on file   Housing Stability: Not on file       Family History   Problem Relation Age of Onset     Cancer Mother         ovarian cancer     Hypertension Father      Breast Cancer No family hx of      Cancer - colorectal No family hx of      Anesthesia Reaction No  family hx of      Venous thrombosis No family hx of      Family history reviewed and edited as appropriate    Medications and Allergies:     Outpatient Encounter Medications as of 2/20/2024   Medication Sig Dispense Refill     albuterol (PROAIR HFA/PROVENTIL HFA/VENTOLIN HFA) 108 (90 Base) MCG/ACT inhaler Inhale 2 puffs into the lungs every 6 hours as needed for shortness of breath, wheezing or cough 18 g 0     ammonium lactate (AMLACTIN) 12 % external cream Apply topically daily as needed for dry skin 385 g 1     bisacodyl (DULCOLAX) 5 MG EC tablet Two days prior to exam take two (2) tablets at 4pm. One day prior to exam take two (2) tablets at 4pm 4 tablet 0     buPROPion (WELLBUTRIN XL) 150 MG 24 hr tablet 300 mg every morning       busPIRone HCl (BUSPAR) 30 MG tablet Take 1 tablet by mouth 2 times daily       Doxepin HCl 150 MG CAPS Take 300 mg by mouth At Bedtime 30 capsule 0     econazole nitrate 1 % external cream APPLY TOPICALLY TO FEET AND TOENAILS DAILY 85 g 5     escitalopram (LEXAPRO) 10 MG tablet Take 10 mg by mouth every morning       esomeprazole (NEXIUM) 20 MG DR capsule Take 1 capsule (20 mg) by mouth 2 times daily for 180 days Take 30-60 minutes before eating. 180 capsule 1     lamoTRIgine (LAMICTAL) 100 MG tablet Take 100 mg by mouth every morning       levothyroxine (SYNTHROID/LEVOTHROID) 150 MCG tablet Take 1 tablet (150 mcg) by mouth daily (Patient taking differently: Take 150 mcg by mouth every morning) 90 tablet 2     nicotine (NICODERM CQ) 21 MG/24HR 24 hr patch Place 1 patch onto the skin every 24 hours 30 patch 1     nystatin (MYCOSTATIN) 353966 UNIT/GM external powder Apply topically daily 60 g 11     order for DME Equipment being ordered: CPAP  Please dispense Cpap and its supply 1 Units prn     pantoprazole (PROTONIX) 40 MG EC tablet TAKE 1 TABLET BY MOUTH 1 1/2 HOURS PRIOR TO LAST MEAL AND 1 TABLET AT BEDTIME. (Patient taking differently: 40 mg 2 times daily) 180 tablet 0      polyethylene glycol (GOLYTELY) 236 g suspension Take as directed. Two days before your exam fill the first container with water. Cover and shake until mixed well. At 5:00pm drink one 8oz glass every 10-15 minutes until half (1/2) of the first container is empty. Store the remainder in the refrigerator. One day before your exam at 5:00pm drink the second half of the first container until it is gone. Before you go to bed mix the second container with water and put in refrigerator. Six hours before your check in time drink one 8oz glass every 10-15 minutes until half of container is empty. Discard the remainder of solution. 8000 mL 0     polyethylene glycol (MIRALAX) 17 GM/Dose powder Take 17 g (1 Capful) by mouth daily for 180 days (Patient taking differently: Take 1 Capful by mouth as needed) 510 g 3     REXULTI 2 MG tablet Take 2 mg by mouth every morning       topiramate (TOPAMAX) 25 MG tablet 25 mg at bedtime for 1 week, 50 mg at bedtime for 1 week and 75 mg daily at bedtime thereafter (Patient taking differently: Take 25 mg by mouth at bedtime 25 mg at bedtime for 1 week, 50 mg at bedtime for 1 week and 75 mg daily at bedtime thereafter) 90 tablet 4     triamcinolone (KENALOG) 0.025 % external ointment Apply topically 2 times daily To affected area in the groin as needed up to 2 weeks at a time. 30 g 2     triamcinolone (KENALOG) 0.1 % external cream Apply topically 2 times daily Only for 2 weeks. 30 g 0     No facility-administered encounter medications on file as of 2/20/2024.        Allergies   Allergen Reactions     Contrast Dye Shortness Of Breath     Methylpyrrolidone Swelling     Iodine Swelling     Other reaction(s): Angioedema  Facial swelling.     Morphine Anxiety and Nausea and Vomiting     Other reaction(s): Behavioral Disturbances  Other reaction(s): Anxiety, vomiting        Review of systems:  A full 10 point review of systems was obtained and was negative except for the pertinent positives and  negatives stated within the HPI.    Objective Findings:   Physical Exam:    Constitutional: LMP 07/20/2006   General: Alert, cooperative, no distress, well-appearing  Head: Atraumatic, normocephalic, no obvious abnormalities   Eyes: Sclera anicteric, no obvious conjunctival hemorrhage   Nose: Nares normal, no obvious malformation, no obvious rhinorrhea   Respiratory: Resting comfortably, no apparent distress, no cough.   Skin: No jaundice, no obvious rash  Neurologic: AAOx3, no obvious neurologic abnormality  Psychiatric: Normal Affect, appropriate mood  Extremities: No obvious edema, no obvious malformation     Labs, Radiology, Pathology     Lab Results   Component Value Date    WBC 5.1 03/28/2023    WBC 7.9 02/10/2022    WBC 6.5 04/15/2021    HGB 11.7 03/28/2023    HGB 12.2 02/10/2022    HGB 11.7 04/15/2021     03/28/2023     02/10/2022     04/15/2021    CHOL 174 03/28/2023    CHOL 183 02/10/2022    CHOL 221 (H) 04/15/2021    TRIG 82 03/28/2023    TRIG 91 01/07/2020    TRIG 115 12/11/2018    HDL 69 03/28/2023    HDL 66 02/10/2022    HDL 62 01/07/2020    ALT 21 03/28/2023    ALT 23 10/04/2022    ALT 17 09/12/2022    AST 21 03/28/2023    AST 23 10/04/2022    AST 19 09/12/2022     03/28/2023     02/10/2022     04/15/2021    BUN 15.4 03/28/2023    BUN 15 02/10/2022    BUN 18 04/15/2021    CO2 26 03/28/2023    CO2 27 02/10/2022    CO2 25 04/15/2021    TSH 0.85 03/28/2023    TSH 1.39 02/10/2022    TSH 1.34 04/15/2021    INR 0.82 (L) 04/24/2006        Liver Function Studies -   Recent Labs   Lab Test 03/28/23  0911   PROTTOTAL 7.6   ALBUMIN 4.3   BILITOTAL 0.2   ALKPHOS 123*   AST 21   ALT 21          Patient Active Problem List    Diagnosis Date Noted     Hiatal hernia 03/28/2023     Priority: Medium     Insomnia, unspecified type 03/28/2023     Priority: Medium     Numbness and tingling in left hand 12/07/2022     Priority: Medium     Obesity (BMI 35.0-39.9) with comorbidity (H)  01/23/2020     Priority: Medium     Vitamin D deficiency 02/20/2018     Priority: Medium     Hidradenitis suppurativa of right axilla 08/24/2016     Priority: Medium     Hypothyroidism due to medication 12/22/2015     Priority: Medium     lithium       Prediabetes 12/08/2015     Priority: Medium     Gastroesophageal reflux disease without esophagitis 10/13/2015     Priority: Medium     Bipolar 2 disorder (H) 07/28/2015     Priority: Medium     Psychosis (H) 05/08/2015     Priority: Medium     Health Care Home 01/13/2015     Priority: Medium     State Tier Level:  unknown  Status:  n/a-Patient has [facility]  working with her. 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) 09/26/2013     Priority: Medium     CARDIOVASCULAR SCREENING; LDL GOAL LESS THAN 160 09/26/2013     Priority: Medium     Major depressive disorder, recurrent episode, moderate (H) 09/26/2013     Priority: Medium     ROMEO on CPAP      Priority: Medium     Tobacco abuse      Priority: Medium     Post menopausal syndrome      Priority: Medium      Assessment and Plan   Assessment/Plan:    Gudelia Dong is a 58 year old female with significant past medical history pertinent for insomnia, depression, ADHD, bipolar type II, tobacco use, history of alcoholism, psychosis, HydroDIURIL suppurativa, history of iron deficiency anemia, obesity, prediabetes, hypothyroidism, ROMEO on CPAP, hiatal hernia  and GERD who is presenting as a follow up patient with a chief complaint of heartburn/regurgiation and dysphagia.    #Reflux  #Large Hiatal Hernia - 7 cm   #Grade C Erosive Esophagitis    Prior evaluation includes:    EGD with [facility] 5/14/2021 with LA grade C erosive esophagitis and a large hiatal hernia.  Biopsies were obtained of the distal esophagus that were consistent with mild nonspecific inflammatory changes.  Negative for intestinal metaplasia/dysplasia.  Gastric biopsies and duodenal biopsies were unremarkable negative for H. pylori and celiac disease respectively.    EGD 8/8/2023 with LA grade C erosive esophagitis and a 7 cm hiatal hernia.  Distal esophageal biopsies with minimal chronic inflammation negative for intestinal aplasia or dysplasia.  Proximal esophageal biopsies no significant histopathologic abnormality.      2/15/2023 EGD with LA Grade C Esophagitis despite Protonix 40mg BID and a 7cm hiatal hernia.    Currently Gudelia is taking Protonix 40 mg twice daily with adequate control of her heartburn symptoms.  At our last office visit she was asked to discontinue Protonix and switch to Nexium 20 mg twice daily as she continued to have erosive esophagitis on endoscopy.  However, she continued to receive a prescription for Protonix an alternative provider and did not start the Nexium.  At the time of her last endoscopy she was taking Protonix 40 mg once daily. Previously consultation was placed to the bariatric surgery team for consideration of hiatal hernia repair with possible bariatric surgical intervention. Today patient expresses that she is no longer interested in weight loss surgery and wishes to proceed with medical weight management.  Consultation will be placed to thoracic surgery for repair of hiatal hernia.  Additionally, she continues to also have intermittent solid food dysphagia which is likely secondary to reflux with persistent erosive esophagitis, large hiatal hernia however  intrinsic motility disorder of the esophagus other should also be considered. Evaluation with time barium esophagram and high-resolution manometry study should be completed prior to surgical intervention.    - Timed barium esophagram with tablet to further characterize esophageal motility/hiatal hernia. To schedule imaging, please call [phone]   - High resolution manometry study   - Consultation to thoracic surgery   - Discontinue Protonix 40 mg twice daily. Start Esomeprazole (Nexium) 20 mg twice daily this is best taken on an empty stomach a least 30 - 60 minutes prior to meals  - Upper endoscopy after being treated with Nexium 8 to 12 weeks to evaluate for resolution of esophagitis/for Cruz's esophagus  - With history of erosive esophagitis patient will need to remain on PPI indefinitely   - Please follow reflux lifestyle modifications as directed in AVS   - Please sleep with the head end of the bed elevated to at least 30 degrees or with a wedge pillow   - Attempt to not eat or drink 3-4 hours prior to laying down for bed or prior to laying down flat    #Gastric Polyps  EGD 8/8/2023 with multiple sessile polyps found within the gastric body less than 1 cm.  Appearance was consistent with benign fundic gland polyp per performing endoscopist.  Again, noted on endoscopy 2/15/2023.    #Colorectal Cancer Screening   #Constipation   8/8/2023 colonoscopy with one 4mm tubular adenoma within the ascending colon and a 2 mm hyperplastic polyp.     Colonoscopy May 2021 with [facility] that was described to be of poor quality examination secondary to difficulties with sedation/constant patient movement, poor prep and redundant colon.  No large lesions were appreciated.     Colonoscopy 2014 with one 5 mm rectal polyp of unknown etiology.     No family history of colorectal cancer. Per the most recent update by the [place] Multi-Society Task Force on Colorectal Cancer recall should be 7 years, 2030 or sooner if otherwise  indicated.     At our last visit Gudelia had presented with symptoms consistent with obstipation with overflow diarrhea. Previoulsy she was struggling with constipation associated with intermittent pushing/strianing.  She had denied rectal digitation, vaginal splinting or sensation of incomplete evacuation. Future considerations would be consultation to the pelvic floor center.  Now with the use of MiraLAX 17 g 3x per week she is stooling every other day which is consistent with Grannis stool scale 1-4.    - Recall colonoscopy for routine colon cancer screening 2030  - Increase MiraLAX to 17g (1 capful) daily this can be increased to twice daily dosing if needed      Follow up plan:   Return to clinic 3 months and as needed.    The risks and benefits of my recommendations, as well as other treatment options were discussed with the patient and any available family today. All questions were answered.     o Follow up: As planned above. Today, I personally spent 23 minutes in direct face to face time with the patient, of which greater than 50% of the time was spent in patient education and counseling as described above. Approximately 20 minutes were spent on indirect care associated with the patient's consultation including but not limited to review of: patient medical records to date, clinic visits, hospital records, lab results, imaging studies, procedural documentation, and coordinating care with other providers. The findings from this review are summarized in the above note. All of the above accounted for a cumulative time of 43 minutes and was performed on the date of service.     The patient verbalized understanding of the plan and was appreciative for the time spent and information provided during the office visit.           Rosalie Martínez PA-C  Division of Gastroenterology, Hepatology, and Nutrition  [facility]       Documentation assisted by voice recognition and documentation system.

## 2024-02-20 NOTE — LETTER
2/20/2024         RE: Gudelia Dong  [address]  [place] [address]        Dear Colleague,    Thank you for referring your patient, Gudelia Dong, to the [facility]. Please see a copy of my visit note below.    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 Skin, right labia, punch:  - Epidermal hyperplasia and mild perivascular inflammation - (see comment)       ____________________________________________    Assessment & Plan:     # Lichen Simplex Chronicus, stable, improving  - Recommended patient switch to a non-scented detergent, such as Tide Free and Clear   - Avoid using soap in the groin area, use water to cleanse.   - refilled topicals today, triamcinolone 0.025% ointment twice daily to affected areas until resolved.  And as needed      Procedures Performed:   N/A    Follow-up: 1 year(s) in-person if more medication is needed, otherwise PRN    Staff and Scribe:   Scribe Disclosure:   By signing my name below, I, Leslie Browningdeemarianna, attest that this documentation has been prepared under the direction and in the presence of Denise Bland PA-C.  - Electronically Signed: Leslie Mae 02/20/24       Provider Disclosure:  I agree with above History, Review of Systems, Physical exam and Plan.  I have reviewed the content of the documentation and have edited it as needed. I have personally performed the services documented here and the documentation accurately represents those services and the decisions I have made.      Electronically signed by:  All risk, benefits and alternatives were discussed with patient.  Patient is in agreement and understands the assessment and plan.  All questions were answered.  Sun Screen Education was given.   Return to Clinic annually or sooner as needed.   Denies Bland  ALEJANDRO        ____________________________________________    CC: Derm Problem (Rash follow up)    HPI:  Ms. Gudelia Dong is a(n) 58 year old female who presents today for follow-up  on rash.    Patient reports the Triamcinolone did not take long to start working. She reports it started working within 2 weeks and worked very well. She is extremely satisfied with the Triamcinolone. She notes she will occasionally get itching and will use the Triamcinolone, and it relieves the symptoms. She has no itching today.    Patient is unaware of any family history of psoriasis. Patient states she was lean growing up, but at age 40, her weight increased to 225 lbs, and at the time of her last visit, had increased to 275 lbs.     Patient is otherwise feeling well, without additional skin concerns.     Labs Reviewed:  N/A    Physical exam:  Vitals: LMP 07/20/2006   GEN: This is a well developed, well-nourished female in no acute distress, in a pleasant mood.    SKIN: Focused examination of the labia and perineum was performed.  - no active eruption  - No other lesions of concern on areas examined.           Medications:  Current Outpatient Medications   Medication     albuterol (PROAIR HFA/PROVENTIL HFA/VENTOLIN HFA) 108 (90 Base) MCG/ACT inhaler     ammonium lactate (AMLACTIN) 12 % external cream     bisacodyl (DULCOLAX) 5 MG EC tablet     buPROPion (WELLBUTRIN XL) 150 MG 24 hr tablet     busPIRone HCl (BUSPAR) 30 MG tablet     Doxepin HCl 150 MG CAPS     econazole nitrate 1 % external cream     escitalopram (LEXAPRO) 10 MG tablet     lamoTRIgine (LAMICTAL) 100 MG tablet     levothyroxine (SYNTHROID/LEVOTHROID) 150 MCG tablet     nicotine (NICODERM CQ) 21 MG/24HR 24 hr patch     nystatin (MYCOSTATIN) 419982 UNIT/GM external powder     pantoprazole (PROTONIX) 40 MG EC tablet     polyethylene glycol (MIRALAX) 17 GM/Dose powder     REXULTI 2 MG tablet     topiramate (TOPAMAX) 25 MG tablet     esomeprazole (NEXIUM) 20 MG DR capsule      order for DME     polyethylene glycol (GOLYTELY) 236 g suspension     triamcinolone (KENALOG) 0.025 % external ointment     triamcinolone (KENALOG) 0.1 % external cream     No current facility-administered medications for this visit.      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)     CARDIOVASCULAR SCREENING; LDL GOAL LESS THAN 160     Major depressive disorder, recurrent episode, moderate (H)     Anemia     Tubular adenoma     Iron deficiency anemia     Hyponatremia     ADHD, predominantly inattentive type     overweight BMI>30     Periumbilical hernia     Health Care Home     Psychosis (H)     Bipolar 2 disorder (H)     Gastroesophageal reflux disease without esophagitis     Prediabetes     Hypothyroidism due to medication     Hidradenitis suppurativa of right axilla     Vitamin D deficiency     Obesity (BMI 35.0-39.9) with comorbidity (H)     Numbness and tingling in left hand     Hiatal hernia     Insomnia, unspecified type     Past Medical History:   Diagnosis Date     Depression      GERD (gastroesophageal reflux disease)      Hemorrhoid      Menstrual disorder     s/p D&C -12/2012     ROMEO on CPAP      Other bipolar disorders     followed by Dr Collazo     Post menopausal syndrome      Tobacco abuse        CC Puma Matt MD  [address] [place] [address] on close of this encounter.       Again, thank you for allowing me to participate in the care of your patient.        Sincerely,        Denise Bland PA-C

## 2024-02-20 NOTE — PROGRESS NOTES
New Patient Thoracic Surgery Nurse Navigator Note     Referring provider: Rosal[facility] Mart[facility]     Referred to (specialty): Thoracic Surgery    Requested provider (if applicable): n/a     Date Referral Received: 2/20/2024     Evaluation for : Gastroesophageal reflux disease without esophagitis    Hiatal hernia    Erosive esophagitis     Clinical History (per Nurse review of records provided):    **BOOK MARKED**     Date TBD for esophagram ordered by referring.      Date TBD for CT Chest W/O      02/15/2024:  Upper GI endoscopy                   Findings:       Esophagogastric landmarks were identified: the Z-line was found at 35        cm, the gastroesophageal junction was found at 35 cm and the site of the        diaphragmatic hiatus was found at 42 cm from the incisors.        Patially- healed, without eschars on ulcers, - LA Grade C (one or more        mucosal breaks continuous between tops of 2 or more mucosal folds, less        than 75% circumference) esophagitis with no bleeding was found 32- 35 cm        from the incisors.        The exam of the esophagus was otherwise normal.        A 7 cm hiatal hernia was present.        Multiple medium sessile polyps were found in the gastric body.        The exam of the stomach was otherwise normal.        The examined duodenum was normal.                                                                                    Impression:            - Esophagogastric landmarks identified.                          - LA Grade C reflux esophagitis with no bleeding.                          - 7 cm hiatal hernia.                          - Multiple gastric polyps.                          - Normal examined duodenum.                          - No specimens collected.   Recommendation:        - Discharge patient to home.                          - Resume previous diet.                          - Continue  present medications.                          - Return to referring physician as previously                          scheduled.                                                                                      Sarmad Reed MDTimed barium esophagram with tablet to further characterize esophageal motility/hiatal hernia. To schedule imaging, please call [phone]      Records Location (Care Everywhere, Media, etc.): [facility]     Records Needed: none     Additional testing needed prior to consult: esophagram-ordered by referring   CT Chest W/O

## 2024-02-28 NOTE — TELEPHONE ENCOUNTER
RECORDS STATUS - ALL OTHER DIAGNOSIS      RECORDS RECEIVED FROM: Epic   DATE RECEIVED:    NOTES STATUS DETAILS   OFFICE NOTE from referring provider Epic 02/20/24: Rosalie Martínez PA-C   DISCHARGE SUMMARY from hospital [facility] 02/15/24: [facility]   OPERATIVE REPORT Epic 02/15/24: EGD   MEDICATION LIST [facility]    LABS     PATHOLOGY REPORTS Reports in EPIC 08/08/23: [id number]  12/24/13: [id number]   ANYTHING RELATED TO DIAGNOSIS Epic Most recent 11/16/23   IMAGING (NEED IMAGES & REPORT)     XRAYS PACS 12/11/18, 09/05/18: XR Chest

## 2024-03-05 ENCOUNTER — TELEPHONE (OUTPATIENT)
Dept: GASTROENTEROLOGY | Facility: CLINIC | Age: 59
End: 2024-03-05
Payer: COMMERCIAL

## 2024-03-05 NOTE — TELEPHONE ENCOUNTER
"Endoscopy Scheduling Screen    Have you had a positive Covid test in the last 14 days?  No    Are you active on MyChart?   Yes    What insurance is in the chart?  Other:  [employer]    Ordering/Referring Provider:     ALESHA GUNTER      (If ordering provider performs procedure, schedule with ordering provider unless otherwise instructed. )    BMI: Estimated body mass index is 43.49 kg/m  as calculated from the following:    Height as of 2/12/24: 1.715 m (5' 7.52\").    Weight as of 2/12/24: 127.9 kg (282 lb).     Sedation Ordered  MAC/deep sedation.   BMI<= 45 45 < BMI <= 48 48 < BMI < = 50  BMI > 50   No Restrictions No [facility]  No [facility]  [facility] with exceptions Hospital Only OR Only       Are you taking any prescription medications for pain 3 or more times per week?   NO - No RN review required.    Do you have a history of malignant hyperthermia or adverse reaction to anesthesia?  No    (Females) Are you currently pregnant?        Have you been diagnosed or told you have pulmonary hypertension?   No    Do you have an LVAD?  No    Have you been told you have moderate to severe sleep apnea?  Yes (RN Review required for scheduling unless scheduling in Hospital.)    Have you been told you have COPD, asthma, or any other lung disease?  No    Do you have any heart conditions?  No     Have you ever had an organ transplant?   No    Have you ever had or are you awaiting a heart or lung transplant?   No    Have you had a stroke or transient ischemic attack (TIA aka \"mini stroke\" in the last 6 months?   No    Have you been diagnosed with or been told you have cirrhosis of the liver?   No    Are you currently on dialysis?   No    Do you need assistance transferring?   No    BMI: Estimated body mass index is 43.49 kg/m  as calculated from the following:    Height as of 2/12/24: 1.715 m (5' 7.52\").    Weight as of 2/12/24: 127.9 kg (282 lb).     Is patients BMI > 40 and scheduling location [facility]?  Yes (If MAC sedation is ordered, " schedule PAC eval)    Do you take an injectable medication for weight loss or diabetes (excluding insulin)?  No    Do you take the medication Naltrexone?  No    Do you take blood thinners?  No       Prep   Are you currently on dialysis or do you have chronic kidney disease?  No    Do you have a diagnosis of diabetes?  No    Do you have a diagnosis of cystic fibrosis (CF)?  No    On a regular basis do you go 3 -5 days between bowel movements?      BMI > 40?      Preferred Pharmacy:    [id number] - [place], [place] - [address] [facility] [address]  [address]  [place] [address]  Phone: [phone] Fax: [phone]      Final Scheduling Details   Colonoscopy prep sent?      Procedure scheduled  Upper endoscopy (EGD)    Surgeon:  KAT     Date of procedure:  8/13     Pre-OP / PAC:   Yes - PAC clinic evaluation scheduled.    Location  [facility] - Per order.    Sedation   MAC/Deep Sedation - Per order.      Patient Reminders:   You will receive a call from a Nurse to review instructions and health history.  This assessment must be completed prior to your procedure.  Failure to complete the Nurse assessment may result in the procedure being cancelled.      On the day of your procedure, please designate an adult(s) who can drive you home stay with you for the next 24 hours. The medicines used in the exam will make you sleepy. You will not be able to drive.      You cannot take public transportation, ride share services, or non-medical taxi service without a responsible caregiver.  Medical transport services are allowed with the requirement that a responsible caregiver will receive you at your destination.  We require that drivers and caregivers are confirmed prior to your procedure.

## 2024-03-21 ENCOUNTER — PRE VISIT (OUTPATIENT)
Dept: SURGERY | Facility: CLINIC | Age: 59
End: 2024-03-21

## 2024-03-21 ENCOUNTER — ANCILLARY PROCEDURE (OUTPATIENT)
Dept: CT IMAGING | Facility: CLINIC | Age: 59
End: 2024-03-21
Attending: CLINICAL NURSE SPECIALIST
Payer: COMMERCIAL

## 2024-03-21 ENCOUNTER — ONCOLOGY VISIT (OUTPATIENT)
Dept: SURGERY | Facility: CLINIC | Age: 59
End: 2024-03-21
Attending: PHYSICIAN ASSISTANT
Payer: COMMERCIAL

## 2024-03-21 VITALS
BODY MASS INDEX: 44.84 KG/M2 | HEART RATE: 109 BPM | TEMPERATURE: 97.6 F | SYSTOLIC BLOOD PRESSURE: 131 MMHG | HEIGHT: 67 IN | DIASTOLIC BLOOD PRESSURE: 85 MMHG | OXYGEN SATURATION: 99 % | WEIGHT: 285.7 LBS | RESPIRATION RATE: 20 BRPM

## 2024-03-21 DIAGNOSIS — K21.9 GASTROESOPHAGEAL REFLUX DISEASE WITHOUT ESOPHAGITIS: ICD-10-CM

## 2024-03-21 DIAGNOSIS — K44.9 HIATAL HERNIA: ICD-10-CM

## 2024-03-21 DIAGNOSIS — K22.10 EROSIVE ESOPHAGITIS: ICD-10-CM

## 2024-03-21 DIAGNOSIS — K44.9 HIATAL HERNIA: Chronic | ICD-10-CM

## 2024-03-21 PROCEDURE — 99213 OFFICE O/P EST LOW 20 MIN: CPT | Performed by: STUDENT IN AN ORGANIZED HEALTH CARE EDUCATION/TRAINING PROGRAM

## 2024-03-21 PROCEDURE — 99203 OFFICE O/P NEW LOW 30 MIN: CPT | Performed by: STUDENT IN AN ORGANIZED HEALTH CARE EDUCATION/TRAINING PROGRAM

## 2024-03-21 PROCEDURE — 71250 CT THORAX DX C-: CPT | Mod: GC | Performed by: RADIOLOGY

## 2024-03-21 ASSESSMENT — PAIN SCALES - GENERAL: PAINLEVEL: NO PAIN (0)

## 2024-03-21 NOTE — PROGRESS NOTES
THORACIC SURGERY - NEW PATIENT OFFICE VISIT          I saw Gudelia Dong in consultation for the evaluation and treatment of a hiatal hernia .     HPI  Gudelia Dong is a 59 year old female with complaints of dysphagia and regurgitation that has been ongoing for several years.  SHe is here to discuss a hiatal hernia repair. SHe was referred to weight management but didn't understand the importance of it and hence hasn't nfollowe dup      Previsit Tests   CT chest: moderate hiatal hernia   EGD: erosive esophagitis   PMH  Reviewed, as below    Past Medical History:   Diagnosis Date    Depression     GERD (gastroesophageal reflux disease)     Hemorrhoid     Menstrual disorder     s/p D&C -12/2012    ROMEO on CPAP     Other bipolar disorders     followed by Dr Collazo    Post menopausal syndrome     Tobacco abuse         PSH  Reviewed, as below    Past Surgical History:   Procedure Laterality Date    COLONOSCOPY  12/24/2013    Procedure: COLONOSCOPY;;  Surgeon: Guy Grant MD;  Location: [facility]    COLONOSCOPY  1/9/2014    Procedure: COMBINED COLONOSCOPY, SINGLE BIOPSY/POLYPECTOMY BY BIOPSY;;  Surgeon: Guy Grant MD;  Location: [facility]    COLONOSCOPY N/A 8/8/2023    Procedure: COLONOSCOPY, WITH POLYPECTOMY AND BIOPSY;  Surgeon: René Rodriguez DO;  Location: [facility]    ESOPHAGOSCOPY, GASTROSCOPY, DUODENOSCOPY (EGD), COMBINED  12/24/2013    Procedure: COMBINED ESOPHAGOSCOPY, GASTROSCOPY, DUODENOSCOPY (EGD), BIOPSY SINGLE OR MULTIPLE;  ESOPHAGOSCOPY, GASTROSCOPY, DUODENOSCOPY, COLONOSCOPY;  Surgeon: Guy Grant MD;  Location: [facility]    ESOPHAGOSCOPY, GASTROSCOPY, DUODENOSCOPY (EGD), COMBINED N/A 8/8/2023    Procedure: ESOPHAGOGASTRODUODENOSCOPY, WITH BIOPSY;  Surgeon: René Rodriguez DO;  Location: [facility]    ESOPHAGOSCOPY, GASTROSCOPY, DUODENOSCOPY (EGD), COMBINED N/A 2/15/2024    Procedure: Esophagoscopy, gastroscopy, duodenoscopy (EGD), combined;  Surgeon: Sarmad Reed MD;  Location: [facility]    GENITOURINARY SURGERY       urethra stretched    GYN SURGERY      d&c     LAPAROSCOPIC ASSISTED RECTOPEXY  3/14/2014    Procedure: LAPAROSCOPIC ASSISTED RECTOPEXY;  LAPAROSCOPIC  VENTRAL RECTOPEXY ;  Surgeon: Jennifer Connolly MD;  Location: [facility]    ORTHOPEDIC SURGERY      surgery on clavicle,surgery for left leg fracture        ETOH  TOB denies     Physical examination  BMI 45          Code Status: Full Code    Health Care Proxy:    IMPRESSION (K21.9) Gastroesophageal reflux disease without esophagitis  Comment:   Plan:     (K44.9) Hiatal hernia  Comment:   Plan:     (K22.10) Erosive esophagitis  Comment:   Plan:      This person is a 59 year old female with      PLAN  I spent 30 min on the date of the encounter in chart review, patient visit, review of tests, documentation and/or discussion with other providers about the issues documented above. I reviewed the plan as follows:    We discussed taht while she did have a hernia and reflux that would benefit from surgery, she would need to work on weight loss first to a BMI goal of 35 (preferably) for best results from the surgery and decrease the chances of early recurrence  She understand the improtance of this and will get on the weight management program  Refer to bariatric surgery by GI if weight loss is not possible    I appreciate the opportunity to participate in the care of your patient and will keep you updated.      Sincerely,    Marjorie Estes MD

## 2024-03-21 NOTE — LETTER
3/21/2024         RE: Gudelia Dong  [address]  [place] [address]        Dear Colleague,    Thank you for referring your patient, Gudelia Dong, to the [facility]. Please see a copy of my visit note below.    THORACIC SURGERY - NEW PATIENT OFFICE VISIT          I saw Gudelia Dong in consultation for the evaluation and treatment of a hiatal hernia .     HPI  Gudelia Dong is a 59 year old female with complaints of dysphagia and regurgitation that has been ongoing for several years.  SHe is here to discuss a hiatal hernia repair. SHe was referred to weight management but didn't understand the importance of it and hence hasn't nfollowe dup      Previsit Tests   CT chest: moderate hiatal hernia   EGD: erosive esophagitis   PMH  Reviewed, as below    Past Medical History:   Diagnosis Date    Depression     GERD (gastroesophageal reflux disease)     Hemorrhoid     Menstrual disorder     s/p D&C -12/2012    ROMEO on CPAP     Other bipolar disorders     followed by Dr Collazo    Post menopausal syndrome     Tobacco abuse         PSH  Reviewed, as below    Past Surgical History:   Procedure Laterality Date    COLONOSCOPY  12/24/2013    Procedure: COLONOSCOPY;;  Surgeon: Guy Grant MD;  Location: [facility]    COLONOSCOPY  1/9/2014    Procedure: COMBINED COLONOSCOPY, SINGLE BIOPSY/POLYPECTOMY BY BIOPSY;;  Surgeon: Guy Grant MD;  Location: [facility]    COLONOSCOPY N/A 8/8/2023    Procedure: COLONOSCOPY, WITH POLYPECTOMY AND BIOPSY;  Surgeon: René Rodriguez DO;  Location: [facility]    ESOPHAGOSCOPY, GASTROSCOPY, DUODENOSCOPY (EGD), COMBINED  12/24/2013    Procedure: COMBINED ESOPHAGOSCOPY, GASTROSCOPY, DUODENOSCOPY (EGD), BIOPSY SINGLE OR MULTIPLE;  ESOPHAGOSCOPY, GASTROSCOPY, DUODENOSCOPY, COLONOSCOPY;  Surgeon: Guy Grant MD;  Location: [facility]    ESOPHAGOSCOPY, GASTROSCOPY, DUODENOSCOPY (EGD), COMBINED N/A 8/8/2023    Procedure: ESOPHAGOGASTRODUODENOSCOPY, WITH BIOPSY;   Surgeon: René Rodriguez DO;  [facility]    ESOPHAGOSCOPY, GASTROSCOPY, DUODENOSCOPY (EGD), COMBINED N/A 2/15/2024    Procedure: Esophagoscopy, gastroscopy, duodenoscopy (EGD), combined;  Surgeon: Sarmad Reed MD;  [facility]    GENITOURINARY SURGERY      urethra stretched    GYN SURGERY      d&c     LAPAROSCOPIC ASSISTED RECTOPEXY  3/14/2014    Procedure: LAPAROSCOPIC ASSISTED RECTOPEXY;  LAPAROSCOPIC  VENTRAL RECTOPEXY ;  Surgeon: Jennifer Connolly MD;  Location:  [facility]    ORTHOPEDIC SURGERY      surgery on clavicle,surgery for left leg fracture        ETOH  TOB denies     Physical examination  BMI 45          Code Status: Full Code    Health Care Proxy:    IMPRESSION (K21.9) Gastroesophageal reflux disease without esophagitis  Comment:   Plan:     (K44.9) Hiatal hernia  Comment:   Plan:     (K22.10) Erosive esophagitis  Comment:   Plan:      This person is a 59 year old female with      PLAN  I spent 30 min on the date of the encounter in chart review, patient visit, review of tests, documentation and/or discussion with other providers about the issues documented above. I reviewed the plan as follows:    We discussed taht while she did have a hernia and reflux that would benefit from surgery, she would need to work on weight loss first to a BMI goal of 35 (preferably) for best results from the surgery and decrease the chances of early recurrence  She understand the improtance of this and will get on the weight management program  Refer to bariatric surgery by GI if weight loss is not possible    I appreciate the opportunity to participate in the care of your patient and will keep you updated.      Sincerely,    Marjorie Estes MD

## 2024-03-21 NOTE — NURSING NOTE
"Oncology Rooming Note    March 21, 2024 10:54 AM   Gudelia Dong is a 59 year old female who presents for:    Chief Complaint   Patient presents with    Oncology Clinic Visit     Gastroesophageal reflux disease without esophagitis; Hiatal hernia; Erosive esophagitis     Initial Vitals: /85 (BP Location: Right arm, Patient Position: Sitting, Cuff Size: Adult Large)   Pulse 109   Temp 97.6  F (36.4  C) (Oral)   Resp 20   Ht 1.697 m (5' 6.83\")   Wt 129.6 kg (285 lb 11.2 oz)   LMP 07/20/2006   SpO2 99%   BMI 44.97 kg/m   Estimated body mass index is 44.97 kg/m  as calculated from the following:    Height as of this encounter: 1.697 m (5' 6.83\").    Weight as of this encounter: 129.6 kg (285 lb 11.2 oz). Body surface area is 2.47 meters squared.  No Pain (0) Comment: Data Unavailable   Patient's last menstrual period was 07/20/2006.  Allergies reviewed: Yes  Medications reviewed: Yes    Medications: Medication refills not needed today.  Pharmacy name entered into [employer]: [id number] - [place], [place] - [address] AT [facility] [address]    Frailty Screening:   Is the patient here for a new oncology consult visit in cancer care? 1. Yes. Over the past month, have you experienced difficulty or required a caregiver to assist with:   1. Balance, walking or general mobility (including any falls)? NO  2. Completion of self-care tasks such as bathing, dressing, toileting, grooming/hygiene?  NO  3. Concentration or memory that affects your daily life?  NO       Clinical concerns: none       Kait Torres              "

## 2024-03-26 DIAGNOSIS — E03.2 HYPOTHYROIDISM DUE TO MEDICATION: ICD-10-CM

## 2024-03-26 RX ORDER — LEVOTHYROXINE SODIUM 150 UG/1
150 TABLET ORAL DAILY
Qty: 90 TABLET | Refills: 0 | Status: SHIPPED | OUTPATIENT
Start: 2024-03-26 | End: 2024-06-24

## 2024-03-26 NOTE — TELEPHONE ENCOUNTER
Patient called the clinic requesting Rx for levothyroxine (SYNTHROID/LEVOTHROID) 150 MCG tablet. Rx sent per protocol.

## 2024-03-29 ENCOUNTER — TELEPHONE (OUTPATIENT)
Dept: ENDOCRINOLOGY | Facility: CLINIC | Age: 59
End: 2024-03-29
Payer: COMMERCIAL

## 2024-03-29 NOTE — TELEPHONE ENCOUNTER
Left Voicemail (1st Attempt) and Sent Mychart (1st Attempt) for the patient to call back and schedule the following:    Appointment type: RET GABE/PRE-SURG  Provider: Alida Weinberg PA-C  Return date: Next Available   Specialty phone number: [phone]  Additional appointment(s) needed: no   Additonal Notes: Message per in basket: Can you please get this patient schedule with me, Gudelia Nelson, or Louise to discuss bariatric surgery or weight management. It is ok to be in NBS spot

## 2024-04-01 ENCOUNTER — TELEPHONE (OUTPATIENT)
Dept: GASTROENTEROLOGY | Facility: CLINIC | Age: 59
End: 2024-04-01
Payer: MEDICAID

## 2024-04-01 NOTE — TELEPHONE ENCOUNTER
Patient Contacted regarding the following:    Appointment type: NEW GABE/Consult   Provider: Any of the weight management PA'S  Return date: Next Available  Specialty phone number: [phone]  Additional appointment(s) needed: no   Additonal Notes: Contacted pt per in basket message, pt is unable to schedule at this time due to insurance reasons. Gave pt call center number, pt will call back when she has insurance sorted out.

## 2024-04-18 NOTE — TELEPHONE ENCOUNTER
FUTURE VISIT INFORMATION      SURGERY INFORMATION:  Date: 24  [facility]  Surgeon:  René Rodriguez DO   Anesthesia Type:  general  Procedure: Esophagoscopy, gastroscopy, duodenoscopy (EGD), combined     RECORDS REQUESTED FROM:       Primary Care Provider: [facility]    Most recent EKG+ Tracin/28/15    Most recent ECHO: 5/8/15

## 2024-06-02 ENCOUNTER — HEALTH MAINTENANCE LETTER (OUTPATIENT)
Age: 59
End: 2024-06-02

## 2024-06-24 DIAGNOSIS — E03.2 HYPOTHYROIDISM DUE TO MEDICATION: ICD-10-CM

## 2024-06-24 RX ORDER — LEVOTHYROXINE SODIUM 150 UG/1
150 TABLET ORAL DAILY
Qty: 60 TABLET | Refills: 0 | Status: SHIPPED | OUTPATIENT
Start: 2024-06-24 | End: 2024-08-26

## 2024-06-24 NOTE — CONFIDENTIAL NOTE
Call placed to patient informing them to contact the clinic and schedule a Lab only visit to draw a current TSH level.

## 2024-06-24 NOTE — TELEPHONE ENCOUNTER
Pt is out of medication and is using [facility] now. Rx and preferred pharmacy pended.     Pt agreed to schedule appt after July since she will have a new insurance.

## 2024-06-24 NOTE — TELEPHONE ENCOUNTER
Pt aware needing lab for future refills,pt reported not having any current supply    Pt aware 1 time refill sent.    Pt will schedule a Lab only visit to draw a current TSH level. Pt refused assistance scheduling appt    Thompson Garcia CMA on 6/24/2024 at 3:16 PM

## 2024-07-09 DIAGNOSIS — K21.9 GASTROESOPHAGEAL REFLUX DISEASE WITHOUT ESOPHAGITIS: ICD-10-CM

## 2024-07-09 DIAGNOSIS — K22.10 EROSIVE ESOPHAGITIS: ICD-10-CM

## 2024-07-09 DIAGNOSIS — K44.9 HIATAL HERNIA: ICD-10-CM

## 2024-07-09 NOTE — TELEPHONE ENCOUNTER
Pt has upcoming EGD in August and prescription will run out before pt can have procedure. Sent in refills for pt to complete procedure and adjustments to medication may be made based on the results of the endoscopy.

## 2024-07-13 ENCOUNTER — LAB (OUTPATIENT)
Dept: LAB | Facility: CLINIC | Age: 59
End: 2024-07-13
Payer: COMMERCIAL

## 2024-07-13 DIAGNOSIS — E03.2 HYPOTHYROIDISM DUE TO MEDICATION: ICD-10-CM

## 2024-07-13 LAB
T4 FREE SERPL-MCNC: 1.09 NG/DL (ref 0.9–1.7)
TSH SERPL DL<=0.005 MIU/L-ACNC: 5.78 UIU/ML (ref 0.3–4.2)

## 2024-07-13 PROCEDURE — 36415 COLL VENOUS BLD VENIPUNCTURE: CPT

## 2024-07-13 PROCEDURE — 84439 ASSAY OF FREE THYROXINE: CPT

## 2024-07-13 PROCEDURE — 84443 ASSAY THYROID STIM HORMONE: CPT

## 2024-07-19 ENCOUNTER — MYC MEDICAL ADVICE (OUTPATIENT)
Dept: GASTROENTEROLOGY | Facility: CLINIC | Age: 59
End: 2024-07-19
Payer: COMMERCIAL

## 2024-07-19 ENCOUNTER — TELEPHONE (OUTPATIENT)
Dept: GASTROENTEROLOGY | Facility: CLINIC | Age: 59
End: 2024-07-19
Payer: COMMERCIAL

## 2024-07-19 NOTE — TELEPHONE ENCOUNTER
PA Initiation    Medication:  Nexium 20mg BID  Insurance Company:    [facility] F[facility]  Filling Pharmacy Phone:  [phone]  Filling Pharmacy Fax:  [phone]  Start Date:  07/09/2024

## 2024-07-19 NOTE — TELEPHONE ENCOUNTER
PA Initiation    Medication: ESOMEPRAZOLE MAGNESIUM 20 MG PO CPDR  Insurance Company: [employer] ([facility]) - Phone [phone] Fax [phone]  Pharmacy Filling the Rx:    Filling Pharmacy Phone:    Filling Pharmacy Fax:    Start Date: 7/19/2024  [email or web address]

## 2024-07-22 ENCOUNTER — PRE VISIT (OUTPATIENT)
Dept: SURGERY | Facility: CLINIC | Age: 59
End: 2024-07-22

## 2024-07-22 ENCOUNTER — VIRTUAL VISIT (OUTPATIENT)
Dept: SURGERY | Facility: CLINIC | Age: 59
End: 2024-07-22
Payer: COMMERCIAL

## 2024-07-22 ENCOUNTER — ANESTHESIA EVENT (OUTPATIENT)
Dept: GASTROENTEROLOGY | Facility: CLINIC | Age: 59
End: 2024-07-22
Payer: COMMERCIAL

## 2024-07-22 VITALS — WEIGHT: 292 LBS | BODY MASS INDEX: 45.83 KG/M2 | HEIGHT: 67 IN

## 2024-07-22 DIAGNOSIS — Z01.818 PRE-OP EVALUATION: Primary | ICD-10-CM

## 2024-07-22 DIAGNOSIS — K22.10 EROSIVE ESOPHAGITIS: ICD-10-CM

## 2024-07-22 DIAGNOSIS — K44.9 HIATAL HERNIA: ICD-10-CM

## 2024-07-22 DIAGNOSIS — K21.9 GASTROESOPHAGEAL REFLUX DISEASE WITHOUT ESOPHAGITIS: Chronic | ICD-10-CM

## 2024-07-22 PROCEDURE — 99215 OFFICE O/P EST HI 40 MIN: CPT | Mod: 95 | Performed by: NURSE PRACTITIONER

## 2024-07-22 RX ORDER — PANTOPRAZOLE SODIUM 40 MG/1
80 TABLET, DELAYED RELEASE ORAL 2 TIMES DAILY
COMMUNITY
End: 2024-10-01

## 2024-07-22 ASSESSMENT — PAIN SCALES - GENERAL: PAINLEVEL: NO PAIN (0)

## 2024-07-22 ASSESSMENT — LIFESTYLE VARIABLES: TOBACCO_USE: 1

## 2024-07-22 ASSESSMENT — COPD QUESTIONNAIRES: COPD: 0

## 2024-07-22 NOTE — PROGRESS NOTES
Gudelia is a 59 year old who is being evaluated via a billable video visit.    How would you like to obtain your AVS? MyChart  If the video visit is dropped, the invitation should be resent by: Text to cell phone: [phone]    Bryan Galeas is a 59 year old, presenting for the following health issues:  Pre-Op Exam    HPI         Physical Exam

## 2024-07-22 NOTE — PATIENT INSTRUCTIONS
Name:  Gudelia Dong   MRN:  [id number]   :  1965   Today's Date:  2024     GI Lab procedures:    A representative from the GI Lab will contact you regarding date, arrival time, location, eating and drinking instructions, and give you additional information..      You were seen today in the PAC Clinic.   (Pre-operative Anesthesia Assessment Center)  [facility]  [address] [place] [address]  [address]   phone [phone]    You had a pre-operative assessment done.    Anesthesia recommendations for medications:    Hold Aspirin for 2 days before procedure.  Hold Multivitamins for 7 days before procedure.   Hold Herbal medications and Supplements for 7 days before procedure.  Hold Ibuprofen for 2 days before procedure.   Hold Naproxen for 2 days before procedure.   Acetaminophen (Tylenol) is okay to take if needed.    No alcohol or cannabis products for 24 hours before your procedure.      Please take these medications the day of procedure:  Bupropion (Wellbutrin XL)  Buspirone (Buspar)  Escitalopram (Lexapro)  Lamotrigine (Lamictal)  Levothyroxine (Synthroid)  Pantoprazole (Protonix)  Rexulti  Albuterol inhaler if needed    Bring Albuterol inhaler.      For questions or appointments, call:  [facility]: [phone], option 2.  Monday through Friday, 8 a.m. to 4:30 p.m.  (If it is after hours, please reach out to the clinic or provider that scheduled your appointment)

## 2024-07-22 NOTE — TELEPHONE ENCOUNTER
OTC medications not covered by plan      PRIOR AUTHORIZATION DENIED    Medication: ESOMEPRAZOLE MAGNESIUM 20 MG PO CPDR  Insurance Company: [employer] ([facility]) - Phone [phone] Fax [phone]  Denial Date: 7/19/2024  Denial Reason(s):

## 2024-07-22 NOTE — OR NURSING
Was asked by [employer] to go over medication instructions with Gudelia Dong for the upcoming GI procedure on 8-13-24. Called home/cell number and phone rang and then call went to voicemail. Left message to refer to My Chart instructions for medication instructions that were sent via My Chart. Phone number to call with questions is listed on instructions.

## 2024-07-22 NOTE — H&P
Pre-Operative H & P     CC:  Preoperative exam to assess for increased cardiopulmonary risk while undergoing surgery and anesthesia.    Date of Encounter: 7/22/2024  Primary Care Physician:  Mendel Mireles     Reason for visit:   Encounter Diagnoses   Name Primary?    Pre-op evaluation Yes    Gastroesophageal reflux disease without esophagitis     Hiatal hernia     Erosive esophagitis        HPI  Gudelia Dong is a 59 year old female who presents for pre-operative H & P in preparation for  Procedure Information       Case: [id number] Date/Time: 08/13/24 1245    Procedure: Esophagoscopy, gastroscopy, duodenoscopy (EGD), combined (Esophagus)    Anesthesia type: General    Diagnosis:       Gastroesophageal reflux disease without esophagitis [K21.9]      Hiatal hernia [K44.9]      Erosive esophagitis [K22.10]    Pre-op diagnosis:       Gastroesophageal reflux disease without esophagitis [K21.9]      Hiatal hernia [K44.9]      Erosive esophagitis [K22.10]    Location:  [facility]  [facility]    Providers: René Rodriguez DO          The patient presents to the PAC virtually today in preparation for the above scheduled procedure with comorbid conditions including ROMEO on CPAP, tobacco dependence (quit 2 months ago), iron deficiency anemia, prediabetic, hypothyroid, obesity, GERD, hiatal hernia, depression, bipolar, ADHD, h/o psychosis, insomnia, alcohol use disorder in remission, MRSA  and hydradenitis suppurativa.     History is obtained from the patient and chart review    Hx of abnormal bleeding or anti-platelet use: denies    Menstrual history: Patient's last menstrual period was 07/20/2006.:      Past Medical History  Past Medical History:   Diagnosis Date    Depression     GERD (gastroesophageal reflux disease)     Hemorrhoid     Menstrual disorder     s/p D&C -12/2012    ROEMO on CPAP     Other bipolar disorders     followed by Dr Collazo    Post menopausal syndrome     Tobacco abuse        Past Surgical History  Past  Surgical History:   Procedure Laterality Date    COLONOSCOPY  12/24/2013    Procedure: COLONOSCOPY;;  Surgeon: Guy Grant MD;  Location: [facility]    COLONOSCOPY  1/9/2014    Procedure: COMBINED COLONOSCOPY, SINGLE BIOPSY/POLYPECTOMY BY BIOPSY;;  Surgeon: Guy Grant MD;  Location: [facility]    COLONOSCOPY N/A 8/8/2023    Procedure: COLONOSCOPY, WITH POLYPECTOMY AND BIOPSY;  Surgeon: René Rodriguez DO;  Location: [facility]    ESOPHAGOSCOPY, GASTROSCOPY, DUODENOSCOPY (EGD), COMBINED  12/24/2013    Procedure: COMBINED ESOPHAGOSCOPY, GASTROSCOPY, DUODENOSCOPY (EGD), BIOPSY SINGLE OR MULTIPLE;  ESOPHAGOSCOPY, GASTROSCOPY, DUODENOSCOPY, COLONOSCOPY;  Surgeon: Guy Grant MD;  Location: [facility]    ESOPHAGOSCOPY, GASTROSCOPY, DUODENOSCOPY (EGD), COMBINED N/A 8/8/2023    Procedure: ESOPHAGOGASTRODUODENOSCOPY, WITH BIOPSY;  Surgeon: René Rodriguez DO;  Location: [facility]    ESOPHAGOSCOPY, GASTROSCOPY, DUODENOSCOPY (EGD), COMBINED N/A 2/15/2024    Procedure: Esophagoscopy, gastroscopy, duodenoscopy (EGD), combined;  Surgeon: Sarmad Reed MD;  Location: [facility]    GENITOURINARY SURGERY      urethra stretched    GYN SURGERY      d&c     LAPAROSCOPIC ASSISTED RECTOPEXY  3/14/2014    Procedure: LAPAROSCOPIC ASSISTED RECTOPEXY;  LAPAROSCOPIC  VENTRAL RECTOPEXY ;  Surgeon: Jennifer Connolly MD;  Location:  OR    ORTHOPEDIC SURGERY      surgery on clavicle,surgery for left leg fracture       Prior to Admission Medications  Current Outpatient Medications   Medication Sig Dispense Refill    albuterol (PROAIR HFA/PROVENTIL HFA/VENTOLIN HFA) 108 (90 Base) MCG/ACT inhaler Inhale 2 puffs into the lungs every 6 hours as needed for shortness of breath, wheezing or cough 18 g 0    ammonium lactate (AMLACTIN) 12 % external cream Apply topically daily as needed for dry skin 385 g 1    buPROPion (WELLBUTRIN XL) 150 MG 24 hr tablet Take 450 mg by mouth every morning      busPIRone HCl (BUSPAR) 30 MG tablet Take 1 tablet by mouth 2 times daily       Doxepin HCl 150 MG CAPS Take 300 mg by mouth At Bedtime (Patient taking differently: Take 150 mg by mouth at bedtime) 30 capsule 0    escitalopram (LEXAPRO) 10 MG tablet Take 10 mg by mouth every morning      lamoTRIgine (LAMICTAL) 100 MG tablet Take 100 mg by mouth every morning      levothyroxine (SYNTHROID/LEVOTHROID) 150 MCG tablet Take 1 tablet (150 mcg) by mouth daily (Patient taking differently: Take 150 mcg by mouth every morning) 60 tablet 0    nystatin (MYCOSTATIN) 899853 UNIT/GM external powder Apply topically daily 60 g 11    pantoprazole (PROTONIX) 40 MG EC tablet Take 80 mg by mouth 2 times daily      REXULTI 2 MG tablet Take 2 mg by mouth every morning      esomeprazole (NEXIUM) 20 MG DR capsule Take 1 capsule (20 mg) by mouth 2 times daily Take 30-60 minutes before eating. (Patient not taking: Reported on 2024) 180 capsule 0    order for DME Equipment being ordered: CPAP  Please dispense Cpap and its supply 1 Units prn    triamcinolone (KENALOG) 0.025 % external ointment Apply topically 2 times daily To affected area in the groin as needed up to 2 weeks at a time. 30 g 3       Allergies  Allergies   Allergen Reactions    Contrast Dye Shortness Of Breath    Methylpyrrolidone Swelling    Iodine Swelling     Other reaction(s): Angioedema  Facial swelling.    Morphine Anxiety and Nausea and Vomiting     Other reaction(s): Behavioral Disturbances  Other reaction(s): Anxiety, vomiting       Social History  Social History     Socioeconomic History    Marital status:      Spouse name: Not on file    Number of children: Not on file    Years of education: Not on file    Highest education level: Not on file   Occupational History    Not on file   Tobacco Use    Smoking status: Former     Current packs/day: 0.00     Types: Cigarettes     Quit date: 2023     Years since quittin.5     Passive exposure: Past    Smokeless tobacco: Never    Tobacco comments:     Quit for year, restarted .        Quit middle May 2024   Vaping Use    Vaping status: Never Used   Substance and Sexual Activity    Alcohol use: No     Comment: Severe alcohol use disorder for 20 years.  2023:  Reports sober for past five years    Drug use: No    Sexual activity: Yes     Partners: Male   Other Topics Concern    Parent/sibling w/ CABG, MI or angioplasty before 65F 55M? Not Asked   Social History Narrative    8/2013    /single/    Children-    Work-    Tobacco-    ETOH-    Exercise-         Social Determinants of Health     Financial Resource Strain: Not on file   Food Insecurity: Not on file   Transportation Needs: Not on file   Physical Activity: Not on file   Stress: Not on file   Social Connections: Not on file   Interpersonal Safety: Not on file   Housing Stability: Not on file       Family History  Family History   Problem Relation Age of Onset    Cancer Mother         ovarian cancer    Hypertension Father     Breast Cancer No family hx of     Cancer - colorectal No family hx of     Anesthesia Reaction No family hx of     Venous thrombosis No family hx of        Review of Systems  The complete review of systems is negative other than noted in the HPI or here.   Anesthesia Evaluation   Pt has had prior anesthetic. Type: General and MAC.    History of anesthetic complications (Difficult to stay asleep. )  - .  ROMEO.    ROS/MED HX  ENT/Pulmonary:     (+) sleep apnea, severe, uses CPAP,              tobacco use (Quit smoking 2 months.), Past use,                    (-) asthma and COPD   Neurologic:  - neg neurologic ROS     Cardiovascular: Comment: Denies cardiac symptoms including chest pain, SOB, palpitations, syncope, SANCHEZ, orthopnea, or PND.      (+)  - -   -  - -                                 Previous cardiac testing   Echo: Date: 2015 Results:  Interpretation Summary  Global and regional left ventricular function is normal with an EF of 60-65%.   Right ventricular function, chamber size, wall motion, and  thickness are   normal. Pulmonary artery systolic pressure cannot be assessed. The inferior   vena cava  is normal. No pericardial effusion is present.  PatientHeight: 68 in  PatientWeight: 208 lbs  BSA 2.1 m^2    Stress Test:  Date: Results:    ECG Reviewed:  Date: 2015 Results:  SR  Cath:  Date: Results:   (-) hypertension, taking anticoagulants/antiplatelets and dyslipidemia   METS/Exercise Tolerance: >4 METS Comment: Able to walk x 30 mins at grocery store without exertional symptoms.  Job is very physically active.        Hematologic:     (+)      anemia,       (-) history of blood clots and history of blood transfusion   Musculoskeletal: Comment: S/p left LE surgery with hardware.     S/p Left clavicle requiring hardware.       GI/Hepatic: Comment: Dysphagia and belching.  No vomiting.     (+) GERD, Asymptomatic on medication,    hiatal hernia,           (-) liver disease   Renal/Genitourinary:    (-) renal disease   Endo: Comment: Prediabetes     (+)          thyroid problem, hypothyroidism,    Obesity,    (-) Type I DM, Type II DM and chronic steroid usage   Psychiatric/Substance Use:     (+) psychiatric history depression, bipolar and other (comment) (ADHD.  Reports being stable. Follows with Psychiatrist.) alcohol abuse (Alcohol use disorder in remission since 2014.)  Recreational drug usage: Cocaine. (-) chronic opioid use history (Has not used cocaine since 2014.)   Infectious Disease:     (+)   MRSA,         Malignancy:  - neg malignancy ROS     Other: Comment: Denies recent flares with Right axilla hydradenitis suppurativa.  - neg other ROS          Virtual visit -  No vitals were obtained    Physical Exam  Constitutional: Awake, alert, cooperative, no apparent distress, and appears stated age.  Eyes: Pupils equal  HENT: Normocephalic  Respiratory: non labored breathing   Neurologic: Awake, alert, oriented to name, place and time.   Neuropsychiatric: Calm, cooperative. Normal affect.      Prior  Labs/Diagnostic Studies   All labs and imaging personally reviewed      Latest Reference Range & Units 07/13/24 13:48   T4 Free 0.90 - 1.70 ng/dL 1.09   TSH 0.30 - 4.20 uIU/mL 5.78 (H)   (H): Data is abnormally high    Lab Results   Component Value Date    WBC 5.1 03/28/2023    WBC 6.5 04/15/2021     Lab Results   Component Value Date    RBC 4.23 03/28/2023    RBC 4.24 04/15/2021     Lab Results   Component Value Date    HGB 11.7 03/28/2023    HGB 11.7 04/15/2021     Lab Results   Component Value Date    HCT 37.5 03/28/2023    HCT 36.8 04/15/2021     Lab Results   Component Value Date    MCV 89 03/28/2023    MCV 87 04/15/2021     Lab Results   Component Value Date    MCH 27.7 03/28/2023    MCH 27.6 04/15/2021     Lab Results   Component Value Date    MCHC 31.2 03/28/2023    MCHC 31.8 04/15/2021     Lab Results   Component Value Date    RDW 15.7 03/28/2023    RDW 15.1 04/15/2021     Lab Results   Component Value Date     03/28/2023     04/15/2021       Last Comprehensive Metabolic Panel:  Lab Results   Component Value Date     03/28/2023    POTASSIUM 4.1 03/28/2023    CHLORIDE 105 03/28/2023    CO2 26 03/28/2023    ANIONGAP 14 03/28/2023    GLC 94 03/28/2023    BUN 15.4 03/28/2023    CR 0.83 03/28/2023    GFRESTIMATED 80 03/28/2023    PURVI 9.3 03/28/2023       Hemoglobin A1C   Date Value Ref Range Status   03/28/2023 5.8 (H) 0.0 - 5.6 % Final     Comment:     Normal <5.7%   Prediabetes 5.7-6.4%    Diabetes 6.5% or higher     Note: Adopted from ADA consensus guidelines.   01/07/2020 5.4 0 - 5.6 % Final     Comment:     Normal <5.7% Prediabetes 5.7-6.4%  Diabetes 6.5% or higher - adopted from ADA   consensus guidelines.         EKG/ stress test - if available please see in ROS above   No results found.       No data to display                  The patient's records and results personally reviewed by this provider.     Outside records reviewed from: Care Everywhere      Assessment    Gudelia Dong is  "a 59 year old female seen as a PAC referral for risk assessment and optimization for anesthesia.    Plan/Recommendations  Pt will be optimized for the proposed procedure.  See below for details on the assessment, risk, and preoperative recommendations    ANESTHESIA  - Reports she has had difficulty getting to sleep with past anesthesia.      NEUROLOGY  - No history of TIA, CVA or seizure    -Post Op delirium risk factors:  No risk identified    ENT  - No current airway concerns.  Will need to be reassessed day of surgery.  Mallampati: Unable to assess  TM: Unable to assess    CARDIAC  - No history of CAD, Hypertension, and Afib    -  Denies cardiac symptoms.    - METS (Metabolic Equivalents)  Patient performs 4 or more METS exercise without symptoms             Total Score: 0      - RCRI-Very low risk: Class 1 0.4% complication rate             Total Score: 0        PULMONARY  - Obstructive Sleep Apnea>>SEVERE   ~ ROMEO with home CPAP.  Patient will be instructed to bring their home CPAP device to the hospital with them.    - Denies asthma or inhaler use    - Tobacco History   ~ Quit smoking 2 months ago.      History   Smoking Status    Former    Types: Cigarettes   Smokeless Tobacco    Never       GI  - GERD/hiatal hernia / erosive esophagitis   ~ Patient was prescribed Nexium, however, she not been able to get Nexium covered (prior authorization with provider and insurance problem) so she has been taking Pantoprazole 40 mg 2 tablets twice daily (800 mg BID).  She continues to have dysphagia and burping, but denies vomiting.   ~ Not controlled on medications   ~ above procedure scheduled    - PONV Medium Risk  Total Score: 2           1 AN PONV: Pt is Female    1 AN PONV: Patient is not a current smoker        /RENAL  - Baseline Creatinine  see above     ENDOCRINE    - BMI: Estimated body mass index is 45.73 kg/m  as calculated from the following:    Height as of this encounter: 1.702 m (5' 7\").    Weight as of " this encounter: 132.5 kg (292 lb).  Class 3 Obesity (BMI > 40)   ~  Consideration for careful lifting and postioning techniques    ~ no longer taking topiramate    - Thyroid disorder  Continue home replacement while hospitalized.    - Prediabetic, see A1C above     HEME  - VTE Low Risk 0.5%             Total Score: 2    VTE: Greater than 59 yrs old    VTE: BMI greater than 39      - No history of abnormal bleeding or antiplatelet use.    Hemoglobin   Date Value Ref Range Status   03/28/2023 11.7 11.7 - 15.7 g/dL Final   04/15/2021 11.7 11.7 - 15.7 g/dL Final   ]  ~ Denies previous blood transfusion.       MSK  Patient is NOT Frail             Total Score: 1    Frailty: Increased exhaustion        PSYCH  - Bipolar depression, insomnia and ADHD              ~ reports doing well on current medications and followed by psychiatry.  Continue mental health medications as prescribed DOS     - h/o Alcohol use disorder for >20 years with many admission for detoxification.  Now in remission for past five years     ID  - MRSA (+)              ~ contact precautions    Different anesthesia methods/types have been discussed with the patient, but they are aware that the final plan will be decided by the assigned anesthesia provider on the date of service.      The patient is optimized for their procedure. Information on procedure time, arrival time and NPO status given by [facility] nursing staff. Medications instructions provided by PAC RN staff.   No further diagnostic testing indicated.    Please refer to the physical examination documented by the anesthesiologist in the anesthesia record on the day of surgery.    Video-Visit Details    Type of service:  Video Visit    Provider received verbal consent for a Video Visit from the patient? Yes     Originating Location (pt. Location): Home    Distant Location (provider location):  Off-site  Mode of Communication:  Video Conference via [employer]  On the day of service:     Prep time: 11  minutes  Visit time: 16 minutes  Documentation time: 18 minutes  ------------------------------------------  Total time: 43 minutes      INEZ Kaur CNP  Preoperative Assessment Center  [facility]  Phone: [phone]  Fax: [phone]

## 2024-07-27 ENCOUNTER — MYC MEDICAL ADVICE (OUTPATIENT)
Dept: GASTROENTEROLOGY | Facility: CLINIC | Age: 59
End: 2024-07-27
Payer: COMMERCIAL

## 2024-07-27 DIAGNOSIS — K22.10 EROSIVE ESOPHAGITIS: Primary | ICD-10-CM

## 2024-08-02 DIAGNOSIS — K44.9 HIATAL HERNIA: ICD-10-CM

## 2024-08-02 DIAGNOSIS — K22.10 EROSIVE ESOPHAGITIS: ICD-10-CM

## 2024-08-02 DIAGNOSIS — K21.9 GASTROESOPHAGEAL REFLUX DISEASE WITHOUT ESOPHAGITIS: ICD-10-CM

## 2024-08-02 RX ORDER — ESOMEPRAZOLE MAGNESIUM 20 MG/1
20 GRANULE, DELAYED RELEASE ORAL
Qty: 180 EACH | Refills: 1 | Status: SHIPPED | OUTPATIENT
Start: 2024-08-02 | End: 2024-10-01

## 2024-08-05 ENCOUNTER — TELEPHONE (OUTPATIENT)
Dept: GASTROENTEROLOGY | Facility: CLINIC | Age: 59
End: 2024-08-05
Payer: COMMERCIAL

## 2024-08-05 NOTE — TELEPHONE ENCOUNTER
Pre visit planning completed.      Procedure details:    Patient scheduled for Upper endoscopy (EGD) on 8/13/2024.     Arrival time: 1115. Procedure time 1245    Facility location: [facility]; [address] [place] [address], [address] Floor, [place] [address]. Check in location: Main entrance at registration desk.    Sedation type: General    Pre op exam needed? Yes. PAC eval completed on 7/22 due to BMI with general anesthesia.    Indication for procedure:   Gastroesophageal reflux disease without esophagitis [K21.9]      Hiatal hernia [K44.9]      Erosive esophagitis [K22.10]            Chart review:     Electronic implanted devices? No    Recent diagnosis of diverticulitis within the last 6 weeks? N/A      Medication review:    Diabetic? Prediabetic.     Anticoagulants? No    Weight loss medication/injectable? No.    NSAIDS? No NSAID medications per patient's medication list.  RN will verify with pre-assessment call.    Other medication HOLDING recommendations:  N/A      Prep for procedure:     Prep instructions sent via [employer]         Alida Wu RN  Endoscopy Procedure Pre Assessment RN  [phone] option 4

## 2024-08-05 NOTE — TELEPHONE ENCOUNTER
Attempted to contact patient in order to complete pre assessment questions.     No answer. Left message to return call to [phone] option 4    Callback required communication sent via [employer].    Alida Wu RN  Endoscopy Procedure Pre Assessment

## 2024-08-05 NOTE — TELEPHONE ENCOUNTER
Pre assessment completed for upcoming procedure.   (Please see previous telephone encounter notes for complete details)    Patient  returned call.       Procedure details:    Arrival time and facility location reviewed.    Pre op exam needed? Yes. Pre op exam completed on 7/22/24.    Designated  policy reviewed. Instructed to have someone stay 24  hours post procedure.       Medication review:    NSAID medication(s): Ibuprofen (Advil, Motrin): HOLD 1 day before procedure.      Prep for procedure:     Procedure prep instructions reviewed.        Any additional information needed:  N/A      Patient  verbalized understanding and had no questions or concerns at this time.      Yesenia Riojas RN  Endoscopy Procedure Pre Assessment   [phone] option 4

## 2024-08-13 ENCOUNTER — HOSPITAL ENCOUNTER (OUTPATIENT)
Facility: CLINIC | Age: 59
Discharge: HOME | End: 2024-08-13
Attending: INTERNAL MEDICINE | Admitting: INTERNAL MEDICINE
Payer: COMMERCIAL

## 2024-08-13 ENCOUNTER — ANESTHESIA (OUTPATIENT)
Dept: GASTROENTEROLOGY | Facility: CLINIC | Age: 59
End: 2024-08-13
Payer: COMMERCIAL

## 2024-08-13 VITALS
SYSTOLIC BLOOD PRESSURE: 115 MMHG | HEART RATE: 76 BPM | TEMPERATURE: 98.1 F | RESPIRATION RATE: 16 BRPM | OXYGEN SATURATION: 92 % | DIASTOLIC BLOOD PRESSURE: 72 MMHG

## 2024-08-13 LAB — UPPER GI ENDOSCOPY: NORMAL

## 2024-08-13 PROCEDURE — 43235 EGD DIAGNOSTIC BRUSH WASH: CPT | Performed by: INTERNAL MEDICINE

## 2024-08-13 PROCEDURE — 43235 EGD DIAGNOSTIC BRUSH WASH: CPT | Performed by: ANESTHESIOLOGY

## 2024-08-13 PROCEDURE — 258N000003 HC RX IP 258 OP 636: Performed by: NURSE ANESTHETIST, CERTIFIED REGISTERED

## 2024-08-13 PROCEDURE — 370N000017 HC ANESTHESIA TECHNICAL FEE, PER MIN: Performed by: INTERNAL MEDICINE

## 2024-08-13 PROCEDURE — 250N000011 HC RX IP 250 OP 636: Performed by: NURSE ANESTHETIST, CERTIFIED REGISTERED

## 2024-08-13 PROCEDURE — 250N000009 HC RX 250: Performed by: NURSE ANESTHETIST, CERTIFIED REGISTERED

## 2024-08-13 PROCEDURE — 43235 EGD DIAGNOSTIC BRUSH WASH: CPT | Performed by: NURSE ANESTHETIST, CERTIFIED REGISTERED

## 2024-08-13 RX ORDER — ONDANSETRON 2 MG/ML
4 INJECTION INTRAMUSCULAR; INTRAVENOUS
Status: DISCONTINUED | OUTPATIENT
Start: 2024-08-13 | End: 2024-08-13 | Stop reason: HOSPADM

## 2024-08-13 RX ORDER — NALOXONE HYDROCHLORIDE 0.4 MG/ML
0.2 INJECTION, SOLUTION INTRAMUSCULAR; INTRAVENOUS; SUBCUTANEOUS
Status: DISCONTINUED | OUTPATIENT
Start: 2024-08-13 | End: 2024-08-13 | Stop reason: HOSPADM

## 2024-08-13 RX ORDER — NALOXONE HYDROCHLORIDE 0.4 MG/ML
0.4 INJECTION, SOLUTION INTRAMUSCULAR; INTRAVENOUS; SUBCUTANEOUS
Status: DISCONTINUED | OUTPATIENT
Start: 2024-08-13 | End: 2024-08-13 | Stop reason: HOSPADM

## 2024-08-13 RX ORDER — PROPOFOL 10 MG/ML
INJECTION, EMULSION INTRAVENOUS PRN
Status: DISCONTINUED | OUTPATIENT
Start: 2024-08-13 | End: 2024-08-13

## 2024-08-13 RX ORDER — PROPOFOL 10 MG/ML
INJECTION, EMULSION INTRAVENOUS CONTINUOUS PRN
Status: DISCONTINUED | OUTPATIENT
Start: 2024-08-13 | End: 2024-08-13

## 2024-08-13 RX ORDER — PROCHLORPERAZINE MALEATE 5 MG
10 TABLET ORAL EVERY 6 HOURS PRN
Status: DISCONTINUED | OUTPATIENT
Start: 2024-08-13 | End: 2024-08-13 | Stop reason: HOSPADM

## 2024-08-13 RX ORDER — FLUMAZENIL 0.1 MG/ML
0.2 INJECTION, SOLUTION INTRAVENOUS
Status: DISCONTINUED | OUTPATIENT
Start: 2024-08-13 | End: 2024-08-13 | Stop reason: HOSPADM

## 2024-08-13 RX ORDER — KETAMINE HYDROCHLORIDE 50 MG/ML
INJECTION, SOLUTION INTRAMUSCULAR; INTRAVENOUS PRN
Status: DISCONTINUED | OUTPATIENT
Start: 2024-08-13 | End: 2024-08-13

## 2024-08-13 RX ORDER — LIDOCAINE HYDROCHLORIDE 20 MG/ML
INJECTION, SOLUTION INFILTRATION; PERINEURAL PRN
Status: DISCONTINUED | OUTPATIENT
Start: 2024-08-13 | End: 2024-08-13

## 2024-08-13 RX ORDER — SODIUM CHLORIDE, SODIUM LACTATE, POTASSIUM CHLORIDE, CALCIUM CHLORIDE 600; 310; 30; 20 MG/100ML; MG/100ML; MG/100ML; MG/100ML
INJECTION, SOLUTION INTRAVENOUS CONTINUOUS
Status: DISCONTINUED | OUTPATIENT
Start: 2024-08-13 | End: 2024-08-13 | Stop reason: HOSPADM

## 2024-08-13 RX ORDER — SODIUM CHLORIDE, SODIUM LACTATE, POTASSIUM CHLORIDE, CALCIUM CHLORIDE 600; 310; 30; 20 MG/100ML; MG/100ML; MG/100ML; MG/100ML
INJECTION, SOLUTION INTRAVENOUS CONTINUOUS PRN
Status: DISCONTINUED | OUTPATIENT
Start: 2024-08-13 | End: 2024-08-13

## 2024-08-13 RX ORDER — ONDANSETRON 2 MG/ML
4 INJECTION INTRAMUSCULAR; INTRAVENOUS EVERY 6 HOURS PRN
Status: DISCONTINUED | OUTPATIENT
Start: 2024-08-13 | End: 2024-08-13 | Stop reason: HOSPADM

## 2024-08-13 RX ORDER — ONDANSETRON 2 MG/ML
INJECTION INTRAMUSCULAR; INTRAVENOUS PRN
Status: DISCONTINUED | OUTPATIENT
Start: 2024-08-13 | End: 2024-08-13

## 2024-08-13 RX ORDER — LIDOCAINE 40 MG/G
CREAM TOPICAL
Status: DISCONTINUED | OUTPATIENT
Start: 2024-08-13 | End: 2024-08-13 | Stop reason: HOSPADM

## 2024-08-13 RX ORDER — ONDANSETRON 4 MG/1
4 TABLET, ORALLY DISINTEGRATING ORAL EVERY 6 HOURS PRN
Status: DISCONTINUED | OUTPATIENT
Start: 2024-08-13 | End: 2024-08-13 | Stop reason: HOSPADM

## 2024-08-13 RX ORDER — GLYCOPYRROLATE 0.2 MG/ML
INJECTION, SOLUTION INTRAMUSCULAR; INTRAVENOUS PRN
Status: DISCONTINUED | OUTPATIENT
Start: 2024-08-13 | End: 2024-08-13

## 2024-08-13 RX ADMIN — DEXMEDETOMIDINE HYDROCHLORIDE 4 MCG: 100 INJECTION, SOLUTION INTRAVENOUS at 13:22

## 2024-08-13 RX ADMIN — KETAMINE HYDROCHLORIDE 20 MG: 50 INJECTION INTRAMUSCULAR; INTRAVENOUS at 13:26

## 2024-08-13 RX ADMIN — ONDANSETRON 4 MG: 2 INJECTION INTRAMUSCULAR; INTRAVENOUS at 13:21

## 2024-08-13 RX ADMIN — LIDOCAINE HYDROCHLORIDE 100 MG: 20 INJECTION, SOLUTION INFILTRATION; PERINEURAL at 13:21

## 2024-08-13 RX ADMIN — SODIUM CHLORIDE, POTASSIUM CHLORIDE, SODIUM LACTATE AND CALCIUM CHLORIDE: 600; 310; 30; 20 INJECTION, SOLUTION INTRAVENOUS at 13:21

## 2024-08-13 RX ADMIN — MIDAZOLAM 2 MG: 1 INJECTION INTRAMUSCULAR; INTRAVENOUS at 13:17

## 2024-08-13 RX ADMIN — PROPOFOL 200 MCG/KG/MIN: 10 INJECTION, EMULSION INTRAVENOUS at 13:21

## 2024-08-13 RX ADMIN — PROPOFOL 70 MG: 10 INJECTION, EMULSION INTRAVENOUS at 13:21

## 2024-08-13 RX ADMIN — GLYCOPYRROLATE 0.2 MG: 0.2 INJECTION, SOLUTION INTRAMUSCULAR; INTRAVENOUS at 13:21

## 2024-08-13 ASSESSMENT — LIFESTYLE VARIABLES: TOBACCO_USE: 1

## 2024-08-13 ASSESSMENT — ACTIVITIES OF DAILY LIVING (ADL)
ADLS_ACUITY_SCORE: 37

## 2024-08-13 ASSESSMENT — COPD QUESTIONNAIRES: COPD: 0

## 2024-08-13 NOTE — H&P
Gudelia Dong  [id number]  female  59 year old      Reason for procedure/surgery: egd, eval erosive esophagitis    Patient Active Problem List   Diagnosis    ROMEO on CPAP    Tobacco abuse    Post menopausal syndrome    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)    CARDIOVASCULAR SCREENING; LDL GOAL LESS THAN 160    Major depressive disorder, recurrent episode, moderate (H)    Anemia    Tubular adenoma    Iron deficiency anemia    Hyponatremia    ADHD, predominantly inattentive type    overweight BMI>30    Periumbilical hernia    Psychosis (H)    Bipolar 2 disorder (H)    Gastroesophageal reflux disease without esophagitis    Prediabetes    Hypothyroidism due to medication    Hidradenitis suppurativa of right axilla    Vitamin D deficiency    Obesity (BMI 35.0-39.9) with comorbidity (H)    Numbness and tingling in left hand    Hiatal hernia    Insomnia, unspecified type       Past Surgical History:    Past Surgical History:   Procedure Laterality Date    COLONOSCOPY  12/24/2013    Procedure: COLONOSCOPY;;  Surgeon: Guy Grant MD;  Location: [facility]    COLONOSCOPY  1/9/2014    Procedure: COMBINED COLONOSCOPY, SINGLE BIOPSY/POLYPECTOMY BY BIOPSY;;  Surgeon: Guy Grant MD;  Location: [facility]    COLONOSCOPY N/A 8/8/2023    Procedure: COLONOSCOPY, WITH POLYPECTOMY AND BIOPSY;  Surgeon: René Rodriguez DO;  Location:  GI    ESOPHAGOSCOPY, GASTROSCOPY, DUODENOSCOPY (EGD), COMBINED  12/24/2013    Procedure: COMBINED ESOPHAGOSCOPY, GASTROSCOPY, DUODENOSCOPY (EGD), BIOPSY SINGLE OR MULTIPLE;  ESOPHAGOSCOPY, GASTROSCOPY, DUODENOSCOPY, COLONOSCOPY;  Surgeon: Guy Grant MD;  Location: [facility]    ESOPHAGOSCOPY, GASTROSCOPY, DUODENOSCOPY (EGD), COMBINED N/A 8/8/2023    Procedure: ESOPHAGOGASTRODUODENOSCOPY, WITH BIOPSY;  Surgeon: René Rodriguez DO;  Location:  GI    ESOPHAGOSCOPY, GASTROSCOPY, DUODENOSCOPY (EGD), COMBINED N/A 2/15/2024    Procedure: Esophagoscopy, gastroscopy, duodenoscopy (EGD), combined;  Surgeon: Derek  Sarmad HERNANDEZ MD;  Location:  GI    GENITOURINARY SURGERY      urethra stretched    GYN SURGERY      d&c     LAPAROSCOPIC ASSISTED RECTOPEXY  3/14/2014    Procedure: LAPAROSCOPIC ASSISTED RECTOPEXY;  LAPAROSCOPIC  VENTRAL RECTOPEXY ;  Surgeon: Jennifer Connolly MD;  Location:  OR    ORTHOPEDIC SURGERY      surgery on clavicle,surgery for left leg fracture       Past Medical History:   Past Medical History:   Diagnosis Date    Depression     GERD (gastroesophageal reflux disease)     Hemorrhoid     Menstrual disorder     s/p D&C -2012    ROMEO on CPAP     Other bipolar disorders     followed by Dr Collazo    Post menopausal syndrome     Tobacco abuse        Social History:   Social History     Tobacco Use    Smoking status: Former     Current packs/day: 0.00     Types: Cigarettes     Quit date: 2023     Years since quittin.6     Passive exposure: Past    Smokeless tobacco: Never    Tobacco comments:     Quit for year, restarted .       Quit middle May 2024   Substance Use Topics    Alcohol use: No     Comment: Severe alcohol use disorder for 20 years.  :  Reports sober for past five years       Family History:   Family History   Problem Relation Age of Onset    Cancer Mother         ovarian cancer    Hypertension Father     Breast Cancer No family hx of     Cancer - colorectal No family hx of     Anesthesia Reaction No family hx of     Venous thrombosis No family hx of        Allergies:   Allergies   Allergen Reactions    Contrast Dye Shortness Of Breath    Methylpyrrolidone Swelling    Iodine Swelling     Other reaction(s): Angioedema  Facial swelling.    Morphine Anxiety and Nausea and Vomiting     Other reaction(s): Behavioral Disturbances  Other reaction(s): Anxiety, vomiting       Active Medications:   No current outpatient medications on file.       Systemic Review:   CONSTITUTIONAL: NEGATIVE for fever, chills, change in weight  ENT/MOUTH: NEGATIVE for ear, mouth and throat problems  RESP:  NEGATIVE for significant cough or SOB  CV: NEGATIVE for chest pain, palpitations or peripheral edema    Physical Examination:   Vital Signs: BP (!) 106/90   Pulse 76   Temp 98.1  F (36.7  C) (Oral)   Resp 16   LMP 07/20/2006   SpO2 96%   GENERAL: healthy, alert and no distress  NECK: no adenopathy, no asymmetry, masses, or scars  RESP: lungs clear to auscultation - no rales, rhonchi or wheezes  CV: regular rate and rhythm, normal S1 S2, no S3 or S4, no murmur, click or rub, no peripheral edema and peripheral pulses strong  ABDOMEN: soft, nontender, no hepatosplenomegaly, no masses and bowel sounds normal  MS: no gross musculoskeletal defects noted, no edema    I examined patient s dentition and informed the patient that dental injuries are a risk of any anesthetic management. No concerns were noted with this patient's dentition. . The patient has consented to proceed with the procedure.    Plan: Appropriate to proceed as scheduled.      René Rodriguez DO  8/13/2024    PCP:  Mendel Mireles

## 2024-08-13 NOTE — ANESTHESIA POSTPROCEDURE EVALUATION
Patient: Gudelia Dong    Procedure: Procedure(s):  Esophagoscopy, gastroscopy, duodenoscopy (EGD), combined       Anesthesia Type:  MAC    Note:  Disposition: Outpatient   Postop Pain Control: Uneventful            Sign Out: Well controlled pain   PONV: No   Neuro/Psych: Uneventful            Sign Out: Acceptable/Baseline neuro status   Airway/Respiratory: Uneventful            Sign Out: Acceptable/Baseline resp. status   CV/Hemodynamics: Uneventful            Sign Out: Acceptable CV status; No obvious hypovolemia; No obvious fluid overload   Other NRE: NONE   DID A NON-ROUTINE EVENT OCCUR? No           Last vitals:  Vitals Value Taken Time   /72 08/13/24 1350   Temp     Pulse     Resp 16 08/13/24 1350   SpO2 94 % 08/13/24 1359   Vitals shown include unfiled device data.    Electronically Signed By: Brenton Bryant MD  August 13, 2024  2:14 PM

## 2024-08-13 NOTE — ANESTHESIA PREPROCEDURE EVALUATION
Pre-Operative H & P     CC:  Preoperative exam to assess for increased cardiopulmonary risk while undergoing surgery and anesthesia.    Date of Encounter: 7/22/2024  Primary Care Physician:  Mendel Mireles     Reason for visit:   No diagnosis found.      BELLE Dong is a 59 year old female who presents for pre-operative H & P in preparation for  Procedure Information       Case: [id number] Date/Time: 08/13/24 1245    Procedure: Esophagoscopy, gastroscopy, duodenoscopy (EGD), combined (Esophagus)    Anesthesia type: General    Diagnosis:       Gastroesophageal reflux disease without esophagitis [K21.9]      Hiatal hernia [K44.9]      Erosive esophagitis [K22.10]    Pre-op diagnosis:       Gastroesophageal reflux disease without esophagitis [K21.9]      Hiatal hernia [K44.9]      Erosive esophagitis [K22.10]    Location:  [facility] /  GI    Providers: René Rodriguez DO          The patient presents to the PAC virtually today in preparation for the above scheduled procedure with comorbid conditions including ROMEO on CPAP, tobacco dependence (quit 2 months ago), iron deficiency anemia, prediabetic, hypothyroid, obesity, GERD, hiatal hernia, depression, bipolar, ADHD, h/o psychosis, insomnia, alcohol use disorder in remission, MRSA  and hydradenitis suppurativa.     History is obtained from the patient and chart review    Hx of abnormal bleeding or anti-platelet use: denies    Menstrual history: Patient's last menstrual period was 07/20/2006.:      Past Medical History  Past Medical History:   Diagnosis Date    Depression     GERD (gastroesophageal reflux disease)     Hemorrhoid     Menstrual disorder     s/p D&C -12/2012    ROMEO on CPAP     Other bipolar disorders     followed by Dr Collazo    Post menopausal syndrome     Tobacco abuse        Past Surgical History  Past Surgical History:   Procedure Laterality Date    COLONOSCOPY  12/24/2013    Procedure: COLONOSCOPY;;  Surgeon: Guy Grant MD;  Location:   GI    COLONOSCOPY  1/9/2014    Procedure: COMBINED COLONOSCOPY, SINGLE BIOPSY/POLYPECTOMY BY BIOPSY;;  Surgeon: Guy Grant MD;  Location: [facility]    COLONOSCOPY N/A 8/8/2023    Procedure: COLONOSCOPY, WITH POLYPECTOMY AND BIOPSY;  Surgeon: René Rodriguez DO;  Location: [facility]    ESOPHAGOSCOPY, GASTROSCOPY, DUODENOSCOPY (EGD), COMBINED  12/24/2013    Procedure: COMBINED ESOPHAGOSCOPY, GASTROSCOPY, DUODENOSCOPY (EGD), BIOPSY SINGLE OR MULTIPLE;  ESOPHAGOSCOPY, GASTROSCOPY, DUODENOSCOPY, COLONOSCOPY;  Surgeon: Guy Grant MD;  Location: [facility]    ESOPHAGOSCOPY, GASTROSCOPY, DUODENOSCOPY (EGD), COMBINED N/A 8/8/2023    Procedure: ESOPHAGOGASTRODUODENOSCOPY, WITH BIOPSY;  Surgeon: René Rodriguez DO;  Location: [facility]    ESOPHAGOSCOPY, GASTROSCOPY, DUODENOSCOPY (EGD), COMBINED N/A 2/15/2024    Procedure: Esophagoscopy, gastroscopy, duodenoscopy (EGD), combined;  Surgeon: Sarmad Reed MD;  Location:  GI    GENITOURINARY SURGERY      urethra stretched    GYN SURGERY      d&c     LAPAROSCOPIC ASSISTED RECTOPEXY  3/14/2014    Procedure: LAPAROSCOPIC ASSISTED RECTOPEXY;  LAPAROSCOPIC  VENTRAL RECTOPEXY ;  Surgeon: Jennifer Connolly MD;  Location:  OR    ORTHOPEDIC SURGERY      surgery on clavicle,surgery for left leg fracture       Prior to Admission Medications  No current outpatient medications on file.       Allergies  Allergies   Allergen Reactions    Contrast Dye Shortness Of Breath    Methylpyrrolidone Swelling    Iodine Swelling     Other reaction(s): Angioedema  Facial swelling.    Morphine Anxiety and Nausea and Vomiting     Other reaction(s): Behavioral Disturbances  Other reaction(s): Anxiety, vomiting       Social History  Social History     Socioeconomic History    Marital status:      Spouse name: Not on file    Number of children: Not on file    Years of education: Not on file    Highest education level: Not on file   Occupational History    Not on file   Tobacco Use    Smoking status:  Former     Current packs/day: 0.00     Types: Cigarettes     Quit date: 2023     Years since quittin.6     Passive exposure: Past    Smokeless tobacco: Never    Tobacco comments:     Quit for year, restarted .       Quit middle May 2024   Vaping Use    Vaping status: Never Used   Substance and Sexual Activity    Alcohol use: No     Comment: Severe alcohol use disorder for 20 years.  :  Reports sober for past five years    Drug use: No    Sexual activity: Yes     Partners: Male   Other Topics Concern    Parent/sibling w/ CABG, MI or angioplasty before 65F 55M? Not Asked   Social History Narrative    2013    /single/    Children-    Work-    Tobacco-    ETOH-    Exercise-         Social Determinants of Health     Financial Resource Strain: Not on file   Food Insecurity: Not on file   Transportation Needs: Not on file   Physical Activity: Not on file   Stress: Not on file   Social Connections: Not on file   Interpersonal Safety: Not on file   Housing Stability: Not on file       Family History  Family History   Problem Relation Age of Onset    Cancer Mother         ovarian cancer    Hypertension Father     Breast Cancer No family hx of     Cancer - colorectal No family hx of     Anesthesia Reaction No family hx of     Venous thrombosis No family hx of        Review of Systems  The complete review of systems is negative other than noted in the HPI or here.   Anesthesia Evaluation   Pt has had prior anesthetic. Type: General and MAC.    History of anesthetic complications (Difficult to sedate)  - .  ROMEO.    ROS/MED HX  ENT/Pulmonary:     (+) sleep apnea, severe, uses CPAP,              tobacco use (Quit smoking 2 months.), Past use,                    (-) asthma and COPD   Neurologic:  - neg neurologic ROS     Cardiovascular: Comment: Denies cardiac symptoms including chest pain, SOB, palpitations, syncope, SANCHEZ, orthopnea, or PND.      (+)  - -   -  - -                                  Previous cardiac testing   Echo: Date: 2015 Results:  Interpretation Summary  Global and regional left ventricular function is normal with an EF of 60-65%.   Right ventricular function, chamber size, wall motion, and thickness are   normal. Pulmonary artery systolic pressure cannot be assessed. The inferior   vena cava  is normal. No pericardial effusion is present.  PatientHeight: 68 in  PatientWeight: 208 lbs  BSA 2.1 m^2    Stress Test:  Date: Results:    ECG Reviewed:  Date: 2015 Results:  SR  Cath:  Date: Results:   (-) hypertension, taking anticoagulants/antiplatelets and dyslipidemia   METS/Exercise Tolerance: >4 METS Comment: Able to walk x 30 mins at grocery store without exertional symptoms.  Job is very physically active.        Hematologic:     (+)      anemia,       (-) history of blood clots and history of blood transfusion   Musculoskeletal: Comment: S/p left LE surgery with hardware.     S/p Left clavicle requiring hardware.       GI/Hepatic: Comment: Dysphagia and belching.  No vomiting.     (+) GERD, Asymptomatic on medication,    hiatal hernia,           (-) liver disease   Renal/Genitourinary:    (-) renal disease   Endo: Comment: Prediabetes     (+)          thyroid problem, hypothyroidism,    Obesity,    (-) Type I DM, Type II DM and chronic steroid usage   Psychiatric/Substance Use:     (+) psychiatric history depression, bipolar and other (comment) (ADHD.  Reports being stable. Follows with Psychiatrist.) alcohol abuse (Alcohol use disorder in remission since 2014.)  Recreational drug usage: Cocaine. (-) chronic opioid use history (Has not used cocaine since 2014.)   Infectious Disease:     (+)   MRSA,         Malignancy:  - neg malignancy ROS     Other: Comment: Denies recent flares with Right axilla hydradenitis suppurativa.  - neg other ROS          Virtual visit -  No vitals were obtained    Physical Exam  Constitutional: Awake, alert, cooperative, no apparent distress, and appears stated  age.  Eyes: Pupils equal  HENT: Normocephalic  Respiratory: non labored breathing   Neurologic: Awake, alert, oriented to name, place and time.   Neuropsychiatric: Calm, cooperative. Normal affect.      Prior Labs/Diagnostic Studies   All labs and imaging personally reviewed      Latest Reference Range & Units 07/13/24 13:48   T4 Free 0.90 - 1.70 ng/dL 1.09   TSH 0.30 - 4.20 uIU/mL 5.78 (H)   (H): Data is abnormally high    Lab Results   Component Value Date    WBC 5.1 03/28/2023    WBC 6.5 04/15/2021     Lab Results   Component Value Date    RBC 4.23 03/28/2023    RBC 4.24 04/15/2021     Lab Results   Component Value Date    HGB 11.7 03/28/2023    HGB 11.7 04/15/2021     Lab Results   Component Value Date    HCT 37.5 03/28/2023    HCT 36.8 04/15/2021     Lab Results   Component Value Date    MCV 89 03/28/2023    MCV 87 04/15/2021     Lab Results   Component Value Date    MCH 27.7 03/28/2023    MCH 27.6 04/15/2021     Lab Results   Component Value Date    MCHC 31.2 03/28/2023    MCHC 31.8 04/15/2021     Lab Results   Component Value Date    RDW 15.7 03/28/2023    RDW 15.1 04/15/2021     Lab Results   Component Value Date     03/28/2023     04/15/2021       Last Comprehensive Metabolic Panel:  Lab Results   Component Value Date     03/28/2023    POTASSIUM 4.1 03/28/2023    CHLORIDE 105 03/28/2023    CO2 26 03/28/2023    ANIONGAP 14 03/28/2023    GLC 94 03/28/2023    BUN 15.4 03/28/2023    CR 0.83 03/28/2023    GFRESTIMATED 80 03/28/2023    PURVI 9.3 03/28/2023       Hemoglobin A1C   Date Value Ref Range Status   03/28/2023 5.8 (H) 0.0 - 5.6 % Final     Comment:     Normal <5.7%   Prediabetes 5.7-6.4%    Diabetes 6.5% or higher     Note: Adopted from ADA consensus guidelines.   01/07/2020 5.4 0 - 5.6 % Final     Comment:     Normal <5.7% Prediabetes 5.7-6.4%  Diabetes 6.5% or higher - adopted from ADA   consensus guidelines.         EKG/ stress test - if available please see in ROS above   No results  found.       No data to display                  The patient's records and results personally reviewed by this provider.     Outside records reviewed from: Care Everywhere      Assessment    Gudelia Dong is a 59 year old female seen as a PAC referral for risk assessment and optimization for anesthesia.    Plan/Recommendations  Pt will be optimized for the proposed procedure.  See below for details on the assessment, risk, and preoperative recommendations    ANESTHESIA  - Reports she has had difficulty getting to sleep with past anesthesia.      NEUROLOGY  - No history of TIA, CVA or seizure    -Post Op delirium risk factors:  No risk identified    ENT  - No current airway concerns.  Will need to be reassessed day of surgery.  Mallampati: Unable to assess  TM: Unable to assess    CARDIAC  - No history of CAD, Hypertension, and Afib    -  Denies cardiac symptoms.    - METS (Metabolic Equivalents)  Patient performs 4 or more METS exercise without symptoms             Total Score: 0      - RCRI-Very low risk: Class 1 0.4% complication rate             Total Score: 0        PULMONARY  - Obstructive Sleep Apnea>>SEVERE   ~ ROMEO with home CPAP.  Patient will be instructed to bring their home CPAP device to the hospital with them.    - Denies asthma or inhaler use    - Tobacco History   ~ Quit smoking 2 months ago.      History   Smoking Status    Former    Types: Cigarettes   Smokeless Tobacco    Never       GI  - GERD/hiatal hernia / erosive esophagitis   ~ Patient was prescribed Nexium, however, she not been able to get Nexium covered (prior authorization with provider and insurance problem) so she has been taking Pantoprazole 40 mg 2 tablets twice daily (800 mg BID).  She continues to have dysphagia and burping, but denies vomiting.   ~ Not controlled on medications   ~ above procedure scheduled    - PONV Medium Risk  Total Score: 2           1 AN PONV: Pt is Female    1 AN PONV: Patient is not a current smoker     "    /RENAL  - Baseline Creatinine  see above     ENDOCRINE    - BMI: Estimated body mass index is 45.73 kg/m  as calculated from the following:    Height as of 7/22/24: 1.702 m (5' 7\").    Weight as of 7/22/24: 132.5 kg (292 lb).  Class 3 Obesity (BMI > 40)   ~  Consideration for careful lifting and postioning techniques    ~ no longer taking topiramate    - Thyroid disorder  Continue home replacement while hospitalized.    - Prediabetic, see A1C above     HEME  - VTE Low Risk 0.5%             Total Score: 2    VTE: Greater than 59 yrs old    VTE: BMI greater than 39      - No history of abnormal bleeding or antiplatelet use.    Hemoglobin   Date Value Ref Range Status   03/28/2023 11.7 11.7 - 15.7 g/dL Final   04/15/2021 11.7 11.7 - 15.7 g/dL Final   ]  ~ Denies previous blood transfusion.       MSK  Patient is NOT Frail             Total Score: 1    Frailty: Increased exhaustion        PSYCH  - Bipolar depression, insomnia and ADHD              ~ reports doing well on current medications and followed by psychiatry.  Continue mental health medications as prescribed DOS     - h/o Alcohol use disorder for >20 years with many admission for detoxification.  Now in remission for past five years     ID  - MRSA (+)              ~ contact precautions    Different anesthesia methods/types have been discussed with the patient, but they are aware that the final plan will be decided by the assigned anesthesia provider on the date of service.      The patient is optimized for their procedure. Information on procedure time, arrival time and NPO status given by [facility] nursing staff. Medications instructions provided by PAC RN staff.   No further diagnostic testing indicated.    Please refer to the physical examination documented by the anesthesiologist in the anesthesia record on the day of surgery.    Video-Visit Details    Type of service:  Video Visit    Provider received verbal consent for a Video Visit from the patient? Yes "     Originating Location (pt. Location): Home    Distant Location (provider location):  Off-site  Mode of Communication:  Video Conference via [email or web address]  On the day of service:     Prep time: 11 minutes  Visit time: 16 minutes  Documentation time: 18 minutes  ------------------------------------------  Total time: 43 minutes      INEZ Kaur CNP  Preoperative Assessment Center  [facility]  Phone: [phone]  Fax: [phone]    Physical Exam    Airway        Mallampati: III   TM distance: > 3 FB   Neck ROM: full   Mouth opening: > 3 cm    Respiratory Devices and Support         Dental       (+) Minor Abnormalities - some fillings, tiny chips      Cardiovascular   cardiovascular exam normal          Pulmonary   pulmonary exam normal                Anesthesia Plan    ASA Status:  3    NPO Status:  NPO Appropriate    Anesthesia Type: MAC.     - Reason for MAC: straight local not clinically adequate   Induction: Intravenous, Propofol.   Maintenance: TIVA.   Techniques and Equipment:     - Airway: Video-Laryngoscope       Consents    Anesthesia Plan(s) and associated risks, benefits, and realistic alternatives discussed. Questions answered and patient/representative(s) expressed understanding.     - Discussed: Risks, Benefits and Alternatives for BOTH SEDATION and the PROCEDURE were discussed     - Discussed with:  Patient      - Extended Intubation/Ventilatory Support Discussed: No.      - Patient is DNR/DNI Status: No          Postoperative Care    Pain management: IV analgesics, Multi-modal analgesia.   PONV prophylaxis: Ondansetron (or other 5HT-3), Background Propofol Infusion     Comments:    Other Comments: Patient agreeable to attempting MAC with GA/ETT as backup. She used to drink heavily and was difficult to sedate in the past. Will use versed, ketamine and precedex ajuncts and have ETT as back-up. She is agreeable with plan.

## 2024-08-13 NOTE — ANESTHESIA CARE TRANSFER NOTE
Patient: Gudelia Dong    Procedure: Procedure(s):  Esophagoscopy, gastroscopy, duodenoscopy (EGD), combined       Diagnosis: Gastroesophageal reflux disease without esophagitis [K21.9]  Hiatal hernia [K44.9]  Erosive esophagitis [K22.10]  Diagnosis Additional Information: No value filed.    Anesthesia Type:   MAC     Note:    Oropharynx: oropharynx clear of all foreign objects  Level of Consciousness: awake  Oxygen Supplementation: room air    Independent Airway: airway patency satisfactory and stable  Dentition: dentition unchanged      Patient transferred to: PACU    Handoff Report: Identifed the Patient, Identified the Reponsible Provider, Reviewed the pertinent medical history, Discussed the surgical course, Reviewed Intra-OP anesthesia mangement and issues during anesthesia, Set expectations for post-procedure period and Allowed opportunity for questions and acknowledgement of understanding      Vitals:  Vitals Value Taken Time   /68    Temp     Pulse 67    Resp     SpO2 93 % 08/13/24 1334   Vitals shown include unfiled device data.    Electronically Signed By: INEZ Leon CRNA  August 13, 2024  1:34 PM

## 2024-08-14 ENCOUNTER — MYC MEDICAL ADVICE (OUTPATIENT)
Dept: GASTROENTEROLOGY | Facility: CLINIC | Age: 59
End: 2024-08-14
Payer: COMMERCIAL

## 2024-08-14 DIAGNOSIS — K22.10 EROSIVE ESOPHAGITIS: Primary | ICD-10-CM

## 2024-08-14 DIAGNOSIS — K44.9 HIATAL HERNIA: ICD-10-CM

## 2024-08-14 DIAGNOSIS — K22.10 EROSIVE ESOPHAGITIS: ICD-10-CM

## 2024-08-14 DIAGNOSIS — K21.9 GASTROESOPHAGEAL REFLUX DISEASE WITHOUT ESOPHAGITIS: Primary | ICD-10-CM

## 2024-08-14 RX ORDER — FAMOTIDINE 40 MG/1
40 TABLET, FILM COATED ORAL AT BEDTIME
Qty: 90 TABLET | Refills: 4 | Status: SHIPPED | OUTPATIENT
Start: 2024-08-14

## 2024-08-14 RX ORDER — VONOPRAZAN FUMARATE 26.72 MG/1
20 TABLET ORAL DAILY
Qty: 90 TABLET | Refills: 3 | Status: SHIPPED | OUTPATIENT
Start: 2024-08-14 | End: 2024-11-12

## 2024-08-14 RX ORDER — VONOPRAZAN FUMARATE 13.36 MG/1
10 TABLET ORAL DAILY
Qty: 90 TABLET | Refills: 2 | Status: SHIPPED | OUTPATIENT
Start: 2024-08-14 | End: 2024-08-14 | Stop reason: ALTCHOICE

## 2024-08-20 ENCOUNTER — VIRTUAL VISIT (OUTPATIENT)
Dept: GASTROENTEROLOGY | Facility: CLINIC | Age: 59
End: 2024-08-20
Attending: PHYSICIAN ASSISTANT
Payer: COMMERCIAL

## 2024-08-20 VITALS — BODY MASS INDEX: 45.73 KG/M2 | WEIGHT: 292 LBS

## 2024-08-20 DIAGNOSIS — K21.9 GASTROESOPHAGEAL REFLUX DISEASE WITHOUT ESOPHAGITIS: ICD-10-CM

## 2024-08-20 DIAGNOSIS — K44.9 HIATAL HERNIA: ICD-10-CM

## 2024-08-20 DIAGNOSIS — K22.10 EROSIVE ESOPHAGITIS: Primary | ICD-10-CM

## 2024-08-20 PROCEDURE — 99213 OFFICE O/P EST LOW 20 MIN: CPT | Mod: 95 | Performed by: PHYSICIAN ASSISTANT

## 2024-08-20 ASSESSMENT — PAIN SCALES - GENERAL: PAINLEVEL: NO PAIN (0)

## 2024-08-20 NOTE — NURSING NOTE
Current patient location: [address]  [place] [address]    Is the patient currently in the state of [place]? YES    Visit mode:VIDEO    If the visit is dropped, the patient can be reconnected by: VIDEO VISIT: Send to e-mail at: [email or web address]    Will anyone else be joining the visit? NO  (If patient encounters technical issues they should call [phone]    How would you like to obtain your AVS? MyChart    Are changes needed to the allergy or medication list? No    Are refills needed on medications prescribed by this physician? NO    Rooming Documentation:  Questionnaire(s) completed      Reason for visit: SAHARA BASURTOF

## 2024-08-20 NOTE — LETTER
8/20/2024      Gudelia Dong  [address]  [place] [address]      Dear Colleague,    Thank you for referring your patient, Gudelia Dong, to the [facility] [place]. Please see a copy of my visit note below.      Virtual Visit Details    Type of service:  Video Visit     Originating Location (pt. Location): Home    Distant Location (provider location):  Off-site  Platform used for Video Visit: [facility]      Gastroenterology Visit for: Gudelia Dong 1965   MRN: [id number]     Reason for Visit:  chief complaint    Referred by: Shayne  / [id number] / [place] [address]  Patient Care Team:  Mendel Mireles PA-C as PCP - General (Physician Assistant - Medical)  Mendel Abbasi MD as Resident (Student in organized health care education/training program)  Sarmad Reed MD as MD (Gastroenterology)  Selena Bella MD as ScribDenise Fields PA-C as Physician Assistant (Dermatology)  Ashwini Smith APRN CNS as Clinical Nurse Specialist (Anesthesiology)  René Rodriguez DO as Physician (Gastroenterology)  Tiarra Condon RD as Registered Dietitian (Dietitian, Registered)  Ortiz Rg MD as Assigned Endocrinology Provider  Mendel Mireles PA-C as Assigned PCP  Denise Bland PA-C as Assigned Surgical Provider  Rosalie Martínez PA-C as Assigned Gastroenterology Provider  Marjorie Estes MD as MD (Cardiovascular & Thoracic Surgery)    History of Present Illness:   Gudelia Dong is a 59 year old female with significant past medical history pertinent for insomnia, depression, ADHD, bipolar type II, tobacco use, history of alcoholism, psychosis, HydroDIURIL suppurativa, history of iron deficiency anemia, obesity, prediabetes, hypothyroidism, ROMEO on CPAP, hiatal hernia and GERD who is presenting as a follow up patient with a chief complaint of heartburn/regurgiation and dysphagia.    Had to cancel imaging/HRM as she was laid  "off from her job. She is now employed and insured once again.     Reflux - Stating she had a significant improvement with the change to the Esomeprazole 20 mg BID. In regards to the heartburn/regurgitation she states \"it was a rarity.\"    Dysphagia - 1-2x per month solid dysphagia. Denies dysphagia to semi solids, liquids and pills.     Bowel Patterns - Now stooling daily if not every other day consistent with [place] Stool Scale Type 4. No longer needing to use the Miralax. Denies incontinence, melena, hematochezia and BRBR.     --------------------------------------------------------------------------------------------------------------------------------------------------------------------------  Interval History February 20, 2024:    Had been taking Protonix 40 mg twice daily rather than Nexium 20 mg twice daily as discussed at last office visit.     Heartburn/Regurgitation- Noting she is inducing emesis as she is developing coughing when she was laying down to sleep. Explaining \"I just feel full of liquid.\" Noting she sleeps with two pillows although not with a wedge pillow or with gradual decline.     Dysphagia- Stable and occuring to specific solids once per week. Trigger foods include: lean pork, chicken and rice. This is associated with transient substernal chest discomfort. Denies dysphagia to semi solids, liquids and pills.     Bowel Patterns- With the use of Miralax 17g as needed multiple times throughout the week ~ 3 with stools occurring every other day that are consistent with [place] Stool Scale Type 1-4.      Tobacco Use- Cessation approximately 2 months prior.     Denies unintentional weight loss, odynophagia, abdominal pain, diarrhea, constipation, nocturnal stooling, incontinence, melena, hematochezia and BRBR. " "    -----------------------------------------------------------------------------------------------------------------------------------------------------------------------------------------------------------------  Interval History September 27, 2023:    Has had an isolated episode of emesis a few days prior. Noting \"I felt I was swimming in liquid and I was choking so I had to take care of that right away.\" To relieve this sensation she had induced emesis. This has occurred 5-6 times since last seen in May. The fluid consists of small amount of food particles as well as what is described to be stomach acid. Denies seeing bile.     Heartburn/regurgiation symptoms have been stable. Has had an isolated \"couple\" times where she has had heartburn occurring in the late afternoon between 4 - 9 pm. When further clarifying she states she only experiences the heartburn occurring once per week if not less. Noting she is not taking her second dose of PPI (Protonix 40 mg) until prior to bed.      Dysphagia - stable. Continuing to has intermittent solid food dysphagia to dry breads/meats. Noting meat is a trigger and she has limited this recently.     As for her bowel habits since the colonoscopy she notes that she is struggling with constipation. Noting she would not have a BM for 2-3 days followed by having large amounts of diarrhea.     Has had intentional weight loss of 8 lbs through dietary changes.      Denies odynophagia, abdominal pain, nocturnal stooling, incontinence, steatorrhea, melena, hematochezia and BRBR.     Continues to smoke 4 cigarillos every three days.     Notes \"big changes going on at my job.\" Her partner at work is loosing her job and there is concern that she may also loose her job and therefore lack of insurance. " "  --------------------------------------------------------------------------------------------------------------------------------------------------------------------------------------------------------------    5/19/2023 HPI:     Prior labs from March of this year include TSH, hemoglobin A1c, CBC and CMP that are grossly unremarkable other than an A1c of 5.8.      Gudelia reports history of long standing reflux consistent of both heartburn/regurgitation. Currently she is taking Protonix 40 mg twice daily with adequate control of her heartburn/regurgiation. Approximately once per month will have vomiting. Specifically she notes that she is unable to drink liquid with eating. If she does \"this will get stuck and result in throwing up.\" Also has dysphagia to solids. Specific trigger foods include meats. Denies dysphagia to semi solids and liquids. Associated with belching that can last 30 - 45 minutes.     Has a bowel movement twice per week that is consistent with [place] Stool Scale Type 2-3. Between she may have small volume stools consistent with [place] Stool Scale Type 1. Associated with intermittent pushing/strianing. Denies digitation, vaginal splinting or sensation of incomplete evacuation.      In the past 5 years has gained 50+ lbs.     Denies weight loss, odynophagia, dysphonia/hoarseness, chronic cough, neck pain/swelling/mass, abdominal pain, diarrhea, constipation, nocturnal stooling, incontinence, hematochezia and BRBR.     Denies use of ETOH and recreational drug use. Had 18 months of refraining from smoking. Currently she is on the patch and will have 4 cigarettes every three days.     No family history or GI related malignancy (esophageal, gastric, pancreatic, liver or colon) or family history of IBD/celiac disease.     Previously did not tolerate MAC sedation.     Esophageal Questionnaire(s)    BEDQ Questionnaire      9/20/2023     9:29 AM 2/20/2024     6:59 AM 8/20/2024     8:27 AM   BEDQ " Questionnaire: How Often Have You Had the Following?   Trouble eating solid food (meat, bread, vegetables) 1 4 1   Trouble eating soft foods (yogurt, jello, pudding) 0 0 0   Trouble swallowing liquids 0 0 1   Pain while swallowing 1 2 1   Coughing or choking while swallowing foods or liquids 2 2 2   Total Score: 4 8 5         9/20/2023     9:29 AM 2/20/2024     6:59 AM 8/20/2024     8:27 AM   BEDQ Questionnaire: Discomfort/Pain Ratings   Eating solid food (meat, bread, vegetables) 2 4 3   Eating soft foods (yogurt, jello, pudding) 0 0 0   Drinking liquid 1 0 3   Total Score: 3 4 6       Eckardt Questionnaire      9/20/2023     9:30 AM 2/20/2024     6:59 AM 8/20/2024     8:29 AM   Eckardt Questionnaire   Dysphagia 1 1 1   Regurgitation 1 1 1   Retrosternal Pain 1 1 1   Weight Loss (kg) 2 1 0   Total Score:  5 4 3       Promis 10 Questionnaire      8/28/2023     9:57 AM 9/20/2023     9:31 AM 2/20/2024     7:01 AM 8/20/2024     8:31 AM   PROMIS 10 FLOWSHEET DATA   In general, would you say your health is: 3 2 2 2   In general, would you say your quality of life is: 2 1 2 1   In general, how would you rate your physical health? 2 1 2 2   In general, how would you rate your mental health, including your mood and your ability to think? 3 3 2 3   In general, how would you rate your satisfaction with your social activities and relationships? 1 3 2 2   In general, please rate how well you carry out your usual social activities and roles. (This includes activities at home, at work and in your community, and responsibilities as a parent, child, spouse, employee, friend, etc.) 2 2 2 3   To what extent are you able to carry out your everyday physical activities such as walking, climbing stairs, carrying groceries, or moving a chair? 3 3 3 2   In the past 7 days, how often have you been bothered by emotional problems such as feeling anxious, depressed, or irritable? 4 3 3 3   In the past 7 days, how would you rate your fatigue on  average? 3 3 3 3   In the past 7 days, how would you rate your pain on average, where 0 means no pain, and 10 means worst imaginable pain? 4 5 4 3   Mental health question re-calculation - no clinical value 2 3 3 3   Physical health question re-calculation - no clinical value 3 3 3 3   Pain question re-calculation - no clinical value 3 3 3 4   Global Mental Health Score 8 10 9 9   Global Physical Health Score 11 10 11 11   PROMIS TOTAL - SUBSCORES 19 20 20 20       STUDIES & PROCEDURES:    EGD:     8/13/2024 Nexium 20 mg BID  Findings:       The Z-line was irregular and was found 36 cm from the incisors.        The gastroesophageal flap valve was visualized endoscopically and        classified as Hill Grade IV (no fold, wide open lumen, hiatal hernia        present).        A 6 cm hiatal hernia was found. The proximal extent of the gastric folds        (end of tubular esophagus) was 36 cm from the incisors. The hiatal        narrowing was 42 cm from the incisors.        LA Grade C (one or more mucosal breaks continuous between tops of 2 or        more mucosal folds, less than 75% circumference) esophagitis with no        bleeding was found.        Multiple sessile polyps with no bleeding and no stigmata of recent        bleeding were found in the gastric fundus. These were all benign        appearing fundic gland polyps.        The examined duodenum was normal.                                                                                    Impression:            - Z-line irregular, 36 cm from the incisors.                          - Gastroesophageal flap valve classified as Hill Grade                          IV (no fold, wide open lumen, hiatal hernia present).                          - 6 cm hiatal hernia.                          - LA Grade C reflux esophagitis with no bleeding.                          - Multiple gastric polyps.                          - Normal examined duodenum.                          -  No specimens collected.     2/15/2024 Protonix 40mg BID   Findings:        Esophagogastric landmarks were identified: the Z-line was found at 35        cm, the gastroesophageal junction was found at 35 cm and the site of the        diaphragmatic hiatus was found at 42 cm from the incisors.        Patially- healed, without eschars on ulcers, - LA Grade C (one or more        mucosal breaks continuous between tops of 2 or more mucosal folds, less        than 75% circumference) esophagitis with no bleeding was found 32- 35 cm        from the incisors.        The exam of the esophagus was otherwise normal.        A 7 cm hiatal hernia was present.        Multiple medium sessile polyps were found in the gastric body.        The exam of the stomach was otherwise normal.        The examined duodenum was normal.                                                                                     Impression:            - Esophagogastric landmarks identified.                          - LA Grade C reflux esophagitis with no bleeding.                          - 7 cm hiatal hernia.                          - Multiple gastric polyps.                          - Normal examined duodenum.                          - No specimens collected.     8/8/2023  Findings:        The lumen of the esophagus was moderately dilated. Biopsies were taken        with a cold forceps for histology in the distal and proximal esophagus.        Verification of patient identification for the specimen was done.        LA Grade C (one or more mucosal breaks continuous between tops of 2 or        more mucosal folds, less than 75% circumference) esophagitis with no        bleeding was found.        The gastroesophageal flap valve was visualized endoscopically and        classified as Hill Grade IV (no fold, wide open lumen, hiatal hernia        present).        A 7 cm hiatal hernia was found. The proximal extent of the gastric folds        (end of tubular  esophagus) was 37 cm from the incisors. The hiatal        narrowing was 44 cm from the incisors. The Z-line was 37 cm from the        incisors.        Multiple sessile polyps with no bleeding and no stigmata of recent        bleeding were found in the gastric body. benign fundic gland appearing        <1cm        The duodenal bulb, first portion of the duodenum, second portion of the        duodenum and examined duodenum were normal.                                                                                     Impression:            - Dilation in the entire esophagus. Biopsied.                          - LA Grade C reflux esophagitis with no bleeding.                          - Gastroesophageal flap valve classified as Hill Grade                          IV (no fold, wide open lumen, hiatal hernia present).                          - 7 cm hiatal hernia.                          - Multiple gastric polyps.                          - Normal duodenal bulb, first portion of the duodenum,                          second portion of the duodenum and examined duodenum.      Final Diagnosis   A.  ESOPHAGUS, DISTAL, BIOPSY:  - Squamous mucosa with minimal chronic inflammation   - Negative for intestinal metaplasia or dysplasia     B.  ESOPHAGUS, PROXIMAL, BIOPSY:  - Squamous epithelium with no significant histopathologic abnormality  - No evidence of eosinophilic esophagitis       5/14/2021 [facility]         8/2015 [facility]         Colonoscopy:    8/8/2023  Findings:        The perianal and digital rectal examinations were normal.        A 4 mm polyp was found in the ascending colon. The polyp was sessile.        The polyp was removed with a cold snare. Resection and retrieval were        complete. Verification of patient identification for the specimen was        done. Estimated blood loss: none.        A 2 mm polyp was found in the recto-sigmoid colon. The polyp was        sessile. The polyp was removed with a jumbo cold  forceps. Resection and        retrieval were complete. Verification of patient identification for the        specimen was done. Estimated blood loss: none.        A single small-mouthed diverticulum was found in the ascending colon.        There was no evidence of diverticular bleeding.        The retroflexed view of the distal rectum and anal verge was normal and        showed no anal or rectal abnormalities.                                                                                     Impression:            - One 4 mm polyp in the ascending colon, removed with                          a cold snare. Resected and retrieved.                          - One 2 mm polyp at the recto-sigmoid colon, removed                          with a jumbo cold forceps. Resected and retrieved.                          - Diverticulosis in the ascending colon. There was no                          evidence of diverticular bleeding.                          - The distal rectum and anal verge are normal on                          retroflexion view.     Final Diagnosis     C.  COLON, ASCENDING, POLYP:  - Tubular adenoma; negative for high-grade dysplasia     D.  COLON, SIGMOID/RECTUM, POLYP:  - Hyperplastic polyp          5/14/2021 [facility]     1/2014  Findings:        The digital rectal exam was abnormal. Findings include decreased        sphincter tone. Pertinent negatives include no palpable rectal lesions.        The terminal ileum appeared normal. A benign appearing sessile polyp was        found in the rectum. The polyp was 5 mm in size. The polyp was removed        with a hot snare. Resection and retrieval were complete. Estimated blood        loss was minimal. A segmental area of moderately congested and        erythematous mucosa was found in the distal rectum. Biopsies were taken        with a cold forceps for histology. Estimated blood loss was minimal.                                                                                     Impression:               - Rectal exam revealed decreased sphincter tone.                             - The examined portion of the ileum was normal.                             - One 5 mm polyp in the rectum (benign appearing).                             Resected and retrieved.                             - Congested and erythematous mucosa distal rectum.                             This was biopsied.                             - Rectal prolapse.                             - The clinical history suggests pelvic floor                             dysfunction with rectal prolapse and urinary                             retention requiring digital manipulation of the                             pelvic floor to facilitate bladder emptying. I                             believe further evaluation of the perineum at the                             pelvic floor center is indicated.        EndoFLIP directed at the UES or LES (8cm (EF-325) balloon length or 16cm (EF-322) balloon length):   Date:  8cm balloon  Balloon inflation Balloon pressure CSA (mm^2) DI (mm^2/mmHg) Dmin (mm) Compliance   20 (ladmark ID)        30        40        50           16cm balloon  Balloon inflation Balloon pressure CSA (mm^2) DI (mm^2/mmHg) Dmin (mm) Compliance   30 (ladmark ID)        40        50        60        70           High Resolution Manometry:    PH/Impedance:     Bravo:    CT:    Esophagram:    FL VSS:     GES:    U/S:     XRAY:      Prior medical records were reviewed including, but not limited to, notes from referring providers, lab work, radiographic tests, and other diagnostic tests. Pertinent results were summarized above.     History     Past Medical History:   Diagnosis Date     Depression      GERD (gastroesophageal reflux disease)      Hemorrhoid      Menstrual disorder     s/p D&C -12/2012     ROMEO on CPAP      Other bipolar disorders     followed by Dr Collazo     Post menopausal syndrome      Tobacco abuse         Past Surgical History:   Procedure Laterality Date     COLONOSCOPY  12/24/2013    Procedure: COLONOSCOPY;;  Surgeon: Guy Grant MD;  Location: [facility]     COLONOSCOPY  1/9/2014    Procedure: COMBINED COLONOSCOPY, SINGLE BIOPSY/POLYPECTOMY BY BIOPSY;;  Surgeon: Guy Grant MD;  Location: [facility]     COLONOSCOPY N/A 8/8/2023    Procedure: COLONOSCOPY, WITH POLYPECTOMY AND BIOPSY;  Surgeon: René Rodriguez DO;  Location:  GI     ESOPHAGOSCOPY, GASTROSCOPY, DUODENOSCOPY (EGD), COMBINED  12/24/2013    Procedure: COMBINED ESOPHAGOSCOPY, GASTROSCOPY, DUODENOSCOPY (EGD), BIOPSY SINGLE OR MULTIPLE;  ESOPHAGOSCOPY, GASTROSCOPY, DUODENOSCOPY, COLONOSCOPY;  Surgeon: Guy Grant MD;  Location: [facility]     ESOPHAGOSCOPY, GASTROSCOPY, DUODENOSCOPY (EGD), COMBINED N/A 8/8/2023    Procedure: ESOPHAGOGASTRODUODENOSCOPY, WITH BIOPSY;  Surgeon: René Rodriguez DO;  Location: [facility]     ESOPHAGOSCOPY, GASTROSCOPY, DUODENOSCOPY (EGD), COMBINED N/A 2/15/2024    Procedure: Esophagoscopy, gastroscopy, duodenoscopy (EGD), combined;  Surgeon: Sarmad Reed MD;  Location: [facility]     ESOPHAGOSCOPY, GASTROSCOPY, DUODENOSCOPY (EGD), COMBINED N/A 8/13/2024    Procedure: Esophagoscopy, gastroscopy, duodenoscopy (EGD), combined;  Surgeon: René Rodriguez DO;  Location:  GI     GENITOURINARY SURGERY      urethra stretched     GYN SURGERY      d&c      LAPAROSCOPIC ASSISTED RECTOPEXY  3/14/2014    Procedure: LAPAROSCOPIC ASSISTED RECTOPEXY;  LAPAROSCOPIC  VENTRAL RECTOPEXY ;  Surgeon: Jennifer Connolly MD;  Location:  OR     ORTHOPEDIC SURGERY      surgery on clavicle,surgery for left leg fracture       Social History     Socioeconomic History     Marital status:      Spouse name: Not on file     Number of children: Not on file     Years of education: Not on file     Highest education level: Not on file   Occupational History     Not on file   Tobacco Use     Smoking status: Former     Current packs/day: 0.00     Types:  Cigarettes     Quit date: 2023     Years since quittin.6     Passive exposure: Past     Smokeless tobacco: Never     Tobacco comments:     Quit for year, restarted .       Quit middle May 2024   Vaping Use     Vaping status: Never Used   Substance and Sexual Activity     Alcohol use: No     Comment: Severe alcohol use disorder for 20 years.  :  Reports sober for past five years     Drug use: No     Sexual activity: Yes     Partners: Male   Other Topics Concern     Parent/sibling w/ CABG, MI or angioplasty before 65F 55M? Not Asked   Social History Narrative    2013    /single/    Children-    Work-    Tobacco-    ETOH-    Exercise-         Social Determinants of Health     Financial Resource Strain: Not on file   Food Insecurity: Not on file   Transportation Needs: Not on file   Physical Activity: Not on file   Stress: Not on file   Social Connections: Not on file   Interpersonal Safety: Not on file   Housing Stability: Not on file       Family History   Problem Relation Age of Onset     Cancer Mother         ovarian cancer     Hypertension Father      Breast Cancer No family hx of      Cancer - colorectal No family hx of      Anesthesia Reaction No family hx of      Venous thrombosis No family hx of      Family history reviewed and edited as appropriate    Medications and Allergies:     Outpatient Encounter Medications as of 2024   Medication Sig Dispense Refill     albuterol (PROAIR HFA/PROVENTIL HFA/VENTOLIN HFA) 108 (90 Base) MCG/ACT inhaler Inhale 2 puffs into the lungs every 6 hours as needed for shortness of breath, wheezing or cough 18 g 0     ammonium lactate (AMLACTIN) 12 % external cream Apply topically daily as needed for dry skin 385 g 1     buPROPion (WELLBUTRIN XL) 150 MG 24 hr tablet Take 450 mg by mouth every morning       busPIRone HCl (BUSPAR) 30 MG tablet Take 1 tablet by mouth 2 times daily       Doxepin HCl 150 MG CAPS Take 300 mg by mouth At Bedtime  (Patient taking differently: Take 150 mg by mouth at bedtime) 30 capsule 0     escitalopram (LEXAPRO) 10 MG tablet Take 10 mg by mouth every morning       esomeprazole (NEXIUM) 20 MG packet Take 20 mg by mouth 2 times daily (before meals) 180 each 1     famotidine (PEPCID) 40 MG tablet Take 1 tablet (40 mg) by mouth at bedtime 90 tablet 4     lamoTRIgine (LAMICTAL) 100 MG tablet Take 100 mg by mouth every morning       levothyroxine (SYNTHROID/LEVOTHROID) 150 MCG tablet Take 1 tablet (150 mcg) by mouth daily (Patient taking differently: Take 150 mcg by mouth every morning) 60 tablet 0     nystatin (MYCOSTATIN) 267790 UNIT/GM external powder Apply topically daily 60 g 11     order for DME Equipment being ordered: CPAP  Please dispense Cpap and its supply 1 Units prn     REXULTI 2 MG tablet Take 2 mg by mouth every morning       triamcinolone (KENALOG) 0.025 % external ointment Apply topically 2 times daily To affected area in the groin as needed up to 2 weeks at a time. 30 g 3     Vonoprazan Fumarate (VOQUEZNA) 20 MG TABS Take 20 mg by mouth daily for 90 days 90 tablet 3     esomeprazole (NEXIUM) 20 MG DR capsule Take 1 capsule (20 mg) by mouth 2 times daily Take 30-60 minutes before eating. 180 capsule 0     pantoprazole (PROTONIX) 40 MG EC tablet Take 80 mg by mouth 2 times daily       No facility-administered encounter medications on file as of 8/20/2024.        Allergies   Allergen Reactions     Contrast Dye Shortness Of Breath     Methylpyrrolidone Swelling     Iodine Swelling     Other reaction(s): Angioedema  Facial swelling.     Morphine Anxiety and Nausea and Vomiting     Other reaction(s): Behavioral Disturbances  Other reaction(s): Anxiety, vomiting        Review of systems:  A full 10 point review of systems was obtained and was negative except for the pertinent positives and negatives stated within the HPI.    Objective Findings:   Physical Exam:    Constitutional: Wt 132.5 kg (292 lb)   LMP 07/20/2006    BMI 45.73 kg/m    General: Alert, cooperative, no distress, well-appearing  Head: Atraumatic, normocephalic, no obvious abnormalities   Eyes: Sclera anicteric, no obvious conjunctival hemorrhage   Nose: Nares normal, no obvious malformation, no obvious rhinorrhea   Respiratory: Resting comfortably, no apparent distress, no cough.   Skin: No jaundice, no obvious rash  Neurologic: AAOx3, no obvious neurologic abnormality  Psychiatric: Normal Affect, appropriate mood  Extremities: No obvious edema, no obvious malformation     Labs, Radiology, Pathology     Lab Results   Component Value Date    WBC 5.1 03/28/2023    WBC 7.9 02/10/2022    WBC 6.5 04/15/2021    HGB 11.7 03/28/2023    HGB 12.2 02/10/2022    HGB 11.7 04/15/2021     03/28/2023     02/10/2022     04/15/2021    CHOL 174 03/28/2023    CHOL 183 02/10/2022    CHOL 221 (H) 04/15/2021    TRIG 82 03/28/2023    TRIG 91 01/07/2020    TRIG 115 12/11/2018    HDL 69 03/28/2023    HDL 66 02/10/2022    HDL 62 01/07/2020    ALT 21 03/28/2023    ALT 23 10/04/2022    ALT 17 09/12/2022    AST 21 03/28/2023    AST 23 10/04/2022    AST 19 09/12/2022     03/28/2023     02/10/2022     04/15/2021    BUN 15.4 03/28/2023    BUN 15 02/10/2022    BUN 18 04/15/2021    CO2 26 03/28/2023    CO2 27 02/10/2022    CO2 25 04/15/2021    TSH 5.78 (H) 07/13/2024    TSH 0.85 03/28/2023    TSH 1.39 02/10/2022    INR 0.82 (L) 04/24/2006        Liver Function Studies -   Recent Labs   Lab Test 03/28/23  0911   PROTTOTAL 7.6   ALBUMIN 4.3   BILITOTAL 0.2   ALKPHOS 123*   AST 21   ALT 21          Patient Active Problem List    Diagnosis Date Noted     Hiatal hernia 03/28/2023     Priority: Medium     Insomnia, unspecified type 03/28/2023     Priority: Medium     Numbness and tingling in left hand 12/07/2022     Priority: Medium     Obesity (BMI 35.0-39.9) with comorbidity (H) 01/23/2020     Priority: Medium     Vitamin D deficiency 02/20/2018     Priority:  Medium     Hidradenitis suppurativa of right axilla 08/24/2016     Priority: Medium     Hypothyroidism due to medication 12/22/2015     Priority: Medium     lithium       Prediabetes 12/08/2015     Priority: Medium     Gastroesophageal reflux disease without esophagitis 10/13/2015     Priority: Medium     Bipolar 2 disorder (H) 07/28/2015     Priority: Medium     Psychosis (H) 05/08/2015     Priority: Medium     Periumbilical hernia 06/12/2014     Priority: Medium     overweight BMI>30 05/20/2014     Priority: Medium     ADHD, predominantly inattentive type 05/16/2014     Priority: Medium     Hyponatremia 04/04/2014     Priority: Medium     Tubular adenoma 01/30/2014     Priority: Medium     Seen on colonoscopy dated 1/09/2014       Iron deficiency anemia 01/30/2014     Priority: Medium     Anemia 09/30/2013     Priority: Medium     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) 09/26/2013     Priority: Medium     CARDIOVASCULAR SCREENING; LDL GOAL LESS THAN 160 09/26/2013     Priority: Medium     Major depressive disorder, recurrent episode, moderate (H) 09/26/2013     Priority: Medium     ROMEO on CPAP      Priority: Medium     Tobacco abuse      Priority: Medium     Post menopausal syndrome      Priority: Medium      Assessment and Plan   Assessment/Plan:    Gudelia Dong is a 59 year old female with significant past medical history pertinent for insomnia, depression, ADHD, bipolar type II, tobacco use, history of alcoholism, psychosis, HydroDIURIL suppurativa, history of iron deficiency anemia, obesity, prediabetes, hypothyroidism, ROMEO on CPAP, hiatal hernia and GERD who is presenting as a follow up patient with a chief complaint of heartburn/regurgiation and dysphagia.    #Reflux  #Large Hiatal Hernia ~ 6-7 cm   #Grade C Erosive Esophagitis  #Obesity   #Tobacco Use     Prior Evaluation Includes:    EGD with [facility] 5/14/2021 with LA grade C erosive esophagitis and a large hiatal hernia.  Biopsies were obtained of the distal  esophagus that were consistent with mild nonspecific inflammatory changes. Negative for intestinal metaplasia/dysplasia. Gastric biopsies and duodenal biopsies were unremarkable negative for H. pylori and celiac disease respectively.    EGD 8/8/2023 with LA grade C erosive esophagitis and a 7 cm hiatal hernia. Distal esophageal biopsies with minimal chronic inflammation negative for intestinal aplasia or dysplasia. Proximal esophageal biopsies no significant histopathologic abnormality.      2/15/2023 EGD with LA Grade C Esophagitis despite Protonix 40mg BID and a 7cm hiatal hernia.    EGD 8/13/2024 completed on Nexium 20 mg twice daily with persistent LA grade C erosive esophagitis and findings of a 6 cm hiatal hernia. No biopsies were obtained.    Today Gudelia reports that she had significant symptomatic improvement with the use of esomeprazole 20 mg twice daily in regards to her reflux symptoms as well as dysphagia.  We discussed and reviewed that she has continued to have persistent erosive esophagitis despite PPI therapy.  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 She expresses that she is now willing to meet with the bariatrics team and is ready to move forward on her weight loss journey.    - Start Vonoprazan (Voquezna) 20 mg once daily, if there are difficulties with insurance coverage please inform your provider    - Start Famotidine 40 mg once nightly at bedtime   - Discontinue Esomeprazole    - Timed barium esophagram with tablet to further characterize esophageal motility/hiatal hernia. To schedule imaging, please call [phone]   - High resolution manometry study   - Follow up with weight management/bariatrics, message to [facility] team within the bariatrics group    - Upper endoscopy after being treated with Vanoprazan/Famotidine 8 to 12 weeks to evaluate for resolution of  esophagitis/for Cruz's esophagus  - With history of erosive esophagitis patient will need to remain on PPI indefinitely   - Please follow reflux lifestyle modifications as directed in AVS   - Please sleep with the head end of the bed elevated to at least 30 degrees or with a wedge pillow   - Attempt to not eat or drink 3 - 4 hours prior to laying down for bed or prior to laying down flat  - Consoled on tobacco cessation     #Gastric Polyps  EGD 8/8/2023 with multiple sessile polyps found within the gastric body less than 1 cm.  Appearance was consistent with benign fundic gland polyp per performing endoscopist.  Again, noted on endoscopy 2/15/2023 and 8/13/2024.    #Colorectal Cancer Screening   #Constipation   8/8/2023 colonoscopy with one 4mm tubular adenoma within the ascending colon and a 2 mm hyperplastic polyp.     Colonoscopy May 2021 with [facility] that was described to be of poor quality examination secondary to difficulties with sedation/constant patient movement, poor prep and redundant colon.  No large lesions were appreciated.     Colonoscopy 2014 with one 5 mm rectal polyp of unknown etiology.     No family history of colorectal cancer. Per the most recent update by the [place] [employer]-Society Task Force on Colorectal Cancer recall should be 7 years, 2030 or sooner if otherwise indicated.       Follow up plan:   Return to clinic 4 months and as needed.    The risks and benefits of my recommendations, as well as other treatment options were discussed with the patient and any available family today. All questions were answered.     Follow up: As planned above. Today, I personally spent 21 minutes in direct face to face time with the patient, of which greater than 50% of the time was spent in patient education and counseling as described above. Approximately 4 minutes were spent on indirect care associated with the patient's consultation including but not limited to review of: patient medical records to date,  clinic visits, hospital records, lab results, imaging studies, procedural documentation, and coordinating care with other providers. The findings from this review are summarized in the above note. All of the above accounted for a cumulative time of 25 minutes and was performed on the date of service.     The patient verbalized understanding of the plan and was appreciative for the time spent and information provided during the office visit.           Rosalie Martínez PA-C  Division of Gastroenterology, Hepatology, and Nutrition  [facility]       Documentation assisted by voice recognition and documentation system.            Again, thank you for allowing me to participate in the care of your patient.        Sincerely,        Rosalie Martínez PA-C

## 2024-08-20 NOTE — PROGRESS NOTES
"  Virtual Visit Details    Type of service:  Video Visit     Originating Location (pt. Location): Home    Distant Location (provider location):  Off-site  Platform used for Video Visit: [facility]      Gastroenterology Visit for: Gudelia Dong 1965   MRN: [id number]     Reason for Visit:  chief complaint    Referred by: Shayne  / [id number] / [place] [address]  Patient Care Team:  Mendel Mireles PA-C as PCP - General (Physician Assistant - Medical)  Mendel Abbasi MD as Resident (Student in organized health care education/training program)  Sarmad Reed MD as MD (Gastroenterology)  Selena Bella MD as Denise Busby PA-C as Physician Assistant (Dermatology)  Ashwini Smith APRN CNS as Clinical Nurse Specialist (Anesthesiology)  René Rodriguez DO as Physician (Gastroenterology)  Tiarra Condon RD as Registered Dietitian (Dietitian, Registered)  Ortiz Rg MD as Assigned Endocrinology Provider  Mendel Mireles PA-C as Assigned PCP  Denise Bland PA-C as Assigned Surgical Provider  Rosalie Martínez PA-C as Assigned Gastroenterology Provider  Marjorie Estes MD as MD (Cardiovascular & Thoracic Surgery)    History of Present Illness:   Gudelia Dong is a 59 year old female with significant past medical history pertinent for insomnia, depression, ADHD, bipolar type II, tobacco use, history of alcoholism, psychosis, HydroDIURIL suppurativa, history of iron deficiency anemia, obesity, prediabetes, hypothyroidism, ROMEO on CPAP, hiatal hernia and GERD who is presenting as a follow up patient with a chief complaint of heartburn/regurgiation and dysphagia.    Had to cancel imaging/HRM as she was laid off from her job. She is now employed and insured once again.     Reflux - Stating she had a significant improvement with the change to the Esomeprazole 20 mg BID. In regards to the heartburn/regurgitation she states \"it was a rarity.\"    Dysphagia - 1-2x per " "month solid dysphagia. Denies dysphagia to semi solids, liquids and pills.     Bowel Patterns - Now stooling daily if not every other day consistent with [place] Stool Scale Type 4. No longer needing to use the Miralax. Denies incontinence, melena, hematochezia and BRBR.     --------------------------------------------------------------------------------------------------------------------------------------------------------------------------  Interval History February 20, 2024:    Had been taking Protonix 40 mg twice daily rather than Nexium 20 mg twice daily as discussed at last office visit.     Heartburn/Regurgitation- Noting she is inducing emesis as she is developing coughing when she was laying down to sleep. Explaining \"I just feel full of liquid.\" Noting she sleeps with two pillows although not with a wedge pillow or with gradual decline.     Dysphagia- Stable and occuring to specific solids once per week. Trigger foods include: lean pork, chicken and rice. This is associated with transient substernal chest discomfort. Denies dysphagia to semi solids, liquids and pills.     Bowel Patterns- With the use of Miralax 17g as needed multiple times throughout the week ~ 3 with stools occurring every other day that are consistent with [place] Stool Scale Type 1-4.      Tobacco Use- Cessation approximately 2 months prior.     Denies unintentional weight loss, odynophagia, abdominal pain, diarrhea, constipation, nocturnal stooling, incontinence, melena, hematochezia and BRBR.     -----------------------------------------------------------------------------------------------------------------------------------------------------------------------------------------------------------------  Interval History September 27, 2023:    Has had an isolated episode of emesis a few days prior. Noting \"I felt I was swimming in liquid and I was choking so I had to take care of that right away.\" To relieve this sensation she had " "induced emesis. This has occurred 5-6 times since last seen in May. The fluid consists of small amount of food particles as well as what is described to be stomach acid. Denies seeing bile.     Heartburn/regurgiation symptoms have been stable. Has had an isolated \"couple\" times where she has had heartburn occurring in the late afternoon between 4 - 9 pm. When further clarifying she states she only experiences the heartburn occurring once per week if not less. Noting she is not taking her second dose of PPI (Protonix 40 mg) until prior to bed.      Dysphagia - stable. Continuing to has intermittent solid food dysphagia to dry breads/meats. Noting meat is a trigger and she has limited this recently.     As for her bowel habits since the colonoscopy she notes that she is struggling with constipation. Noting she would not have a BM for 2-3 days followed by having large amounts of diarrhea.     Has had intentional weight loss of 8 lbs through dietary changes.      Denies odynophagia, abdominal pain, nocturnal stooling, incontinence, steatorrhea, melena, hematochezia and BRBR.     Continues to smoke 4 cigarillos every three days.     Notes \"big changes going on at my job.\" Her partner at work is loosing her job and there is concern that she may also loose her job and therefore lack of insurance.   --------------------------------------------------------------------------------------------------------------------------------------------------------------------------------------------------------------    5/19/2023 HPI:     Prior labs from March of this year include TSH, hemoglobin A1c, CBC and CMP that are grossly unremarkable other than an A1c of 5.8.      Gudelia reports history of long standing reflux consistent of both heartburn/regurgitation. Currently she is taking Protonix 40 mg twice daily with adequate control of her heartburn/regurgiation. Approximately once per month will have vomiting. Specifically she notes " "that she is unable to drink liquid with eating. If she does \"this will get stuck and result in throwing up.\" Also has dysphagia to solids. Specific trigger foods include meats. Denies dysphagia to semi solids and liquids. Associated with belching that can last 30 - 45 minutes.     Has a bowel movement twice per week that is consistent with Pine Stool Scale Type 2-3. Between she may have small volume stools consistent with [place] Stool Scale Type 1. Associated with intermittent pushing/strianing. Denies digitation, vaginal splinting or sensation of incomplete evacuation.      In the past 5 years has gained 50+ lbs.     Denies weight loss, odynophagia, dysphonia/hoarseness, chronic cough, neck pain/swelling/mass, abdominal pain, diarrhea, constipation, nocturnal stooling, incontinence, hematochezia and BRBR.     Denies use of ETOH and recreational drug use. Had 18 months of refraining from smoking. Currently she is on the patch and will have 4 cigarettes every three days.     No family history or GI related malignancy (esophageal, gastric, pancreatic, liver or colon) or family history of IBD/celiac disease.     Previously did not tolerate MAC sedation.     Esophageal Questionnaire(s)    BEDQ Questionnaire      9/20/2023     9:29 AM 2/20/2024     6:59 AM 8/20/2024     8:27 AM   BEDQ Questionnaire: How Often Have You Had the Following?   Trouble eating solid food (meat, bread, vegetables) 1 4 1   Trouble eating soft foods (yogurt, jello, pudding) 0 0 0   Trouble swallowing liquids 0 0 1   Pain while swallowing 1 2 1   Coughing or choking while swallowing foods or liquids 2 2 2   Total Score: 4 8 5         9/20/2023     9:29 AM 2/20/2024     6:59 AM 8/20/2024     8:27 AM   BEDQ Questionnaire: Discomfort/Pain Ratings   Eating solid food (meat, bread, vegetables) 2 4 3   Eating soft foods (yogurt, jello, pudding) 0 0 0   Drinking liquid 1 0 3   Total Score: 3 4 6       Eckardt Questionnaire      9/20/2023     9:30 AM " 2/20/2024     6:59 AM 8/20/2024     8:29 AM   Eckardt Questionnaire   Dysphagia 1 1 1   Regurgitation 1 1 1   Retrosternal Pain 1 1 1   Weight Loss (kg) 2 1 0   Total Score:  5 4 3       Promis 10 Questionnaire      8/28/2023     9:57 AM 9/20/2023     9:31 AM 2/20/2024     7:01 AM 8/20/2024     8:31 AM   PROMIS 10 FLOWSHEET DATA   In general, would you say your health is: 3 2 2 2   In general, would you say your quality of life is: 2 1 2 1   In general, how would you rate your physical health? 2 1 2 2   In general, how would you rate your mental health, including your mood and your ability to think? 3 3 2 3   In general, how would you rate your satisfaction with your social activities and relationships? 1 3 2 2   In general, please rate how well you carry out your usual social activities and roles. (This includes activities at home, at work and in your community, and responsibilities as a parent, child, spouse, employee, friend, etc.) 2 2 2 3   To what extent are you able to carry out your everyday physical activities such as walking, climbing stairs, carrying groceries, or moving a chair? 3 3 3 2   In the past 7 days, how often have you been bothered by emotional problems such as feeling anxious, depressed, or irritable? 4 3 3 3   In the past 7 days, how would you rate your fatigue on average? 3 3 3 3   In the past 7 days, how would you rate your pain on average, where 0 means no pain, and 10 means worst imaginable pain? 4 5 4 3   Mental health question re-calculation - no clinical value 2 3 3 3   Physical health question re-calculation - no clinical value 3 3 3 3   Pain question re-calculation - no clinical value 3 3 3 4   Global Mental Health Score 8 10 9 9   Global Physical Health Score 11 10 11 11   PROMIS TOTAL - SUBSCORES 19 20 20 20       STUDIES & PROCEDURES:    EGD:     8/13/2024 Nexium 20 mg BID  Findings:       The Z-line was irregular and was found 36 cm from the incisors.        The gastroesophageal  flap valve was visualized endoscopically and        classified as Hill Grade IV (no fold, wide open lumen, hiatal hernia        present).        A 6 cm hiatal hernia was found. The proximal extent of the gastric folds        (end of tubular esophagus) was 36 cm from the incisors. The hiatal        narrowing was 42 cm from the incisors.        LA Grade C (one or more mucosal breaks continuous between tops of 2 or        more mucosal folds, less than 75% circumference) esophagitis with no        bleeding was found.        Multiple sessile polyps with no bleeding and no stigmata of recent        bleeding were found in the gastric fundus. These were all benign        appearing fundic gland polyps.        The examined duodenum was normal.                                                                                    Impression:            - Z-line irregular, 36 cm from the incisors.                          - Gastroesophageal flap valve classified as Hill Grade                          IV (no fold, wide open lumen, hiatal hernia present).                          - 6 cm hiatal hernia.                          - LA Grade C reflux esophagitis with no bleeding.                          - Multiple gastric polyps.                          - Normal examined duodenum.                          - No specimens collected.     2/15/2024 Protonix 40mg BID   Findings:        Esophagogastric landmarks were identified: the Z-line was found at 35        cm, the gastroesophageal junction was found at 35 cm and the site of the        diaphragmatic hiatus was found at 42 cm from the incisors.        Patially- healed, without eschars on ulcers, - LA Grade C (one or more        mucosal breaks continuous between tops of 2 or more mucosal folds, less        than 75% circumference) esophagitis with no bleeding was found 32- 35 cm        from the incisors.        The exam of the esophagus was otherwise normal.        A 7 cm hiatal hernia was  present.        Multiple medium sessile polyps were found in the gastric body.        The exam of the stomach was otherwise normal.        The examined duodenum was normal.                                                                                     Impression:            - Esophagogastric landmarks identified.                          - LA Grade C reflux esophagitis with no bleeding.                          - 7 cm hiatal hernia.                          - Multiple gastric polyps.                          - Normal examined duodenum.                          - No specimens collected.     8/8/2023  Findings:        The lumen of the esophagus was moderately dilated. Biopsies were taken        with a cold forceps for histology in the distal and proximal esophagus.        Verification of patient identification for the specimen was done.        LA Grade C (one or more mucosal breaks continuous between tops of 2 or        more mucosal folds, less than 75% circumference) esophagitis with no        bleeding was found.        The gastroesophageal flap valve was visualized endoscopically and        classified as Hill Grade IV (no fold, wide open lumen, hiatal hernia        present).        A 7 cm hiatal hernia was found. The proximal extent of the gastric folds        (end of tubular esophagus) was 37 cm from the incisors. The hiatal        narrowing was 44 cm from the incisors. The Z-line was 37 cm from the        incisors.        Multiple sessile polyps with no bleeding and no stigmata of recent        bleeding were found in the gastric body. benign fundic gland appearing        <1cm        The duodenal bulb, first portion of the duodenum, second portion of the        duodenum and examined duodenum were normal.                                                                                     Impression:            - Dilation in the entire esophagus. Biopsied.                          - LA Grade C reflux esophagitis  with no bleeding.                          - Gastroesophageal flap valve classified as Hill Grade                          IV (no fold, wide open lumen, hiatal hernia present).                          - 7 cm hiatal hernia.                          - Multiple gastric polyps.                          - Normal duodenal bulb, first portion of the duodenum,                          second portion of the duodenum and examined duodenum.      Final Diagnosis   A.  ESOPHAGUS, DISTAL, BIOPSY:  - Squamous mucosa with minimal chronic inflammation   - Negative for intestinal metaplasia or dysplasia     B.  ESOPHAGUS, PROXIMAL, BIOPSY:  - Squamous epithelium with no significant histopathologic abnormality  - No evidence of eosinophilic esophagitis       5/14/2021 [facility]         8/2015 [facility]         Colonoscopy:    8/8/2023  Findings:        The perianal and digital rectal examinations were normal.        A 4 mm polyp was found in the ascending colon. The polyp was sessile.        The polyp was removed with a cold snare. Resection and retrieval were        complete. Verification of patient identification for the specimen was        done. Estimated blood loss: none.        A 2 mm polyp was found in the recto-sigmoid colon. The polyp was        sessile. The polyp was removed with a jumbo cold forceps. Resection and        retrieval were complete. Verification of patient identification for the        specimen was done. Estimated blood loss: none.        A single small-mouthed diverticulum was found in the ascending colon.        There was no evidence of diverticular bleeding.        The retroflexed view of the distal rectum and anal verge was normal and        showed no anal or rectal abnormalities.                                                                                     Impression:            - One 4 mm polyp in the ascending colon, removed with                          a cold snare. Resected and retrieved.                           - One 2 mm polyp at the recto-sigmoid colon, removed                          with a jumbo cold forceps. Resected and retrieved.                          - Diverticulosis in the ascending colon. There was no                          evidence of diverticular bleeding.                          - The distal rectum and anal verge are normal on                          retroflexion view.     Final Diagnosis     C.  COLON, ASCENDING, POLYP:  - Tubular adenoma; negative for high-grade dysplasia     D.  COLON, SIGMOID/RECTUM, POLYP:  - Hyperplastic polyp          5/14/2021 [facility]     1/2014  Findings:        The digital rectal exam was abnormal. Findings include decreased        sphincter tone. Pertinent negatives include no palpable rectal lesions.        The terminal ileum appeared normal. A benign appearing sessile polyp was        found in the rectum. The polyp was 5 mm in size. The polyp was removed        with a hot snare. Resection and retrieval were complete. Estimated blood        loss was minimal. A segmental area of moderately congested and        erythematous mucosa was found in the distal rectum. Biopsies were taken        with a cold forceps for histology. Estimated blood loss was minimal.                                                                                    Impression:               - Rectal exam revealed decreased sphincter tone.                             - The examined portion of the ileum was normal.                             - One 5 mm polyp in the rectum (benign appearing).                             Resected and retrieved.                             - Congested and erythematous mucosa distal rectum.                             This was biopsied.                             - Rectal prolapse.                             - The clinical history suggests pelvic floor                             dysfunction with rectal prolapse and urinary                             retention  requiring digital manipulation of the                             pelvic floor to facilitate bladder emptying. I                             believe further evaluation of the perineum at the                             pelvic floor center is indicated.        EndoFLIP directed at the UES or LES (8cm (EF-325) balloon length or 16cm (EF-322) balloon length):   Date:  8cm balloon  Balloon inflation Balloon pressure CSA (mm^2) DI (mm^2/mmHg) Dmin (mm) Compliance   20 (ladmark ID)        30        40        50           16cm balloon  Balloon inflation Balloon pressure CSA (mm^2) DI (mm^2/mmHg) Dmin (mm) Compliance   30 (ladmark ID)        40        50        60        70           High Resolution Manometry:    PH/Impedance:     Bravo:    CT:    Esophagram:    FL VSS:     GES:    U/S:     XRAY:      Prior medical records were reviewed including, but not limited to, notes from referring providers, lab work, radiographic tests, and other diagnostic tests. Pertinent results were summarized above.     History     Past Medical History:   Diagnosis Date    Depression     GERD (gastroesophageal reflux disease)     Hemorrhoid     Menstrual disorder     s/p D&C -12/2012    ROMEO on CPAP     Other bipolar disorders     followed by Dr Collazo    Post menopausal syndrome     Tobacco abuse        Past Surgical History:   Procedure Laterality Date    COLONOSCOPY  12/24/2013    Procedure: COLONOSCOPY;;  Surgeon: Guy Grant MD;  Location: [facility]    COLONOSCOPY  1/9/2014    Procedure: COMBINED COLONOSCOPY, SINGLE BIOPSY/POLYPECTOMY BY BIOPSY;;  Surgeon: Guy Grant MD;  Location: [facility]    COLONOSCOPY N/A 8/8/2023    Procedure: COLONOSCOPY, WITH POLYPECTOMY AND BIOPSY;  Surgeon: René Rodriguez DO;  Location:  GI    ESOPHAGOSCOPY, GASTROSCOPY, DUODENOSCOPY (EGD), COMBINED  12/24/2013    Procedure: COMBINED ESOPHAGOSCOPY, GASTROSCOPY, DUODENOSCOPY (EGD), BIOPSY SINGLE OR MULTIPLE;  ESOPHAGOSCOPY, GASTROSCOPY, DUODENOSCOPY,  COLONOSCOPY;  Surgeon: Guy Grant MD;  Location:  GI    ESOPHAGOSCOPY, GASTROSCOPY, DUODENOSCOPY (EGD), COMBINED N/A 2023    Procedure: ESOPHAGOGASTRODUODENOSCOPY, WITH BIOPSY;  Surgeon: René Rodriguez DO;  Location:  GI    ESOPHAGOSCOPY, GASTROSCOPY, DUODENOSCOPY (EGD), COMBINED N/A 2/15/2024    Procedure: Esophagoscopy, gastroscopy, duodenoscopy (EGD), combined;  Surgeon: Sarmad Reed MD;  Location:  GI    ESOPHAGOSCOPY, GASTROSCOPY, DUODENOSCOPY (EGD), COMBINED N/A 2024    Procedure: Esophagoscopy, gastroscopy, duodenoscopy (EGD), combined;  Surgeon: René Rodriguez DO;  Location:  GI    GENITOURINARY SURGERY      urethra stretched    GYN SURGERY      d&c     LAPAROSCOPIC ASSISTED RECTOPEXY  3/14/2014    Procedure: LAPAROSCOPIC ASSISTED RECTOPEXY;  LAPAROSCOPIC  VENTRAL RECTOPEXY ;  Surgeon: Jennifer Connolly MD;  Location:  OR    ORTHOPEDIC SURGERY      surgery on clavicle,surgery for left leg fracture       Social History     Socioeconomic History    Marital status:      Spouse name: Not on file    Number of children: Not on file    Years of education: Not on file    Highest education level: Not on file   Occupational History    Not on file   Tobacco Use    Smoking status: Former     Current packs/day: 0.00     Types: Cigarettes     Quit date: 2023     Years since quittin.6     Passive exposure: Past    Smokeless tobacco: Never    Tobacco comments:     Quit for year, restarted .       Quit middle May 2024   Vaping Use    Vaping status: Never Used   Substance and Sexual Activity    Alcohol use: No     Comment: Severe alcohol use disorder for 20 years.  :  Reports sober for past five years    Drug use: No    Sexual activity: Yes     Partners: Male   Other Topics Concern    Parent/sibling w/ CABG, MI or angioplasty before 65F 55M? Not Asked   Social History Narrative    2013    /single/    Children-    Work-    Tobacco-    ETOH-    Exercise-          Social Determinants of Health     Financial Resource Strain: Not on file   Food Insecurity: Not on file   Transportation Needs: Not on file   Physical Activity: Not on file   Stress: Not on file   Social Connections: Not on file   Interpersonal Safety: Not on file   Housing Stability: Not on file       Family History   Problem Relation Age of Onset    Cancer Mother         ovarian cancer    Hypertension Father     Breast Cancer No family hx of     Cancer - colorectal No family hx of     Anesthesia Reaction No family hx of     Venous thrombosis No family hx of      Family history reviewed and edited as appropriate    Medications and Allergies:     Outpatient Encounter Medications as of 8/20/2024   Medication Sig Dispense Refill    albuterol (PROAIR HFA/PROVENTIL HFA/VENTOLIN HFA) 108 (90 Base) MCG/ACT inhaler Inhale 2 puffs into the lungs every 6 hours as needed for shortness of breath, wheezing or cough 18 g 0    ammonium lactate (AMLACTIN) 12 % external cream Apply topically daily as needed for dry skin 385 g 1    buPROPion (WELLBUTRIN XL) 150 MG 24 hr tablet Take 450 mg by mouth every morning      busPIRone HCl (BUSPAR) 30 MG tablet Take 1 tablet by mouth 2 times daily      Doxepin HCl 150 MG CAPS Take 300 mg by mouth At Bedtime (Patient taking differently: Take 150 mg by mouth at bedtime) 30 capsule 0    escitalopram (LEXAPRO) 10 MG tablet Take 10 mg by mouth every morning      esomeprazole (NEXIUM) 20 MG packet Take 20 mg by mouth 2 times daily (before meals) 180 each 1    famotidine (PEPCID) 40 MG tablet Take 1 tablet (40 mg) by mouth at bedtime 90 tablet 4    lamoTRIgine (LAMICTAL) 100 MG tablet Take 100 mg by mouth every morning      levothyroxine (SYNTHROID/LEVOTHROID) 150 MCG tablet Take 1 tablet (150 mcg) by mouth daily (Patient taking differently: Take 150 mcg by mouth every morning) 60 tablet 0    nystatin (MYCOSTATIN) 135095 UNIT/GM external powder Apply topically daily 60 g 11    order for DME  Equipment being ordered: CPAP  Please dispense Cpap and its supply 1 Units prn    REXULTI 2 MG tablet Take 2 mg by mouth every morning      triamcinolone (KENALOG) 0.025 % external ointment Apply topically 2 times daily To affected area in the groin as needed up to 2 weeks at a time. 30 g 3    Vonoprazan Fumarate (VOQUEZNA) 20 MG TABS Take 20 mg by mouth daily for 90 days 90 tablet 3    esomeprazole (NEXIUM) 20 MG DR capsule Take 1 capsule (20 mg) by mouth 2 times daily Take 30-60 minutes before eating. 180 capsule 0    pantoprazole (PROTONIX) 40 MG EC tablet Take 80 mg by mouth 2 times daily       No facility-administered encounter medications on file as of 8/20/2024.        Allergies   Allergen Reactions    Contrast Dye Shortness Of Breath    Methylpyrrolidone Swelling    Iodine Swelling     Other reaction(s): Angioedema  Facial swelling.    Morphine Anxiety and Nausea and Vomiting     Other reaction(s): Behavioral Disturbances  Other reaction(s): Anxiety, vomiting        Review of systems:  A full 10 point review of systems was obtained and was negative except for the pertinent positives and negatives stated within the HPI.    Objective Findings:   Physical Exam:    Constitutional: Wt 132.5 kg (292 lb)   LMP 07/20/2006   BMI 45.73 kg/m    General: Alert, cooperative, no distress, well-appearing  Head: Atraumatic, normocephalic, no obvious abnormalities   Eyes: Sclera anicteric, no obvious conjunctival hemorrhage   Nose: Nares normal, no obvious malformation, no obvious rhinorrhea   Respiratory: Resting comfortably, no apparent distress, no cough.   Skin: No jaundice, no obvious rash  Neurologic: AAOx3, no obvious neurologic abnormality  Psychiatric: Normal Affect, appropriate mood  Extremities: No obvious edema, no obvious malformation     Labs, Radiology, Pathology     Lab Results   Component Value Date    WBC 5.1 03/28/2023    WBC 7.9 02/10/2022    WBC 6.5 04/15/2021    HGB 11.7 03/28/2023    HGB 12.2  02/10/2022    HGB 11.7 04/15/2021     03/28/2023     02/10/2022     04/15/2021    CHOL 174 03/28/2023    CHOL 183 02/10/2022    CHOL 221 (H) 04/15/2021    TRIG 82 03/28/2023    TRIG 91 01/07/2020    TRIG 115 12/11/2018    HDL 69 03/28/2023    HDL 66 02/10/2022    HDL 62 01/07/2020    ALT 21 03/28/2023    ALT 23 10/04/2022    ALT 17 09/12/2022    AST 21 03/28/2023    AST 23 10/04/2022    AST 19 09/12/2022     03/28/2023     02/10/2022     04/15/2021    BUN 15.4 03/28/2023    BUN 15 02/10/2022    BUN 18 04/15/2021    CO2 26 03/28/2023    CO2 27 02/10/2022    CO2 25 04/15/2021    TSH 5.78 (H) 07/13/2024    TSH 0.85 03/28/2023    TSH 1.39 02/10/2022    INR 0.82 (L) 04/24/2006        Liver Function Studies -   Recent Labs   Lab Test 03/28/23  0911   PROTTOTAL 7.6   ALBUMIN 4.3   BILITOTAL 0.2   ALKPHOS 123*   AST 21   ALT 21          Patient Active Problem List    Diagnosis Date Noted    Hiatal hernia 03/28/2023     Priority: Medium    Insomnia, unspecified type 03/28/2023     Priority: Medium    Numbness and tingling in left hand 12/07/2022     Priority: Medium    Obesity (BMI 35.0-39.9) with comorbidity (H) 01/23/2020     Priority: Medium    Vitamin D deficiency 02/20/2018     Priority: Medium    Hidradenitis suppurativa of right axilla 08/24/2016     Priority: Medium    Hypothyroidism due to medication 12/22/2015     Priority: Medium     lithium      Prediabetes 12/08/2015     Priority: Medium    Gastroesophageal reflux disease without esophagitis 10/13/2015     Priority: Medium    Bipolar 2 disorder (H) 07/28/2015     Priority: Medium    Psychosis (H) 05/08/2015     Priority: Medium    Periumbilical hernia 06/12/2014     Priority: Medium    overweight BMI>30 05/20/2014     Priority: Medium    ADHD, predominantly inattentive type 05/16/2014     Priority: Medium    Hyponatremia 04/04/2014     Priority: Medium    Tubular adenoma 01/30/2014     Priority: Medium     Seen on  colonoscopy dated 1/09/2014      Iron deficiency anemia 01/30/2014     Priority: Medium    Anemia 09/30/2013     Priority: Medium    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) 09/26/2013     Priority: Medium    CARDIOVASCULAR SCREENING; LDL GOAL LESS THAN 160 09/26/2013     Priority: Medium    Major depressive disorder, recurrent episode, moderate (H) 09/26/2013     Priority: Medium    ROMEO on CPAP      Priority: Medium    Tobacco abuse      Priority: Medium    Post menopausal syndrome      Priority: Medium      Assessment and Plan   Assessment/Plan:    Gudelia Dong is a 59 year old female with significant past medical history pertinent for insomnia, depression, ADHD, bipolar type II, tobacco use, history of alcoholism, psychosis, HydroDIURIL suppurativa, history of iron deficiency anemia, obesity, prediabetes, hypothyroidism, ROMEO on CPAP, hiatal hernia and GERD who is presenting as a follow up patient with a chief complaint of heartburn/regurgiation and dysphagia.    #Reflux  #Large Hiatal Hernia ~ 6-7 cm   #Grade C Erosive Esophagitis  #Obesity   #Tobacco Use     Prior Evaluation Includes:    EGD with [facility] 5/14/2021 with LA grade C erosive esophagitis and a large hiatal hernia.  Biopsies were obtained of the distal esophagus that were consistent with mild nonspecific inflammatory changes. Negative for intestinal metaplasia/dysplasia. Gastric biopsies and duodenal biopsies were unremarkable negative for H. pylori and celiac disease respectively.    EGD 8/8/2023 with LA grade C erosive esophagitis and a 7 cm hiatal hernia. Distal esophageal biopsies with minimal chronic inflammation negative for intestinal aplasia or dysplasia. Proximal esophageal biopsies no significant histopathologic abnormality.      2/15/2023 EGD with LA Grade C Esophagitis despite Protonix 40mg BID and a 7cm hiatal hernia.    EGD 8/13/2024 completed on Nexium 20 mg twice daily with persistent LA grade C erosive esophagitis and findings of a 6 cm  hiatal hernia. No biopsies were obtained.    Today Gudelia reports that she had significant symptomatic improvement with the use of esomeprazole 20 mg twice daily in regards to her reflux symptoms as well as dysphagia.  We discussed and reviewed that she has continued to have persistent erosive esophagitis despite PPI therapy.  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 She expresses that she is now willing to meet with the bariatrics team and is ready to move forward on her weight loss journey.    - Start Vonoprazan (Voquezna) 20 mg once daily, if there are difficulties with insurance coverage please inform your provider    - Start Famotidine 40 mg once nightly at bedtime   - Discontinue Esomeprazole    - Timed barium esophagram with tablet to further characterize esophageal motility/hiatal hernia. To schedule imaging, please call [phone]   - High resolution manometry study   - Follow up with weight management/bariatrics, message to PADMINI team within the bariatrics group    - Upper endoscopy after being treated with Vanoprazan/Famotidine 8 to 12 weeks to evaluate for resolution of esophagitis/for Cruz's esophagus  - With history of erosive esophagitis patient will need to remain on PPI indefinitely   - Please follow reflux lifestyle modifications as directed in AVS   - Please sleep with the head end of the bed elevated to at least 30 degrees or with a wedge pillow   - Attempt to not eat or drink 3 - 4 hours prior to laying down for bed or prior to laying down flat  - Consoled on tobacco cessation     #Gastric Polyps  EGD 8/8/2023 with multiple sessile polyps found within the gastric body less than 1 cm.  Appearance was consistent with benign fundic gland polyp per performing endoscopist.  Again, noted on endoscopy 2/15/2023 and 8/13/2024.    #Colorectal Cancer Screening    #Constipation   8/8/2023 colonoscopy with one 4mm tubular adenoma within the ascending colon and a 2 mm hyperplastic polyp.     Colonoscopy May 2021 with [facility] that was described to be of poor quality examination secondary to difficulties with sedation/constant patient movement, poor prep and redundant colon.  No large lesions were appreciated.     Colonoscopy 2014 with one 5 mm rectal polyp of unknown etiology.     No family history of colorectal cancer. Per the most recent update by the [place] Multi-Society Task Force on Colorectal Cancer recall should be 7 years, 2030 or sooner if otherwise indicated.       Follow up plan:   Return to clinic 4 months and as needed.    The risks and benefits of my recommendations, as well as other treatment options were discussed with the patient and any available family today. All questions were answered.     Follow up: As planned above. Today, I personally spent 21 minutes in direct face to face time with the patient, of which greater than 50% of the time was spent in patient education and counseling as described above. Approximately 4 minutes were spent on indirect care associated with the patient's consultation including but not limited to review of: patient medical records to date, clinic visits, hospital records, lab results, imaging studies, procedural documentation, and coordinating care with other providers. The findings from this review are summarized in the above note. All of the above accounted for a cumulative time of 25 minutes and was performed on the date of service.     The patient verbalized understanding of the plan and was appreciative for the time spent and information provided during the office visit.           Rosalie Martínez PA-C  Division of Gastroenterology, Hepatology, and Nutrition  [facility]       Documentation assisted by voice recognition and documentation system.

## 2024-08-20 NOTE — PATIENT INSTRUCTIONS
It was a pleasure meeting with you today and discussing your healthcare plan. Below is a summary of what we covered:    - Start Vonoprazan (Voquezna) 20 mg once daily, if there are difficulties with insurance coverage please inform your provider    - Start Famotidine 40 mg once nightly at bedtime   - Discontinue Esomeprazole    - Timed barium esophagram with tablet to further characterize esophageal motility/hiatal hernia. To schedule imaging, please call [phone]   - High resolution manometry study   - Follow up with weight management/bariatrics, message to PADMINI team within the bariatrics group    - Upper endoscopy after being treated with Vanoprazan/Famotidine 8 to 12 weeks to evaluate for resolution of esophagitis/for Cruz's esophagus  - With history of erosive esophagitis patient will need to remain on PPI indefinitely   - Please follow reflux lifestyle modifications as directed in AVS   - Please sleep with the head end of the bed elevated to at least 30 degrees or with a wedge pillow   - Attempt to not eat or drink 3-4 hours prior to laying down for bed or prior to laying down flat  - Consoled on tobacco cessation       Gastroesophageal Reflux Disease (GERD) Lifestyle Modifications:   If taking acid suppression therapy (PPI ie Pantoprazole, Lansoprazole, Omeprazole, Esomeprazole, Rabeprazole, Dexlansoprazole) it should be taken 30 - 60 minutes prior to meals on an empty stomach to have maximum effect  Avoid triggers for reflux such as coffee, chocolate, carbonated beverages, spicy foods, acidic foods (tomato based/citrus and foods with high fat content   Abstinence from alcohol and cessation of all tobacco products is recommended   Studies have shown that weight loss, exercise and maintaining a healthy BMI significantly reduce GERD symptoms   Remain upright while eating and immediately after meals  Do not eat or drink at least 3 hours prior to laying down supine/laying down for bed   Avoid late  night/middle of the night snacking    Consider obtaining a wedge pillow or elevating the head end of the bed while sleeping   Avoid sleeping right side down as this can place the lower part of the esophagus/lower esophageal sphincter in a dependent position that favors reflux   Attempting to eat smaller more frequent meals may improve symptoms         Please call my nurse Radha ([phone]), Kristi ([phone]) with any questions or concerns.      See below for any additional questions and scheduling guidelines.    Sign up for IFTTT: IFTTT patient portal serves as a secure platform for accessing your medical records from the [facility]. Additionally, IFTTT facilitates easy, timely, and secure messaging with your care team. If you have not signed up, you may do so by using the provided code or calling [phone].    Coordinating your care after your visit:  There are multiple options for scheduling your follow-up care based on your provider's recommendation.    How do I schedule a follow-up clinic appointment:   After your appointment, you may receive scheduling assistance with the Clinic Coordinators by having a seat in the waiting room and a Clinic Coordinator will call you up to schedule.  Virtual visits or after you leave the clinic:  Your provider has placed a follow-up order in the IFTTT portal for scheduling your return appointment. A member of the scheduling team will contact you to schedule.  IFTTT Scheduling: Timely scheduling through IFTTT is advised to ensure appointment availability.   Call to schedule: You may schedule your follow-up appointment(s) by calling [phone], option 1.    How do I schedule my endoscopy or colonoscopy procedure:  If a procedure, such as a colonoscopy or upper endoscopy was ordered by your provider, the scheduling team will contact you to schedule this procedure. Or you may choose to call to schedule at   [phone], option 2.  Please allow  20-30 minutes when scheduling a procedure.    How do I get my blood work done? To get your blood work done, you need to schedule a lab appointment at an [facility]. There are multiple ways to schedule:   At the clinic: The Clinic Coordinator you meet after your visit can help you schedule a lab appointment.   [email or web address] scheduling: Doctor.com offers online lab scheduling at all [facility] laboratory locations.   Call to schedule: You can call [phone] to schedule your lab appointment.    How do I schedule my imaging study: To schedule imaging studies, such as CT scans, ultrasounds, MRIs, or X-rays, contact Imaging Services at [phone].    How do I schedule a referral to another doctor: If your provider recommended a referral to another specialist(s), the referral order was placed by your provider. You will receive a phone call to schedule this referral, or you may choose to call the number attached to the referral to self-schedule.    For Post-Visit Question(s):  For any inquiries following today's visit:  Please utilize [email or web address] messaging and allow 48 hours for reply or contact the Call Center during normal business hours at [phone], option 3.  For Emergent After-hours questions, contact the On-Call GI Fellow through the [facility]  at [phone].  In addition, you may contact your Nurse directly using the provided contact information.    Test Results:  Test results will be accessible via Doctor.com in compliance with the 21st Century Cures Act. This means that your results will be available to you at the same time as your provider. Often you may see your results before your provider does. Results are reviewed by staff within two weeks with communication follow-up. Results may be released in the patient portal prior to your care team review.    Prescription Refill(s):  Medication prescribed by your provider will be addressed during your visit. For future refills,  please coordinate with your pharmacy. If you have not had a recent clinic visit or routine labs, for your safety, your provider may not be able to refill your prescription.         Sincerely,    Rosalie Martínez PA-C  Division of Gastroenterology, Hepatology, and Nutrition  [facility]

## 2024-08-24 DIAGNOSIS — E03.2 HYPOTHYROIDISM DUE TO MEDICATION: ICD-10-CM

## 2024-08-26 RX ORDER — LEVOTHYROXINE SODIUM 150 UG/1
150 TABLET ORAL DAILY
Qty: 60 TABLET | Refills: 1 | Status: SHIPPED | OUTPATIENT
Start: 2024-08-26

## 2024-08-29 ENCOUNTER — TELEPHONE (OUTPATIENT)
Dept: GASTROENTEROLOGY | Facility: CLINIC | Age: 59
End: 2024-08-29
Payer: COMMERCIAL

## 2024-08-29 NOTE — TELEPHONE ENCOUNTER
Left Voicemail (1st Attempt) and Sent Mychart (1st Attempt) for the patient to call back and schedule the following:    Appointment type: Return Eso   Provider: Rosalie Martínez   Return date: 4 mo (around 12/20/24)- after any testing/procedures  Specialty phone number: [phone]  Additional appointment(s) needed: NA  Additonal Notes: LVM/Ignacio x1

## 2024-09-10 ENCOUNTER — TELEPHONE (OUTPATIENT)
Dept: GASTROENTEROLOGY | Facility: CLINIC | Age: 59
End: 2024-09-10
Payer: COMMERCIAL

## 2024-09-10 NOTE — TELEPHONE ENCOUNTER
"Endoscopy Scheduling Screen    Have you had a positive Covid test in the last 14 days?  No    What is your communication preference for Instructions and/or Bowel Prep?   MyChart    What insurance is in the chart?  Other:  BCBS    Ordering/Referring Provider:     ALESHA GUNTER      (If ordering provider performs procedure, schedule with ordering provider unless otherwise instructed. )    BMI: Estimated body mass index is 45.73 kg/m  as calculated from the following:    Height as of 7/22/24: 1.702 m (5' 7\").    Weight as of 8/20/24: 132.5 kg (292 lb).     Sedation Ordered  MAC/deep sedation.   BMI<= 45 45 < BMI <= 48 48 < BMI < = 50  BMI > 50   No Restrictions No [facility]  No [facility]  [facility] [place] [facility] with exceptions Hospital Only OR Only       Do you have a history of malignant hyperthermia?  No    (Females) Are you currently pregnant?   No     Have you been diagnosed or told you have pulmonary hypertension?   No    Do you have an LVAD?  No    Have you been told you have moderate to severe sleep apnea?  Yes (RN Review required for scheduling unless scheduling in Hospital.)    Have you been told you have COPD, asthma, or any other lung disease?  No    Do you have any heart conditions?  No     Have you ever had or are you waiting for an organ transplant?  No. Continue scheduling, no site restrictions.    Have you had a stroke or transient ischemic attack (TIA aka \"mini stroke\" in the last 6 months?   No    Have you been diagnosed with or been told you have cirrhosis of the liver?   No    Are you currently on dialysis?   No    Do you need assistance transferring?   No    BMI: Estimated body mass index is 45.73 kg/m  as calculated from the following:    Height as of 7/22/24: 1.702 m (5' 7\").    Weight as of 8/20/24: 132.5 kg (292 lb).     Is patients BMI > 40 and scheduling location [facility]?  No    Do you take an injectable medication for weight loss or diabetes (excluding insulin)?  No    Do you take the medication " Naltrexone?  No    Do you take blood thinners?  No       Prep   Are you currently on dialysis or do you have chronic kidney disease?  No    Do you have a diagnosis of diabetes?  No    Do you have a diagnosis of cystic fibrosis (CF)?  No    On a regular basis do you go 3 -5 days between bowel movements?  No    BMI > 40?  No    Preferred Pharmacy:    [facility] [place] [address]  [address]  [facility] [address]  Phone: [phone] Fax: [phone]      Final Scheduling Details     Procedure scheduled  Upper endoscopy (EGD)    Surgeon:  KAT     Date of procedure:  02/11/2025     Pre-OP / PAC:   Yes - PAC clinic evaluation scheduled.    Location  [facility] - Per order.    Sedation   MAC/Deep Sedation - Per order.      Patient Reminders:   You will receive a call from a Nurse to review instructions and health history.  This assessment must be completed prior to your procedure.  Failure to complete the Nurse assessment may result in the procedure being cancelled.      On the day of your procedure, please designate an adult(s) who can drive you home stay with you for the next 24 hours. The medicines used in the exam will make you sleepy. You will not be able to drive.      You cannot take public transportation, ride share services, or non-medical taxi service without a responsible caregiver.  Medical transport services are allowed with the requirement that a responsible caregiver will receive you at your destination.  We require that drivers and caregivers are confirmed prior to your procedure.    Non-Invasive GI Procedure Scheduling Questionnaire    Scheduling Reminders:  Add-on within 2 weeks require clinic approval (Manometry & HBT)  Manometry requires an in-person interrupter (not needed for HBT)      Have you had a positive Covid test in the last 14 days?  No    Are you active on MyChart?   Yes    What insurance is in the chart?  Other:  BCBS    Ordering/Referring  Provider: ALESHA GUNTER   (If ordering provider performs procedure, schedule with ordering provider unless otherwise instructed. )    (Females) Are you currently pregnant? No - Okay to continue scheduling.    Have you ever had or are you awaiting a heart or lung transplant? No - Schedule next available.    Do you need assistance transferring? No - Continue scheduling.    Do you take acid reflux medication or proton-pump inhibitors (PPI)? Yes - Advise patients to discontinue acid suppression medications 2-4 weeks prior to the exam/procedure. ( Scheduled for more than 14 days out.)    Patient Reminders:  Please inform patients to review instructions sent via [employer] or letter two weeks before procedure.  The Manometry exam may take up to 90 minutes.  The Breath Test exam may take up to 4 hours.

## 2024-09-24 ENCOUNTER — TELEPHONE (OUTPATIENT)
Dept: GASTROENTEROLOGY | Facility: CLINIC | Age: 59
End: 2024-09-24
Payer: COMMERCIAL

## 2024-09-24 DIAGNOSIS — K22.10 EROSIVE ESOPHAGITIS: Primary | ICD-10-CM

## 2024-09-24 DIAGNOSIS — K21.9 GASTROESOPHAGEAL REFLUX DISEASE WITHOUT ESOPHAGITIS: ICD-10-CM

## 2024-09-24 DIAGNOSIS — K44.9 HIATAL HERNIA: ICD-10-CM

## 2024-09-24 NOTE — TELEPHONE ENCOUNTER
Prior Authorization Retail Medication Request    Medication/Dose: Vonoprazan Fumarate (VOQUEZNA) 20 MG TABS   Take 20 mg by mouth daily for 90 days   Diagnosis and ICD code (if different than what is on RX):    New/renewal/insurance change PA/secondary ins. PA:  Previously Tried and Failed:    Rationale:    has continued to have persistent erosive esophagitis despite PPI therapy  #Reflux  #Large Hiatal Hernia ~ 6-7 cm   #Grade C Erosive Esophagitis  - Start Vonoprazan (Voquezna) 20 mg once daily      Insurance   Primary: BCBS OUT OF STATE  Insurance ID:  [id number]    Secondary (if applicable):  Insurance ID:      Pharmacy Information (if different than what is on RX)  Name:    Phone:    Fax:

## 2024-09-26 NOTE — TELEPHONE ENCOUNTER
PA Initiation    Medication: VOQUEZNA 20 MG PO TABS  Insurance Company: [employer] ([facility]) - Phone [phone] Fax [phone]  [facility]illing the Rx[facility] [address] [place] [address]  Filling Pharmacy Phone: [phone]  Filling Pharmacy Fax: [phone]  Start Date: 9/26/2024

## 2024-09-28 ENCOUNTER — MYC MEDICAL ADVICE (OUTPATIENT)
Dept: FAMILY MEDICINE | Facility: CLINIC | Age: 59
End: 2024-09-28
Payer: COMMERCIAL

## 2024-09-28 DIAGNOSIS — H04.123 DRY EYES: Primary | ICD-10-CM

## 2024-09-30 RX ORDER — POLYETHYLENE GLYCOL 400 AND PROPYLENE GLYCOL 4; 3 MG/ML; MG/ML
1 SOLUTION/ DROPS OPHTHALMIC 4 TIMES DAILY PRN
Qty: 15 ML | Refills: 1 | Status: SHIPPED | OUTPATIENT
Start: 2024-09-30

## 2024-09-30 NOTE — TELEPHONE ENCOUNTER
PRIOR AUTHORIZATION DENIED    Medication: VOQUEZNA 20 MG PO TABS  Insurance Company: [employer] ([facility]) - Phone [phone] Fax [phone]  Denial Date: 9/26/2024  Denial Reason(s):       Appeal Information:       Patient Notified: NO

## 2024-10-01 ENCOUNTER — VIRTUAL VISIT (OUTPATIENT)
Dept: ENDOCRINOLOGY | Facility: CLINIC | Age: 59
End: 2024-10-01
Payer: COMMERCIAL

## 2024-10-01 VITALS — WEIGHT: 281 LBS | BODY MASS INDEX: 44.01 KG/M2

## 2024-10-01 DIAGNOSIS — K44.9 HIATAL HERNIA: ICD-10-CM

## 2024-10-01 DIAGNOSIS — E66.813 CLASS 3 SEVERE OBESITY WITH BODY MASS INDEX (BMI) OF 40.0 TO 44.9 IN ADULT, UNSPECIFIED OBESITY TYPE, UNSPECIFIED WHETHER SERIOUS COMORBIDITY PRESENT (H): Primary | ICD-10-CM

## 2024-10-01 DIAGNOSIS — K21.9 GASTROESOPHAGEAL REFLUX DISEASE WITHOUT ESOPHAGITIS: ICD-10-CM

## 2024-10-01 DIAGNOSIS — E66.01 CLASS 3 SEVERE OBESITY WITH BODY MASS INDEX (BMI) OF 40.0 TO 44.9 IN ADULT, UNSPECIFIED OBESITY TYPE, UNSPECIFIED WHETHER SERIOUS COMORBIDITY PRESENT (H): Primary | ICD-10-CM

## 2024-10-01 PROCEDURE — 99214 OFFICE O/P EST MOD 30 MIN: CPT | Mod: 95

## 2024-10-01 PROCEDURE — G2211 COMPLEX E/M VISIT ADD ON: HCPCS | Mod: 95

## 2024-10-01 RX ORDER — RABEPRAZOLE SODIUM 20 MG/1
20 TABLET, DELAYED RELEASE ORAL
Qty: 180 TABLET | Refills: 1 | Status: SHIPPED | OUTPATIENT
Start: 2024-10-01

## 2024-10-01 RX ORDER — TOPIRAMATE 25 MG/1
TABLET, FILM COATED ORAL
Qty: 90 TABLET | Refills: 2 | Status: SHIPPED | OUTPATIENT
Start: 2024-10-01

## 2024-10-01 NOTE — PROGRESS NOTES
Virtual Visit Details    Type of service:  Video Visit     Originating Location (pt. Location): Home  {PROVIDER LOCATION On-site should be selected for visits conducted from your clinic location or adjoining [facility] hospital, academic office, or other nearby [facility] building. Off-site should be selected for all other provider locations, including home:[id number]}  Distant Location (provider location):  Off-site  Platform used for Video Visit: [facility] Medical Weight Management Note     Gudelia Dong  MRN:  [id number]  :  1965  AKSHAT:  10/1/2024    Dear Mendel Mireles PA-C,    I had the pleasure of seeing your patient Gudelia oDng. She is a 59 year old female who I am continuing to see for treatment of obesity related to:        2023    10:35 AM   --   I have the following health issues associated with obesity Pre-Diabetes    Sleep Apnea    GERD (Reflux)   I have the following symptoms associated with obesity Depression    Lower Extremity Swelling       Assessment & Plan   Problem List Items Addressed This Visit       Gastroesophageal reflux disease without esophagitis (Chronic)    Relevant Medications    topiramate (TOPAMAX) 25 MG tablet    Other Relevant Orders    Parathyroid Hormone Intact    Vitamin D Deficiency    Comprehensive metabolic panel    Hemoglobin A1c    Lipid panel reflex to direct LDL Fasting    Hiatal hernia (Chronic)    Relevant Medications    topiramate (TOPAMAX) 25 MG tablet    Other Relevant Orders    Parathyroid Hormone Intact    Vitamin D Deficiency    Comprehensive metabolic panel    Hemoglobin A1c    Lipid panel reflex to direct LDL Fasting    overweight BMI>30 - Primary    Relevant Medications    topiramate (TOPAMAX) 25 MG tablet    Other Relevant Orders    Parathyroid Hormone Intact    Vitamin D Deficiency    Comprehensive metabolic panel    Hemoglobin A1c    Lipid panel reflex to direct LDL Fasting      Plan  GREAT work on cutting out tobacco use!   Start Topiramate 25mg -  "take 1 tablet at night for 7 days, then increase to 2 tablets at night for 7 days, then increase to 3 tablets at night. Alternatives discussed include naltrexone, metformin.   Goals we discussed today:   Track all food intake prior to meeting with dietician   Labs ordered today: Pth, vitamin d, cmp, A1c, lipids   Follow up with Louise in 3 months   Dietician appointment to be scheduled asap   Keep up the excellent work!     Anti-obesity medication started today for this patient   TOPIRAMATE  Has taken in the past - did not recall weight loss but did not take for very long   No history of kidney stones  No history of glaucoma   No issues with memory  No chronic kidney disease   .     Potential anti-obesity medications for this patient the future if there are issues with cost or side effects  NALTREXONE  Would likely have synergistic effect with wellbutrin. Took in the past to help with substance use.   No history of liver disease  No chronic pain   No current opioid use   .  METFORMIN  Has diagnosis of prediabetes   Is concurrently taking the following medication(s) associated with weight gain: lamotrigine   No history of chronic kidney disease  GFR >45  .      INTERVAL HISTORY:  MWM typically with Dr. Rg, since August 2023   Last seen by Dr. Davis 2/12/24   \"No the best time for her to lose weight, can come back to that. \"    Today in visit , new to me- 10/1/24  Hoping to establish with the [facility] weight management team moving forward for a better fit.   Overall weight maintenance since last visit.     Ongoing issues with erosive esophagitis, dysphagia, vomiting, working regularly with Rosalie Martínez PA-C. Symptoms are better with recent diet adjustments- denies issues with dysphagia, vomiting. Last vomited maybe 4 months ago.   Last seen by Rosalie 8/20/24  Today Gudelia reports that she had significant symptomatic improvement with the use of esomeprazole 20 mg twice daily in regards to her reflux symptoms as " well as dysphagia.  We discussed and reviewed that she has continued to have persistent erosive esophagitis despite PPI therapy.  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 She expresses that she is now willing to meet with the bariatrics team and is ready to move forward on her weight loss journey.     - Start Vonoprazan (Voquezna) 20 mg once daily, if there are difficulties with insurance coverage please inform your provider    - Start Famotidine 40 mg once nightly at bedtime   - Discontinue Esomeprazole    - Timed barium esophagram with tablet to further characterize esophageal motility/hiatal hernia. To schedule imaging, please call [phone]   - High resolution manometry study   - Follow up with weight management/bariatrics, message to PADMINI team within the bariatrics group    - Upper endoscopy after being treated with Vanoprazan/Famotidine 8 to 12 weeks to evaluate for resolution of esophagitis/for Cruz's esophagus  - With history of erosive esophagitis patient will need to remain on PPI indefinitely   - Please follow reflux lifestyle modifications as directed in AVS              - Please sleep with the head end of the bed elevated to at least 30 degrees or with a wedge pillow              - Attempt to not eat or drink 3 - 4 hours prior to laying down for bed or prior to laying down flat  - Consoled on tobacco cessation     Is interested in working towards a hiatal hernia repair- goal is bmi 35 to achieve this.         Regarding tobacco use- has completely cut out since August 2024!       Briefly discussed bariatric surgery as a possible option- explained that a hiatal hernia repair can happen at the same time as bariatric surgery.   Gudelia would prefer to medical weight management at this time, discussed this would be a resource for her to consider in the future. Would be  cautious with sleeve gastrectomy due to persistent esophagitis, RYGB would likely be contraindicated due to long term history smoking, diagnosis of bipolar disorder type 2.         Wt Readings from Last 5 Encounters:   10/01/24 127.5 kg (281 lb)   08/20/24 132.5 kg (292 lb)   07/22/24 132.5 kg (292 lb)   03/21/24 129.6 kg (285 lb 11.2 oz)   02/12/24 127.9 kg (282 lb)       Anti-obesity medication history    Current:   None     Past/Failed/contraindicated:   Wegovy- denied by insurance in August 2023     Topiramate-took briefly, stopped due to perceived lack of efficacy though acknowledges she was not motivated at that time to work on weight loss.     Constipation/Diarrhea: intermittent constipation, well managed as long as she prioritizes fiber intake.     Recent diet changes: eating a lot of salads lately. Cut down on ice cream, potato chips.     Protein- eggs, steak, fish.  Drinking water with zero sugar grape flavoring. No ETOH. Pop very seldomly, sometimes will buy a diet pop at work every so often.     Recent exercise/activity changes: started new job in May 2024, on her feet a lot more with this new job.  Got a new shukri for working on exercises.       Vitamins/Labs:         CURRENT WEIGHT:   281 lbs 0 oz                      10/1/2024    12:21 PM   Changes and Difficulties   I have made the following changes to my diet since my last visit: cut out ice cream and potato chip   With regards to my diet, I am still struggling with: cosistant eating   I have made the following changes to my activity/exercise since my last visit: I get exercise at work   With regards to my activity/exercise, I am still struggling with: my left knee feels like it's spraned             MEDICATIONS:   Current Outpatient Medications   Medication Sig Dispense Refill    buPROPion (WELLBUTRIN XL) 150 MG 24 hr tablet Take 450 mg by mouth every morning      busPIRone HCl (BUSPAR) 30 MG tablet Take 1 tablet by mouth 2 times daily      Doxepin  HCl 150 MG CAPS Take 300 mg by mouth At Bedtime (Patient taking differently: Take 150 mg by mouth at bedtime) 30 capsule 0    escitalopram (LEXAPRO) 10 MG tablet Take 10 mg by mouth every morning      lamoTRIgine (LAMICTAL) 100 MG tablet Take 100 mg by mouth every morning      REXULTI 2 MG tablet Take 2 mg by mouth every morning      topiramate (TOPAMAX) 25 MG tablet 25mg at bedtime for week 1, 50mg at bedtime for 1 week, and 75mg at bedtime thereafter 90 tablet 2    albuterol (PROAIR HFA/PROVENTIL HFA/VENTOLIN HFA) 108 (90 Base) MCG/ACT inhaler Inhale 2 puffs into the lungs every 6 hours as needed for shortness of breath, wheezing or cough 18 g 0    ammonium lactate (AMLACTIN) 12 % external cream Apply topically daily as needed for dry skin 385 g 1    esomeprazole (NEXIUM) 20 MG DR capsule Take 1 capsule (20 mg) by mouth 2 times daily Take 30-60 minutes before eating. 180 capsule 0    famotidine (PEPCID) 40 MG tablet Take 1 tablet (40 mg) by mouth at bedtime 90 tablet 4    levothyroxine (SYNTHROID/LEVOTHROID) 150 MCG tablet TAKE ONE TABLET BY MOUTH EVERY DAY 60 tablet 1    nystatin (MYCOSTATIN) 805157 UNIT/GM external powder Apply topically daily 60 g 11    order for DME Equipment being ordered: CPAP  Please dispense Cpap and its supply 1 Units prn    polyethylene glycol-propylene glycol (SYSTANE) 0.4-0.3 % SOLN ophthalmic solution Place 1 drop into both eyes 4 times daily as needed for dry eyes. 15 mL 1    triamcinolone (KENALOG) 0.025 % external ointment Apply topically 2 times daily To affected area in the groin as needed up to 2 weeks at a time. 30 g 3    Vonoprazan Fumarate (VOQUEZNA) 20 MG TABS Take 20 mg by mouth daily for 90 days 90 tablet 3           10/1/2024    12:21 PM   Weight Loss Medication History Reviewed With Patient   Which weight loss medications are you currently taking on a regular basis? None   If you are not taking a weight loss medication that was prescribed to you, please indicate why: The  cost is too much for me   Are you having any side effects from the weight loss medication that we have prescribed you? No       {Diagnostic Test Results (Optional):711257}        1/30/2014     4:00 PM   AMMON Score (Last Two)   MAMON Raw Score 42   Activation Score 66   AMMON Level 3         PHYSICAL EXAM:  Objective    Wt 127.5 kg (281 lb)   LMP 07/20/2006   BMI 44.01 kg/m             Vitals:  No vitals were obtained today due to virtual visit.    GENERAL: alert and no distress  EYES: Eyes grossly normal to inspection.  No discharge or erythema, or obvious scleral/conjunctival abnormalities.  RESP: No audible wheeze, cough, or visible cyanosis.    SKIN: Visible skin clear. No significant rash, abnormal pigmentation or lesions.  NEURO: Cranial nerves grossly intact.  Mentation and speech appropriate for age.  PSYCH: Appropriate affect, tone, and pace of words    Anti-obesity medication ROS:    HEENT  Hx of glaucoma: No    Cardiovascular  CAD:No  HTN:No      Gastrointestinal  GERD:Yes  Constipation:Yes  Liver Dz:No  H/O Pancreatitis:No    Psychiatric  Bipolar: Yes diagnosis of bipolar 2 in the past, is not sure if this is an accurate diagnosis. Per chart also has history of psychosis, though Gudelia denies this.   Anxiety:Yes  Depression:Yes  Suicidal Ideation:  in the past experienced SI when dealing with alcohol use disorder.   History of alcohol/drug abuse: Yes alcohol use disorder sober since 2014. Intermittent crack, cocaine, hallucinogenic use as well, also sober from these since 2014.   Hx of eating disorder:No    Endocrine  Personal or family hx of MTC or MEN2:No  Diabetes/prediabetes: Yes prediabetes     Neurologic:  Hx of seizures: No  Hx of migraines: No  Memory Impairment:  more forgetful lately, struggles to remember short term things at times. No formal diagnosis.  CVA history: No        History of kidney stones: No  Kidney disease:  kidney infection age 8, resolved with surgery, stretching urethra. No  issues since.    Current birth control: post menopausal     Taking Opioid/Narcotic: No      Sincerely,    Louise Colindres PA-C      *** minutes spent by me on the date of the encounter doing chart review, history and exam, documentation and further activities per the note    The longitudinal plan of care for the diagnosis(es)/condition(s) as documented were addressed during this visit. Due to the added complexity in care, I will continue to support Gudelia  in the subsequent management and with ongoing continuity of care.

## 2024-10-01 NOTE — LETTER
10/1/2024       RE: Gudelia Dong  [address]  [place] [address]     Dear Colleague,    Thank you for referring your patient, Gudelia Dong, to the [facility] WEIGHT MANAGEMENT [facility]INIC [facility]. Please see a copy of my visit note below.    Virtual Visit Details    Type of service:  Video Visit     Originating Location (pt. Location): Home    Distant Location (provider location):  Off-site  Platform used for Video Visit: [facility] Medical Weight Management Note     Gudelia Dong  MRN:  [id number]  :  1965  AKSHAT:  10/1/2024    Dear Mednel Mireles PA-C,    I had the pleasure of seeing your patient Gudelia Dong. She is a 59 year old female who I am continuing to see for treatment of obesity related to:        2023    10:35 AM   --   I have the following health issues associated with obesity Pre-Diabetes    Sleep Apnea    GERD (Reflux)   I have the following symptoms associated with obesity Depression    Lower Extremity Swelling       Assessment & Plan  Problem List Items Addressed This Visit       Gastroesophageal reflux disease without esophagitis (Chronic)    Relevant Medications    topiramate (TOPAMAX) 25 MG tablet    Other Relevant Orders    Parathyroid Hormone Intact    Vitamin D Deficiency    Comprehensive metabolic panel    Hemoglobin A1c    Lipid panel reflex to direct LDL Fasting    Hiatal hernia (Chronic)    Relevant Medications    topiramate (TOPAMAX) 25 MG tablet    Other Relevant Orders    Parathyroid Hormone Intact    Vitamin D Deficiency    Comprehensive metabolic panel    Hemoglobin A1c    Lipid panel reflex to direct LDL Fasting    overweight BMI>30 - Primary    Relevant Medications    topiramate (TOPAMAX) 25 MG tablet    Other Relevant Orders    Parathyroid Hormone Intact    Vitamin D Deficiency    Comprehensive metabolic panel    Hemoglobin A1c    Lipid panel reflex to direct LDL Fasting     "  Plan  GREAT work on cutting out tobacco use!   Start Topiramate 25mg - take 1 tablet at night for 7 days, then increase to 2 tablets at night for 7 days, then increase to 3 tablets at night. Alternatives discussed include naltrexone, metformin.   Goals we discussed today:   Track all food intake prior to meeting with dietician   Labs ordered today: Pth, vitamin d, cmp, A1c, lipids   Follow up with Louise in 3 months   Dietician appointment to be scheduled asap   Keep up the excellent work!     Anti-obesity medication started today for this patient   TOPIRAMATE  Has taken in the past - did not recall weight loss but did not take for very long   No history of kidney stones  No history of glaucoma   No issues with memory  No chronic kidney disease   .     Potential anti-obesity medications for this patient the future if there are issues with cost or side effects  NALTREXONE  Would likely have synergistic effect with wellbutrin. Took in the past to help with substance use.   No history of liver disease  No chronic pain   No current opioid use   .  METFORMIN  Has diagnosis of prediabetes   Is concurrently taking the following medication(s) associated with weight gain: lamotrigine   No history of chronic kidney disease  GFR >45  .      INTERVAL HISTORY:  MWM typically with Dr. Rg, since August 2023   Last seen by Dr. Davis 2/12/24   \"No the best time for her to lose weight, can come back to that. \"    Today in visit , new to me- 10/1/24  Hoping to establish with the [facility] weight management team moving forward for a better fit.   Overall weight maintenance since last visit.     Ongoing issues with erosive esophagitis, dysphagia, vomiting, working regularly with Rosalie Martínez PA-C. Symptoms are better with recent diet adjustments- denies issues with dysphagia, vomiting. Last vomited maybe 4 months ago.     Regarding tobacco use- has completely cut out since August 2024!       We briefly discussed bariatric " surgery as a possible option- explained that a hiatal hernia repair can happen at the same time as bariatric surgery.   Gudelia would prefer to medical weight management at this time due to concern for risks associated with major surgery. For future reference, we would need to be cautious with sleeve gastrectomy due to persistent esophagitis, RYGB would likely be contraindicated due to long term history smoking, diagnosis of bipolar disorder type 2.     Is interested in working towards a hiatal hernia repair- goal is bmi 35 to achieve this.               Wt Readings from Last 5 Encounters:   10/01/24 127.5 kg (281 lb)   08/20/24 132.5 kg (292 lb)   07/22/24 132.5 kg (292 lb)   03/21/24 129.6 kg (285 lb 11.2 oz)   02/12/24 127.9 kg (282 lb)       Anti-obesity medication history    Current:   None     Past/Failed/contraindicated:   Wegovy- denied by insurance in August 2023     Topiramate-took briefly, stopped due to perceived lack of efficacy though acknowledges she was not motivated at that time to work on weight loss.     Constipation/Diarrhea: intermittent constipation, well managed as long as she prioritizes fiber intake.     Recent diet changes: eating a lot of salads lately. Cut down on ice cream, potato chips.     Protein- eggs, steak, fish.  Drinking water with zero sugar grape flavoring. No ETOH. Pop very seldomly, sometimes will buy a diet pop at work every so often.     Recent exercise/activity changes: started new job in May 2024, on her feet a lot more with this new job.  Got a new shukri for working on exercises.       Vitamins/Labs: Pth, vitamin d, cmp, A1c, lipids ordered today,         CURRENT WEIGHT:   281 lbs 0 oz                      10/1/2024    12:21 PM   Changes and Difficulties   I have made the following changes to my diet since my last visit: cut out ice cream and potato chip   With regards to my diet, I am still struggling with: cosistant eating   I have made the following changes to my  activity/exercise since my last visit: I get exercise at work   With regards to my activity/exercise, I am still struggling with: my left knee feels like it's spraned             MEDICATIONS:   Current Outpatient Medications   Medication Sig Dispense Refill     buPROPion (WELLBUTRIN XL) 150 MG 24 hr tablet Take 450 mg by mouth every morning       busPIRone HCl (BUSPAR) 30 MG tablet Take 1 tablet by mouth 2 times daily       Doxepin HCl 150 MG CAPS Take 300 mg by mouth At Bedtime (Patient taking differently: Take 150 mg by mouth at bedtime) 30 capsule 0     escitalopram (LEXAPRO) 10 MG tablet Take 10 mg by mouth every morning       lamoTRIgine (LAMICTAL) 100 MG tablet Take 100 mg by mouth every morning       REXULTI 2 MG tablet Take 2 mg by mouth every morning       topiramate (TOPAMAX) 25 MG tablet 25mg at bedtime for week 1, 50mg at bedtime for 1 week, and 75mg at bedtime thereafter 90 tablet 2     albuterol (PROAIR HFA/PROVENTIL HFA/VENTOLIN HFA) 108 (90 Base) MCG/ACT inhaler Inhale 2 puffs into the lungs every 6 hours as needed for shortness of breath, wheezing or cough 18 g 0     ammonium lactate (AMLACTIN) 12 % external cream Apply topically daily as needed for dry skin 385 g 1     esomeprazole (NEXIUM) 20 MG DR capsule Take 1 capsule (20 mg) by mouth 2 times daily Take 30-60 minutes before eating. 180 capsule 0     famotidine (PEPCID) 40 MG tablet Take 1 tablet (40 mg) by mouth at bedtime 90 tablet 4     levothyroxine (SYNTHROID/LEVOTHROID) 150 MCG tablet TAKE ONE TABLET BY MOUTH EVERY DAY 60 tablet 1     nystatin (MYCOSTATIN) 964122 UNIT/GM external powder Apply topically daily 60 g 11     order for DME Equipment being ordered: CPAP  Please dispense Cpap and its supply 1 Units prn     polyethylene glycol-propylene glycol (SYSTANE) 0.4-0.3 % SOLN ophthalmic solution Place 1 drop into both eyes 4 times daily as needed for dry eyes. 15 mL 1     triamcinolone (KENALOG) 0.025 % external ointment Apply topically 2  times daily To affected area in the groin as needed up to 2 weeks at a time. 30 g 3     Vonoprazan Fumarate (VOQUEZNA) 20 MG TABS Take 20 mg by mouth daily for 90 days 90 tablet 3           10/1/2024    12:21 PM   Weight Loss Medication History Reviewed With Patient   Which weight loss medications are you currently taking on a regular basis? None   If you are not taking a weight loss medication that was prescribed to you, please indicate why: The cost is too much for me   Are you having any side effects from the weight loss medication that we have prescribed you? No               1/30/2014     4:00 PM   AMMON Score (Last Two)   AMMON Raw Score 42   Activation Score 66   AMMON Level 3         PHYSICAL EXAM:  Objective   Wt 127.5 kg (281 lb)   LMP 07/20/2006   BMI 44.01 kg/m             Vitals:  No vitals were obtained today due to virtual visit.    GENERAL: alert and no distress  EYES: Eyes grossly normal to inspection.  No discharge or erythema, or obvious scleral/conjunctival abnormalities.  RESP: No audible wheeze, cough, or visible cyanosis.    SKIN: Visible skin clear. No significant rash, abnormal pigmentation or lesions.  NEURO: Cranial nerves grossly intact.  Mentation and speech appropriate for age.  PSYCH: Appropriate affect, tone, and pace of words    Anti-obesity medication ROS:    HEENT  Hx of glaucoma: No    Cardiovascular  CAD:No  HTN:No      Gastrointestinal  GERD:Yes  Constipation:Yes  Liver Dz:No  H/O Pancreatitis:No    Psychiatric  Bipolar: Yes diagnosis of bipolar 2 in the past, is not sure if this is an accurate diagnosis. Per chart also has history of psychosis, though Gudelia denies this.   Anxiety:Yes  Depression:Yes  Suicidal Ideation:  in the past experienced SI when dealing with alcohol use disorder.   History of alcohol/drug abuse: Yes alcohol use disorder sober since 2014. Intermittent crack, cocaine, hallucinogenic use as well, also sober from these since 2014.   Hx of eating  disorder:No    Endocrine  Personal or family hx of MTC or MEN2:No  Diabetes/prediabetes: Yes prediabetes     Neurologic:  Hx of seizures: No  Hx of migraines: No  Memory Impairment:  more forgetful lately, struggles to remember short term things at times. No formal diagnosis.  CVA history: No        History of kidney stones: No  Kidney disease:  kidney infection age 8, resolved with surgery, stretching urethra. No issues since.    Current birth control: post menopausal     Taking Opioid/Narcotic: No      Sincerely,    Louise Colindres PA-C      38 minutes spent by me on the date of the encounter doing chart review, history and exam, documentation and further activities per the note    The longitudinal plan of care for the diagnosis(es)/condition(s) as documented were addressed during this visit. Due to the added complexity in care, I will continue to support Gudelia  in the subsequent management and with ongoing continuity of care.       Again, thank you for allowing me to participate in the care of your patient.      Sincerely,    Louise Colindres PA-C

## 2024-10-01 NOTE — NURSING NOTE
Is the patient currently in the state of [place]? YES    Location: home    Visit mode:VIDEO    If the visit is dropped, the patient can be reconnected by: VIDEO VISIT: Text to cell phone:   Telephone Information:   Mobile [phone]       Will anyone else be joining the visit? NO  (If patient encounters technical issues they should call [phone] :[phone])    How would you like to obtain your AVS? MyChart    Are changes needed to the allergy or medication list? No    Are refills needed on medications prescribed by this physician? NO    Reason for visit: RECHECK    Eileen CUENCA    QNR Status: completing on MyChart

## 2024-10-14 NOTE — PATIENT INSTRUCTIONS
"Thank you for allowing us the privilege of caring for you. We hope we provided you with the excellent service you deserve.   Please let us know if there is anything else we can do for you so that we can be sure you are completely satisfied with your care experience.    To ensure the quality of our services you may be receiving a patient satisfaction survey from an independent patient satisfaction monitoring company.    The greatest compliment you can give is a \"Likely to Recommend\"    Your visit was with Louise Colindres PA-C today.    Instructions per today's visit:     Tyler Dong, it was great to visit with you today.  Here is a review of our visit.  If our clinic scheduler is not able to reach you please call [phone] to schedule your next appointments.    Plan  GREAT work on cutting out tobacco use!   Start Topiramate 25mg - take 1 tablet at night for 7 days, then increase to 2 tablets at night for 7 days, then increase to 3 tablets at night. Alternatives discussed include naltrexone, metformin.   Goals we discussed today:   Track all food intake prior to meeting with dietician   Labs ordered today: Pth, vitamin d, cmp, A1c, lipids   Follow up with Louise in 3 months   Dietician appointment to be scheduled asap   Keep up the excellent work!       Information about Video Visits with [facility]: video visit information  _________________________________________________________________________________________________________________________________________________________  If you are asked by your clinic team to have your blood pressure checked:  [facility] do offer several locations for blood pressure checks. Please follow the below link to schedule an appointment. Scheduling an appointment at the pharmacy for a blood pressure check is now preferred.    Appointment Plus " ([email or web address])  _________________________________________________________________________________________________________________________________________________________  Important contact and scheduling information:  Please call our contact center at [phone] to schedule your next appointments.  To find a lab location near you, please call [phone].  For any nursing questions or concerns call Sara Luo LPN at [phone] or Erin Sahu RN at [phone]  Please call during clinic hours Monday through Friday 8:00a - 4:00p if you have questions or you can contact us via [employer] at anytime and we will reply during clinic hours.    Lab results will be communicated through My Chart or letter (if My Chart not used). Please call the clinic if you have not received communication after 1 week or if you have any questions.?  Clinic Fax: [phone]    _________________________________________________________________________________________________________________________________________________________  Meal Replacement Products:    Here is the link to our new e-store where you can purchase our meal replacement products    [facility] E-Store  [facility]/store    The one week starter kit is a great way to sample a variety of products and see what works for you.    If you want more information about the product go to: [employer] Meals  [email or web address]    If you are an employee or [facility] or [facility] please contact your care team for a 10% estore discount    Free Shipping for orders over $75     Benefits of meal replacements products:    Portion and calorie control  Improved nutrition  Structured eating  Simplified food choices  Avoid contact with trigger foods  _________________________________________________________________________________________________________________________________________________________  Interested in working with a health  ?  Health coaches work with you to improve your overall health and wellbeing.  They look at the whole person, and may involve discussion of different areas of life, including, but not limited to the four pillars of health (sleep, exercise, nutrition, and stress management). Discuss with your care team if you would like to start working a health .  Health Coaching-3 Pack: Schedule by calling [phone]    $99 for three health coaching visits    Visits may be done in person or via phone    Coaching is a partnership between the  and the client; Coaches do not prescribe or diagnose    Coaching helps inspire the client to reach his/her personal goals   _________________________________________________________________________________________________________________________________________________________  24 Week Healthy Lifestyle Plan:    Our mission in the 24-week Healthy Lifestyle Plan is to provide you with individualized care by giving you the tools, education and support you need to lose weight and maintain a healthy lifestyle. In your 24-week journey, you ll be supported by a dedicated weight loss team that includes registered dietitians, medical weight management providers, health coaches, and nurses -- all with special expertise in weight loss -- to help you every step of the way.     Monthly meetings with your registered dietician or medical weight management provider help to review your progress, update your care plan, and make any adjustments needed to ensure success. Between these visits, weekly and bi-weekly health  visits will help you focus on the four pillars of weight loss -- stress, sleep, nutrition, and exercise -- and how you can best adapt each to achieve sustainable weight loss results.    In addition, you will be given exclusive access to online wellbeing classes through [employer].  Your initial visit will be with a medical weight management provider who will help to  understand your weight loss goals and ensure this program is the right fit for you. Please let our team know if you are interested in the 24 week plan by sending a message to your care team or calling [phone] to schedule.  _________________________________________________________________________________________________________________________________________________________  __________  [facility] Get Moving Program  Our team of physical therapists is trained to help you understand and take control of your condition. They will perform a thorough evaluation to determine your ability for activity and develop a customized plan to fit your goals and physical ability.  Scheduling: Unsure if the Get Moving program is right for you? Discuss the program with your medical provider or diabetes educator. You can also call us at [phone] to ask questions or schedule an appointment.   TREASURE Get Moving Program  ____________________________________________________________________________________________________________________________________________________________________________  [facility] Diabetes Prevention Program (DPP)  If you have prediabetes and Medicare please contact us via [employer] to learn more about the Diabetes Prevention Program (DPP)  Program Details:   [facility] offers the year-long Diabetes Prevention Program (DPP). The program helps you to make lifestyle changes that prevent or delay type 2 diabetes by supporting healthy eating, increased physical activity, stress reduction and use of coping skills.   On average, previous [facility] DPP cohorts have lost and maintained at least 5% of their starting weight throughout the program and averaged more than 150 minutes of physical activity per week.  Participants meet weekly for one-hour group sessions over sixteen weeks, every other week for the next 8 weeks, and monthly for the last six months.   A year-long  maintenance program is also available for participants who complete the first year.   Location & Cost:   During the COVID-19 Public Health Emergency, the program is offered virtually. When in-person classes can resume, they will be held at [facility].  For people with Medicare, the program is covered in full. A self-pay option will also be available for those with non-Medicare insurance plans.   ______________________________________________________________________________________________________________________________________________________________________________________________________________________________    To work with a Behavioral Health Psychologist:    Call to schedule:    Porfirio Reeves - [phone]  Bismark Acosta - [phone]  Rosalie Park - [phone]  Anita Jon - [phone]   Denise Juarez PhD (cannot accept Medicare) [phone]        Thank you,   [facility] Comprehensive Weight Management Team

## 2024-10-16 ENCOUNTER — NURSE TRIAGE (OUTPATIENT)
Dept: FAMILY MEDICINE | Facility: CLINIC | Age: 59
End: 2024-10-16
Payer: COMMERCIAL

## 2024-10-16 NOTE — TELEPHONE ENCOUNTER
"Nurse Triage SBAR    Is this a 2nd Level Triage? NO    Situation: Pt called the clinic concerned about her left knee.     Background: Sx started about a month ago. No injury.     Assessment: Pt reports left knee pain a 6-7/10. Pt stated her knee has collapsed. It hurts when she bend its. It hurts the worse going from sitting to standing.     Pt denies swelling, redness,chest pain, difficulty breathing, fever, calf pain.    Protocol Recommended Disposition:   See in Office Within 3 Days    Recommendation: Advised pt to go to ortho walk in clinic in [place]. Provided pt with address and hours. Pt stated \"ok\".     Routing to PCP as an FYI.      Routed to provider    Does the patient meet one of the following criteria for ADS visit consideration? 16+ years old, with an [facility] PCP     TIP  Providers, please consider if this condition is appropriate for management at one of our Acute and Diagnostic Services sites.     If patient is a good candidate, please use dotphrase <dot>triageresponse and select Refer to ADS to document.       1. LOCATION and RADIATION: \"Where is the pain located?\"       Left knee   2. QUALITY: \"What does the pain feel like?\"  (e.g., sharp, dull, aching, burning)      Achy  3. SEVERITY: \"How bad is the pain?\" \"What does it keep you from doing?\"   (Scale 1-10; or mild, moderate, severe)    -  MILD (1-3): doesn't interfere with normal activities     -  MODERATE (4-7): interferes with normal activities (e.g., work or school) or awakens from sleep, limping     -  SEVERE (8-10): excruciating pain, unable to do any normal activities, unable to walk      6-7/10  4. ONSET: \"When did the pain start?\" \"Does it come and go, or is it there all the time?\"      A month  5. RECURRENT: \"Have you had this pain before?\" If Yes, ask: \"When, and what happened then?\"      No  6. SETTING: \"Has there been any recent work, exercise or other activity that involved that part of the body?\"       No  7. AGGRAVATING FACTORS: " "\"What makes the knee pain worse?\" (e.g., walking, climbing stairs, running)      Sitting to standing   8. ASSOCIATED SYMPTOMS: \"Is there any swelling or redness of the knee?\"      No  9. OTHER SYMPTOMS: \"Do you have any other symptoms?\" (e.g., chest pain, difficulty breathing, fever, calf pain)      No  10. PREGNANCY: \"Is there any chance you are pregnant?\" \"When was your last menstrual period?\"        NA  Reason for Disposition   MODERATE pain (e.g., symptoms interfere with work or school, limping) and present > 3 days    Additional Information   Negative: Sounds like a life-threatening emergency to the triager   Negative: Followed a knee injury   Negative: Swollen knee joint and fever   Negative: Patient sounds very sick or weak to the triager   Negative: Can't move swollen joint at all   Negative: Thigh or calf pain and only 1 side and present > 1 hour   Negative: Thigh or calf swelling and only 1 side   Negative: SEVERE pain (e.g., excruciating, unable to walk)   Negative: Very swollen knee joint   Negative: Painful rash with multiple small blisters grouped together (i.e., dermatomal distribution or 'band' or 'stripe')   Negative: Looks like a boil, infected sore, deep ulcer, or other infected rash (spreading redness, pus)    Protocols used: Knee Pain-A-OH    "

## 2024-10-19 ENCOUNTER — MYC MEDICAL ADVICE (OUTPATIENT)
Dept: GASTROENTEROLOGY | Facility: CLINIC | Age: 59
End: 2024-10-19
Payer: COMMERCIAL

## 2024-10-19 DIAGNOSIS — K22.10 EROSIVE ESOPHAGITIS: ICD-10-CM

## 2024-10-19 DIAGNOSIS — K44.9 HIATAL HERNIA: ICD-10-CM

## 2024-10-21 RX ORDER — FAMOTIDINE 40 MG/1
40 TABLET, FILM COATED ORAL 2 TIMES DAILY
Qty: 180 TABLET | Refills: 3 | Status: SHIPPED | OUTPATIENT
Start: 2024-10-21

## 2024-10-22 NOTE — PROGRESS NOTES
ASSESSMENT & PLAN         Today we discussed the underlying etiology/pathology of patient's   1. Acute pain of left knee      Discussed diagnosis and treatment options with the patient today. A shared decision making model was used. The patient's values and choices were respected. The following represents what was discussed and decided upon by the provider and the patient.   -Patient dealing with significant progressive left knee pain more so on the medial side of the knee with some radiation down the proximal one half of the tibia.  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  She does have increased BMI over the last 2 years which may be contributing to additional stress  - X-rays today do not show any evidence of fracture.  There is some mild age-appropriate degenerative changes of the medial compartment of the knee as well as slightly of the patellofemoral joint more so on the lateral facet.  - Concern is for possible stress reaction/fracture of the knee.  I will send her for an MRI of the left knee to evaluate for stress reaction versus possible soft tissue injury as well as severity of arthritis.  - I will contact the patient with MRI results when known.  - Patient will go into a assisted weightbearing status on the left lower extremity.  Patient will utilize a cane in her right hand.  Cane order sent to the [facility] for her to  today.  - Patient will be prescribed diclofenac to assist with pain and inflammation.  Breakthrough pain medication of tramadol prescribed.  Patient understood potential side effects as well as her previous history of nausea and vomiting with other narcotics.  - While taking diclofenac she needs to avoid all other NSAIDs including ibuprofen products.  - Patient may take up to 3000 mg of acetaminophen/Tylenol daily for additional pain control as well as use topical ice to her knee to help  minimize swelling and pain  - Treatment plan will be updated based on MRI findings.  If symptoms progressively worsen before repeat assessment patient will contact me immediately for repeat exam and discussion.    -Call direct clinic number [[phone]] at any time with questions or concerns in regards to your recent office visit with me.     Yovanny Valerio PA-C  [facility]    This note was completed in part using a voice recognition software, any grammatical or context distortion are unintentional and inherent to the software.         SUBJECTIVE  Gudelia Dong is a/an 59 year old female who is seen as a self referral for evaluation of left knee pain. The patient is seen by themselves.    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  She also has had increased weight over the last 2 years.  Patient's activity and physical demands have increased before onset of left knee pain.  Symptoms are worsening.  Patient does not recall any injury, trauma, twisting event or cause of increased knee pain otherwise.    Onset: 1 month(s) ago. Reports insidious onset without acute precipitating event.  Location of Pain: left anterior knee joint. Sometimes posteriorly. Reports only in the past 2-3 days a radiating pain down the medial tibia and calf  Rating of Pain at worst: 8/10  Rating of Pain Currently: 6/10  Worsened by: kneeling, leaning forward, bent knee weightbearing, walking.   Better with: nothing  Treatments tried: ibuprofen 400 mg q 4 hours without pain relief   Quality: aching, constant  Associated symptoms: no distal numbness or tingling; denies swelling or warmth  Orthopedic history: NO  Relevant surgical history: NO  Social history:  since May 2024, long walking with carrying packages/items, lots of stairs.     Past Medical History:   Diagnosis Date    Depression     GERD (gastroesophageal reflux  disease)     Hemorrhoid     Menstrual disorder     s/p D&C -2012    ROMEO on CPAP     Other bipolar disorders     followed by Dr Collazo    Post menopausal syndrome     Tobacco abuse      Social History     Socioeconomic History    Marital status:    Tobacco Use    Smoking status: Former     Current packs/day: 0.00     Types: Cigarettes     Quit date: 2023     Years since quittin.8     Passive exposure: Past    Smokeless tobacco: Never    Tobacco comments:     Quit for year, restarted .       Quit middle May 2024   Vaping Use    Vaping status: Never Used   Substance and Sexual Activity    Alcohol use: No     Comment: Severe alcohol use disorder for 20 years.  :  Reports sober for past five years    Drug use: No    Sexual activity: Yes     Partners: Male   Social History Narrative    2013    /single/    Children-    Work-    Tobacco-    ETOH-    Exercise-             Patient's past medical, surgical, social, and family histories were personally reviewed today and no changes are noted.    REVIEW OF SYSTEMS:  10 point ROS is negative other than symptoms noted above in HPI, Past Medical History or as stated below  Constitutional: NEGATIVE for fever, chills, change in weight  Skin: NEGATIVE for worrisome rashes, moles or lesions  GI/: NEGATIVE for bowel or bladder changes  Neuro: NEGATIVE for weakness, dizziness or paresthesias    OBJECTIVE:  Vital signs as noted in EPIC for 10/22/2024  General: healthy, alert and in no distress  HEENT: no scleral icterus or conjunctival erythema  Skin: no suspicious lesions or rash. No jaundice.  CV: no pedal edema  Resp: normal respiratory effort without conversational dyspnea   Psych: normal mood and affect  Neuro: Normal light sensory exam of lower extremity      MSK:  Exam shows a well-nourished 59-year-old female who ambulates with a significant antalgic gait favoring her left leg.  Patient has elevated BMI more proximally in the thighs and  central region.  Examination of the lower extremities with skin exposed shows no obvious bruising, swelling or ecchymosis.  No excessive warmth of the knee.  No radha effusion.  Patient is able to gain full passive extension of the knee in flexion past 125 degrees.  Mild tenderness of the distal quadriceps.  Patient has some stinging sensation with palpation on the posterior knee and hamstrings.  There also is pain and stinging sensation on the medial tibial plateau down to approximately mid tibia level.  No particular pain over the fibula.  Slight tenderness along the medial joint line.  No pain laterally.  Ligament exam is stable.  Anterior posterior drawer appear to be stable.  Circumduction maneuvers just generate pain in general but no radha mechanical symptoms.  Hip motion is uniform symmetric pain-free.  Gastrocsoleus complex is soft with no evidence of popliteal cyst.  No evidence of compartment syndrome.  Patient is neurovascular intact L2-S1 bilaterally.            Personal Independent visualization of the below images done today:  Three-view x-ray of the left knee are obtained and reviewed today.  Patient shows mild degenerative changes of the medial compartment of the left knee.  No evidence of fracture or dislocation.  Left patella shows slight patellar facet narrowing laterally with early osteophyte on the lateral facet.  Right knee patellofemoral joint largely unremarkable.  No radha effusion.    Patient's conditions were thoroughly discussed during today's visit with total time reviewing patient's previous medical records/history/radiology, face-to-face examination and discussion and plan of care with the patient and documentation being 30 minutes for today's clinical visit  Yovanny Valerio PA-C  [facility]    This note was completed in part using a voice recognition software, any grammatical or context distortion are unintentional and inherent to the software.

## 2024-10-23 ENCOUNTER — TELEPHONE (OUTPATIENT)
Dept: ENDOCRINOLOGY | Facility: CLINIC | Age: 59
End: 2024-10-23
Payer: COMMERCIAL

## 2024-10-23 NOTE — TELEPHONE ENCOUNTER
Patient confirmed scheduled appointment:  Date: 2/12/25  Time: 4pm  Visit type: return mwm  Provider: Louise Colindres  [place]

## 2024-10-24 ENCOUNTER — OFFICE VISIT (OUTPATIENT)
Dept: ORTHOPEDICS | Facility: CLINIC | Age: 59
End: 2024-10-24
Payer: COMMERCIAL

## 2024-10-24 DIAGNOSIS — M25.562 ACUTE PAIN OF LEFT KNEE: Primary | ICD-10-CM

## 2024-10-24 PROCEDURE — 99204 OFFICE O/P NEW MOD 45 MIN: CPT | Performed by: PHYSICIAN ASSISTANT

## 2024-10-24 RX ORDER — TRAMADOL HYDROCHLORIDE 50 MG/1
50 TABLET ORAL EVERY 12 HOURS PRN
Qty: 10 TABLET | Refills: 0 | Status: SHIPPED | OUTPATIENT
Start: 2024-10-24 | End: 2024-10-27

## 2024-10-24 NOTE — LETTER
10/24/2024      Gudelia Dong  [address]  [place] [address]      Dear Colleague,    Thank you for referring your patient, Gudelia Dong, to the [facility]. Please see a copy of my visit note below.    ASSESSMENT & PLAN         Today we discussed the underlying etiology/pathology of patient's   1. Acute pain of left knee      Discussed diagnosis and treatment options with the patient today. A shared decision making model was used. The patient's values and choices were respected. The following represents what was discussed and decided upon by the provider and the patient.   -Patient dealing with significant progressive left knee pain more so on the medial side of the knee with some radiation down the proximal one half of the tibia.  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  She does have increased BMI over the last 2 years which may be contributing to additional stress  - X-rays today do not show any evidence of fracture.  There is some mild age-appropriate degenerative changes of the medial compartment of the knee as well as slightly of the patellofemoral joint more so on the lateral facet.  - Concern is for possible stress reaction/fracture of the knee.  I will send her for an MRI of the left knee to evaluate for stress reaction versus possible soft tissue injury as well as severity of arthritis.  - I will contact the patient with MRI results when known.  - Patient will go into a assisted weightbearing status on the left lower extremity.  Patient will utilize a cane in her right hand.  Cane order sent to the [facility] [facility] for her to  today.  - Patient will be prescribed diclofenac to assist with pain and inflammation.  Breakthrough pain medication of tramadol prescribed.  Patient understood potential side effects as well as her previous history of nausea and  vomiting with other narcotics.  - While taking diclofenac she needs to avoid all other NSAIDs including ibuprofen products.  - Patient may take up to 3000 mg of acetaminophen/Tylenol daily for additional pain control as well as use topical ice to her knee to help minimize swelling and pain  - Treatment plan will be updated based on MRI findings.  If symptoms progressively worsen before repeat assessment patient will contact me immediately for repeat exam and discussion.    -Call direct clinic number [[phone]] at any time with questions or concerns in regards to your recent office visit with me.     Yovanny Valerio PA-C  [facility]    This note was completed in part using a voice recognition software, any grammatical or context distortion are unintentional and inherent to the software.         SUBJECTIVE  Gudelia Dong is a/an 59 year old female who is seen as a self referral for evaluation of left knee pain. The patient is seen by themselves.    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  She also has had increased weight over the last 2 years.  Patient's activity and physical demands have increased before onset of left knee pain.  Symptoms are worsening.  Patient does not recall any injury, trauma, twisting event or cause of increased knee pain otherwise.    Onset: 1 month(s) ago. Reports insidious onset without acute precipitating event.  Location of Pain: left anterior knee joint. Sometimes posteriorly. Reports only in the past 2-3 days a radiating pain down the medial tibia and calf  Rating of Pain at worst: 8/10  Rating of Pain Currently: 6/10  Worsened by: kneeling, leaning forward, bent knee weightbearing, walking.   Better with: nothing  Treatments tried: ibuprofen 400 mg q 4 hours without pain relief   Quality: aching, constant  Associated symptoms: no distal numbness or tingling; denies swelling or  warmth  Orthopedic history: NO  Relevant surgical history: NO  Social history:  since May 2024, long walking with carrying packages/items, lots of stairs.     Past Medical History:   Diagnosis Date     Depression      GERD (gastroesophageal reflux disease)      Hemorrhoid      Menstrual disorder     s/p D&C -2012     ROMEO on CPAP      Other bipolar disorders     followed by Dr Collazo     Post menopausal syndrome      Tobacco abuse      Social History     Socioeconomic History     Marital status:    Tobacco Use     Smoking status: Former     Current packs/day: 0.00     Types: Cigarettes     Quit date: 2023     Years since quittin.8     Passive exposure: Past     Smokeless tobacco: Never     Tobacco comments:     Quit for year, restarted .       Quit middle May 2024   Vaping Use     Vaping status: Never Used   Substance and Sexual Activity     Alcohol use: No     Comment: Severe alcohol use disorder for 20 years.  :  Reports sober for past five years     Drug use: No     Sexual activity: Yes     Partners: Male   Social History Narrative    2013    /single/    Children-    Work-    Tobacco-    ETOH-    Exercise-             Patient's past medical, surgical, social, and family histories were personally reviewed today and no changes are noted.    REVIEW OF SYSTEMS:  10 point ROS is negative other than symptoms noted above in HPI, Past Medical History or as stated below  Constitutional: NEGATIVE for fever, chills, change in weight  Skin: NEGATIVE for worrisome rashes, moles or lesions  GI/: NEGATIVE for bowel or bladder changes  Neuro: NEGATIVE for weakness, dizziness or paresthesias    OBJECTIVE:  Vital signs as noted in EPIC for 10/22/2024  General: healthy, alert and in no distress  HEENT: no scleral icterus or conjunctival erythema  Skin: no suspicious lesions or rash. No jaundice.  CV: no pedal edema  Resp: normal respiratory effort without conversational  dyspnea   Psych: normal mood and affect  Neuro: Normal light sensory exam of lower extremity      MSK:  Exam shows a well-nourished 59-year-old female who ambulates with a significant antalgic gait favoring her left leg.  Patient has elevated BMI more proximally in the thighs and central region.  Examination of the lower extremities with skin exposed shows no obvious bruising, swelling or ecchymosis.  No excessive warmth of the knee.  No radha effusion.  Patient is able to gain full passive extension of the knee in flexion past 125 degrees.  Mild tenderness of the distal quadriceps.  Patient has some stinging sensation with palpation on the posterior knee and hamstrings.  There also is pain and stinging sensation on the medial tibial plateau down to approximately mid tibia level.  No particular pain over the fibula.  Slight tenderness along the medial joint line.  No pain laterally.  Ligament exam is stable.  Anterior posterior drawer appear to be stable.  Circumduction maneuvers just generate pain in general but no radha mechanical symptoms.  Hip motion is uniform symmetric pain-free.  Gastrocsoleus complex is soft with no evidence of popliteal cyst.  No evidence of compartment syndrome.  Patient is neurovascular intact L2-S1 bilaterally.            Personal Independent visualization of the below images done today:  Three-view x-ray of the left knee are obtained and reviewed today.  Patient shows mild degenerative changes of the medial compartment of the left knee.  No evidence of fracture or dislocation.  Left patella shows slight patellar facet narrowing laterally with early osteophyte on the lateral facet.  Right knee patellofemoral joint largely unremarkable.  No radha effusion.    Patient's conditions were thoroughly discussed during today's visit with total time reviewing patient's previous medical records/history/radiology, face-to-face examination and discussion and plan of care with the patient and  documentation being 30 minutes for today's clinical visit  Yovanny Valerio PA-C  [facility]    This note was completed in part using a voice recognition software, any grammatical or context distortion are unintentional and inherent to the software.       Again, thank you for allowing me to participate in the care of your patient.        Sincerely,        Yovanny Valerio PA-C

## 2024-10-24 NOTE — LETTER
October 24, 2024      Gudelia Dong  [address]   [place] [address]        To Whom It May Concern:    Gudelia Dong was seen in our clinic 10/24/2024. She may return to work with the following recommendations.  Patient is dealing with acute onset of severe left knee pain.  She has been evaluated for possible stress fracture of the leg with upcoming MRI.  Patient needs to utilize an assistive device to help minimize stress and weightbearing on the left leg.  Currently she will utilize a cane in her right hand.  If possible please minimize the distance the patient needs to walk and she should try to avoid steps if possible.  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  Patient will be contacted once MRI results are known and treatment plan will be updated at that time.      Sincerely,      Yovanny Valerio

## 2024-10-24 NOTE — PATIENT INSTRUCTIONS
Today we discussed the underlying etiology/pathology of patient's   1. Acute pain of left knee      Discussed diagnosis and treatment options with the patient today. A shared decision making model was used. The patient's values and choices were respected. The following represents what was discussed and decided upon by the provider and the patient.   -Patient dealing with significant progressive left knee pain more so on the medial side of the knee with some radiation down the proximal one half of the tibia.  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  She does have increased BMI over the last 2 years which may be contributing to additional stress  - X-rays today do not show any evidence of fracture.  There is some mild age-appropriate degenerative changes of the medial compartment of the knee as well as slightly of the patellofemoral joint more so on the lateral facet.  - Concern is for possible stress reaction/fracture of the knee.  I will send her for an MRI of the left knee to evaluate for stress reaction versus possible soft tissue injury as well as severity of arthritis.  - I will contact the patient with MRI results when known.  - Patient will go into a assisted weightbearing status on the left lower extremity.  Patient will utilize a cane in her right hand.  Cane order sent to the [facility]/[place] for her to  today.  - Patient will be prescribed diclofenac to assist with pain and inflammation.  Breakthrough pain medication of tramadol prescribed.  Patient understood potential side effects as well as her previous history of nausea and vomiting with other narcotics.  - While taking diclofenac she needs to avoid all other NSAIDs including ibuprofen products.  - Patient may take up to 3000 mg of acetaminophen/Tylenol daily for additional pain control as well as use topical ice to her knee to help minimize swelling and  pain  - Treatment plan will be updated based on MRI findings.  If symptoms progressively worsen before repeat assessment patient will contact me immediately for repeat exam and discussion.    -Call direct clinic number [[phone]] at any time with questions or concerns in regards to your recent office visit with me.     Yovanny Valerio PA-C  [facility]    This note was completed in part using a voice recognition software, any grammatical or context distortion are unintentional and inherent to the software.

## 2024-11-01 NOTE — TELEPHONE ENCOUNTER
FUTURE VISIT INFORMATION      SURGERY INFORMATION:  Date: 2/11/25  Location: [facility]  Surgeon:  René Rodriguez DO   Anesthesia Type:  mac  Procedure: Esophagoscopy, gastroscopy, duodenoscopy (EGD), combined     RECORDS REQUESTED FROM:       Primary Care Provider: [facility]    Pertinent Medical History: fior

## 2024-11-04 ENCOUNTER — TELEPHONE (OUTPATIENT)
Dept: ORTHOPEDICS | Facility: CLINIC | Age: 59
End: 2024-11-04
Payer: COMMERCIAL

## 2024-11-04 NOTE — TELEPHONE ENCOUNTER
Please offer patient walk-in care. I have a full clinic tomorrow.  I will not add any new treatment or medication without repeat clinic exam and/or MRI results are known.

## 2024-11-04 NOTE — TELEPHONE ENCOUNTER
Other: Toñito Hendricks is calling because she did something to her left knee this weekend, that she heard a cracking noise and now she is not able to even walk using her cane. Also she would like a different pain medication because the one that you gave her does not work. She would like to talk to someone. Please call her     Could we send this information to you in [employer] or would you prefer to receive a phone call?:   Patient would prefer a phone call   Okay to leave a detailed message?: Yes at Cell number on file:    Telephone Information:   Mobile [phone]

## 2024-11-04 NOTE — TELEPHONE ENCOUNTER
Outbound phone call and spoke with the patient to relay the provider message.  I reviewed once again the pain management strategies advised at last visit.  Orthopedic walk-in care options were delivered to the patient as well.  We will follow-up after her MRI is complete and on 11/16/2024.    Ronny Cross ATC

## 2024-11-04 NOTE — TELEPHONE ENCOUNTER
"I returned the patient's call and spoke with her regarding her new wear/increased knee pain since her last visit on 10/24/2024.  She states that she was just walking around her house on Saturday, 11/2/2024, when she felt a random crack and increase in pain along her medial knee.  She is both having difficulty ambulating with her cane and increased pain.  She denies swelling or warmth to the area.  She states that the tramadol was not helpful, and the diclofenac helps a little.  -She states that she has been icing, but it has been for over an hour at a time each time.  I advised her that she should break this up into 20-minute segments on, 40-minute segments off.  - She states that she has taken Tylenol on occasion but not consistently.  I reevaluated Yovanny's plan to take it 1000 mg 3 times daily, and explained the benefits of that.  - She states she is \"too busy \"to get this checked out again.  I advised that if her pain continues to increase with these new measures, that she should reconsider getting evaluated sooner than her 11/16/2024 MRI.   -I reminded her that Yovanny is out of the office today and may not be able to respond.  Our goal is to get her an answer or updated care plan within 24 hours from now.  She was agreeable.    Please advise on any additional recommendations or future considerations, between her getting an MRI and her follow-up.    Ronny Cross, ATC   "

## 2024-11-09 ENCOUNTER — OFFICE VISIT (OUTPATIENT)
Dept: URGENT CARE | Facility: URGENT CARE | Age: 59
End: 2024-11-09
Payer: COMMERCIAL

## 2024-11-09 VITALS
TEMPERATURE: 97.8 F | BODY MASS INDEX: 44.01 KG/M2 | WEIGHT: 281 LBS | RESPIRATION RATE: 21 BRPM | DIASTOLIC BLOOD PRESSURE: 70 MMHG | SYSTOLIC BLOOD PRESSURE: 140 MMHG | HEART RATE: 107 BPM | OXYGEN SATURATION: 96 %

## 2024-11-09 DIAGNOSIS — S29.011A CHEST WALL MUSCLE STRAIN, INITIAL ENCOUNTER: Primary | ICD-10-CM

## 2024-11-09 PROCEDURE — 99213 OFFICE O/P EST LOW 20 MIN: CPT | Performed by: INTERNAL MEDICINE

## 2024-11-09 ASSESSMENT — PAIN SCALES - GENERAL: PAINLEVEL_OUTOF10: MODERATE PAIN (5)

## 2024-11-09 NOTE — PROGRESS NOTES
"SUBJECTIVE:  Chief complaint of chest pain in the right chest.  She states that she was \"messing around\" with her boyfriend who was trying to lift her and she felt a lot of pain in the right chest.  This occurred six days ago.  She has noted an increase in the pain and pain with movement of the right upper extremity or rotation of the trunk or with coughing. Pain was intense yesterday but significantly better today. She denies any skin rash or bruising.     ROS:  The following systems have been completely reviewed and are negative except as noted in the HPI: CONSTITUTIONAL, CARDIOVASCULAR, PULMONARY, DERMATOLOGIC, MUSCULOSKELETAL, and NEUROLOGIC    OBJECTIVE:  BP (!) 140/70 (BP Location: Right arm, Patient Position: Sitting, Cuff Size: Adult Large)   Pulse 107   Temp 97.8  F (36.6  C) (Temporal)   Resp 21   Wt 127.5 kg (281 lb)   LMP 07/20/2006   SpO2 96%   BMI 44.01 kg/m    GENERAL: alert and oriented  RESP: clear to auscultation and percussion bilaterally; normal I:E ratio  CV: regular rates and rhythm, normal S1 S2, no S3 or S4 and no murmur, click or rub -  M/SKEL: focal chest wall tenderness in the mid-axillary line over the fourth or fifth rib on the right; mild increase in pain with truncal rotation and mild chest wall pain with resisted abduction, internal rotation of the right shoulder  SKIN: no suspicious lesions or rashes; no ecchymosis of the chest wall    ASSESSMENT/PLAN:    ICD-10-CM    1. Chest wall muscle strain, initial encounter  S29.011A       Signs and symptoms are highly specific for myofascial injury of the right rib cage with a low risk of fracture.  Even if a minor non-displaced fracture were present, there would be no change in management.  The patient is comfortable with clinical diagnosis and she agrees that imaging is not necessary.  Supportive care reviewed.    Jesse Francisco MD      "

## 2024-11-16 ENCOUNTER — HOSPITAL ENCOUNTER (OUTPATIENT)
Dept: MRI IMAGING | Facility: HOSPITAL | Age: 59
Discharge: HOME | End: 2024-11-16
Attending: PHYSICIAN ASSISTANT | Admitting: PHYSICIAN ASSISTANT
Payer: COMMERCIAL

## 2024-11-16 ENCOUNTER — MYC MEDICAL ADVICE (OUTPATIENT)
Dept: ORTHOPEDICS | Facility: CLINIC | Age: 59
End: 2024-11-16
Payer: COMMERCIAL

## 2024-11-16 DIAGNOSIS — M25.562 ACUTE PAIN OF LEFT KNEE: ICD-10-CM

## 2024-11-16 PROCEDURE — 73721 MRI JNT OF LWR EXTRE W/O DYE: CPT | Mod: LT

## 2024-11-21 ENCOUNTER — OFFICE VISIT (OUTPATIENT)
Dept: ORTHOPEDICS | Facility: CLINIC | Age: 59
End: 2024-11-21
Payer: COMMERCIAL

## 2024-11-21 DIAGNOSIS — M84.369A STRESS FRACTURE OF KNEE: Primary | ICD-10-CM

## 2024-11-21 DIAGNOSIS — M23.307 DEGENERATIVE TEAR OF MENISCUS OF LEFT KNEE: ICD-10-CM

## 2024-11-21 NOTE — LETTER
11/21/2024      Gudelia Dong  [address]  [place] [address]      Dear Colleague,    Thank you for referring your patient, Gudelia Dong, to the [facility]. Please see a copy of my visit note below.    ASSESSMENT & PLAN     Today we discussed the underlying etiology/pathology of patient's   1. Stress fracture of knee, left-  Nondisplaced, subchondral fracture involving the weightbearing medial femoral condyle    2. 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 The patient's values and choices were respected. The following information represents what was discussed and decided upon by the provider and the patient.  -We reviewed patient's left knee MRI in full detail and all questions were addressed  - Patient understands that she has subchondral edema/fractures of the medial femoral condyle of the left knee with associated edema on the medial tibial plateau to a lesser degree.  There is degenerative medial meniscus tearing with underlying degenerative changes of the knee.  - Patient has quit smoking as of last week which is something to celebrate.  This will improve patient's healing potential and overall health  - We discussed the patient should be minimal weightbearing on her left knee.  Prescription for a walker was sent to the [facility] DME department for her to  at her convenience.  Patient should use the walker is much as possible going into a toe-touch weightbearing status on the left lower extremity utilizing her walker and only putting weight through the ball of her left foot.  Patient may utilize a cane in her right hand if needed for certain ambulatory situations that the walker is not ideal  - Patient will continue with Tylenol and ibuprofen for pain management as well as topical ice or any other topical agent on the inner side of her knee to help minimize pain  - We discussed that  these type of injuries certainly can take anywhere between 3-5 months to improve and that there is a small set of patients that continue to have pain and dysfunction that may need to consider surgical intervention in the future  - Patient will come back and see me towards the end of January for repeat assessment.  Patient will come back sooner if there is a new acute injury or significantly worsening symptoms  - Workability form provided to the patient today.    - Appointment line phone number is [[phone]]     Yovanny Valerio PA-C  [facility]    This note was completed in part using a voice recognition software, any grammatical or context distortion are unintentional and inherent to the software.     OVER THE COUNTER MEDICATIONS  Insurance usually does not cover these. Please check your local drug store and/or [employer].    Topical lidocaine gel                 SUBJECTIVE  Gudelia Dong is a/an 59 year old female who is seen for follow up of left knee pain and MRI review. The patient is seen by themselves.   Since last visit patient had an acute event where her  tried to pick her up causing some increased chest/rib pain as well as some mild increased left knee pain.  There was no fall or direct injury to her left knee.  She continues to take Tylenol and ibuprofen for pain management and did  a cane and is utilizing it in her right hand.  Patient did quit smoking last week cold turkey.  Patient's employer has been helpful in minimizing her ambulation/walking duties at work.  Original Date of Injury: mid Sept 2024      Location of Pain: left medial knee        Past Medical History:   Diagnosis Date     Depression      GERD (gastroesophageal reflux disease)      Hemorrhoid      Menstrual disorder     s/p D&C -12/2012     ROMEO on CPAP      Other bipolar disorders     followed by Dr Collazo     Post menopausal syndrome      Tobacco abuse      Social History     Socioeconomic History      Marital status:      Spouse name: None     Number of children: None     Years of education: None     Highest education level: None   Tobacco Use     Smoking status: Former     Current packs/day: 0.00     Types: Cigarettes     Quit date: 2023     Years since quittin.9     Passive exposure: Past     Smokeless tobacco: Never     Tobacco comments:     Quit for year, restarted .       Quit middle May 2024   Vaping Use     Vaping status: Never Used   Substance and Sexual Activity     Alcohol use: No     Comment: Severe alcohol use disorder for 20 years.  :  Reports sober for past five years     Drug use: No     Sexual activity: Yes     Partners: Male   Social History Narrative    2013    /single/    Children-    Work-    Tobacco-    ETOH-    Exercise-             Patient's past medical, surgical, social, and family histories were personally reviewed today and no changes are noted.  REVIEW OF SYSTEMS:  Review of Systems    OBJECTIVE:  Vital signs as noted in EPIC for 2024    General: healthy, alert and in no distress  HEENT: no scleral icterus or conjunctival erythema  Skin: no suspicious lesions or rash. No jaundice.  CV: no pedal edema  Resp: normal respiratory effort without conversational dyspnea   Psych: normal mood and affect  Neuro: Normal light sensory exam of lower extremity      MSK:  Exam shows a well-nourished 59-year-old female Hambly is full weightbearing with a single-point cane in her right hand.  Antalgic gait favoring her left lower extremity.  Patient still has full knee extension passively and flexion past 125 degrees with minimal discomfort.  No pain throughout the anterior knee and extensor mechanism.  No pain over the lateral side of the knee including lateral joint line.  Patient describes a stinging sensation with palpation along the medial joint line and medial tibial plateau.  Patient has pain with palpation over the medial femoral  condyle.        Personal Independent visualization of the below images done today:  Left knee MRI results fully reviewed and imaging reviewed today personally and with the patient in the examination room     Study Result    Narrative & Impression   EXAM: MR KNEE LEFT WITHOUT CONTRAST  LOCATION: [facility]  DATE: 11/16/2024     INDICATION: Acute left knee pain. Evaluate for stress fracture of tibia.  COMPARISON: None.  TECHNIQUE: Unenhanced.     FINDINGS:     MEDIAL COMPARTMENT:   -Meniscus: There is high-grade radial tearing of the medial meniscus posterior root, with mild medial meniscal body extrusion.  -Cartilage: There is some mild thinning over the weightbearing medial femoral condyle without a high-grade or full-thickness defect. There is a nondisplaced subchondral fracture involving the weightbearing medial femoral condyle centrally with fairly   extensive marrow edema signal.     LATERAL COMPARTMENT:  -Meniscus: No lateral meniscal tear.   -Cartilage: No high-grade or full-thickness cartilage defect.     PATELLOFEMORAL COMPARTMENT:   -Alignment: There is mild lateral patellar subluxation.   -Cartilage: Small area of deep fissuring over the patellar median ridge with some additional more superficial fissuring over the lateral retropatellar facet. No discrete trochlear cartilage defect.     CRUCIATE LIGAMENTS:   -ACL: Normal.  -PCL: Normal.     COLLATERAL LIGAMENTS:   -Medial collateral ligament: Intact.  -Lateral collateral ligament: Intact.     POSTEROMEDIAL CORNER:  -Distal semimembranosus tendon is normal.   -Pes anserine tendons are intact.     POSTEROLATERAL CORNER:   -Popliteal tendon is intact. No tendinopathy.  -Biceps femoris tendon and posterolateral corner complex ligaments are intact.     EXTENSOR MECHANISM:   -Quadriceps tendon: Mild tendinopathy, without tearing.  -Patellar tendon: Normal.  -Patellofemoral ligaments and retinacula: Intact.     JOINT:   -Moderate-sized  joint effusion.     BONES:  -No concerning marrow replacing lesion. There is a nondisplaced subchondral fracture involving the weightbearing medial femoral condyle centrally.     SOFT TISSUES:   -No popliteal cyst. No acute muscle injury. There is a very small, thin fluid collection in the subcutaneous tissue anterior to the proximal patellar tendon measuring approximately 1.3 x 0.3 x 1.6 cm, suggesting a very mild prepatellar bursitis.                                                                       IMPRESSION:  1.  High-grade radial tearing of the medial meniscus posterior root, with mild medial meniscal body extrusion.  2.  Nondisplaced, subchondral fracture involving the weightbearing medial femoral condyle centrally with fairly extensive associated marrow edema signal.  3.  No lateral meniscal tear. The cruciate and collateral ligaments remain intact.  4.  Mild patellar chondromalacia. No high-grade or full-thickness medial or lateral compartment cartilage defect.  5.  Moderate-sized joint effusion.  6.  Findings suggesting very mild prepatellar bursitis.        Patient's conditions were thoroughly discussed during today's visit with total time reviewing patient's previous medical records/history/radiology, face-to-face examination and discussion and plan of care with the patient and documentation being 40 minutes for today's clinical visit    Yovanny Valerio PA-C  [facility]    This note was completed in part using a voice recognition software, any grammatical or context distortion are unintentional and inherent to the software.      Again, thank you for allowing me to participate in the care of your patient.        Sincerely,        Yovanny Valerio PA-C

## 2024-11-21 NOTE — PATIENT INSTRUCTIONS
Thank you for allowing me to be part of your care team. My personal goal for your visit today is that you felt that I listened to you, you understood your diagnosis and treatment options and our staff/clinic met your expectations. We strive to provide you excellent care.  If you felt like your expectations were not met at your visit today or if you have further questions about your visit or care, please send me a [employer] message and I would be happy answer any questions or listen to your feedback.  If you do not have [employer], you can call [phone] to speak with me.      Today we discussed the underlying etiology/pathology of patient's   1. Stress fracture of knee, left-  Nondisplaced, subchondral fracture involving the weightbearing medial femoral condyle    2. 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 The patient's values and choices were respected. The following information represents what was discussed and decided upon by the provider and the patient.  -We reviewed patient's left knee MRI in full detail and all questions were addressed  - Patient understands that she has subchondral edema/fractures of the medial femoral condyle of the left knee with associated edema on the medial tibial plateau to a lesser degree.  There is degenerative medial meniscus tearing with underlying degenerative changes of the knee.  - Patient has quit smoking as of last week which is something to celebrate.  This will improve patient's healing potential and overall health  - We discussed the patient should be minimal weightbearing on her left knee.  Prescription for a walker was sent to the [facility] department for her to  at her convenience.  Patient should use the walker is much as possible going into a toe-touch weightbearing status on the left lower extremity utilizing her walker and only putting weight through the ball of her left foot.  Patient may utilize a  cane in her right hand if needed for certain ambulatory situations that the walker is not ideal  - Patient will continue with Tylenol and ibuprofen for pain management as well as topical ice or any other topical agent on the inner side of her knee to help minimize pain  - We discussed that these type of injuries certainly can take anywhere between 3-5 months to improve and that there is a small set of patients that continue to have pain and dysfunction that may need to consider surgical intervention in the future  - Patient will come back and see me towards the end of January for repeat assessment.  Patient will come back sooner if there is a new acute injury or significantly worsening symptoms  - Workability form provided to the patient today.    - Appointment line phone number is [[phone]]     Yovanny Valerio PA-C  [facility]    This note was completed in part using a voice recognition software, any grammatical or context distortion are unintentional and inherent to the software.     OVER THE COUNTER MEDICATIONS  Insurance usually does not cover these. Please check your local drug store and/or [employer].    Topical lidocaine gel

## 2024-11-21 NOTE — LETTER
November 21, 2024      Gudelia Dong  [address]   [place] [address]        To Whom It May Concern:    Gudelia Dong was seen in our clinic 11/21/2024 for left knee recheck and MRI review.  Patient has a left knee subchondral fracture.  This condition will need management for at least the next 3 months.  These injuries can take anywhere between 3-5 months to improve.  Certain patients continue to have chronic pain and dysfunction which would require future surgery.  She needs to minimize weightbearing on her left leg.  She is to minimize walking and stress on her knee as much as possible at home and work until office recheck at the end of January 2025.  She will use a cane or walker to assist with decreasing weight on her left leg and for ambulation as needed.  Please try to accommodate these restrictions as able.      Sincerely,      Yovanny Valerio

## 2024-11-21 NOTE — PROGRESS NOTES
ASSESSMENT & PLAN     Today we discussed the underlying etiology/pathology of patient's   1. Stress fracture of knee, left-  Nondisplaced, subchondral fracture involving the weightbearing medial femoral condyle    2. 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 The patient's values and choices were respected. The following information represents what was discussed and decided upon by the provider and the patient.  -We reviewed patient's left knee MRI in full detail and all questions were addressed  - Patient understands that she has subchondral edema/fractures of the medial femoral condyle of the left knee with associated edema on the medial tibial plateau to a lesser degree.  There is degenerative medial meniscus tearing with underlying degenerative changes of the knee.  - Patient has quit smoking as of last week which is something to celebrate.  This will improve patient's healing potential and overall health  - We discussed the patient should be minimal weightbearing on her left knee.  Prescription for a walker was sent to the [facility] department for her to  at her convenience.  Patient should use the walker is much as possible going into a toe-touch weightbearing status on the left lower extremity utilizing her walker and only putting weight through the ball of her left foot.  Patient may utilize a cane in her right hand if needed for certain ambulatory situations that the walker is not ideal  - Patient will continue with Tylenol and ibuprofen for pain management as well as topical ice or any other topical agent on the inner side of her knee to help minimize pain  - We discussed that these type of injuries certainly can take anywhere between 3-5 months to improve and that there is a small set of patients that continue to have pain and dysfunction that may need to consider surgical intervention in the future  - Patient will come back and see me  towards the end of January for repeat assessment.  Patient will come back sooner if there is a new acute injury or significantly worsening symptoms  - Workability form provided to the patient today.    - Appointment line phone number is [[phone]]     Yovanny Valerio PA-C  [facility]    This note was completed in part using a voice recognition software, any grammatical or context distortion are unintentional and inherent to the software.     OVER THE COUNTER MEDICATIONS  Insurance usually does not cover these. Please check your local drug store and/or [employer].    Topical lidocaine gel                 SUBJECTIVE  Gudelia Dong is a/an 59 year old female who is seen for follow up of left knee pain and MRI review. The patient is seen by themselves.   Since last visit patient had an acute event where her  tried to pick her up causing some increased chest/rib pain as well as some mild increased left knee pain.  There was no fall or direct injury to her left knee.  She continues to take Tylenol and ibuprofen for pain management and did  a cane and is utilizing it in her right hand.  Patient did quit smoking last week cold turkey.  Patient's employer has been helpful in minimizing her ambulation/walking duties at work.  Original Date of Injury: mid Sept 2024      Location of Pain: left medial knee        Past Medical History:   Diagnosis Date    Depression     GERD (gastroesophageal reflux disease)     Hemorrhoid     Menstrual disorder     s/p D&C -12/2012    ROMEO on CPAP     Other bipolar disorders     followed by Dr Collazo    Post menopausal syndrome     Tobacco abuse      Social History     Socioeconomic History    Marital status:      Spouse name: None    Number of children: None    Years of education: None    Highest education level: None   Tobacco Use    Smoking status: Former     Current packs/day: 0.00     Types: Cigarettes     Quit date: 12/17/2023     Years since  quittin.9     Passive exposure: Past    Smokeless tobacco: Never    Tobacco comments:     Quit for year, restarted .       Quit middle May 2024   Vaping Use    Vaping status: Never Used   Substance and Sexual Activity    Alcohol use: No     Comment: Severe alcohol use disorder for 20 years.  :  Reports sober for past five years    Drug use: No    Sexual activity: Yes     Partners: Male   Social History Narrative    2013    /single/    Children-    Work-    Tobacco-    ETOH-    Exercise-             Patient's past medical, surgical, social, and family histories were personally reviewed today and no changes are noted.  REVIEW OF SYSTEMS:  Review of Systems    OBJECTIVE:  Vital signs as noted in EPIC for 2024    General: healthy, alert and in no distress  HEENT: no scleral icterus or conjunctival erythema  Skin: no suspicious lesions or rash. No jaundice.  CV: no pedal edema  Resp: normal respiratory effort without conversational dyspnea   Psych: normal mood and affect  Neuro: Normal light sensory exam of lower extremity      MSK:  Exam shows a well-nourished 59-year-old female Hambly is full weightbearing with a single-point cane in her right hand.  Antalgic gait favoring her left lower extremity.  Patient still has full knee extension passively and flexion past 125 degrees with minimal discomfort.  No pain throughout the anterior knee and extensor mechanism.  No pain over the lateral side of the knee including lateral joint line.  Patient describes a stinging sensation with palpation along the medial joint line and medial tibial plateau.  Patient has pain with palpation over the medial femoral condyle.        Personal Independent visualization of the below images done today:  Left knee MRI results fully reviewed and imaging reviewed today personally and with the patient in the examination room     Study Result    Narrative & Impression   EXAM: MR KNEE LEFT WITHOUT CONTRAST  LOCATION: [facility]  DATE: 11/16/2024     INDICATION: Acute left knee pain. Evaluate for stress fracture of tibia.  COMPARISON: None.  TECHNIQUE: Unenhanced.     FINDINGS:     MEDIAL COMPARTMENT:   -Meniscus: There is high-grade radial tearing of the medial meniscus posterior root, with mild medial meniscal body extrusion.  -Cartilage: There is some mild thinning over the weightbearing medial femoral condyle without a high-grade or full-thickness defect. There is a nondisplaced subchondral fracture involving the weightbearing medial femoral condyle centrally with fairly   extensive marrow edema signal.     LATERAL COMPARTMENT:  -Meniscus: No lateral meniscal tear.   -Cartilage: No high-grade or full-thickness cartilage defect.     PATELLOFEMORAL COMPARTMENT:   -Alignment: There is mild lateral patellar subluxation.   -Cartilage: Small area of deep fissuring over the patellar median ridge with some additional more superficial fissuring over the lateral retropatellar facet. No discrete trochlear cartilage defect.     CRUCIATE LIGAMENTS:   -ACL: Normal.  -PCL: Normal.     COLLATERAL LIGAMENTS:   -Medial collateral ligament: Intact.  -Lateral collateral ligament: Intact.     POSTEROMEDIAL CORNER:  -Distal semimembranosus tendon is normal.   -Pes anserine tendons are intact.     POSTEROLATERAL CORNER:   -Popliteal tendon is intact. No tendinopathy.  -Biceps femoris tendon and posterolateral corner complex ligaments are intact.     EXTENSOR MECHANISM:   -Quadriceps tendon: Mild tendinopathy, without tearing.  -Patellar tendon: Normal.  -Patellofemoral ligaments and retinacula: Intact.     JOINT:   -Moderate-sized joint effusion.     BONES:  -No concerning marrow replacing lesion. There is a nondisplaced subchondral fracture involving the weightbearing medial femoral condyle centrally.     SOFT TISSUES:   -No popliteal cyst. No acute muscle injury. There is a very small, thin fluid collection in the  subcutaneous tissue anterior to the proximal patellar tendon measuring approximately 1.3 x 0.3 x 1.6 cm, suggesting a very mild prepatellar bursitis.                                                                       IMPRESSION:  1.  High-grade radial tearing of the medial meniscus posterior root, with mild medial meniscal body extrusion.  2.  Nondisplaced, subchondral fracture involving the weightbearing medial femoral condyle centrally with fairly extensive associated marrow edema signal.  3.  No lateral meniscal tear. The cruciate and collateral ligaments remain intact.  4.  Mild patellar chondromalacia. No high-grade or full-thickness medial or lateral compartment cartilage defect.  5.  Moderate-sized joint effusion.  6.  Findings suggesting very mild prepatellar bursitis.        Patient's conditions were thoroughly discussed during today's visit with total time reviewing patient's previous medical records/history/radiology, face-to-face examination and discussion and plan of care with the patient and documentation being 40 minutes for today's clinical visit    Yovanny Valerio PA-C  [facility]    This note was completed in part using a voice recognition software, any grammatical or context distortion are unintentional and inherent to the software.

## 2024-12-21 DIAGNOSIS — E03.2 HYPOTHYROIDISM DUE TO MEDICATION: ICD-10-CM

## 2024-12-23 RX ORDER — LEVOTHYROXINE SODIUM 150 UG/1
150 TABLET ORAL DAILY
Qty: 90 TABLET | Refills: 3 | Status: SHIPPED | OUTPATIENT
Start: 2024-12-23

## 2025-01-10 NOTE — PROGRESS NOTES
ASSESSMENT & PLAN       Today we discussed the underlying etiology/pathology of patient's   1. Stress fracture of knee, left-  Nondisplaced, subchondral fracture involving the weightbearing medial femoral condyle    2. Post-traumatic osteoarthritis of left knee, medial compartment    3. Tobacco abuse, 1/4 ppd currently    4. BMI 40.0-44.9, adult (H)        Today a shared decision making model was used. The patient's values and choices were respected. The following information represents what was discussed and decided upon by the provider and the patient.  -We discussed the patient has continued chronic medial sided left knee pain.  Symptoms overall have improved since her last assessment with patient needing only 600 mg of ibuprofen twice daily as needed.  She continues to have isolated medial sided knee pain worse with weightbearing activities.  - We discussed that patient never went into a guarded weightbearing/minimal weightbearing status on her left knee as she did not utilize walker nor crutches.  She has utilized a single-point cane in her right hand.  - We updated x-rays of her left knee today which show significant advancement of medial compartment degeneration/osteoarthritis as well as what appears to be slight deformity of the medial femoral condyle articular surface.  We again discussed how her subchondral edema/fracture pattern likely has now progressed into advancing osteoarthritis.  - At this time we discussed treatment options ranging from benign neglect, consideration of medial compartment  brace, intra-articular corticosteroid injection with understanding that proceeding with injection would delay any surgical intervention for a minimum of 3 months, continued use of oral agents, use of topical agents for pain, referral to physical therapy due to deconditioning and knee stiffness or proceeding with repeat advanced imaging to evaluate medial compartment and status of her bones.  -At this time  patient would like to proceed with conservative management.  Order placed for a medial compartment left-sided  brace.  Patient should be contacted by the orthotic bracing department in the near future to find a mutual time for sizing and fitting.  Phone number provided below if patient has not heard from the brace department by the end of the week she may contact the department directly to find out about scheduling an appointment.  -Patient also be referred to physical therapy to work on restoration of left knee range of motion and strength.  She also should be contacted within the next 2-3 business days to find mutual times to meet with PT.  In the meantime she will reach out to her insurance carrier to find out about her financial responsibility for therapy appointments.  - We agreed to proceed with a left knee intra-articular corticosteroid injection today.  Risk and benefits thoroughly discussed.  - Patient may continue with oral agents for additional pain control as well as topical agents as desired.  - I would like to see the patient back in approximately 2 months to determine response to above treatment plan.  - New work letter provided today stating the patient may wean into more weightbearing activities as she tolerates on her left leg.  - If patient is not doing significantly better at repeat assessment in 2 months we would need to proceed with repeat MRI scan of the left knee to evaluate severity of arthritis, bone involvement for consideration of joint replacement surgery.  - Patient again has started smoking/using tobacco products and patient would need to discontinue all nicotine products for a minimum of 30 days prior to any surgical intervention.  Smoking cessation again strongly recommended today.  - Patient should continue to work on decreasing BMI for surgical consideration.  Patient has lost weight since our last assessment.  She is in dialogue with a GI provider about possible GI surgery  including correction of a hiatal hernia.    - Appointment line phone number is [[phone]]     Yovanny Valerio PA-C  [facility]    This note was completed in part using a voice recognition software, any grammatical or context distortion are unintentional and inherent to the software.     [facility]    [facility] [place]  [address]  [place] [address]  Phone: [phone]  Fax: [phone] [facility]   [address]  [place] [address]  Phone: [phone]  Fax: [phone]     [facility] [place]  [address]  [place] [address]  Phone: [phone]  Fax: [phone]   [facility] [place]  [address]  [place] [address]  Phone: [phone]   Fax: [phone]   [facility]  [address]  [place] [address]  Phone: [phone]  Fax: [phone]            You have been referred to physical or occupational therapy.  At any time you can contact the scheduling department [phone] to arrange your own appointments but an [facility]  will call you to coordinate your care as prescribed by your provider within the next 2 business days. If you don't hear from a representative within 3 business days, please call [phone].   Please be aware that coverage of these services is subject to the terms and limitations of your health insurance plan.  Call member services at your health plan with any benefit or coverage questions.            Cortisone injection of the left knee was performed today in clinic, 1/21/2025 by Yovanny Valerio PA-C    Time spent today with patient was separate/exclusive from performing the associated procedure.       Patient instructions after cortisone injection:   - Do not soak in a hot tub, bath or swimming pool for 48 hours to minimize risk of infection to  "injection site  - Ok to shower for daily cleaning  - Apply ice today to injection site for 10-15 minutes up to 3 times a day and only do your normal amounts of activity to prevent increased inflammation and pain for the next few days  - The lidocaine, \"numbing medicine\" (what is giving you pain relief right now) will likely wear off in 1-2 hours.  Once the lidocaine wears off you may note symptoms similar to before you received the injection and this is normal and expected. The corticosteroid which is designed to give prolonged relief of your symptoms will not reach maximum effect/relief until approximately 1-2 weeks from date of injection.   -In a small percentage of people, cortisone can cause flushing/redness in the face. This usually lasts for 1-3 days and resolves. Cool compress to the face, low dose benadryl and Ibuprofen/Tylenol can help if this happens.  -If you are diabetic you will notice increased blood sugar levels for up to a week due to your cortisone injection. Adjust your insulin sliding scale to correct your blood sugar levels.  If you are diabetic and take only oral/pill glycemic control medications  please continue with your normal daily dosing.  If any concerns in regards to your blood sugars or diabetic medications please address this with your primary care provider managing your diabetes.    -Rarely for a small percentage of patients, the area injected can become more painful and difficult to move for 24-36 hours after your injection due to a local inflammation response to the injection.  This is called a \"cortisone flare\".   It will resolve over a couple days on its own.  Rest, gentle motion of the joint, Tylenol, NSAID's (ibuprofen, Aleve) and ice to the area are all helpful to minimize discomfort.  -Cortisone injections can be repeated anytime after 4-6 months if your pain returns but should only be repeated if you are symptomatic and not to be done for preventative reasons.   -Proceeding " with cortisone injection will delay surgical consideration such as total joint arthroplasty/joint replacement for the joint that was injected for a minimum of 3 months from the date of cortisone injection.    Follow up for repeat assessment and possible repeat injection if pain returns.       Prior to proceeding with procedure today, patient was informed of the following and signed a consent to proceed with procedure:  Patient understood that medical procedures carry risks which may include infection, bleeding, unacceptable cosmetic results, allergic reactions, respiratory distress, cardiac arrest or death. They understood that there are risks associated with these particular procedures as outlined below:  For Aspirations (withdrawal of fluid), with or without injection of local anesthetic, risks include, but are not limited to: temporary worsening of pain, failure to relieve pain, temporary   increased numbness and/or weakness, re-accumulation of fluid, infection, bleeding which may become excessive. They understood that my treatment plan today may change based on the   aspirated fluid s appearance and viscosity, as well as laboratory results.    For all cortisone (steroid) injections, risks include, but are not limited to: redness or flushing of face, temporary stiffness or worsening of pain in joint, failure to relieve pain, infection,   subcutaneous tissue atrophy, injury to a nerve, loss of skin pigmentation (darker or lighter skin coloring in area of injection), an allergic reaction, decreased bone density and increased   risk of compression fractures or other fragility fractures, osteoarthritis (thinning of the cartilage space) from injections into joints, rupture of Achilles tendon, Patellar tendon or other   tendon(s), a temporary increase in blood sugars for patients with diabetes, avascular necrosis for patients taking oral steroids long-term.        SUBJECTIVE  Gudelia Dong is a/an 59 year old female  who is seen for follow up of left knee pain. (Stress fracture of knee, left- Nondisplaced, subchondral fracture involving the weightbearing medial femoral condyle.  Degenerative tear of meniscus of left knee). The patient is seen by themselves.  Since last visit on 11/21/2024 patient has mildly improved as pain is better.  Taking less ibuprofen.  Original Date of Injury: Mid September 2024    Expanded HPI: Patient was prescribed a walker as well as crutches to go into a decreased weightbearing status on her left lower extremity.  Patient states that she never utilized a walker and never utilized her crutches.  She has been using a single-point cane in her right hand.  Her employer has been helpful on minimizing her work duties and she is largely in a sedentary job position at this time.  Her pain overall has improved as she is only needing ibuprofen 600 mg twice daily maximum as needed.  She continues to have isolated medial sided knee pain.  Pain increases every other Saturday with increased physical demand with needing to do chores including grocery shopping.  She denies any new injury or trauma.  She unfortunately has started smoking again which is approximately 1/4 pack/day.  Patient has lost some weight since last assessment.    Has previous treatment plan been beneficial for condition: was recommended to walk with walker or crutches.  Patient walks with a cane and said she did not use the walker or crutches.  Treatment used since last visit:  using cane and ibuprofen.  Would like to avoid surgery if possible.  Location of Pain: left knee, medial.  Rating of Pain at worst: 3/10  Rating of Pain Currently: 0/10  Worsened by: walking without cane  Better with: resting, ice and ibuprofen  Treatments tried: rest/activity avoidance, elevation, ice, Tylenol, ibuprofen, and walks with cane.  Quality: sharp  Associated symptoms: every other Saturday there is increased pain      Past Medical History:   Diagnosis Date     Depression     GERD (gastroesophageal reflux disease)     Hemorrhoid     Menstrual disorder     s/p D&C -2012    ROMEO on CPAP     Other bipolar disorders     followed by Dr Collazo    Post menopausal syndrome     Tobacco abuse      Social History     Socioeconomic History    Marital status:    Tobacco Use    Smoking status: Former     Current packs/day: 0.00     Types: Cigarettes     Quit date: 2023     Years since quittin.0     Passive exposure: Past    Smokeless tobacco: Never    Tobacco comments:     Quit for year, restarted .       Quit middle May 2024   Vaping Use    Vaping status: Never Used   Substance and Sexual Activity    Alcohol use: No     Comment: Severe alcohol use disorder for 20 years.  :  Reports sober for past five years    Drug use: No    Sexual activity: Yes     Partners: Male   Social History Narrative    2013    /single/    Children-    Work-    Tobacco-    ETOH-    Exercise-             Patient's past medical, surgical, social, and family histories were personally reviewed today and no changes are noted.  REVIEW OF SYSTEMS:  Review of Systems    OBJECTIVE:  Vital signs as noted in EPIC for 1/10/2025    General: healthy, alert and in no distress  HEENT: no scleral icterus or conjunctival erythema  Skin: no suspicious lesions or rash. No jaundice.  CV: no pedal edema  Resp: normal respiratory effort without conversational dyspnea   Psych: normal mood and affect  Neuro: Normal light sensory exam of lower extremity      MSK:  Exam shows a well-nourished 59-year-old female who ambulates with a blue single-point cane in her right hand.  Examination of the left lower extremity shows no obvious bruising, swelling or ecchymosis.  No visual effusion noted.  She has stiffness of her left knee with loss of 3 degrees of terminal extension with increased medial and posterior knee discomfort.  Flexion is still maintained past 120 degrees without crepitation.  Slight  tenderness is noted over the distal quadriceps and infrapatellar tendon.  There is associated slight lateral joint line tenderness.  She is more point tender over the medial side of the knee including anterior medial femoral condyle and medial tibial plateau which she describes as a stinging sensation.  She has slight increased laxity of the MCL without pain with correctable varus deformity.  No radha varus or valgus thrust with gait.  Full active and passive range of motion of the left hip without reproduction of hemipelvic or knee pain.  She is grossly neurovascularly intact L2-S1 bilaterally.  No significant lower extremity edema.  No pain in the popliteal fossa or the gastrocsoleus complex.  No significant peripheral edema.  Extensor mechanism intact with adequate quadricep and hamstring muscle tone.        RADIOLOGY AND LABORATORY:   Personal Independent visualization of the below images done today:  Three-view x-ray of the patient's left knee are obtained and reviewed today personally and reviewed with the patient.  There has been significant advancement of medial joint line narrowing.  There appears to be a depression over the medial femoral condyle of the articular surface noted on AP view.  Lateral joint line maintained.  Increased varus alignment.  Right knee shows mild medial compartment narrowing.    Previous left knee MRI is again reviewed today and I agree with interpretation below.  Results for orders placed or performed during the hospital encounter of 11/16/24   MR Knee Left w/o Contrast    Narrative    EXAM: MR KNEE LEFT WITHOUT CONTRAST  LOCATION: [facility]  DATE: 11/16/2024    INDICATION: Acute left knee pain. Evaluate for stress fracture of tibia.  COMPARISON: None.  TECHNIQUE: Unenhanced.    FINDINGS:    MEDIAL COMPARTMENT:   -Meniscus: There is high-grade radial tearing of the medial meniscus posterior root, with mild medial meniscal body extrusion.  -Cartilage:  There is some mild thinning over the weightbearing medial femoral condyle without a high-grade or full-thickness defect. There is a nondisplaced subchondral fracture involving the weightbearing medial femoral condyle centrally with fairly   extensive marrow edema signal.    LATERAL COMPARTMENT:  -Meniscus: No lateral meniscal tear.   -Cartilage: No high-grade or full-thickness cartilage defect.    PATELLOFEMORAL COMPARTMENT:   -Alignment: There is mild lateral patellar subluxation.   -Cartilage: Small area of deep fissuring over the patellar median ridge with some additional more superficial fissuring over the lateral retropatellar facet. No discrete trochlear cartilage defect.    CRUCIATE LIGAMENTS:   -ACL: Normal.  -PCL: Normal.    COLLATERAL LIGAMENTS:   -Medial collateral ligament: Intact.  -Lateral collateral ligament: Intact.    POSTEROMEDIAL CORNER:  -Distal semimembranosus tendon is normal.   -Pes anserine tendons are intact.    POSTEROLATERAL CORNER:   -Popliteal tendon is intact. No tendinopathy.  -Biceps femoris tendon and posterolateral corner complex ligaments are intact.    EXTENSOR MECHANISM:   -Quadriceps tendon: Mild tendinopathy, without tearing.  -Patellar tendon: Normal.  -Patellofemoral ligaments and retinacula: Intact.    JOINT:   -Moderate-sized joint effusion.    BONES:  -No concerning marrow replacing lesion. There is a nondisplaced subchondral fracture involving the weightbearing medial femoral condyle centrally.    SOFT TISSUES:   -No popliteal cyst. No acute muscle injury. There is a very small, thin fluid collection in the subcutaneous tissue anterior to the proximal patellar tendon measuring approximately 1.3 x 0.3 x 1.6 cm, suggesting a very mild prepatellar bursitis.       Impression    IMPRESSION:  1.  High-grade radial tearing of the medial meniscus posterior root, with mild medial meniscal body extrusion.  2.  Nondisplaced, subchondral fracture involving the weightbearing medial  femoral condyle centrally with fairly extensive associated marrow edema signal.  3.  No lateral meniscal tear. The cruciate and collateral ligaments remain intact.  4.  Mild patellar chondromalacia. No high-grade or full-thickness medial or lateral compartment cartilage defect.  5.  Moderate-sized joint effusion.  6.  Findings suggesting very mild prepatellar bursitis.           Patient's conditions were thoroughly discussed during today's visit with total time reviewing patient's previous medical records/history/radiology, face-to-face examination and discussion and plan of care with the patient and documentation being 45 minutes for today's clinical visit    Yovanny Valerio PA-C  [facility]    This note was completed in part using a voice recognition software, any grammatical or context distortion are unintentional and inherent to the software.     Large Joint Injection/Arthocentesis: L knee joint    Date/Time: 1/21/2025 4:53 PM    Performed by: Yovanny Valerio PA-C  Authorized by: Yovanny Valerio PA-C    Indications:  Pain and osteoarthritis  Indications comment:  Improve function  Needle Size:  22 G  Guidance: landmark guided    Approach:  Anterolateral  Location:  Knee      Medications:  4 mL lidocaine 1 %; 2 mL ROPivacaine 5 MG/ML; 40 mg triamcinolone 40 MG/ML  Outcome:  Tolerated well, no immediate complications  Procedure discussed: discussed risks, benefits, and alternatives    Consent Given by:  Patient  Timeout: timeout called immediately prior to procedure    Prep: patient was prepped and draped in usual sterile fashion

## 2025-01-21 ENCOUNTER — OFFICE VISIT (OUTPATIENT)
Dept: ORTHOPEDICS | Facility: CLINIC | Age: 60
End: 2025-01-21
Attending: PHYSICIAN ASSISTANT
Payer: COMMERCIAL

## 2025-01-21 ENCOUNTER — ANCILLARY PROCEDURE (OUTPATIENT)
Dept: GENERAL RADIOLOGY | Facility: CLINIC | Age: 60
End: 2025-01-21
Attending: PHYSICIAN ASSISTANT
Payer: COMMERCIAL

## 2025-01-21 VITALS — BODY MASS INDEX: 42.36 KG/M2 | HEIGHT: 67 IN | WEIGHT: 269.9 LBS

## 2025-01-21 DIAGNOSIS — M17.32 POST-TRAUMATIC OSTEOARTHRITIS OF LEFT KNEE: ICD-10-CM

## 2025-01-21 DIAGNOSIS — M84.369A STRESS FRACTURE OF KNEE: ICD-10-CM

## 2025-01-21 DIAGNOSIS — Z72.0 TOBACCO ABUSE: Chronic | ICD-10-CM

## 2025-01-21 DIAGNOSIS — M84.369A STRESS FRACTURE OF KNEE: Primary | ICD-10-CM

## 2025-01-21 PROCEDURE — 73560 X-RAY EXAM OF KNEE 1 OR 2: CPT | Mod: TC | Performed by: RADIOLOGY

## 2025-01-21 PROCEDURE — 99215 OFFICE O/P EST HI 40 MIN: CPT | Mod: 25 | Performed by: PHYSICIAN ASSISTANT

## 2025-01-21 PROCEDURE — 20610 DRAIN/INJ JOINT/BURSA W/O US: CPT | Mod: LT | Performed by: PHYSICIAN ASSISTANT

## 2025-01-21 PROCEDURE — 73562 X-RAY EXAM OF KNEE 3: CPT | Mod: TC | Performed by: RADIOLOGY

## 2025-01-21 RX ORDER — LIDOCAINE HYDROCHLORIDE 10 MG/ML
4 INJECTION, SOLUTION INFILTRATION; PERINEURAL
Status: COMPLETED | OUTPATIENT
Start: 2025-01-21 | End: 2025-01-21

## 2025-01-21 RX ORDER — ROPIVACAINE HYDROCHLORIDE 5 MG/ML
2 INJECTION, SOLUTION EPIDURAL; INFILTRATION; PERINEURAL
Status: COMPLETED | OUTPATIENT
Start: 2025-01-21 | End: 2025-01-21

## 2025-01-21 RX ORDER — TRIAMCINOLONE ACETONIDE 40 MG/ML
40 INJECTION, SUSPENSION INTRA-ARTICULAR; INTRAMUSCULAR
Status: COMPLETED | OUTPATIENT
Start: 2025-01-21 | End: 2025-01-21

## 2025-01-21 RX ADMIN — LIDOCAINE HYDROCHLORIDE 4 ML: 10 INJECTION, SOLUTION INFILTRATION; PERINEURAL at 16:53

## 2025-01-21 RX ADMIN — ROPIVACAINE HYDROCHLORIDE 2 ML: 5 INJECTION, SOLUTION EPIDURAL; INFILTRATION; PERINEURAL at 16:53

## 2025-01-21 RX ADMIN — TRIAMCINOLONE ACETONIDE 40 MG: 40 INJECTION, SUSPENSION INTRA-ARTICULAR; INTRAMUSCULAR at 16:53

## 2025-01-21 NOTE — PATIENT INSTRUCTIONS
Thank you for allowing me to be part of your care team. My personal goal for your visit today is that you felt that I listened to you, you understood your diagnosis and treatment options and our staff/clinic met your expectations. We strive to provide you excellent care.  If you felt like your expectations were not met at your visit today or if you have further questions about your visit or care, please send me a [employer] message and I would be happy answer any questions or listen to your feedback.  If you do not have [employer], you can call [phone] to speak with me.      Today we discussed the underlying etiology/pathology of patient's   1. Stress fracture of knee, left-  Nondisplaced, subchondral fracture involving the weightbearing medial femoral condyle    2. Post-traumatic osteoarthritis of left knee, medial compartment    3. Tobacco abuse, 1/4 ppd currently    4. BMI 40.0-44.9, adult (H)        Today a shared decision making model was used. The patient's values and choices were respected. The following information represents what was discussed and decided upon by the provider and the patient.  -We discussed the patient has continued chronic medial sided left knee pain.  Symptoms overall have improved since her last assessment with patient needing only 600 mg of ibuprofen twice daily as needed.  She continues to have isolated medial sided knee pain worse with weightbearing activities.  - We discussed that patient never went into a guarded weightbearing/minimal weightbearing status on her left knee as she did not utilize walker nor crutches.  She has utilized a single-point cane in her right hand.  - We updated x-rays of her left knee today which show significant advancement of medial compartment degeneration/osteoarthritis as well as what appears to be slight deformity of the medial femoral condyle articular surface.  We again discussed how her subchondral edema/fracture pattern likely has now progressed into  advancing osteoarthritis.  - At this time we discussed treatment options ranging from benign neglect, consideration of medial compartment  brace, intra-articular corticosteroid injection with understanding that proceeding with injection would delay any surgical intervention for a minimum of 3 months, continued use of oral agents, use of topical agents for pain, referral to physical therapy due to deconditioning and knee stiffness or proceeding with repeat advanced imaging to evaluate medial compartment and status of her bones.  -At this time patient would like to proceed with conservative management.  Order placed for a medial compartment left-sided  brace.  Patient should be contacted by the orthotic bracing department in the near future to find a mutual time for sizing and fitting.  Phone number provided below if patient has not heard from the brace department by the end of the week she may contact the department directly to find out about scheduling an appointment.  -Patient also be referred to physical therapy to work on restoration of left knee range of motion and strength.  She also should be contacted within the next 2-3 business days to find mutual times to meet with PT.  In the meantime she will reach out to her insurance carrier to find out about her financial responsibility for therapy appointments.  - We agreed to proceed with a left knee intra-articular corticosteroid injection today.  Risk and benefits thoroughly discussed.  - Patient may continue with oral agents for additional pain control as well as topical agents as desired.  - I would like to see the patient back in approximately 2 months to determine response to above treatment plan.  - New work letter provided today stating the patient may wean into more weightbearing activities as she tolerates on her left leg.  - If patient is not doing significantly better at repeat assessment in 2 months we would need to proceed with repeat  MRI scan of the left knee to evaluate severity of arthritis, bone involvement for consideration of joint replacement surgery.  - Patient again has started smoking/using tobacco products and patient would need to discontinue all nicotine products for a minimum of 30 days prior to any surgical intervention.  Smoking cessation again strongly recommended today.  - Patient should continue to work on decreasing BMI for surgical consideration.  Patient has lost weight since our last assessment.  She is in dialogue with a GI provider about possible GI surgery including correction of a hiatal hernia.    - Appointment line phone number is [[phone]]     Yovanny Valerio PA-C  [facility]    This note was completed in part using a voice recognition software, any grammatical or context distortion are unintentional and inherent to the software.     [facility]    [facility]  [address]  [place] [address]  Phone: [phone]  Fax: [phone] [facility]   [address]  [place] [address]  Phone: [phone]  Fax: [phone]     [facility]  [address]  [place] [address]  Phone: [phone]  Fax: [phone]   [facility]  [address]  [place] [address]  Phone: [phone]   Fax: [phone]   [facility]  [address]  [place] [address]  Phone: [phone]  Fax: [phone]            You have been referred to physical or occupational therapy.  At any time you can contact the scheduling department [phone] to arrange your own appointments but an [facility]  will call you to coordinate your care as prescribed by your provider within the next 2 business days. If you don't hear from a representative within 3 business days, please call [phone].    "Please be aware that coverage of these services is subject to the terms and limitations of your health insurance plan.  Call member services at your health plan with any benefit or coverage questions.            Cortisone injection of the left knee was performed today in clinic, 1/21/2025 by Yovanny Valerio PA-C    Time spent today with patient was separate/exclusive from performing the associated procedure.       Patient instructions after cortisone injection:   - Do not soak in a hot tub, bath or swimming pool for 48 hours to minimize risk of infection to injection site  - Ok to shower for daily cleaning  - Apply ice today to injection site for 10-15 minutes up to 3 times a day and only do your normal amounts of activity to prevent increased inflammation and pain for the next few days  - The lidocaine, \"numbing medicine\" (what is giving you pain relief right now) will likely wear off in 1-2 hours.  Once the lidocaine wears off you may note symptoms similar to before you received the injection and this is normal and expected. The corticosteroid which is designed to give prolonged relief of your symptoms will not reach maximum effect/relief until approximately 1-2 weeks from date of injection.   -In a small percentage of people, cortisone can cause flushing/redness in the face. This usually lasts for 1-3 days and resolves. Cool compress to the face, low dose benadryl and Ibuprofen/Tylenol can help if this happens.  -If you are diabetic you will notice increased blood sugar levels for up to a week due to your cortisone injection. Adjust your insulin sliding scale to correct your blood sugar levels.  If you are diabetic and take only oral/pill glycemic control medications  please continue with your normal daily dosing.  If any concerns in regards to your blood sugars or diabetic medications please address this with your primary care provider managing your diabetes.    -Rarely for a small percentage of patients, the area " "injected can become more painful and difficult to move for 24-36 hours after your injection due to a local inflammation response to the injection.  This is called a \"cortisone flare\".   It will resolve over a couple days on its own.  Rest, gentle motion of the joint, Tylenol, NSAID's (ibuprofen, Aleve) and ice to the area are all helpful to minimize discomfort.  -Cortisone injections can be repeated anytime after 4-6 months if your pain returns but should only be repeated if you are symptomatic and not to be done for preventative reasons.   -Proceeding with cortisone injection will delay surgical consideration such as total joint arthroplasty/joint replacement for the joint that was injected for a minimum of 3 months from the date of cortisone injection.    Follow up for repeat assessment and possible repeat injection if pain returns.       Prior to proceeding with procedure today, patient was informed of the following and signed a consent to proceed with procedure:  Patient understood that medical procedures carry risks which may include infection, bleeding, unacceptable cosmetic results, allergic reactions, respiratory distress, cardiac arrest or death. They understood that there are risks associated with these particular procedures as outlined below:  For Aspirations (withdrawal of fluid), with or without injection of local anesthetic, risks include, but are not limited to: temporary worsening of pain, failure to relieve pain, temporary   increased numbness and/or weakness, re-accumulation of fluid, infection, bleeding which may become excessive. They understood that my treatment plan today may change based on the   aspirated fluid s appearance and viscosity, as well as laboratory results.    For all cortisone (steroid) injections, risks include, but are not limited to: redness or flushing of face, temporary stiffness or worsening of pain in joint, failure to relieve pain, infection,   subcutaneous tissue " atrophy, injury to a nerve, loss of skin pigmentation (darker or lighter skin coloring in area of injection), an allergic reaction, decreased bone density and increased   risk of compression fractures or other fragility fractures, osteoarthritis (thinning of the cartilage space) from injections into joints, rupture of Achilles tendon, Patellar tendon or other   tendon(s), a temporary increase in blood sugars for patients with diabetes, avascular necrosis for patients taking oral steroids long-term.

## 2025-01-21 NOTE — LETTER
To Whom It May Concern:     Gudelia Dong was seen in our clinic 1/21/2025 for left knee recheck.  Patient has a known left knee subchondral fracture with now development of medial compartment osteoarthritis.  This condition will need continued management.  We will continue to treat this condition non-operatively at this time with custom knee brace, cortisone injection and physical therapy for the next 2 months.  Certain patients continue to have chronic pain and dysfunction which would require future surgery (knee replacement).  We agreed today that patient may start to increase her work demands with short distances of walking for her employment but this will need to be monitored based on symptom control as response to above treatment plan is unknown if she will be able to tolerate increased physical demands on her left knee. She will use a cane to assist with decreasing weight on her left leg and for ambulation as needed.  Please try to accommodate these restrictions as able.  I will see her back in 8 weeks to determine response to current treatment plan.      Yovanny Valerio PA-C        Electronically signed

## 2025-01-21 NOTE — LETTER
1/21/2025      Gudelia Dong  [address]  [place] [address]      Dear Colleague,    Thank you for referring your patient, Gudelia Dong, to the [facility]. Please see a copy of my visit note below.    ASSESSMENT & PLAN       Today we discussed the underlying etiology/pathology of patient's   1. Stress fracture of knee, left-  Nondisplaced, subchondral fracture involving the weightbearing medial femoral condyle    2. Post-traumatic osteoarthritis of left knee, medial compartment    3. Tobacco abuse, 1/4 ppd currently    4. BMI 40.0-44.9, adult (H)        Today a shared decision making model was used. The patient's values and choices were respected. The following information represents what was discussed and decided upon by the provider and the patient.  -We discussed the patient has continued chronic medial sided left knee pain.  Symptoms overall have improved since her last assessment with patient needing only 600 mg of ibuprofen twice daily as needed.  She continues to have isolated medial sided knee pain worse with weightbearing activities.  - We discussed that patient never went into a guarded weightbearing/minimal weightbearing status on her left knee as she did not utilize walker nor crutches.  She has utilized a single-point cane in her right hand.  - We updated x-rays of her left knee today which show significant advancement of medial compartment degeneration/osteoarthritis as well as what appears to be slight deformity of the medial femoral condyle articular surface.  We again discussed how her subchondral edema/fracture pattern likely has now progressed into advancing osteoarthritis.  - At this time we discussed treatment options ranging from benign neglect, consideration of medial compartment  brace, intra-articular corticosteroid injection with understanding that proceeding with injection would delay any surgical intervention for a  Attempt as healthy of a lifestyle as possible with good eating habits, plenty of water and clear fluids and rest.   Avoid all supplements if you can tolerate it, until you are evaluated by your doctors.    If the pressure changes, worsens, there is any visual changes, any association with vomiting, worst headache of your life, neck stiffness, present emergently to any ER for evaluation.  May take Tylenol 1000 mg every 4-6 hours not to exceed 4000 mg in 24 hours or over the counter ibuprofen 600 mg every 6-8 hours not to exceed 3200 mg in 24 hours as needed for headaches.  Keep your Appointment with your primary care provider as we have discussed.  They are encouraged to follow up with the OB gyn provider for further workup as we have discussed.    You may be receiving a survey in the mail following your visit. Please take the time to complete this, as your feedback is very important to us!  We strive to make your experience exceptional and your comments help us with that goal. We look forward to hearing from you!    Please call your doctor for follow up of this Urgent Care visit.  If anything changes or worsens or if there are any further concerns see your primary provider or return to the Urgent Care or seek care at an ER (Emergency Room) for further evaluation.   I hope our care team has provided you with excellent care and that you feel better soon.      Sincerely,  Valeria Cortez PA-C       minimum of 3 months, continued use of oral agents, use of topical agents for pain, referral to physical therapy due to deconditioning and knee stiffness or proceeding with repeat advanced imaging to evaluate medial compartment and status of her bones.  -At this time patient would like to proceed with conservative management.  Order placed for a medial compartment left-sided  brace.  Patient should be contacted by the orthotic bracing department in the near future to find a mutual time for sizing and fitting.  Phone number provided below if patient has not heard from the brace department by the end of the week she may contact the department directly to find out about scheduling an appointment.  -Patient also be referred to physical therapy to work on restoration of left knee range of motion and strength.  She also should be contacted within the next 2-3 business days to find mutual times to meet with PT.  In the meantime she will reach out to her insurance carrier to find out about her financial responsibility for therapy appointments.  - We agreed to proceed with a left knee intra-articular corticosteroid injection today.  Risk and benefits thoroughly discussed.  - Patient may continue with oral agents for additional pain control as well as topical agents as desired.  - I would like to see the patient back in approximately 2 months to determine response to above treatment plan.  - New work letter provided today stating the patient may wean into more weightbearing activities as she tolerates on her left leg.  - If patient is not doing significantly better at repeat assessment in 2 months we would need to proceed with repeat MRI scan of the left knee to evaluate severity of arthritis, bone involvement for consideration of joint replacement surgery.  - Patient again has started smoking/using tobacco products and patient would need to discontinue all nicotine products for a minimum of 30 days prior to any  surgical intervention.  Smoking cessation again strongly recommended today.  - Patient should continue to work on decreasing BMI for surgical consideration.  Patient has lost weight since our last assessment.  She is in dialogue with a GI provider about possible GI surgery including correction of a hiatal hernia.    - Appointment line phone number is [[phone]]     Yovanny Valerio PA-C  [facility]    This note was completed in part using a voice recognition software, any grammatical or context distortion are unintentional and inherent to the software.     [facility]    [facility]  [address]  [place] [address]  Phone: [phone]  Fax: [phone] [facility]   [address]  [place] [address]  Phone: [phone]  Fax: [phone]     [facility]  [address]  [place] [address]  Phone: [phone]  Fax: [phone]   [facility]  [address]  [place] [address]  Phone: [phone]   Fax: [phone]   [facility]  [address]  [place] [address]  Phone: [phone]  Fax: [phone]            You have been referred to physical or occupational therapy.  At any time you can contact the scheduling department [phone] to arrange your own appointments but an [facility]  will call you to coordinate your care as prescribed by your provider within the next 2 business days. If you don't hear from a representative within 3 business days, please call [phone].   Please be aware that coverage of these services is subject to the terms and limitations of your health insurance plan.  Call member services at your health plan with any benefit or coverage questions.            Cortisone injection of the left knee was performed today in clinic,  "1/21/2025 by Yovanny Valerio PA-C    Time spent today with patient was separate/exclusive from performing the associated procedure.       Patient instructions after cortisone injection:   - Do not soak in a hot tub, bath or swimming pool for 48 hours to minimize risk of infection to injection site  - Ok to shower for daily cleaning  - Apply ice today to injection site for 10-15 minutes up to 3 times a day and only do your normal amounts of activity to prevent increased inflammation and pain for the next few days  - The lidocaine, \"numbing medicine\" (what is giving you pain relief right now) will likely wear off in 1-2 hours.  Once the lidocaine wears off you may note symptoms similar to before you received the injection and this is normal and expected. The corticosteroid which is designed to give prolonged relief of your symptoms will not reach maximum effect/relief until approximately 1-2 weeks from date of injection.   -In a small percentage of people, cortisone can cause flushing/redness in the face. This usually lasts for 1-3 days and resolves. Cool compress to the face, low dose benadryl and Ibuprofen/Tylenol can help if this happens.  -If you are diabetic you will notice increased blood sugar levels for up to a week due to your cortisone injection. Adjust your insulin sliding scale to correct your blood sugar levels.  If you are diabetic and take only oral/pill glycemic control medications  please continue with your normal daily dosing.  If any concerns in regards to your blood sugars or diabetic medications please address this with your primary care provider managing your diabetes.    -Rarely for a small percentage of patients, the area injected can become more painful and difficult to move for 24-36 hours after your injection due to a local inflammation response to the injection.  This is called a \"cortisone flare\".   It will resolve over a couple days on its own.  Rest, gentle motion of the joint, Tylenol, " NSAID's (ibuprofen, Aleve) and ice to the area are all helpful to minimize discomfort.  -Cortisone injections can be repeated anytime after 4-6 months if your pain returns but should only be repeated if you are symptomatic and not to be done for preventative reasons.   -Proceeding with cortisone injection will delay surgical consideration such as total joint arthroplasty/joint replacement for the joint that was injected for a minimum of 3 months from the date of cortisone injection.    Follow up for repeat assessment and possible repeat injection if pain returns.       Prior to proceeding with procedure today, patient was informed of the following and signed a consent to proceed with procedure:  Patient understood that medical procedures carry risks which may include infection, bleeding, unacceptable cosmetic results, allergic reactions, respiratory distress, cardiac arrest or death. They understood that there are risks associated with these particular procedures as outlined below:  For Aspirations (withdrawal of fluid), with or without injection of local anesthetic, risks include, but are not limited to: temporary worsening of pain, failure to relieve pain, temporary   increased numbness and/or weakness, re-accumulation of fluid, infection, bleeding which may become excessive. They understood that my treatment plan today may change based on the   aspirated fluid s appearance and viscosity, as well as laboratory results.    For all cortisone (steroid) injections, risks include, but are not limited to: redness or flushing of face, temporary stiffness or worsening of pain in joint, failure to relieve pain, infection,   subcutaneous tissue atrophy, injury to a nerve, loss of skin pigmentation (darker or lighter skin coloring in area of injection), an allergic reaction, decreased bone density and increased   risk of compression fractures or other fragility fractures, osteoarthritis (thinning of the cartilage space) from  injections into joints, rupture of Achilles tendon, Patellar tendon or other   tendon(s), a temporary increase in blood sugars for patients with diabetes, avascular necrosis for patients taking oral steroids long-term.        SUBJECTIVE  Gudelia Dong is a/an 59 year old female who is seen for follow up of left knee pain. (Stress fracture of knee, left- Nondisplaced, subchondral fracture involving the weightbearing medial femoral condyle.  Degenerative tear of meniscus of left knee). The patient is seen by themselves.  Since last visit on 11/21/2024 patient has mildly improved as pain is better.  Taking less ibuprofen.  Original Date of Injury: Mid September 2024    Expanded HPI: Patient was prescribed a walker as well as crutches to go into a decreased weightbearing status on her left lower extremity.  Patient states that she never utilized a walker and never utilized her crutches.  She has been using a single-point cane in her right hand.  Her employer has been helpful on minimizing her work duties and she is largely in a sedentary job position at this time.  Her pain overall has improved as she is only needing ibuprofen 600 mg twice daily maximum as needed.  She continues to have isolated medial sided knee pain.  Pain increases every other Saturday with increased physical demand with needing to do chores including grocery shopping.  She denies any new injury or trauma.  She unfortunately has started smoking again which is approximately 1/4 pack/day.  Patient has lost some weight since last assessment.    Has previous treatment plan been beneficial for condition: was recommended to walk with walker or crutches.  Patient walks with a cane and said she did not use the walker or crutches.  Treatment used since last visit:  using cane and ibuprofen.  Would like to avoid surgery if possible.  Location of Pain: left knee, medial.  Rating of Pain at worst: 3/10  Rating of Pain Currently: 0/10  Worsened by: walking  without cane  Better with: resting, ice and ibuprofen  Treatments tried: rest/activity avoidance, elevation, ice, Tylenol, ibuprofen, and walks with cane.  Quality: sharp  Associated symptoms: every other Saturday there is increased pain      Past Medical History:   Diagnosis Date     Depression      GERD (gastroesophageal reflux disease)      Hemorrhoid      Menstrual disorder     s/p D&C -2012     ROMEO on CPAP      Other bipolar disorders     followed by Dr Collazo     Post menopausal syndrome      Tobacco abuse      Social History     Socioeconomic History     Marital status:    Tobacco Use     Smoking status: Former     Current packs/day: 0.00     Types: Cigarettes     Quit date: 2023     Years since quittin.0     Passive exposure: Past     Smokeless tobacco: Never     Tobacco comments:     Quit for year, restarted .       Quit middle May 2024   Vaping Use     Vaping status: Never Used   Substance and Sexual Activity     Alcohol use: No     Comment: Severe alcohol use disorder for 20 years.  :  Reports sober for past five years     Drug use: No     Sexual activity: Yes     Partners: Male   Social History Narrative    2013    /single/    Children-    Work-    Tobacco-    ETOH-    Exercise-             Patient's past medical, surgical, social, and family histories were personally reviewed today and no changes are noted.  REVIEW OF SYSTEMS:  Review of Systems    OBJECTIVE:  Vital signs as noted in EPIC for 1/10/2025    General: healthy, alert and in no distress  HEENT: no scleral icterus or conjunctival erythema  Skin: no suspicious lesions or rash. No jaundice.  CV: no pedal edema  Resp: normal respiratory effort without conversational dyspnea   Psych: normal mood and affect  Neuro: Normal light sensory exam of lower extremity      MSK:  Exam shows a well-nourished 59-year-old female who ambulates with a blue single-point cane in her right hand.  Examination of the left  lower extremity shows no obvious bruising, swelling or ecchymosis.  No visual effusion noted.  She has stiffness of her left knee with loss of 3 degrees of terminal extension with increased medial and posterior knee discomfort.  Flexion is still maintained past 120 degrees without crepitation.  Slight tenderness is noted over the distal quadriceps and infrapatellar tendon.  There is associated slight lateral joint line tenderness.  She is more point tender over the medial side of the knee including anterior medial femoral condyle and medial tibial plateau which she describes as a stinging sensation.  She has slight increased laxity of the MCL without pain with correctable varus deformity.  No radha varus or valgus thrust with gait.  Full active and passive range of motion of the left hip without reproduction of hemipelvic or knee pain.  She is grossly neurovascularly intact L2-S1 bilaterally.  No significant lower extremity edema.  No pain in the popliteal fossa or the gastrocsoleus complex.  No significant peripheral edema.  Extensor mechanism intact with adequate quadricep and hamstring muscle tone.        RADIOLOGY AND LABORATORY:   Personal Independent visualization of the below images done today:  Three-view x-ray of the patient's left knee are obtained and reviewed today personally and reviewed with the patient.  There has been significant advancement of medial joint line narrowing.  There appears to be a depression over the medial femoral condyle of the articular surface noted on AP view.  Lateral joint line maintained.  Increased varus alignment.  Right knee shows mild medial compartment narrowing.    Previous left knee MRI is again reviewed today and I agree with interpretation below.  Results for orders placed or performed during the hospital encounter of 11/16/24   MR Knee Left w/o Contrast    Narrative    EXAM: MR KNEE LEFT WITHOUT CONTRAST  LOCATION: [facility]  DATE:  11/16/2024    INDICATION: Acute left knee pain. Evaluate for stress fracture of tibia.  COMPARISON: None.  TECHNIQUE: Unenhanced.    FINDINGS:    MEDIAL COMPARTMENT:   -Meniscus: There is high-grade radial tearing of the medial meniscus posterior root, with mild medial meniscal body extrusion.  -Cartilage: There is some mild thinning over the weightbearing medial femoral condyle without a high-grade or full-thickness defect. There is a nondisplaced subchondral fracture involving the weightbearing medial femoral condyle centrally with fairly   extensive marrow edema signal.    LATERAL COMPARTMENT:  -Meniscus: No lateral meniscal tear.   -Cartilage: No high-grade or full-thickness cartilage defect.    PATELLOFEMORAL COMPARTMENT:   -Alignment: There is mild lateral patellar subluxation.   -Cartilage: Small area of deep fissuring over the patellar median ridge with some additional more superficial fissuring over the lateral retropatellar facet. No discrete trochlear cartilage defect.    CRUCIATE LIGAMENTS:   -ACL: Normal.  -PCL: Normal.    COLLATERAL LIGAMENTS:   -Medial collateral ligament: Intact.  -Lateral collateral ligament: Intact.    POSTEROMEDIAL CORNER:  -Distal semimembranosus tendon is normal.   -Pes anserine tendons are intact.    POSTEROLATERAL CORNER:   -Popliteal tendon is intact. No tendinopathy.  -Biceps femoris tendon and posterolateral corner complex ligaments are intact.    EXTENSOR MECHANISM:   -Quadriceps tendon: Mild tendinopathy, without tearing.  -Patellar tendon: Normal.  -Patellofemoral ligaments and retinacula: Intact.    JOINT:   -Moderate-sized joint effusion.    BONES:  -No concerning marrow replacing lesion. There is a nondisplaced subchondral fracture involving the weightbearing medial femoral condyle centrally.    SOFT TISSUES:   -No popliteal cyst. No acute muscle injury. There is a very small, thin fluid collection in the subcutaneous tissue anterior to the proximal patellar tendon  measuring approximately 1.3 x 0.3 x 1.6 cm, suggesting a very mild prepatellar bursitis.       Impression    IMPRESSION:  1.  High-grade radial tearing of the medial meniscus posterior root, with mild medial meniscal body extrusion.  2.  Nondisplaced, subchondral fracture involving the weightbearing medial femoral condyle centrally with fairly extensive associated marrow edema signal.  3.  No lateral meniscal tear. The cruciate and collateral ligaments remain intact.  4.  Mild patellar chondromalacia. No high-grade or full-thickness medial or lateral compartment cartilage defect.  5.  Moderate-sized joint effusion.  6.  Findings suggesting very mild prepatellar bursitis.           Patient's conditions were thoroughly discussed during today's visit with total time reviewing patient's previous medical records/history/radiology, face-to-face examination and discussion and plan of care with the patient and documentation being 45 minutes for today's clinical visit    Yovanny Valerio PA-C  [facility]    This note was completed in part using a voice recognition software, any grammatical or context distortion are unintentional and inherent to the software.     Large Joint Injection/Arthocentesis: L knee joint    Date/Time: 1/21/2025 4:53 PM    Performed by: Yovanny Valerio PA-C  Authorized by: Yovanny Valerio PA-C    Indications:  Pain and osteoarthritis  Indications comment:  Improve function  Needle Size:  22 G  Guidance: landmark guided    Approach:  Anterolateral  Location:  Knee      Medications:  4 mL lidocaine 1 %; 2 mL ROPivacaine 5 MG/ML; 40 mg triamcinolone 40 MG/ML  Outcome:  Tolerated well, no immediate complications  Procedure discussed: discussed risks, benefits, and alternatives    Consent Given by:  Patient  Timeout: timeout called immediately prior to procedure    Prep: patient was prepped and draped in usual sterile fashion          Again, thank you for allowing me to participate in the care  of your patient.        Sincerely,        Yovanny Valerio PA-C    Electronically signed

## 2025-01-22 DIAGNOSIS — E66.01 CLASS 3 SEVERE OBESITY WITH BODY MASS INDEX (BMI) OF 40.0 TO 44.9 IN ADULT, UNSPECIFIED OBESITY TYPE, UNSPECIFIED WHETHER SERIOUS COMORBIDITY PRESENT (H): ICD-10-CM

## 2025-01-22 DIAGNOSIS — E66.813 CLASS 3 SEVERE OBESITY WITH BODY MASS INDEX (BMI) OF 40.0 TO 44.9 IN ADULT, UNSPECIFIED OBESITY TYPE, UNSPECIFIED WHETHER SERIOUS COMORBIDITY PRESENT (H): ICD-10-CM

## 2025-01-22 DIAGNOSIS — K21.9 GASTROESOPHAGEAL REFLUX DISEASE WITHOUT ESOPHAGITIS: ICD-10-CM

## 2025-01-22 DIAGNOSIS — K44.9 HIATAL HERNIA: ICD-10-CM

## 2025-01-23 ENCOUNTER — TELEPHONE (OUTPATIENT)
Dept: ORTHOPEDICS | Facility: CLINIC | Age: 60
End: 2025-01-23
Payer: COMMERCIAL

## 2025-01-23 NOTE — CONFIDENTIAL NOTE
I spoke with patient today to see how she is doing status post knee corticosteroid injection.  She states her knee already is feeling better.  Physical therapy has reached out to her already to start scheduling appointments but she is still waiting to hear back from her insurance company in regards to the out-of-pocket cost/co-pay but will reengage with PT about scheduling once that information is available for her.  She also is waiting to hear from the orthotic department to get a  brace.  Patient otherwise has no other questions or concerns and will follow-up as discussed yesterday.  Yovanny Valerio PA-C

## 2025-01-28 ENCOUNTER — TELEPHONE (OUTPATIENT)
Dept: GASTROENTEROLOGY | Facility: CLINIC | Age: 60
End: 2025-01-28

## 2025-01-28 ENCOUNTER — VIRTUAL VISIT (OUTPATIENT)
Dept: SURGERY | Facility: CLINIC | Age: 60
End: 2025-01-28
Payer: COMMERCIAL

## 2025-01-28 ENCOUNTER — PRE VISIT (OUTPATIENT)
Dept: SURGERY | Facility: CLINIC | Age: 60
End: 2025-01-28

## 2025-01-28 ENCOUNTER — ANESTHESIA EVENT (OUTPATIENT)
Dept: GASTROENTEROLOGY | Facility: CLINIC | Age: 60
End: 2025-01-28
Payer: COMMERCIAL

## 2025-01-28 VITALS — BODY MASS INDEX: 40.81 KG/M2 | WEIGHT: 260 LBS | HEIGHT: 67 IN

## 2025-01-28 DIAGNOSIS — Z01.818 PRE-OPERATIVE EXAMINATION: Primary | ICD-10-CM

## 2025-01-28 DIAGNOSIS — K22.10 EROSIVE ESOPHAGITIS: ICD-10-CM

## 2025-01-28 PROCEDURE — 98006 SYNCH AUDIO-VIDEO EST MOD 30: CPT | Performed by: PHYSICIAN ASSISTANT

## 2025-01-28 RX ORDER — TOPIRAMATE 25 MG/1
TABLET, FILM COATED ORAL
Qty: 90 TABLET | Refills: 2 | OUTPATIENT
Start: 2025-01-28

## 2025-01-28 ASSESSMENT — ENCOUNTER SYMPTOMS: SEIZURES: 0

## 2025-01-28 ASSESSMENT — LIFESTYLE VARIABLES: TOBACCO_USE: 1

## 2025-01-28 ASSESSMENT — COPD QUESTIONNAIRES: COPD: 0

## 2025-01-28 ASSESSMENT — PAIN SCALES - GENERAL: PAINLEVEL_OUTOF10: MODERATE PAIN (4)

## 2025-01-28 NOTE — TELEPHONE ENCOUNTER
Pre visit planning completed.    Procedure details:    Patient scheduled for Upper endoscopy (EGD) on 02/11/2025.     Arrival time: 1030. Procedure time 1200    Facility location: [facility]; [address] [place] [address], [address] Floor, [place] [address]. Check in location: Main entrance at registration desk.    Sedation type: MAC    Pre op exam needed? Yes. PAC eval completed on 01/28/2025.    Indication for procedure:   K22.10 (ICD-10-CM) - Erosive [id number] (ICD-10-CM) - Hiatal pthnbeB05.9 (ICD-10-CM) - Gastroesophageal reflux disease without esophagitis     Chart review:     Electronic implanted devices? No    Recent diagnosis of diverticulitis within the last 6 weeks? No    Medication review:    Diabetic? No    Anticoagulants? No    Weight loss medication/injectable? No.    Other medication HOLDING recommendations:  See list below for medication holdings recommended during PAC Eval.     Hold Aspirin for 2 days before procedure.  Hold Multivitamins for 7 days before procedure.   Hold Herbal medications and Supplements for 7 days before procedure.  Hold Ibuprofen for 2 days before procedure.   Hold Naproxen for 2 days before procedure.   Acetaminophen (Tylenol) is okay to take if needed.  No alcohol or cannabis products for 24 hours before your procedure.     Hold Diclofenac (Volataren) for 24 hours prior to procedure.      Please hold the following medications the day of procedure:  No powders, ointments, lotions, or gels on the skin.     Please take these medications the day of procedure:  Bupropion (Wellbutrin XL)  Buspirone HCl (Buspar)  Escitalopram (Lexapro)  Esomeprazole (Nexium)  Famotidine (Pepcid)  Lamotrigine (Lamictal)  Levothyroxine (Synthroid)  Rabeprazole (Aciphex)  Rexulti  Albuterol inhaler if needed     Bring Albuterol inhaler to hospital.    Prep for procedure:     Bowel prep recommendation: N/A  Due to:  EGD    Procedure information and instructions sent via [employer]        Juliann Clark RN  Endoscopy Procedure Pre Assessment   [phone] option 2

## 2025-01-28 NOTE — PATIENT INSTRUCTIONS
Name:  Gudelia Dong   MRN:  [id number]   :  1965   Today's Date:  2025     GI Lab procedures:    A representative from the GI Lab will contact you regarding date, arrival time, location, eating and drinking instructions, and give you additional information.      You were seen today in the PAC Clinic.   (Pre-operative Anesthesia Assessment Center)  [facility]  [address] [place] [address]  [id number]   phone [phone]    You had a pre-operative assessment done.    Anesthesia recommendations for medications:    Hold Aspirin for 2 days before procedure.  Hold Multivitamins for 7 days before procedure.   Hold Herbal medications and Supplements for 7 days before procedure.  Hold Ibuprofen for 2 days before procedure.   Hold Naproxen for 2 days before procedure.   Acetaminophen (Tylenol) is okay to take if needed.    No alcohol or cannabis products for 24 hours before your procedure.    Hold Diclofenac (Volataren) for 24 hours prior to procedure.      Please hold the following medications the day of procedure:  No powders, ointments, lotions, or gels on the skin.      Please take these medications the day of procedure:  Bupropion (Wellbutrin XL)  Buspirone HCl (Buspar)  Escitalopram (Lexapro)  Esomeprazole (Nexium)  Famotidine (Pepcid)  Lamotrigine (Lamictal)  Levothyroxine (Synthroid)  Rabeprazole (Aciphex)  Rexulti  Albuterol inhaler if needed    Bring Albuterol inhaler to hospital.      For questions or appointments, call:  [facility]: [phone], option 2.  Monday through Friday, 8 a.m. to 4:30 p.m.  (If it is after hours, please reach out to the clinic or provider that scheduled your appointment)

## 2025-01-28 NOTE — PROGRESS NOTES
Gudelia is a 59 year old who is being evaluated via a billable video visit.    How would you like to obtain your AVS? MyChart  If the video visit is dropped, the invitation should be resent by: Text to cell phone: [phone]        Objective    Vitals - Patient Reported  Pain Score: Moderate Pain (4)  Pain Loc: Knee

## 2025-01-28 NOTE — TELEPHONE ENCOUNTER
topiramate (TOPAMAX) 25 MG tablet 90 tablet 2 1/24/2025     Should have refills on file.  Rx refill denied    Deirdre Max RN  P Red Flag Triage/MRT

## 2025-01-28 NOTE — OR NURSING
Was asked by Autumn WASHINGTON to send medication instructions to Gudelia Dong for the upcoming GI procedure on 2-11-25. Medication instructions sent via My Chart.

## 2025-01-28 NOTE — H&P
Pre-Operative H & P     CC:  Preoperative exam to assess for increased cardiopulmonary risk while undergoing surgery and anesthesia.    Date of Encounter: 1/28/2025  Primary Care Physician:  Mendel Mireles     Reason for visit:   Encounter Diagnoses   Name Primary?    Pre-operative examination Yes    Erosive esophagitis        HPI  Gudelia Dong is a 59 year old female who presents for pre-operative H & P in preparation for  Procedure Information       Case: [id number] Date/Time: 02/11/25 1200    Procedure: Esophagoscopy, gastroscopy, duodenoscopy (EGD), combined (Esophagus)    Anesthesia type: MAC    Diagnosis:       Erosive esophagitis [K22.10]      Hiatal hernia [K44.9]      Gastroesophageal reflux disease without esophagitis [K21.9]    Pre-op diagnosis:       Erosive esophagitis [K22.10]      Hiatal hernia [K44.9]      Gastroesophageal reflux disease without esophagitis [K21.9]    Location:  GI 03 /  GI    Providers: René Rodriguez DO            The patient presents to the PAC virtually today in preparation for the above scheduled procedure with comorbid conditions including ROMEO on CPAP, history of smoking, iron deficiency anemia, prediabetic, hypothyroid, obesity, GERD, hiatal hernia, depression, bipolar, ADHD, insomnia, alcohol use and crack use disorder in remission, MRSA  and hydradenitis suppurativa. She underwent previous EGD on 8/13/24 for ongoing issues of hiatal hernia, erosive esophagitis and GERD and was found to have continued severe inflammation. She has continued to follow with GI and is scheduled for the follow up procedure as above.     History is obtained from the patient and chart review    Hx of abnormal bleeding or anti-platelet use: none    Menstrual history: Patient's last menstrual period was 07/20/2006.:      Past Medical History  Past Medical History:   Diagnosis Date    Anemia     Depression     Erosive esophagitis     GERD (gastroesophageal reflux disease)     Hemorrhoid      Hiatal hernia     Hypothyroidism     Menstrual disorder     s/p D&C -12/2012    Obesity     ROMEO on CPAP     Other bipolar disorders     followed by Dr Collazo    Post menopausal syndrome     Prediabetes     Tobacco abuse        Past Surgical History  Past Surgical History:   Procedure Laterality Date    COLONOSCOPY  12/24/2013    Procedure: COLONOSCOPY;;  Surgeon: Guy Grant MD;  Location:  GI    COLONOSCOPY  1/9/2014    Procedure: COMBINED COLONOSCOPY, SINGLE BIOPSY/POLYPECTOMY BY BIOPSY;;  Surgeon: Guy Grant MD;  Location:  GI    COLONOSCOPY N/A 8/8/2023    Procedure: COLONOSCOPY, WITH POLYPECTOMY AND BIOPSY;  Surgeon: René Rodriguez DO;  Location:  GI    ESOPHAGOSCOPY, GASTROSCOPY, DUODENOSCOPY (EGD), COMBINED  12/24/2013    Procedure: COMBINED ESOPHAGOSCOPY, GASTROSCOPY, DUODENOSCOPY (EGD), BIOPSY SINGLE OR MULTIPLE;  ESOPHAGOSCOPY, GASTROSCOPY, DUODENOSCOPY, COLONOSCOPY;  Surgeon: Guy Grant MD;  Location: [facility]    ESOPHAGOSCOPY, GASTROSCOPY, DUODENOSCOPY (EGD), COMBINED N/A 8/8/2023    Procedure: ESOPHAGOGASTRODUODENOSCOPY, WITH BIOPSY;  Surgeon: René Rodriguez DO;  Location:  GI    ESOPHAGOSCOPY, GASTROSCOPY, DUODENOSCOPY (EGD), COMBINED N/A 2/15/2024    Procedure: Esophagoscopy, gastroscopy, duodenoscopy (EGD), combined;  Surgeon: Sarmad Reed MD;  Location:  GI    ESOPHAGOSCOPY, GASTROSCOPY, DUODENOSCOPY (EGD), COMBINED N/A 8/13/2024    Procedure: Esophagoscopy, gastroscopy, duodenoscopy (EGD), combined;  Surgeon: René Rodriguez DO;  Location:  GI    GENITOURINARY SURGERY      urethra stretched    GYN SURGERY      d&c     LAPAROSCOPIC ASSISTED RECTOPEXY  3/14/2014    Procedure: LAPAROSCOPIC ASSISTED RECTOPEXY;  LAPAROSCOPIC  VENTRAL RECTOPEXY ;  Surgeon: Jennifer Connolly MD;  Location:  OR    ORTHOPEDIC SURGERY      surgery on clavicle,surgery for left leg fracture       Prior to Admission Medications  Current Outpatient Medications   Medication Sig Dispense Refill     albuterol (PROAIR HFA/PROVENTIL HFA/VENTOLIN HFA) 108 (90 Base) MCG/ACT inhaler Inhale 2 puffs into the lungs every 6 hours as needed for shortness of breath, wheezing or cough 18 g 0    ammonium lactate (AMLACTIN) 12 % external cream Apply topically daily as needed for dry skin 385 g 1    buPROPion (WELLBUTRIN XL) 150 MG 24 hr tablet Take 450 mg by mouth every morning      busPIRone HCl (BUSPAR) 30 MG tablet Take 1 tablet by mouth 2 times daily      diclofenac (VOLTAREN) 50 MG EC tablet Take 1 tablet (50 mg) by mouth 2 times daily. 42 tablet 0    Doxepin HCl 150 MG CAPS Take 300 mg by mouth At Bedtime (Patient taking differently: Take 150 mg by mouth at bedtime.) 30 capsule 0    escitalopram (LEXAPRO) 10 MG tablet Take 10 mg by mouth every morning      esomeprazole (NEXIUM) 20 MG DR capsule Take 1 capsule (20 mg) by mouth 2 times daily Take 30-60 minutes before eating. 180 capsule 0    famotidine (PEPCID) 40 MG tablet Take 1 tablet (40 mg) by mouth 2 times daily. 180 tablet 3    lamoTRIgine (LAMICTAL) 100 MG tablet Take 100 mg by mouth every morning      levothyroxine (SYNTHROID/LEVOTHROID) 150 MCG tablet TAKE ONE TABLET BY MOUTH EVERY DAY (Patient taking differently: Take 150 mcg by mouth every morning (before breakfast).) 90 tablet 3    nystatin (MYCOSTATIN) 015586 UNIT/GM external powder Apply topically daily 60 g 11    RABEprazole (ACIPHEX) 20 MG EC tablet Take 1 tablet (20 mg) by mouth 2 times daily (before meals). 180 tablet 1    REXULTI 2 MG tablet Take 2 mg by mouth every morning      topiramate (TOPAMAX) 25 MG tablet 25mg at bedtime for week 1, 50mg at bedtime for 1 week, and 75mg at bedtime thereafter (Patient taking differently: Take 75 mg by mouth at bedtime. 25mg at bedtime for week 1, 50mg at bedtime for 1 week, and 75mg at bedtime thereafter) 90 tablet 2    triamcinolone (KENALOG) 0.025 % external ointment Apply topically 2 times daily To affected area in the groin as needed up to 2 weeks at a time.  30 g 3    order for DME Equipment being ordered: CPAP  Please dispense Cpap and its supply 1 Units prn       Allergies  Allergies   Allergen Reactions    Contrast Dye Shortness Of Breath    Methylpyrrolidone Swelling    Iodine Swelling     Other reaction(s): Angioedema  Facial swelling.    Morphine Anxiety and Nausea and Vomiting     Other reaction(s): Behavioral Disturbances  Other reaction(s): Anxiety, vomiting       Social History  Social History     Socioeconomic History    Marital status:      Spouse name: Not on file    Number of children: Not on file    Years of education: Not on file    Highest education level: Not on file   Occupational History    Not on file   Tobacco Use    Smoking status: Former     Current packs/day: 0.00     Types: Cigarettes     Quit date: 2023     Years since quittin.1     Passive exposure: Past    Smokeless tobacco: Never    Tobacco comments:     Quit for year, restarted .       Quit middle May 2024   Vaping Use    Vaping status: Never Used   Substance and Sexual Activity    Alcohol use: No     Comment: Severe alcohol use disorder for 20 years.  :  Reports sober for past five years    Drug use: Yes     Types: Cocaine     Comment: Sober from crack for 5 years    Sexual activity: Yes     Partners: Male   Other Topics Concern    Parent/sibling w/ CABG, MI or angioplasty before 65F 55M? Not Asked   Social History Narrative    2013    /single/    Children-    Work-    Tobacco-    ETOH-    Exercise-         Social Drivers of Health     Financial Resource Strain: Not on file   Food Insecurity: Not on file   Transportation Needs: Not on file   Physical Activity: Not on file   Stress: Not on file   Social Connections: Not on file   Interpersonal Safety: Not on file   Housing Stability: Not on file       Family History  Family History   Problem Relation Age of Onset    Cancer Mother         ovarian cancer    Hypertension Father     Breast Cancer No  family hx of     Cancer - colorectal No family hx of     Anesthesia Reaction No family hx of     Venous thrombosis No family hx of        Review of Systems  The complete review of systems is negative other than noted in the HPI or here.   Anesthesia Evaluation   Pt has had prior anesthetic. Type: General and MAC.    History of anesthetic complications (Difficult to stay asleep. )  - .  ROMEO.    ROS/MED HX  ENT/Pulmonary:     (+) sleep apnea, severe, uses CPAP,              tobacco use (Quit smoking 2 months.), Past use,                    (-) asthma and COPD   Neurologic:  - neg neurologic ROS  (-) no seizures, no CVA and no TIA   Cardiovascular: Comment: Denies cardiac symptoms including chest pain, SOB, palpitations, syncope, SANCHEZ, orthopnea, or PND.      (+)  - -   -  - -                                 Previous cardiac testing   Echo: Date: 2015 Results:  Interpretation Summary  Global and regional left ventricular function is normal with an EF of 60-65%.   Right ventricular function, chamber size, wall motion, and thickness are   normal. Pulmonary artery systolic pressure cannot be assessed. The inferior   vena cava  is normal. No pericardial effusion is present.  PatientHeight: 68 in  PatientWeight: 208 lbs  BSA 2.1 m^2    Stress Test:  Date: Results:    ECG Reviewed:  Date: 2015 Results:  SR  Cath:  Date: Results:   (-) hypertension, taking anticoagulants/antiplatelets and dyslipidemia   METS/Exercise Tolerance: 4 - Raking leaves, gardening Comment:  Job is very physically active.        Hematologic:     (+)      anemia,       (-) history of blood clots and history of blood transfusion   Musculoskeletal: Comment: S/p left LE surgery with hardware.     S/p Left clavicle requiring hardware.     Following with sports medicine for stress fracture of left knee      GI/Hepatic: Comment: Dysphagia and belching.  No vomiting.     (+) GERD, Asymptomatic on medication,    hiatal hernia,           (-) liver disease    Renal/Genitourinary:  - neg Renal ROS  (-) renal disease   Endo: Comment: Prediabetes     (+)          thyroid problem, hypothyroidism,    Obesity,    (-) Type I DM, Type II DM and chronic steroid usage   Psychiatric/Substance Use:     (+) psychiatric history depression, bipolar and other (comment) (ADHD.  Reports being stable. Follows with Psychiatrist.) alcohol abuse (Alcohol use disorder in remission since 2014.)  Recreational drug usage: Cocaine (Has not used cocaine for 5 years). (-) chronic opioid use history   Infectious Disease: Comment: Denies recent flares with Right axilla hydradenitis suppurativa.     (+)   MRSA,         Malignancy:  - neg malignancy ROS     Other:  - neg other ROS          Virtual visit -  No vitals were obtained    Physical Exam  Constitutional: Awake, alert, cooperative, no apparent distress, and appears stated age.  Eyes: Pupils equal, glasses  HENT: Normocephalic  Respiratory: non labored breathing   Neurologic: Awake, alert, oriented to name, place and time.   Neuropsychiatric: Calm, cooperative. Normal affect.      Prior Labs/Diagnostic Studies   All labs and imaging personally reviewed     EKG/ stress test - if available please see in ROS above         The patient's records and results personally reviewed by this provider.     Outside records reviewed from: Care Everywhere      Assessment    Gudelia Dong is a 59 year old female seen as a PAC referral for risk assessment and optimization for anesthesia.    Plan/Recommendations  Pt will be optimized for the proposed procedure.  See below for details on the assessment, risk, and preoperative recommendations    NEUROLOGY  - No history of TIA, CVA or seizure  -Post Op delirium risk factors:  No risk identified    ENT  - No current airway concerns.  Will need to be reassessed day of surgery.  Mallampati: Unable to assess  TM: Unable to assess    CARDIAC  - No history of CAD, Hypertension, and Afib  - METS (Metabolic  "Equivalents)  Patient performs 4 or more METS exercise without symptoms             Total Score: 0      RCRI-Very low risk: Class 1 0.4% complication rate             Total Score: 0    ~The patient is currently slightly limited in activity due to a left knee stress fracture.  She is using a brace and able to get back to work where she is very physically active doing lots of walking.  She denies any cardiac symptoms.    PULMONARY  - Obstructive Sleep Apnea  ROMEO with home CPAP.  Patient will be instructed to bring their home CPAP device to the hospital with them.  ROMEO High Risk             Total Score: 5    ROMEO: Often tired    ROMEO: Observed stopped breathing    ROMEO: BMI over 35 kg/m2    ROMEO: Over 50 ys old    ROMEO: Neck Circum >16 in      - Denies asthma or inhaler use  - Tobacco History    History   Smoking Status    Former    Types: Cigarettes   Smokeless Tobacco    Never       GI  - GERD/ hiatal hernia/ erosive esophagitis  Controlled on medications: Patient will continue her Nexium, Pepcid and Aciphex.  She reports she has had no symptoms or vomiting since her last EGD on 8/13/2024  PONV Medium Risk  Total Score: 2           1 AN PONV: Pt is Female    1 AN PONV: Patient is not a current smoker        /RENAL  - Baseline Creatinine  0.83    ENDOCRINE    - BMI: Estimated body mass index is 40.72 kg/m  as calculated from the following:    Height as of this encounter: 1.702 m (5' 7\").    Weight as of this encounter: 117.9 kg (260 lb).  Class 3 Obesity (BMI > 40) -the patient has been working on weight loss and is down to 260 pounds.  Continue Topamax  - prediabetes - A1c 5.8 in 2023  ~Hypothyroidism-continue levothyroxine    HEME  VTE Low Risk 0.5%             Total Score: 2    VTE: Greater than 59 yrs old    VTE: BMI greater than 39      - No history of abnormal bleeding or antiplatelet use.      MSK  Patient is NOT Frail             Total Score: 1    Frailty: Increased exhaustion      OA - following with sports " medicine and has stress fracture in left knee.  She had a corticosteroid injection in her knee and was prescribed a brace as well as physical therapy.  Hold voltaren for 24 hours prior.  Consideration for careful positioning to minimize discomfort    PSYCH  - Bipolar depression, insomnia and ADHD.               ~ reports doing well on current medications and followed by psychiatry.  Continue mental health medications as prescribed DOS     - h/o Alcohol use disorder for >20 years with many admission for detoxification.  Patient has a history of crack use and has been in remission for 5 years.      ID  - MRSA (+)              ~ contact precautions  ~history of Hidradenitis-patient reports she was living in living facility when she was having issues but since leaving that has not had any ongoing issues    ANESTHESIA  - Reports she has had difficulty getting to sleep with past anesthesia.  She reports with her last EGD she had MAC anesthesia and this went very well for her.    Different anesthesia methods/types have been discussed with the patient, but they are aware that the final plan will be decided by the assigned anesthesia provider on the date of service.     The patient is optimized for their procedure. Information on procedure time, arrival time and NPO status given by [facility] nursing staff. Medications instructions provided by PAC RN staff.   No further diagnostic testing indicated.    Please refer to the physical examination documented by the anesthesiologist in the anesthesia record on the day of surgery.    Video-Visit Details    Type of service:  Video Visit    Provider received verbal consent for a Video Visit from the patient? Yes     Originating Location (pt. Location): Parked in their car    Distant Location (provider location):  Off-site  Mode of Communication:  Video Conference via [employer]  On the day of service:     Prep time: 6 minutes  Visit time: 17 minutes  Documentation time: 10  minutes  ------------------------------------------  Total time: 33 minutes      Tania Vallejo PA-C  Preoperative Assessment Center  [facility]  Clinic and Surgery Center  Phone: [phone]  Fax: [phone]

## 2025-01-28 NOTE — TELEPHONE ENCOUNTER
Pre assessment completed for upcoming procedure.   (Please see previous telephone encounter notes for complete       Procedure details:    Arrival time and facility location reviewed.    Pre op exam needed? Yes. PAC eval completed on 1/28/25.    Designated  policy reviewed. Instructed to have someone stay 24  hours post procedure.       Medication review:    Other medication HOLDING recommendations:  See list below for medication holdings recommended during PAC Eval.      Hold Aspirin for 2 days before procedure.  Hold Multivitamins for 7 days before procedure.   Hold Herbal medications and Supplements for 7 days before procedure.  Hold Ibuprofen for 2 days before procedure.   Hold Naproxen for 2 days before procedure.   Acetaminophen (Tylenol) is okay to take if needed.  No alcohol or cannabis products for 24 hours before your procedure.     Hold Diclofenac (Volataren) for 24 hours prior to procedure.      Please hold the following medications the day of procedure:  No powders, ointments, lotions, or gels on the skin.     Please take these medications the day of procedure:  Bupropion (Wellbutrin XL)  Buspirone HCl (Buspar)  Escitalopram (Lexapro)  Esomeprazole (Nexium)  Famotidine (Pepcid)  Lamotrigine (Lamictal)  Levothyroxine (Synthroid)  Rabeprazole (Aciphex)  Rexulti  Albuterol inhaler if needed     Bring Albuterol inhaler to hospital.      Prep for procedure:     Procedure prep instructions reviewed.        Any additional information needed:  N/A      Patient verbalized understanding and had no questions or concerns at this time.      Valentina Stauffer LPN  Endoscopy Procedure Pre Assessment   [phone] option 2

## 2025-02-03 ENCOUNTER — OFFICE VISIT (OUTPATIENT)
Dept: URGENT CARE | Facility: URGENT CARE | Age: 60
End: 2025-02-03
Payer: COMMERCIAL

## 2025-02-03 VITALS
BODY MASS INDEX: 41.19 KG/M2 | DIASTOLIC BLOOD PRESSURE: 70 MMHG | TEMPERATURE: 97.8 F | SYSTOLIC BLOOD PRESSURE: 114 MMHG | WEIGHT: 263 LBS | HEART RATE: 79 BPM | OXYGEN SATURATION: 94 % | RESPIRATION RATE: 28 BRPM

## 2025-02-03 DIAGNOSIS — J10.1 INFLUENZA A: Primary | ICD-10-CM

## 2025-02-03 LAB
FLUAV AG SPEC QL IA: POSITIVE
FLUBV AG SPEC QL IA: NEGATIVE

## 2025-02-03 PROCEDURE — 99214 OFFICE O/P EST MOD 30 MIN: CPT | Performed by: FAMILY MEDICINE

## 2025-02-03 PROCEDURE — 87804 INFLUENZA ASSAY W/OPTIC: CPT

## 2025-02-03 RX ORDER — OSELTAMIVIR PHOSPHATE 75 MG/1
75 CAPSULE ORAL 2 TIMES DAILY
Qty: 10 CAPSULE | Refills: 0 | Status: SHIPPED | OUTPATIENT
Start: 2025-02-03 | End: 2025-02-08

## 2025-02-03 NOTE — PROGRESS NOTES
Assessment & Plan     Influenza A  High risk  Start on tamiflu  - oseltamivir (TAMIFLU) 75 MG capsule  Dispense: 10 capsule; Refill: 0             No follow-ups on file.    Sanjay Dias MD  [facility] [place]    Bryan Galeas is a 59 year old female who presents to clinic today for the following health issues:  Chief Complaint   Patient presents with    Chills    Fever     Ill x 2 days     Shortness of Breath     Mild coughing     Wheezing       HPI    Smoker  Some SOB  Fever and wheeze  Started Saturday and Sunday was down  Hot/cold.  No fever today  OTC meds        Review of Systems        Objective    /70 (BP Location: Right arm, Patient Position: Sitting, Cuff Size: Adult Large)   Pulse 79   Temp 97.8  F (36.6  C) (Tympanic)   Resp 28   Wt 119.3 kg (263 lb)   LMP 07/20/2006   SpO2 94%   BMI 41.19 kg/m    Physical Exam  Vitals and nursing note reviewed.   Constitutional:       Appearance: Normal appearance.   HENT:      Right Ear: Tympanic membrane and ear canal normal.      Left Ear: Tympanic membrane and ear canal normal.      Mouth/Throat:      Mouth: Mucous membranes are moist.   Eyes:      Pupils: Pupils are equal, round, and reactive to light.   Cardiovascular:      Rate and Rhythm: Normal rate and regular rhythm.      Pulses: Normal pulses.      Heart sounds: Normal heart sounds.   Pulmonary:      Effort: Pulmonary effort is normal.      Breath sounds: Normal breath sounds.   Musculoskeletal:      Cervical back: Neck supple.   Neurological:      Mental Status: She is alert.

## 2025-02-03 NOTE — LETTER
February 3, 2025      Gudelia Dong  [address]   [place] [address]        To Whom It May Concern:    Gudelia Dong  was seen today.  Please excuse her from work due to illness.      Sincerely,        Sanjay Dias MD    Electronically signed

## 2025-02-04 ENCOUNTER — TELEPHONE (OUTPATIENT)
Dept: ORTHOPEDICS | Facility: CLINIC | Age: 60
End: 2025-02-04
Payer: COMMERCIAL

## 2025-02-04 DIAGNOSIS — M17.32 POST-TRAUMATIC OSTEOARTHRITIS OF LEFT KNEE: Primary | ICD-10-CM

## 2025-02-04 DIAGNOSIS — M84.369A STRESS FRACTURE OF KNEE: ICD-10-CM

## 2025-02-04 NOTE — TELEPHONE ENCOUNTER
[facility] Call Center    Phone Message    May a detailed message be left on voicemail: yes     Reason for Call: Patient updates on Cortisone Injection not Working. She Not Unable to Use Brace. Please call. Thanks    Action Taken: [facility]     Travel Screening: Not Applicable     Date of Service:

## 2025-02-04 NOTE — TELEPHONE ENCOUNTER
Outbound call to patient.  Left message to return call.    Per Yovanny Valerio PA-C, if brace is not fitting, please reach out to DME, #[phone], to have brace fitted.    If corticosteroid injection not working, please start PT and let us know if you would like to see Orthopedics to discuss surgery for your knee.    Nessa Martin MA

## 2025-02-04 NOTE — TELEPHONE ENCOUNTER
Out bound call to patient Gudelia.    Very hard for her to get the straps tight enough so it does not slip down.  She is able to get it on, but unable to get the straps in the lock.  Brace keeps slipping.  Gudelia is very emotional that she is unable to do this brace.  Advised her to call the numbers listed in her chart and writer gave Gudelia the numbers to orthotic dept.  Gudelia said she will call and make an appointment.    Gudelia does have her 3 sessions of PT scheduled:  2/13/2025, 2/21/2025, and 3/7/2025.    Appointment made for 2/24/2025 at 1500 with Dr Martha Alonso for an U/S guided gel injection per Yovanny Valerio PA-C.      Gudelia agrees with plan above.    Routed to WAYNE Valerio PA-C to enter PA for gel injection.    Nessa Martin MA

## 2025-02-04 NOTE — CONFIDENTIAL NOTE
Update: 02/0/25 0876  Patient returned phone call and we discussed her concerns.  Patient states that patient is not strong enough to tighten straps on her  knee brace.  Patient will return to the orthotic department for modification as needed.  Patient does have phone number available to contact the orthotic department directly.  Patient will be referred to physical therapy as previously discussed for left knee osteoarthritis rehab program.  Patient also is interested in proceeding viscosupplementation injection for her posttraumatic medial compartment osteoarthritis.  Prior authorization request for Synvisc 1 placed today with future ultrasound-guided injection appointment scheduled.  Patient at this time is not interested in surgical consultation.  Patient understood that by proceeding with intra-articular injection that this would delay any surgical intervention for a minimum of 3 months from injection date.  Yovanny Valerio PA-C          If brace is not fitting correctly, patient is to contact the orthotic dept for adjustment.  She is to make sure she understands how to don and wear brace from orthotic dept.   Patient was to also contact and engage with PT to start rehabbing her knee.  Otherwise, other treatment options are:   Start PT as discussed and attend at least 3 sessions  If cortisone injection is no longer working (patient was doing well day after injection- see telephone encounter) then we could consider HA/gel injection (If patient is interested in this, I will place a prior auth request) and patient will need to schedule a procedure only visit in 2-3 weeks with AZUCENA Priest OR [facility] FOR ULTRASOUND GUIDED INJECTION  due to leg size (assist patient with scheduling if needed)   Referral to a surgeon to discuss total knee arthroplasty surgery    Yovanny Valerio PA-C        You have been referred to physical or occupational therapy.  At any time you can contact the scheduling department  [phone] to arrange your own appointments but an [facility]  will call you to coordinate your care as prescribed by your provider within the next 2 business days. If you don't hear from a representative within 3 business days, please call [phone].   Please be aware that coverage of these services is subject to the terms and limitations of your health insurance plan.  Call member services at your health plan with any benefit or coverage questions.        [facility]    [facility]  [address]  [place] [address]  Phone: [phone]  Fax: [phone] [facility]   [address]  [place] [address]  Phone: [phone]  Fax: [phone]     [facility]  [address]  [place] [address]  Phone: [phone]  Fax: [phone]   [facility]  [address]  [place] [address]  Phone: [phone]   Fax: [phone]   [facility]  [address]  [place] [address]  Phone: [phone]  Fax: [phone]

## 2025-02-10 ENCOUNTER — OFFICE VISIT (OUTPATIENT)
Dept: FAMILY MEDICINE | Facility: CLINIC | Age: 60
End: 2025-02-10
Payer: COMMERCIAL

## 2025-02-10 VITALS
HEIGHT: 67 IN | HEART RATE: 78 BPM | TEMPERATURE: 99.6 F | DIASTOLIC BLOOD PRESSURE: 57 MMHG | RESPIRATION RATE: 16 BRPM | BODY MASS INDEX: 41.09 KG/M2 | SYSTOLIC BLOOD PRESSURE: 100 MMHG | WEIGHT: 261.8 LBS | OXYGEN SATURATION: 94 %

## 2025-02-10 DIAGNOSIS — R05.9 COUGH, UNSPECIFIED TYPE: ICD-10-CM

## 2025-02-10 DIAGNOSIS — J10.1 INFLUENZA A: Primary | ICD-10-CM

## 2025-02-10 DIAGNOSIS — R06.2 WHEEZING: ICD-10-CM

## 2025-02-10 PROCEDURE — 99213 OFFICE O/P EST LOW 20 MIN: CPT | Performed by: NURSE PRACTITIONER

## 2025-02-10 RX ORDER — GUAIFENESIN AND DEXTROMETHORPHAN HYDROBROMIDE 600; 30 MG/1; MG/1
1 TABLET, EXTENDED RELEASE ORAL EVERY 12 HOURS PRN
Qty: 30 TABLET | Refills: 1 | Status: SHIPPED | OUTPATIENT
Start: 2025-02-10

## 2025-02-10 RX ORDER — FLUTICASONE PROPIONATE AND SALMETEROL 250; 50 UG/1; UG/1
1 POWDER RESPIRATORY (INHALATION) EVERY 12 HOURS
Qty: 14 EACH | Refills: 0 | Status: SHIPPED | OUTPATIENT
Start: 2025-02-10

## 2025-02-10 RX ORDER — ALBUTEROL SULFATE 90 UG/1
2 INHALANT RESPIRATORY (INHALATION) EVERY 4 HOURS PRN
Qty: 18 G | Refills: 1 | Status: SHIPPED | OUTPATIENT
Start: 2025-02-10

## 2025-02-10 ASSESSMENT — PAIN SCALES - GENERAL: PAINLEVEL_OUTOF10: MODERATE PAIN (6)

## 2025-02-10 ASSESSMENT — PATIENT HEALTH QUESTIONNAIRE - PHQ9: SUM OF ALL RESPONSES TO PHQ QUESTIONS 1-9: 2

## 2025-02-10 ASSESSMENT — ENCOUNTER SYMPTOMS: COUGH: 1

## 2025-02-10 NOTE — PROGRESS NOTES
"  Assessment & Plan     Influenza A  Continued symptoms after tx with tamiflu. Will try inhalers and mucinex DM. Follow-up if worsening. Expect this to last several more weeks.   - fluticasone-salmeterol (ADVAIR) 250-50 MCG/ACT inhaler; Inhale 1 puff into the lungs every 12 hours.  - dextromethorphan-guaiFENesin (MUCINEX DM)  MG 12 hr tablet; Take 1 tablet by mouth every 12 hours as needed for cough.    Cough, unspecified type  - albuterol (PROAIR HFA/PROVENTIL HFA/VENTOLIN HFA) 108 (90 Base) MCG/ACT inhaler; Inhale 2 puffs into the lungs every 4 hours as needed for shortness of breath, wheezing or cough.  - fluticasone-salmeterol (ADVAIR) 250-50 MCG/ACT inhaler; Inhale 1 puff into the lungs every 12 hours.  - dextromethorphan-guaiFENesin (MUCINEX DM)  MG 12 hr tablet; Take 1 tablet by mouth every 12 hours as needed for cough.    Wheezing  - albuterol (PROAIR HFA/PROVENTIL HFA/VENTOLIN HFA) 108 (90 Base) MCG/ACT inhaler; Inhale 2 puffs into the lungs every 4 hours as needed for shortness of breath, wheezing or cough.      She needs to cancel her EGD for tomorrow d/t this illness.         Bryan Galeas is a 59 year old, presenting for the following health issues:  Cough    Cough       A week ago Saturday had flu symptoms. Went to [place] [facility] and was prescribed tamiflu. Took tamiflu Tues-Saturday.  Felt somewhat improved     No longer feeling feverish or chilled. LH and SOB has improved.     Coughing and SOB. Having to stop to catch her breath even walking short distances. Fatigued. Wheezing. Phlegm with blowing nose. Throat is sore.     Took theraflu at home     No ear pain or sinus pain.         Review of Systems  Detailed as above         Objective    /57 (BP Location: Left arm, Patient Position: Sitting, Cuff Size: Adult Large)   Pulse 78   Temp 99.6  F (37.6  C) (Tympanic)   Resp 16   Ht 1.702 m (5' 7\")   Wt 118.8 kg (261 lb 12.8 oz)   LMP 07/20/2006   SpO2 94%   BMI 41.00 " kg/m    Body mass index is 41 kg/m .  Physical Exam  Constitutional:       Appearance: Normal appearance.   HENT:      Head: Normocephalic.      Right Ear: Tympanic membrane, ear canal and external ear normal.      Left Ear: Tympanic membrane, ear canal and external ear normal.      Mouth/Throat:      Pharynx: No oropharyngeal exudate.   Eyes:      Conjunctiva/sclera: Conjunctivae normal.   Cardiovascular:      Rate and Rhythm: Normal rate and regular rhythm.      Heart sounds: Normal heart sounds. No murmur heard.  Pulmonary:      Effort: Pulmonary effort is normal. No respiratory distress.      Breath sounds: Wheezing present.      Comments: Dry wheezy cough  Musculoskeletal:      Cervical back: Normal range of motion.   Lymphadenopathy:      Cervical: No cervical adenopathy.   Skin:     General: Skin is warm and dry.   Neurological:      Mental Status: She is alert.   Psychiatric:         Mood and Affect: Mood normal.                I saw this patient in collaboration with Nancy Ybarra      I was present with the INEZ/PA student who participated in the service and in the documentation of the services provided. I have verified the history and personally performed the physical exam and medical decision making, as documented by the student and edited by me.     INEZ Gutiérrez, MADY Ybarra NP Student          Signed Electronically by: INEZ Morelos CNP

## 2025-02-10 NOTE — PATIENT INSTRUCTIONS
Steroid inhaler twice a day    Albuterol inhaler as needed     Mucinex DM for cough as needed

## 2025-02-11 ENCOUNTER — ANESTHESIA (OUTPATIENT)
Dept: GASTROENTEROLOGY | Facility: CLINIC | Age: 60
End: 2025-02-11
Payer: COMMERCIAL

## 2025-02-11 ENCOUNTER — TELEPHONE (OUTPATIENT)
Dept: GASTROENTEROLOGY | Facility: CLINIC | Age: 60
End: 2025-02-11
Payer: COMMERCIAL

## 2025-02-11 NOTE — TELEPHONE ENCOUNTER
"Caller: Patient    Reason for Reschedule/Cancellation (please be detailed, any staff messages or encounters to note?):   Unable to find a ride.     Did you cancel or rescheduled an EUS procedure? No.    Is screening questionnaire older than 3 months from the reschedule date.   If Yes, please complete screening questionnaire. Yes    Prior to reschedule please review:  Ordering Provider: ALESHA SANCHEZ  Sedation Determined: MAC  Does patient have any ASC Exclusions, please identify?: Y - ROMEO    Notes on Cancelled Procedure:  Procedure: Upper Endoscopy [EGD]   Date: 2/11/2025  Location: [facility]; [address], [place] [address]   Surgeon: Michael    Rescheduled: Yes,     Endoscopy Scheduling Screen    Have you had any respiratory illness or flu-like symptoms in the last 10 days?  No    What is your communication preference for Instructions and/or Bowel Prep?   MyChart    What insurance is in the chart?  Other:  BCBS    Ordering/Referring Provider: ALESHA SANCHEZ   (If ordering provider performs procedure, schedule with ordering provider unless otherwise instructed. )    BMI: Estimated body mass index is 41 kg/m  as calculated from the following:    Height as of 2/10/25: 1.702 m (5' 7\").    Weight as of 2/10/25: 118.8 kg (261 lb 12.8 oz).     Sedation Ordered  MAC/deep sedation.   BMI<= 45 45 < BMI <= 48 48 < BMI < = 50  BMI > 50   No Restrictions No MG ASC  No E[facility]  [facility] with exceptions Hospital Only OR Only       Do you have a history of malignant hyperthermia?  No    (Females) Are you currently pregnant?   No     Have you been diagnosed or told you have pulmonary hypertension?   No    Do you have an LVAD?  No    Have you been told you have moderate to severe sleep apnea?  Yes. Do you use a CPAP? Yes Where is the patient located?. (RN Review required for scheduling unless scheduling in Hospital.)     Have you been told you have COPD, asthma, or any other lung " "disease?  No    Do you have any heart conditions?  No     Have you ever had or are you waiting for an organ transplant?  No. Continue scheduling, no site restrictions.    Have you had a stroke or transient ischemic attack (TIA aka \"mini stroke\" in the last 6 months?   No    Have you been diagnosed with or been told you have cirrhosis of the liver?   No.    Are you currently on dialysis?   No    Do you need assistance transferring?   No    BMI: Estimated body mass index is 41 kg/m  as calculated from the following:    Height as of 2/10/25: 1.702 m (5' 7\").    Weight as of 2/10/25: 118.8 kg (261 lb 12.8 oz).     Is patients BMI > 40 and scheduling location [facility]?  Yes (If MAC sedation is ordered, schedule PAC eval)    Do you take an injectable or oral medication for weight loss or diabetes (excluding insulin)?  No    Do you take the medication Naltrexone?  No    Do you take blood thinners?  No       Prep   Are you currently on dialysis or do you have chronic kidney disease?  No    Do you have a diagnosis of diabetes?  No    Do you have a diagnosis of cystic fibrosis (CF)?  No    On a regular basis do you go 3 -5 days between bowel movements?  No    BMI > 40?  No    [facility] [place] [place] [facility] [address] [place] [address] N  [address] [place] [address]  Phone: [phone] Fax: [phone]      Final Scheduling Details     Procedure scheduled  Upper endoscopy (EGD)    Surgeon:  KAT     Date of procedure:  4/8/2025     Pre-OP / PAC:   Yes - PAC clinic evaluation scheduled.    Location  [facility] - Per exclusion criteria.    Sedation   MAC/Deep Sedation - Per order.      Patient Reminders:   You will receive a call from a Nurse to review instructions and health history.  This assessment must be completed prior to your procedure.  Failure to complete the Nurse assessment may result in the procedure being cancelled.      On the day of your procedure, please designate an adult(s) who " can drive you home stay with you for the next 24 hours. The medicines used in the exam will make you sleepy. You will not be able to drive.      You cannot take public transportation, ride share services, or non-medical taxi service without a responsible caregiver.  Medical transport services are allowed with the requirement that a responsible caregiver will receive you at your destination.  We require that drivers and caregivers are confirmed prior to your procedure.

## 2025-02-12 ENCOUNTER — VIRTUAL VISIT (OUTPATIENT)
Dept: ENDOCRINOLOGY | Facility: CLINIC | Age: 60
End: 2025-02-12
Payer: COMMERCIAL

## 2025-02-12 VITALS — HEIGHT: 67 IN | BODY MASS INDEX: 40.65 KG/M2 | WEIGHT: 259 LBS

## 2025-02-12 DIAGNOSIS — K44.9 HIATAL HERNIA: ICD-10-CM

## 2025-02-12 DIAGNOSIS — K21.9 GASTROESOPHAGEAL REFLUX DISEASE WITHOUT ESOPHAGITIS: ICD-10-CM

## 2025-02-12 DIAGNOSIS — F10.21 RECOVERING ALCOHOLIC IN REMISSION (H): Chronic | ICD-10-CM

## 2025-02-12 DIAGNOSIS — E66.813 CLASS 3 SEVERE OBESITY WITH BODY MASS INDEX (BMI) OF 40.0 TO 44.9 IN ADULT, UNSPECIFIED OBESITY TYPE, UNSPECIFIED WHETHER SERIOUS COMORBIDITY PRESENT (H): Primary | ICD-10-CM

## 2025-02-12 DIAGNOSIS — E66.01 CLASS 3 SEVERE OBESITY WITH BODY MASS INDEX (BMI) OF 40.0 TO 44.9 IN ADULT, UNSPECIFIED OBESITY TYPE, UNSPECIFIED WHETHER SERIOUS COMORBIDITY PRESENT (H): Primary | ICD-10-CM

## 2025-02-12 RX ORDER — TOPIRAMATE 25 MG/1
50 TABLET, FILM COATED ORAL 2 TIMES DAILY
Qty: 120 TABLET | Refills: 3 | Status: SHIPPED | OUTPATIENT
Start: 2025-02-12

## 2025-02-12 RX ORDER — NALTREXONE HYDROCHLORIDE 50 MG/1
TABLET, FILM COATED ORAL
Qty: 30 TABLET | Refills: 3 | Status: SHIPPED | OUTPATIENT
Start: 2025-02-12

## 2025-02-12 ASSESSMENT — PAIN SCALES - GENERAL: PAINLEVEL_OUTOF10: SEVERE PAIN (7)

## 2025-02-12 NOTE — PATIENT INSTRUCTIONS
"Thank you for allowing us the privilege of caring for you. We hope we provided you with the excellent service you deserve.   Please let us know if there is anything else we can do for you so that we can be sure you are completely satisfied with your care experience.    To ensure the quality of our services you may be receiving a patient satisfaction survey from an independent patient satisfaction monitoring company.    The greatest compliment you can give is a \"Likely to Recommend\"    Your visit was with Louise Colindres PA-C today.    Instructions per today's visit:     Tyler Dong, it was great to visit with you today.  Here is a review of our visit.  If our clinic scheduler is not able to reach you please call [phone] to schedule your next appointments.    Plan  Increase topiramate to 50mg twice daily   Restart Naltrexone 50mg - take 1/2 tablet daily for 7 days, then increase to 1 tablet daily.   Goals we discussed today:   Continue to decrease snacks  Continue to increase protein and vegetables.   Continue to avoid cigarette use   Labs ordered at previous visit- discussed getting this done   Follow up with PADMINI in 3 months   Dietician appointment to be scheduled asap   Keep up the excellent work!       Information about Video Visits with [facility]: video visit information  _________________________________________________________________________________________________________________________________________________________  If you are asked by your clinic team to have your blood pressure checked:  [facility] do offer several locations for blood pressure checks. Please follow the below link to schedule an appointment. Scheduling an appointment at the pharmacy for a blood pressure check is now preferred.    Appointment Plus " ([employer]  _________________________________________________________________________________________________________________________________________________________  Important contact and scheduling information:  Please call our contact center at [phone] to schedule your next appointments.  To find a lab location near you, please call [phone].  For any nursing questions or concerns call Sara Luo LPN at [phone] or Erin Sahu RN at [phone]  Please call during clinic hours Monday through Friday 8:00a - 4:00p if you have questions or you can contact us via [employer] at anytime and we will reply during clinic hours.    Lab results will be communicated through My Chart or letter (if My Chart not used). Please call the clinic if you have not received communication after 1 week or if you have any questions.?  Clinic Fax: [phone]    _Interested in working with a health ?  Health coaches work with you to improve your overall health and wellbeing.  They look at the whole person, and may involve discussion of different areas of life, including, but not limited to the four pillars of health (sleep, exercise, nutrition, and stress management). Discuss with your care team if you would like to start working a health .  Health Coaching-3 Pack: Schedule by calling [phone]    $99 for three health coaching visits    Visits may be done in person or via phone    Coaching is a partnership between the  and the client; Coaches do not prescribe or diagnose    Coaching helps inspire the client to reach his/her personal goals   _________________________________________________________________________________________________________________________________________________________  __________  [place] Get Moving Program  Our team of physical therapists is trained to help you understand and take control of your condition. They will perform a thorough  evaluation to determine your ability for activity and develop a customized plan to fit your goals and physical ability.  Scheduling: Unsure if the Get Moving program is right for you? Discuss the program with your medical provider or diabetes educator. You can also call us at [phone] to ask questions or schedule an appointment.   [facility] Get Moving Program  ____________________________________________________________________________________________________________________________________________________________________________        Thank you,   [facility] Comprehensive Weight Management Team

## 2025-02-12 NOTE — NURSING NOTE
Current patient location: Patient declined to provide     Is the patient currently in the state of [place]? YES    Visit mode: VIDEO    If the visit is dropped, the patient can be reconnected by:Video VISIT: Phone number:   Telephone Information:   Mobile [phone]       Will anyone else be joining the visit? NO  (If patient encounters technical issues they should call [phone] :[phone])    Are changes needed to the allergy or medication list? No    Are refills needed on medications prescribed by this physician? NO    Rooming Documentation:  Questionnaire(s) completed    Reason for visit: RECHBENITO CUENCA

## 2025-02-12 NOTE — TELEPHONE ENCOUNTER
FUTURE VISIT INFORMATION      SURGERY INFORMATION:  Date: 4/8/25  [facility]  Surgeon:  René Rodriguez DO   Anesthesia Type:  mac  Procedure: Esophagoscopy, gastroscopy, duodenoscopy (EGD), combined     RECORDS REQUESTED FROM:       [facility]    Pertinent Medical History: fior

## 2025-02-12 NOTE — PROGRESS NOTES
Virtual Visit Details    Type of service:  Video Visit     Originating Location (pt. Location): Home    Distant Location (provider location):  Off-site  Platform used for Video Visit: [facility] Medical Weight Management Note     Gudelia Dong  MRN:  [id number]  :  1965  AKSHAT:  2025    Dear Mendel Mireles PA-C,    I had the pleasure of seeing your patient Gudelia Dong. She is a 59 year old female who I am continuing to see for treatment of obesity related to:        2023    10:35 AM   --   I have the following health issues associated with obesity Pre-Diabetes    Sleep Apnea    GERD (Reflux)   I have the following symptoms associated with obesity Depression    Lower Extremity Swelling       Assessment & Plan   Problem List Items Addressed This Visit       Gastroesophageal reflux disease without esophagitis (Chronic)    Relevant Medications    topiramate (TOPAMAX) 25 MG tablet    Hiatal hernia (Chronic)    Relevant Medications    naltrexone (DEPADE/REVIA) 50 MG tablet    topiramate (TOPAMAX) 25 MG tablet    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   Goals we discussed today:   Continue to decrease snacks  Continue to increase protein and vegetables.   Continue to avoid cigarette use   Labs ordered at previous visit- discussed getting this done   Follow up with [facility] pharmacy in 2 months    Follow up with [facility] in 5-6 months   Dietician appointment to be scheduled asap   Keep up the excellent work!       Anti-obesity medication started today for this patient   NALTREXONE  Would likely have synergistic effect with wellbutrin. Took in the  "past to help with substance use.   No history of liver disease  No chronic pain   No current opioid use   .  Potential anti-obesity medications for this patient the future if there are issues with cost or side effects  ZEPBOUND  Would likely see benefit due to severe ROMEO- sleep study with [facility] not visible in chart.   No history of pancreatitis   No personal or family history of medullary thyroid carcinoma  No personal or family history of Multiple Endocrine Neoplasia Type 2  Caution would be needed with this medication due to long term GI issues related to hiatal hernia, reports they are currently well controlled, denies vomiting or regurgitation for the last 4+ months   .    METFORMIN  Has diagnosis of prediabetes   Is concurrently taking the following medication(s) associated with weight gain: lamotrigine   No history of chronic kidney disease  GFR >45  .    Contraindicated medications for this patient  Phentermine- contraindicated due to substance abuse (crack) history     INTERVAL HISTORY:  MWM previously with Dr. Rg, since August 2023   Last seen by Dr. Davis 2/12/24   \"No the best time for her to lose weight, can come back to that. \"     New to me- 10/1/24  Hoping to establish with the [facility] weight management team moving forward for a better fit.   Overall weight maintenance since last visit.      Ongoing issues with erosive esophagitis, dysphagia, vomiting, working regularly with Rosalie Martínez PA-C. Symptoms are better with recent diet adjustments- denies issues with dysphagia, vomiting. Last vomited maybe 4 months ago.      Regarding tobacco use- has completely cut out since August 2024!      We briefly discussed bariatric surgery as a possible option- explained that a hiatal hernia repair can happen at the same time as bariatric surgery.   Gudelia would prefer to medical weight management at this time due to concern for risks associated with major surgery. For future reference, we would need to be " cautious with sleeve gastrectomy due to persistent esophagitis, RYGB would likely be contraindicated due to long term history smoking, diagnosis of bipolar disorder type 2.      Is interested in working towards a hiatal hernia repair- goal is bmi 35 to achieve this.   -started topiramate     Today in visit 2/12/25   29lbs weight loss since last visit, thrilled with this.    Noticing great improvement in GI symptoms, denies any vomiting or regurgitation since last visit.     Restarted smoking tobacco in 2025, smoked 2 cigarillos for about 3 weeks, but has quit again for the last 2 week after getting the flu, has not started again. Feeling confident that she'll be able to stop moving forward.      Rescheduling endoscopy due to the flu.     Wt Readings from Last 5 Encounters:   02/12/25 117.5 kg (259 lb)   02/10/25 118.8 kg (261 lb 12.8 oz)   02/03/25 119.3 kg (263 lb)   01/28/25 117.9 kg (260 lb)   01/21/25 122.4 kg (269 lb 14.4 oz)       Anti-obesity medication history    Current:   Topiramate- feeling good on this med, denies side effects. Helpful with avoiding snacks, minimizing craving. Eating smaller portions as well.     Past/Failed/contraindicated:       Recent diet changes: smaller portions, fewer snacks. Never got to meet with dietician, will schedule asap. Knows she want to increase protein and vegetables.      Protein - likes hardboiled eggs, ham, chicken, fish. Notes more dysphagia with white meat compared to dark meat. Discussed other sources such as zero sugar greek yogurt, cottage cheese, beans.   Water- drinking throughout the day    Recent exercise/activity changes: minimal exercise with ongoing knee pain- is scheduled with meet with PT tomorrow.     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 Meets with sleep medicine provider annually     Vitamins/Labs: labs ordered at previous visit, discussed getting these done        CURRENT WEIGHT:   259 lbs 0 oz    Initial Weight (lbs): 288 lbs  Last Visits Weight: 118.8 kg (261 lb 12.8 oz)  Cumulative weight loss (lbs): 29  Weight Loss Percentage: 10.07%        2/12/2025     3:09 PM   Changes and Difficulties   I have made the following changes to my diet since my last visit: eating less not snacking at night   With regards to my diet, I am still struggling with: some sweets   I have made the following changes to my activity/exercise since my last visit: eating less             MEDICATIONS:   Current Outpatient Medications   Medication Sig Dispense Refill    naltrexone (DEPADE/REVIA) 50 MG tablet Take 1/2 tablet once daily 1-2 hours prior to worst cravings for 1 week, then increase to 1 tablet daily as directed if tolerating 30 tablet 3    topiramate (TOPAMAX) 25 MG tablet Take 2 tablets (50 mg) by mouth 2 times daily. 120 tablet 3    albuterol (PROAIR HFA/PROVENTIL HFA/VENTOLIN HFA) 108 (90 Base) MCG/ACT inhaler Inhale 2 puffs into the lungs every 4 hours as needed for shortness of breath, wheezing or cough. 18 g 1    ammonium lactate (AMLACTIN) 12 % external cream Apply topically daily as needed for dry skin 385 g 1    buPROPion (WELLBUTRIN XL) 150 MG 24 hr tablet Take 450 mg by mouth every morning      busPIRone HCl (BUSPAR) 30 MG tablet Take 1 tablet by mouth 2 times daily      dextromethorphan-guaiFENesin (MUCINEX DM)  MG 12 hr tablet Take 1 tablet by mouth every 12 hours as needed for cough. 30 tablet 1    diclofenac (VOLTAREN) 50 MG EC tablet Take 1 tablet (50 mg) by mouth 2 times daily. 42 tablet 0    Doxepin HCl 150 MG CAPS Take 300 mg by mouth At Bedtime 30 capsule 0    escitalopram (LEXAPRO) 10 MG tablet Take 10 mg by mouth every morning      esomeprazole (NEXIUM) 20 MG DR capsule Take 1 capsule (20 mg) by mouth 2 times daily Take 30-60 minutes before eating. 180 capsule 0    famotidine (PEPCID) 40 MG tablet Take 1 tablet (40 mg) by mouth 2 times daily. 180 tablet 3     "fluticasone-salmeterol (ADVAIR) 250-50 MCG/ACT inhaler Inhale 1 puff into the lungs every 12 hours. 14 each 0    lamoTRIgine (LAMICTAL) 100 MG tablet Take 100 mg by mouth every morning      levothyroxine (SYNTHROID/LEVOTHROID) 150 MCG tablet TAKE ONE TABLET BY MOUTH EVERY DAY 90 tablet 3    nystatin (MYCOSTATIN) 310948 UNIT/GM external powder Apply topically daily 60 g 11    order for DME Equipment being ordered: CPAP  Please dispense Cpap and its supply 1 Units prn    RABEprazole (ACIPHEX) 20 MG EC tablet Take 1 tablet (20 mg) by mouth 2 times daily (before meals). 180 tablet 1    REXULTI 2 MG tablet Take 2 mg by mouth every morning      triamcinolone (KENALOG) 0.025 % external ointment Apply topically 2 times daily To affected area in the groin as needed up to 2 weeks at a time. 30 g 3           2/12/2025     3:09 PM   Weight Loss Medication History Reviewed With Patient   Which weight loss medications are you currently taking on a regular basis? Topamax (topiramate)   Are you having any side effects from the weight loss medication that we have prescribed you? No               1/30/2014     4:00 PM   AMMON Score (Last Two)   AMMON Raw Score 42   Activation Score 66   AMMON Level 3         PHYSICAL EXAM:  Objective    Ht 1.702 m (5' 7.01\")   Wt 117.5 kg (259 lb)   LMP 07/20/2006   BMI 40.56 kg/m      Vitals - Patient Reported  Pain Score: Severe Pain (7)  Pain Loc: Knee      Vitals:  No vitals were obtained today due to virtual visit.    GENERAL: alert and no distress  EYES: Eyes grossly normal to inspection.  No discharge or erythema, or obvious scleral/conjunctival abnormalities.  RESP: No audible wheeze, cough, or visible cyanosis.    SKIN: Visible skin clear. No significant rash, abnormal pigmentation or lesions.  NEURO: Cranial nerves grossly intact.  Mentation and speech appropriate for age.  PSYCH: Appropriate affect, tone, and pace of words        Sincerely,    Louies Colindres PA-C      37 minutes spent by " me on the date of the encounter doing chart review, history and exam, documentation and further activities per the note    The longitudinal plan of care for the diagnosis(es)/condition(s) as documented were addressed during this visit. Due to the added complexity in care, I will continue to support Gudelia in the subsequent management and with ongoing continuity of care.

## 2025-02-12 NOTE — LETTER
2025       RE: Gudelia Dong  [address]  [place] [address]     Dear Colleague,    Thank you for referring your patient, Gudelia Dong, to the [facility]NA[facility] [place] at [facility]. Please see a copy of my visit note below.    Virtual Visit Details    Type of service:  Video Visit     Originating Location (pt. Location): Home    Distant Location (provider location):  Off-site  Platform used for Video Visit: [facility] Medical Weight Management Note     Gudelia Dong  MRN:  [id number]  :  1965  AKSHAT:  2025    Dear Mendel Mireles PA-C,    I had the pleasure of seeing your patient Gudelia Dong. She is a 59 year old female who I am continuing to see for treatment of obesity related to:        2023    10:35 AM   --   I have the following health issues associated with obesity Pre-Diabetes    Sleep Apnea    GERD (Reflux)   I have the following symptoms associated with obesity Depression    Lower Extremity Swelling       Assessment & Plan  Problem List Items Addressed This Visit       Gastroesophageal reflux disease without esophagitis (Chronic)    Relevant Medications    topiramate (TOPAMAX) 25 MG tablet    Hiatal hernia (Chronic)    Relevant Medications    naltrexone (DEPADE/REVIA) 50 MG tablet    topiramate (TOPAMAX) 25 MG tablet    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   Goals we discussed today:   Continue to decrease snacks  Continue to increase protein and vegetables.   Continue to avoid cigarette use   Labs ordered at previous visit-  "discussed getting this done   Follow up with [facility] pharmacy in 2 months    Follow up with [facility] in 5-6 months   Dietician appointment to be scheduled asap   Keep up the excellent work!       Anti-obesity medication started today for this patient   NALTREXONE  Would likely have synergistic effect with wellbutrin. Took in the past to help with substance use.   No history of liver disease  No chronic pain   No current opioid use   .  Potential anti-obesity medications for this patient the future if there are issues with cost or side effects  ZEPBOUND  Would likely see benefit due to severe ROMEO- sleep study with [facility] not visible in chart.   No history of pancreatitis   No personal or family history of medullary thyroid carcinoma  No personal or family history of Multiple Endocrine Neoplasia Type 2  Caution would be needed with this medication due to long term GI issues related to hiatal hernia, reports they are currently well controlled, denies vomiting or regurgitation for the last 4+ months   .    METFORMIN  Has diagnosis of prediabetes   Is concurrently taking the following medication(s) associated with weight gain: lamotrigine   No history of chronic kidney disease  GFR >45  .    Contraindicated medications for this patient  Phentermine- contraindicated due to substance abuse (crack) history     INTERVAL HISTORY:  MWM previously with Dr. Rg, since August 2023   Last seen by Dr. Davis 2/12/24   \"No the best time for her to lose weight, can come back to that. \"     New to me- 10/1/24  Hoping to establish with the [facility] weight management team moving forward for a better fit.   Overall weight maintenance since last visit.      Ongoing issues with erosive esophagitis, dysphagia, vomiting, working regularly with Rosalie Martínez PA-C. Symptoms are better with recent diet adjustments- denies issues with dysphagia, vomiting. Last vomited maybe 4 months ago.      Regarding tobacco use- has completely cut out since " August 2024!      We briefly discussed bariatric surgery as a possible option- explained that a hiatal hernia repair can happen at the same time as bariatric surgery.   Gudelia would prefer to medical weight management at this time due to concern for risks associated with major surgery. For future reference, we would need to be cautious with sleeve gastrectomy due to persistent esophagitis, RYGB would likely be contraindicated due to long term history smoking, diagnosis of bipolar disorder type 2.      Is interested in working towards a hiatal hernia repair- goal is bmi 35 to achieve this.   -started topiramate     Today in visit 2/12/25   29lbs weight loss since last visit, thrilled with this.    Noticing great improvement in GI symptoms, denies any vomiting or regurgitation since last visit.     Restarted smoking tobacco in 2025, smoked 2 cigarillos for about 3 weeks, but has quit again for the last 2 week after getting the flu, has not started again. Feeling confident that she'll be able to stop moving forward.      Rescheduling endoscopy due to the flu.     Wt Readings from Last 5 Encounters:   02/12/25 117.5 kg (259 lb)   02/10/25 118.8 kg (261 lb 12.8 oz)   02/03/25 119.3 kg (263 lb)   01/28/25 117.9 kg (260 lb)   01/21/25 122.4 kg (269 lb 14.4 oz)       Anti-obesity medication history    Current:   Topiramate- feeling good on this med, denies side effects. Helpful with avoiding snacks, minimizing craving. Eating smaller portions as well.     Past/Failed/contraindicated:       Recent diet changes: smaller portions, fewer snacks. Never got to meet with dietician, will schedule asap. Knows she want to increase protein and vegetables.      Protein - likes hardboiled eggs, ham, chicken, fish. Notes more dysphagia with white meat compared to dark meat. Discussed other sources such as zero sugar greek yogurt, cottage cheese, beans.   Water- drinking throughout the day    Recent exercise/activity changes: minimal  exercise with ongoing knee pain- is scheduled with meet with PT tomorrow.     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 Meets with sleep medicine provider annually     Vitamins/Labs: labs ordered at previous visit, discussed getting these done       CURRENT WEIGHT:   259 lbs 0 oz    Initial Weight (lbs): 288 lbs  Last Visits Weight: 118.8 kg (261 lb 12.8 oz)  Cumulative weight loss (lbs): 29  Weight Loss Percentage: 10.07%        2/12/2025     3:09 PM   Changes and Difficulties   I have made the following changes to my diet since my last visit: eating less not snacking at night   With regards to my diet, I am still struggling with: some sweets   I have made the following changes to my activity/exercise since my last visit: eating less             MEDICATIONS:   Current Outpatient Medications   Medication Sig Dispense Refill     naltrexone (DEPADE/REVIA) 50 MG tablet Take 1/2 tablet once daily 1-2 hours prior to worst cravings for 1 week, then increase to 1 tablet daily as directed if tolerating 30 tablet 3     topiramate (TOPAMAX) 25 MG tablet Take 2 tablets (50 mg) by mouth 2 times daily. 120 tablet 3     albuterol (PROAIR HFA/PROVENTIL HFA/VENTOLIN HFA) 108 (90 Base) MCG/ACT inhaler Inhale 2 puffs into the lungs every 4 hours as needed for shortness of breath, wheezing or cough. 18 g 1     ammonium lactate (AMLACTIN) 12 % external cream Apply topically daily as needed for dry skin 385 g 1     buPROPion (WELLBUTRIN XL) 150 MG 24 hr tablet Take 450 mg by mouth every morning       busPIRone HCl (BUSPAR) 30 MG tablet Take 1 tablet by mouth 2 times daily       dextromethorphan-guaiFENesin (MUCINEX DM)  MG 12 hr tablet Take 1 tablet by mouth every 12 hours as needed for cough. 30 tablet 1     diclofenac (VOLTAREN) 50 MG EC tablet Take 1 tablet (50 mg) by mouth 2 times daily. 42 tablet 0     Doxepin HCl 150 MG CAPS Take 300 mg by  "mouth At Bedtime 30 capsule 0     escitalopram (LEXAPRO) 10 MG tablet Take 10 mg by mouth every morning       esomeprazole (NEXIUM) 20 MG DR capsule Take 1 capsule (20 mg) by mouth 2 times daily Take 30-60 minutes before eating. 180 capsule 0     famotidine (PEPCID) 40 MG tablet Take 1 tablet (40 mg) by mouth 2 times daily. 180 tablet 3     fluticasone-salmeterol (ADVAIR) 250-50 MCG/ACT inhaler Inhale 1 puff into the lungs every 12 hours. 14 each 0     lamoTRIgine (LAMICTAL) 100 MG tablet Take 100 mg by mouth every morning       levothyroxine (SYNTHROID/LEVOTHROID) 150 MCG tablet TAKE ONE TABLET BY MOUTH EVERY DAY 90 tablet 3     nystatin (MYCOSTATIN) 539341 UNIT/GM external powder Apply topically daily 60 g 11     order for DME Equipment being ordered: CPAP  Please dispense Cpap and its supply 1 Units prn     RABEprazole (ACIPHEX) 20 MG EC tablet Take 1 tablet (20 mg) by mouth 2 times daily (before meals). 180 tablet 1     REXULTI 2 MG tablet Take 2 mg by mouth every morning       triamcinolone (KENALOG) 0.025 % external ointment Apply topically 2 times daily To affected area in the groin as needed up to 2 weeks at a time. 30 g 3           2/12/2025     3:09 PM   Weight Loss Medication History Reviewed With Patient   Which weight loss medications are you currently taking on a regular basis? Topamax (topiramate)   Are you having any side effects from the weight loss medication that we have prescribed you? No               1/30/2014     4:00 PM   AMMON Score (Last Two)   AMMON Raw Score 42   Activation Score 66   AMMON Level 3         PHYSICAL EXAM:  Objective   Ht 1.702 m (5' 7.01\")   Wt 117.5 kg (259 lb)   LMP 07/20/2006   BMI 40.56 kg/m      Vitals - Patient Reported  Pain Score: Severe Pain (7)  Pain Loc: Knee      Vitals:  No vitals were obtained today due to virtual visit.    GENERAL: alert and no distress  EYES: Eyes grossly normal to inspection.  No discharge or erythema, or obvious scleral/conjunctival " abnormalities.  RESP: No audible wheeze, cough, or visible cyanosis.    SKIN: Visible skin clear. No significant rash, abnormal pigmentation or lesions.  NEURO: Cranial nerves grossly intact.  Mentation and speech appropriate for age.  PSYCH: Appropriate affect, tone, and pace of words        Sincerely,    Louise Colindres PA-C      37 minutes spent by me on the date of the encounter doing chart review, history and exam, documentation and further activities per the note    The longitudinal plan of care for the diagnosis(es)/condition(s) as documented were addressed during this visit. Due to the added complexity in care, I will continue to support Gudelia in the subsequent management and with ongoing continuity of care.       Again, thank you for allowing me to participate in the care of your patient.      Sincerely,    Louise Colindres PA-C

## 2025-02-13 ENCOUNTER — THERAPY VISIT (OUTPATIENT)
Dept: PHYSICAL THERAPY | Facility: REHABILITATION | Age: 60
End: 2025-02-13
Attending: PHYSICIAN ASSISTANT
Payer: COMMERCIAL

## 2025-02-13 DIAGNOSIS — M17.32 POST-TRAUMATIC OSTEOARTHRITIS OF LEFT KNEE: ICD-10-CM

## 2025-02-13 DIAGNOSIS — M84.369A STRESS FRACTURE OF KNEE: ICD-10-CM

## 2025-02-13 ASSESSMENT — ACTIVITIES OF DAILY LIVING (ADL)
PAIN: THE SYMPTOM AFFECTS MY ACTIVITY SEVERELY
SQUAT: ACTIVITY IS VERY DIFFICULT
GO DOWN STAIRS: ACTIVITY IS FAIRLY DIFFICULT
HOW_WOULD_YOU_RATE_THE_OVERALL_FUNCTION_OF_YOUR_KNEE_DURING_YOUR_USUAL_DAILY_ACTIVITIES?: NORMAL
PAIN: THE SYMPTOM AFFECTS MY ACTIVITY SEVERELY
GIVING WAY, BUCKLING OR SHIFTING OF KNEE: I DO NOT HAVE THE SYMPTOM
SIT WITH YOUR KNEE BENT: ACTIVITY IS NOT DIFFICULT
HOW_WOULD_YOU_RATE_THE_OVERALL_FUNCTION_OF_YOUR_KNEE_DURING_YOUR_USUAL_DAILY_ACTIVITIES?: NORMAL
WEAKNESS: THE SYMPTOM AFFECTS MY ACTIVITY SEVERELY
GIVING WAY, BUCKLING OR SHIFTING OF KNEE: I DO NOT HAVE THE SYMPTOM
SWELLING: I DO NOT HAVE THE SYMPTOM
WALK: ACTIVITY IS VERY DIFFICULT
LIMPING: THE SYMPTOM AFFECTS MY ACTIVITY SEVERELY
GO UP STAIRS: ACTIVITY IS FAIRLY DIFFICULT
HOW_WOULD_YOU_RATE_THE_CURRENT_FUNCTION_OF_YOUR_KNEE_DURING_YOUR_USUAL_DAILY_ACTIVITIES_ON_A_SCALE_FROM_0_TO_100_WITH_100_BEING_YOUR_LEVEL_OF_KNEE_FUNCTION_PRIOR_TO_YOUR_INJURY_AND_0_BEING_THE_INABILITY_TO_PERFORM_ANY_OF_YOUR_USUAL_DAILY_ACTIVITIES?: 100
PLEASE_INDICATE_YOR_PRIMARY_REASON_FOR_REFERRAL_TO_THERAPY:: KNEE
GO DOWN STAIRS: ACTIVITY IS FAIRLY DIFFICULT
AS_A_RESULT_OF_YOUR_KNEE_INJURY,_HOW_WOULD_YOU_RATE_YOUR_CURRENT_LEVEL_OF_DAILY_ACTIVITY?: SEVERELY ABNORMAL
HOW_WOULD_YOU_RATE_THE_CURRENT_FUNCTION_OF_YOUR_KNEE_DURING_YOUR_USUAL_DAILY_ACTIVITIES_ON_A_SCALE_FROM_0_TO_100_WITH_100_BEING_YOUR_LEVEL_OF_KNEE_FUNCTION_PRIOR_TO_YOUR_INJURY_AND_0_BEING_THE_INABILITY_TO_PERFORM_ANY_OF_YOUR_USUAL_DAILY_ACTIVITIES?: 100
SWELLING: I DO NOT HAVE THE SYMPTOM
WALK: ACTIVITY IS VERY DIFFICULT
SIT WITH YOUR KNEE BENT: ACTIVITY IS NOT DIFFICULT
STIFFNESS: THE SYMPTOM AFFECTS MY ACTIVITY SEVERELY
KNEEL ON THE FRONT OF YOUR KNEE: ACTIVITY IS VERY DIFFICULT
SQUAT: ACTIVITY IS VERY DIFFICULT
LIMPING: THE SYMPTOM AFFECTS MY ACTIVITY SEVERELY
RAW_SCORE: 32
GO UP STAIRS: ACTIVITY IS FAIRLY DIFFICULT
STAND: ACTIVITY IS FAIRLY DIFFICULT
RISE FROM A CHAIR: ACTIVITY IS MINIMALLY DIFFICULT
STAND: ACTIVITY IS FAIRLY DIFFICULT
KNEE_ACTIVITY_OF_DAILY_LIVING_SCORE: 45.71
STIFFNESS: THE SYMPTOM AFFECTS MY ACTIVITY SEVERELY
RISE FROM A CHAIR: ACTIVITY IS MINIMALLY DIFFICULT
WEAKNESS: THE SYMPTOM AFFECTS MY ACTIVITY SEVERELY
KNEE_ACTIVITY_OF_DAILY_LIVING_SUM: 32
AS_A_RESULT_OF_YOUR_KNEE_INJURY,_HOW_WOULD_YOU_RATE_YOUR_CURRENT_LEVEL_OF_DAILY_ACTIVITY?: SEVERELY ABNORMAL
KNEEL ON THE FRONT OF YOUR KNEE: ACTIVITY IS VERY DIFFICULT

## 2025-02-13 NOTE — PROGRESS NOTES
PHYSICAL THERAPY EVALUATION  Type of Visit: Evaluation       Fall Risk Screen:  Fall screen completed by: PT  Have you fallen 2 or more times in the past year?: No  Have you fallen and had an injury in the past year?: No  Is patient a fall risk?: Yes; Department fall risk interventions implemented    Subjective     Pt is a 59 year old female presenting with complaints of L medial> lateral knee pain. Per 11/2024 MRI she had a small stress fracture  associated with the medial femoral condyle. Patient never went into a guarded weightbearing/minimal weightbearing status on her left knee as she did not utilize walker nor crutches.  She has utilized a single-point cane in her right hand  The symptoms started in October 2024 without FAUSTINO.   Prior treatments: injection 1/21/25 (no benefit),.  Pain is worse with kneeling ie to clean, walking without cane, any movement. MD suggested TKA if conservative measures fail.    Pain is better with resting   Sleep Quality: fair to poor but not affected by the knees   Current level of activity: nothing currently    Goals of therapy: get back to normal; walking, kneeling    XR 1/21/25  IMPRESSION:   LEFT KNEE: Moderate to severe medial compartment joint space narrowing. There is a knee joint effusion. No evidence of an acute fracture.  RIGHT KNEE: Views of the right knee show mild medial and patellofemoral compartment arthrosis.    MRI 11/16/24  IMPRESSION:  1.  High-grade radial tearing of the medial meniscus posterior root, with mild medial meniscal body extrusion.  2.  Nondisplaced, subchondral fracture involving the weightbearing medial femoral condyle centrally with fairly extensive associated marrow edema signal.  3.  No lateral meniscal tear. The cruciate and collateral ligaments remain intact.  4.  Mild patellar chondromalacia. No high-grade or full-thickness medial or lateral compartment cartilage defect.  5.  Moderate-sized joint effusion.  6.  Findings suggesting very mild  prepatellar bursitis.          Presenting condition or subjective complaint: Stiffness inability to kneel or walk w/out cane  Date of onset: 10/01/24    Relevant medical history:     Dates & types of surgery:      Prior diagnostic imaging/testing results: MRI     Prior therapy history for the same diagnosis, illness or injury: No      Prior Level of Function  Transfers:   Ambulation:   ADL:   IADL:     Living Environment  Social support: Alone -  is incarcerated   Type of home: Apartment/condo   Stairs to enter the home: No       Ramp: No   Stairs inside the home: No       Help at home: None  Equipment owned: Straight Cane; Walker; Crutches     Employment: Yes   Hobbies/Interests: Reading walking biking rollerblading skating skiing [employer]    Patient goals for therapy: Return to normal functioning    Pain assessment: See objective evaluation for additional pain details     Objective         KNEE EVALUATION    PAIN: Pain Level at Rest: 0/10  Pain Level with Use: 6/10  Pain Quality: Aching, Sharp, and Stabbing  Pain Frequency: intermittent ie no pain at rest     INTEGUMENTARY (edema, incisions):  notable swelling on L    GAIT:  Weightbearing Status: WBAT  Assistive Device(s): Cane (single end)  Gait Deviations: Antalgic, hip drop      Knee ROM:   (Degrees) Right AROM Right PROM  Left AROM Left PROM   Knee Flexion 130  107 pain     Knee Extension 0  Lacking 15 pain        STRENGTH:   Pain: - none + mild ++ moderate +++ severe  Strength Scale: 0-5/5 Right Left   Hip Flexion 4 3+   Hip Extension     Hip Abduction 4+ 4   Hip Adduction     Hip Internal Rotation     Hip External Rotation     Knee Flexion 4+ 3+   Knee Extension 4+ 3+ pain and lacking full extension        Functional Strength:   STS: weight shifted to R, pain. Significant troubles to stand without UE support   Bridging: legs uneven but overall good tolerance, no pain. Challenged with lift  Quad Set: poor to fair contraction only  on L  Supine SLR: L extensor lag and increase in pain/pressure   SLS: unable to on L LE due to pain and weakness, hip drop when on R LE but able to hold a few sec without pain      SPECIAL TESTS: NT due to pain       Palpation: general disperse TTP surrounding L knee         Assessment & Plan   CLINICAL IMPRESSIONS  Medical Diagnosis: Stress fracture of knee,  Post-traumatic osteoarthritis of left knee    Treatment Diagnosis: L knee pain   Impression/Assessment: Patient is a 59 year old female with L knee complaints.  The following significant findings have been identified: Pain, Decreased ROM/flexibility, Decreased joint mobility, Decreased strength, Impaired balance, Decreased proprioception, Impaired sensation, Inflammation, Edema, Impaired gait, Impaired muscle performance, Decreased activity tolerance, Impaired posture, and Instability. These impairments interfere with their ability to perform self care tasks, work tasks, recreational activities, household chores, household mobility, and community mobility as compared to previous level of function.     Clinical Decision Making (Complexity):  Clinical Presentation: Stable/Uncomplicated  Clinical Presentation Rationale: based on medical and personal factors listed in PT evaluation  Clinical Decision Making (Complexity): Low complexity    PLAN OF CARE  Treatment Interventions:  Interventions: Gait Training, Manual Therapy, Neuromuscular Re-education, Therapeutic Activity, Therapeutic Exercise, Self-Care/Home Management    Long Term Goals     PT Goal 1  Goal Identifier: Walking  Goal Description: Pt will be able to walk for 20-30 min without AD without increase in symptoms <2/10 NRS  Rationale: to maximize safety and independence with performance of ADLs and functional tasks;to maximize safety and independence within the home;to maximize safety and independence within the community;to maximize safety and independence with transportation;to maximize safety and  independence with self cares  Target Date: 05/08/25  PT Goal 2  Goal Identifier: Kneeling  Goal Description: Pt will be able to kneel on knee for 5-10 min in order to clean wihtout increase in symptoms  Target Date: 05/08/25      Frequency of Treatment: 1x/week tapering as able  Duration of Treatment: 12 weeks    Recommended Referrals to Other Professionals:   Education Assessment:   Learner/Method: Patient  Education Comments: Eager to participate in therapy    Risks and benefits of evaluation/treatment have been explained.   Patient/Family/caregiver agrees with Plan of Care.     Evaluation Time:     PT Eval, Low Complexity Minutes (51321): 25       Signing Clinician: Jennifer Doss, PT

## 2025-02-17 ENCOUNTER — TELEPHONE (OUTPATIENT)
Dept: ENDOCRINOLOGY | Facility: CLINIC | Age: 60
End: 2025-02-17
Payer: COMMERCIAL

## 2025-02-17 NOTE — TELEPHONE ENCOUNTER
Patient confirmed scheduled appointment:  Date: 4/8/25  Time: 2:30 pm  Visit type: return mwm nutrition  Provider: Tiarra Condon  Location: virtual  Testing/imaging: NA  Additional notes: NA    Patient confirmed scheduled appointment:  Date: 7/23/25  Time: 3 pm  Visit type: return mw  Provider: Louise Colindres  Location: virtual  Testing/imaging: NA  Additional notes: NA

## 2025-02-19 ENCOUNTER — TELEPHONE (OUTPATIENT)
Dept: ORTHOPEDICS | Facility: CLINIC | Age: 60
End: 2025-02-19
Payer: COMMERCIAL

## 2025-02-19 DIAGNOSIS — M17.32 POST-TRAUMATIC OSTEOARTHRITIS OF LEFT KNEE: Primary | ICD-10-CM

## 2025-02-19 DIAGNOSIS — M84.369A STRESS FRACTURE OF KNEE: ICD-10-CM

## 2025-02-19 DIAGNOSIS — M23.307 DEGENERATIVE TEAR OF MENISCUS OF LEFT KNEE: ICD-10-CM

## 2025-02-19 NOTE — TELEPHONE ENCOUNTER
Patient Returning Call    Reason for call:  patient calling regarding canceling gel injection, the brace is not working, wants to talk about surgery options, requesting callback     Information relayed to patient:  te sent to clinic    Patient has additional questions:  No      Could we [facility] or would you prefer to receive a phone call?:   Patient would prefer a phone call   Okay to leave a detailed message?: Yes at Cell number on file:    Telephone Information:   Mobile [phone]

## 2025-02-19 NOTE — TELEPHONE ENCOUNTER
I made an outbound phone call and spoke with the patient once again.  I let her know that we did place the surgical referral, and that we canceled her upcoming appointment for the gel injections as we discussed.  She can call the orthopedic scheduling number if she wishes, or she will be contacted in the next 2 business days to set up an appointment.  When asked, she did not have any additional questions or our team at this time.  Ronny Cross, ATC

## 2025-02-19 NOTE — CONFIDENTIAL NOTE
Surgical Ortho  referral placed per patient request to meet with a knee surgeon to discuss knee replacement as patient has exhausted and failed all conservative management at this time for her medial subchondral bone injury with progression of medial joint collapse with associated posttraumatic osteoarthritis due to patient not going into a previous nonweightbearing status as recommended.  Patient should be contacted within the next 5 business days to schedule a mutual appointment to meet with one of our surgeons for consult.  Please  cancel patient's viscosupplementation injection with Dr. Alonso on 02/20/2025 per patient request as by proceeding with injection this would delay surgical intervention for a minimum of 3 months from date of procedure.  Patient should still engage with formal physical therapy as previously recommended and ordered for her knee.  Patient should be wearing her  brace on the skin as previously recommended.  Yovanny Valerio PA-C

## 2025-02-20 ENCOUNTER — PATIENT OUTREACH (OUTPATIENT)
Dept: CARE COORDINATION | Facility: CLINIC | Age: 60
End: 2025-02-20
Payer: COMMERCIAL

## 2025-02-24 ENCOUNTER — TELEPHONE (OUTPATIENT)
Dept: GASTROENTEROLOGY | Facility: CLINIC | Age: 60
End: 2025-02-24

## 2025-02-24 NOTE — TELEPHONE ENCOUNTER
Manometry with or without pH Test  Your exam is on 3/6/ Exam Time: 0715 Arrival Time: 0700  Check in at the [facility] [address] [place] [address]   Check in on the 3rd floor.    What is a manometry test?  This test shows the strength and function of your swallowing muscles. It also tells us the exact location of the lower muscle in your esophagus (food pipe) and stomach.    Manometry can help find the cause of acid reflux, heartburn, trouble swallowing and some types of chest pain. Or, it is done before surgery or other tests.    The test takes 45 to 60 minutes. We will:   Moisten the inside of your nose with gel.   Put a small tube in your nose, down your esophagus and into your stomach.   You will be given a series of saline (salty water). Tiny sensors on the tube will record the movements of your esophagus as you swallow.   Remove the tube.          Getting ready   Do not eat or drink 4 hours before the test. (It is okay to take medicines with a small amount of water.)     Unless your provider says it is okay, do not take these medicines for 4 hours before your test(s):  - pain medicine  - sedatives or tranquilizers  - antispasmodics  - promotility medicines  - anti-depressants       You may drive yourself to and from the test(s).      Test results  - You will receive your results in 4-8 weeks by phone, letter or MyChart. All follow up recommendations are the responsibility of your ordering provider.     For questions or appointments, call:  [facility]   GI Clinic  [phone]

## 2025-03-04 ASSESSMENT — KOOS JR
BENDING TO THE FLOOR TO PICK UP OBJECT: MODERATE
GOING UP OR DOWN STAIRS: SEVERE
STRAIGHTENING KNEE FULLY: SEVERE
HOW SEVERE IS YOUR KNEE STIFFNESS AFTER FIRST WAKING IN MORNING: SEVERE
KOOS JR SCORING: 42.28
STANDING UPRIGHT: SEVERE
RISING FROM SITTING: MODERATE
TWISING OR PIVOTING ON KNEE: MODERATE

## 2025-03-06 ENCOUNTER — OFFICE VISIT (OUTPATIENT)
Dept: GASTROENTEROLOGY | Facility: CLINIC | Age: 60
End: 2025-03-06
Attending: PHYSICIAN ASSISTANT
Payer: COMMERCIAL

## 2025-03-06 ENCOUNTER — OFFICE VISIT (OUTPATIENT)
Dept: ORTHOPEDICS | Facility: CLINIC | Age: 60
End: 2025-03-06
Attending: PHYSICIAN ASSISTANT
Payer: COMMERCIAL

## 2025-03-06 VITALS — BODY MASS INDEX: 41.12 KG/M2 | HEIGHT: 67 IN | WEIGHT: 262 LBS

## 2025-03-06 VITALS — HEART RATE: 84 BPM | DIASTOLIC BLOOD PRESSURE: 81 MMHG | SYSTOLIC BLOOD PRESSURE: 138 MMHG | OXYGEN SATURATION: 97 %

## 2025-03-06 DIAGNOSIS — M17.32 POST-TRAUMATIC OSTEOARTHRITIS OF LEFT KNEE: ICD-10-CM

## 2025-03-06 DIAGNOSIS — M17.32 POST-TRAUMATIC OSTEOARTHRITIS OF LEFT KNEE: Primary | ICD-10-CM

## 2025-03-06 DIAGNOSIS — K21.9 GASTROESOPHAGEAL REFLUX DISEASE WITHOUT ESOPHAGITIS: ICD-10-CM

## 2025-03-06 DIAGNOSIS — K22.10 EROSIVE ESOPHAGITIS: ICD-10-CM

## 2025-03-06 DIAGNOSIS — M84.369A STRESS FRACTURE OF KNEE: ICD-10-CM

## 2025-03-06 DIAGNOSIS — K44.9 HIATAL HERNIA: ICD-10-CM

## 2025-03-06 DIAGNOSIS — M23.307 DEGENERATIVE TEAR OF MENISCUS OF LEFT KNEE: ICD-10-CM

## 2025-03-06 ASSESSMENT — PAIN SCALES - GENERAL: PAINLEVEL_OUTOF10: NO PAIN (0)

## 2025-03-06 NOTE — LETTER
3/6/2025      Gudelia Dong  [address]  [place] [address]      Dear Colleague,    Thank you for referring your patient, Gudelia Dong, to the [facility] [place]. Please see a copy of my visit note below.    Assessment: This is a 60 year old with left knee pain, severe.  This is the results of a meniscus tear, development of a femoral condyle stress fracture, which has now gone on to advanced osteoarthritis (Kellgren-Iván 3-4).  She is limited in her activities of daily living.  She is limited in her ability to perform her work duties.  This has been going on now for over 4 months.  She would like to consider joint replacement.  I think this is reasonable given her symptoms and radiographic changes.  I do not think that injection therapy is going to have meaningful midterm improvement in her symptoms.We discussed that given the level of symptoms and radiographic changes that further non-operative management in the form injection and/or physical therapist directed exercises is not mandatory. We discussed the risks and benefits of total knee arthroplasty at length and reviewed the proposed surgical plan.  The discussion included but was not limited to the following:  blood clots, blood clots to the lungs, infection, injury to blood vessels and nerves, stiffness, and continued pain. We discussed that as part of the routine part of surgical care a qualified assist may finish closing the wound after I have left the room. Reviewed templated radiographs as part of teaching. Discussed increased perioperative risks with high BMI. We discussed the post-operative recovery for the typical patient, return to driving, and return to normal activities.  All the patient's questions were answered to the best of my ability.    Plan:  left total knee arthroplasty when the patient chooses to go forward with it.     Chief Complaint: No chief complaint on file.    Physician:  Yovanny SNEED  Law    HPI: Gudelia Dong is a 60 year old female who presents today for evaluation of    Symptom Profile  Location of symptoms:  medial and lateral joint lines   Onset: acute for an unknown reason   Trend: getting worse   Duration of symptoms: 4-5 months   Quality of symptoms: aching, sharp/stabbing  Severity:severe  Alleviate: activity modification  Exacerbating: activities  Previous Treatments: Previous treatments include activity modification, oral pain medication    LA Hendrix 42.28  PROMIS Mental: (Patient-Rptd) (P) 11  PROMIS Physical (Patient-Rptd) (P) 10  PROMIS TOTAL (Patient-Rptd) (P) 21  [facility]:    MEDICAL HISTORY:   Past Medical History:   Diagnosis Date     Anemia      Depression      Erosive esophagitis      GERD (gastroesophageal reflux disease)      Hemorrhoid      Hiatal hernia      Hypothyroidism      Menstrual disorder     s/p D&C -12/2012     Obesity      ROMEO on CPAP      Other bipolar disorders     followed by Dr Collazo     Post menopausal syndrome      Prediabetes      Tobacco abuse        Medications:     Current Outpatient Medications:      albuterol (PROAIR HFA/PROVENTIL HFA/VENTOLIN HFA) 108 (90 Base) MCG/ACT inhaler, Inhale 2 puffs into the lungs every 4 hours as needed for shortness of breath, wheezing or cough., Disp: 18 g, Rfl: 1     ammonium lactate (AMLACTIN) 12 % external cream, Apply topically daily as needed for dry skin, Disp: 385 g, Rfl: 1     buPROPion (WELLBUTRIN XL) 150 MG 24 hr tablet, Take 450 mg by mouth every morning, Disp: , Rfl:      busPIRone HCl (BUSPAR) 30 MG tablet, Take 1 tablet by mouth 2 times daily, Disp: , Rfl:      dextromethorphan-guaiFENesin (MUCINEX DM)  MG 12 hr tablet, Take 1 tablet by mouth every 12 hours as needed for cough., Disp: 30 tablet, Rfl: 1     diclofenac (VOLTAREN) 50 MG EC tablet, Take 1 tablet (50 mg) by mouth 2 times daily., Disp: 42 tablet, Rfl: 0     Doxepin HCl 150 MG CAPS, Take 300 mg by mouth At Bedtime, Disp: 30 capsule, Rfl: 0      escitalopram (LEXAPRO) 10 MG tablet, Take 10 mg by mouth every morning, Disp: , Rfl:      esomeprazole (NEXIUM) 20 MG DR capsule, Take 1 capsule (20 mg) by mouth 2 times daily Take 30-60 minutes before eating., Disp: 180 capsule, Rfl: 0     famotidine (PEPCID) 40 MG tablet, Take 1 tablet (40 mg) by mouth 2 times daily., Disp: 180 tablet, Rfl: 3     fluticasone-salmeterol (ADVAIR) 250-50 MCG/ACT inhaler, Inhale 1 puff into the lungs every 12 hours., Disp: 14 each, Rfl: 0     lamoTRIgine (LAMICTAL) 100 MG tablet, Take 100 mg by mouth every morning, Disp: , Rfl:      levothyroxine (SYNTHROID/LEVOTHROID) 150 MCG tablet, TAKE ONE TABLET BY MOUTH EVERY DAY, Disp: 90 tablet, Rfl: 3     naltrexone (DEPADE/REVIA) 50 MG tablet, Take 1/2 tablet once daily 1-2 hours prior to worst cravings for 1 week, then increase to 1 tablet daily as directed if tolerating, Disp: 30 tablet, Rfl: 3     nystatin (MYCOSTATIN) 923833 UNIT/GM external powder, Apply topically daily, Disp: 60 g, Rfl: 11     order for DME, Equipment being ordered: CPAP Please dispense Cpap and its supply, Disp: 1 Units, Rfl: prn     RABEprazole (ACIPHEX) 20 MG EC tablet, Take 1 tablet (20 mg) by mouth 2 times daily (before meals)., Disp: 180 tablet, Rfl: 1     REXULTI 2 MG tablet, Take 2 mg by mouth every morning, Disp: , Rfl:      topiramate (TOPAMAX) 25 MG tablet, Take 2 tablets (50 mg) by mouth 2 times daily., Disp: 120 tablet, Rfl: 3     triamcinolone (KENALOG) 0.025 % external ointment, Apply topically 2 times daily To affected area in the groin as needed up to 2 weeks at a time., Disp: 30 g, Rfl: 3    Allergies: Contrast dye, Methylpyrrolidone, Iodine, and Morphine    SURGICAL HISTORY:   Past Surgical History:   Procedure Laterality Date     COLONOSCOPY  12/24/2013    Procedure: COLONOSCOPY;;  Surgeon: Guy Grant MD;  Location:  GI     COLONOSCOPY  1/9/2014    Procedure: COMBINED COLONOSCOPY, SINGLE BIOPSY/POLYPECTOMY BY BIOPSY;;  Surgeon: Guy Grant  MD TEX;  Location:  GI     COLONOSCOPY N/A 2023    Procedure: COLONOSCOPY, WITH POLYPECTOMY AND BIOPSY;  Surgeon: René Rodriguez DO;  Location:  GI     ESOPHAGOSCOPY, GASTROSCOPY, DUODENOSCOPY (EGD), COMBINED  2013    Procedure: COMBINED ESOPHAGOSCOPY, GASTROSCOPY, DUODENOSCOPY (EGD), BIOPSY SINGLE OR MULTIPLE;  ESOPHAGOSCOPY, GASTROSCOPY, DUODENOSCOPY, COLONOSCOPY;  Surgeon: Guy Grant MD;  Location:  GI     ESOPHAGOSCOPY, GASTROSCOPY, DUODENOSCOPY (EGD), COMBINED N/A 2023    Procedure: ESOPHAGOGASTRODUODENOSCOPY, WITH BIOPSY;  Surgeon: René Rodriguez DO;  Location:  GI     ESOPHAGOSCOPY, GASTROSCOPY, DUODENOSCOPY (EGD), COMBINED N/A 2/15/2024    Procedure: Esophagoscopy, gastroscopy, duodenoscopy (EGD), combined;  Surgeon: Sarmad Reed MD;  Location: [facility]     ESOPHAGOSCOPY, GASTROSCOPY, DUODENOSCOPY (EGD), COMBINED N/A 2024    Procedure: Esophagoscopy, gastroscopy, duodenoscopy (EGD), combined;  Surgeon: René Rodriguez DO;  Location:  GI     GENITOURINARY SURGERY      urethra stretched     GYN SURGERY      d&c      LAPAROSCOPIC ASSISTED RECTOPEXY  3/14/2014    Procedure: LAPAROSCOPIC ASSISTED RECTOPEXY;  LAPAROSCOPIC  VENTRAL RECTOPEXY ;  Surgeon: Jennifer Connolly MD;  Location:  OR     ORTHOPEDIC SURGERY      surgery on clavicle,surgery for left leg fracture       FAMILY HISTORY:   Family History   Problem Relation Age of Onset     Cancer Mother         ovarian cancer     Hypertension Father      Breast Cancer No family hx of      Cancer - colorectal No family hx of      Anesthesia Reaction No family hx of      Venous thrombosis No family hx of        SOCIAL HISTORY:   Social History     Tobacco Use     Smoking status: Former     Current packs/day: 0.00     Types: Cigarettes     Quit date: 2023     Years since quittin.2     Passive exposure: Past     Smokeless tobacco: Never     Tobacco comments:     Quit for year, restarted .       Quit middle May 2024      Quit 1-   Substance Use Topics     Alcohol use: No     Comment: Severe alcohol use disorder for 20 years.  2023:  Reports sober for past five years       REVIEW OF SYSTEMS:  The comprehensive review of systems from the intake form was reviewed with the patient.  No fever, weight change or fatigue. No dry eyes. No oral ulcers, sore throat or voice change. No palpitations, syncope, angina or edema.  No chest pain, excessive sleepiness, shortness of breath or hemoptysis.   No abdominal pain, nausea, vomiting, diarrhea or heartburn.  No skin rash. No focal weakness or numbness. No bleeding or lymphadenopathy. No rhinitis or hives.     Exam:  On physical examination the patient appears the stated age, is in no acute distress, affect is appropriate, and breathing is non-labored.  Vitals are documented in the EMR and have been reviewed:    [facility] 07/20/2006   Data Unavailable  There is no height or weight on file to calculate BMI.    Rises from chair: with effort   Gait: antalgic less time on the left, flexed knee left     Right  Knee  Appearance: benign  Clinical alignment:neutral   Effusion:no  Tenderness to palpation:no  Extension:0  Flexion: 115   Collateral ligaments: intact  Cruciate ligaments: grossly intact     Left Knee  Appearance: benign  Clinical alignment: mild varus  Effusion: no  Tenderness to palpation:medial and lateral joint lines   Extension: 4  Flexion: 105  Collateral ligaments: intact  Cruciate ligaments: grossly intact     Hip examination: benign hip ROM with groin or anterior thigh symptoms.     Distally, the circulatory, motor, and sensation exam is intact with 5/5 EHL, gastroc-soleus, and tibialis anterior.    Sensation to light touch is intact.    Dorsalis pedis and posterior tibialis pulses are palpable.    There are no sores on the feet, no bruising, and no lymphedema.    X-rays:       Exam Information    Exam Date Exam Time Exam Date Exam Time Accession # Performing Department Results     11/16/24 11:50 AM 11/16/24 11:50 AM [id number] [facility]      PACS Images     Show images for MR Knee Left w/o Contrast     Study Result    Narrative & Impression   EXAM: MR KNEE LEFT WITHOUT CONTRAST  LOCATION: [facility]  DATE: 11/16/2024     INDICATION: Acute left knee pain. Evaluate for stress fracture of tibia.  COMPARISON: None.  TECHNIQUE: Unenhanced.     FINDINGS:     MEDIAL COMPARTMENT:   -Meniscus: There is high-grade radial tearing of the medial meniscus posterior root, with mild medial meniscal body extrusion.  -Cartilage: There is some mild thinning over the weightbearing medial femoral condyle without a high-grade or full-thickness defect. There is a nondisplaced subchondral fracture involving the weightbearing medial femoral condyle centrally with fairly   extensive marrow edema signal.     LATERAL COMPARTMENT:  -Meniscus: No lateral meniscal tear.   -Cartilage: No high-grade or full-thickness cartilage defect.     PATELLOFEMORAL COMPARTMENT:   -Alignment: There is mild lateral patellar subluxation.   -Cartilage: Small area of deep fissuring over the patellar median ridge with some additional more superficial fissuring over the lateral retropatellar facet. No discrete trochlear cartilage defect.     CRUCIATE LIGAMENTS:   -ACL: Normal.  -PCL: Normal.     COLLATERAL LIGAMENTS:   -Medial collateral ligament: Intact.  -Lateral collateral ligament: Intact.     POSTEROMEDIAL CORNER:  -Distal semimembranosus tendon is normal.   -Pes anserine tendons are intact.     POSTEROLATERAL CORNER:   -Popliteal tendon is intact. No tendinopathy.  -Biceps femoris tendon and posterolateral corner complex ligaments are intact.     EXTENSOR MECHANISM:   -Quadriceps tendon: Mild tendinopathy, without tearing.  -Patellar tendon: Normal.  -Patellofemoral ligaments and retinacula: Intact.     JOINT:   -Moderate-sized joint effusion.     BONES:  -No  concerning marrow replacing lesion. There is a nondisplaced subchondral fracture involving the weightbearing medial femoral condyle centrally.     SOFT TISSUES:   -No popliteal cyst. No acute muscle injury. There is a very small, thin fluid collection in the subcutaneous tissue anterior to the proximal patellar tendon measuring approximately 1.3 x 0.3 x 1.6 cm, suggesting a very mild prepatellar bursitis.                                                                       IMPRESSION:  1.  High-grade radial tearing of the medial meniscus posterior root, with mild medial meniscal body extrusion.  2.  Nondisplaced, subchondral fracture involving the weightbearing medial femoral condyle centrally with fairly extensive associated marrow edema signal.  3.  No lateral meniscal tear. The cruciate and collateral ligaments remain intact.  4.  Mild patellar chondromalacia. No high-grade or full-thickness medial or lateral compartment cartilage defect.  5.  Moderate-sized joint effusion.  6.  Findings suggesting very mild prepatellar bursitis.   Again, thank you for allowing me to participate in the care of your patient.        Sincerely,        Glenroy Carrera MD    Electronically signed

## 2025-03-06 NOTE — LETTER
3/6/2025      Gudelia Dong  [address]  [place] [address]      Dear Colleague,    Thank you for referring your patient, Gudelia Dong, to the [facility] [place]. Please see a copy of my visit note below.    Non-Invasive GI Procedure Visit    Gudelia Dong is a 60 year old female with history of    Erosive esophagitis  Hiatal hernia  Gastroesophageal reflux disease without esophagitis.   Patient stated reason for procedure: Dysphagia and Regurgitation        Pre-Procedure Assessment  Patient presents to clinic today for Esophageal Manometry Study    Referring Provider: Rosalie Martínez PA-C  Patient has undergone previous endoscopy.    Does patient report taking a PPI (omeprazole, pantoprazole, rabeprazole, lansoprazole, esomeprazole, dexlansoprazole)? Yes. Is patient taking a PPI twice daily? Yes  Does patient report taking a H2 blocker (ranitidine, or famotidine)? Yes  Does patient report taking opioids? No  Patient reported that last food and/or drink was last consumed 5 hours ago.  Esophageal Questionnaire(s) Completed: Yes - Esophageal Questionnaire(s)    BEDQ Questionnaire      9/20/2023     9:29 AM 2/20/2024     6:59 AM 8/20/2024     8:27 AM 11/26/2024    12:56 AM 3/4/2025     7:07 AM   BEDQ Questionnaire: How Often Have You Had the Following?   Trouble eating solid food (meat, bread, vegetables) 1 4 1 2 1   Trouble eating soft foods (yogurt, jello, pudding) 0 0 0 0 0   Trouble swallowing liquids 0 0 1 0 0   Pain while swallowing 1 2 1 2 0   Coughing or choking while swallowing foods or liquids 2 2 2 0 0   Total Score: 4 8 5 4  1        Patient-reported         9/20/2023     9:29 AM 2/20/2024     6:59 AM 8/20/2024     8:27 AM 11/26/2024    12:56 AM 3/4/2025     7:07 AM   BEDQ Questionnaire: Discomfort/Pain Ratings   Eating solid food (meat, bread, vegetables) 2 4 3 3 0   Eating soft foods (yogurt, jello, pudding) 0 0 0 0 0   Drinking liquid 1 0 3 0 0   Total Score: 3 4  6 3  0        Patient-reported       Eckardt Questionnaire      9/20/2023     9:30 AM 2/20/2024     6:59 AM 8/20/2024     8:29 AM 11/26/2024    12:57 AM 3/4/2025     7:09 AM   Eckardt Questionnaire   Dysphagia 1 1 1 1 1   Regurgitation 1 1 1 1 1   Retrosternal Pain 1 1 1 1 1   Weight Loss (kg) 2 1 0 2 3   Total Score:  5 4 3 5  6        Patient-reported       Promis 10 Questionnaire      8/20/2024     8:31 AM 10/1/2024    12:19 PM 11/26/2024    12:58 AM 2/12/2025     3:07 PM 3/4/2025     7:10 AM   PROMIS 10 FLOWSHEET DATA   In general, would you say your health is: 2 2 2 2 2   In general, would you say your quality of life is: 1 2 1 3 2   In general, how would you rate your physical health? 2 1 2 2 2   In general, how would you rate your mental health, including your mood and your ability to think? 3 2 2 3 3   In general, how would you rate your satisfaction with your social activities and relationships? 2 1 2 1 2   In general, please rate how well you carry out your usual social activities and roles. (This includes activities at home, at work and in your community, and responsibilities as a parent, child, spouse, employee, friend, etc.) 3 2 2 1 2   To what extent are you able to carry out your everyday physical activities such as walking, climbing stairs, carrying groceries, or moving a chair? 2 3 3 2 2   In the past 7 days, how often have you been bothered by emotional problems such as feeling anxious, depressed, or irritable? 3 3 3 2 2   In the past 7 days, how would you rate your fatigue on average? 3 3 3 3 3   In the past 7 days, how would you rate your pain on average, where 0 means no pain, and 10 means worst imaginable pain? 3 3 8 7 6   Mental health question re-calculation - no clinical value 3 3 3  4  4    Physical health question re-calculation - no clinical value 3 3 3  3  3    Pain question re-calculation - no clinical value 4 4 2  2  3    Global Mental Health Score 9 8 8  11  11    Global Physical  Health Score 11 11 10  9  10    PROMIS TOTAL - SUBSCORES 20 19 18  20  21        Patient-reported   .    Patient Hx  Patient's history, medications and allergies were reviewed.     Height: Data Unavailable   Weight: 0 lbs 0 oz    Patient Active Problem List    Diagnosis Date Noted     Stress fracture of knee, left-  Nondisplaced, subchondral fracture involving the weightbearing medial femoral condyle 02/13/2025     Priority: Medium     Post-traumatic osteoarthritis of left knee, medial compartment 02/13/2025     Priority: Medium     Hiatal hernia 03/28/2023     Priority: Medium     Insomnia, unspecified type 03/28/2023     Priority: Medium     Numbness and tingling in left hand 12/07/2022     Priority: Medium     Obesity (BMI 35.0-39.9) with comorbidity (H) 01/23/2020     Priority: Medium     Vitamin D deficiency 02/20/2018     Priority: Medium     Hidradenitis suppurativa of right axilla 08/24/2016     Priority: Medium     Hypothyroidism due to medication 12/22/2015     Priority: Medium     lithium       Prediabetes 12/08/2015     Priority: Medium     Gastroesophageal reflux disease without esophagitis 10/13/2015     Priority: Medium     Bipolar 2 disorder (H) 07/28/2015     Priority: Medium     Psychosis (H) 05/08/2015     Priority: Medium     Periumbilical hernia 06/12/2014     Priority: Medium     overweight BMI>30 05/20/2014     Priority: Medium     ADHD, predominantly inattentive type 05/16/2014     Priority: Medium     Hyponatremia 04/04/2014     Priority: Medium     Tubular adenoma 01/30/2014     Priority: Medium     Seen on colonoscopy dated 1/09/2014       Iron deficiency anemia 01/30/2014     Priority: Medium     Anemia 09/30/2013     Priority: Medium     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) 09/26/2013     Priority: Medium     CARDIOVASCULAR SCREENING; LDL GOAL LESS THAN 160 09/26/2013     Priority: Medium     Major depressive disorder, recurrent episode, moderate (H) 09/26/2013     Priority: Medium      ROMEO on CPAP      Priority: Medium     Tobacco abuse      Priority: Medium     Post menopausal syndrome      Priority: Medium      Prior to Admission medications    Medication Sig Start Date End Date Taking? Authorizing Provider   albuterol (PROAIR HFA/PROVENTIL HFA/VENTOLIN HFA) 108 (90 Base) MCG/ACT inhaler Inhale 2 puffs into the lungs every 4 hours as needed for shortness of breath, wheezing or cough. 2/10/25   Flores Benedict APRN CNP   ammonium lactate (AMLACTIN) 12 % external cream Apply topically daily as needed for dry skin 3/28/23   Selena Bella MD   buPROPion (WELLBUTRIN XL) 150 MG 24 hr tablet Take 450 mg by mouth every morning 1/2/20   Reported, Patient   busPIRone HCl (BUSPAR) 30 MG tablet Take 1 tablet by mouth 2 times daily 3/4/23   Reported, Patient   dextromethorphan-guaiFENesin (MUCINEX DM)  MG 12 hr tablet Take 1 tablet by mouth every 12 hours as needed for cough. 2/10/25   Flores Benedict APRN CNP   diclofenac (VOLTAREN) 50 MG EC tablet Take 1 tablet (50 mg) by mouth 2 times daily. 10/24/24   Yovanny Valerio PA-C   Doxepin HCl 150 MG CAPS Take 300 mg by mouth At Bedtime 8/9/15   Trigger, Bautista Jon MD   escitalopram (LEXAPRO) 10 MG tablet Take 10 mg by mouth every morning 1/2/20   Reported, Patient   esomeprazole (NEXIUM) 20 MG DR capsule Take 1 capsule (20 mg) by mouth 2 times daily Take 30-60 minutes before eating. 8/2/24   Rosalie Martínez PA-C   famotidine (PEPCID) 40 MG tablet Take 1 tablet (40 mg) by mouth 2 times daily. 10/21/24   Rosalie Martínez PA-C   fluticasone-salmeterol (ADVAIR) 250-50 MCG/ACT inhaler Inhale 1 puff into the lungs every 12 hours. 2/10/25   Flores Benedict APRN CNP   lamoTRIgine (LAMICTAL) 100 MG tablet Take 100 mg by mouth every morning 3/25/23   Reported, Patient   levothyroxine (SYNTHROID/LEVOTHROID) 150 MCG tablet TAKE ONE TABLET BY MOUTH EVERY DAY 12/23/24   Mendel Mireles PA-C   naltrexone (DEPADE/REVIA) 50 MG tablet Take  1/2 tablet once daily 1-2 hours prior to worst cravings for 1 week, then increase to 1 tablet daily as directed if tolerating 2/12/25   Louise Colindres PA-C   nystatin (MYCOSTATIN) 206012 UNIT/GM external powder Apply topically daily 7/14/22   Jennifer Lassiter MD   order for DME Equipment being ordered: CPAP  Please dispense Cpap and its supply 9/1/15   Diane Salomon MD   RABEprazole (ACIPHEX) 20 MG EC tablet Take 1 tablet (20 mg) by mouth 2 times daily (before meals). 10/1/24   Rosalie aMrtínez PA-C   REXULTI 2 MG tablet Take 2 mg by mouth every morning 5/2/22   Reported, Patient   topiramate (TOPAMAX) 25 MG tablet Take 2 tablets (50 mg) by mouth 2 times daily. 2/12/25   Louise Colindres PA-C   triamcinolone (KENALOG) 0.025 % external ointment Apply topically 2 times daily To affected area in the groin as needed up to 2 weeks at a time. 2/20/24   Denise Bland PA-C     Allergies   Allergen Reactions     Contrast Dye Shortness Of Breath     Methylpyrrolidone Swelling     Iodine Swelling     Other reaction(s): Angioedema  Facial swelling.     Morphine Anxiety and Nausea and Vomiting     Other reaction(s): Behavioral Disturbances  Other reaction(s): Anxiety, vomiting     Past Medical History:   Diagnosis Date     Anemia      Depression      Erosive esophagitis      GERD (gastroesophageal reflux disease)      Hemorrhoid      Hiatal hernia      Hypothyroidism      Menstrual disorder     s/p D&C -12/2012     Obesity      ROMEO on CPAP      Other bipolar disorders     followed by Dr Collazo     Post menopausal syndrome      Prediabetes      Tobacco abuse      Past Surgical History:   Procedure Laterality Date     COLONOSCOPY  12/24/2013    Procedure: COLONOSCOPY;;  Surgeon: Guy Grant MD;  Location:  GI     COLONOSCOPY  1/9/2014    Procedure: COMBINED COLONOSCOPY, SINGLE BIOPSY/POLYPECTOMY BY BIOPSY;;  Surgeon: Guy Grant MD;  Location:  GI     COLONOSCOPY N/A 8/8/2023     Procedure: COLONOSCOPY, WITH POLYPECTOMY AND BIOPSY;  Surgeon: René Rodriguez DO;  Location:  GI     ESOPHAGOSCOPY, GASTROSCOPY, DUODENOSCOPY (EGD), COMBINED  2013    Procedure: COMBINED ESOPHAGOSCOPY, GASTROSCOPY, DUODENOSCOPY (EGD), BIOPSY SINGLE OR MULTIPLE;  ESOPHAGOSCOPY, GASTROSCOPY, DUODENOSCOPY, COLONOSCOPY;  Surgeon: Guy Grant MD;  Location[facility]     ESOPHAGOSCOPY, GASTROSCOPY, DUODENOSCOPY (EGD), COMBINED N/A 2023    Procedure: ESOPHAGOGASTRODUODENOSCOPY, WITH BIOPSY;  Surgeon: René Rodriguez DO;  Location:  GI     ESOPHAGOSCOPY, GASTROSCOPY, DUODENOSCOPY (EGD), COMBINED N/A 2/15/2024    Procedure: Esophagoscopy, gastroscopy, duodenoscopy (EGD), combined;  Surgeon: Sarmad Reed MD;  Location:  GI     ESOPHAGOSCOPY, GASTROSCOPY, DUODENOSCOPY (EGD), COMBINED N/A 2024    Procedure: Esophagoscopy, gastroscopy, duodenoscopy (EGD), combined;  Surgeon: René Rodriguez DO;  Location:  GI     GENITOURINARY SURGERY      urethra stretched     GYN SURGERY      d&c      LAPAROSCOPIC ASSISTED RECTOPEXY  3/14/2014    Procedure: LAPAROSCOPIC ASSISTED RECTOPEXY;  LAPAROSCOPIC  VENTRAL RECTOPEXY ;  Surgeon: Jennifer Connolly MD;  Location:  OR     ORTHOPEDIC SURGERY      surgery on clavicle,surgery for left leg fracture     Family History   Problem Relation Age of Onset     Cancer Mother         ovarian cancer     Hypertension Father      Breast Cancer No family hx of      Cancer - colorectal No family hx of      Anesthesia Reaction No family hx of      Venous thrombosis No family hx of      Social History     Tobacco Use     Smoking status: Former     Current packs/day: 0.00     Types: Cigarettes     Quit date: 2023     Years since quittin.2     Passive exposure: Past     Smokeless tobacco: Never     Tobacco comments:     Quit for year, restarted .       Quit middle May 2024     Quit 2025   Substance Use Topics     Alcohol use: No     Comment: Severe alcohol use  disorder for 20 years.  2023:  Reports sober for past five years        Pre-Procedure Education & Consent  Procedure education was provided to: Patient  Teaching method: Explanation  Barriers to learning: No Barrier    Patient indicated understanding of pre-procedure instruction and appropriate consent was obtained and documented.    ____________________________________________________________________    Post-Procedure Documentation: Esophageal Manometry    Manometry catheter was placed via right nare to 51 cm and normal saline swallows given per protocol. Manometry catheter was removed at the end of test.    Discharge instructions given to patient.    Notification of pending test results sent to provider for interpretation. Please reference scanned document for final interpretation of results. Patient will follow up with referring provider for test results.    Marcy Chandra RN on 3/6/2025 at 6:45 AM           Again, thank you for allowing me to participate in the care of your patient.        Sincerely,        Non-Invasive GI Nurse    Electronically signed

## 2025-03-06 NOTE — NURSING NOTE
"Reason For Visit:   Chief Complaint   Patient presents with    Consult     Left knee surgery consult. Saw Yovanny Valerio PA-C. Patient is a  at [employer]. Job requires her to move across the manufacuring floor. Working at 50% capacity right now due to slow walking/knee pain. The brace slips down, it won't stay on. Doesn't matter how she wears it or how tight it is. Custom brace. Left knee joint injection 1/21/25. Relief for maybe one day only. Has not tried HA injection yet.      42.28  Ht 1.702 m (5' 7\")   Wt 118.8 kg (262 lb)   LMP 07/20/2006   BMI 41.04 kg/m      Pain Assessment  Patient Currently in Pain: Yes  Patient's Stated Pain Goal: 4    Jazz Fabian, ATC    "

## 2025-03-06 NOTE — PATIENT INSTRUCTIONS
Esophogeal Manometry Study  1. Resume regular diet.  2. You may have a bloody nose or sore throat after the procedure.  3. If you have questions call [phone] from 7:00am-5:00pm.  For afterhours questions call GI doctor on call at [phone].

## 2025-03-06 NOTE — PROGRESS NOTES
Assessment: This is a 60 year old with left knee pain, severe.  This is the results of a meniscus tear, development of a femoral condyle stress fracture, which has now gone on to advanced osteoarthritis (Kellgren-Iván 3-4).  She is limited in her activities of daily living.  She is limited in her ability to perform her work duties.  This has been going on now for over 4 months.  She would like to consider joint replacement.  I think this is reasonable given her symptoms and radiographic changes.  I do not think that injection therapy is going to have meaningful midterm improvement in her symptoms.We discussed that given the level of symptoms and radiographic changes that further non-operative management in the form injection and/or physical therapist directed exercises is not mandatory. We discussed the risks and benefits of total knee arthroplasty at length and reviewed the proposed surgical plan.  The discussion included but was not limited to the following:  blood clots, blood clots to the lungs, infection, injury to blood vessels and nerves, stiffness, and continued pain. We discussed that as part of the routine part of surgical care a qualified assist may finish closing the wound after I have left the room. Reviewed templated radiographs as part of teaching. Discussed increased perioperative risks with high BMI. We discussed the post-operative recovery for the typical patient, return to driving, and return to normal activities.  All the patient's questions were answered to the best of my ability.    Plan:  left total knee arthroplasty when the patient chooses to go forward with it.     Chief Complaint: No chief complaint on file.    Physician:  Yovanny Valerio    HPI: Gudelia Dong is a 60 year old female who presents today for evaluation of    Symptom Profile  Location of symptoms:  medial and lateral joint lines   Onset: acute for an unknown reason   Trend: getting worse   Duration of symptoms: 4-5 months    Quality of symptoms: aching, sharp/stabbing  Severity:severe  Alleviate: activity modification  Exacerbating: activities  Previous Treatments: Previous treatments include activity modification, oral pain medication    LA Hendrix 42.28  PROMIS Mental: (Patient-Rptd) (P) 11  PROMIS Physical (Patient-Rptd) (P) 10  PROMIS TOTAL (Patient-Rptd) (P) 21  [facility]:    MEDICAL HISTORY:   Past Medical History:   Diagnosis Date    Anemia     Depression     Erosive esophagitis     GERD (gastroesophageal reflux disease)     Hemorrhoid     Hiatal hernia     Hypothyroidism     Menstrual disorder     s/p D&C -12/2012    Obesity     ROMEO on CPAP     Other bipolar disorders     followed by Dr Collazo    Post menopausal syndrome     Prediabetes     Tobacco abuse        Medications:     Current Outpatient Medications:     albuterol (PROAIR HFA/PROVENTIL HFA/VENTOLIN HFA) 108 (90 Base) MCG/ACT inhaler, Inhale 2 puffs into the lungs every 4 hours as needed for shortness of breath, wheezing or cough., Disp: 18 g, Rfl: 1    ammonium lactate (AMLACTIN) 12 % external cream, Apply topically daily as needed for dry skin, Disp: 385 g, Rfl: 1    buPROPion (WELLBUTRIN XL) 150 MG 24 hr tablet, Take 450 mg by mouth every morning, Disp: , Rfl:     busPIRone HCl (BUSPAR) 30 MG tablet, Take 1 tablet by mouth 2 times daily, Disp: , Rfl:     dextromethorphan-guaiFENesin (MUCINEX DM)  MG 12 hr tablet, Take 1 tablet by mouth every 12 hours as needed for cough., Disp: 30 tablet, Rfl: 1    diclofenac (VOLTAREN) 50 MG EC tablet, Take 1 tablet (50 mg) by mouth 2 times daily., Disp: 42 tablet, Rfl: 0    Doxepin HCl 150 MG CAPS, Take 300 mg by mouth At Bedtime, Disp: 30 capsule, Rfl: 0    escitalopram (LEXAPRO) 10 MG tablet, Take 10 mg by mouth every morning, Disp: , Rfl:     esomeprazole (NEXIUM) 20 MG DR capsule, Take 1 capsule (20 mg) by mouth 2 times daily Take 30-60 minutes before eating., Disp: 180 capsule, Rfl: 0    famotidine (PEPCID) 40 MG tablet, Take  1 tablet (40 mg) by mouth 2 times daily., Disp: 180 tablet, Rfl: 3    fluticasone-salmeterol (ADVAIR) 250-50 MCG/ACT inhaler, Inhale 1 puff into the lungs every 12 hours., Disp: 14 each, Rfl: 0    lamoTRIgine (LAMICTAL) 100 MG tablet, Take 100 mg by mouth every morning, Disp: , Rfl:     levothyroxine (SYNTHROID/LEVOTHROID) 150 MCG tablet, TAKE ONE TABLET BY MOUTH EVERY DAY, Disp: 90 tablet, Rfl: 3    naltrexone (DEPADE/REVIA) 50 MG tablet, Take 1/2 tablet once daily 1-2 hours prior to worst cravings for 1 week, then increase to 1 tablet daily as directed if tolerating, Disp: 30 tablet, Rfl: 3    nystatin (MYCOSTATIN) 188468 UNIT/GM external powder, Apply topically daily, Disp: 60 g, Rfl: 11    order for DME, Equipment being ordered: CPAP Please dispense Cpap and its supply, Disp: 1 Units, Rfl: prn    RABEprazole (ACIPHEX) 20 MG EC tablet, Take 1 tablet (20 mg) by mouth 2 times daily (before meals)., Disp: 180 tablet, Rfl: 1    REXULTI 2 MG tablet, Take 2 mg by mouth every morning, Disp: , Rfl:     topiramate (TOPAMAX) 25 MG tablet, Take 2 tablets (50 mg) by mouth 2 times daily., Disp: 120 tablet, Rfl: 3    triamcinolone (KENALOG) 0.025 % external ointment, Apply topically 2 times daily To affected area in the groin as needed up to 2 weeks at a time., Disp: 30 g, Rfl: 3    Allergies: Contrast dye, Methylpyrrolidone, Iodine, and Morphine    SURGICAL HISTORY:   Past Surgical History:   Procedure Laterality Date    COLONOSCOPY  12/24/2013    Procedure: COLONOSCOPY;;  Surgeon: Guy Grant MD;  Location:  GI    COLONOSCOPY  1/9/2014    Procedure: COMBINED COLONOSCOPY, SINGLE BIOPSY/POLYPECTOMY BY BIOPSY;;  Surgeon: Guy Grant MD;  Location:  GI    COLONOSCOPY N/A 8/8/2023    Procedure: COLONOSCOPY, WITH POLYPECTOMY AND BIOPSY;  Surgeon: René Rodriguez DO;  Location:  GI    ESOPHAGOSCOPY, GASTROSCOPY, DUODENOSCOPY (EGD), COMBINED  12/24/2013    Procedure: COMBINED ESOPHAGOSCOPY, GASTROSCOPY, DUODENOSCOPY  (EGD), BIOPSY SINGLE OR MULTIPLE;  ESOPHAGOSCOPY, GASTROSCOPY, DUODENOSCOPY, COLONOSCOPY;  Surgeon: Guy Grant MD;  Location:  GI    ESOPHAGOSCOPY, GASTROSCOPY, DUODENOSCOPY (EGD), COMBINED N/A 2023    Procedure: ESOPHAGOGASTRODUODENOSCOPY, WITH BIOPSY;  Surgeon: René Rodriguez DO;  Location:  GI    ESOPHAGOSCOPY, GASTROSCOPY, DUODENOSCOPY (EGD), COMBINED N/A 2/15/2024    Procedure: Esophagoscopy, gastroscopy, duodenoscopy (EGD), combined;  Surgeon: Sarmad Reed MD;  Location:  GI    ESOPHAGOSCOPY, GASTROSCOPY, DUODENOSCOPY (EGD), COMBINED N/A 2024    Procedure: Esophagoscopy, gastroscopy, duodenoscopy (EGD), combined;  Surgeon: René Rodriguez DO;  Location:  GI    GENITOURINARY SURGERY      urethra stretched    GYN SURGERY      d&c     LAPAROSCOPIC ASSISTED RECTOPEXY  3/14/2014    Procedure: LAPAROSCOPIC ASSISTED RECTOPEXY;  LAPAROSCOPIC  VENTRAL RECTOPEXY ;  Surgeon: Jennifer Connolly MD;  Location:  OR    ORTHOPEDIC SURGERY      surgery on clavicle,surgery for left leg fracture       FAMILY HISTORY:   Family History   Problem Relation Age of Onset    Cancer Mother         ovarian cancer    Hypertension Father     Breast Cancer No family hx of     Cancer - colorectal No family hx of     Anesthesia Reaction No family hx of     Venous thrombosis No family hx of        SOCIAL HISTORY:   Social History     Tobacco Use    Smoking status: Former     Current packs/day: 0.00     Types: Cigarettes     Quit date: 2023     Years since quittin.2     Passive exposure: Past    Smokeless tobacco: Never    Tobacco comments:     Quit for year, restarted .       Quit middle May 2024     Quit 2025   Substance Use Topics    Alcohol use: No     Comment: Severe alcohol use disorder for 20 years.  :  Reports sober for past five years       REVIEW OF SYSTEMS:  The comprehensive review of systems from the intake form was reviewed with the patient.  No fever, weight change or fatigue. No  dry eyes. No oral ulcers, sore throat or voice change. No palpitations, syncope, angina or edema.  No chest pain, excessive sleepiness, shortness of breath or hemoptysis.   No abdominal pain, nausea, vomiting, diarrhea or heartburn.  No skin rash. No focal weakness or numbness. No bleeding or lymphadenopathy. No rhinitis or hives.     Exam:  On physical examination the patient appears the stated age, is in no acute distress, affect is appropriate, and breathing is non-labored.  Vitals are documented in the EMR and have been reviewed:    [facility] 07/20/2006   Data Unavailable  There is no height or weight on file to calculate BMI.    Rises from chair: with effort   Gait: antalgic less time on the left, flexed knee left     Right  Knee  Appearance: benign  Clinical alignment:neutral   Effusion:no  Tenderness to palpation:no  Extension:0  Flexion: 115   Collateral ligaments: intact  Cruciate ligaments: grossly intact     Left Knee  Appearance: benign  Clinical alignment: mild varus  Effusion: no  Tenderness to palpation:medial and lateral joint lines   Extension: 4  Flexion: 105  Collateral ligaments: intact  Cruciate ligaments: grossly intact     Hip examination: benign hip ROM with groin or anterior thigh symptoms.     Distally, the circulatory, motor, and sensation exam is intact with 5/5 EHL, gastroc-soleus, and tibialis anterior.    Sensation to light touch is intact.    Dorsalis pedis and posterior tibialis pulses are palpable.    There are no sores on the feet, no bruising, and no lymphedema.    X-rays:       Exam Information    Exam Date Exam Time Exam Date Exam Time Accession # Performing Department Results    11/16/24 11:50 AM 11/16/24 11:50 AM [id number] [facility]      PACS Images     Show images for MR Knee Left w/o Contrast     Study Result    Narrative & Impression   EXAM: MR KNEE LEFT WITHOUT CONTRAST  LOCATION: [facility]  DATE:  11/16/2024     INDICATION: Acute left knee pain. Evaluate for stress fracture of tibia.  COMPARISON: None.  TECHNIQUE: Unenhanced.     FINDINGS:     MEDIAL COMPARTMENT:   -Meniscus: There is high-grade radial tearing of the medial meniscus posterior root, with mild medial meniscal body extrusion.  -Cartilage: There is some mild thinning over the weightbearing medial femoral condyle without a high-grade or full-thickness defect. There is a nondisplaced subchondral fracture involving the weightbearing medial femoral condyle centrally with fairly   extensive marrow edema signal.     LATERAL COMPARTMENT:  -Meniscus: No lateral meniscal tear.   -Cartilage: No high-grade or full-thickness cartilage defect.     PATELLOFEMORAL COMPARTMENT:   -Alignment: There is mild lateral patellar subluxation.   -Cartilage: Small area of deep fissuring over the patellar median ridge with some additional more superficial fissuring over the lateral retropatellar facet. No discrete trochlear cartilage defect.     CRUCIATE LIGAMENTS:   -ACL: Normal.  -PCL: Normal.     COLLATERAL LIGAMENTS:   -Medial collateral ligament: Intact.  -Lateral collateral ligament: Intact.     POSTEROMEDIAL CORNER:  -Distal semimembranosus tendon is normal.   -Pes anserine tendons are intact.     POSTEROLATERAL CORNER:   -Popliteal tendon is intact. No tendinopathy.  -Biceps femoris tendon and posterolateral corner complex ligaments are intact.     EXTENSOR MECHANISM:   -Quadriceps tendon: Mild tendinopathy, without tearing.  -Patellar tendon: Normal.  -Patellofemoral ligaments and retinacula: Intact.     JOINT:   -Moderate-sized joint effusion.     BONES:  -No concerning marrow replacing lesion. There is a nondisplaced subchondral fracture involving the weightbearing medial femoral condyle centrally.     SOFT TISSUES:   -No popliteal cyst. No acute muscle injury. There is a very small, thin fluid collection in the subcutaneous tissue anterior to the proximal  patellar tendon measuring approximately 1.3 x 0.3 x 1.6 cm, suggesting a very mild prepatellar bursitis.                                                                       IMPRESSION:  1.  High-grade radial tearing of the medial meniscus posterior root, with mild medial meniscal body extrusion.  2.  Nondisplaced, subchondral fracture involving the weightbearing medial femoral condyle centrally with fairly extensive associated marrow edema signal.  3.  No lateral meniscal tear. The cruciate and collateral ligaments remain intact.  4.  Mild patellar chondromalacia. No high-grade or full-thickness medial or lateral compartment cartilage defect.  5.  Moderate-sized joint effusion.  6.  Findings suggesting very mild prepatellar bursitis.

## 2025-03-06 NOTE — PROGRESS NOTES
Non-Invasive GI Procedure Visit    Gudelia Dong is a 60 year old female with history of    Erosive esophagitis  Hiatal hernia  Gastroesophageal reflux disease without esophagitis.   Patient stated reason for procedure: Dysphagia and Regurgitation        Pre-Procedure Assessment  Patient presents to clinic today for Esophageal Manometry Study    Referring Provider: Rosalie Martínez PA-C  Patient has undergone previous endoscopy.    Does patient report taking a PPI (omeprazole, pantoprazole, rabeprazole, lansoprazole, esomeprazole, dexlansoprazole)? Yes. Is patient taking a PPI twice daily? Yes  Does patient report taking a H2 blocker (ranitidine, or famotidine)? Yes  Does patient report taking opioids? No  Patient reported that last food and/or drink was last consumed 5 hours ago.  Esophageal Questionnaire(s) Completed: Yes - Esophageal Questionnaire(s)    BEDQ Questionnaire      9/20/2023     9:29 AM 2/20/2024     6:59 AM 8/20/2024     8:27 AM 11/26/2024    12:56 AM 3/4/2025     7:07 AM   BEDQ Questionnaire: How Often Have You Had the Following?   Trouble eating solid food (meat, bread, vegetables) 1 4 1 2 1   Trouble eating soft foods (yogurt, jello, pudding) 0 0 0 0 0   Trouble swallowing liquids 0 0 1 0 0   Pain while swallowing 1 2 1 2 0   Coughing or choking while swallowing foods or liquids 2 2 2 0 0   Total Score: 4 8 5 4  1        Patient-reported         9/20/2023     9:29 AM 2/20/2024     6:59 AM 8/20/2024     8:27 AM 11/26/2024    12:56 AM 3/4/2025     7:07 AM   BEDQ Questionnaire: Discomfort/Pain Ratings   Eating solid food (meat, bread, vegetables) 2 4 3 3 0   Eating soft foods (yogurt, jello, pudding) 0 0 0 0 0   Drinking liquid 1 0 3 0 0   Total Score: 3 4 6 3  0        Patient-reported       Eckardt Questionnaire      9/20/2023     9:30 AM 2/20/2024     6:59 AM 8/20/2024     8:29 AM 11/26/2024    12:57 AM 3/4/2025     7:09 AM   Eckardt Questionnaire   Dysphagia 1 1 1 1 1   Regurgitation 1 1 1 1 1    Retrosternal Pain 1 1 1 1 1   Weight Loss (kg) 2 1 0 2 3   Total Score:  5 4 3 5  6        Patient-reported       Promis 10 Questionnaire      8/20/2024     8:31 AM 10/1/2024    12:19 PM 11/26/2024    12:58 AM 2/12/2025     3:07 PM 3/4/2025     7:10 AM   PROMIS 10 FLOWSHEET DATA   In general, would you say your health is: 2 2 2 2 2   In general, would you say your quality of life is: 1 2 1 3 2   In general, how would you rate your physical health? 2 1 2 2 2   In general, how would you rate your mental health, including your mood and your ability to think? 3 2 2 3 3   In general, how would you rate your satisfaction with your social activities and relationships? 2 1 2 1 2   In general, please rate how well you carry out your usual social activities and roles. (This includes activities at home, at work and in your community, and responsibilities as a parent, child, spouse, employee, friend, etc.) 3 2 2 1 2   To what extent are you able to carry out your everyday physical activities such as walking, climbing stairs, carrying groceries, or moving a chair? 2 3 3 2 2   In the past 7 days, how often have you been bothered by emotional problems such as feeling anxious, depressed, or irritable? 3 3 3 2 2   In the past 7 days, how would you rate your fatigue on average? 3 3 3 3 3   In the past 7 days, how would you rate your pain on average, where 0 means no pain, and 10 means worst imaginable pain? 3 3 8 7 6   Mental health question re-calculation - no clinical value 3 3 3  4  4    Physical health question re-calculation - no clinical value 3 3 3  3  3    Pain question re-calculation - no clinical value 4 4 2  2  3    Global Mental Health Score 9 8 8  11  11    Global Physical Health Score 11 11 10  9  10    PROMIS TOTAL - SUBSCORES 20 19 18  20  21        Patient-reported   .    Patient Hx  Patient's history, medications and allergies were reviewed.     Height: Data Unavailable   Weight: 0 lbs 0 oz    Patient Active  Problem List    Diagnosis Date Noted    Stress fracture of knee, left-  Nondisplaced, subchondral fracture involving the weightbearing medial femoral condyle 02/13/2025     Priority: Medium    Post-traumatic osteoarthritis of left knee, medial compartment 02/13/2025     Priority: Medium    Hiatal hernia 03/28/2023     Priority: Medium    Insomnia, unspecified type 03/28/2023     Priority: Medium    Numbness and tingling in left hand 12/07/2022     Priority: Medium    Obesity (BMI 35.0-39.9) with comorbidity (H) 01/23/2020     Priority: Medium    Vitamin D deficiency 02/20/2018     Priority: Medium    Hidradenitis suppurativa of right axilla 08/24/2016     Priority: Medium    Hypothyroidism due to medication 12/22/2015     Priority: Medium     lithium      Prediabetes 12/08/2015     Priority: Medium    Gastroesophageal reflux disease without esophagitis 10/13/2015     Priority: Medium    Bipolar 2 disorder (H) 07/28/2015     Priority: Medium    Psychosis (H) 05/08/2015     Priority: Medium    Periumbilical hernia 06/12/2014     Priority: Medium    overweight BMI>30 05/20/2014     Priority: Medium    ADHD, predominantly inattentive type 05/16/2014     Priority: Medium    Hyponatremia 04/04/2014     Priority: Medium    Tubular adenoma 01/30/2014     Priority: Medium     Seen on colonoscopy dated 1/09/2014      Iron deficiency anemia 01/30/2014     Priority: Medium    Anemia 09/30/2013     Priority: Medium    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) 09/26/2013     Priority: Medium    CARDIOVASCULAR SCREENING; LDL GOAL LESS THAN 160 09/26/2013     Priority: Medium    Major depressive disorder, recurrent episode, moderate (H) 09/26/2013     Priority: Medium    ROMEO on CPAP      Priority: Medium    Tobacco abuse      Priority: Medium    Post menopausal syndrome      Priority: Medium      Prior to Admission medications    Medication Sig Start Date End Date Taking? Authorizing Provider   albuterol (PROAIR HFA/PROVENTIL  HFA/VENTOLIN HFA) 108 (90 Base) MCG/ACT inhaler Inhale 2 puffs into the lungs every 4 hours as needed for shortness of breath, wheezing or cough. 2/10/25   Flores Benedict APRN CNP   ammonium lactate (AMLACTIN) 12 % external cream Apply topically daily as needed for dry skin 3/28/23   Selena Bella MD   buPROPion (WELLBUTRIN XL) 150 MG 24 hr tablet Take 450 mg by mouth every morning 1/2/20   Reported, Patient   busPIRone HCl (BUSPAR) 30 MG tablet Take 1 tablet by mouth 2 times daily 3/4/23   Reported, Patient   dextromethorphan-guaiFENesin (MUCINEX DM)  MG 12 hr tablet Take 1 tablet by mouth every 12 hours as needed for cough. 2/10/25   Flores Benedict APRN CNP   diclofenac (VOLTAREN) 50 MG EC tablet Take 1 tablet (50 mg) by mouth 2 times daily. 10/24/24   Yovanny Valerio PA-C   Doxepin HCl 150 MG CAPS Take 300 mg by mouth At Bedtime 8/9/15   Trigger, Bautista Jon MD   escitalopram (LEXAPRO) 10 MG tablet Take 10 mg by mouth every morning 1/2/20   Reported, Patient   esomeprazole (NEXIUM) 20 MG DR capsule Take 1 capsule (20 mg) by mouth 2 times daily Take 30-60 minutes before eating. 8/2/24   Rosalie Martínez PA-C   famotidine (PEPCID) 40 MG tablet Take 1 tablet (40 mg) by mouth 2 times daily. 10/21/24   Rosalie Martínez PA-C   fluticasone-salmeterol (ADVAIR) 250-50 MCG/ACT inhaler Inhale 1 puff into the lungs every 12 hours. 2/10/25   Flores Benedict APRN CNP   lamoTRIgine (LAMICTAL) 100 MG tablet Take 100 mg by mouth every morning 3/25/23   Reported, Patient   levothyroxine (SYNTHROID/LEVOTHROID) 150 MCG tablet TAKE ONE TABLET BY MOUTH EVERY DAY 12/23/24   Mendel Mireles PA-C   naltrexone (DEPADE/REVIA) 50 MG tablet Take 1/2 tablet once daily 1-2 hours prior to worst cravings for 1 week, then increase to 1 tablet daily as directed if tolerating 2/12/25   Louise Colindres, ALEJANDRO   nystatin (MYCOSTATIN) 690660 UNIT/GM external powder Apply topically daily 7/14/22   Jennifer Lassiter  MD Tania   order for DME Equipment being ordered: CPAP  Please dispense Cpap and its supply 9/1/15   Diane Salomon MD   RABEprazole (ACIPHEX) 20 MG EC tablet Take 1 tablet (20 mg) by mouth 2 times daily (before meals). 10/1/24   Rosalie Martínez PA-C   REXULTI 2 MG tablet Take 2 mg by mouth every morning 5/2/22   Reported, Patient   topiramate (TOPAMAX) 25 MG tablet Take 2 tablets (50 mg) by mouth 2 times daily. 2/12/25   Louise Colindres PA-C   triamcinolone (KENALOG) 0.025 % external ointment Apply topically 2 times daily To affected area in the groin as needed up to 2 weeks at a time. 2/20/24   Denise Bland PA-C     Allergies   Allergen Reactions    Contrast Dye Shortness Of Breath    Methylpyrrolidone Swelling    Iodine Swelling     Other reaction(s): Angioedema  Facial swelling.    Morphine Anxiety and Nausea and Vomiting     Other reaction(s): Behavioral Disturbances  Other reaction(s): Anxiety, vomiting     Past Medical History:   Diagnosis Date    Anemia     Depression     Erosive esophagitis     GERD (gastroesophageal reflux disease)     Hemorrhoid     Hiatal hernia     Hypothyroidism     Menstrual disorder     s/p D&C -12/2012    Obesity     ROMEO on CPAP     Other bipolar disorders     followed by Dr Collazo    Post menopausal syndrome     Prediabetes     Tobacco abuse      Past Surgical History:   Procedure Laterality Date    COLONOSCOPY  12/24/2013    Procedure: COLONOSCOPY;;  Surgeon: Guy Grant MD;  Location: [facility]    COLONOSCOPY  1/9/2014    Procedure: COMBINED COLONOSCOPY, SINGLE BIOPSY/POLYPECTOMY BY BIOPSY;;  Surgeon: Guy Grant MD;  Location: [facility]    COLONOSCOPY N/A 8/8/2023    Procedure: COLONOSCOPY, WITH POLYPECTOMY AND BIOPSY;  Surgeon: René Rodriguez DO;  Location:  GI    ESOPHAGOSCOPY, GASTROSCOPY, DUODENOSCOPY (EGD), COMBINED  12/24/2013    Procedure: COMBINED ESOPHAGOSCOPY, GASTROSCOPY, DUODENOSCOPY (EGD), BIOPSY SINGLE OR MULTIPLE;  ESOPHAGOSCOPY,  GASTROSCOPY, DUODENOSCOPY, COLONOSCOPY;  Surgeon: Guy Grant MD;  Location:  GI    ESOPHAGOSCOPY, GASTROSCOPY, DUODENOSCOPY (EGD), COMBINED N/A 2023    Procedure: ESOPHAGOGASTRODUODENOSCOPY, WITH BIOPSY;  Surgeon: René Rodriguez DO;  Location:  GI    ESOPHAGOSCOPY, GASTROSCOPY, DUODENOSCOPY (EGD), COMBINED N/A 2/15/2024    Procedure: Esophagoscopy, gastroscopy, duodenoscopy (EGD), combined;  Surgeon: Sarmad Reed MD;  Location:  GI    ESOPHAGOSCOPY, GASTROSCOPY, DUODENOSCOPY (EGD), COMBINED N/A 2024    Procedure: Esophagoscopy, gastroscopy, duodenoscopy (EGD), combined;  Surgeon: René Rodriguez DO;  Location:  GI    GENITOURINARY SURGERY      urethra stretched    GYN SURGERY      d&c     LAPAROSCOPIC ASSISTED RECTOPEXY  3/14/2014    Procedure: LAPAROSCOPIC ASSISTED RECTOPEXY;  LAPAROSCOPIC  VENTRAL RECTOPEXY ;  Surgeon: Jennifer Connolly MD;  Location:  OR    ORTHOPEDIC SURGERY      surgery on clavicle,surgery for left leg fracture     Family History   Problem Relation Age of Onset    Cancer Mother         ovarian cancer    Hypertension Father     Breast Cancer No family hx of     Cancer - colorectal No family hx of     Anesthesia Reaction No family hx of     Venous thrombosis No family hx of      Social History     Tobacco Use    Smoking status: Former     Current packs/day: 0.00     Types: Cigarettes     Quit date: 2023     Years since quittin.2     Passive exposure: Past    Smokeless tobacco: Never    Tobacco comments:     Quit for year, restarted .       Quit middle May 2024     Quit 2025   Substance Use Topics    Alcohol use: No     Comment: Severe alcohol use disorder for 20 years.  :  Reports sober for past five years        Pre-Procedure Education & Consent  Procedure education was provided to: Patient  Teaching method: Explanation  Barriers to learning: No Barrier    Patient indicated understanding of pre-procedure instruction and appropriate consent was  obtained and documented.    ____________________________________________________________________    Post-Procedure Documentation: Esophageal Manometry    Manometry catheter was placed via right nare to 51 cm and normal saline swallows given per protocol. Manometry catheter was removed at the end of test.    Discharge instructions given to patient.    Notification of pending test results sent to provider for interpretation. Please reference scanned document for final interpretation of results. Patient will follow up with referring provider for test results.    Marcy Chandra RN on 3/6/2025 at 6:45 AM

## 2025-03-13 ENCOUNTER — PRE VISIT (OUTPATIENT)
Dept: SURGERY | Facility: CLINIC | Age: 60
End: 2025-03-13

## 2025-03-13 ENCOUNTER — VIRTUAL VISIT (OUTPATIENT)
Dept: SURGERY | Facility: CLINIC | Age: 60
End: 2025-03-13
Payer: COMMERCIAL

## 2025-03-13 VITALS — BODY MASS INDEX: 41.12 KG/M2 | HEIGHT: 67 IN | WEIGHT: 262 LBS

## 2025-03-13 DIAGNOSIS — K22.10 EROSIVE ESOPHAGITIS: ICD-10-CM

## 2025-03-13 DIAGNOSIS — Z01.818 PREOP EXAMINATION: Primary | ICD-10-CM

## 2025-03-13 DIAGNOSIS — K44.9 HIATAL HERNIA: Chronic | ICD-10-CM

## 2025-03-13 DIAGNOSIS — K21.9 GASTROESOPHAGEAL REFLUX DISEASE WITHOUT ESOPHAGITIS: ICD-10-CM

## 2025-03-13 ASSESSMENT — ENCOUNTER SYMPTOMS
ORTHOPNEA: 0
SEIZURES: 0

## 2025-03-13 ASSESSMENT — COPD QUESTIONNAIRES: COPD: 0

## 2025-03-13 ASSESSMENT — LIFESTYLE VARIABLES: TOBACCO_USE: 1

## 2025-03-13 NOTE — PROGRESS NOTES
Gudelia is a 60 year old who is being evaluated via a billable video visit.      How would you like to obtain your AVS? Nava        Bryan Galeas is a 60 year old, presenting for the following health issues:  Pre-Op Exam (/)        TEX Orosco LPN

## 2025-03-13 NOTE — PATIENT INSTRUCTIONS
Name:  Gudelia Dong   MRN:  [id number]   :  1965   Today's Date:  3/13/2025     GI Lab procedures:    A representative from the GI Lab will contact you regarding arrival date and time.      You were seen today in the PAC Clinic.   (Pre-operative Anesthesia Assessment Center)  [facility] [address]  [address]   phone [phone]    You had a pre-operative assessment done.    Anesthesia recommendations for medications:    Hold Aspirin for 2 days before procedure.  Hold Multivitamins for 7 days before procedure.   Hold Herbal medications and Supplements for 7 days before procedure.  Hold Ibuprofen for 2 days before procedure.   Hold Naproxen for 2 days before procedure.     Hold Diclofenac for 2 days before procedure     No alcohol or cannabis products for 24 hours before your procedure      Please take these medications the day of procedure:  Inhalers per your routine  Wellbutrin  Buspar  Escitalopram  Famotidine  Levothyroxine  Naltrexone  Raberprazole   Rexulti  Topiramate               For questions or appointments, call:  [facility]: [phone], option 2.  Monday through Friday, 8 a.m. to 4:30 p.m.  (If it is after hours, please reach out to the clinic or provider that scheduled your appointment)

## 2025-03-16 DIAGNOSIS — J10.1 INFLUENZA A: ICD-10-CM

## 2025-03-16 DIAGNOSIS — R05.9 COUGH, UNSPECIFIED TYPE: ICD-10-CM

## 2025-03-17 RX ORDER — FLUTICASONE PROPIONATE AND SALMETEROL 250; 50 UG/1; UG/1
1 POWDER RESPIRATORY (INHALATION) 2 TIMES DAILY
Qty: 60 EACH | Refills: 3 | Status: SHIPPED | OUTPATIENT
Start: 2025-03-17

## 2025-03-21 ENCOUNTER — MYC MEDICAL ADVICE (OUTPATIENT)
Dept: ORTHOPEDICS | Facility: CLINIC | Age: 60
End: 2025-03-21
Payer: COMMERCIAL

## 2025-03-21 DIAGNOSIS — Z96.652 STATUS POST TOTAL LEFT KNEE REPLACEMENT: Primary | ICD-10-CM

## 2025-03-24 ENCOUNTER — TELEPHONE (OUTPATIENT)
Dept: GASTROENTEROLOGY | Facility: CLINIC | Age: 60
End: 2025-03-24
Payer: COMMERCIAL

## 2025-03-24 NOTE — TELEPHONE ENCOUNTER
Pre visit planning completed.      Procedure details:    Patient scheduled for Upper endoscopy (EGD) on 4/8/25.     Arrival time: 0800. Procedure time 0930    Facility location: [facility]; [address] [place] [address], [address] Floor, [place] [address]. Check in location: Main entrance at registration desk.  *Disclaimer: Drivers are to check in with patient and stay on campus during procedure.     Sedation type: MAC    Pre op exam needed? Yes. PAC eval completed on 3/13/25 due to prior complications with sedation. Difficult to put to sleep. Patient was found optimized for procedure.      Indication for procedure:   Erosive esophagitis   Hiatal hernia   Gastroesophageal reflux disease without esophagitis         Chart review:     Electronic implanted devices? No    Recent diagnosis of diverticulitis within the last 6 weeks? No      Medication review:    Diabetic? No    Anticoagulants? No    Weight loss medication/injectable? No GLP-1 medication per patient's medication list. Nursing to verify with pre-assessment call.    Other medication HOLDING recommendations:  Patient is on naltrexone but will not need to hold due to procedure scheduled with MAC      Prep for procedure:     Bowel prep recommendation: N/A    Procedure information and instructions sent via [employer] Grijalva RN  Endoscopy Procedure Pre Assessment   [phone] option 3

## 2025-03-25 NOTE — TELEPHONE ENCOUNTER
Attempted to contact patient in order to complete pre assessment questions.     No answer. Left message to return call to [phone] option 3.    Callback communication sent via [employer].    Valentina Stauffer LPN

## 2025-03-25 NOTE — TELEPHONE ENCOUNTER
Pre assessment completed for upcoming procedure.   (Please see previous telephone encounter notes for complete details)    Patient returned call.       Procedure details:    Arrival time and facility location reviewed.    Pre op exam needed? Yes. PAC eval completed on 3/13/25 due to Difficult to put to sleep. Patient was found optimized for procedure    Designated  policy reviewed and that site requests drivers to check in and stay on campus. Instructed to have someone stay 24  hours post procedure.   *Disclaimer - please notify the [facility] Op Manager with any  issues/concerns.    Medication review:    Medications reviewed. Please see supporting documentation below. Holding recommendations discussed (if applicable).       Prep for procedure:    Procedure prep instructions reviewed.        Any additional information needed:  N/A      Patient verbalized understanding and had no questions or concerns at this time.      Corinne Kliber, RN  Endoscopy Procedure Pre Assessment   [phone] option 3

## 2025-03-25 NOTE — CONFIDENTIAL NOTE
Called patient and discussed her question. She states her  is not able to help her after surgery due to not being around. She has no family in town, they are all out of state.   She had friends from [facility] that were going to drive her home after surgery but she is not sure if they will stay with her. She is going to see if her sister could help and fly into town.   We had a long discussion about rehab not being an option and she needs to find help at home. Also explained that she would need to cancel surgery if she does not have anyone. She will call us back if she is able to get her sister to help.    Gudelia Cobb RN

## 2025-03-29 NOTE — PATIENT INSTRUCTIONS
Nutrition Goals/Resources:  Aim to get one slice of pie from [employer] as opposed to having a whole buy  2. Aim to eat 3x/day even if something small (5-6 am, 12:30-1:30-pm, 3:30-5:00 pm)  3. Aim to do some meal planning on the weekend    Plate method can be used for general guidance on balanced meals/portion sizes (see link below for picture/more information)                 Make 1/2 your plate non starchy vegetable                3+ oz lean protein sources     (Note: 3 oz = deck of cards size)                 1/2 to 1 cup carbohydrate choices such as whole grain starches or starchy vegetables or fruit.               Choose ~0-2 added fat servings at a meal (avocados, nut butters, nuts or seeds, olive oil, vegetable oils).    The Plate Method:  https://www.cdc.gov/diabetes/images/managing/Diabetes-Manage-Eat-Well-Plate-Graphic_600px.jpg    Can use different seasons, citrus, herbs to help add flavor.     Grocery list ideas:   - Broccoli   - [place] sprouts   - Raw spinach    - Green peppers   - Cucumbers    - Cauliflower    - Asparagus    - Green peas   - Peas (starchy vegetable)    - Carrots   - Fish    - Strawberries   - Apples   - Grapes   - Raisins (recommend no sugar added)   - Blueberries   - Raspberries   - Blackberries   - Turkey   - Chicken    Follow-Up:  Will call to schedule ([phone])    Tiarra Elizabeth ([place]), IVELISSE, RD, LD  Clinic #: [phone]     ,

## 2025-03-31 ENCOUNTER — OFFICE VISIT (OUTPATIENT)
Dept: FAMILY MEDICINE | Facility: CLINIC | Age: 60
End: 2025-03-31
Payer: COMMERCIAL

## 2025-03-31 VITALS
OXYGEN SATURATION: 96 % | BODY MASS INDEX: 41.36 KG/M2 | SYSTOLIC BLOOD PRESSURE: 127 MMHG | HEART RATE: 67 BPM | HEIGHT: 67 IN | TEMPERATURE: 97.5 F | WEIGHT: 263.5 LBS | DIASTOLIC BLOOD PRESSURE: 77 MMHG | RESPIRATION RATE: 16 BRPM

## 2025-03-31 DIAGNOSIS — E66.813 CLASS 3 SEVERE OBESITY WITH BODY MASS INDEX (BMI) OF 40.0 TO 44.9 IN ADULT, UNSPECIFIED OBESITY TYPE, UNSPECIFIED WHETHER SERIOUS COMORBIDITY PRESENT (H): ICD-10-CM

## 2025-03-31 DIAGNOSIS — E03.2 HYPOTHYROIDISM DUE TO MEDICATION: ICD-10-CM

## 2025-03-31 DIAGNOSIS — E66.01 CLASS 3 SEVERE OBESITY WITH BODY MASS INDEX (BMI) OF 40.0 TO 44.9 IN ADULT, UNSPECIFIED OBESITY TYPE, UNSPECIFIED WHETHER SERIOUS COMORBIDITY PRESENT (H): ICD-10-CM

## 2025-03-31 DIAGNOSIS — D64.9 ANEMIA, UNSPECIFIED TYPE: ICD-10-CM

## 2025-03-31 DIAGNOSIS — M17.32 POST-TRAUMATIC OSTEOARTHRITIS OF LEFT KNEE: ICD-10-CM

## 2025-03-31 DIAGNOSIS — Z01.818 PREOP GENERAL PHYSICAL EXAM: Primary | ICD-10-CM

## 2025-03-31 DIAGNOSIS — R73.03 PREDIABETES: ICD-10-CM

## 2025-03-31 LAB
ERYTHROCYTE [DISTWIDTH] IN BLOOD BY AUTOMATED COUNT: 14.1 % (ref 10–15)
EST. AVERAGE GLUCOSE BLD GHB EST-MCNC: 114 MG/DL
HBA1C MFR BLD: 5.6 % (ref 0–5.6)
HCT VFR BLD AUTO: 35.6 % (ref 35–47)
HGB BLD-MCNC: 10.8 G/DL (ref 11.7–15.7)
HOLD SPECIMEN: NORMAL
MCH RBC QN AUTO: 26.3 PG (ref 26.5–33)
MCHC RBC AUTO-ENTMCNC: 30.3 G/DL (ref 31.5–36.5)
MCV RBC AUTO: 87 FL (ref 78–100)
PLATELET # BLD AUTO: 324 10E3/UL (ref 150–450)
RBC # BLD AUTO: 4.1 10E6/UL (ref 3.8–5.2)
WBC # BLD AUTO: 7 10E3/UL (ref 4–11)

## 2025-03-31 PROCEDURE — 3078F DIAST BP <80 MM HG: CPT | Performed by: NURSE PRACTITIONER

## 2025-03-31 PROCEDURE — 93000 ELECTROCARDIOGRAM COMPLETE: CPT | Performed by: NURSE PRACTITIONER

## 2025-03-31 PROCEDURE — 83550 IRON BINDING TEST: CPT | Performed by: NURSE PRACTITIONER

## 2025-03-31 PROCEDURE — 83036 HEMOGLOBIN GLYCOSYLATED A1C: CPT | Performed by: NURSE PRACTITIONER

## 2025-03-31 PROCEDURE — 36415 COLL VENOUS BLD VENIPUNCTURE: CPT | Performed by: NURSE PRACTITIONER

## 2025-03-31 PROCEDURE — 3074F SYST BP LT 130 MM HG: CPT | Performed by: NURSE PRACTITIONER

## 2025-03-31 PROCEDURE — 84439 ASSAY OF FREE THYROXINE: CPT | Performed by: NURSE PRACTITIONER

## 2025-03-31 PROCEDURE — 84482 T3 REVERSE: CPT | Mod: 90 | Performed by: NURSE PRACTITIONER

## 2025-03-31 PROCEDURE — 80053 COMPREHEN METABOLIC PANEL: CPT | Performed by: NURSE PRACTITIONER

## 2025-03-31 PROCEDURE — 84443 ASSAY THYROID STIM HORMONE: CPT | Performed by: NURSE PRACTITIONER

## 2025-03-31 PROCEDURE — 1126F AMNT PAIN NOTED NONE PRSNT: CPT | Performed by: NURSE PRACTITIONER

## 2025-03-31 PROCEDURE — 99000 SPECIMEN HANDLING OFFICE-LAB: CPT | Performed by: NURSE PRACTITIONER

## 2025-03-31 PROCEDURE — 85027 COMPLETE CBC AUTOMATED: CPT | Performed by: NURSE PRACTITIONER

## 2025-03-31 PROCEDURE — 82728 ASSAY OF FERRITIN: CPT | Performed by: NURSE PRACTITIONER

## 2025-03-31 PROCEDURE — 84481 FREE ASSAY (FT-3): CPT | Performed by: NURSE PRACTITIONER

## 2025-03-31 PROCEDURE — 83540 ASSAY OF IRON: CPT | Performed by: NURSE PRACTITIONER

## 2025-03-31 PROCEDURE — 99214 OFFICE O/P EST MOD 30 MIN: CPT | Performed by: NURSE PRACTITIONER

## 2025-03-31 ASSESSMENT — PAIN SCALES - GENERAL: PAINLEVEL_OUTOF10: NO PAIN (0)

## 2025-03-31 NOTE — RESULT ENCOUNTER NOTE
FYI to PCP    Gudelia, you are anemic. You need to start taking your iron pills again. I also requested that the hematology team helps get you optimized before surgery. They will call you. Let me know if you have questions   Flores

## 2025-03-31 NOTE — PATIENT INSTRUCTIONS
How to Take Your Medication Before Surgery  Preoperative Medication Instructions   Antiplatelet or Anticoagulation Medication Instructions   - We reviewed the medication list and the patient is not on an antiplatelet or anticoagulation medications.    Additional Medication Instructions  Take all scheduled medications on the day of surgery EXCEPT for modifications listed below:   - naltrexone PO: DO NOT TAKE 3 days prior to surgery.       Patient Education   Preparing for Your Surgery  For Adults  Getting started  In most cases, a nurse will call to review your health history and instructions. They will give you an arrival time based on your scheduled surgery time. Please be ready to share:  Your doctor's clinic name and phone number  Your medical, surgical, and anesthesia history  A list of allergies and sensitivities  A list of medicines, including herbal treatments and over-the-counter drugs  Whether the patient has a legal guardian (ask how to send us the papers in advance)  Note: You may not receive a call if you were seen at our PAC (Preoperative Assessment Center).  Please tell us if you're pregnant--or if there's any chance you might be pregnant. Some surgeries may injure a fetus (unborn baby), so they require a pregnancy test. Surgeries that are safe for a fetus don't always need a test, and you can choose whether to have one.   Preparing for surgery  Within 10 to 30 days of surgery: Have a pre-op exam (sometimes called an H&P, or History and Physical). This can be done at a clinic or pre-operative center.  If you're having a , you may not need this exam. Talk to your care team.  At your pre-op exam, talk to your care team about all medicines you take. (This includes CBD oil and any drugs, such as THC, marijuana, and other forms of cannabis.) If you need to stop any medicine before surgery, ask when to start taking it again.  This is for your safety. Many medicines and drugs can make you bleed too  much during surgery. Some change how well surgery (anesthesia) drugs work.  Call your insurance company to let them know you're having surgery. (If you don't have insurance, call [phone].)  Call your clinic if there's any change in your health. This includes a scrape or scratch near the surgery site, or any signs of a cold (sore throat, runny nose, cough, rash, fever).  Eating and drinking guidelines  For your safety: Unless your surgeon tells you otherwise, follow the guidelines below.  Eat and drink as normal until 8 hours before you arrive for surgery. After that, no food or milk. You can spit out gum when you arrive.  Drink clear liquids until 2 hours before you arrive. These are liquids you can see through, like water, Gatorade, and Propel Water. They also include plain black coffee and tea (no cream or milk).  No alcohol for 24 hours before you arrive. The night before surgery, stop any drinks that contain THC.  If your care team tells you to take medicine on the morning of surgery, it's okay to take it with a sip of water. No other medicines or drugs are allowed (including CBD oil)--follow your care team's instructions.  If you have questions the day of surgery, call your hospital or surgery center.   Preventing infection  Shower or bathe the night before and the morning of surgery. Follow the instructions your clinic gave you. (If no instructions, use regular soap.)  Don't shave or clip hair near your surgery site. We'll remove the hair if needed.  Don't smoke or vape the morning of surgery. No chewing tobacco for 6 hours before you arrive. A nicotine patch is okay. You may spit out nicotine gum when you arrive.  For some surgeries, the surgeon will tell you to fully quit smoking and nicotine.  We will make every effort to keep you safe from infection. We will:  Clean our hands often with soap and water (or an alcohol-based hand rub).  Clean the skin at your surgery site with a special soap that kills  germs.  Give you a special gown to keep you warm. (Cold raises the risk of infection.)  Wear hair covers, masks, gowns, and gloves during surgery.  Give antibiotic medicine, if prescribed. Not all surgeries need this medicine.  What to bring on the day of surgery  Photo ID and insurance card  Copy of your health care directive, if you have one  Glasses and hearing aids (bring cases)  You can't wear contacts during surgery  Inhaler and eye drops, if you use them (tell us about these when you arrive)  CPAP machine or breathing device, if you use them  A few personal items, if spending the night  If you have . . .  A pacemaker, ICD (cardiac defibrillator), or other implant: Bring the ID card.  An implanted stimulator: Bring the remote control.  A legal guardian: Bring a copy of the certified (court-stamped) guardianship papers.  Please remove any jewelry, including body piercings. Leave jewelry and other valuables at home.  If you're going home the day of surgery  You must have a responsible adult drive you home. They should stay with you overnight as well.  If you don't have someone to stay with you, and you aren't safe to go home alone, we may keep you overnight. Insurance often won't pay for this.  After surgery  If it's hard to control your pain or you need more pain medicine, please call your surgeon's office.  Questions?   If you have any questions for your care team, list them here:   ____________________________________________________________________________________________________________________________________________________________________________________________________________________________________________________________  For informational purposes only. Not to replace the advice of your health care provider. Copyright   2003, 2019 [facility]. All rights reserved. Clinically reviewed by Shine Winston MD. [employer] [id number] - REV 08/24.

## 2025-03-31 NOTE — PROGRESS NOTES
Preoperative Evaluation  [facility]  [address], [address]  [place] [address]  Phone: [phone]  Primary Provider: Mendel Mireles PA-C  Pre-op Performing Provider: INEZ Morelos CNP  Mar 31, 2025             3/31/2025   Surgical Information   What procedure is being done? Left knee replacement   Facility or Hospital where procedure/surgery will be performed: [facility]   Who is doing the procedure / surgery? Glenroy Carrera MD   Date of surgery / procedure: 04-23-25   Time of surgery / procedure: I dont know   Where do you plan to recover after surgery? at home with Home Care     Fax number for surgical facility: Note does not need to be faxed, will be available electronically in Epic.    Assessment & Plan     The proposed surgical procedure is considered INTERMEDIATE risk.    Preop general physical exam  Ok for surgery. No concerns today   - CBC w/ Reflex to Iron Studies; Future  - Comprehensive metabolic panel (BMP + Alb, Alk Phos, ALT, AST, Total. Bili, TP); Future  - EKG 12-lead complete w/read - Clinics  - CBC w/ Reflex to Iron Studies  - Comprehensive metabolic panel (BMP + Alb, Alk Phos, ALT, AST, Total. Bili, TP)  - Iron & Iron Binding Capacity  - Ferritin    Post-traumatic osteoarthritis of left knee, medial compartment  Plan TKA     Hypothyroidism due to medication  Due for labs. Misunderstood dosing instructions so has been taking less than recommended.   - TSH; Future  - T4, free; Future  - T3, Free; Future  - T3 reverse; Future  - TSH  - T4, free  - T3, Free  - T3 reverse    Prediabetes  Will check labs   - Hemoglobin A1c; Future  - Comprehensive metabolic panel (BMP + Alb, Alk Phos, ALT, AST, Total. Bili, TP); Future  - Hemoglobin A1c  - Comprehensive metabolic panel (BMP + Alb, Alk Phos, ALT, AST, Total. Bili, TP)    Class 3 severe obesity with body mass index (BMI) of 40.0 to 44.9 in adult, unspecified obesity type,  unspecified whether serious comorbidity present (H)  Will check labs   - Comprehensive metabolic panel (BMP + Alb, Alk Phos, ALT, AST, Total. Bili, TP); Future  - Comprehensive metabolic panel (BMP + Alb, Alk Phos, ALT, AST, Total. Bili, TP)      (D64.9) Anemia, unspecified type  Chronic anemia that has waxed and waned. I reviewed the last 10 years of hgb labs. She is currently within her normal range. Has taken iron in the past but hasn't been taking it lately. Recommend restarting iron and requested heme consult. Would be best to optimize before surgery.   -heme/onc consult           - No identified additional risk factors other than previously addressed    Preoperative Medication Instructions  Antiplatelet or Anticoagulation Medication Instructions   - We reviewed the medication list and the patient is not on an antiplatelet or anticoagulation medications.    Additional Medication Instructions  Take all scheduled medications on the day of surgery EXCEPT for modifications listed below:   - naltrexone PO: DO NOT TAKE 3 days prior to surgery.    Recommendation  Approval given to proceed with proposed procedure pending review of diagnostic evaluation.   Will need anemia optimization.     Bryan Galeas is a 60 year old, presenting for the following:  Pre-Op Exam          3/31/2025     3:47 PM   Additional Questions   Roomed by Marita   Accompanied by none     HPI:   Going for TKA    Has been tobacco free for 2 months   Has been feeling well lately without any concerns         3/31/2025   Pre-Op Questionnaire   Have you ever had a heart attack or stroke? No   Have you ever had surgery on your heart or blood vessels, such as a stent placement, a coronary artery bypass, or surgery on an artery in your head, neck, heart, or legs? No   Do you have chest pain with activity? No   Do you have a history of heart failure? No   Do you currently have a cold, bronchitis or symptoms of other infection? No   Do you have a cough,  shortness of breath, or wheezing? No   Do you or anyone in your family have previous history of blood clots? No   Do you or does anyone in your family have a serious bleeding problem such as prolonged bleeding following surgeries or cuts? No   Have you ever had problems with anemia or been told to take iron pills? (!) YES chronic slightly low iron. Hasn't been taking iron lately    Have you had any abnormal blood loss such as black, tarry or bloody stools, or abnormal vaginal bleeding? No   Have you ever had a blood transfusion? No   Are you willing to have a blood transfusion if it is medically needed before, during, or after your surgery? Yes   Have you or any of your relatives ever had problems with anesthesia? No   Do you have sleep apnea, excessive snoring or daytime drowsiness? (!) YES   Do you have a CPAP machine? Yes   Do you have any artifical heart valves or other implanted medical devices like a pacemaker, defibrillator, or continuous glucose monitor? No   Do you have artificial joints? No   Are you allergic to latex? No     Health Care Directive  Patient does not have a Health Care Directive:     Preoperative Review of    reviewed - no record of controlled substances prescribed.      Status of Chronic Conditions:  See problem list for active medical problems.  Problems all longstanding and stable, except as noted/documented.  See ROS for pertinent symptoms related to these conditions.    Patient Active Problem List    Diagnosis Date Noted    Stress fracture of knee, left-  Nondisplaced, subchondral fracture involving the weightbearing medial femoral condyle 02/13/2025     Priority: Medium    Post-traumatic osteoarthritis of left knee, medial compartment 02/13/2025     Priority: Medium    Hiatal hernia 03/28/2023     Priority: Medium    Insomnia, unspecified type 03/28/2023     Priority: Medium    Numbness and tingling in left hand 12/07/2022     Priority: Medium    Obesity (BMI 35.0-39.9) with  comorbidity (H) 01/23/2020     Priority: Medium    Vitamin D deficiency 02/20/2018     Priority: Medium    Hidradenitis suppurativa of right axilla 08/24/2016     Priority: Medium    Hypothyroidism due to medication 12/22/2015     Priority: Medium     lithium      Prediabetes 12/08/2015     Priority: Medium    Gastroesophageal reflux disease without esophagitis 10/13/2015     Priority: Medium    Bipolar 2 disorder (H) 07/28/2015     Priority: Medium    Psychosis (H) 05/08/2015     Priority: Medium    Periumbilical hernia 06/12/2014     Priority: Medium    overweight BMI>30 05/20/2014     Priority: Medium    ADHD, predominantly inattentive type 05/16/2014     Priority: Medium    Hyponatremia 04/04/2014     Priority: Medium    Tubular adenoma 01/30/2014     Priority: Medium     Seen on colonoscopy dated 1/09/2014      Iron deficiency anemia 01/30/2014     Priority: Medium    Anemia 09/30/2013     Priority: Medium    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) 09/26/2013     Priority: Medium    CARDIOVASCULAR SCREENING; LDL GOAL LESS THAN 160 09/26/2013     Priority: Medium    Major depressive disorder, recurrent episode, moderate (H) 09/26/2013     Priority: Medium    ROEMO on CPAP      Priority: Medium    Tobacco abuse      Priority: Medium    Post menopausal syndrome      Priority: Medium      Past Medical History:   Diagnosis Date    Anemia     Depression     Erosive esophagitis     GERD (gastroesophageal reflux disease)     Hemorrhoid     Hiatal hernia     Hypothyroidism     Menstrual disorder     s/p D&C -12/2012    Obesity     ROMEO on CPAP     Other bipolar disorders     followed by Dr Collazo    Post menopausal syndrome     Prediabetes     Tobacco abuse      Past Surgical History:   Procedure Laterality Date    COLONOSCOPY  12/24/2013    Procedure: COLONOSCOPY;;  Surgeon: Guy Grant MD;  Location:  GI    COLONOSCOPY  1/9/2014    Procedure: COMBINED COLONOSCOPY, SINGLE BIOPSY/POLYPECTOMY BY BIOPSY;;  Surgeon:  Guy Grant MD;  Location:  GI    COLONOSCOPY N/A 8/8/2023    Procedure: COLONOSCOPY, WITH POLYPECTOMY AND BIOPSY;  Surgeon: René Rodriguez DO;  Location:  GI    ESOPHAGOSCOPY, GASTROSCOPY, DUODENOSCOPY (EGD), COMBINED  12/24/2013    Procedure: COMBINED ESOPHAGOSCOPY, GASTROSCOPY, DUODENOSCOPY (EGD), BIOPSY SINGLE OR MULTIPLE;  ESOPHAGOSCOPY, GASTROSCOPY, DUODENOSCOPY, COLONOSCOPY;  Surgeon: Guy Grant MD;  Location:  GI    ESOPHAGOSCOPY, GASTROSCOPY, DUODENOSCOPY (EGD), COMBINED N/A 8/8/2023    Procedure: ESOPHAGOGASTRODUODENOSCOPY, WITH BIOPSY;  Surgeon: René Rodriguez DO;  Location:  GI    ESOPHAGOSCOPY, GASTROSCOPY, DUODENOSCOPY (EGD), COMBINED N/A 2/15/2024    Procedure: Esophagoscopy, gastroscopy, duodenoscopy (EGD), combined;  Surgeon: Sarmad Reed MD;  Location: [facility]    ESOPHAGOSCOPY, GASTROSCOPY, DUODENOSCOPY (EGD), COMBINED N/A 8/13/2024    Procedure: Esophagoscopy, gastroscopy, duodenoscopy (EGD), combined;  Surgeon: René Rodriguez DO;  Location: [facility]    GENITOURINARY SURGERY      urethra stretched    GYN SURGERY      d&c     LAPAROSCOPIC ASSISTED RECTOPEXY  3/14/2014    Procedure: LAPAROSCOPIC ASSISTED RECTOPEXY;  LAPAROSCOPIC  VENTRAL RECTOPEXY ;  Surgeon: Jennifer Connolly MD;  Location:  OR    ORTHOPEDIC SURGERY      surgery on clavicle,surgery for left leg fracture     Current Outpatient Medications   Medication Sig Dispense Refill    albuterol (PROAIR HFA/PROVENTIL HFA/VENTOLIN HFA) 108 (90 Base) MCG/ACT inhaler Inhale 2 puffs into the lungs every 4 hours as needed for shortness of breath, wheezing or cough. 18 g 1    ammonium lactate (AMLACTIN) 12 % external cream Apply topically daily as needed for dry skin 385 g 1    buPROPion (WELLBUTRIN XL) 150 MG 24 hr tablet Take 450 mg by mouth every morning      busPIRone HCl (BUSPAR) 30 MG tablet Take 1 tablet by mouth 2 times daily      dextromethorphan-guaiFENesin (MUCINEX DM)  MG 12 hr tablet Take 1 tablet by mouth every  12 hours as needed for cough. 30 tablet 1    Doxepin HCl 150 MG CAPS Take 300 mg by mouth At Bedtime 30 capsule 0    escitalopram (LEXAPRO) 10 MG tablet Take 10 mg by mouth every morning      esomeprazole (NEXIUM) 20 MG DR capsule Take 1 capsule (20 mg) by mouth 2 times daily Take 30-60 minutes before eating. 180 capsule 0    famotidine (PEPCID) 40 MG tablet Take 1 tablet (40 mg) by mouth 2 times daily. 180 tablet 3    fluticasone-salmeterol (ADVAIR) 250-50 MCG/ACT inhaler Inhale 1 puff into the lungs 2 times daily. 60 each 3    lamoTRIgine (LAMICTAL) 100 MG tablet Take 100 mg by mouth every morning      levothyroxine (SYNTHROID/LEVOTHROID) 150 MCG tablet TAKE ONE TABLET BY MOUTH EVERY DAY 90 tablet 3    naltrexone (DEPADE/REVIA) 50 MG tablet Take 1/2 tablet once daily 1-2 hours prior to worst cravings for 1 week, then increase to 1 tablet daily as directed if tolerating 30 tablet 3    order for DME Equipment being ordered: CPAP  Please dispense Cpap and its supply 1 Units prn    RABEprazole (ACIPHEX) 20 MG EC tablet Take 1 tablet (20 mg) by mouth 2 times daily (before meals). 180 tablet 1    REXULTI 2 MG tablet Take 2 mg by mouth every morning      topiramate (TOPAMAX) 25 MG tablet Take 2 tablets (50 mg) by mouth 2 times daily. 120 tablet 3    diclofenac (VOLTAREN) 50 MG EC tablet Take 1 tablet (50 mg) by mouth 2 times daily. (Patient not taking: Reported on 3/31/2025) 42 tablet 0    nystatin (MYCOSTATIN) 404483 UNIT/GM external powder Apply topically daily (Patient not taking: Reported on 3/31/2025) 60 g 11    triamcinolone (KENALOG) 0.025 % external ointment Apply topically 2 times daily To affected area in the groin as needed up to 2 weeks at a time. 30 g 3       Allergies   Allergen Reactions    Contrast Dye Shortness Of Breath    Methylpyrrolidone Swelling    Iodine Swelling     Other reaction(s): Angioedema  Facial swelling.    Morphine Anxiety and Nausea and Vomiting     Other reaction(s): Behavioral  "Disturbances  Other reaction(s): Anxiety, vomiting        Social History     Tobacco Use    Smoking status: Former     Current packs/day: 0.00     Types: Cigarettes     Quit date: 2023     Years since quittin.2     Passive exposure: Past    Smokeless tobacco: Never    Tobacco comments:     Quit for year, restarted .       Quit middle May 2024     Quit 25    Substance Use Topics    Alcohol use: No     Comment: Severe alcohol use disorder for 20 years.  :  Reports sober for past five years     Family History   Problem Relation Age of Onset    Cancer Mother         ovarian cancer    Hypertension Father     Breast Cancer No family hx of     Cancer - colorectal No family hx of     Anesthesia Reaction No family hx of     Venous thrombosis No family hx of      History   Drug Use Unknown     Comment: Sober from crack for              Review of Systems  Constitutional, neuro, ENT, endocrine, pulmonary, cardiac, gastrointestinal, genitourinary, musculoskeletal, integument and psychiatric systems are negative, except as otherwise noted.    Objective    /77 (BP Location: Right arm, Patient Position: Sitting, Cuff Size: Adult Large)   Pulse 67   Temp 97.5  F (36.4  C) (Tympanic)   Resp 16   Ht 1.702 m (5' 7\")   Wt 119.5 kg (263 lb 8 oz)   LMP 2006   SpO2 96%   BMI 41.27 kg/m     Estimated body mass index is 41.27 kg/m  as calculated from the following:    Height as of this encounter: 1.702 m (5' 7\").    Weight as of this encounter: 119.5 kg (263 lb 8 oz).  Physical Exam  GENERAL: alert and no distress  EYES: Eyes grossly normal to inspection, conjunctivae and sclerae normal  RESP: lungs clear to auscultation - no rales, rhonchi or wheezes  CV: regular rate and rhythm, normal S1 S2, no S3 or S4, no murmur, click or rub, no peripheral edema  MS: no gross musculoskeletal defects noted, no edema  SKIN: no suspicious lesions or rashes  NEURO: Normal strength and tone, mentation intact and " "speech normal  PSYCH: mentation appears normal, affect normal/bright    No results for input(s): \"HGB\", \"PLT\", \"INR\", \"NA\", \"POTASSIUM\", \"CR\", \"A1C\" in the last 8760 hours.     Diagnostics  Recent Results (from the past 24 hours)   Hemoglobin A1c    Collection Time: 03/31/25  5:15 PM   Result Value Ref Range    Estimated Average Glucose 114 <117 mg/dL    Hemoglobin A1C 5.6 0.0 - 5.6 %   CBC with Platelets and Reflex to Iron Studies    Collection Time: 03/31/25  5:15 PM   Result Value Ref Range    WBC Count 7.0 4.0 - 11.0 10e3/uL    RBC Count 4.10 3.80 - 5.20 10e6/uL    Hemoglobin 10.8 (L) 11.7 - 15.7 g/dL    Hematocrit 35.6 35.0 - 47.0 %    MCV 87 78 - 100 fL    MCH 26.3 (L) 26.5 - 33.0 pg    MCHC 30.3 (L) 31.5 - 36.5 g/dL    RDW 14.1 10.0 - 15.0 %    Platelet Count 324 150 - 450 10e3/uL      EKG: appears normal, NSR, normal axis, normal intervals, no acute ST/T changes c/w ischemia, no LVH by voltage criteria    Revised Cardiac Risk Index (RCRI)  The patient has the following serious cardiovascular risks for perioperative complications:   - No serious cardiac risks = 0 points     RCRI Interpretation: 0 points: Class I (very low risk - 0.4% complication rate)         Signed Electronically by: INEZ Morelos CNP  A copy of this evaluation report is provided to the requesting physician.         "

## 2025-04-01 ENCOUNTER — PATIENT OUTREACH (OUTPATIENT)
Dept: ONCOLOGY | Facility: CLINIC | Age: 60
End: 2025-04-01

## 2025-04-01 LAB
ALBUMIN SERPL BCG-MCNC: 4.3 G/DL (ref 3.5–5.2)
ALP SERPL-CCNC: 165 U/L (ref 40–150)
ALT SERPL W P-5'-P-CCNC: 14 U/L (ref 0–50)
ANION GAP SERPL CALCULATED.3IONS-SCNC: 14 MMOL/L (ref 7–15)
AST SERPL W P-5'-P-CCNC: 19 U/L (ref 0–45)
BILIRUB SERPL-MCNC: <0.2 MG/DL
BUN SERPL-MCNC: 14.5 MG/DL (ref 8–23)
CALCIUM SERPL-MCNC: 9.5 MG/DL (ref 8.8–10.4)
CHLORIDE SERPL-SCNC: 106 MMOL/L (ref 98–107)
CREAT SERPL-MCNC: 0.94 MG/DL (ref 0.51–0.95)
EGFRCR SERPLBLD CKD-EPI 2021: 68 ML/MIN/1.73M2
FERRITIN SERPL-MCNC: 21 NG/ML (ref 11–328)
GLUCOSE SERPL-MCNC: 86 MG/DL (ref 70–99)
HCO3 SERPL-SCNC: 21 MMOL/L (ref 22–29)
IRON BINDING CAPACITY (ROCHE): 332 UG/DL (ref 240–430)
IRON SATN MFR SERPL: 8 % (ref 15–46)
IRON SERPL-MCNC: 26 UG/DL (ref 37–145)
POTASSIUM SERPL-SCNC: 4.4 MMOL/L (ref 3.4–5.3)
PROT SERPL-MCNC: 7.5 G/DL (ref 6.4–8.3)
SODIUM SERPL-SCNC: 141 MMOL/L (ref 135–145)
T3FREE SERPL-MCNC: 2.5 PG/ML (ref 2–4.4)
T4 FREE SERPL-MCNC: 1.16 NG/DL (ref 0.9–1.7)
TSH SERPL DL<=0.005 MIU/L-ACNC: 3.46 UIU/ML (ref 0.3–4.2)

## 2025-04-01 NOTE — PROGRESS NOTES
New Patient Oncology Nurse Navigator Note     Referral Received: 04/01/25      Referring provider: Flores Benedict APRN CNP     Referring Clinic/Organization: [facility]     Referred to: Benign Hematology    Requested provider (if applicable): First available - did not specify      Evaluation for :   Diagnosis   D64.9 (ICD-10-CM) - Anemia, unspecified type      Clinical History (per Nurse review of records provided):      3/31 OV Jak:   (D64.9) Anemia, unspecified type  Chronic anemia that has waxed and waned. I reviewed the last 10 years of hgb labs. She is currently within her normal range. Has taken iron in the past but hasn't been taking it lately. Recommend restarting iron and requested heme consult. Would be best to optimize before surgery.   -heme/onc consult     Latest Reference Range & Units 08/27/20 17:24 04/15/21 18:20 02/10/22 16:58 03/28/23 09:11 03/31/25 17:15   WBC 4.0 - 11.0 10e3/uL 6.5 6.5 7.9 5.1 7.0   Hemoglobin 11.7 - 15.7 g/dL 11.5 (L) 11.7 12.2 11.7 10.8 (L)   Hematocrit 35.0 - 47.0 % 35.8 36.8 39.2 37.5 35.6   Platelet Count 150 - 450 10e3/uL 266 296 281 298 324   RBC Count 3.80 - 5.20 10e6/uL 4.11 4.24 4.46 4.23 4.10   MCV 78 - 100 fL 87 87 88 89 87   MCH 26.5 - 33.0 pg 28.0 27.6 27.4 27.7 26.3 (L)   MCHC 31.5 - 36.5 g/dL 32.1 31.8 31.1 (L) 31.2 (L) 30.3 (L)   RDW 10.0 - 15.0 % 14.6 15.1 (H) 15.9 (H) 15.7 (H) 14.1   (L): Data is abnormally low  (H): Data is abnormally high    Records Location: [facility]     Additional testing needed prior to consult:     ?    Referral updates and Plan:     04/01/2025 8:14 AM -  Referral received and reviewed. Message sent to referring advising of timing and unable to schedule prior to surgery.  Sent triage instructions to NPS for next available.    AISSATOU MinN, RN  Hematology/Oncology Nurse Navigator  [facility]  [phone] / [phone]

## 2025-04-03 LAB — T3REVERSE SERPL-MCNC: 14.7 NG/DL

## 2025-04-08 ENCOUNTER — HOSPITAL ENCOUNTER (OUTPATIENT)
Facility: CLINIC | Age: 60
Discharge: HOME | End: 2025-04-08
Attending: INTERNAL MEDICINE | Admitting: INTERNAL MEDICINE
Payer: COMMERCIAL

## 2025-04-08 VITALS
TEMPERATURE: 97.9 F | HEART RATE: 72 BPM | OXYGEN SATURATION: 100 % | RESPIRATION RATE: 15 BRPM | DIASTOLIC BLOOD PRESSURE: 66 MMHG | SYSTOLIC BLOOD PRESSURE: 107 MMHG

## 2025-04-08 LAB — UPPER GI ENDOSCOPY: NORMAL

## 2025-04-08 PROCEDURE — 250N000009 HC RX 250: Performed by: NURSE ANESTHETIST, CERTIFIED REGISTERED

## 2025-04-08 PROCEDURE — 258N000003 HC RX IP 258 OP 636: Performed by: NURSE ANESTHETIST, CERTIFIED REGISTERED

## 2025-04-08 PROCEDURE — 370N000017 HC ANESTHESIA TECHNICAL FEE, PER MIN: Performed by: INTERNAL MEDICINE

## 2025-04-08 PROCEDURE — 43235 EGD DIAGNOSTIC BRUSH WASH: CPT | Performed by: INTERNAL MEDICINE

## 2025-04-08 PROCEDURE — 250N000011 HC RX IP 250 OP 636: Performed by: NURSE ANESTHETIST, CERTIFIED REGISTERED

## 2025-04-08 RX ORDER — LIDOCAINE HYDROCHLORIDE 20 MG/ML
INJECTION, SOLUTION INFILTRATION; PERINEURAL PRN
Status: DISCONTINUED | OUTPATIENT
Start: 2025-04-08 | End: 2025-04-08

## 2025-04-08 RX ORDER — PROPOFOL 10 MG/ML
INJECTION, EMULSION INTRAVENOUS PRN
Status: DISCONTINUED | OUTPATIENT
Start: 2025-04-08 | End: 2025-04-08

## 2025-04-08 RX ORDER — ONDANSETRON 2 MG/ML
4 INJECTION INTRAMUSCULAR; INTRAVENOUS EVERY 6 HOURS PRN
Status: DISCONTINUED | OUTPATIENT
Start: 2025-04-08 | End: 2025-04-08 | Stop reason: HOSPADM

## 2025-04-08 RX ORDER — NALOXONE HYDROCHLORIDE 0.4 MG/ML
0.4 INJECTION, SOLUTION INTRAMUSCULAR; INTRAVENOUS; SUBCUTANEOUS
Status: DISCONTINUED | OUTPATIENT
Start: 2025-04-08 | End: 2025-04-08 | Stop reason: HOSPADM

## 2025-04-08 RX ORDER — ONDANSETRON 2 MG/ML
4 INJECTION INTRAMUSCULAR; INTRAVENOUS
Status: DISCONTINUED | OUTPATIENT
Start: 2025-04-08 | End: 2025-04-08 | Stop reason: HOSPADM

## 2025-04-08 RX ORDER — PROCHLORPERAZINE MALEATE 5 MG/1
10 TABLET ORAL EVERY 6 HOURS PRN
Status: DISCONTINUED | OUTPATIENT
Start: 2025-04-08 | End: 2025-04-08 | Stop reason: HOSPADM

## 2025-04-08 RX ORDER — NALOXONE HYDROCHLORIDE 0.4 MG/ML
0.1 INJECTION, SOLUTION INTRAMUSCULAR; INTRAVENOUS; SUBCUTANEOUS
Status: DISCONTINUED | OUTPATIENT
Start: 2025-04-08 | End: 2025-04-08 | Stop reason: HOSPADM

## 2025-04-08 RX ORDER — NALOXONE HYDROCHLORIDE 0.4 MG/ML
0.2 INJECTION, SOLUTION INTRAMUSCULAR; INTRAVENOUS; SUBCUTANEOUS
Status: DISCONTINUED | OUTPATIENT
Start: 2025-04-08 | End: 2025-04-08 | Stop reason: HOSPADM

## 2025-04-08 RX ORDER — OXYCODONE HYDROCHLORIDE 10 MG/1
10 TABLET ORAL
Status: DISCONTINUED | OUTPATIENT
Start: 2025-04-08 | End: 2025-04-08 | Stop reason: HOSPADM

## 2025-04-08 RX ORDER — LIDOCAINE 40 MG/G
CREAM TOPICAL
Status: DISCONTINUED | OUTPATIENT
Start: 2025-04-08 | End: 2025-04-08 | Stop reason: HOSPADM

## 2025-04-08 RX ORDER — SODIUM CHLORIDE, SODIUM LACTATE, POTASSIUM CHLORIDE, CALCIUM CHLORIDE 600; 310; 30; 20 MG/100ML; MG/100ML; MG/100ML; MG/100ML
INJECTION, SOLUTION INTRAVENOUS CONTINUOUS
Status: DISCONTINUED | OUTPATIENT
Start: 2025-04-08 | End: 2025-04-08 | Stop reason: HOSPADM

## 2025-04-08 RX ORDER — ONDANSETRON 2 MG/ML
4 INJECTION INTRAMUSCULAR; INTRAVENOUS EVERY 30 MIN PRN
Status: DISCONTINUED | OUTPATIENT
Start: 2025-04-08 | End: 2025-04-08 | Stop reason: HOSPADM

## 2025-04-08 RX ORDER — DEXAMETHASONE SODIUM PHOSPHATE 4 MG/ML
4 INJECTION, SOLUTION INTRA-ARTICULAR; INTRALESIONAL; INTRAMUSCULAR; INTRAVENOUS; SOFT TISSUE
Status: DISCONTINUED | OUTPATIENT
Start: 2025-04-08 | End: 2025-04-08 | Stop reason: HOSPADM

## 2025-04-08 RX ORDER — ONDANSETRON 4 MG/1
4 TABLET, ORALLY DISINTEGRATING ORAL EVERY 6 HOURS PRN
Status: DISCONTINUED | OUTPATIENT
Start: 2025-04-08 | End: 2025-04-08 | Stop reason: HOSPADM

## 2025-04-08 RX ORDER — PROPOFOL 10 MG/ML
INJECTION, EMULSION INTRAVENOUS CONTINUOUS PRN
Status: DISCONTINUED | OUTPATIENT
Start: 2025-04-08 | End: 2025-04-08

## 2025-04-08 RX ORDER — ONDANSETRON 4 MG/1
4 TABLET, ORALLY DISINTEGRATING ORAL EVERY 30 MIN PRN
Status: DISCONTINUED | OUTPATIENT
Start: 2025-04-08 | End: 2025-04-08 | Stop reason: HOSPADM

## 2025-04-08 RX ORDER — FLUMAZENIL 0.1 MG/ML
0.2 INJECTION, SOLUTION INTRAVENOUS
Status: DISCONTINUED | OUTPATIENT
Start: 2025-04-08 | End: 2025-04-08 | Stop reason: HOSPADM

## 2025-04-08 RX ORDER — SODIUM CHLORIDE, SODIUM LACTATE, POTASSIUM CHLORIDE, CALCIUM CHLORIDE 600; 310; 30; 20 MG/100ML; MG/100ML; MG/100ML; MG/100ML
INJECTION, SOLUTION INTRAVENOUS CONTINUOUS PRN
Status: DISCONTINUED | OUTPATIENT
Start: 2025-04-08 | End: 2025-04-08

## 2025-04-08 RX ORDER — OXYCODONE HYDROCHLORIDE 5 MG/1
5 TABLET ORAL
Status: DISCONTINUED | OUTPATIENT
Start: 2025-04-08 | End: 2025-04-08 | Stop reason: HOSPADM

## 2025-04-08 RX ADMIN — LIDOCAINE HYDROCHLORIDE 100 MG: 20 INJECTION, SOLUTION INFILTRATION; PERINEURAL at 10:04

## 2025-04-08 RX ADMIN — TOPICAL ANESTHETIC 1 SPRAY: 200 SPRAY DENTAL; PERIODONTAL at 10:04

## 2025-04-08 RX ADMIN — SODIUM CHLORIDE, SODIUM LACTATE, POTASSIUM CHLORIDE, AND CALCIUM CHLORIDE: .6; .31; .03; .02 INJECTION, SOLUTION INTRAVENOUS at 10:04

## 2025-04-08 RX ADMIN — PROPOFOL 80 MG: 10 INJECTION, EMULSION INTRAVENOUS at 10:04

## 2025-04-08 RX ADMIN — PROPOFOL 100 MCG/KG/MIN: 10 INJECTION, EMULSION INTRAVENOUS at 10:04

## 2025-04-08 ASSESSMENT — ACTIVITIES OF DAILY LIVING (ADL)
ADLS_ACUITY_SCORE: 41

## 2025-04-08 NOTE — ANESTHESIA POSTPROCEDURE EVALUATION
Patient: Gudelia Dong    Procedure: Procedure(s):  Esophagoscopy, gastroscopy, duodenoscopy (EGD), combined       Anesthesia Type:  MAC    Note:  Disposition: Outpatient   Postop Pain Control: Uneventful            Sign Out: Well controlled pain   PONV: No   Neuro/Psych: Uneventful            Sign Out: Acceptable/Baseline neuro status   Airway/Respiratory: Uneventful            Sign Out: Acceptable/Baseline resp. status   CV/Hemodynamics: Uneventful            Sign Out: Acceptable CV status; No obvious hypovolemia; No obvious fluid overload   Other NRE: NONE   DID A NON-ROUTINE EVENT OCCUR? No           Last vitals:  Vitals Value Taken Time   /96 04/08/25 1030   Temp     Pulse     Resp     SpO2 99 % 04/08/25 1035   Vitals shown include unfiled device data.    Electronically Signed By: Elvira Dupree MD  April 8, 2025  10:35 AM

## 2025-04-08 NOTE — H&P
Gudelia Dong  [id number]  female  60 year old      Reason for procedure/surgery: egd, assess healing of erosive esophagitis     Patient Active Problem List   Diagnosis    ROMEO on CPAP    Tobacco abuse    Post menopausal syndrome    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)    CARDIOVASCULAR SCREENING; LDL GOAL LESS THAN 160    Major depressive disorder, recurrent episode, moderate (H)    Anemia    Tubular adenoma    Iron deficiency anemia    Hyponatremia    ADHD, predominantly inattentive type    overweight BMI>30    Periumbilical hernia    Psychosis (H)    Bipolar 2 disorder (H)    Gastroesophageal reflux disease without esophagitis    Prediabetes    Hypothyroidism due to medication    Hidradenitis suppurativa of right axilla    Vitamin D deficiency    Obesity (BMI 35.0-39.9) with comorbidity (H)    Numbness and tingling in left hand    Hiatal hernia    Insomnia, unspecified type    Stress fracture of knee, left-  Nondisplaced, subchondral fracture involving the weightbearing medial femoral condyle    Post-traumatic osteoarthritis of left knee, medial compartment       Past Surgical History:    Past Surgical History:   Procedure Laterality Date    COLONOSCOPY  12/24/2013    Procedure: COLONOSCOPY;;  Surgeon: Guy Grant MD;  Location: [facility]    COLONOSCOPY  1/9/2014    Procedure: COMBINED COLONOSCOPY, SINGLE BIOPSY/POLYPECTOMY BY BIOPSY;;  Surgeon: Guy Grant MD;  Location: [facility]    COLONOSCOPY N/A 8/8/2023    Procedure: COLONOSCOPY, WITH POLYPECTOMY AND BIOPSY;  Surgeon: René Rodriguez DO;  Location: [facility]    ESOPHAGOSCOPY, GASTROSCOPY, DUODENOSCOPY (EGD), COMBINED  12/24/2013    Procedure: COMBINED ESOPHAGOSCOPY, GASTROSCOPY, DUODENOSCOPY (EGD), BIOPSY SINGLE OR MULTIPLE;  ESOPHAGOSCOPY, GASTROSCOPY, DUODENOSCOPY, COLONOSCOPY;  Surgeon: Guy Grant MD;  Location: [facility]    ESOPHAGOSCOPY, GASTROSCOPY, DUODENOSCOPY (EGD), COMBINED N/A 8/8/2023    Procedure: ESOPHAGOGASTRODUODENOSCOPY, WITH BIOPSY;  Surgeon:  René Rodriguez DO;  [facility]  [facility]    ESOPHAGOSCOPY, GASTROSCOPY, DUODENOSCOPY (EGD), COMBINED N/A 2/15/2024    Procedure: Esophagoscopy, gastroscopy, duodenoscopy (EGD), combined;  Surgeon: Sarmad Reed MD;  Location:  [facility]    ESOPHAGOSCOPY, GASTROSCOPY, DUODENOSCOPY (EGD), COMBINED N/A 2024    Procedure: Esophagoscopy, gastroscopy, duodenoscopy (EGD), combined;  Surgeon: René Rodriguez DO;  Location:  GI    GENITOURINARY SURGERY      urethra stretched    GYN SURGERY      d&c     LAPAROSCOPIC ASSISTED RECTOPEXY  3/14/2014    Procedure: LAPAROSCOPIC ASSISTED RECTOPEXY;  LAPAROSCOPIC  VENTRAL RECTOPEXY ;  Surgeon: Jennifer Connolly MD;  Location:  OR    ORTHOPEDIC SURGERY      surgery on clavicle,surgery for left leg fracture       Past Medical History:   Past Medical History:   Diagnosis Date    Anemia     Depression     Erosive esophagitis     GERD (gastroesophageal reflux disease)     Hemorrhoid     Hiatal hernia     Hypothyroidism     Menstrual disorder     s/p D&C -2012    Obesity     ROMEO on CPAP     Other bipolar disorders     followed by Dr Collazo    Post menopausal syndrome     Prediabetes     Tobacco abuse        Social History:   Social History     Tobacco Use    Smoking status: Former     Current packs/day: 0.00     Types: Cigarettes     Quit date: 2023     Years since quittin.3     Passive exposure: Past    Smokeless tobacco: Never    Tobacco comments:     Quit for year, restarted .       Quit middle May 2024     Quit 25    Substance Use Topics    Alcohol use: No     Comment: Severe alcohol use disorder for 20 years.  :  Reports sober for past five years       Family History:   Family History   Problem Relation Age of Onset    Cancer Mother         ovarian cancer    Hypertension Father     Breast Cancer No family hx of     Cancer - colorectal No family hx of     Anesthesia Reaction No family hx of     Venous thrombosis No family hx of        Allergies:   Allergies    Allergen Reactions    Contrast Dye Shortness Of Breath    Methylpyrrolidone Swelling    Iodine Swelling     Other reaction(s): Angioedema  Facial swelling.    Morphine Anxiety and Nausea and Vomiting     Other reaction(s): Behavioral Disturbances  Other reaction(s): Anxiety, vomiting       Active Medications:   No current outpatient medications on file.       Systemic Review:   CONSTITUTIONAL: NEGATIVE for fever, chills, change in weight  ENT/MOUTH: NEGATIVE for ear, mouth and throat problems  RESP: NEGATIVE for significant cough or SOB  CV: NEGATIVE for chest pain, palpitations or peripheral edema    Physical Examination:   Vital Signs: /59   Pulse 72   Temp 97.9  F (36.6  C) (Oral)   Resp 15   LMP 07/20/2006   SpO2 94%   GENERAL: healthy, alert and no distress  NECK: no adenopathy, no asymmetry, masses, or scars  RESP: lungs clear to auscultation - no rales, rhonchi or wheezes  CV: regular rate and rhythm, normal S1 S2, no S3 or S4, no murmur, click or rub, no peripheral edema and peripheral pulses strong  ABDOMEN: soft, nontender, no hepatosplenomegaly, no masses and bowel sounds normal  MS: no gross musculoskeletal defects noted, no edema    I examined patient s dentition and informed the patient that dental injuries are a risk of any anesthetic management. No concerns were noted with this patient's dentition. . The patient has consented to proceed with the procedure.    Plan: Appropriate to proceed as scheduled.      René Rodriguez DO  4/8/2025    PCP:  Mendel Mireles

## 2025-04-08 NOTE — ANESTHESIA PREPROCEDURE EVALUATION
Anesthesia Pre-Procedure Evaluation    Patient: Gudelia Dong   MRN: [id number] : 1965        Procedure : Procedure(s):  Esophagoscopy, gastroscopy, duodenoscopy (EGD), combined          Past Medical History:   Diagnosis Date    Anemia     Depression     Erosive esophagitis     GERD (gastroesophageal reflux disease)     Hemorrhoid     Hiatal hernia     Hypothyroidism     Menstrual disorder     s/p D&C -2012    Obesity     ROMEO on CPAP     Other bipolar disorders     followed by Dr Collazo    Post menopausal syndrome     Prediabetes     Tobacco abuse       Past Surgical History:   Procedure Laterality Date    COLONOSCOPY  2013    Procedure: COLONOSCOPY;;  Surgeon: Guy Garnt MD;  Location: [facility]    COLONOSCOPY  2014    Procedure: COMBINED COLONOSCOPY, SINGLE BIOPSY/POLYPECTOMY BY BIOPSY;;  Surgeon: Guy Grant MD;  Location: [facility]    COLONOSCOPY N/A 2023    Procedure: COLONOSCOPY, WITH POLYPECTOMY AND BIOPSY;  Surgeon: René Rodriguez DO;  Location:  GI    ESOPHAGOSCOPY, GASTROSCOPY, DUODENOSCOPY (EGD), COMBINED  2013    Procedure: COMBINED ESOPHAGOSCOPY, GASTROSCOPY, DUODENOSCOPY (EGD), BIOPSY SINGLE OR MULTIPLE;  ESOPHAGOSCOPY, GASTROSCOPY, DUODENOSCOPY, COLONOSCOPY;  Surgeon: Guy Grant MD;  Location: [facility]    ESOPHAGOSCOPY, GASTROSCOPY, DUODENOSCOPY (EGD), COMBINED N/A 2023    Procedure: ESOPHAGOGASTRODUODENOSCOPY, WITH BIOPSY;  Surgeon: René Rodriguez DO;  Location:  GI    ESOPHAGOSCOPY, GASTROSCOPY, DUODENOSCOPY (EGD), COMBINED N/A 2/15/2024    Procedure: Esophagoscopy, gastroscopy, duodenoscopy (EGD), combined;  Surgeon: Sarmad Reed MD;  Location:  [facility]    ESOPHAGOSCOPY, GASTROSCOPY, DUODENOSCOPY (EGD), COMBINED N/A 2024    Procedure: Esophagoscopy, gastroscopy, duodenoscopy (EGD), combined;  Surgeon: René Rodriguez DO;  Location:  GI    GENITOURINARY SURGERY      urethra stretched    GYN SURGERY      d&c     LAPAROSCOPIC ASSISTED RECTOPEXY   3/14/2014    Procedure: LAPAROSCOPIC ASSISTED RECTOPEXY;  LAPAROSCOPIC  VENTRAL RECTOPEXY ;  Surgeon: Jennifer Connolly MD;  Location: [facility]    ORTHOPEDIC SURGERY      surgery on clavicle,surgery for left leg fracture      Allergies   Allergen Reactions    Contrast Dye Shortness Of Breath    Methylpyrrolidone Swelling    Iodine Swelling     Other reaction(s): Angioedema  Facial swelling.    Morphine Anxiety and Nausea and Vomiting     Other reaction(s): Behavioral Disturbances  Other reaction(s): Anxiety, vomiting      Social History     Tobacco Use    Smoking status: Former     Current packs/day: 0.00     Types: Cigarettes     Quit date: 2023     Years since quittin.3     Passive exposure: Past    Smokeless tobacco: Never    Tobacco comments:     Quit for year, restarted .       Quit middle May 2024     Quit 25    Substance Use Topics    Alcohol use: No     Comment: Severe alcohol use disorder for 20 years.  :  Reports sober for past five years      Wt Readings from Last 1 Encounters:   25 119.5 kg (263 lb 8 oz)        Anesthesia Evaluation   Pt has had prior anesthetic.     History of anesthetic complications       ROS/MED HX  ENT/Pulmonary:     (+) sleep apnea,                     asthma                  Neurologic:       Cardiovascular:     (+)  - -   -  - -                                 Previous cardiac testing     METS/Exercise Tolerance: >4 METS    Hematologic:       Musculoskeletal:       GI/Hepatic:     (+) GERD,     hiatal hernia,              Renal/Genitourinary:       Endo:     (+)          thyroid problem, hypothyroidism,    Obesity,       Psychiatric/Substance Use:       Infectious Disease:       Malignancy:       Other:            Physical Exam    Airway        Mallampati: II   TM distance: > 3 FB   Neck ROM: full   Mouth opening: > 3 cm    Respiratory Devices and Support         Dental       (+) Modest Abnormalities - crowns, retainers, 1 or 2 missing  teeth      Cardiovascular   cardiovascular exam normal          Pulmonary   pulmonary exam normal                OUTSIDE LABS:  CBC:   Lab Results   Component Value Date    WBC 7.0 03/31/2025    WBC 5.1 03/28/2023    HGB 10.8 (L) 03/31/2025    HGB 11.7 03/28/2023    HCT 35.6 03/31/2025    HCT 37.5 03/28/2023     03/31/2025     03/28/2023     BMP:   Lab Results   Component Value Date     03/31/2025     03/28/2023    POTASSIUM 4.4 03/31/2025    POTASSIUM 4.1 03/28/2023    CHLORIDE 106 03/31/2025    CHLORIDE 105 03/28/2023    CO2 21 (L) 03/31/2025    CO2 26 03/28/2023    BUN 14.5 03/31/2025    BUN 15.4 03/28/2023    CR 0.94 03/31/2025    CR 0.83 03/28/2023    GLC 86 03/31/2025    GLC 94 03/28/2023     COAGS:   Lab Results   Component Value Date    INR 0.82 (L) 04/24/2006     POC:   Lab Results   Component Value Date    HCG Negative 07/28/2015     HEPATIC:   Lab Results   Component Value Date    ALBUMIN 4.3 03/31/2025    PROTTOTAL 7.5 03/31/2025    ALT 14 03/31/2025    AST 19 03/31/2025    GGT 19 07/05/2006    ALKPHOS 165 (H) 03/31/2025    BILITOTAL <0.2 03/31/2025     OTHER:   Lab Results   Component Value Date    A1C 5.6 03/31/2025    PURVI 9.5 03/31/2025    PHOS 3.5 03/19/2014    MAG 1.7 03/19/2014    LIPASE 50 06/07/2014    AMYLASE 54 06/27/2009    TSH 3.46 03/31/2025    T4 1.16 03/31/2025       Anesthesia Plan    ASA Status:  3       Anesthesia Type: MAC.     - Reason for MAC: straight local not clinically adequate   Induction: Intravenous.   Maintenance: TIVA.        Consents    Anesthesia Plan(s) and associated risks, benefits, and realistic alternatives discussed. Questions answered and patient/representative(s) expressed understanding.     - Discussed: Risks, Benefits and Alternatives for BOTH SEDATION and the PROCEDURE were discussed     - Discussed with:  Patient      - Extended Intubation/Ventilatory Support Discussed: No.      - Patient is DNR/DNI Status: No     Use of blood products  "discussed: No .     Postoperative Care    Pain management: Multi-modal analgesia, IV analgesics, Oral pain medications.   PONV prophylaxis: Ondansetron (or other 5HT-3)     Comments:               Elvira Dupree MD    Clinically Significant Risk Factors Present on Admission                             # Severe Obesity: Estimated body mass index is 41.27 kg/m  as calculated from the following:    Height as of 3/31/25: 1.702 m (5' 7\").    Weight as of 3/31/25: 119.5 kg (263 lb 8 oz).                "

## 2025-04-08 NOTE — ANESTHESIA CARE TRANSFER NOTE
Patient: Gudelia Dong    Procedure: Procedure(s):  Esophagoscopy, gastroscopy, duodenoscopy (EGD), combined       Diagnosis: Erosive esophagitis [K22.10]  Hiatal hernia [K44.9]  Gastroesophageal reflux disease without esophagitis [K21.9]  Diagnosis Additional Information: No value filed.    Anesthesia Type:   MAC     Note:    Oropharynx: oropharynx clear of all foreign objects  Level of Consciousness: awake  Oxygen Supplementation: room air    Independent Airway: airway patency satisfactory and stable  Dentition: dentition unchanged  Vital Signs Stable: post-procedure vital signs reviewed and stable  Report to RN Given: handoff report given  Patient transferred to: PACU    Handoff Report: Identifed the Patient, Identified the Reponsible Provider, Reviewed the pertinent medical history, Discussed the surgical course, Reviewed Intra-OP anesthesia mangement and issues during anesthesia, Set expectations for post-procedure period and Allowed opportunity for questions and acknowledgement of understanding      Vitals:  Vitals Value Taken Time   /86    Temp     Pulse 75    Resp 12    SpO2 100        Electronically Signed By: INEZ Cancino CRNA  April 8, 2025  10:18 AM

## 2025-04-08 NOTE — OR NURSING
Pt underwent EGD under MAC. Specimens: none. Pt transferred to recovery and report given to saloni CROOK.       Elvira Larkin RN

## 2025-04-08 NOTE — H&P (VIEW-ONLY)
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)    CARDIOVASCULAR SCREENING; LDL GOAL LESS THAN 160    Major depressive disorder, recurrent episode, moderate (H)    Anemia    Tubular adenoma    Iron deficiency anemia    Hyponatremia    ADHD, predominantly inattentive type    overweight BMI>30    Periumbilical hernia    Psychosis (H)    Bipolar 2 disorder (H)    Gastroesophageal reflux disease without esophagitis    Prediabetes    Hypothyroidism due to medication    Hidradenitis suppurativa of right axilla    Vitamin D deficiency    Obesity (BMI 35.0-39.9) with comorbidity (H)    Numbness and tingling in left hand    Hiatal hernia    Insomnia, unspecified type    Stress fracture of knee, left-  Nondisplaced, subchondral fracture involving the weightbearing medial femoral condyle    Post-traumatic osteoarthritis of left knee, medial compartment       Past Surgical History:    Past Surgical History:   Procedure Laterality Date    COLONOSCOPY  12/24/2013    Procedure: COLONOSCOPY;;  Surgeon: Guy Grant MD;  Location: [facility]    COLONOSCOPY  1/9/2014    Procedure: COMBINED COLONOSCOPY, SINGLE BIOPSY/POLYPECTOMY BY BIOPSY;;  Surgeon: Guy Grant MD;  Location: [facility]    COLONOSCOPY N/A 8/8/2023    Procedure: COLONOSCOPY, WITH POLYPECTOMY AND BIOPSY;  Surgeon: René Rodriguez DO;  Location: [facility]    ESOPHAGOSCOPY, GASTROSCOPY, DUODENOSCOPY (EGD), COMBINED  12/24/2013    Procedure: COMBINED ESOPHAGOSCOPY, GASTROSCOPY, DUODENOSCOPY (EGD), BIOPSY SINGLE OR MULTIPLE;  ESOPHAGOSCOPY, GASTROSCOPY, DUODENOSCOPY, COLONOSCOPY;  Surgeon: Guy Grant MD;  Location: [facility]    ESOPHAGOSCOPY, GASTROSCOPY, DUODENOSCOPY (EGD), COMBINED N/A 8/8/2023    Procedure: ESOPHAGOGASTRODUODENOSCOPY, WITH BIOPSY;  Surgeon:  René Rodriguez DO;  Location:  [facility]    ESOPHAGOSCOPY, GASTROSCOPY, DUODENOSCOPY (EGD), COMBINED N/A 2/15/2024    Procedure: Esophagoscopy, gastroscopy, duodenoscopy (EGD), combined;  Surgeon: Sarmad Reed MD;  Location:  GI    ESOPHAGOSCOPY, GASTROSCOPY, DUODENOSCOPY (EGD), COMBINED N/A 2024    Procedure: Esophagoscopy, gastroscopy, duodenoscopy (EGD), combined;  Surgeon: René Rodriguez DO;  Location:  GI    GENITOURINARY SURGERY      urethra stretched    GYN SURGERY      d&c     LAPAROSCOPIC ASSISTED RECTOPEXY  3/14/2014    Procedure: LAPAROSCOPIC ASSISTED RECTOPEXY;  LAPAROSCOPIC  VENTRAL RECTOPEXY ;  Surgeon: Jennifer Connolly MD;  Location:  OR    ORTHOPEDIC SURGERY      surgery on clavicle,surgery for left leg fracture       Past Medical History:   Past Medical History:   Diagnosis Date    Anemia     Depression     Erosive esophagitis     GERD (gastroesophageal reflux disease)     Hemorrhoid     Hiatal hernia     Hypothyroidism     Menstrual disorder     s/p D&C -2012    Obesity     ROMEO on CPAP     Other bipolar disorders     followed by Dr Collazo    Post menopausal syndrome     Prediabetes     Tobacco abuse        Social History:   Social History     Tobacco Use    Smoking status: Former     Current packs/day: 0.00     Types: Cigarettes     Quit date: 2023     Years since quittin.3     Passive exposure: Past    Smokeless tobacco: Never    Tobacco comments:     Quit for year, restarted .       Quit middle May 2024     Quit 25    Substance Use Topics    Alcohol use: No     Comment: Severe alcohol use disorder for 20 years.  :  Reports sober for past five years       Family History:   Family History   Problem Relation Age of Onset    Cancer Mother         ovarian cancer    Hypertension Father     Breast Cancer No family hx of     Cancer - colorectal No family hx of     Anesthesia Reaction No family hx of     Venous thrombosis No family hx of        Allergies:   Allergies    Allergen Reactions    Contrast Dye Shortness Of Breath    Methylpyrrolidone Swelling    Iodine Swelling     Other reaction(s): Angioedema  Facial swelling.    Morphine Anxiety and Nausea and Vomiting     Other reaction(s): Behavioral Disturbances  Other reaction(s): Anxiety, vomiting       Active Medications:   No current outpatient medications on file.       Systemic Review:   CONSTITUTIONAL: NEGATIVE for fever, chills, change in weight  ENT/MOUTH: NEGATIVE for ear, mouth and throat problems  RESP: NEGATIVE for significant cough or SOB  CV: NEGATIVE for chest pain, palpitations or peripheral edema    Physical Examination:   Vital Signs: /59   Pulse 72   Temp 97.9  F (36.6  C) (Oral)   Resp 15   LMP 07/20/2006   SpO2 94%   GENERAL: healthy, alert and no distress  NECK: no adenopathy, no asymmetry, masses, or scars  RESP: lungs clear to auscultation - no rales, rhonchi or wheezes  CV: regular rate and rhythm, normal S1 S2, no S3 or S4, no murmur, click or rub, no peripheral edema and peripheral pulses strong  ABDOMEN: soft, nontender, no hepatosplenomegaly, no masses and bowel sounds normal  MS: no gross musculoskeletal defects noted, no edema    I examined patient s dentition and informed the patient that dental injuries are a risk of any anesthetic management. No concerns were noted with this patient's dentition. . The patient has consented to proceed with the procedure.    Plan: Appropriate to proceed as scheduled.      René Rodriguez DO  4/8/2025    PCP:  Mendel Mireles

## 2025-04-13 ENCOUNTER — MYC MEDICAL ADVICE (OUTPATIENT)
Dept: GASTROENTEROLOGY | Facility: CLINIC | Age: 60
End: 2025-04-13
Payer: COMMERCIAL

## 2025-04-13 DIAGNOSIS — K44.9 HIATAL HERNIA: ICD-10-CM

## 2025-04-13 DIAGNOSIS — K22.10 EROSIVE ESOPHAGITIS: Primary | ICD-10-CM

## 2025-04-14 ENCOUNTER — VIRTUAL VISIT (OUTPATIENT)
Dept: PHARMACY | Facility: CLINIC | Age: 60
End: 2025-04-14
Payer: COMMERCIAL

## 2025-04-14 VITALS — WEIGHT: 260 LBS | HEIGHT: 67 IN | BODY MASS INDEX: 40.81 KG/M2

## 2025-04-14 DIAGNOSIS — Z72.0 TOBACCO ABUSE: Chronic | ICD-10-CM

## 2025-04-14 DIAGNOSIS — E03.2 HYPOTHYROIDISM DUE TO MEDICATION: Chronic | ICD-10-CM

## 2025-04-14 DIAGNOSIS — K44.9 HIATAL HERNIA: Chronic | ICD-10-CM

## 2025-04-14 DIAGNOSIS — J10.1 INFLUENZA A: ICD-10-CM

## 2025-04-14 DIAGNOSIS — K21.9 GASTROESOPHAGEAL REFLUX DISEASE WITHOUT ESOPHAGITIS: Chronic | ICD-10-CM

## 2025-04-14 DIAGNOSIS — E66.01 MORBID OBESITY (H): Primary | ICD-10-CM

## 2025-04-14 DIAGNOSIS — F33.1 MAJOR DEPRESSIVE DISORDER, RECURRENT EPISODE, MODERATE (H): ICD-10-CM

## 2025-04-14 DIAGNOSIS — F31.81 BIPOLAR 2 DISORDER (H): Chronic | ICD-10-CM

## 2025-04-14 RX ORDER — VONOPRAZAN FUMARATE 26.72 MG/1
20 TABLET ORAL DAILY
Qty: 90 TABLET | Refills: 1 | Status: SHIPPED | OUTPATIENT
Start: 2025-04-14 | End: 2025-07-13

## 2025-04-14 ASSESSMENT — PAIN SCALES - GENERAL: PAINLEVEL_OUTOF10: NO PAIN (0)

## 2025-04-14 NOTE — Clinical Note
Angela Wiggins - saw your [employer] message and patient is indeed doing rabeprazole 20 mg twice daily (before meals) and famotidine 40 mg twice daily, more so FYI to answer the Functional Neuromodulationhart question. She continues to work on weight loss but keeps hitting some road blocks, upcoming left TKA. Elvira ANDRADE

## 2025-04-14 NOTE — PROGRESS NOTES
"Medication Therapy Management (MTM) Encounter    ASSESSMENT:                            Medication Adherence/Access: {adherencechoices:576543}    ***:   ***    PLAN:                            ***    Follow-up: {followuptest2:598931}    SUBJECTIVE/OBJECTIVE:                          Gudelia Dong is a 60 year old female seen for {mtmvisit:747415}     Reason for visit: ***.    Allergies/ADRs: {1/2/3/4/5:614118}  Past Medical History: {1/2/3/4/5:990478}  Tobacco: She reports that she quit smoking about 15 months ago. Her smoking use included cigarettes. She has been exposed to tobacco smoke. She has never used smokeless tobacco.  Alcohol: {ALCOHOL CONSUMPTION HX:657835}  {Social and Goals:680468}  Medication Adherence/Access: {fumedadherence:595186}    {MTM SUBJECTIVE (Optional):093886}        ***  ***    Today's Vitals: Ht 5' 7\" (1.702 m)   Wt 260 lb (117.9 kg)   LMP 07/20/2006   BMI 40.72 kg/m    ----------------  {KARLA?:778472}    I spent {mtm total time 3:679289} with this patient today{MTMpartdbillingquestion:352550}. { :374933}.     A summary of these recommendations {GIVEN/NOT GIVEN:731428}.    ***    Telemedicine Visit Details  The patient's medications can be safely assessed via a telemedicine encounter.  Type of service:  {telemedvisitmtm:227134::\"Telephone visit\"}  Originating Location (pt. Location): {video visit patient location:052124::\"Home\"}  {PROVIDER LOCATION On-site should be selected for visits conducted from your clinic location or adjoining [facility] hospital, academic office, or other nearby [facility] building. Off-site should be selected for all other provider locations, including home:399963}  Distant Location (provider location):  {virtual location provider:351374}  Start Time: {video/phone visit start time:152948}  End Time: {video/phone visit end time:152948}     Medication Therapy Recommendations  No medication therapy recommendations to display   "

## 2025-04-14 NOTE — PATIENT INSTRUCTIONS
"Recommendations from today's MTM visit:                                                       Take Naltrexone scheduled. Hold Naltrexone at least 3 days prior to knee surgery and do not restart until you have stopped opioid pain medications and have remained off opioid pain medications at least 3 days.    Pharmacist to send message to Gastroenterology regarding updated GERD regimen.   Patient to follow up with Psychiatry to discus de-escalation of psychiatry medications due to polypharmacy.     Follow-up: Return in about 6 months (around 10/14/2025) for Medication Therapy Management Pharmacist Visit.    It was great speaking with you today.  I value your experience and would be very thankful for your time in providing feedback in our clinic survey. In the next few days, you may receive an email or text message from [employer] with a link to a survey related to your  clinical pharmacist.\"     To schedule another MTM appointment, please call the clinic directly or you may call the MTM scheduling line at [phone] or toll-free at [phone].     My Clinical Pharmacist's contact information:                                                      Please feel free to contact me with any questions or concerns you have.      Lauren Bloch, PharmD  Medication Therapy Management Pharmacist   [facility]             "

## 2025-04-14 NOTE — PROGRESS NOTES
Medication Therapy Management (MTM) Encounter    ASSESSMENT:                            Medication Adherence/Access: No issues identified.    Weight Management:  Would benefit from taking naltrexone scheduled. Hopefully left TKA can overall assist with increased mobility over time. To further adjustment to medication regimen at this time.     GERD w/ Erosive Esophagitis/HH:  Defer to Gastroenterology. Pharmacist will send Gastroenterology message to confirm regimen. Patient to continue to work with Weight Management clinic to strive towards goal weight for HH repair.      Bipolar, and Insomnia:  Would benefit from follow up with psychiatry regarding discussion of de-escalation of therapy.     Tobacco Use:   Stable.     Influenza A:   Resolved.     Hypothyroidism   TSH within normal limits.     PLAN:                            Take Naltrexone scheduled. Hold Naltrexone at least 3 days prior to knee surgery and do not restart until you have stopped opioid pain medications and have remained off opioid pain medications at least 3 days.    Pharmacist to send message to Gastroenterology regarding updated GERD regimen.   Patient to follow up with Psychiatry to discus de-escalation of psychiatry medications due to polypharmacy.   Follow up with Louise Colindres PA-C in 3 months as planned.     Follow-up: Return in about 6 months (around 10/14/2025) for Medication Therapy Management Pharmacist Visit.    SUBJECTIVE/OBJECTIVE:                          Gudelia Dong is a 60 year old female seen for an initial visit. She was referred to me from Louise Colindres PA-C.      Reason for visit: Weight Management follow up, comprehensive review of medications.    Allergies/ADRs: Reviewed in chart  Past Medical History: Reviewed in chart  Tobacco: She reports that she quit smoking about 15 months ago. Her smoking use included cigarettes. She has been exposed to tobacco smoke. She has never used smokeless tobacco.  Quit smoking  "2/2/2025.   Alcohol: not currently using. History of alcohol dependence sober from alcohol almost 10 years.   Intermittent crack, cocaine, hallucinogenic use as well, sober from these since 2014.     Medication Adherence/Access:   Patient uses pill box(es).  Patient takes medications 2 time(s) per day.   Per patient, misses medication 0 time(s) per week.     Weight Management   Topiramate 50 mg twice daily  Naltrexone 50 mg once daily as needed     Patient reports no current medication side effects. Reports weight plateau in the recent past. Taking naltrexone more so as needed for cravings 1/2 days of the week. Feels lack of activity is contributing to weight stall.   Nutrition/Eating Habits: 2 meals per day.  Breakfast: yogurt + fruit + granola or cereal or bagel + cream cheese; Lunch: salad + other vegetables + protein via eggs (2). Snacks: fruit popsicles (30 calories), pretzels, vanilla wafer (6-8).   Exercise/Activity: reports that mobility is limited due to significant knee pain. She reports she cannot walk like she previously used to. Feels everywhere she goes she has significant pain from knee.  PT planned for post op cares.     Initial Consult Weight: 288 lb      Current weight today: 260 lbs 0 oz  Cumulative Weight Loss: -28 lb, -9.7% from baseline    Wt Readings from Last 4 Encounters:   04/14/25 260 lb (117.9 kg)   03/31/25 263 lb 8 oz (119.5 kg)   03/13/25 262 lb (118.8 kg)   03/06/25 262 lb (118.8 kg)     Estimated body mass index is 40.72 kg/m  as calculated from the following:    Height as of this encounter: 5' 7\" (1.702 m).    Weight as of this encounter: 260 lb (117.9 kg).    GERD w/ Erosive Esophatitis/HH:  Rabeprazole (Aciphex) 20 mg twice daily     Famotidine 40 mg twice daily    Reports HH, is hoping is to undergo surgery but has to get to BMI < 35 prior to surgery.   Patient reports no current symptoms.   Rabeprazole before eating in AM and before dinner meal at night.   EGD 4/8/2025: " "persistent LA grade C erosive esophagitis.   Patient feels that current regimen is effective.  The patient does not have a history of GI bleed.  Food triggers: unknown   Medications tried/failed: Esomeprazole, pantoprazole.        Mental Health   Bipolar and Insomnia  Bupropion  mg once daily AM  Buspirone 30 mg twice daily  Doxepin 150 mg bedtime   Rexulti 2 mg daily   Escitalopram 10 mg daily   Lamotrigine 100 mg daily     Reports that she continued to add medication when she was drinking alcohol and was having significant bipolar/depression symptoms. Since abstaining from alcohol has noticed improved mood and feels she doesn't need to be on all medications within regimen. Doxepin originally for sleep then increased dose for mood.   Patient reports no current medication side effects. She feels her mood is \"really good\". Going on leave from work 4/23/2025 and having knee replacement on left knee. Will be on short term disability. She is excited for her upcoming surgery.   Reports history of alcohol and substance abuse. Has been 10 years sober. She feels like her regimen could be de-escalated but hasn't had this discussion with psychiatrist, plans to discuss. Reports missed January appointment is beginning in May.    Patient is seeing a psychiatrist.       Tobacco Use:   No longer coughing since quit smoking.   Quit smoking 2/2/2025.     Influenza A:   Was given albuterol inhaler and Advair inhaler when shew as diagnosed with influenza A and cough. She is no longer using anymore.     Hypothyroidism   Levothyroxine 150 mcg daily.   Patient is having the following symptoms: none.        Today's Vitals: Ht 5' 7\" (1.702 m)   Wt 260 lb (117.9 kg)   LMP 07/20/2006   BMI 40.72 kg/m    ----------------      I spent 30 minutes with this patient today. All changes were made via collaborative practice agreement with Louise Colindres.     A summary of these recommendations was sent via [employer].    Lauren Bloch, " PharmD, BCACP   Medication Therapy Management Pharmacist   [facility]    Telemedicine Visit Details  The patient's medications can be safely assessed via a telemedicine encounter.  Type of service:  Telephone visit  Originating Location (pt. Location): Home    Distant Location (provider location):  Off-site  Start Time:  10:12 AM  End Time:  10:42 AM      Medication Therapy Recommendations  Obesity   1 Current Medication: naltrexone (DEPADE/REVIA) 50 MG tablet   Current Medication Sig: Take 1/2 tablet once daily 1-2 hours prior to worst cravings for 1 week, then increase to 1 tablet daily as directed if tolerating   Rationale: Patient forgets to take - Adherence - Adherence   Recommendation: Provide Adherence Intervention   Status: Patient Agreed - Adherence/Education   Identified Date: 4/14/2025 Completed Date: 4/14/2025

## 2025-04-14 NOTE — NURSING NOTE
Current patient location:  Pt states will be in car at work.    Is the patient currently in the state of [place]? YES    Visit mode: VIDEO    If the visit is dropped, the patient can be reconnected by:VIDEO VISIT: Text to cell phone:   Telephone Information:   Mobile [phone]       Will anyone else be joining the visit? NO  (If patient encounters technical issues they should call [phone])    Are changes needed to the allergy or medication list? No    Are refills needed on medications prescribed by this physician? NO    Rooming Documentation:  Questionnaire(s) not pre-assigned    Reason for visit: Medication Therapy Management    Sree CUENCA      
Yes

## 2025-04-15 ENCOUNTER — TELEPHONE (OUTPATIENT)
Dept: GASTROENTEROLOGY | Facility: CLINIC | Age: 60
End: 2025-04-15
Payer: COMMERCIAL

## 2025-04-15 NOTE — TELEPHONE ENCOUNTER
Writer received a message from provider to help get Pt scheduled for a follow up appointment. Writer called and left a message for the Pt, along with call back number.

## 2025-04-15 NOTE — PROGRESS NOTES
Ortho Navigator Note      Pre-op Date 3/31/25     Medical Clearance  Cleared      Labs Hx of Anemia. PCP restated Iron supplement.      COVID Test Date No longer indicated      Skin  Intact      Activity: Ambulates independently with straight cane      Equipment Need Patient HAS a walker for discharge. Defer to PT/OT for recs.       Meds to Hold Held all supplements 14 days prior to surgery   - naltrexone PO: DO NOT TAKE 3 days prior to surgery.     * Medication recommendations are not intended to be exhaustive; they are limited to common medications that are potentially dangerous if incorrectly managed   NPO Instructions  Instructed to stop solids 8 hr prior to arrival and clears 2 hr prior to arrival.       Pre-op Joint Education Complete? Complete    Discharge Plan Patient has plan to discharge home on morning of POD 1.   Sister in law (June) will arrive at hospital at 0800 to participate in therapy and discharge education. They will then transport patient home    /Transportation Sara will be  and transportation.  is physically able to care for patient.      Barriers to Discharge No barriers to discharge.      Additional Info/   Special Needs : Patient had no unanswered questions or concerns.           04/15/25 1052   Discharge Planning   Patient/Family Anticipates Transition to home with family   Concerns to be Addressed no discharge needs identified   Living Arrangements   People in Home other relative(s)  (Sister in Law(June))   Type of Residence Private Residence   Is your private residence a single family home or apartment? Apartment   Number of Stairs, Within Home, Primary none   Stair Railings, Within Home, Primary none   Once home, are you able to live on one level? Yes   Which level? Main Level   Bathroom Shower/Tub Tub/Shower unit   Equipment Currently Used at Home cane, straight   Support System   Support Systems Family Members   Do you have someone available to stay with you one or two  nights once you are home? Yes   Medical Clearance   Date of Physical 03/31/25   [facility]   It is recommended that you call and check with any specialty providers before surgery to see if you need surgical clearance.  Do you see any specialty providers outside of your primary care provider? No   Blood   Known Bleeding Disorder or Coagulopathy No   Does the patient have any Hinduism/cultural preferences related to blood products? No   Education   Has the patient scheduled or completed pre-op total joint education, either in class or online, in the last 12 months? No   Patient attended total joint pre-op class/received pre-op teaching  online   Falls Risk   Has the patient been identified as a high falls risk before surgery? (fallen in the last 6 months, history of falls, unsteady gait, or balance issues) No   Did an order get placed to attend outpatient pre-surgery balance evaluation? No   Relationship/Environment   Name(s) of People in Home June (sister in law)

## 2025-04-16 ENCOUNTER — TELEPHONE (OUTPATIENT)
Dept: ONCOLOGY | Facility: HOSPITAL | Age: 60
End: 2025-04-16
Payer: COMMERCIAL

## 2025-04-16 NOTE — TELEPHONE ENCOUNTER
Called and spoke with pt to r/s apt on 06/06/25 with Dr. Vasquez due to provider being out of clinic. Pt was r/s to 06/20/25

## 2025-04-18 NOTE — OR NURSING
"Can surgery proceed on 4/23/25  Received: Today  Tania Haney, Flores Jara, APRN CNP  Cc: Dominic Aburto RN; Gudelia Cobb RN  Eladio Sandy,    Pt is scheduled for ARTHROPLASTY, KNEE, TOTAL - Left Choice, on 4/23/25.    You did pt's pre-op H&P on 3/31/25 and said \"Recommendation: Approval given to proceed with proposed procedure pending review of diagnostic evaluation.  Will need anemia optimization.\"    A note in pt's chart on 4/1/25 by Ashlee Alvarado RN,  noted the referral (to hematology) received and reviewed. She said \"Message sent to referring advising of timing and unable to schedule prior to surgery\".      A note in pt's chart on 4/4/25 by Gudelia Cobb, RN says the hematology visit cannot be done until June (which is after the pt's surgery is scheduled).    FYI: It looks like pt had an EGD on 4/8.    Can you please make an addendum in pt's chart. Can pt proceed with surgery on 4/23/25 or does her surgery need to be rescheduled?    Kind regards,    Tania Haney RN, BSN  Pre-Anesthesia Screening  [phone] Office  "

## 2025-04-21 NOTE — OR NURSING
"E: Can surgery proceed on 4/23/25  Received: Today  Flores Benedict, APRN CNP  Tania Haney, AMBREEN  I addended the note! Should be good. She is on her oral iron. Thanks  Flores          Previous Messages       ----- Message -----  From: Tania Haney RN  Sent: 4/18/2025   6:24 PM CDT  To: Dominic Aburto RN; *  Subject: Can surgery proceed on 4/23/25                  Eladio Sandy,    Pt is scheduled for ARTHROPLASTY, KNEE, TOTAL - Left Choice, on 4/23/25.    You did pt's pre-op H&P on 3/31/25 and said \"Recommendation: Approval given to proceed with proposed procedure pending review of diagnostic evaluation.  Will need anemia optimization.\"    A note in pt's chart on 4/1/25 by Ashlee Alvarado RN,  noted the referral (to hematology) received and reviewed. She said \"Message sent to referring advising of timing and unable to schedule prior to surgery\".      A note in pt's chart on 4/4/25 by Gudelia Cobb RN says the hematology visit cannot be done until June (which is after the pt's surgery is scheduled).    FYI: It looks like pt had an EGD on 4/8.    Can you please make an addendum in pt's chart. Can pt proceed with surgery on 4/23/25 or does her surgery need to be rescheduled?    Kind regards,    Tania Haney RN, BSN  Pre-Anesthesia Screening  [phone] Office  "

## 2025-04-22 ENCOUNTER — TELEPHONE (OUTPATIENT)
Dept: ENDOCRINOLOGY | Facility: CLINIC | Age: 60
End: 2025-04-22
Payer: COMMERCIAL

## 2025-04-22 ENCOUNTER — ANESTHESIA EVENT (OUTPATIENT)
Dept: SURGERY | Facility: CLINIC | Age: 60
End: 2025-04-22
Payer: COMMERCIAL

## 2025-04-22 ASSESSMENT — ENCOUNTER SYMPTOMS
SEIZURES: 0
ORTHOPNEA: 0

## 2025-04-22 ASSESSMENT — LIFESTYLE VARIABLES: TOBACCO_USE: 1

## 2025-04-22 ASSESSMENT — COPD QUESTIONNAIRES: COPD: 0

## 2025-04-22 NOTE — TELEPHONE ENCOUNTER
Patient confirmed scheduled appointment:     Date: 6/12/25  Time: 4PM  Visit type: Return Weight Management  Visit mode: Virtual Visit  Provider:  Louise Colindres PA-C  Location: [facility]    Additional Notes:   Reschedule from 7/23/25

## 2025-04-22 NOTE — ANESTHESIA PREPROCEDURE EVALUATION
Anesthesia Pre-Procedure Evaluation    Patient: Gudelia Dong   MRN: [id number] : 1965        Procedure : Procedure(s):  ARTHROPLASTY, KNEE, TOTAL          Past Medical History:   Diagnosis Date    Anemia     Depression     Erosive esophagitis     GERD (gastroesophageal reflux disease)     Hemorrhoid     Hiatal hernia     Hypothyroidism     Menstrual disorder     s/p D&C -2012    Obesity     ROMEO on CPAP     Other bipolar disorders     followed by Dr Collazo    Post menopausal syndrome     Prediabetes     Tobacco abuse       Past Surgical History:   Procedure Laterality Date    COLONOSCOPY  2013    Procedure: COLONOSCOPY;;  Surgeon: Guy Grant MD;  Location:  GI    COLONOSCOPY  2014    Procedure: COMBINED COLONOSCOPY, SINGLE BIOPSY/POLYPECTOMY BY BIOPSY;;  Surgeon: Guy Grant MD;  Location:  GI    COLONOSCOPY N/A 2023    Procedure: COLONOSCOPY, WITH POLYPECTOMY AND BIOPSY;  Surgeon: René Rodriguez DO;  [facility]    ESOPHAGOSCOPY, GASTROSCOPY, DUODENOSCOPY (EGD), COMBINED  2013    Procedure: COMBINED ESOPHAGOSCOPY, GASTROSCOPY, DUODENOSCOPY (EGD), BIOPSY SINGLE OR MULTIPLE;  ESOPHAGOSCOPY, GASTROSCOPY, DUODENOSCOPY, COLONOSCOPY;  Surgeon: Guy Grant MD;  Location:  GI    ESOPHAGOSCOPY, GASTROSCOPY, DUODENOSCOPY (EGD), COMBINED N/A 2023    Procedure: ESOPHAGOGASTRODUODENOSCOPY, WITH BIOPSY;  Surgeon: René Rodriguez DO;  [facility]    ESOPHAGOSCOPY, GASTROSCOPY, DUODENOSCOPY (EGD), COMBINED N/A 2/15/2024    Procedure: Esophagoscopy, gastroscopy, duodenoscopy (EGD), combined;  Surgeon: Sarmad Reed MD;  [facility]: [facility]    ESOPHAGOSCOPY, GASTROSCOPY, DUODENOSCOPY (EGD), COMBINED N/A 2024    Procedure: Esophagoscopy, gastroscopy, duodenoscopy (EGD), combined;  Surgeon: René Rodriguez DO;  [facility]    ESOPHAGOSCOPY, GASTROSCOPY, DUODENOSCOPY (EGD), COMBINED N/A 2025    Procedure: Esophagoscopy, gastroscopy, duodenoscopy (EGD), combined;   Surgeon: René Rodriguez DO;  Location:  GI    GENITOURINARY SURGERY      urethra stretched    GYN SURGERY      d&c     LAPAROSCOPIC ASSISTED RECTOPEXY  3/14/2014    Procedure: LAPAROSCOPIC ASSISTED RECTOPEXY;  LAPAROSCOPIC  VENTRAL RECTOPEXY ;  Surgeon: Jennifer Connolly MD;  Location:  OR    ORTHOPEDIC SURGERY      surgery on clavicle,surgery for left leg fracture      Allergies   Allergen Reactions    Contrast Dye Shortness Of Breath    Methylpyrrolidone Swelling    Iodine Swelling     Other reaction(s): Angioedema  Facial swelling.    Morphine Anxiety and Nausea and Vomiting     Other reaction(s): Behavioral Disturbances  Other reaction(s): Anxiety, vomiting      Social History     Tobacco Use    Smoking status: Former     Current packs/day: 0.00     Types: Cigarettes     Quit date: 2023     Years since quittin.3     Passive exposure: Past    Smokeless tobacco: Never    Tobacco comments:     Quit for year, restarted .       Quit middle May 2024     Quit 25    Substance Use Topics    Alcohol use: No     Comment: Severe alcohol use disorder for 20 years.  :  Reports sober for past five years      Wt Readings from Last 1 Encounters:   25 117.9 kg (260 lb)        Anesthesia Evaluation   Pt has had prior anesthetic. Type: General and MAC.    History of anesthetic complications (Difficult to stay asleep. )  - .  ROMEO.    ROS/MED HX  ENT/Pulmonary:     (+) sleep apnea, severe, uses CPAP,              tobacco use (Quit smoking 2 months.), Past use,                    (-) asthma and COPD   Neurologic:  - neg neurologic ROS  (-) no seizures, no CVA and no TIA   Cardiovascular: Comment: Denies cardiac symptoms including chest pain, SOB, palpitations, syncope, SANCHEZ, orthopnea, or PND.      (+)  - -   -  - -                                 Previous cardiac testing   Echo: Date:  Results:  Interpretation Summary  Global and regional left ventricular function is normal with an EF of 60-65%.    Right ventricular function, chamber size, wall motion, and thickness are   normal. Pulmonary artery systolic pressure cannot be assessed. The inferior   vena cava  is normal. No pericardial effusion is present.  PatientHeight: 68 in  PatientWeight: 208 lbs  BSA 2.1 m^2    Stress Test:  Date: Results:    ECG Reviewed:  Date: 2015 Results:  SR  Cath:  Date: Results:   (-) hypertension, taking anticoagulants/antiplatelets, SANCHEZ, orthopnea/PND and dyslipidemia   METS/Exercise Tolerance: 4 - Raking leaves, gardening Comment:  Job is very physically active.        Hematologic:     (+)      anemia,       (-) history of blood clots and history of blood transfusion   Musculoskeletal: Comment: S/p left LE surgery with hardware.     S/p Left clavicle requiring hardware.     Following with sports medicine for stress fracture of left knee. Using a cane.       GI/Hepatic: Comment: Dysphagia and belching.  No vomiting.     (+) GERD, Asymptomatic on medication,    hiatal hernia,           (-) liver disease   Renal/Genitourinary:  - neg Renal ROS  (-) renal disease   Endo: Comment: Prediabetes     (+)          thyroid problem, hypothyroidism,    Obesity,    (-) Type I DM, Type II DM and chronic steroid usage   Psychiatric/Substance Use:     (+) psychiatric history depression, bipolar and other (comment) (ADHD.  Reports being stable. Follows with Psychiatrist.) alcohol abuse (Alcohol use disorder in remission since 2014.)  Recreational drug usage: Cocaine (Has not used cocaine for 5 years). (-) chronic opioid use history   Infectious Disease: Comment: Denies recent flares with Right axilla hydradenitis suppurativa.     (+)   MRSA,         Malignancy:  - neg malignancy ROS     Other:  - neg other ROS          Physical Exam    Airway        Mallampati: II   TM distance: > 3 FB   Neck ROM: full   Mouth opening: > 3 cm    Respiratory Devices and Support         Dental       (+) Minor Abnormalities - some fillings, tiny  chips      Cardiovascular   cardiovascular exam normal          Pulmonary   pulmonary exam normal                OUTSIDE LABS:  CBC:   Lab Results   Component Value Date    WBC 7.0 03/31/2025    WBC 5.1 03/28/2023    HGB 10.8 (L) 03/31/2025    HGB 11.7 03/28/2023    HCT 35.6 03/31/2025    HCT 37.5 03/28/2023     03/31/2025     03/28/2023     BMP:   Lab Results   Component Value Date     03/31/2025     03/28/2023    POTASSIUM 4.4 03/31/2025    POTASSIUM 4.1 03/28/2023    CHLORIDE 106 03/31/2025    CHLORIDE 105 03/28/2023    CO2 21 (L) 03/31/2025    CO2 26 03/28/2023    BUN 14.5 03/31/2025    BUN 15.4 03/28/2023    CR 0.94 03/31/2025    CR 0.83 03/28/2023    GLC 86 03/31/2025    GLC 94 03/28/2023     COAGS:   Lab Results   Component Value Date    INR 0.82 (L) 04/24/2006     POC:   Lab Results   Component Value Date    HCG Negative 07/28/2015     HEPATIC:   Lab Results   Component Value Date    ALBUMIN 4.3 03/31/2025    PROTTOTAL 7.5 03/31/2025    ALT 14 03/31/2025    AST 19 03/31/2025    GGT 19 07/05/2006    ALKPHOS 165 (H) 03/31/2025    BILITOTAL <0.2 03/31/2025     OTHER:   Lab Results   Component Value Date    A1C 5.6 03/31/2025    PURVI 9.5 03/31/2025    PHOS 3.5 03/19/2014    MAG 1.7 03/19/2014    LIPASE 50 06/07/2014    AMYLASE 54 06/27/2009    TSH 3.46 03/31/2025    T4 1.16 03/31/2025       Anesthesia Plan    ASA Status:  3       Anesthesia Type: General.     - Airway: ETT   Induction: Intravenous, Propofol.   Maintenance: Balanced.   Techniques and Equipment:     - Lines/Monitors: BIS     - Blood: T&S     Consents    Anesthesia Plan(s) and associated risks, benefits, and realistic alternatives discussed. Questions answered and patient/representative(s) expressed understanding.     - Discussed: Risks, Benefits and Alternatives for the PROCEDURE were discussed     - Discussed with:  Patient, Other (See Comment)      - Specific Concerns: Sara -sister in law.     - Extended  "Intubation/Ventilatory Support Discussed: No.      - Patient is DNR/DNI Status: No     Use of blood products discussed: No .     Postoperative Care    Pain management: Multi-modal analgesia.   PONV prophylaxis: Ondansetron (or other 5HT-3), Dexamethasone or Solumedrol     Comments:               Priscilla Doty MD    Clinically Significant Risk Factors Present on Admission                             # Severe Obesity: Estimated body mass index is 40.72 kg/m  as calculated from the following:    Height as of 4/14/25: 1.702 m (5' 7\").    Weight as of 4/14/25: 117.9 kg (260 lb).                "

## 2025-04-23 ENCOUNTER — HOSPITAL ENCOUNTER (INPATIENT)
Facility: CLINIC | Age: 60
LOS: 1 days | Discharge: HOME HEALTH | End: 2025-04-24
Attending: ORTHOPAEDIC SURGERY | Admitting: ORTHOPAEDIC SURGERY
Payer: COMMERCIAL

## 2025-04-23 ENCOUNTER — ANESTHESIA (OUTPATIENT)
Dept: SURGERY | Facility: CLINIC | Age: 60
End: 2025-04-23
Payer: COMMERCIAL

## 2025-04-23 ENCOUNTER — APPOINTMENT (OUTPATIENT)
Dept: GENERAL RADIOLOGY | Facility: CLINIC | Age: 60
End: 2025-04-23
Attending: ORTHOPAEDIC SURGERY
Payer: COMMERCIAL

## 2025-04-23 DIAGNOSIS — M17.32 POST-TRAUMATIC OSTEOARTHRITIS OF LEFT KNEE: ICD-10-CM

## 2025-04-23 DIAGNOSIS — Z96.652 S/P TOTAL KNEE ARTHROPLASTY, LEFT: Primary | ICD-10-CM

## 2025-04-23 LAB
ABO + RH BLD: NORMAL
BLD GP AB SCN SERPL QL: NEGATIVE
GLUCOSE BLDC GLUCOMTR-MCNC: 105 MG/DL (ref 70–99)
HGB BLD-MCNC: 10.2 G/DL (ref 11.7–15.7)
SPECIMEN EXP DATE BLD: NORMAL

## 2025-04-23 PROCEDURE — 27447 TOTAL KNEE ARTHROPLASTY: CPT | Mod: LT | Performed by: ORTHOPAEDIC SURGERY

## 2025-04-23 PROCEDURE — 82962 GLUCOSE BLOOD TEST: CPT

## 2025-04-23 PROCEDURE — 250N000013 HC RX MED GY IP 250 OP 250 PS 637

## 2025-04-23 PROCEDURE — 258N000003 HC RX IP 258 OP 636

## 2025-04-23 PROCEDURE — 250N000011 HC RX IP 250 OP 636

## 2025-04-23 PROCEDURE — 250N000011 HC RX IP 250 OP 636: Mod: JZ

## 2025-04-23 PROCEDURE — 86901 BLOOD TYPING SEROLOGIC RH(D): CPT

## 2025-04-23 PROCEDURE — 250N000025 HC SEVOFLURANE, PER MIN: Performed by: ORTHOPAEDIC SURGERY

## 2025-04-23 PROCEDURE — 999N000065 XR KNEE PORT LEFT 1/2 VIEWS: Mod: LT

## 2025-04-23 PROCEDURE — 272N000001 HC OR GENERAL SUPPLY STERILE: Performed by: ORTHOPAEDIC SURGERY

## 2025-04-23 PROCEDURE — 370N000017 HC ANESTHESIA TECHNICAL FEE, PER MIN: Performed by: ORTHOPAEDIC SURGERY

## 2025-04-23 PROCEDURE — 360N000077 HC SURGERY LEVEL 4, PER MIN: Performed by: ORTHOPAEDIC SURGERY

## 2025-04-23 PROCEDURE — 258N000001 HC RX 258: Performed by: ORTHOPAEDIC SURGERY

## 2025-04-23 PROCEDURE — C1713 ANCHOR/SCREW BN/BN,TIS/BN: HCPCS | Performed by: ORTHOPAEDIC SURGERY

## 2025-04-23 PROCEDURE — 99254 IP/OBS CNSLTJ NEW/EST MOD 60: CPT | Performed by: PHYSICIAN ASSISTANT

## 2025-04-23 PROCEDURE — 0SRD069 REPLACEMENT OF LEFT KNEE JOINT WITH OXIDIZED ZIRCONIUM ON POLYETHYLENE SYNTHETIC SUBSTITUTE, CEMENTED, OPEN APPROACH: ICD-10-PCS | Performed by: ORTHOPAEDIC SURGERY

## 2025-04-23 PROCEDURE — 250N000011 HC RX IP 250 OP 636: Performed by: PHYSICIAN ASSISTANT

## 2025-04-23 PROCEDURE — 120N000002 HC R&B MED SURG/OB UMMC

## 2025-04-23 PROCEDURE — 250N000013 HC RX MED GY IP 250 OP 250 PS 637: Performed by: PHYSICIAN ASSISTANT

## 2025-04-23 PROCEDURE — 250N000009 HC RX 250

## 2025-04-23 PROCEDURE — C1776 JOINT DEVICE (IMPLANTABLE): HCPCS | Performed by: ORTHOPAEDIC SURGERY

## 2025-04-23 PROCEDURE — 999N000141 HC STATISTIC PRE-PROCEDURE NURSING ASSESSMENT: Performed by: ORTHOPAEDIC SURGERY

## 2025-04-23 PROCEDURE — 710N000011 HC RECOVERY PHASE 1, LEVEL 3, PER MIN: Performed by: ORTHOPAEDIC SURGERY

## 2025-04-23 PROCEDURE — 85018 HEMOGLOBIN: CPT

## 2025-04-23 DEVICE — LEGION HIGHLY CROSS LINKED                                    POLYETHYLENE DISHED INSERT SIZE 3-4 9MM
Type: IMPLANTABLE DEVICE | Site: KNEE | Status: FUNCTIONAL
Brand: LEGION

## 2025-04-23 DEVICE — LEGION CRUCIATE RETAINING                                    NONPOROUS FEMORAL SIZE 5 LEFT
Type: IMPLANTABLE DEVICE | Site: KNEE | Status: FUNCTIONAL
Brand: LEGION

## 2025-04-23 DEVICE — IMP BONE CEMENT STRK SIMPLEX TOBRAMYCIN 40CC 6197-9-001: Type: IMPLANTABLE DEVICE | Site: KNEE | Status: FUNCTIONAL

## 2025-04-23 DEVICE — GENESIS II NON-POROUS TIBIAL                                    BASEPLATE SIZE 4 LEFT
Type: IMPLANTABLE DEVICE | Site: KNEE | Status: FUNCTIONAL
Brand: GENESIS II

## 2025-04-23 RX ORDER — NALOXONE HYDROCHLORIDE 0.4 MG/ML
0.4 INJECTION, SOLUTION INTRAMUSCULAR; INTRAVENOUS; SUBCUTANEOUS
Status: DISCONTINUED | OUTPATIENT
Start: 2025-04-23 | End: 2025-04-24 | Stop reason: HOSPADM

## 2025-04-23 RX ORDER — DIPHENHYDRAMINE HCL 25 MG
25 CAPSULE ORAL EVERY 6 HOURS PRN
Status: DISCONTINUED | OUTPATIENT
Start: 2025-04-23 | End: 2025-04-23 | Stop reason: HOSPADM

## 2025-04-23 RX ORDER — PROCHLORPERAZINE MALEATE 5 MG/1
10 TABLET ORAL EVERY 6 HOURS PRN
Status: DISCONTINUED | OUTPATIENT
Start: 2025-04-23 | End: 2025-04-24 | Stop reason: HOSPADM

## 2025-04-23 RX ORDER — DOXEPIN HYDROCHLORIDE 150 MG/1
150 CAPSULE ORAL AT BEDTIME
COMMUNITY

## 2025-04-23 RX ORDER — TRANEXAMIC ACID 650 MG/1
1950 TABLET ORAL ONCE
Status: COMPLETED | OUTPATIENT
Start: 2025-04-23 | End: 2025-04-23

## 2025-04-23 RX ORDER — OXYCODONE HYDROCHLORIDE 5 MG/1
10 TABLET ORAL EVERY 4 HOURS PRN
Status: DISCONTINUED | OUTPATIENT
Start: 2025-04-23 | End: 2025-04-24 | Stop reason: HOSPADM

## 2025-04-23 RX ORDER — ONDANSETRON 2 MG/ML
INJECTION INTRAMUSCULAR; INTRAVENOUS PRN
Status: DISCONTINUED | OUTPATIENT
Start: 2025-04-23 | End: 2025-04-23

## 2025-04-23 RX ORDER — NALOXONE HYDROCHLORIDE 0.4 MG/ML
0.2 INJECTION, SOLUTION INTRAMUSCULAR; INTRAVENOUS; SUBCUTANEOUS
Status: DISCONTINUED | OUTPATIENT
Start: 2025-04-23 | End: 2025-04-24 | Stop reason: HOSPADM

## 2025-04-23 RX ORDER — PANTOPRAZOLE SODIUM 40 MG/1
40 TABLET, DELAYED RELEASE ORAL
Status: DISCONTINUED | OUTPATIENT
Start: 2025-04-24 | End: 2025-04-24 | Stop reason: HOSPADM

## 2025-04-23 RX ORDER — METHOCARBAMOL 750 MG/1
750 TABLET, FILM COATED ORAL EVERY 6 HOURS PRN
Status: DISCONTINUED | OUTPATIENT
Start: 2025-04-23 | End: 2025-04-24 | Stop reason: HOSPADM

## 2025-04-23 RX ORDER — CALCIUM CARBONATE 500 MG/1
500 TABLET, CHEWABLE ORAL 4 TIMES DAILY PRN
Status: DISCONTINUED | OUTPATIENT
Start: 2025-04-23 | End: 2025-04-24 | Stop reason: HOSPADM

## 2025-04-23 RX ORDER — BUSPIRONE HYDROCHLORIDE 15 MG/1
30 TABLET ORAL
Status: DISCONTINUED | OUTPATIENT
Start: 2025-04-23 | End: 2025-04-24 | Stop reason: HOSPADM

## 2025-04-23 RX ORDER — LAMOTRIGINE 100 MG/1
100 TABLET ORAL EVERY MORNING
Status: DISCONTINUED | OUTPATIENT
Start: 2025-04-24 | End: 2025-04-24 | Stop reason: HOSPADM

## 2025-04-23 RX ORDER — CEFAZOLIN SODIUM/WATER 2 G/20 ML
2 SYRINGE (ML) INTRAVENOUS
Status: DISCONTINUED | OUTPATIENT
Start: 2025-04-23 | End: 2025-04-23 | Stop reason: HOSPADM

## 2025-04-23 RX ORDER — FENTANYL CITRATE 50 UG/ML
50 INJECTION, SOLUTION INTRAMUSCULAR; INTRAVENOUS EVERY 5 MIN PRN
Status: DISCONTINUED | OUTPATIENT
Start: 2025-04-23 | End: 2025-04-23 | Stop reason: HOSPADM

## 2025-04-23 RX ORDER — DOXEPIN HYDROCHLORIDE 50 MG/1
150 CAPSULE ORAL AT BEDTIME
Status: DISCONTINUED | OUTPATIENT
Start: 2025-04-23 | End: 2025-04-24 | Stop reason: HOSPADM

## 2025-04-23 RX ORDER — HYDROXYZINE HYDROCHLORIDE 25 MG/1
25 TABLET, FILM COATED ORAL EVERY 6 HOURS PRN
Status: DISCONTINUED | OUTPATIENT
Start: 2025-04-23 | End: 2025-04-24 | Stop reason: HOSPADM

## 2025-04-23 RX ORDER — SODIUM CHLORIDE, SODIUM LACTATE, POTASSIUM CHLORIDE, CALCIUM CHLORIDE 600; 310; 30; 20 MG/100ML; MG/100ML; MG/100ML; MG/100ML
INJECTION, SOLUTION INTRAVENOUS CONTINUOUS PRN
Status: DISCONTINUED | OUTPATIENT
Start: 2025-04-23 | End: 2025-04-23

## 2025-04-23 RX ORDER — CEFAZOLIN SODIUM 2 G/50ML
2 SOLUTION INTRAVENOUS EVERY 8 HOURS
Status: COMPLETED | OUTPATIENT
Start: 2025-04-23 | End: 2025-04-24

## 2025-04-23 RX ORDER — KETAMINE HYDROCHLORIDE 10 MG/ML
INJECTION INTRAMUSCULAR; INTRAVENOUS PRN
Status: DISCONTINUED | OUTPATIENT
Start: 2025-04-23 | End: 2025-04-23

## 2025-04-23 RX ORDER — PROPOFOL 10 MG/ML
INJECTION, EMULSION INTRAVENOUS CONTINUOUS PRN
Status: DISCONTINUED | OUTPATIENT
Start: 2025-04-23 | End: 2025-04-23

## 2025-04-23 RX ORDER — FENTANYL CITRATE 50 UG/ML
INJECTION, SOLUTION INTRAMUSCULAR; INTRAVENOUS PRN
Status: DISCONTINUED | OUTPATIENT
Start: 2025-04-23 | End: 2025-04-23

## 2025-04-23 RX ORDER — FERROUS SULFATE 300 MG/5ML
300 LIQUID (ML) ORAL DAILY
COMMUNITY

## 2025-04-23 RX ORDER — LIDOCAINE 40 MG/G
CREAM TOPICAL
Status: DISCONTINUED | OUTPATIENT
Start: 2025-04-23 | End: 2025-04-23 | Stop reason: HOSPADM

## 2025-04-23 RX ORDER — ACETAMINOPHEN 325 MG/1
975 TABLET ORAL ONCE
Status: DISCONTINUED | OUTPATIENT
Start: 2025-04-23 | End: 2025-04-23 | Stop reason: HOSPADM

## 2025-04-23 RX ORDER — ONDANSETRON 2 MG/ML
4 INJECTION INTRAMUSCULAR; INTRAVENOUS EVERY 6 HOURS PRN
Status: DISCONTINUED | OUTPATIENT
Start: 2025-04-23 | End: 2025-04-24 | Stop reason: HOSPADM

## 2025-04-23 RX ORDER — LABETALOL HYDROCHLORIDE 5 MG/ML
10 INJECTION, SOLUTION INTRAVENOUS
Status: DISCONTINUED | OUTPATIENT
Start: 2025-04-23 | End: 2025-04-23 | Stop reason: HOSPADM

## 2025-04-23 RX ORDER — TOPIRAMATE 50 MG/1
50 TABLET, FILM COATED ORAL 2 TIMES DAILY
Status: DISCONTINUED | OUTPATIENT
Start: 2025-04-23 | End: 2025-04-24 | Stop reason: HOSPADM

## 2025-04-23 RX ORDER — HYDROMORPHONE HYDROCHLORIDE 1 MG/ML
0.4 INJECTION, SOLUTION INTRAMUSCULAR; INTRAVENOUS; SUBCUTANEOUS EVERY 5 MIN PRN
Status: DISCONTINUED | OUTPATIENT
Start: 2025-04-23 | End: 2025-04-23 | Stop reason: HOSPADM

## 2025-04-23 RX ORDER — ONDANSETRON 2 MG/ML
4 INJECTION INTRAMUSCULAR; INTRAVENOUS EVERY 30 MIN PRN
Status: DISCONTINUED | OUTPATIENT
Start: 2025-04-23 | End: 2025-04-23 | Stop reason: HOSPADM

## 2025-04-23 RX ORDER — FAMOTIDINE 20 MG/1
20 TABLET, FILM COATED ORAL 2 TIMES DAILY
Status: DISCONTINUED | OUTPATIENT
Start: 2025-04-23 | End: 2025-04-23

## 2025-04-23 RX ORDER — BISACODYL 10 MG
10 SUPPOSITORY, RECTAL RECTAL DAILY PRN
Status: DISCONTINUED | OUTPATIENT
Start: 2025-04-26 | End: 2025-04-24 | Stop reason: HOSPADM

## 2025-04-23 RX ORDER — DEXAMETHASONE SODIUM PHOSPHATE 4 MG/ML
4 INJECTION, SOLUTION INTRA-ARTICULAR; INTRALESIONAL; INTRAMUSCULAR; INTRAVENOUS; SOFT TISSUE
Status: DISCONTINUED | OUTPATIENT
Start: 2025-04-23 | End: 2025-04-23 | Stop reason: HOSPADM

## 2025-04-23 RX ORDER — VANCOMYCIN HYDROCHLORIDE 1 G/200ML
1000 INJECTION, SOLUTION INTRAVENOUS
Status: COMPLETED | OUTPATIENT
Start: 2025-04-23 | End: 2025-04-23

## 2025-04-23 RX ORDER — PROPOFOL 10 MG/ML
INJECTION, EMULSION INTRAVENOUS PRN
Status: DISCONTINUED | OUTPATIENT
Start: 2025-04-23 | End: 2025-04-23

## 2025-04-23 RX ORDER — LEVOTHYROXINE SODIUM 150 UG/1
150 TABLET ORAL DAILY
Status: DISCONTINUED | OUTPATIENT
Start: 2025-04-24 | End: 2025-04-24 | Stop reason: HOSPADM

## 2025-04-23 RX ORDER — LABETALOL 20 MG/4 ML (5 MG/ML) INTRAVENOUS SYRINGE
PRN
Status: DISCONTINUED | OUTPATIENT
Start: 2025-04-23 | End: 2025-04-23

## 2025-04-23 RX ORDER — OXYCODONE HYDROCHLORIDE 5 MG/1
5 TABLET ORAL EVERY 4 HOURS PRN
Status: DISCONTINUED | OUTPATIENT
Start: 2025-04-23 | End: 2025-04-24 | Stop reason: HOSPADM

## 2025-04-23 RX ORDER — FERROUS SULFATE 300 MG/5ML
300 LIQUID (ML) ORAL DAILY
Status: DISCONTINUED | OUTPATIENT
Start: 2025-04-23 | End: 2025-04-24 | Stop reason: HOSPADM

## 2025-04-23 RX ORDER — SODIUM CHLORIDE, SODIUM LACTATE, POTASSIUM CHLORIDE, CALCIUM CHLORIDE 600; 310; 30; 20 MG/100ML; MG/100ML; MG/100ML; MG/100ML
INJECTION, SOLUTION INTRAVENOUS CONTINUOUS
Status: DISCONTINUED | OUTPATIENT
Start: 2025-04-23 | End: 2025-04-23 | Stop reason: HOSPADM

## 2025-04-23 RX ORDER — ACETAMINOPHEN 325 MG/1
975 TABLET ORAL ONCE
Status: DISCONTINUED | OUTPATIENT
Start: 2025-04-23 | End: 2025-04-23

## 2025-04-23 RX ORDER — KETOROLAC TROMETHAMINE 30 MG/ML
15 INJECTION, SOLUTION INTRAMUSCULAR; INTRAVENOUS EVERY 6 HOURS
Status: DISCONTINUED | OUTPATIENT
Start: 2025-04-23 | End: 2025-04-24 | Stop reason: HOSPADM

## 2025-04-23 RX ORDER — NALOXONE HYDROCHLORIDE 0.4 MG/ML
0.1 INJECTION, SOLUTION INTRAMUSCULAR; INTRAVENOUS; SUBCUTANEOUS
Status: DISCONTINUED | OUTPATIENT
Start: 2025-04-23 | End: 2025-04-23 | Stop reason: HOSPADM

## 2025-04-23 RX ORDER — ONDANSETRON 4 MG/1
4 TABLET, ORALLY DISINTEGRATING ORAL EVERY 6 HOURS PRN
Status: DISCONTINUED | OUTPATIENT
Start: 2025-04-23 | End: 2025-04-24 | Stop reason: HOSPADM

## 2025-04-23 RX ORDER — ACETAMINOPHEN 325 MG/1
975 TABLET ORAL EVERY 8 HOURS
Status: DISCONTINUED | OUTPATIENT
Start: 2025-04-23 | End: 2025-04-24 | Stop reason: HOSPADM

## 2025-04-23 RX ORDER — AMOXICILLIN 250 MG
1 CAPSULE ORAL 2 TIMES DAILY
Status: DISCONTINUED | OUTPATIENT
Start: 2025-04-23 | End: 2025-04-24 | Stop reason: HOSPADM

## 2025-04-23 RX ORDER — ACETAMINOPHEN 325 MG/1
975 TABLET ORAL ONCE
Status: COMPLETED | OUTPATIENT
Start: 2025-04-23 | End: 2025-04-23

## 2025-04-23 RX ORDER — ASPIRIN 81 MG/1
81 TABLET ORAL 2 TIMES DAILY
Status: DISCONTINUED | OUTPATIENT
Start: 2025-04-23 | End: 2025-04-24 | Stop reason: HOSPADM

## 2025-04-23 RX ORDER — ESCITALOPRAM OXALATE 10 MG/1
10 TABLET ORAL EVERY MORNING
Status: DISCONTINUED | OUTPATIENT
Start: 2025-04-24 | End: 2025-04-24 | Stop reason: HOSPADM

## 2025-04-23 RX ORDER — POLYETHYLENE GLYCOL 3350 17 G/17G
17 POWDER, FOR SOLUTION ORAL DAILY
Status: DISCONTINUED | OUTPATIENT
Start: 2025-04-24 | End: 2025-04-24 | Stop reason: HOSPADM

## 2025-04-23 RX ORDER — FAMOTIDINE 20 MG/1
40 TABLET, FILM COATED ORAL 2 TIMES DAILY
Status: DISCONTINUED | OUTPATIENT
Start: 2025-04-23 | End: 2025-04-24 | Stop reason: HOSPADM

## 2025-04-23 RX ORDER — LIDOCAINE 40 MG/G
CREAM TOPICAL
Status: DISCONTINUED | OUTPATIENT
Start: 2025-04-23 | End: 2025-04-24 | Stop reason: HOSPADM

## 2025-04-23 RX ORDER — SODIUM CHLORIDE, SODIUM LACTATE, POTASSIUM CHLORIDE, CALCIUM CHLORIDE 600; 310; 30; 20 MG/100ML; MG/100ML; MG/100ML; MG/100ML
INJECTION, SOLUTION INTRAVENOUS CONTINUOUS
Status: DISCONTINUED | OUTPATIENT
Start: 2025-04-23 | End: 2025-04-24 | Stop reason: HOSPADM

## 2025-04-23 RX ORDER — LIDOCAINE HYDROCHLORIDE 20 MG/ML
INJECTION, SOLUTION INFILTRATION; PERINEURAL PRN
Status: DISCONTINUED | OUTPATIENT
Start: 2025-04-23 | End: 2025-04-23

## 2025-04-23 RX ORDER — DEXAMETHASONE SODIUM PHOSPHATE 4 MG/ML
INJECTION, SOLUTION INTRA-ARTICULAR; INTRALESIONAL; INTRAMUSCULAR; INTRAVENOUS; SOFT TISSUE PRN
Status: DISCONTINUED | OUTPATIENT
Start: 2025-04-23 | End: 2025-04-23

## 2025-04-23 RX ORDER — DIPHENHYDRAMINE HYDROCHLORIDE 50 MG/ML
25 INJECTION, SOLUTION INTRAMUSCULAR; INTRAVENOUS EVERY 6 HOURS PRN
Status: DISCONTINUED | OUTPATIENT
Start: 2025-04-23 | End: 2025-04-23 | Stop reason: HOSPADM

## 2025-04-23 RX ORDER — PANTOPRAZOLE SODIUM 20 MG/1
20 TABLET, DELAYED RELEASE ORAL
Status: DISCONTINUED | OUTPATIENT
Start: 2025-04-24 | End: 2025-04-23

## 2025-04-23 RX ORDER — HYDROMORPHONE HYDROCHLORIDE 1 MG/ML
0.4 INJECTION, SOLUTION INTRAMUSCULAR; INTRAVENOUS; SUBCUTANEOUS
Status: DISCONTINUED | OUTPATIENT
Start: 2025-04-23 | End: 2025-04-24 | Stop reason: HOSPADM

## 2025-04-23 RX ORDER — CEFAZOLIN SODIUM/WATER 2 G/20 ML
2 SYRINGE (ML) INTRAVENOUS SEE ADMIN INSTRUCTIONS
Status: DISCONTINUED | OUTPATIENT
Start: 2025-04-23 | End: 2025-04-23 | Stop reason: HOSPADM

## 2025-04-23 RX ORDER — FENTANYL CITRATE 50 UG/ML
25 INJECTION, SOLUTION INTRAMUSCULAR; INTRAVENOUS EVERY 5 MIN PRN
Status: DISCONTINUED | OUTPATIENT
Start: 2025-04-23 | End: 2025-04-23 | Stop reason: HOSPADM

## 2025-04-23 RX ORDER — HYDROMORPHONE HYDROCHLORIDE 1 MG/ML
0.2 INJECTION, SOLUTION INTRAMUSCULAR; INTRAVENOUS; SUBCUTANEOUS
Status: DISCONTINUED | OUTPATIENT
Start: 2025-04-23 | End: 2025-04-24 | Stop reason: HOSPADM

## 2025-04-23 RX ORDER — HYDROMORPHONE HYDROCHLORIDE 1 MG/ML
0.2 INJECTION, SOLUTION INTRAMUSCULAR; INTRAVENOUS; SUBCUTANEOUS EVERY 5 MIN PRN
Status: DISCONTINUED | OUTPATIENT
Start: 2025-04-23 | End: 2025-04-23 | Stop reason: HOSPADM

## 2025-04-23 RX ORDER — ONDANSETRON 4 MG/1
4 TABLET, ORALLY DISINTEGRATING ORAL EVERY 30 MIN PRN
Status: DISCONTINUED | OUTPATIENT
Start: 2025-04-23 | End: 2025-04-23 | Stop reason: HOSPADM

## 2025-04-23 RX ADMIN — VANCOMYCIN HYDROCHLORIDE 1000 MG: 1 INJECTION, SOLUTION INTRAVENOUS at 11:17

## 2025-04-23 RX ADMIN — Medication 10 MG: at 12:49

## 2025-04-23 RX ADMIN — TRANEXAMIC ACID 1950 MG: 650 TABLET ORAL at 09:05

## 2025-04-23 RX ADMIN — DIPHENHYDRAMINE HYDROCHLORIDE 25 MG: 50 INJECTION, SOLUTION INTRAMUSCULAR; INTRAVENOUS at 14:30

## 2025-04-23 RX ADMIN — FENTANYL CITRATE 50 MCG: 50 INJECTION INTRAMUSCULAR; INTRAVENOUS at 11:37

## 2025-04-23 RX ADMIN — SENNOSIDES AND DOCUSATE SODIUM 1 TABLET: 50; 8.6 TABLET ORAL at 20:04

## 2025-04-23 RX ADMIN — Medication 2 G: at 11:17

## 2025-04-23 RX ADMIN — ACETAMINOPHEN 975 MG: 325 TABLET ORAL at 09:04

## 2025-04-23 RX ADMIN — FENTANYL CITRATE 100 MCG: 50 INJECTION INTRAMUSCULAR; INTRAVENOUS at 12:33

## 2025-04-23 RX ADMIN — ACETAMINOPHEN 975 MG: 325 TABLET ORAL at 16:53

## 2025-04-23 RX ADMIN — TOPIRAMATE 50 MG: 50 TABLET, FILM COATED ORAL at 20:04

## 2025-04-23 RX ADMIN — DOXEPIN HYDROCHLORIDE 150 MG: 50 CAPSULE ORAL at 21:02

## 2025-04-23 RX ADMIN — FENTANYL CITRATE 50 MCG: 50 INJECTION INTRAMUSCULAR; INTRAVENOUS at 11:05

## 2025-04-23 RX ADMIN — ONDANSETRON 4 MG: 2 INJECTION INTRAMUSCULAR; INTRAVENOUS at 13:01

## 2025-04-23 RX ADMIN — OXYCODONE HYDROCHLORIDE 10 MG: 5 TABLET ORAL at 21:02

## 2025-04-23 RX ADMIN — BUSPIRONE HYDROCHLORIDE 30 MG: 15 TABLET ORAL at 20:03

## 2025-04-23 RX ADMIN — LABETALOL 20 MG/4 ML (5 MG/ML) INTRAVENOUS SYRINGE 5 MG: at 12:01

## 2025-04-23 RX ADMIN — Medication 70 MG: at 11:11

## 2025-04-23 RX ADMIN — HYDROMORPHONE HYDROCHLORIDE 0.4 MG: 1 INJECTION, SOLUTION INTRAMUSCULAR; INTRAVENOUS; SUBCUTANEOUS at 14:04

## 2025-04-23 RX ADMIN — LIDOCAINE HYDROCHLORIDE 100 MG: 20 INJECTION, SOLUTION INFILTRATION; PERINEURAL at 11:07

## 2025-04-23 RX ADMIN — Medication 300 MG: at 20:03

## 2025-04-23 RX ADMIN — KETOROLAC TROMETHAMINE 15 MG: 30 INJECTION, SOLUTION INTRAMUSCULAR at 18:17

## 2025-04-23 RX ADMIN — FENTANYL CITRATE 100 MCG: 50 INJECTION INTRAMUSCULAR; INTRAVENOUS at 13:03

## 2025-04-23 RX ADMIN — SODIUM CHLORIDE, SODIUM LACTATE, POTASSIUM CHLORIDE, AND CALCIUM CHLORIDE: .6; .31; .03; .02 INJECTION, SOLUTION INTRAVENOUS at 10:55

## 2025-04-23 RX ADMIN — HYDROMORPHONE HYDROCHLORIDE 0.4 MG: 1 INJECTION, SOLUTION INTRAMUSCULAR; INTRAVENOUS; SUBCUTANEOUS at 15:02

## 2025-04-23 RX ADMIN — FAMOTIDINE 40 MG: 20 TABLET, FILM COATED ORAL at 20:03

## 2025-04-23 RX ADMIN — MIDAZOLAM 2 MG: 1 INJECTION INTRAMUSCULAR; INTRAVENOUS at 11:04

## 2025-04-23 RX ADMIN — HYDROMORPHONE HYDROCHLORIDE 0.4 MG: 1 INJECTION, SOLUTION INTRAMUSCULAR; INTRAVENOUS; SUBCUTANEOUS at 15:23

## 2025-04-23 RX ADMIN — ASPIRIN 81 MG: 81 TABLET ORAL at 20:03

## 2025-04-23 RX ADMIN — DEXAMETHASONE SODIUM PHOSPHATE 8 MG: 4 INJECTION, SOLUTION INTRAMUSCULAR; INTRAVENOUS at 11:17

## 2025-04-23 RX ADMIN — LABETALOL 20 MG/4 ML (5 MG/ML) INTRAVENOUS SYRINGE 5 MG: at 11:56

## 2025-04-23 RX ADMIN — PROPOFOL 15 MCG/KG/MIN: 10 INJECTION, EMULSION INTRAVENOUS at 11:38

## 2025-04-23 RX ADMIN — SODIUM CHLORIDE, SODIUM LACTATE, POTASSIUM CHLORIDE, AND CALCIUM CHLORIDE: .6; .31; .03; .02 INJECTION, SOLUTION INTRAVENOUS at 18:17

## 2025-04-23 RX ADMIN — HYDROMORPHONE HYDROCHLORIDE 0.5 MG: 1 INJECTION, SOLUTION INTRAMUSCULAR; INTRAVENOUS; SUBCUTANEOUS at 11:50

## 2025-04-23 RX ADMIN — SUGAMMADEX 200 MG: 100 INJECTION, SOLUTION INTRAVENOUS at 13:17

## 2025-04-23 RX ADMIN — SUGAMMADEX 100 MG: 100 INJECTION, SOLUTION INTRAVENOUS at 13:26

## 2025-04-23 RX ADMIN — DEXMEDETOMIDINE HYDROCHLORIDE 12 MCG: 100 INJECTION, SOLUTION INTRAVENOUS at 11:49

## 2025-04-23 RX ADMIN — Medication 1500 MG: at 22:14

## 2025-04-23 RX ADMIN — CEFAZOLIN SODIUM 2 G: 2 SOLUTION INTRAVENOUS at 20:03

## 2025-04-23 RX ADMIN — Medication 30 MG: at 11:08

## 2025-04-23 RX ADMIN — PROPOFOL 240 MG: 10 INJECTION, EMULSION INTRAVENOUS at 11:08

## 2025-04-23 ASSESSMENT — ACTIVITIES OF DAILY LIVING (ADL)
ADLS_ACUITY_SCORE: 38
ADLS_ACUITY_SCORE: 32
ADLS_ACUITY_SCORE: 37
ADLS_ACUITY_SCORE: 32
ADLS_ACUITY_SCORE: 32
ADLS_ACUITY_SCORE: 38
ADLS_ACUITY_SCORE: 32
ADLS_ACUITY_SCORE: 38
ADLS_ACUITY_SCORE: 32
ADLS_ACUITY_SCORE: 38

## 2025-04-23 NOTE — CONSULTS
[facility]  Consult Note - Hospitalist Service  Date of Admission:  4/23/2025  Consult Requested by: orthopedics  Reason for Consult: medical co-management    Assessment & Plan   Gudelia Dong is a 60F w/ PMH OA, ROMEO on CPAP, former tobacco use, former alcohol use, GERD, hiatal hernia, erosive esophagitis, hypothyroid, anemia, BPD, depression admitted on 4/23/2025 s/p left TKA.     S/p Left TKA  Post-traumatic OA left knee  Surgery with Dr. Carrera. EBL 75 ml. Tolerated surgery well with no reported complications. Post op patient reports feeling well. Denies nausea or vomiting. HDS.   -Care per primary orthopedics team:   - FEN: ADAT to regular diet    - periop abx: vancomycin X 24 hours    - pain mng:    -APAP    -oxycodone    -dilaudid    -ketorolac     -methocarbamol    - PT and OT consults   -activity: WBAT    - DVT ptx: ASA 81 mg BID X 4 weeks    -antiemetics PRN     GERD  Hiatal hernia  Erosive esophagitis  EGD 4/8/2025: persistent LA grade C erosive esophagitis. Planning for eventual HH repair after weight loss.   -Continue PPI with protonix (formulary sub for rabeprazole)  -Continue PTA vonoprazan fumarate 20 mg daily   -Continue PTA famotidine 40 mg BID   -Has follow up with GI 4/29 outpatient     ROMEO on CPAP  -CPAP w/ home settings     Hypothyroid   -Continue PTA levothyroxine 150 mcg daily     Former tobacco use  Quit smoking 2/2/25.  -continue cessation     Hx of alcohol use disorder   -PTA on naltrexone, held prior to surgery     Anemia  Iron studies consistent with iron deficiency. Started on iron supplement pre op. Hgb stable.   -Continue PTA ferrous sulfate daily   -Outpatient hematology consult has been requested previously     BPD  Depression   -Continue PTA bupropion 450 mg daily   -Continue PTA buspirone 30 mg BID   -Continue PTA doxepin 150 mg HS   -Continue PTA Escitalopram 10 mg daily   -Continue PTA lomatorigine 100 mg daily   -Continue PTA Rexulti  "2 mg daily   -Recommend follow up with psychiatry to consider de-escalation of therapy; has follow up beginning of May     Weight management   -Continue PTA topiramate 25 mg BID          The patient's care was discussed with the Bedside Nurse and Patient. Recommendations communicated to primary team via this note.     Clinically Significant Risk Factors Present on Admission                        # Anemia: based on hgb <11       # Severe Obesity: Estimated body mass index is 41.16 kg/m  as calculated from the following:    Height as of this encounter: 1.702 m (5' 7\").    Weight as of this encounter: 119.2 kg (262 lb 12.6 oz).              Kamala Gabriel PA-C  Hospitalist Service  Securely message with [employer] (more info)  Text page via [facility] Paging/Directory   ______________________________________________________________________    Chief Complaint   Knee pain     History is obtained from the patient and review of medical records.     History of Present Illness   Gudelia Dong is a 60 year old female who has a PMH OA, ROMEO on CPAP, former tobacco use, former alcohol use disorder, GERD, hiatal hernia, erosive esophagitis, hypothyroid, anemia, BPD, depression admitted on 4/23/2025 s/p left TKA. Patient seen in PACU and reports feeling well. Tolerating clear liquids with no nausea or vomiting. Denies any chest pain, SOB, dyspnea. Knee pain is well controlled. Denies lightheadedness, dizziness, abdominal pain, numbness, tingling, bowel or bladder issues. Patient reports history of HH for which she will ultimately undergo surgical repair after weight loss. Currently, symptoms are well managed with medication regimen.     Bedside nurse reports bladder scanned for 170 ml.       Past Medical History    Past Medical History:   Diagnosis Date    Anemia     Depression     Erosive esophagitis     GERD (gastroesophageal reflux disease)     Hemorrhoid     Hiatal hernia     Hypothyroidism     Menstrual disorder     s/p D&C " -12/2012    Obesity     ROMEO on CPAP     Other bipolar disorders     followed by Dr Collazo    Post menopausal syndrome     Prediabetes     Tobacco abuse        Past Surgical History   Past Surgical History:   Procedure Laterality Date    COLONOSCOPY  12/24/2013    Procedure: COLONOSCOPY;;  Surgeon: Guy Grant MD;  Location:  GI    COLONOSCOPY  1/9/2014    Procedure: COMBINED COLONOSCOPY, SINGLE BIOPSY/POLYPECTOMY BY BIOPSY;;  Surgeon: Guy Grant MD;  Location:  GI    COLONOSCOPY N/A 8/8/2023    Procedure: COLONOSCOPY, WITH POLYPECTOMY AND BIOPSY;  Surgeon: René Rodriguez DO;  Location:  GI    ESOPHAGOSCOPY, GASTROSCOPY, DUODENOSCOPY (EGD), COMBINED  12/24/2013    Procedure: COMBINED ESOPHAGOSCOPY, GASTROSCOPY, DUODENOSCOPY (EGD), BIOPSY SINGLE OR MULTIPLE;  ESOPHAGOSCOPY, GASTROSCOPY, DUODENOSCOPY, COLONOSCOPY;  Surgeon: Guy Grant MD;  Location: [facility]    ESOPHAGOSCOPY, GASTROSCOPY, DUODENOSCOPY (EGD), COMBINED N/A 8/8/2023    Procedure: ESOPHAGOGASTRODUODENOSCOPY, WITH BIOPSY;  Surgeon: René Rodriguez DO;  Location:  GI    ESOPHAGOSCOPY, GASTROSCOPY, DUODENOSCOPY (EGD), COMBINED N/A 2/15/2024    Procedure: Esophagoscopy, gastroscopy, duodenoscopy (EGD), combined;  Surgeon: Sarmad Reed MD;  Location:  GI    ESOPHAGOSCOPY, GASTROSCOPY, DUODENOSCOPY (EGD), COMBINED N/A 8/13/2024    Procedure: Esophagoscopy, gastroscopy, duodenoscopy (EGD), combined;  Surgeon: René Rodriguez DO;  Location:  GI    ESOPHAGOSCOPY, GASTROSCOPY, DUODENOSCOPY (EGD), COMBINED N/A 4/8/2025    Procedure: Esophagoscopy, gastroscopy, duodenoscopy (EGD), combined;  Surgeon: René Rodriguez DO;  Location:  GI    GENITOURINARY SURGERY      urethra stretched    GYN SURGERY      d&c     LAPAROSCOPIC ASSISTED RECTOPEXY  3/14/2014    Procedure: LAPAROSCOPIC ASSISTED RECTOPEXY;  LAPAROSCOPIC  VENTRAL RECTOPEXY ;  Surgeon: Jennifer Connolly MD;  Location:  OR    ORTHOPEDIC SURGERY      surgery on clavicle,surgery for  left leg fracture       Medications   Medications Prior to Admission   Medication Sig Dispense Refill Last Dose/Taking    buPROPion (WELLBUTRIN XL) 150 MG 24 hr tablet Take 450 mg by mouth every morning   4/23/2025 Morning    busPIRone HCl (BUSPAR) 30 MG tablet Take 1 tablet by mouth 2 times daily   4/23/2025 Morning    Doxepin HCl 150 MG CAPS Take 300 mg by mouth At Bedtime (Patient taking differently: Take 150 mg by mouth at bedtime.) 30 capsule 0 4/22/2025 Bedtime    escitalopram (LEXAPRO) 10 MG tablet Take 10 mg by mouth every morning   4/23/2025 Morning    famotidine (PEPCID) 40 MG tablet Take 1 tablet (40 mg) by mouth 2 times daily. 180 tablet 3 4/23/2025 Morning    ferrous sulfate 300 (60 Fe) MG/5ML solution Take 300 mg by mouth daily.   4/22/2025    lamoTRIgine (LAMICTAL) 100 MG tablet Take 100 mg by mouth every morning   4/23/2025 Morning    levothyroxine (SYNTHROID/LEVOTHROID) 150 MCG tablet TAKE ONE TABLET BY MOUTH EVERY DAY 90 tablet 3 4/23/2025 Morning    RABEprazole (ACIPHEX) 20 MG EC tablet Take 1 tablet (20 mg) by mouth 2 times daily (before meals). 180 tablet 1 4/23/2025 Morning    REXULTI 2 MG tablet Take 2 mg by mouth every morning   4/23/2025 Morning    topiramate (TOPAMAX) 25 MG tablet Take 2 tablets (50 mg) by mouth 2 times daily. 120 tablet 3 4/23/2025 Morning    UNKNOWN TO PATIENT Pt just started an Iron Supplement   Taking    Vonoprazan Fumarate (VOQUEZNA) 20 MG TABS Take 20 mg by mouth daily. 90 tablet 1 4/23/2025 Morning    ammonium lactate (AMLACTIN) 12 % external cream Apply topically daily as needed for dry skin 385 g 1 Unknown    naltrexone (DEPADE/REVIA) 50 MG tablet Take 1/2 tablet once daily 1-2 hours prior to worst cravings for 1 week, then increase to 1 tablet daily as directed if tolerating (Patient taking differently: Take 1/2 tablet once daily 1-2 hours prior to worst cravings for 1 week, then increase to 1 tablet daily as directed if tolerating) 30 tablet 3 Unknown    order  "for DME Equipment being ordered: CPAP  Please dispense Cpap and its supply 1 Units prn     triamcinolone (KENALOG) 0.025 % external ointment Apply topically 2 times daily To affected area in the groin as needed up to 2 weeks at a time. 30 g 3 Unknown          Allergies   Allergies   Allergen Reactions    Contrast Dye Shortness Of Breath    Methylpyrrolidone Swelling    Iodine Swelling     Other reaction(s): Angioedema  Facial swelling.    Morphine Anxiety and Nausea and Vomiting     Other reaction(s): Behavioral Disturbances  Other reaction(s): Anxiety, vomiting        Physical Exam   Blood pressure 123/66, pulse 72, temperature 97.7  F (36.5  C), temperature source Oral, resp. rate 16, height 1.702 m (5' 7\"), weight 119.2 kg (262 lb 12.6 oz), last menstrual period 07/20/2006, SpO2 94%, not currently breastfeeding.  GENERAL: Alert and oriented x 3. NAD. Pleasant.   HEENT: Anicteric sclera. Mucous membranes moist and without lesions.   CV: RRR. S1, S2. No murmurs appreciated.   RESPIRATORY: Effort normal on oxymask. Lungs CTAB with no wheezing, rales, rhonchi.   GI: Abdomen obese, soft and non distended, bowel sounds present. No tenderness, rebound, guarding.   MUSCULOSKELETAL: Moves all extremities.   NEUROLOGICAL: No focal deficits.   EXTREMITIES: No peripheral edema. Intact bilateral pedal pulses.   SKIN: No jaundice. No rashes.       Medical Decision Making       60 MINUTES SPENT BY ME on the date of service doing chart review, history, exam, documentation & further activities per the note.      Data     I have personally reviewed the following data over the past 24 hrs:    N/A  \   10.2 (L)   / N/A     N/A N/A N/A /  105 (H)   N/A N/A N/A \       Imaging results reviewed over the past 24 hrs:   Recent Results (from the past 24 hours)   XR Knee Port Left 1/2 Views    Narrative    EXAM: XR KNEE PORT LEFT 1/2 VIEWS  LOCATION: [facility]  DATE: 4/23/2025    INDICATION: " Post Op Total Knee  COMPARISON: 3/6/2025      Impression    IMPRESSION: Immediate postoperative change of total knee arthroplasty. Alignment is satisfactory. No acute fracture. Small amount of operative related gas in the knee joint and soft tissues.

## 2025-04-23 NOTE — ANESTHESIA POSTPROCEDURE EVALUATION
Patient: Gudelia Dong    Procedure: Procedure(s):  ARTHROPLASTY, KNEE, TOTAL       Anesthesia Type:  General    Note:  Disposition: Outpatient   Postop Pain Control: Uneventful            Sign Out: Well controlled pain   PONV: No   Neuro/Psych: Uneventful            Sign Out: Acceptable/Baseline neuro status   Airway/Respiratory: Uneventful            Sign Out: Acceptable/Baseline resp. status   CV/Hemodynamics: Uneventful            Sign Out: Acceptable CV status; No obvious hypovolemia; No obvious fluid overload   Other NRE:    DID A NON-ROUTINE EVENT OCCUR?            Last vitals:  Vitals Value Taken Time   /91 04/23/25 1500   Temp 36.8  C (98.2  F) 04/23/25 1445   Pulse 72 04/23/25 1507   Resp 14 04/23/25 1507   SpO2 97 % 04/23/25 1507   Vitals shown include unfiled device data.    Electronically Signed By: Cheryle Mejia MD  April 23, 2025  3:07 PM

## 2025-04-23 NOTE — OR NURSING
PACU to Inpatient Nursing Handoff    Patient Gudelia Dong is a 60 year old female who speaks English.   Procedure Procedure(s):  ARTHROPLASTY, KNEE, TOTAL   Surgeon(s) Primary: Glenroy Carrera MD  Assisting: Cassandra Pope PA-C  Resident - Assisting: Shauna Kaur MD     Allergies   Allergen Reactions    Contrast Dye Shortness Of Breath    Methylpyrrolidone Swelling    Iodine Swelling     Other reaction(s): Angioedema  Facial swelling.    Morphine Anxiety and Nausea and Vomiting     Other reaction(s): Behavioral Disturbances  Other reaction(s): Anxiety, vomiting       Isolation  [unfilled]     Past Medical History   has a past medical history of Anemia, Depression, Erosive esophagitis, GERD (gastroesophageal reflux disease), Hemorrhoid, Hiatal hernia, Hypothyroidism, Menstrual disorder, Obesity, ROMEO on CPAP, Other bipolar disorders, Post menopausal syndrome, Prediabetes, and Tobacco abuse.    Anesthesia Choice   Dermatome Level     Preop Meds acetaminophen (Tylenol) - time given: 0904   Nerve block Not applicable   Intraop Meds dexamethasone (Decadron)  fentanyl (Sublimaze): 300 mcg total  hydromorphone (Dilaudid): 0.5 mg total  ketamine (Ketalar): 40 mg given  ondansetron (Zofran): last given at 1313   Local Meds Yes - Local Cocktail (morphine, ropivacaine, epinephrine, Toradol)   Antibiotics cefazolin (Ancef) - last given at 1117     Pain Patient Currently in Pain: yes   PACU meds  hydromorphone (Dilaudid): 1.2 mg (total dose) last given at 1404   Benedryl 25mg iv @ 1430   PCA / epidural No   Capnography     Telemetry ECG Rhythm: Sinus rhythm   Inpatient Telemetry Monitor Ordered? No        Labs Glucose Lab Results   Component Value Date     04/23/2025    GLC 92 02/10/2022    GLC 75 04/15/2021       Hgb Lab Results   Component Value Date    HGB 10.2 04/23/2025    HGB 11.7 04/15/2021       INR Lab Results   Component Value Date    INR 0.82 04/24/2006      PACU Imaging Completed     Wound/Incision  Incision/Surgical Site 04/23/25 Left Knee (Active)   Incision Assessment UTV 04/23/25 1401   Dressing Alginate 04/23/25 1256   Closure Liquid bandage;Sutures 04/23/25 1256   Dressing Intervention Clean, dry, intact 04/23/25 1401   Number of days: 0      CMS        Equipment ice pack   Other LDA       IV Access Peripheral IV 04/23/25 Right;Posterior Hand (Active)   Site Assessment WDL 04/23/25 1400   Line Status Saline locked 04/23/25 0926   Dressing Transparent 04/23/25 0926   Dressing Status clean;dry;intact 04/23/25 0926   Dressing Intervention New dressing  04/23/25 0926   Phlebitis Scale 0-->no symptoms 04/23/25 1400   Infiltration? no 04/23/25 0926   Number of days: 0      Blood Products Not applicable  mL   Intake/Output Date 04/23/25 0700 - 04/24/25 0659   Shift 8758-2738 6007-2429 2757-6900 24 Hour Total   INTAKE   I.V. 750   750   Shift Total(mL/kg) 750(6.29)   750(6.29)   OUTPUT   Shift Total(mL/kg)       Weight (kg) 119.2 119.2 119.2 119.2      Drains / Amezquita     Time of void PreOp Time of Void Prior to Procedure: 0953 (04/23/25 0953)    PostOp      Diapered? No   Bladder Scan     PO    tolerating sips     Vitals    B/P: (!) 147/92  T: 97.5  F (36.4  C)    Temp src: Axillary  P:  Pulse: 73 (04/23/25 1400)          R: 10  O2:  SpO2: 100 %    O2 Device: None (Room air) (04/23/25 0825)              Family/support present Sister in law June   Patient belongings     Patient transported on bed   DC meds/scripts (obs/outpt) Not applicable   Inpatient Pain Meds Released? Yes       Special needs/considerations pleasant , ROMEO/patient on her own cpap with own machine, needs O2 bled in.   Tasks needing completion I M consult       Yfn Carias, AMBREEN Wilder

## 2025-04-23 NOTE — BRIEF OP NOTE
[facility]    Brief Operative Note    Pre-operative diagnosis: Post-traumatic osteoarthritis of left knee [M17.32]  Stress fracture of knee [M84.369A]  Post-operative diagnosis Same as pre-operative diagnosis    Procedure: ARTHROPLASTY, KNEE, TOTAL, Left - Knee    Surgeon: Surgeons and Role:     * Glenroy Carrera MD - Primary     * Cassandra Pope PA-C - Assisting     * Shauna Kaur MD - Resident - Assisting  Anesthesia: Choice   Estimated Blood Loss: 75 ml    Drains: None  Specimens: * No specimens in log *  Findings:   See full op report .  Complications: None.  Implants:   Implant Name Type Inv. Item Serial No.  Lot No. LRB No. Used Action   IMP BONE CEMENT STRK SIMPLEX TOBRAMYCIN 40 6197-9-001 - [id number] Cement, Bone IMP BONE CEMENT STRK SIMPLEX TOBRAMYCIN 40 6197-9-001  [employer] [id number] Left 2 Implanted   IMP COMP FEMORAL S&N DAPHNIE II NP CR SZ 5 LT [id number] - [id number] Total Joint Component/Insert IMP COMP FEMORAL S&N DAPHNIE II NP CR SZ 5 LT [id number]  [employer] [id number] Left 1 Implanted   IMP INSERT TIBIAL S&N LEGION XLPE DISHED SZ3-4 9MM [id number] - [id number] Total Joint Component/Insert IMP INSERT TIBIAL S&N LEGION XLPE DISHED SZ3-4 9MM [id number]  [employer] [id number] Left 1 Implanted   IMP BASEPLATE TIBIAL DAPHNIE II SZ 4 LT TI [id number] - [id number] Total Joint Component/Insert IMP BASEPLATE TIBIAL DAPHNIE II SZ 4 LT TI [id number]  [employer]-R [id number] Left 1 Implanted         Post-Op Plan:  Assessment/Plan: Gudelia Dong is a 60 year old female s/p Procedure(s):  ARTHROPLASTY, KNEE, TOTAL on 4/23/2025 with Dr. Carrera.    Activity: Up with assist and assistive devices as needed until independent. Knee ROM as tolerated.   Weight bearing status: WBAT    Antibiotics: Vancomycin x 24 hours  Diet: Begin with clear fluids and progress diet as tolerated. Bowel regimen. Anti-emetics PRN.    DVT  prophylaxis:  ASA 81 mg BID x 4 weeks   Elevation: Elevate heels off of bed on pillows, no pillows behind the knee at any time    Wound Care: Tegaderm and alginate x 2 weeks   Pain management: Orals PRN, IV for breakthrough only. Will hold naltrexone while receiving narcotic pain medications.  X-rays: AP/Lat operative knee XR in PACU.  Physical Therapy: Mobilization, ROM, ADL's  Occupational Therapy: ADL's  Labs: Trend Hgb on PODs #1 & 2  Cultures: None  Consults: PT, OT. Hospitalist, appreciate assistance in caring for this patient throughout the perioperative period     Future Appointments   Date Time Provider Department Center   4/23/2025  4:00 PM Mary Bailey, PT [facility] [place]   4/24/2025  7:45 AM Yesenia Flor, PT [facility] [place]   4/24/2025  8:15 AM Lisa Chapman, OT [facility] [place]   4/29/2025 10:15 AM Rosalie Martínez PA-C [facility]   4/30/2025  9:40 AM [facility]   5/7/2025  9:40 AM AmEwa villarreal, PT [facility]   5/12/2025  3:00 PM Cheri Baker PA-C [facility]   5/13/2025  2:40 PM Yesenia Stallworth PA-C [facility]   5/14/2025  9:40 AM AmEwa villarreal, PT [facility]   6/5/2025 10:40 AM Glenroy Carrera MD [facility]   6/12/2025  4:00 PM Louise Colindres PA-C [facility]   6/20/2025  3:00 PM [facility]   8/27/2025  7:45 AM Rosalie Martínez PA-C [facility]       Disposition: Pending progress with therapies, pain control on orals, and medical stability, anticipate discharge to Home vs TCU on POD #1-2.      Shauna Kaur MD  Orthopaedic Surgery PGY-4

## 2025-04-23 NOTE — ANESTHESIA PROCEDURE NOTES
Airway       Patient location during procedure: OR       Procedure Start/Stop Times: 4/23/2025 11:17 AM  Staff -        Anesthesiologist:  Cheryle Scott MD       Resident/Fellow: Priscilla Doty MD       Performed By: resident and anesthesiologist  Consent for Airway        Urgency: elective  Indications and Patient Condition       Indications for airway management: jone-procedural       Induction type:intravenous       Mask difficulty assessment: 1 - vent by mask    Final Airway Details       Final airway type: endotracheal airway       Successful airway: ETT - single and Oral  Endotracheal Airway Details        ETT size (mm): 7.0       Cuffed: yes       Successful intubation technique: direct laryngoscopy       DL Blade Type: Gore 3       Grade View of Cords: 2       Adjucts: stylet       Position: Right       Measured from: lips       Secured at (cm): 23       Bite block used: None    Post intubation assessment        Placement verified by: capnometry, equal breath sounds and chest rise        Number of attempts at approach: 3 (Initial attempt by resident with Grade 2b view MAC3. Attending on second attempt similar view Miller2. Pt re-positioned and ramped. Blade changed to Gore 3.)       Number of other approaches attempted: 0       Secured with: pink tape       Ease of procedure: easy       Dentition: Intact and Unchanged    Medication(s) Administered   Medication Administration Time: 4/23/2025 11:17 AM

## 2025-04-23 NOTE — ANESTHESIA CARE TRANSFER NOTE
Patient: Gudelia Dong    Procedure: Procedure(s):  ARTHROPLASTY, KNEE, TOTAL       Diagnosis: Post-traumatic osteoarthritis of left knee [M17.32]  Stress fracture of knee [M84.369A]  Diagnosis Additional Information: No value filed.    Anesthesia Type:   General     Note:    Oropharynx: oral airway in place and spontaneously breathing  Level of Consciousness: drowsy  Oxygen Supplementation: face mask  Level of Supplemental Oxygen (L/min / FiO2): 8  Independent Airway: airway patency satisfactory and stable  Dentition: dentition unchanged  Vital Signs Stable: post-procedure vital signs reviewed and stable  Report to RN Given: handoff report given  Patient transferred to: PACU    Handoff Report: Identifed the Patient, Identified the Reponsible Provider, Reviewed the pertinent medical history, Discussed the surgical course, Reviewed Intra-OP anesthesia mangement and issues during anesthesia, Set expectations for post-procedure period and Allowed opportunity for questions and acknowledgement of understanding      Vitals:  Vitals Value Taken Time   BP     Temp     Pulse 71 04/23/25 1352   Resp 6 04/23/25 1352   SpO2 96 % 04/23/25 1352   Vitals shown include unfiled device data.    Electronically Signed By: Priscilla Doty MD  April 23, 2025  1:52 PM

## 2025-04-23 NOTE — PHARMACY-ADMISSION MEDICATION HISTORY
Pharmacist Admission Medication History    Admission medication history is complete. The information provided in this note is only as accurate as the sources available at the time of the update.    Information Source(s): Patient via in-person    Pertinent Information: Reported that she has not started Vonoprazan since the PA has been denied. Looks like patient just recently refill Ventolin and Advair but reported that it was prescribed for Influenzae. She do not anticipate to start it at all.     Changes made to PTA medication list:  Added: None  Deleted: None  Changed: None    Allergies reviewed with patient and updates made in EHR: yes    Medication History Completed By: Obie Hanna [facility] 4/23/2025 6:55 PM    PTA Med List   Medication Sig Last Dose/Taking    ammonium lactate (AMLACTIN) 12 % external cream Apply topically daily as needed for dry skin More than a month    buPROPion (WELLBUTRIN XL) 150 MG 24 hr tablet Take 450 mg by mouth every morning 4/23/2025 Morning    busPIRone HCl (BUSPAR) 30 MG tablet Take 1 tablet by mouth 2 times daily 4/23/2025 Morning    doxepin HCl (SINEQUAN) 150 MG capsule Take 150 mg by mouth at bedtime. 4/22/2025 Evening    escitalopram (LEXAPRO) 10 MG tablet Take 10 mg by mouth every morning 4/23/2025 Morning    famotidine (PEPCID) 40 MG tablet Take 1 tablet (40 mg) by mouth 2 times daily. 4/23/2025 Morning    ferrous sulfate 300 (60 Fe) MG/5ML solution Take 300 mg by mouth daily. 4/22/2025    lamoTRIgine (LAMICTAL) 100 MG tablet Take 100 mg by mouth every morning 4/23/2025 Morning    levothyroxine (SYNTHROID/LEVOTHROID) 150 MCG tablet TAKE ONE TABLET BY MOUTH EVERY DAY 4/23/2025 Morning    RABEprazole (ACIPHEX) 20 MG EC tablet Take 1 tablet (20 mg) by mouth 2 times daily (before meals). 4/23/2025 Morning    REXULTI 2 MG tablet Take 2 mg by mouth every morning 4/23/2025 Morning    topiramate (TOPAMAX) 25 MG tablet Take 2 tablets (50 mg) by mouth 2 times daily. 4/23/2025 Morning

## 2025-04-23 NOTE — PROGRESS NOTES
"  VS: /66 (BP Location: Left arm)   Pulse 72   Temp 97.7  F (36.5  C) (Oral)   Resp 16   Ht 1.702 m (5' 7\")   Wt 119.2 kg (262 lb 12.6 oz)   LMP 07/20/2006   SpO2 97%   BMI 41.16 kg/m      O2: 2L capno, wears CPAP at night and will need O2 at night, contact RT   Output: Due to void   Last BM:    Activity: Not OOB yet, GB, walker, 1-2 assist   Skin: Intact except for L knee incision   Pain: Complains of pain in L knee area   CMS: intact   Dressing: L knee dressing   Diet: reg   LDA: PIV in R hand   Equipment: GB, walker   Plan: Monitor overnight, discharge home tomorrow, accelerated pt   Additional Info:       "

## 2025-04-24 ENCOUNTER — APPOINTMENT (OUTPATIENT)
Dept: PHYSICAL THERAPY | Facility: CLINIC | Age: 60
End: 2025-04-24
Attending: ORTHOPAEDIC SURGERY
Payer: COMMERCIAL

## 2025-04-24 VITALS
DIASTOLIC BLOOD PRESSURE: 57 MMHG | SYSTOLIC BLOOD PRESSURE: 114 MMHG | WEIGHT: 262.79 LBS | RESPIRATION RATE: 18 BRPM | HEART RATE: 67 BPM | OXYGEN SATURATION: 96 % | BODY MASS INDEX: 41.25 KG/M2 | TEMPERATURE: 98.4 F | HEIGHT: 67 IN

## 2025-04-24 LAB
GLUCOSE SERPL-MCNC: 111 MG/DL (ref 70–99)
HGB BLD-MCNC: 8.3 G/DL (ref 11.7–15.7)

## 2025-04-24 PROCEDURE — 250N000011 HC RX IP 250 OP 636

## 2025-04-24 PROCEDURE — 97116 GAIT TRAINING THERAPY: CPT | Mod: GP | Performed by: PHYSICAL THERAPIST

## 2025-04-24 PROCEDURE — 36415 COLL VENOUS BLD VENIPUNCTURE: CPT | Performed by: ORTHOPAEDIC SURGERY

## 2025-04-24 PROCEDURE — 82947 ASSAY GLUCOSE BLOOD QUANT: CPT | Performed by: ORTHOPAEDIC SURGERY

## 2025-04-24 PROCEDURE — 97161 PT EVAL LOW COMPLEX 20 MIN: CPT | Mod: GP | Performed by: PHYSICAL THERAPIST

## 2025-04-24 PROCEDURE — 97110 THERAPEUTIC EXERCISES: CPT | Mod: GP | Performed by: PHYSICAL THERAPIST

## 2025-04-24 PROCEDURE — 99232 SBSQ HOSP IP/OBS MODERATE 35: CPT | Performed by: INTERNAL MEDICINE

## 2025-04-24 PROCEDURE — 250N000013 HC RX MED GY IP 250 OP 250 PS 637

## 2025-04-24 PROCEDURE — 250N000013 HC RX MED GY IP 250 OP 250 PS 637: Performed by: ORTHOPAEDIC SURGERY

## 2025-04-24 PROCEDURE — 250N000013 HC RX MED GY IP 250 OP 250 PS 637: Performed by: PHYSICIAN ASSISTANT

## 2025-04-24 PROCEDURE — 97530 THERAPEUTIC ACTIVITIES: CPT | Mod: GP | Performed by: PHYSICAL THERAPIST

## 2025-04-24 PROCEDURE — 85018 HEMOGLOBIN: CPT

## 2025-04-24 PROCEDURE — 999N000111 HC STATISTIC OT IP EVAL DEFER

## 2025-04-24 RX ORDER — HYDROXYZINE HYDROCHLORIDE 25 MG/1
25 TABLET, FILM COATED ORAL EVERY 6 HOURS PRN
Qty: 25 TABLET | Refills: 0 | Status: SHIPPED | OUTPATIENT
Start: 2025-04-24

## 2025-04-24 RX ORDER — ASPIRIN 81 MG/1
81 TABLET, COATED ORAL 2 TIMES DAILY
Qty: 60 TABLET | Refills: 0 | Status: SHIPPED | OUTPATIENT
Start: 2025-04-24

## 2025-04-24 RX ORDER — OXYCODONE HYDROCHLORIDE 5 MG/1
5-10 TABLET ORAL EVERY 4 HOURS PRN
Qty: 30 TABLET | Refills: 0 | Status: SHIPPED | OUTPATIENT
Start: 2025-04-24

## 2025-04-24 RX ORDER — POLYETHYLENE GLYCOL 3350 17 G/17G
17 POWDER, FOR SOLUTION ORAL DAILY
Qty: 510 G | Refills: 0 | Status: SHIPPED | OUTPATIENT
Start: 2025-04-24

## 2025-04-24 RX ORDER — METHOCARBAMOL 750 MG/1
750 TABLET, FILM COATED ORAL EVERY 6 HOURS PRN
Qty: 20 TABLET | Refills: 0 | Status: SHIPPED | OUTPATIENT
Start: 2025-04-24 | End: 2025-05-01

## 2025-04-24 RX ORDER — ACETAMINOPHEN 325 MG/1
650 TABLET ORAL EVERY 4 HOURS PRN
Qty: 100 TABLET | Refills: 0 | Status: SHIPPED | OUTPATIENT
Start: 2025-04-24

## 2025-04-24 RX ORDER — AMOXICILLIN 250 MG
1 CAPSULE ORAL 2 TIMES DAILY
Qty: 60 TABLET | Refills: 0 | Status: SHIPPED | OUTPATIENT
Start: 2025-04-24

## 2025-04-24 RX ADMIN — BUSPIRONE HYDROCHLORIDE 30 MG: 15 TABLET ORAL at 08:12

## 2025-04-24 RX ADMIN — KETOROLAC TROMETHAMINE 15 MG: 30 INJECTION, SOLUTION INTRAMUSCULAR at 06:21

## 2025-04-24 RX ADMIN — LAMOTRIGINE 100 MG: 100 TABLET ORAL at 08:13

## 2025-04-24 RX ADMIN — BUPROPION HYDROCHLORIDE 450 MG: 300 TABLET, EXTENDED RELEASE ORAL at 08:12

## 2025-04-24 RX ADMIN — OXYCODONE HYDROCHLORIDE 10 MG: 5 TABLET ORAL at 08:23

## 2025-04-24 RX ADMIN — LEVOTHYROXINE SODIUM 150 MCG: 0.15 TABLET ORAL at 08:13

## 2025-04-24 RX ADMIN — SENNOSIDES AND DOCUSATE SODIUM 1 TABLET: 50; 8.6 TABLET ORAL at 08:13

## 2025-04-24 RX ADMIN — PANTOPRAZOLE SODIUM 40 MG: 40 TABLET, DELAYED RELEASE ORAL at 08:13

## 2025-04-24 RX ADMIN — ACETAMINOPHEN 975 MG: 325 TABLET ORAL at 00:34

## 2025-04-24 RX ADMIN — KETOROLAC TROMETHAMINE 15 MG: 30 INJECTION, SOLUTION INTRAMUSCULAR at 00:33

## 2025-04-24 RX ADMIN — CEFAZOLIN SODIUM 2 G: 2 SOLUTION INTRAVENOUS at 02:16

## 2025-04-24 RX ADMIN — METHOCARBAMOL 750 MG: 750 TABLET ORAL at 08:23

## 2025-04-24 RX ADMIN — BREXPIPRAZOLE 2 MG: 1 TABLET ORAL at 08:14

## 2025-04-24 RX ADMIN — ASPIRIN 81 MG: 81 TABLET ORAL at 08:13

## 2025-04-24 RX ADMIN — TOPIRAMATE 50 MG: 50 TABLET, FILM COATED ORAL at 08:14

## 2025-04-24 RX ADMIN — FAMOTIDINE 40 MG: 20 TABLET, FILM COATED ORAL at 08:13

## 2025-04-24 RX ADMIN — ESCITALOPRAM OXALATE 10 MG: 10 TABLET ORAL at 08:13

## 2025-04-24 RX ADMIN — OXYCODONE HYDROCHLORIDE 10 MG: 5 TABLET ORAL at 04:04

## 2025-04-24 RX ADMIN — POLYETHYLENE GLYCOL 3350 17 G: 17 POWDER, FOR SOLUTION ORAL at 08:14

## 2025-04-24 RX ADMIN — ACETAMINOPHEN 975 MG: 325 TABLET ORAL at 08:13

## 2025-04-24 ASSESSMENT — ACTIVITIES OF DAILY LIVING (ADL)
ADLS_ACUITY_SCORE: 38

## 2025-04-24 NOTE — PLAN OF CARE
Physical Therapy Discharge Summary    Reason for therapy discharge:    Discharged to home with outpatient therapy.    Progress towards therapy goal(s). See goals on Care Plan in [facility] electronic health record for goal details.  Goals met    Therapy recommendation(s):    Continued therapy is recommended.  Rationale/Recommendations:  Pt would benefit from out patient PT to improve knee ROM, strength, and progress activity tolerance. Pt needs sock aid at discharge.

## 2025-04-24 NOTE — PROGRESS NOTES
Orthopedic Surgery Progress Note 04/24/2025    S: Patient seen at bedside. No acute events overnight. Pain controlled on PO. Tolerating PO, voiding. Denies new fevers, chills, CP, SOB, new N/T LLE.    O:  Temp: 98.1  F (36.7  C) Temp src: Oral BP: 116/65 Pulse: 63   Resp: 18 SpO2: 96 % O2 Device: BiPAP/CPAP Oxygen Delivery: 2 LPM    Exam:  Gen: alert and oriented, responds to questions appropriately  Resp: non-labored on RA  MSK:  LLE:  - Dressings c/d/i  - SILT femoral/tibial/sural/saphenous/DP/SP nerves  - Fires quad, TA, EHL, FHL, GaSC  - DP pulses 2+, foot wwp    Drain output: n/a.    Recent Labs   Lab 04/23/25  0928   HGB 10.2*       Culture results: n/a    Assessment: Gudelia Dong is a 60 year old female s/p Procedure(s):  ARTHROPLASTY, KNEE, TOTAL on 4/23/2025 with Dr. Carrera.     Activity: Up with assist and assistive devices as needed until independent. Knee ROM as tolerated.   Weight bearing status: WBAT    Antibiotics: Vancomycin x 24 hours  Diet: Begin with clear fluids and progress diet as tolerated. Bowel regimen. Anti-emetics PRN.    DVT prophylaxis:  ASA 81 mg BID x 4 weeks   Elevation: Elevate heels off of bed on pillows, no pillows behind the knee at any time    Wound Care: Tegaderm and alginate x 2 weeks   Pain management: Orals PRN, IV for breakthrough only. Will hold naltrexone while receiving narcotic pain medications.  X-rays: AP/Lat operative knee XR in PACU.  Physical Therapy: Mobilization, ROM, ADL's  Occupational Therapy: ADL's  Labs: Trend Hgb on PODs #1 & 2  Cultures: None  Consults: PT, OT. Hospitalist, appreciate assistance in caring for this patient throughout the perioperative period             Future Appointments   Date Time Provider Department Center   4/23/2025  4:00 PM Mary Bailey, PT [facility]   4/24/2025  7:45 AM Yesenia Flor, PT [facility]   4/24/2025  8:15 AM Lisa Chapman, OT [facility]   4/29/2025 10:15 AM Rosalie Martínez PA-C [facility]    4/30/2025  9:40 AM AmEwa villarreal, PT [facility]   5/7/2025  9:40 AM AmEwa villarreal, PT [facility]   5/12/2025  3:00 PM Cheri Baker PA-C [facility]   5/13/2025  2:40 PM Yesenia Stallworth PA-C [facility]   5/14/2025  9:40 AM Ewa Arana, PT [facility]   6/5/2025 10:40 AM Glenroy Carrera MD [facility]   6/12/2025  4:00 PM Louise Colindres PA-C [facility]   6/20/2025  3:00 PM [facility]   8/27/2025  7:45 AM Rsoalie Martínez PA-C [facility]         Disposition: Pending progress with therapies, pain control on orals, and medical stability, anticipate discharge to Home vs TCU on POD #1-2.    Orthopedic surgery staff for this patient is Dr. Carrera. Discussed.    --  Shauna Kaur MD  Orthopedic Surgery PGY-4

## 2025-04-24 NOTE — PLAN OF CARE
"Goal Outcome Evaluation:      Plan of Care Reviewed With: patient    Overall Patient Progress: improvingOverall Patient Progress: improving    Outcome Evaluation: Discharge today.      VS: /57   Pulse 67   Temp 98.4  F (36.9  C)   Resp 18   Ht 1.702 m (5' 7\")   Wt 119.2 kg (262 lb 12.6 oz)   LMP 07/20/2006   SpO2 96%   BMI 41.16 kg/m     O2: Stable on RA. Denies SOB and CP.   Output: Voiding without difficulty in BR   Last BM: 4/19 per pt   Activity: A1 with walker/GB, WBAT LLE   Skin: L knee surgical incision, scab on R 4th toe   Pain: Managed with PRN oxycodone and robaxin   CMS: A&O x 4, denies N/T   Dressing: CDI   Diet: Regular diet   LDA: R PIV SL - removed   Equipment: IV pole, personal belongings, personal CPAP.   Plan: Discharge today.    Additional Info:      DISCHARGE SUMMARY    Pt discharging to: Home  Transportation: Sister  AVS given and discussed: Pt was given AVS and pt states understanding of content. Pt has no further questions.   Stoplight Tool given and discussed: Yes, no further questions.  Medications given: Yes, discussed. No further questions.   Belongings returned: Yes, ensured all belongings packed and sent with pt. No items in security.   Comments: Escorted safely to elevators. Pt left at 11:30 AM.         "

## 2025-04-24 NOTE — PROGRESS NOTES
"   04/24/25 0835   Appointment Info   Signing Clinician's Name / Credentials (PT) Bubba Flores, GEN   Student Supervision Direct supervision provided;Direct Patient Contact Provided   Living Environment   People in Home alone;other (see comments)  (Pt's sister in law will be around full time for 4-5 days to help.)   Current Living Arrangements apartment   Home Accessibility no concerns   Number of Stairs, Within Home, Primary none   Stair Railings, Within Home, Primary none   Transportation Anticipated family or friend will provide   Living Environment Comments Pt lives in an apartment with an elevator and no stairs to enter the apartment. Pt lives alone, but has sister in law with her for the next few days. Pt has tub-shower at home and has shower chair set up and toilet riser.   Self-Care   Usual Activity Tolerance moderate   Current Activity Tolerance fair   Regular Exercise No   Equipment Currently Used at Home cane, straight  (Pt has crutches, toilet riser, shower chair, and FWW, not using at baseline.)   Fall history within last six months no   Activity/Exercise/Self-Care Comment Pt was working as a production specalist, lots of walking. Pt reports she was using SPC at baseline with limited activity due to pain. Pt was IND with ADL's at baseline.   General Information   Onset of Illness/Injury or Date of Surgery 04/23/25   Referring Physician Glenroy Carrera MD   Patient/Family Therapy Goals Statement (PT) Get back to 'full capacity as quickly as possible'.   Pertinent History of Current Problem (include personal factors and/or comorbidities that impact the POC) Per chart reveiw,\"Gudelia Dong is a 60F w/ PMH OA, ROMEO on CPAP, former tobacco use, former alcohol use, GERD, hiatal hernia, erosive esophagitis, hypothyroid, anemia, BPD, depression admitted on 4/23/2025 s/p left TKA.\"   Existing Precautions/Restrictions fall   Weight-Bearing Status - LUE full weight-bearing   Weight-Bearing Status - RUE full " weight-bearing   Weight-Bearing Status - LLE weight-bearing as tolerated   Weight-Bearing Status - RLE full weight-bearing   General Observations Pt was sitting up in bed upon arrival.   Cognition   Affect/Mental Status (Cognition) WFL   Follows Commands (Cognition) WFL   Pain Assessment   Patient Currently in Pain Yes, see Vital Sign flowsheet   Integumentary/Edema   Integumentary/Edema other (describe)   Integumentary/Edema Comments Surgical site not observed.   Posture    Posture Forward head position;Protracted shoulders   Range of Motion (ROM)   Range of Motion ROM deficits secondary to surgical procedure;ROM deficits secondary to pain   Strength (Manual Muscle Testing)   Strength (Manual Muscle Testing) strength is WFL;Able to perform L SLR   Bed Mobility   Bed Mobility supine-sit   Supine-Sit Loving (Bed Mobility) modified independence   Assistive Device (Bed Mobility) bed rails   Transfers   Transfers sit-stand transfer   Sit-Stand Transfer   Sit-Stand Loving (Transfers) contact guard;1 person assist   Assistive Device (Sit-Stand Transfers) walker, front-wheeled   Gait/Stairs (Locomotion)   Loving Level (Gait) contact guard;1 person assist   Assistive Device (Gait) walker, front-wheeled   Distance in Feet (Gait) 12'   Pattern (Gait) step-to   Deviations/Abnormal Patterns (Gait) antalgic;base of support, wide;dorcas decreased;gait speed decreased;stride length decreased;weight shifting decreased   Balance   Balance no deficits were identified   Sensory Examination   Sensory Perception patient reports no sensory changes   Coordination   Coordination no deficits were identified   Muscle Tone   Muscle Tone no deficits were identified   Clinical Impression   Criteria for Skilled Therapeutic Intervention Yes, treatment indicated   PT Diagnosis (PT) impaired functional mobility   Influenced by the following impairments Pt with decreased ROM, strength, and activity tolerance secondary to post  surgical pain.   Functional limitations due to impairments bed mobility, transfers, gait, stairs   Clinical Presentation (PT Evaluation Complexity) stable   Clinical Presentation Rationale per clinical judgement   Clinical Decision Making (Complexity) low complexity   Planned Therapy Interventions (PT) bed mobility training;gait training;home exercise program;patient/family education;stair training;transfer training;progressive activity/exercise;home program guidelines   Risk & Benefits of therapy have been explained evaluation/treatment results reviewed;care plan/treatment goals reviewed;risks/benefits reviewed;current/potential barriers reviewed;participants voiced agreement with care plan;participants included;patient   PT Total Evaluation Time   PT Eval, Low Complexity Minutes (23798) 6   Physical Therapy Goals   PT Frequency One time eval and treatment only   PT Predicted Duration/Target Date for Goal Attainment 04/25/25   PT Goals Bed Mobility;Transfers;Gait;PT Goal 1   PT: Bed Mobility Independent;Supine to/from sit;Goal Met   PT: Transfers Modified independent;Sit to/from stand;Assistive device;Goal Met   PT: Gait Modified independent;Rolling walker;150 feet;Goal Met   PT: Goal 1 Pt will report understanding and adherence to HEP to improve knee ROM and strength.   Interventions   Interventions Quick Adds Gait Training;Therapeutic Activity;Therapeutic Procedure   Therapeutic Procedure/Exercise   Ther. Procedure: strength, endurance, ROM, flexibillity Minutes (14166) 8   Symptoms Noted During/After Treatment none   Treatment Detail/Skilled Intervention After evlaution, pt performed x6 reps of supine SAQ, AP, quad sets, hamstring sets, glute sets, heel slides, and SLR. Pt educ on frequency for strength and importance of adherance to HEP to improve ROM.   Therapeutic Activity   Therapeutic Activities: dynamic activities to improve functional performance Minutes (04683) 20   Symptoms Noted During/After Treatment  Fatigue;Increased pain   Treatment Detail/Skilled Intervention After evaluation, pt educ on WBAT precaution. Pt educ on benefit of using FWW rather than crutches for balance and added support. Pt educ on hand placement for increased safety during transfers.  After ambulation, patient was mod I standing to seated transfer to the at EOB.  Patient was IND with flat bed height seated to supine.  Patient education on use of sock aid and shower chair.  Patient education on car transfer.  Patient supine in bed with all needs met at end of session.   Gait Training   Gait Training Minutes (85645) 10   Symptoms Noted During/After Treatment (Gait Training) fatigue;increased pain   Treatment Detail/Skilled Intervention After supine exercies, patient AMB with front wheeled walker CGA progressing to mod I x 160 feet OOR. Patient with decreased weight shifting onto left lower extremity and increased UE support on FWW.  Patient with improved weight shifting after verbal cues to shift weight to left. Patient with decreased gait speed WOB.  Patient with good obstacle navigation in hallway.   Distance in Feet 160'   PT Discharge Planning   PT Plan Pt no longer in need of IP PT   PT Discharge Recommendation (DC Rec) other (see comments)  (defer to ortho)   PT Rationale for DC Rec Pt is amb at [facility] with FWW. Pt will have full time support at home for the next few days. Pt does not have any barriers to returning home. Pt would benefit from outpatient PT to improve ROM, strength, and improve endurance.   PT Brief overview of current status [facility] with FWW   PT Total Distance Amb During Session (feet) 160   PT Equipment Needed at Discharge dressing equipment  (Sock aid)   Physical Therapy Time and Intention   Timed Code Treatment Minutes 38   Total Session Time (sum of timed and untimed services) 44

## 2025-04-24 NOTE — PLAN OF CARE
"Goal Outcome Evaluation:      Plan of Care Reviewed With: patient    Overall Patient Progress: no change    VS: /65 (BP Location: Left arm, Patient Position: Semi-Masterson's, Cuff Size: Adult Regular)   Pulse 63   Temp 98.1  F (36.7  C) (Oral)   Resp 18   Ht 1.702 m (5' 7\")   Wt 119.2 kg (262 lb 12.6 oz)   LMP 07/20/2006   SpO2 94%   BMI 41.16 kg/m     O2: O2 sats maintained with 2L via personal CPAP when asleep, weaned to RA this AM   Output: Continent of B&B, Voiding spontaneously without difficulty in BR   Activity: Ax1 with walker and GB, WBAT LLE   Skin: L knee surgical incision, scab on R 4th toe   Pain: L knee pain managed with PRN oxycodone as well as scheduled meds   CMS: [place]ore[place] N&T, A&Ox4   Dressing: L knee surgical drsg CDI   Diet: regular   LDA: R PIV SL   Equipment: IV pole, personal belongings, personal CPAP   Plan: Dc home on day shift pending PT/OT sign off and medical stability   Additional Info: Patient able to make needs known using call light appropriately, call light in reach, continue POC, report given to oncoming RN     "

## 2025-04-24 NOTE — PROGRESS NOTES
[facility]    Medicine Progress Note - Hospitalist Service, GOLD TEAM 20    Date of Admission:  4/23/2025    Assessment & Plan   Gudelia Dong is a 60F w/ PMH OA, ROMEO on CPAP, former tobacco use, former alcohol use, GERD, hiatal hernia, erosive esophagitis, hypothyroid, anemia, BPD, depression admitted on 4/23/2025 s/p left TKA.     S/p Left TKA  Post-traumatic OA left knee  Surgery with Dr. Carrera. EBL 75 ml. Tolerated surgery well with no reported complications. Post op patient reports feeling well. Denies nausea or vomiting. HDS.   -Care per primary orthopedics team:   - FEN: ADAT to regular diet    - periop abx: vancomycin X 24 hours    - pain mng:    -APAP    -oxycodone    -dilaudid    -ketorolac     -methocarbamol    - PT and OT consults   -activity: WBAT    - DVT ptx: ASA 81 mg BID X 4 weeks    -antiemetics PRN     4/24/2025  Doing well  Vss   On RA.   Taran po well.   No NV.     #Hgb: 8.3. anemia of acute blood loss. Transfuse for <7.    GERD  Hiatal hernia  Erosive esophagitis  EGD 4/8/2025: persistent LA grade C erosive esophagitis. Planning for eventual HH repair after weight loss.   -Continue PPI with protonix (formulary sub for rabeprazole)  -Continue PTA vonoprazan fumarate 20 mg daily   -Continue PTA famotidine 40 mg BID   -Has follow up with GI 4/29 outpatient     ROMEO on CPAP  -CPAP w/ home settings     Hypothyroid   -Continue PTA levothyroxine 150 mcg daily     Former tobacco use  Quit smoking 2/2/25.  -continue cessation     Hx of alcohol use disorder   -PTA on naltrexone, held prior to surgery     Anemia  Iron studies consistent with iron deficiency. Started on iron supplement pre op. Hgb stable.   -Continue PTA ferrous sulfate daily   -Outpatient hematology consult has been requested previously     BPD  Depression   -Continue PTA bupropion 450 mg daily   -Continue PTA buspirone 30 mg BID   -Continue PTA doxepin 150 mg HS   -Continue PTA Escitalopram 10  "mg daily   -Continue PTA lomatorigine 100 mg daily   -Continue PTA Rexulti 2 mg daily   -Recommend follow up with psychiatry to consider de-escalation of therapy; has follow up beginning of May     Weight management   -Continue PTA topiramate 25 mg BID               Diet: Advance Diet as Tolerated: Regular Diet Adult  Discharge Instruction - Regular Diet Adult    DVT Prophylaxis: Defer to primary service  Amezquita Catheter: Not present  Lines: None     Cardiac Monitoring: None  Code Status: Full Code      Clinically Significant Risk Factors Present on Admission                        # Anemia: based on hgb <11       # Severe Obesity: Estimated body mass index is 41.16 kg/m  as calculated from the following:    Height as of this encounter: 1.702 m (5' 7\").    Weight as of this encounter: 119.2 kg (262 lb 12.6 oz).              Social Drivers of Health    Tobacco Use: Medium Risk (4/21/2025)    Patient History     Smoking Tobacco Use: Former     Smokeless Tobacco Use: Never     Passive Exposure: Past          Disposition Plan     Medically Ready for Discharge: Ready Now             Juan Pablo Akhtar MD  Hospitalist Service, GOLD TEAM 20  [facility]  Securely message with [employer] (more info)  Text page via [facility] Paging/Directory   See signed in provider for up to date coverage information  ______________________________________________________________________    Interval History   Interval events reviewed.   States doing well  Denies fever or chills.   No cough or cp or sob.   No LH or dizziness.   No NV or pain abdomen. Flatus+. Taran po  No new sensory or motor complaint.   No new rash.    No other new or acute medical concern     Physical Exam   Vital Signs: Temp: 98.4  F (36.9  C) Temp src: Oral BP: 114/57 Pulse: 67   Resp: 18 SpO2: 96 % O2 Device: None (Room air) Oxygen Delivery: 2 LPM  Weight: 262 lbs 12.61 oz      General Appearance: Awake, interactive, NAD  Respiratory: " Normal work of breathing. RA  Cardiovascular: RRR  GI: Soft. NT. ND.  Extremities: Distally wwp.   Neuro: Grossly non focal.   Others: Stable mood.      Medical Decision Making       40 MINUTES SPENT BY ME on the date of service doing chart review, history, exam, documentation & further activities per the note.      Data     I have personally reviewed the following data over the past 24 hrs:    N/A  \   8.3 (L)   / N/A     N/A N/A N/A /  111 (H)   N/A N/A N/A \       Imaging results reviewed over the past 24 hrs:   Recent Results (from the past 24 hours)   XR Knee Port Left 1/2 Views    Narrative    EXAM: XR KNEE PORT LEFT 1/2 VIEWS  LOCATION: [facility]  DATE: 4/23/2025    INDICATION: Post Op Total Knee  COMPARISON: 3/6/2025      Impression    IMPRESSION: Immediate postoperative change of total knee arthroplasty. Alignment is satisfactory. No acute fracture. Small amount of operative related gas in the knee joint and soft tissues.     Recent Labs   Lab 04/24/25  0646 04/23/25  0928 04/23/25  0830   HGB 8.3* 10.2*  --    *  --  105*

## 2025-04-24 NOTE — DISCHARGE SUMMARY
ORTHOPEDIC SURGERY DISCHARGE SUMMARY     Date of Admission: 4/23/2025  Date of Discharge: 4/24/2025 10:30 AM  Disposition: Home  Staff Physician: No att. providers found  Primary Care Provider: Mendel Mireles    DISCHARGE DIAGNOSIS:  Post-traumatic osteoarthritis of left knee [M17.32]  Stress fracture of knee [M84.369A]    PROCEDURES: Procedure(s):  Left total knee arthroplasty on 4/23/2025    BRIEF HISTORY:  This is a 60 year old patient who has been followed in clinic. Please refer to that documentation for full details. Briefly, the patient has posttraumatic arthritis of left knee. The patient has failed conservative treatment of symptoms and desires more definitive intervention. Therefore, after reviewing non-operative and operative options including the risks and benefits associated with each, the patient elected to proceed with the above stated procedure.     HOSPITAL COURSE:    The patient was admitted following the above listed procedures for pain control and rehabilitation. Gudelia Dong did well post-operatively. Medicine was consulted post operatively to aid in management of medical co-morbidities. The patient received routine nursing cares and at the time of discharge was medically stable. Vital signs were stable throughout admission. The patient is tolerating a regular diet and is voiding spontaneously. All PT/OT goals have been met for safe mobility. Pain is now controlled on oral medications which will be available on discharge. Stool softeners have been used while taking pain medications to help prevent constipation. Gudelia Dong is deemed medically safe to discharge.     Antibiotics:  Ancef given periop and 24 hours postop.  DVT prophylaxis:  ASA initiated after surgery and will be continued for 4 weeks.  PT Progress:  Has met PT/OT goals for safe mobility.   Pain Meds:  Weaned off all IV pain meds by discharge.  Inpatient Events:  No significant postoperative events or complications.      PHYSICAL EXAM:    Gen: alert and oriented, responds to questions appropriately  Resp: non-labored on RA  MSK:  LLE:  - Dressings c/d/i  - SILT femoral/tibial/sural/saphenous/DP/SP nerves  - Fires quad, TA, EHL, FHL, GaSC  - DP pulses 2+, foot wwp    FOLLOWUP:    Follow up with Dr. Carrera team at 2 weeks postoperatively.    Future Appointments   Date Time Provider Department Center   4/29/2025 10:15 AM Rosalie Martínez PA-C [facility]   4/30/2025  9:40 AM [facility]mlindsayor[facility]   5/7/2025  9:40 AM Amthor[facility]wa[facility]   5/12/2025  3:00 PM Cheri Baker PA-C [facility]   5/13/2025  2:40 PM Yesenia Stallworth PA-C [facility]   5/14/2025  9:40 AM Am[facility]wa villarreal, [facility]   6/5/2025 10:40 AM Glenroy Carrera MD [facility]   6/12/2025  4:00 PM Louise Colindres PA-C [facility]   6/20/2025  3:00 PM Randi Vasquez MD [facility]   8/27/2025  7:45 AM Rosalie Martínez PA-C [facility]       Orthopedic Surgery appointments are at the [facility] ([address] [place] [address]). Call [phone] to schedule a follow-up appointment at this location with your provider.     PLANNED DISCHARGE ORDERS:      Discharge Medication List as of 4/24/2025 10:37 AM        START taking these medications    Details   acetaminophen (TYLENOL) 325 MG tablet Take 2 tablets (650 mg) by mouth every 4 hours as needed for other (mild pain)., Disp-100 tablet, R-0, E-Prescribe      aspirin (ASA) 81 MG EC tablet Take 1 tablet (81 mg) by mouth 2 times daily., Disp-60 tablet, R-0, E-Prescribe      hydrOXYzine HCl (ATARAX) 25 MG tablet Take 1 tablet (25 mg) by mouth every 6 hours as needed for other (adjuvant pain)., Disp-25 tablet, R-0, E-Prescribe      methocarbamol (ROBAXIN) 750 MG tablet Take 1 tablet (750 mg) by mouth every 6 hours as needed for muscle spasms., Disp-20 tablet, R-0, E-Prescribe      oxyCODONE (ROXICODONE) 5 MG tablet Take 1-2 tablets  (5-10 mg) by mouth every 4 hours as needed for moderate pain., Disp-30 tablet, R-0, E-Prescribe      polyethylene glycol (MIRALAX) 17 GM/Dose powder Take 17 g by mouth daily., Disp-510 g, R-0, E-Prescribe      senna-docusate (SENOKOT-S/PERICOLACE) 8.6-50 MG tablet Take 1 tablet by mouth 2 times daily., Disp-60 tablet, R-0, E-Prescribe           CONTINUE these medications which have NOT CHANGED    Details   ammonium lactate (AMLACTIN) 12 % external cream Apply topically daily as needed for dry skinDisp-385 g, [id number]      buPROPion (WELLBUTRIN XL) 150 MG 24 hr tablet Take 450 mg by mouth every morning, Historical      busPIRone HCl (BUSPAR) 30 MG tablet Take 1 tablet by mouth 2 times daily, Historical      doxepin HCl (SINEQUAN) 150 MG capsule Take 150 mg by mouth at bedtime., Historical      escitalopram (LEXAPRO) 10 MG tablet Take 10 mg by mouth every morning, Historical      famotidine (PEPCID) 40 MG tablet Take 1 tablet (40 mg) by mouth 2 times daily., Disp-180 tablet, R-3, E-Prescribe      ferrous sulfate 300 (60 Fe) MG/5ML solution Take 300 mg by mouth daily., Historical      lamoTRIgine (LAMICTAL) 100 MG tablet Take 100 mg by mouth every morning, Historical      levothyroxine (SYNTHROID/LEVOTHROID) 150 MCG tablet TAKE ONE TABLET BY MOUTH EVERY DAY, Disp-90 tablet, R-3, E-Prescribe      RABEprazole (ACIPHEX) 20 MG EC tablet Take 1 tablet (20 mg) by mouth 2 times daily (before meals)., Disp-180 tablet, R-1, E-Prescribe      REXULTI 2 MG tablet Take 2 mg by mouth every morning, ELISABETH, Historical      topiramate (TOPAMAX) 25 MG tablet Take 2 tablets (50 mg) by mouth 2 times daily., Disp-120 tablet, R-3, E-Prescribe      order for DME Equipment being ordered: CPAP  Please dispense Cpap and its supplyDisp-1 Units, R-prn, Local Print      triamcinolone (KENALOG) 0.025 % external ointment Apply topically 2 times daily To affected area in the groin as needed up to 2 weeks at a time.Disp-30 g, [id number]  "fill today, just update rx.      Vonoprazan Fumarate (VOQUEZNA) 20 MG TABS Take 20 mg by mouth daily., Disp-90 tablet, R-1, E-Prescribe           STOP taking these medications       naltrexone (DEPADE/REVIA) 50 MG tablet Comments:   Reason for Stopping:                 Discharge Procedure Orders   Reason for your hospital stay   Order Comments: Left total knee arthroplasty     When to call - Contact Surgeon Team   Order Comments: You may experience symptoms that require follow-up before your scheduled appointment. Refer to the \"Stoplight Tool\" for instructions on when to contact your Surgeon Team if you are concerned about pain control, blood clots, constipation, or if you are unable to urinate.     When to call - Reach out to Urgent Care   Order Comments: If you are not able to reach your Surgeon Team and you need immediate care, go to the Orthopedic Walk-in Clinic or Urgent Care at your Surgeon's office.  Do NOT go to the Emergency Room unless you have shortness of breath, chest pain, or other signs of a medical emergency.     When to call - Reasons to Call 911   Order Comments: Call 911 immediately if you experience sudden-onset chest pain, arm weakness/numbness, slurred speech, or shortness of breath     Discharge Instruction - Breathing exercises   Order Comments: Perform breathing exercises 10 times per hours while awake for 2 weeks. (If given, use your Incentive Spirometer)     Symptoms - Fever Management   Order Comments: A low grade fever can be expected after surgery.  Use acetaminophen (TYLENOL) as needed for fever management.  Contact your Surgeon Team if you have a fever greater than 101.5 F, chills, and/or night sweats.     Symptoms - Constipation management   Order Comments: Constipation (hard, dry bowel movements) is expected after surgery due to the combination of being less active, the anesthetic, and the opioid pain medication.  You can do the following to help reduce constipation:  ~  FLUIDS:  " Drink clear liquids (water or Gatorade), or juice (apple/prune).  ~  DIET:  Eat a fiber rich diet.    ~  ACTIVITY:  Get up and move around several times a day.  Increase your activity as you are able.  MEDICATIONS:  Reduce the risk of constipation by starting medications before you are constipated.  You can take Miralax   (1 packet as directed) and/or a stool softener (Senokot 1-2 tablets 1-2 times a day).  If you already have constipation and these medications are not working, you can get magnesium citrate and use as directed.  If you continue to have constipation you can try an over the counter suppository or enema.  Call your Surgeon Team if it has been greater than 3 days since your last bowel movement.     Symptoms - Reduced Urine Output   Order Comments: Changes in the amount of fluids you drank before and after surgery may result in problems urinating.  It is important to stay well-hydrated after surgery and drink plenty of water. If it has been greater than 8 hours since you have urinated despite drinking plenty of water, call your Surgeon Team.     Activity - Exercises to prevent blood clots   Order Comments: Unless otherwise directed by your Surgeon team, perform the following exercises at least three times per day for the first four weeks after surgery to prevent blood clots in your legs: 1) Point and flex your feet (Ankle Pumps), 2) Move your ankle around in big circles, 3) Wiggle your toes, 4) Walk, even for short distances, several times a day, will help decrease the risk of blood clots.     Order Specific Question Answer Comments   Is discharge order? Yes      Comfort and Pain Management - Pain after Surgery   Order Comments: Pain after surgery is normal and expected.  You will have some amount of pain for several weeks after surgery.  Your pain will improve with time.  There are several things you can do to help reduce your pain including: rest, ice, elevation, and using pain medications as needed.  Contact your Surgeon Team if you have pain that persists or worsens after surgery despite rest, ice, elevation, and taking your medication(s) as prescribed. Contact your Surgeon Team if you have new numbness, tingling, or weakness in your operative extremity.     Comfort and Pain Management - Swelling after Surgery   Order Comments: Swelling and/or bruising of the surgical extremity is common and may persist for several months after surgery. In addition to frequent icing and elevation, gentle compressive support with an ACE wrap or tubigrip may help with swelling. Apply compression regularly, removing at least twice daily to perform skin checks. Contact your Surgeon Team if your swelling increases and is NOT associated with an increase in your activity level, or if your swelling increases and is associated with redness and pain.     Comfort and Pain Management - Cold therapy   Order Comments: Ice can be used to control swelling and discomfort after surgery. Place a thin towel over your operative site and apply the ice pack overtop. Leave ice pack in place for 20 minutes, then remove for 20 minutes. Repeat this 20 minutes on/20 minutes off routine as often as tolerated.     Medication Instructions - Acetaminophen (TYLENOL) Instructions   Order Comments: You were discharged with acetaminophen (TYLENOL) for pain management after surgery. Acetaminophen most effectively manages pain symptoms when it is taken on a schedule without missing doses (every four, six, or eight hours). Your Provider will prescribe a safe daily dose between 3000 - 4000 mg.  Do NOT exceed this daily dose. Most patients use acetaminophen for pain control for the first four weeks after surgery.  You can wean from this medication as your pain decreases.     Medication Instructions - NSAID Instructions   Order Comments: You were discharged with an anti-inflammatory medication for pain management to use in combination with acetaminophen (TYLENOL) and  "the narcotic pain medication.  Take this medication exactly as directed.  You should only take one anti-inflammatory at a time.  Some common anti-inflammatories include: ibuprofen (ADVIL, MOTRIN), naproxen (ALEVE, NAPROSYN), celecoxib (CELEBREX), meloxicam (MOBIC), ketorolac (TORADOL).  Take this medication with food and water.     Follow Up Care   Order Comments: Follow-up with your Surgeon Team in 2 weeks for wound check.     Medication instructions -  Anticoagulation - aspirin   Order Comments: Take the aspirin as prescribed for a total of four weeks after surgery.  This is given to help minimize your risk of blood clot.     Comfort and Pain Management - LOWER Extremity Elevation   Order Comments: Swelling is expected for several months after surgery. This type of swelling is usually associated with gravity and activity, and can be improved with elevation.   The best way to do this is to get your \"toes above your nose\" by laying down and placing several pillows lengthwise under your calf and heel. When elevating your leg keep your knee completely straight. Perform this elevation as often as possible especially for the first two weeks after surgery.     Activity - Total Knee Arthroplasty     Order Specific Question Answer Comments   Is discharge order? Yes      Return to Driving   Order Comments: Return to driving - Driving is NOT permitted until directed by your provider. Under no circumstance are you permitted to drive while using narcotic pain medications.     Order Specific Question Answer Comments   Is discharge order? Yes      Dressing / Wound Care - Wound   Order Comments: You have a clean dressing on your surgical wound. Dressing change instructions as follows: dressing will be removed at your follow-up appointment. Contact your Surgeon Team if you have increased redness, warmth around the surgical wound, and/or drainage from the surgical wound.     Dressing / Wound Care - NO Tub Bathing   Order Comments: " Tub bathing, swimming, or any other activities that will cause your incision to be submerged in water should be avoided until allowed by your Surgeon.     Weight bearing as tolerated   Order Comments: Weight bearing as tolerated on your operative extremity.     Order Specific Question Answer Comments   Is discharge order? Yes      Dressing / Wound care - Shower with wound/dressing covered   Order Comments: You must COVER your dressing or incision with saran wrap (or any other non-permeable covering) to allow the incision to remain dry while showering. You may shower 3 days after surgery as long as the surgical wound stays dry. Continue to cover your dressing or incision for showering until your first office visit.  You are strictly prohibited from soaking or submerging the surgical wound underwater.     Crutches DME   Order Comments: DME Documentation: Describe the reason for need to support medical necessity: Impaired gait status post knee surgery. I, the undersigned, certify that the above prescribed supplies are medically necessary for this patient and is both reasonable and necessary in reference to accepted standards of medical practice in the treatment of this patient's condition and is not prescribed as a convenience.     Order Specific Question Answer Comments   Medical Equipment (DME) Supplier: [employer]    PATIENT INSTRUCTIONS: If you did not receive this ordered item today, please contact [employer] for availability ([place] Locations: [phone], [place]: [phone]).    Crutch Type: Standard    Crutches Add On: NA    Length of Need: Lifetime      Cane DME   Order Comments: DME Documentation: Describe the reason for need to support medical necessity: Impaired gait status post knee surgery. I, the undersigned, certify that the above prescribed supplies are medically necessary for this patient and is both reasonable and necessary in reference to accepted  standards of medical practice in the treatment of this patient's condition and is not prescribed as a convenience.     Order Specific Question Answer Comments   Medical Equipment (DME) Supplier: [employer]    PATIENT INSTRUCTIONS: If you did not receive this ordered item today, please contact [employer] for availability ([facility] Locations: [phone], [place]: [phone]).    Cane Type: Single Tip    Reminder: Patient can typically get 1 every 5 years      Walker DME   Order Comments: DME Documentation: Describe the reason for need to support medical necessity: Impaired gait status post knee surgery. I, the undersigned, certify that the above prescribed supplies are medically necessary for this patient and is both reasonable and necessary in reference to accepted standards of medical practice in the treatment of this patient's condition and is not prescribed as a convenience.     Order Specific Question Answer Comments   Medical Equipment (DME) Supplier: [employer]    PATIENT INSTRUCTIONS: If you did not receive this ordered item today, please contact [employer] for availability ([facility] Locations: [phone], [place]: [phone]).    Walker Type: Standard (2 Wheel)    Accessories: N/A      Discharge Instruction - Regular Diet Adult   Order Comments: Return to your pre-surgery diet unless instructed otherwise     Order Specific Question Answer Comments   Is discharge order? Yes      Assign Questionnaire Series to Patient       Orthopedic surgery staff for this patient is Dr. Carrera. Discussed.    --  Shauna Kaur MD  Orthopedic Surgery PGY-4

## 2025-04-24 NOTE — DISCHARGE INSTRUCTIONS
Hold naltrexone while receiving narcotic pain medications    How Severe Are Your Spot(S)?: mild Have Your Spot(S) Been Treated In The Past?: has not been treated Hpi Title: Evaluation of Skin Lesions Additional History: Patient would like [facility]. Family Member: sister Location: R suprascapular Year Removed: 2017

## 2025-04-24 NOTE — PLAN OF CARE
Occupational Therapy: Orders received. Chart reviewed and discussed with care team.? Occupational Therapy not indicated as patient has no overall concerns with ADLs, has good support at home. Pt's needs can be met by single discipline, PT, while in hospital.? Defer discharge recommendations to PT/ortho.? Will complete orders.

## 2025-04-27 ENCOUNTER — NURSE TRIAGE (OUTPATIENT)
Dept: NURSING | Facility: CLINIC | Age: 60
End: 2025-04-27
Payer: COMMERCIAL

## 2025-04-28 ENCOUNTER — TELEPHONE (OUTPATIENT)
Dept: GASTROENTEROLOGY | Facility: CLINIC | Age: 60
End: 2025-04-28
Payer: COMMERCIAL

## 2025-04-28 NOTE — TELEPHONE ENCOUNTER
"Patient gave verbal consent to speak with Sara.    Nurse Triage SBAR    Is this a 2nd Level Triage? NO    Situation: post op questions    Background: knee replacement 4/23.    Assessment: Patient and her sister-in-law are calling with some post op questions.  Patient did get a yo horse in her calf on Thursday.  It was been sore since then.  Pain is not increasing.  Patient is taking muscle relaxers and pain medications which are helping.  Top of dressing has slight gray color drainage noted.  No fever, increased pain, or redness noted.  Has a large bruise (6\" x 4\") noted to back of left calf.    Is doing her exercises and using ice. Is staying hydrated.  Had headache today but is gone now.       Protocol Recommended Disposition:   Home Care    Recommendation: Care advice given per protocol.  Recommend monitoring at home at this time.  Did also review the care instructions found in the AVS with patient and CHRISTINA.  Patient and Sister-in-Law verbalized understanding information.          Does the patient meet one of the following criteria for ADS visit consideration? 16+ years old, with an [facility] PCP     TIP  Providers, please consider if this condition is appropriate for management at one of our Acute and Diagnostic Services sites.     If patient is a good candidate, please use dotphrase <dot>triageresponse and select Refer to ADS to document.    Reason for Disposition   Mild bruising near incision site    Additional Information   Negative: [1] Major abdominal surgical incision AND [2] wound gaping open AND [3] visible internal organs   Negative: Sounds like a life-threatening emergency to the triager   Negative: [1] Bleeding from incision AND [2] won't stop after 10 minutes of direct pressure   Negative: [1] Bleeding (more than a few drops) from incision AND [2] tracheostomy or blood vessel surgery (e.g., carotid endarterectomy, femoral bypass graft, kidney dialysis fistula)   Negative: [1] Widespread rash AND [2] " bright red, sunburn-like   Negative: Severe pain in the incision   Negative: [1] Incision gaping open AND [2] < 48 hours since wound re-opened   Negative: [1] Incision gaping open AND [2] length of opening > 2 inches (5 cm)   Negative: Patient sounds very sick or weak to the triager   Negative: Sounds like a serious complication to the triager   Negative: Fever > 100.4 F (38.0 C)   Negative: [1] Incision looks infected (spreading redness, pain) AND [2] fever > 99.5 F (37.5 C)   Negative: [1] Incision looks infected (spreading redness, pain) AND [2] large red area (> 2 in. or 5 cm)   Negative: [1] Incision looks infected (spreading redness, pain) AND [2] face wound   Negative: [1] Red streak runs from the incision AND [2] longer than 1 inch (2.5 cm)   Negative: [1] Pus or bad-smelling fluid draining from incision AND [2] no fever   Negative: [1] Post-op pain AND [2] not controlled with pain medications   Negative: Dressing soaked with blood or body fluid (e.g., drainage)   Negative: [1] Scant bleeding (e.g., few drops) from incision AND AND [2] tracheostomy or blood vessel surgery (e.g., carotid endarterectomy, femoral bypass graft, kidney dialysis fistula)   Negative: [1] Raised bruise and [2] size > 2 inches (5 cm) and expanding   Negative: [1] Caller has URGENT question AND [2] triager unable to answer question   Negative: [1] INCREASING pain in incision AND [2] > 2 days (48 hours) since surgery   Negative: [1] Small red area or streak AND [2] no fever   Negative: [1] Clear or blood-tinged fluid draining from wound AND [2] no fever   Negative: [1] Incision gaping open AND [2] > 48 hours since wound re-opened AND [3] length of opening > 1/2 inch (12 mm)   Negative: [1] Incision on face gaping open AND [2] > 48 hours since wound re-opened AND [3] length of opening > 1/4 inch (6 mm)   Negative: Suture or staple removal is overdue   Negative: [1] Suture or staple came out early AND [2] caller wants wound checked    Negative: [1] Caller has NON-URGENT question AND [2] triager unable to answer question   Negative: Pimple where a stitch comes through the skin   Negative: Suture (or staple) came out early    Protocols used: Post-Op Incision Symptoms and [id number]

## 2025-04-28 NOTE — OP NOTE
OPERATIVE REPORT    DATE OF SERVICE: 4/23/25    SURGEON: Glenroy Carrera MD.    ASSISTANT(S):  Cassandra Pope PA-C The assistance of the Physician Assistant was necessary for positioning, draping, retraction, leg positioning for placement of implants, and layered closure. There was no qualified available resident or fellow who was aware of the intricies of the procedure and requirements of all portions of the procedure.  Shauna Kaur MD    PREOPERATIVE DIAGNOSIS:  Osteoarthritis    POSTOPERATIVE DIAGNOSIS:  Osteoarthritis    OPERATION PERFORMED:  left total knee arthroplasty    IMPLANTS:    Implant Name Type Inv. Item Serial No.  Lot No. LRB No. Used Action   IMP BONE CEMENT STRK SIMPLEX TOBRAMYCIN 40CC 6197-9-001 - [id number] Cement, Bone IMP BONE CEMENT STRK SIMPLEX TOBRAMYCIN 40 6197-9-001  [employer] [id number] Left 2 Implanted   IMP COMP FEMORAL S&N DAPHNIE II NP CR SZ 5 LT [id number] - [id number] Total Joint Component/Insert IMP COMP FEMORAL S&N DAPHNIE II NP CR SZ 5 LT [id number]  [employer] [id number] Left 1 Implanted   IMP INSERT TIBIAL S&N LEGION XLPE DISHED SZ3-4 9MM [id number] - [id number] Total Joint Component/Insert IMP INSERT TIBIAL S&N LEGION XLPE DISHED SZ3-4 9MM [id number]  [employer] [id number] Left 1 Implanted   IMP BASEPLATE TIBIAL DAPHNIE II SZ 4 LT TI [id number] - [id number] Total Joint Component/Insert IMP BASEPLATE TIBIAL DAPHNIE II SZ 4 LT TI [id number]  [employer]R [id number] Left 1 Implanted       ANESTHETIC: General     OPERATIVE FINDINGS:  End stage arthrosis of the knee. Patella appropriate for patellar retention.     BLOOD LOSS: about 75 cc    TOURNIQUET TIME: about 60 min at 250 mm Hg    COMPLICATIONS:  None apparent    OPERATIVE INDICATIONS:  The patient has a long history of debilitating pain secondary to ostearthritis of the knee.  Despite comprehensive non-operative management these symptoms continued to interfere with activities of daily living.   After discussion of further treatment options including the risks and benefits that patient elected to proceed with a total knee.    DESCRIPTION OF THE PROCEDURE:  The patient was identified in the preoperative holding area.  The consent form including the risks and benefits were reviewed with the patient.  The operative limb was identified and marked.  The patient was brought back to the operating room and placed supine on the operating table.  An anesthetic was induced by the anesthesia team.   The patient was prepped and draped in the normal standard fashion for a knee replacement.  A time-out was called.  Antibiotics were given.The tourniquet was inflated.  We utilized an approximately 15 cm curvilinear incision, centered on the patella ridge, and performed a standard parapatellar approach to the knee. There was normal appearing joint fluid seen upon arthrotomy. A modest medial release was performed. The patella was everted. Medial and lateral retractors were placed. The knee was brought into flexion. The AP axis of the knee was marked and an intra-medullary femoral guide anca was placed as templated. The epicondylar axis was then marked. The anterior femoral cutting guide was placed and the epicondylar and AP axes were used to set the rotation of the jig. A stylus was then used to set the depth of the resection. Retractors were used to protect the skin and the anterior cut was made. The distal femoral cutting guide was then placed and pinned. The resection was set to remove the cartilage from the intra-condylar notch. The femoral sizing guide was then placed; the knee sized well to a 5. The four-in-one femoral cutting guide was then placed and pinned. The cuts were made. The trial component was well fit. Attention was then turned to the tibia. A PCL retractor was then placed and used to bring the tibia anterior. Medial and lateral retractors were placed. A tibial intra-medullary guide anca was placed. The tibial  cutting guide was then assembled on the guide anca. The slope was set to nearly mirror the anatomic slope. The tibial cut was first checked with a two-and-nine guide and the cut was then made. A lamina  was then used to remove the necessary bone from the posterior condyles and then the remaining meniscus was removed. The tibia and was sized and it sized well to a 4. The trial components were then assembled dylon  9 mm trial poly was used for trialing. The knee came out into full extension and the knee had greater than 120 degrees of flexion. It was well balanced in the coronal plane with varus and valgus stress at 0 and 30 degrees of flexion. Happy with the reconstruction the tibial rotation was marked, the trial components were removed, and all cancellous surfaces were cleaned with a pulse lavage and then dried. The components were cemented into place, a trial poly placed, the knee brought into extension, and the cement was allowed to dry. With the cement dry the knee was lavaged. The tourniquet was let down and hemostasis was achieved. The knee was then brought into extension and the final poly was placed. It was seen to lock into place. The soft tissues were then injected with analgesic. The capsule was closed with interrupted Vicryl, the dermis with interrupted Vicryl, and skin with running monocryl, Dermabond and steri-strips.  At the end of the procedure the sponge and needle counts were correct times two.  The patient tolerated the procedure well and returned to the PAR extubated and stable.    POSTOPERATIVE PLAN:  1. Weight bearing as tolerated  2. DVT prophylaxis   4. 24 hours of prophylactic antibiotics  5. Follow-up:  Wound clinic in 2 weeks and with Deandre in clinic in 6 weeks for x-rays and a rehabilitation check.

## 2025-04-28 NOTE — TELEPHONE ENCOUNTER
SCREENING DONE 2/11/25    Preferred Pharmacy:    [facility] [place] - [address]  [address]  [facility] [address]  Phone: [phone] Fax: [phone]      Final Scheduling Details     Procedure scheduled  Upper endoscopy (EGD)    Surgeon:  KAT     Date of procedure:  8/26     Pre-OP / PAC:   Yes - PAC clinic evaluation scheduled.    Location  [facility] - Per RN assessment.    Sedation   MAC/Deep Sedation - Per order.      Patient Reminders:   You will receive a call from a Nurse to review instructions and health history.  This assessment must be completed prior to your procedure.  Failure to complete the Nurse assessment may result in the procedure being cancelled.      On the day of your procedure, please designate an adult(s) who can drive you home stay with you for the next 24 hours. The medicines used in the exam will make you sleepy. You will not be able to drive.      You cannot take public transportation, ride share services, or non-medical taxi service without a responsible caregiver.  Medical transport services are allowed with the requirement that a responsible caregiver will receive you at your destination.  We require that drivers and caregivers are confirmed prior to your procedure.

## 2025-04-29 ENCOUNTER — TELEPHONE (OUTPATIENT)
Dept: ORTHOPEDICS | Facility: CLINIC | Age: 60
End: 2025-04-29

## 2025-04-30 ENCOUNTER — THERAPY VISIT (OUTPATIENT)
Dept: PHYSICAL THERAPY | Facility: REHABILITATION | Age: 60
End: 2025-04-30
Attending: ORTHOPAEDIC SURGERY
Payer: COMMERCIAL

## 2025-04-30 DIAGNOSIS — Z96.652 STATUS POST TOTAL LEFT KNEE REPLACEMENT: Primary | ICD-10-CM

## 2025-04-30 DIAGNOSIS — M84.369A STRESS FRACTURE OF KNEE: Primary | ICD-10-CM

## 2025-04-30 DIAGNOSIS — M17.32 POST-TRAUMATIC OSTEOARTHRITIS OF LEFT KNEE: ICD-10-CM

## 2025-04-30 DIAGNOSIS — Z96.652 STATUS POST TOTAL LEFT KNEE REPLACEMENT: ICD-10-CM

## 2025-04-30 PROCEDURE — 97161 PT EVAL LOW COMPLEX 20 MIN: CPT | Mod: GP

## 2025-04-30 PROCEDURE — 97110 THERAPEUTIC EXERCISES: CPT | Mod: GP

## 2025-04-30 ASSESSMENT — ACTIVITIES OF DAILY LIVING (ADL)
GO DOWN STAIRS: ACTIVITY IS FAIRLY DIFFICULT
KNEE_ACTIVITY_OF_DAILY_LIVING_SCORE: 54.29
PAIN: THE SYMPTOM AFFECTS MY ACTIVITY SLIGHTLY
LIMPING: I HAVE THE SYMPTOM BUT IT DOES NOT AFFECT MY ACTIVITY
WEAKNESS: THE SYMPTOM AFFECTS MY ACTIVITY SLIGHTLY
KNEEL ON THE FRONT OF YOUR KNEE: ACTIVITY IS FAIRLY DIFFICULT
AS_A_RESULT_OF_YOUR_KNEE_INJURY,_HOW_WOULD_YOU_RATE_YOUR_CURRENT_LEVEL_OF_DAILY_ACTIVITY?: NEARLY NORMAL
HOW_WOULD_YOU_RATE_THE_CURRENT_FUNCTION_OF_YOUR_KNEE_DURING_YOUR_USUAL_DAILY_ACTIVITIES_ON_A_SCALE_FROM_0_TO_100_WITH_100_BEING_YOUR_LEVEL_OF_KNEE_FUNCTION_PRIOR_TO_YOUR_INJURY_AND_0_BEING_THE_INABILITY_TO_PERFORM_ANY_OF_YOUR_USUAL_DAILY_ACTIVITIES?: 60
STIFFNESS: THE SYMPTOM AFFECTS MY ACTIVITY SLIGHTLY
PLEASE_INDICATE_YOR_PRIMARY_REASON_FOR_REFERRAL_TO_THERAPY:: KNEE
AS_A_RESULT_OF_YOUR_KNEE_INJURY,_HOW_WOULD_YOU_RATE_YOUR_CURRENT_LEVEL_OF_DAILY_ACTIVITY?: NEARLY NORMAL
RISE FROM A CHAIR: ACTIVITY IS SOMEWHAT DIFFICULT
GIVING WAY, BUCKLING OR SHIFTING OF KNEE: THE SYMPTOM AFFECTS MY ACTIVITY SLIGHTLY
WALK: ACTIVITY IS FAIRLY DIFFICULT
WALK: ACTIVITY IS FAIRLY DIFFICULT
STAND: ACTIVITY IS MINIMALLY DIFFICULT
STIFFNESS: THE SYMPTOM AFFECTS MY ACTIVITY SLIGHTLY
GO DOWN STAIRS: ACTIVITY IS FAIRLY DIFFICULT
SIT WITH YOUR KNEE BENT: ACTIVITY IS SOMEWHAT DIFFICULT
GIVING WAY, BUCKLING OR SHIFTING OF KNEE: THE SYMPTOM AFFECTS MY ACTIVITY SLIGHTLY
KNEE_ACTIVITY_OF_DAILY_LIVING_SUM: 38
RISE FROM A CHAIR: ACTIVITY IS SOMEWHAT DIFFICULT
SWELLING: THE SYMPTOM AFFECTS MY ACTIVITY MODERATELY
PAIN: THE SYMPTOM AFFECTS MY ACTIVITY SLIGHTLY
SQUAT: ACTIVITY IS SOMEWHAT DIFFICULT
LIMPING: I HAVE THE SYMPTOM BUT IT DOES NOT AFFECT MY ACTIVITY
SIT WITH YOUR KNEE BENT: ACTIVITY IS SOMEWHAT DIFFICULT
RAW_SCORE: 38
STAND: ACTIVITY IS MINIMALLY DIFFICULT
WEAKNESS: THE SYMPTOM AFFECTS MY ACTIVITY SLIGHTLY
SQUAT: ACTIVITY IS SOMEWHAT DIFFICULT
HOW_WOULD_YOU_RATE_THE_CURRENT_FUNCTION_OF_YOUR_KNEE_DURING_YOUR_USUAL_DAILY_ACTIVITIES_ON_A_SCALE_FROM_0_TO_100_WITH_100_BEING_YOUR_LEVEL_OF_KNEE_FUNCTION_PRIOR_TO_YOUR_INJURY_AND_0_BEING_THE_INABILITY_TO_PERFORM_ANY_OF_YOUR_USUAL_DAILY_ACTIVITIES?: 60
GO UP STAIRS: ACTIVITY IS VERY DIFFICULT
HOW_WOULD_YOU_RATE_THE_OVERALL_FUNCTION_OF_YOUR_KNEE_DURING_YOUR_USUAL_DAILY_ACTIVITIES?: NEARLY NORMAL
GO UP STAIRS: ACTIVITY IS VERY DIFFICULT
KNEEL ON THE FRONT OF YOUR KNEE: ACTIVITY IS FAIRLY DIFFICULT
SWELLING: THE SYMPTOM AFFECTS MY ACTIVITY MODERATELY
HOW_WOULD_YOU_RATE_THE_OVERALL_FUNCTION_OF_YOUR_KNEE_DURING_YOUR_USUAL_DAILY_ACTIVITIES?: NEARLY NORMAL

## 2025-04-30 NOTE — PROGRESS NOTES
PHYSICAL THERAPY EVALUATION  Type of Visit: Evaluation       Fall Risk Screen:  Have you fallen 2 or more times in the past year?: No  Have you fallen and had an injury in the past year?: No    Subjective         Presenting condition or subjective complaint: just had knee surgery    Left total knee arthroplasty on 4/23/2025 with Dr. Carrera. Completing exercises regularly and walking the halls at apartment with sister-in-law. Pain is managed well, only taking OTC medication at this point. Pt lives in an apartment with an elevator and no stairs to enter the apartment. Pt lives alone, but has sister in law stayed with her for the first week post-op. Pt has tub-shower at home and has shower chair set up and toilet riser.    Date of onset: 04/23/25    Relevant medical history: Anemia   Past Medical History:   Diagnosis Date    Anemia     Depression     Erosive esophagitis     GERD (gastroesophageal reflux disease)     Hemorrhoid     Hiatal hernia     Hypothyroidism     Menstrual disorder     s/p D&C -12/2012    Obesity     ROMEO on CPAP     Other bipolar disorders     followed by Dr Collazo    Post menopausal syndrome     Prediabetes     Tobacco abuse        Dates & types of surgery:    Past Surgical History:   Procedure Laterality Date    ARTHROPLASTY KNEE Left 4/23/2025    Procedure: Left total knee arthroplasty;  Surgeon: Glenroy Carrera MD;  Location: [facility]    COLONOSCOPY  12/24/2013    Procedure: COLONOSCOPY;;  Surgeon: Guy Grant MD;  Location:  GI    COLONOSCOPY  1/9/2014    Procedure: COMBINED COLONOSCOPY, SINGLE BIOPSY/POLYPECTOMY BY BIOPSY;;  Surgeon: Guy Grant MD;  Location:  GI    COLONOSCOPY N/A 8/8/2023    Procedure: COLONOSCOPY, WITH POLYPECTOMY AND BIOPSY;  Surgeon: René Rodriguez DO;  Location:  GI    ESOPHAGOSCOPY, GASTROSCOPY, DUODENOSCOPY (EGD), COMBINED  12/24/2013    Procedure: COMBINED ESOPHAGOSCOPY, GASTROSCOPY, DUODENOSCOPY (EGD), BIOPSY SINGLE OR MULTIPLE;  ESOPHAGOSCOPY,  GASTROSCOPY, DUODENOSCOPY, COLONOSCOPY;  Surgeon: Guy Grant MD;  Location:  GI    ESOPHAGOSCOPY, GASTROSCOPY, DUODENOSCOPY (EGD), COMBINED N/A 8/8/2023    Procedure: ESOPHAGOGASTRODUODENOSCOPY, WITH BIOPSY;  Surgeon: René Rodriguez DO;  Location:  GI    ESOPHAGOSCOPY, GASTROSCOPY, DUODENOSCOPY (EGD), COMBINED N/A 2/15/2024    Procedure: Esophagoscopy, gastroscopy, duodenoscopy (EGD), combined;  Surgeon: Sarmad Reed MD;  Location:  GI    ESOPHAGOSCOPY, GASTROSCOPY, DUODENOSCOPY (EGD), COMBINED N/A 8/13/2024    Procedure: Esophagoscopy, gastroscopy, duodenoscopy (EGD), combined;  Surgeon: René Rodriguez DO;  Location:  GI    ESOPHAGOSCOPY, GASTROSCOPY, DUODENOSCOPY (EGD), COMBINED N/A 4/8/2025    Procedure: Esophagoscopy, gastroscopy, duodenoscopy (EGD), combined;  Surgeon: René Rodriguez DO;  Location:  GI    GENITOURINARY SURGERY      urethra stretched    GYN SURGERY      d&c     LAPAROSCOPIC ASSISTED RECTOPEXY  3/14/2014    Procedure: LAPAROSCOPIC ASSISTED RECTOPEXY;  LAPAROSCOPIC  VENTRAL RECTOPEXY ;  Surgeon: Jennifer Connolly MD;  Location:  OR    ORTHOPEDIC SURGERY      surgery on clavicle,surgery for left leg fracture     Prior diagnostic imaging/testing results: MRI     Prior therapy history for the same diagnosis, illness or injury: Yes physical therapy    Prior Level of Function  Transfers:   Ambulation:   ADL:   IADL:     Living Environment  Social support: Alone   Type of home: Apartment/condo   Stairs to enter the home: No       Ramp: No   Stairs inside the home: No       Help at home: Other  Equipment owned: Straight Cane; Walker; Crutches; Raised toilet seat; Bath bench     Employment: Yes   Hobbies/Interests:      Patient goals for therapy: walk without aid of walker    Pain assessment:  pain currently: 0/10, pain max in last few days 4-5/10     Objective   KNEE EVALUATION  PAIN: Pain Location: knee  Pain Quality: stinging  Pain Frequency: intermittent  Pain  is Worst: based on activity  Pain is Exacerbated By: increased activity  Pain is Relieved By: cold and otc medications  Pain Progression: Improved    INTEGUMENTARY (edema, incisions):  bandage in place. No drainage noted. R knee edema consistent with post-op healing  POSTURE:   GAIT:  Weightbearing Status: WBAT  Assistive Device(s): Walker (front wheeled)  Gait Deviations: Antalgic  Base of support increased  Lacking TKE on left  BALANCE/PROPRIOCEPTION:   WEIGHTBEARING ALIGNMENT:   NON-WEIGHTBEARING ALIGNMENT:   ROM:   (Degrees) Left AROM Left PROM  Right AROM Right PROM   Knee Flexion 94 100 120    Knee Extension Lacking 12 degrees Lacking 10 degrees 0    Pain:   End feel:     STRENGTH:   Pain: - none + mild ++ moderate +++ severe  Strength Scale: 0-5/5 Left Right   Knee Flexion 3 5   Knee Extension 3 5   Quad Set       FLEXIBILITY:   SPECIAL TESTS:   FUNCTIONAL TESTS:  sit<>stand: able to perform without UE support from chair  PALPATION:  ttp throughout left proximal calf. Negative for heat, swelling or redness, globally tender throughout knee  JOINT MOBILITY:     Assessment & Plan   CLINICAL IMPRESSIONS  Medical Diagnosis: Z96.652 (ICD-10-CM) - Status post total left knee replacement    Treatment Diagnosis: knee stiffness and weakness s/p LTKA   Impression/Assessment: Patient is a 60 year old female with s/p left TKA 4/23/25.  The following significant findings have been identified: Pain, Decreased ROM/flexibility, Decreased joint mobility, Decreased strength, Impaired balance, Decreased proprioception, Inflammation, Edema, Impaired gait, Impaired muscle performance, Decreased activity tolerance, and Impaired posture. These impairments interfere with their ability to perform self care tasks, recreational activities, household chores, driving , household mobility, and community mobility as compared to previous level of function.     Clinical Decision Making (Complexity):  Clinical Presentation:  Stable/Uncomplicated  Clinical Presentation Rationale: based on medical and personal factors listed in PT evaluation  Clinical Decision Making (Complexity): Low complexity    PLAN OF CARE  Treatment Interventions:  Interventions: Gait Training, Manual Therapy, Neuromuscular Re-education, Therapeutic Activity, Therapeutic Exercise, Self-Care/Home Management    Long Term Goals     PT Goal 1  Goal Identifier: HEP  Goal Description: pt will demonstrate independence and report compliance with HEP to facilitate improved independent symptom mgmt.  Rationale: to maximize safety and independence with performance of ADLs and functional tasks;to maximize safety and independence with self cares  Target Date: 07/23/25  PT Goal 2  Goal Identifier: left knee AROM  Goal Description: pt will demonstrate full knee extension AROM and greater than or equal to 120 degree so knee flexion.  Rationale: to maximize safety and independence with performance of ADLs and functional tasks;to maximize safety and independence within the home;to maximize safety and independence within the community;to maximize safety and independence with self cares  Target Date: 07/23/25  PT Goal 3  Goal Identifier: gait  Goal Description: pt will ambulate without AD for greater than or equal to 15 minutes to faciliate improved tolerance of community ambulation.  Rationale: to maximize safety and independence with performance of ADLs and functional tasks;to maximize safety and independence within the home;to maximize safety and independence within the community;to maximize safety and independence with self cares  Target Date: 07/23/25      Frequency of Treatment: 1x/week  Duration of Treatment: up to 12 weeks    Recommended Referrals to Other Professionals:   Education Assessment:   Learner/Method: Patient    Risks and benefits of evaluation/treatment have been explained.   Patient/Family/caregiver agrees with Plan of Care.     Evaluation Time:     PT Eval, Low  Complexity Minutes (34551): 15       Signing Clinician: Ewa Arana PT

## 2025-05-01 ENCOUNTER — MYC REFILL (OUTPATIENT)
Dept: FAMILY MEDICINE | Facility: CLINIC | Age: 60
End: 2025-05-01
Payer: COMMERCIAL

## 2025-05-01 DIAGNOSIS — Z96.652 S/P TOTAL KNEE ARTHROPLASTY, LEFT: ICD-10-CM

## 2025-05-01 RX ORDER — METHOCARBAMOL 750 MG/1
750 TABLET, FILM COATED ORAL EVERY 6 HOURS PRN
Qty: 20 TABLET | Refills: 0 | Status: SHIPPED | OUTPATIENT
Start: 2025-05-01

## 2025-05-02 ASSESSMENT — KOOS JR
STRAIGHTENING KNEE FULLY: MODERATE
HOW SEVERE IS YOUR KNEE STIFFNESS AFTER FIRST WAKING IN MORNING: MODERATE
TWISING OR PIVOTING ON KNEE: MODERATE
STANDING UPRIGHT: MILD
GOING UP OR DOWN STAIRS: MODERATE
BENDING TO THE FLOOR TO PICK UP OBJECT: MILD
RISING FROM SITTING: MILD
KOOS JR SCORING: 59.38

## 2025-05-03 ENCOUNTER — TELEPHONE (OUTPATIENT)
Dept: OTHER | Facility: CLINIC | Age: 60
End: 2025-05-03
Payer: COMMERCIAL

## 2025-05-03 NOTE — TELEPHONE ENCOUNTER
Orthopedic telephone note:    Received a call from the patient's regarding increased knee stiffness in the last 1 or 2 days.  She notes she has been working on her physical therapy exercises and feels that she has been making progress.  She notes over the last day or 2 she has had increased stiffness and some decreased range of motion.  There is no trauma or injury at the time that this onset.  She denies any fevers or chills.  Denies any changes about the area of her wound.  And no drainage.  She has been able to weight-bear without issues.  No calf discomfort or shortness of breath.  Discussed with the patient that during total knee arthroplasty recovery as there can be times where some stiffness increases whether there is more swelling in the knee or irritation of the muscles around the knee.  Discussed with her that as long as she is not having fevers or chills drainage or signs that are concerning for an infection we could try to have her ice elevate and then reapply her Ace wrap apply some gentle compression around the knee to help with swelling.  She can continue with her at home exercises but if she develops any sharp stabbing pain this could be a sign to back down from this.  She has a follow-up appointment with Dr. Deandre Barry in 3 days in clinic and recommend that she keep this appointment and they can further assess the knee if there is any concerning signs.  Otherwise she should seek medical attention sooner if she develops any signs or symptoms concerning of infection.  Patient verbalized agreement and understanding of this plan.    Billy Laura, Orthopedic resident

## 2025-05-05 DIAGNOSIS — Z96.652 STATUS POST TOTAL LEFT KNEE REPLACEMENT: Primary | ICD-10-CM

## 2025-05-05 NOTE — PROGRESS NOTES
"Chief Complaint:   Follow up, DOS 4/23/25 with Dr. Carrera    Procedures:  Left total knee arthroplasty     History:  Gudelia Dong is a 60 year old female status post above procedure here for a 2 week post op appointment. She has been doing very well overall.   Gudelia reports no pain, she took her pain medication as prescribed after surgery, has not needed any since then.   Uses a cane for ambulation.   Has had one PT appointment since surgery, has been compliant with her at home exercises. Has PT appointment tomorrow, and regularly scheduled appointments moving forward.   Did mention she experienced some increased swelling a few days post operatively, but this was improved with more diligent compression and elevation.  Gudelia has no complains or concerns at this time.   Used to run track when she was younger and her goal is to perform the \"hurdler stretch\" once she progresses through her rehab.      Exam:     General: Awake, Alert, and oriented. Articulates and communicates with a normal affect     L Lower Extremity:  Incisions well healed without evidence of infection, no erythema, drainage, or wound dehiscence   Normal post-operative effusion and ecchymosis  Range of motion: 5 degrees - 107 degrees  TA/Gsc/EHL/FHL with 5/5 strength  Distal pulses palpable, toes are warm and well perfused   Gait: walking with slight antalgic gait with less time on the L leg, using cane as assistive device    Imaging:  Radiographs of the L knee from today were independently reviewed by me and findings were discussed with the patient today. The imaging demonstrates stable post operative changes with no acute complications.    Medications:     Current Outpatient Medications:     acetaminophen (TYLENOL) 325 MG tablet, Take 2 tablets (650 mg) by mouth every 4 hours as needed for other (mild pain)., Disp: 100 tablet, Rfl: 0    ammonium lactate (AMLACTIN) 12 % external cream, Apply topically daily as needed for dry skin, Disp: 385 g, " Rfl: 1    aspirin (ASA) 81 MG EC tablet, Take 1 tablet (81 mg) by mouth 2 times daily., Disp: 60 tablet, Rfl: 0    buPROPion (WELLBUTRIN XL) 150 MG 24 hr tablet, Take 450 mg by mouth every morning, Disp: , Rfl:     busPIRone HCl (BUSPAR) 30 MG tablet, Take 1 tablet by mouth 2 times daily, Disp: , Rfl:     doxepin HCl (SINEQUAN) 150 MG capsule, Take 150 mg by mouth at bedtime., Disp: , Rfl:     escitalopram (LEXAPRO) 10 MG tablet, Take 10 mg by mouth every morning, Disp: , Rfl:     famotidine (PEPCID) 40 MG tablet, Take 1 tablet (40 mg) by mouth 2 times daily., Disp: 180 tablet, Rfl: 3    ferrous sulfate 300 (60 Fe) MG/5ML solution, Take 300 mg by mouth daily., Disp: , Rfl:     hydrOXYzine HCl (ATARAX) 25 MG tablet, Take 1 tablet (25 mg) by mouth every 6 hours as needed for other (adjuvant pain)., Disp: 25 tablet, Rfl: 0    lamoTRIgine (LAMICTAL) 100 MG tablet, Take 100 mg by mouth every morning, Disp: , Rfl:     levothyroxine (SYNTHROID/LEVOTHROID) 150 MCG tablet, TAKE ONE TABLET BY MOUTH EVERY DAY, Disp: 90 tablet, Rfl: 3    methocarbamol (ROBAXIN) 750 MG tablet, Take 1 tablet (750 mg) by mouth every 6 hours as needed for muscle spasms., Disp: 20 tablet, Rfl: 0    order for DME, Equipment being ordered: CPAP Please dispense Cpap and its supply, Disp: 1 Units, Rfl: prn    oxyCODONE (ROXICODONE) 5 MG tablet, Take 1-2 tablets (5-10 mg) by mouth every 4 hours as needed for moderate pain., Disp: 30 tablet, Rfl: 0    polyethylene glycol (MIRALAX) 17 GM/Dose powder, Take 17 g by mouth daily., Disp: 510 g, Rfl: 0    RABEprazole (ACIPHEX) 20 MG EC tablet, Take 1 tablet (20 mg) by mouth 2 times daily (before meals)., Disp: 180 tablet, Rfl: 1    REXULTI 2 MG tablet, Take 2 mg by mouth every morning, Disp: , Rfl:     senna-docusate (SENOKOT-S/PERICOLACE) 8.6-50 MG tablet, Take 1 tablet by mouth 2 times daily., Disp: 60 tablet, Rfl: 0    topiramate (TOPAMAX) 25 MG tablet, Take 2 tablets (50 mg) by mouth 2 times daily., Disp:  120 tablet, Rfl: 3    triamcinolone (KENALOG) 0.025 % external ointment, Apply topically 2 times daily To affected area in the groin as needed up to 2 weeks at a time., Disp: 30 g, Rfl: 3    Vonoprazan Fumarate (VOQUEZNA) 20 MG TABS, Take 20 mg by mouth daily., Disp: 90 tablet, Rfl: 1    Assessment:  Doing well 2 weeks s/p L total knee arthroplasty. Basic wound cares were performed today. Initial post-operative dressing was removed, I did not appreciate signs of infection. We discussed the plan below, all questions were answered to the best of my ability.     Plan:   -Weight bearing: WBAT   -Range of motion as tolerated   -Follow with physical therapy focusing on gait and stability  -DVT prophylaxis: Was potentially taking more aspirin than prescribed as she is almost out of aspirin tablets, will refill for an additional two weeks and instructed her to take 2 tablets in one day  -Follow up: 6 weeks with Dr. Carrera, will not need to obtain new XR      Denny Marylu Stallworth PA-C 5/5/2025 2:02 PM  Orthopedic Surgery

## 2025-05-06 ENCOUNTER — ANCILLARY PROCEDURE (OUTPATIENT)
Dept: GENERAL RADIOLOGY | Facility: CLINIC | Age: 60
End: 2025-05-06
Payer: COMMERCIAL

## 2025-05-06 ENCOUNTER — OFFICE VISIT (OUTPATIENT)
Dept: ORTHOPEDICS | Facility: CLINIC | Age: 60
End: 2025-05-06
Payer: COMMERCIAL

## 2025-05-06 DIAGNOSIS — Z96.652 STATUS POST TOTAL LEFT KNEE REPLACEMENT: ICD-10-CM

## 2025-05-06 DIAGNOSIS — Z96.652 STATUS POST TOTAL LEFT KNEE REPLACEMENT: Primary | ICD-10-CM

## 2025-05-06 PROCEDURE — 73562 X-RAY EXAM OF KNEE 3: CPT | Mod: LT | Performed by: RADIOLOGY

## 2025-05-06 PROCEDURE — 99024 POSTOP FOLLOW-UP VISIT: CPT

## 2025-05-06 RX ORDER — ASPIRIN 81 MG/1
162 TABLET ORAL DAILY
Qty: 28 TABLET | Refills: 0 | Status: SHIPPED | OUTPATIENT
Start: 2025-05-06 | End: 2025-05-20

## 2025-05-06 NOTE — LETTER
"5/6/2025      Gudelia Dong  [address]  [place] [address]      Dear Colleague,    Thank you for referring your patient, Gudelia Dong, to the [facility]. Please see a copy of my visit note below.    Chief Complaint:   Follow up, DOS 4/23/25 with Dr. Carrera    Procedures:  Left total knee arthroplasty     History:  Gudelia Dong is a 60 year old female status post above procedure here for a 2 week post op appointment. She has been doing very well overall.   Gudelia reports no pain, she took her pain medication as prescribed after surgery, has not needed any since then.   Uses a cane for ambulation.   Has had one PT appointment since surgery, has been compliant with her at home exercises. Has PT appointment tomorrow, and regularly scheduled appointments moving forward.   Did mention she experienced some increased swelling a few days post operatively, but this was improved with more diligent compression and elevation.  Gudelia has no complains or concerns at this time.   ***Used to run track when she was younger and her goal is to perform the \"hurdler stretch\" once she progresses through her rehab.      Exam:     General: Awake, Alert, and oriented. Articulates and communicates with a normal affect     L Lower Extremity:  Incisions well healed without evidence of infection, no erythema, drainage, or wound dehiscence   Normal post-operative effusion and ecchymosis  Range of motion: 5 degrees - 107 degrees  TA/Gsc/EHL/FHL with 5/5 strength  Distal pulses palpable, toes are warm and well perfused   Gait: walking with slight antalgic gait with less time on the L leg, using cane as assistive device    Imaging:  Radiographs of the L knee from today were independently reviewed by me and findings were discussed with the patient today. The imaging demonstrates stable post operative changes with no acute complications.    Medications:     Current Outpatient Medications:      " acetaminophen (TYLENOL) 325 MG tablet, Take 2 tablets (650 mg) by mouth every 4 hours as needed for other (mild pain)., Disp: 100 tablet, Rfl: 0     ammonium lactate (AMLACTIN) 12 % external cream, Apply topically daily as needed for dry skin, Disp: 385 g, Rfl: 1     aspirin (ASA) 81 MG EC tablet, Take 1 tablet (81 mg) by mouth 2 times daily., Disp: 60 tablet, Rfl: 0     buPROPion (WELLBUTRIN XL) 150 MG 24 hr tablet, Take 450 mg by mouth every morning, Disp: , Rfl:      busPIRone HCl (BUSPAR) 30 MG tablet, Take 1 tablet by mouth 2 times daily, Disp: , Rfl:      doxepin HCl (SINEQUAN) 150 MG capsule, Take 150 mg by mouth at bedtime., Disp: , Rfl:      escitalopram (LEXAPRO) 10 MG tablet, Take 10 mg by mouth every morning, Disp: , Rfl:      famotidine (PEPCID) 40 MG tablet, Take 1 tablet (40 mg) by mouth 2 times daily., Disp: 180 tablet, Rfl: 3     ferrous sulfate 300 (60 Fe) MG/5ML solution, Take 300 mg by mouth daily., Disp: , Rfl:      hydrOXYzine HCl (ATARAX) 25 MG tablet, Take 1 tablet (25 mg) by mouth every 6 hours as needed for other (adjuvant pain)., Disp: 25 tablet, Rfl: 0     lamoTRIgine (LAMICTAL) 100 MG tablet, Take 100 mg by mouth every morning, Disp: , Rfl:      levothyroxine (SYNTHROID/LEVOTHROID) 150 MCG tablet, TAKE ONE TABLET BY MOUTH EVERY DAY, Disp: 90 tablet, Rfl: 3     methocarbamol (ROBAXIN) 750 MG tablet, Take 1 tablet (750 mg) by mouth every 6 hours as needed for muscle spasms., Disp: 20 tablet, Rfl: 0     order for DME, Equipment being ordered: CPAP Please dispense Cpap and its supply, Disp: 1 Units, Rfl: prn     oxyCODONE (ROXICODONE) 5 MG tablet, Take 1-2 tablets (5-10 mg) by mouth every 4 hours as needed for moderate pain., Disp: 30 tablet, Rfl: 0     polyethylene glycol (MIRALAX) 17 GM/Dose powder, Take 17 g by mouth daily., Disp: 510 g, Rfl: 0     RABEprazole (ACIPHEX) 20 MG EC tablet, Take 1 tablet (20 mg) by mouth 2 times daily (before meals)., Disp: 180 tablet, Rfl: 1     REXULTI 2 MG  tablet, Take 2 mg by mouth every morning, Disp: , Rfl:      senna-docusate (SENOKOT-S/PERICOLACE) 8.6-50 MG tablet, Take 1 tablet by mouth 2 times daily., Disp: 60 tablet, Rfl: 0     topiramate (TOPAMAX) 25 MG tablet, Take 2 tablets (50 mg) by mouth 2 times daily., Disp: 120 tablet, Rfl: 3     triamcinolone (KENALOG) 0.025 % external ointment, Apply topically 2 times daily To affected area in the groin as needed up to 2 weeks at a time., Disp: 30 g, Rfl: 3     Vonoprazan Fumarate (VOQUEZNA) 20 MG TABS, Take 20 mg by mouth daily., Disp: 90 tablet, Rfl: 1    Assessment:  Doing well 2 weeks s/p L total knee arthroplasty. Basic wound cares were performed today. Initial post-operative dressing was removed, I did not appreciate signs of infection. We discussed the plan below, all questions were answered to the best of my ability.     Plan:   -Weight bearing: WBAT   -Range of motion as tolerated   -Follow with physical therapy focusing on gait and stability  -DVT prophylaxis: Was potentially taking more aspirin than prescribed as she is almost out of aspirin tablets, will refill for an additional two weeks and instructed her to take 2 tablets in one day  -Follow up: 6 weeks with Dr. Carrera, will not need to obtain new XR      Denny Stallworth PA-C 5/5/2025 2:02 PM  Orthopedic Surgery        Again, thank you for allowing me to participate in the care of your patient.        Sincerely,        Yesenia Stallworth PA-C    Electronically signed

## 2025-05-06 NOTE — NURSING NOTE
Reason For Visit:   Chief Complaint   Patient presents with    Surgical Followup     2 week post op DOS: 4/23/25 Left TKA with Dr. Carrera       LMP 07/20/2006     Pain Assessment  Patient Currently in Pain: Denies (slight pain after exercises)    Elma Garcia ATC

## 2025-05-07 ENCOUNTER — THERAPY VISIT (OUTPATIENT)
Dept: PHYSICAL THERAPY | Facility: REHABILITATION | Age: 60
End: 2025-05-07
Attending: ORTHOPAEDIC SURGERY
Payer: COMMERCIAL

## 2025-05-07 DIAGNOSIS — Z96.652 STATUS POST TOTAL LEFT KNEE REPLACEMENT: Primary | ICD-10-CM

## 2025-05-07 PROCEDURE — 97110 THERAPEUTIC EXERCISES: CPT | Mod: GP

## 2025-05-07 PROCEDURE — 97116 GAIT TRAINING THERAPY: CPT | Mod: GP

## 2025-05-08 PROBLEM — Z96.652 STATUS POST TOTAL LEFT KNEE REPLACEMENT: Status: ACTIVE | Noted: 2025-05-08

## 2025-05-09 ENCOUNTER — TELEPHONE (OUTPATIENT)
Dept: ORTHOPEDICS | Facility: CLINIC | Age: 60
End: 2025-05-09
Payer: COMMERCIAL

## 2025-05-09 NOTE — TELEPHONE ENCOUNTER
Left Voicemail (1st Attempt) and Sent Mychart (1st Attempt) for the patient to call back and schedule the following:    Appointment type: Post Op Knee Replacement  Provider:   Return date: r/s 6/5 appt to either 5/29 or 6/12 [facility]  Specialty phone number: [phone]

## 2025-05-11 ENCOUNTER — MYC REFILL (OUTPATIENT)
Dept: GASTROENTEROLOGY | Facility: CLINIC | Age: 60
End: 2025-05-11
Payer: COMMERCIAL

## 2025-05-11 DIAGNOSIS — K21.9 GASTROESOPHAGEAL REFLUX DISEASE WITHOUT ESOPHAGITIS: ICD-10-CM

## 2025-05-11 DIAGNOSIS — K44.9 HIATAL HERNIA: ICD-10-CM

## 2025-05-11 DIAGNOSIS — K22.10 EROSIVE ESOPHAGITIS: ICD-10-CM

## 2025-05-12 ENCOUNTER — OFFICE VISIT (OUTPATIENT)
Dept: DERMATOLOGY | Facility: CLINIC | Age: 60
End: 2025-05-12
Attending: PHYSICIAN ASSISTANT
Payer: COMMERCIAL

## 2025-05-12 DIAGNOSIS — D48.9 NEOPLASM OF UNCERTAIN BEHAVIOR: ICD-10-CM

## 2025-05-12 DIAGNOSIS — L28.0 LICHEN SIMPLEX CHRONICUS: ICD-10-CM

## 2025-05-12 DIAGNOSIS — L82.0 SEBORRHEIC KERATOSIS, INFLAMED: Primary | ICD-10-CM

## 2025-05-12 RX ORDER — RABEPRAZOLE SODIUM 20 MG/1
20 TABLET, DELAYED RELEASE ORAL
Qty: 180 TABLET | Refills: 1 | Status: SHIPPED | OUTPATIENT
Start: 2025-05-12

## 2025-05-12 RX ORDER — TRIAMCINOLONE ACETONIDE 0.25 MG/G
OINTMENT TOPICAL 2 TIMES DAILY
Qty: 30 G | Refills: 3 | Status: SHIPPED | OUTPATIENT
Start: 2025-05-12

## 2025-05-12 ASSESSMENT — PAIN SCALES - GENERAL: PAINLEVEL_OUTOF10: NO PAIN (0)

## 2025-05-12 NOTE — LETTER
5/12/2025      Gudelia Dong  [address]  [place] [address]      Dear Colleague,    Thank you for referring your patient, Gudelia Dong, to the [facility]. Please see a copy of my visit note below.    [facility] Dermatology Note  Encounter Date: May 12, 2025  Office Visit     Reviewed patients past medical history and pertinent chart review prior to patients visit today.     Dermatology Problem List:  1. 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 ISK, s/p cryo  ____________________________________________    Assessment & Plan:     # Neoplasm of uncertain behavior x3:  right lower back A, right lower back B, and right posterior shoulder.  DDx includes irritated nevi. Shave biopsy today.  # Neoplasm of uncertain behavior:  left upper back.  DDx includes NMSC vs external trauma vs other. Shave biopsy today.  Photograph obtained.  Procedure Note: Biopsy by shave technique  The risks and benefits of the procedure were described to the patient. These include but are not limited to bleeding, infection, scar, incomplete removal, and non-diagnostic biopsy. Verbal informed consent was obtained. The above site(s) was cleansed with an alcohol pad and injected with 1% lidocaine with epinephrine. Once anesthesia was obtained, a biopsy(ies) was performed with Gilette blade. The tissue(s) was placed in a labeled container(s) with formalin and sent to pathology. Hemostasis was achieved with aluminum chloride. Vaseline and a bandage were applied to the wound(s). The patient tolerated the procedure well and was given post biopsy care instructions.    # Inflamed Seborrheic Keratosis, left upper back x2 and right mid back   -The benign nature of the skin lesion(s)  was discussed with the patient. The patient requested treatment intervention due to finding the lesions to be bothersome. Discussed cryotherapy treatment with patient. Patient elects to pursue cryotherapy today. After verbal consent and discussion of risks and benefits including but no limited to dyspigmentation/scar, blister, and pain, 3 was(were) treated with 1-2mm freeze border for 2 cycles with liquid nitrogen. Post cryotherapy instructions were provided.     # Lichen simplex chronicus, chronic, stable  -Refilled triamcinolone 0.025% ointment to be used twice daily as needed.  Potential side effects from prolonged topical steroid use such as skin atrophy were reviewed. Do not use greater than 2 weeks in duration.    Follow up: annually, prn for new or changing lesions or new concerns    All risks, benefits and alternatives were discussed with patient.  Patient is in agreement and understands the assessment and plan.  All questions were answered.  Esther Baker PA-C  [facility] Dermatology  _______________________________________    CC: Medication Refill (Refill cream for  itching in crotch area )     HPI:  Ms. Gudelia Dong is a(n) 60 year old female who presents today as a return patient for follow-up of lichen simplex chronicus.  She was last seen in dermatology on 2/28/2024 at which time triamcinolone 0.025% ointment was prescribed.  She currently feels her LSC is resolved. She rarely uses the medication, and when she does use it, she states it is only for a few days.  Her main concern today is some lesions on her back that she is interested in having removed.  These are raised from the skin and occasionally become irritated for her. They do catch against her back scratcher.    Patient is otherwise feeling well, without additional skin concerns.     Physical Exam:  SKIN: Focused examination of back, inframammary folds, vulvar area only was performed per patient request.  - vulvar area is clear  without rash  - right posterior shoulder, right lower back x 2, pink to light brown slightly dome shaped papules  - left upper back, shiny erythematous pink papule with nonspecific features (patient states she did scratch this area possible with back scratcher)    - No other lesions of concern on areas examined.                   Medications:  Current Outpatient Medications   Medication Sig Dispense Refill     acetaminophen (TYLENOL) 325 MG tablet Take 2 tablets (650 mg) by mouth every 4 hours as needed for other (mild pain). 100 tablet 0     ammonium lactate (AMLACTIN) 12 % external cream Apply topically daily as needed for dry skin 385 g 1     aspirin (ASA) 81 MG EC tablet Take 1 tablet (81 mg) by mouth 2 times daily. 60 tablet 0     buPROPion (WELLBUTRIN XL) 150 MG 24 hr tablet Take 450 mg by mouth every morning       busPIRone HCl (BUSPAR) 30 MG tablet Take 1 tablet by mouth 2 times daily       doxepin HCl (SINEQUAN) 150 MG capsule Take 150 mg by mouth at bedtime.       escitalopram (LEXAPRO) 10 MG tablet Take 10 mg by mouth every morning       famotidine (PEPCID) 40 MG tablet Take 1 tablet (40 mg) by mouth 2 times daily. 180 tablet 3     ferrous sulfate 300 (60 Fe) MG/5ML solution Take 300 mg by mouth daily.       hydrOXYzine HCl (ATARAX) 25 MG tablet Take 1 tablet (25 mg) by mouth every 6 hours as needed for other (adjuvant pain). 25 tablet 0     levothyroxine (SYNTHROID/LEVOTHROID) 150 MCG tablet TAKE ONE TABLET BY MOUTH EVERY DAY 90 tablet 3     methocarbamol (ROBAXIN) 750 MG tablet Take 1 tablet (750 mg) by mouth every 6 hours as needed for muscle spasms. 20 tablet 0     order for DME Equipment being ordered: CPAP  Please dispense Cpap and its supply 1 Units prn     polyethylene glycol (MIRALAX) 17 GM/Dose powder Take 17 g by mouth daily. 510 g 0     RABEprazole (ACIPHEX) 20 MG EC tablet Take 1 tablet (20 mg) by mouth 2 times daily (before meals). 180 tablet 1     REXULTI 2 MG tablet Take 2 mg by mouth  every morning       senna-docusate (SENOKOT-S/PERICOLACE) 8.6-50 MG tablet Take 1 tablet by mouth 2 times daily. 60 tablet 0     topiramate (TOPAMAX) 25 MG tablet Take 2 tablets (50 mg) by mouth 2 times daily. 120 tablet 3     triamcinolone (KENALOG) 0.025 % external ointment Apply topically 2 times daily To affected area in the groin as needed up to 2 weeks at a time. 30 g 3     Vonoprazan Fumarate (VOQUEZNA) 20 MG TABS Take 20 mg by mouth daily. 90 tablet 1     aspirin 81 MG EC tablet Take 2 tablets (162 mg) by mouth daily for 14 days. 28 tablet 0     lamoTRIgine (LAMICTAL) 100 MG tablet Take 100 mg by mouth every morning       oxyCODONE (ROXICODONE) 5 MG tablet Take 1-2 tablets (5-10 mg) by mouth every 4 hours as needed for moderate pain. (Patient not taking: Reported on 5/6/2025) 30 tablet 0     No current facility-administered medications for this visit.      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)     CARDIOVASCULAR SCREENING; LDL GOAL LESS THAN 160     Major depressive disorder, recurrent episode, moderate (H)     Anemia     Tubular adenoma     Iron deficiency anemia     Hyponatremia     ADHD, predominantly inattentive type     overweight BMI>30     Periumbilical hernia     Psychosis (H)     Bipolar 2 disorder (H)     Gastroesophageal reflux disease without esophagitis     Prediabetes     Hypothyroidism due to medication     Hidradenitis suppurativa of right axilla     Vitamin D deficiency     Obesity (BMI 35.0-39.9) with comorbidity (H)     Numbness and tingling in left hand     Hiatal hernia     Insomnia, unspecified type     Stress fracture of knee, left-  Nondisplaced, subchondral fracture involving the weightbearing medial femoral condyle     Post-traumatic osteoarthritis of left knee, medial compartment     Status post total left knee replacement     Past Medical History:    Diagnosis Date     Anemia      Depression      Erosive esophagitis      GERD (gastroesophageal reflux disease)      Hemorrhoid      Hiatal hernia      Hypothyroidism      Menstrual disorder     s/p D&C -12/2012     Obesity      ROMEO on CPAP      Other bipolar disorders     followed by Dr Collazo     Post menopausal syndrome      Prediabetes      Tobacco abuse        CC Denise Bland PA-C  [address] [place] [phone] on close of this encounter.     Again, thank you for allowing me to participate in the care of your patient.        Sincerely,        Cheri Baker PA-C    Electronically signed

## 2025-05-12 NOTE — PROGRESS NOTES
[facility] Dermatology Note  Encounter Date: May 12, 2025  Office Visit     Reviewed patients past medical history and pertinent chart review prior to patients visit today.     Dermatology Problem List:  1. 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 ISK, s/p cryo  ____________________________________________    Assessment & Plan:     # Neoplasm of uncertain behavior x3:  right lower back A, right lower back B, and right posterior shoulder.  DDx includes irritated nevi. Shave biopsy today.  # Neoplasm of uncertain behavior:  left upper back.  DDx includes NMSC vs external trauma vs other. Shave biopsy today.  Photograph obtained.  Procedure Note: Biopsy by shave technique  The risks and benefits of the procedure were described to the patient. These include but are not limited to bleeding, infection, scar, incomplete removal, and non-diagnostic biopsy. Verbal informed consent was obtained. The above site(s) was cleansed with an alcohol pad and injected with 1% lidocaine with epinephrine. Once anesthesia was obtained, a biopsy(ies) was performed with Gilette blade. The tissue(s) was placed in a labeled container(s) with formalin and sent to pathology. Hemostasis was achieved with aluminum chloride. Vaseline and a bandage were applied to the wound(s). The patient tolerated the procedure well and was given post biopsy care instructions.    # Inflamed Seborrheic Keratosis, left upper back x2 and right mid back   -The benign nature of the skin lesion(s) was discussed with the patient. The patient requested treatment intervention due to finding the lesions to be bothersome. Discussed cryotherapy treatment with patient. Patient elects to pursue cryotherapy today. After verbal consent and discussion of  risks and benefits including but no limited to dyspigmentation/scar, blister, and pain, 3 was(were) treated with 1-2mm freeze border for 2 cycles with liquid nitrogen. Post cryotherapy instructions were provided.     # Lichen simplex chronicus, chronic, stable  -Refilled triamcinolone 0.025% ointment to be used twice daily as needed.  Potential side effects from prolonged topical steroid use such as skin atrophy were reviewed. Do not use greater than 2 weeks in duration.    Follow up: annually, prn for new or changing lesions or new concerns    All risks, benefits and alternatives were discussed with patient.  Patient is in agreement and understands the assessment and plan.  All questions were answered.  Esther Baker PA-C  [facility] Dermatology  _______________________________________    CC: Medication Refill (Refill cream for  itching in crotch area )     HPI:  Ms. Gudelia Dong is a(n) 60 year old female who presents today as a return patient for follow-up of lichen simplex chronicus.  She was last seen in dermatology on 2/28/2024 at which time triamcinolone 0.025% ointment was prescribed.  She currently feels her LSC is resolved. She rarely uses the medication, and when she does use it, she states it is only for a few days.  Her main concern today is some lesions on her back that she is interested in having removed.  These are raised from the skin and occasionally become irritated for her. They do catch against her back scratcher.    Patient is otherwise feeling well, without additional skin concerns.     Physical Exam:  SKIN: Focused examination of back, inframammary folds, vulvar area only was performed per patient request.  - vulvar area is clear without rash  - right posterior shoulder, right lower back x 2, pink to light brown slightly dome shaped papules  - left upper back, shiny erythematous pink papule with nonspecific features (patient states she did scratch this area possible with back  scratcher)    - No other lesions of concern on areas examined.                   Medications:  Current Outpatient Medications   Medication Sig Dispense Refill    acetaminophen (TYLENOL) 325 MG tablet Take 2 tablets (650 mg) by mouth every 4 hours as needed for other (mild pain). 100 tablet 0    ammonium lactate (AMLACTIN) 12 % external cream Apply topically daily as needed for dry skin 385 g 1    aspirin (ASA) 81 MG EC tablet Take 1 tablet (81 mg) by mouth 2 times daily. 60 tablet 0    buPROPion (WELLBUTRIN XL) 150 MG 24 hr tablet Take 450 mg by mouth every morning      busPIRone HCl (BUSPAR) 30 MG tablet Take 1 tablet by mouth 2 times daily      doxepin HCl (SINEQUAN) 150 MG capsule Take 150 mg by mouth at bedtime.      escitalopram (LEXAPRO) 10 MG tablet Take 10 mg by mouth every morning      famotidine (PEPCID) 40 MG tablet Take 1 tablet (40 mg) by mouth 2 times daily. 180 tablet 3    ferrous sulfate 300 (60 Fe) MG/5ML solution Take 300 mg by mouth daily.      hydrOXYzine HCl (ATARAX) 25 MG tablet Take 1 tablet (25 mg) by mouth every 6 hours as needed for other (adjuvant pain). 25 tablet 0    levothyroxine (SYNTHROID/LEVOTHROID) 150 MCG tablet TAKE ONE TABLET BY MOUTH EVERY DAY 90 tablet 3    methocarbamol (ROBAXIN) 750 MG tablet Take 1 tablet (750 mg) by mouth every 6 hours as needed for muscle spasms. 20 tablet 0    order for DME Equipment being ordered: CPAP  Please dispense Cpap and its supply 1 Units prn    polyethylene glycol (MIRALAX) 17 GM/Dose powder Take 17 g by mouth daily. 510 g 0    RABEprazole (ACIPHEX) 20 MG EC tablet Take 1 tablet (20 mg) by mouth 2 times daily (before meals). 180 tablet 1    REXULTI 2 MG tablet Take 2 mg by mouth every morning      senna-docusate (SENOKOT-S/PERICOLACE) 8.6-50 MG tablet Take 1 tablet by mouth 2 times daily. 60 tablet 0    topiramate (TOPAMAX) 25 MG tablet Take 2 tablets (50 mg) by mouth 2 times daily. 120 tablet 3    triamcinolone (KENALOG) 0.025 % external  ointment Apply topically 2 times daily To affected area in the groin as needed up to 2 weeks at a time. 30 g 3    Vonoprazan Fumarate (VOQUEZNA) 20 MG TABS Take 20 mg by mouth daily. 90 tablet 1    aspirin 81 MG EC tablet Take 2 tablets (162 mg) by mouth daily for 14 days. 28 tablet 0    lamoTRIgine (LAMICTAL) 100 MG tablet Take 100 mg by mouth every morning      oxyCODONE (ROXICODONE) 5 MG tablet Take 1-2 tablets (5-10 mg) by mouth every 4 hours as needed for moderate pain. (Patient not taking: Reported on 5/6/2025) 30 tablet 0     No current facility-administered medications for this visit.      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)    CARDIOVASCULAR SCREENING; LDL GOAL LESS THAN 160    Major depressive disorder, recurrent episode, moderate (H)    Anemia    Tubular adenoma    Iron deficiency anemia    Hyponatremia    ADHD, predominantly inattentive type    overweight BMI>30    Periumbilical hernia    Psychosis (H)    Bipolar 2 disorder (H)    Gastroesophageal reflux disease without esophagitis    Prediabetes    Hypothyroidism due to medication    Hidradenitis suppurativa of right axilla    Vitamin D deficiency    Obesity (BMI 35.0-39.9) with comorbidity (H)    Numbness and tingling in left hand    Hiatal hernia    Insomnia, unspecified type    Stress fracture of knee, left-  Nondisplaced, subchondral fracture involving the weightbearing medial femoral condyle    Post-traumatic osteoarthritis of left knee, medial compartment    Status post total left knee replacement     Past Medical History:   Diagnosis Date    Anemia     Depression     Erosive esophagitis     GERD (gastroesophageal reflux disease)     Hemorrhoid     Hiatal hernia     Hypothyroidism     Menstrual disorder     s/p D&C -12/2012    Obesity     ROMEO on CPAP     Other bipolar disorders     followed by Dr Collazo    Post  menopausal syndrome     Prediabetes     Tobacco abuse        CC Denisesincere Bland PA-C  [address] [place] [phone] on close of this encounter.

## 2025-05-12 NOTE — TELEPHONE ENCOUNTER
Other: Patient is calling in to reschedule this visit, however patient needs to be seen before she returns to work on 6/9. No new visits are open on 5/29 and 6/12 is too late. Can patient be added in anywhere else or can some one else see patient?     Could we send this information to you in [employer] or would you prefer to receive a phone call?:   Patient would prefer a phone call   Okay to leave a detailed message?: Yes at Home number on file [phone] (home)

## 2025-05-12 NOTE — TELEPHONE ENCOUNTER
Patient confirmed rescheduled appointment:  Date: 6/5/25  Time: 10:40am to 10:00am  Visit type: New Hip  Provider: Yesenia Stallworth  [place] [facility] [place]

## 2025-05-12 NOTE — PATIENT INSTRUCTIONS
Wound Care After a Biopsy    What is a skin biopsy?  A skin biopsy allows the doctor to examine a very small piece of tissue under the microscope to determine the diagnosis and the best treatment for the skin condition. A local anesthetic (numbing medicine)  is injected with a very small needle into the skin area to be tested. A small piece of skin is taken from the area. Sometimes a suture (stitch) is used.     What are the risks of a skin biopsy?  I will experience scar, bleeding, swelling, pain, crusting and redness. I may experience incomplete removal or recurrence. Risks of this procedure are excessive bleeding, bruising, infection, nerve damage, numbness, thick (hypertrophic or keloidal) scar and non-diagnostic biopsy.    How should I care for my wound for the first 24 hours?  Keep the wound dry and covered for 24 hours  If it bleeds, hold direct pressure on the area for 15 minutes. If bleeding does not stop then go to the emergency room  Avoid strenuous exercise the first 1-2 days or as your doctor instructs you    How should I care for the wound after 24 hours?  After 24 hours, remove the bandage  You may bathe or shower as normal  If you had a scalp biopsy, you can shampoo as usual and can use shower water to clean the biopsy site daily  Clean the wound twice a day with gentle soap and water  Do not scrub, be gentle  Apply white petroleum/Vaseline after cleaning the wound with a cotton swab or a clean finger, and keep the site covered with a Bandaid /bandage. Bandages are not necessary with a scalp biopsy  If you are unable to cover the site with a Bandaid /bandage, re-apply ointment 2-3 times a day to keep the site moist. Moisture will help with healing  Avoid strenuous activity for first 1-2 days  Avoid lakes, rivers, pools, and oceans until the stitches are removed or the site is healed    How do I clean my wound?  Wash hands thoroughly with soap or use hand  before all wound care  Clean the  wound with gentle soap and water  Apply white petroleum/Vaseline  to wound after it is clean  Replace the Bandaid /bandage to keep the wound covered for the first few days or as instructed by your doctor  If you had a scalp biopsy, warm shower water to the area on a daily basis should suffice    What should I use to clean my wound?   Cotton-tipped applicators (Qtips )  White petroleum jelly (Vaseline ). Use a clean new container and use Q-tips to apply.  Bandaids   as needed  Gentle soap     How should I care for my wound long term?  Do not get your wound dirty  Keep up with wound care for one week or until the area is healed.  A small scab will form and fall off by itself when the area is completely healed. The area will be red and will become pink in color as it heals. Sun protection is very important for how your scar will turn out. Sunscreen with an SPF 30 or greater is recommended once the area is healed.  You should have some soreness but it should be mild and slowly go away over several days. Talk to your doctor about using tylenol for pain,    When should I call my doctor?  If you have increased:   Pain or swelling  Pus or drainage (clear or slightly yellow drainage is ok)  Temperature over 100F  Spreading redness or warmth around wound    When will I hear about my results?  The biopsy results can take 2 weeks to come back.  Your results will automatically release to [facility] before your provider has even reviewed them.  The clinic will call you with the results, send you a [facility] message, or have you schedule a follow-up clinic or phone time to discuss the results.  Contact our clinics if you do not hear from us in 2 weeks. If further treatment is needed for a skin cancer, this will be done at either our [place] or [place] office.    Who should I call with questions?  [facility]: [phone]  [facility]: [phone]  For urgent  needs outside of business hours call the [facility] at [phone] and ask for the dermatology resident on call       Cryotherapy    What is it?  Use of a very cold liquid, such as liquid nitrogen, to freeze and destroy abnormal skin cells that need to be removed    What should I expect?  Tenderness and redness  A small blister that might grow and fill with dark purple blood. There may be crusting.  More than one treatment may be needed if the lesions do not go away.    How do I care for the treated area?  Gently wash the area with your hands when bathing.  Use a thin layer of Vaseline to help with healing. You may use a Band-Aid.   The area should heal within 7-10 days and may leave behind a pink or lighter color.   Do not use an antibiotic or Neosporin ointment.   You may take acetaminophen (Tylenol) for pain.     Call your doctor if you have:  Severe pain  Signs of infection (warmth, redness, cloudy yellow drainage, and or a bad smell)  Questions or concerns    Who should I call with questions?      [facility]: [phone]      [facility]: [phone]      For urgent needs outside of business hours call the [facility] at [phone] and ask for the dermatology resident on call

## 2025-05-13 ENCOUNTER — TELEPHONE (OUTPATIENT)
Dept: GASTROENTEROLOGY | Facility: CLINIC | Age: 60
End: 2025-05-13

## 2025-05-13 ENCOUNTER — MYC REFILL (OUTPATIENT)
Dept: FAMILY MEDICINE | Facility: CLINIC | Age: 60
End: 2025-05-13

## 2025-05-13 DIAGNOSIS — Z96.652 S/P TOTAL KNEE ARTHROPLASTY, LEFT: ICD-10-CM

## 2025-05-13 RX ORDER — METHOCARBAMOL 750 MG/1
750 TABLET, FILM COATED ORAL EVERY 6 HOURS PRN
Qty: 20 TABLET | Refills: 0 | Status: SHIPPED | OUTPATIENT
Start: 2025-05-13

## 2025-05-13 NOTE — TELEPHONE ENCOUNTER
Prior Authorization Retail Medication Request    Medication/Dose: Voquezna  Diagnosis and ICD code (if different than what is on RX):    Erosive esophagitis [K22.10]  - Primary      Hiatal hernia [K44.9]        New/renewal/insurance change PA/secondary ins. PA:  Previously Tried and Failed:    Rationale:      Insurance   Primary: [facility]  Insurance ID:  [id number]       Pharmacy Information (if different than what is on RX)  Name:  [facility]  Phone:  [phone]   Fax:[phone]    Clinic Information  Preferred routing pool for dept communication: DESMOND Cage

## 2025-05-14 ENCOUNTER — RESULTS FOLLOW-UP (OUTPATIENT)
Dept: GASTROENTEROLOGY | Facility: CLINIC | Age: 60
End: 2025-05-14

## 2025-05-14 ENCOUNTER — THERAPY VISIT (OUTPATIENT)
Dept: PHYSICAL THERAPY | Facility: REHABILITATION | Age: 60
End: 2025-05-14
Attending: ORTHOPAEDIC SURGERY
Payer: COMMERCIAL

## 2025-05-14 DIAGNOSIS — Z96.652 STATUS POST TOTAL LEFT KNEE REPLACEMENT: Primary | ICD-10-CM

## 2025-05-14 PROCEDURE — 97140 MANUAL THERAPY 1/> REGIONS: CPT | Mod: GP

## 2025-05-14 PROCEDURE — 97110 THERAPEUTIC EXERCISES: CPT | Mod: GP

## 2025-05-14 NOTE — RESULT ENCOUNTER NOTE
Spoke to patient and provided results, also sent results in mychart to review per patient request    Thank you,  AISSATOU HorvathN RN  [facility]  Gastroenterology

## 2025-05-15 LAB
PATH REPORT.COMMENTS IMP SPEC: NORMAL
PATH REPORT.COMMENTS IMP SPEC: NORMAL
PATH REPORT.FINAL DX SPEC: NORMAL
PATH REPORT.GROSS SPEC: NORMAL
PATH REPORT.MICROSCOPIC SPEC OTHER STN: NORMAL
PATH REPORT.RELEVANT HX SPEC: NORMAL

## 2025-05-15 NOTE — TELEPHONE ENCOUNTER
PA Initiation    Medication: VOQUEZNA 20 MG PO TABS  Insurance Company: [employer] - Phone [phone] Fax [phone]  [facility]illing the Rx[facility] [address] [place] [address]  Filling Pharmacy Phone: [phone]  Filling Pharmacy Fax: [phone]  Start Date: 5/15/2025

## 2025-05-15 NOTE — TELEPHONE ENCOUNTER
Prior Authorization Approval    Medication: VOQUEZNA 20 MG PO TABS  Authorization Effective Date: 5/15/2025  Authorization Expiration Date: 5/15/2027  Approved Dose/Quantity:   Reference #:     Insurance Company: [employer] - Phone [phone] Fax [phone]  Expected CoPay: $    CoPay Card Available:      Financial Assistance Needed:   Which Pharm[facility] is filling the prescription[facility] [address] [place] [address]  Pharmacy Notified: YES  Patient Notified: **Instructed pharmacy to notify patient when script is ready to /ship.**

## 2025-05-16 ENCOUNTER — RESULTS FOLLOW-UP (OUTPATIENT)
Dept: DERMATOLOGY | Facility: CLINIC | Age: 60
End: 2025-05-16

## 2025-05-16 DIAGNOSIS — D48.9 NEOPLASM OF UNCERTAIN BEHAVIOR: Primary | ICD-10-CM

## 2025-05-19 ENCOUNTER — OFFICE VISIT (OUTPATIENT)
Dept: DERMATOLOGY | Facility: CLINIC | Age: 60
End: 2025-05-19
Payer: COMMERCIAL

## 2025-05-19 DIAGNOSIS — K22.4 ESOPHAGEAL DYSKINESIA: Primary | ICD-10-CM

## 2025-05-19 DIAGNOSIS — D48.9 NEOPLASM OF UNCERTAIN BEHAVIOR: ICD-10-CM

## 2025-05-19 NOTE — PATIENT INSTRUCTIONS
Wound Care After a Biopsy    What is a skin biopsy?  A skin biopsy allows the doctor to examine a very small piece of tissue under the microscope to determine the diagnosis and the best treatment for the skin condition. A local anesthetic (numbing medicine)  is injected with a very small needle into the skin area to be tested. A small piece of skin is taken from the area. Sometimes a suture (stitch) is used.     What are the risks of a skin biopsy?  I will experience scar, bleeding, swelling, pain, crusting and redness. I may experience incomplete removal or recurrence. Risks of this procedure are excessive bleeding, bruising, infection, nerve damage, numbness, thick (hypertrophic or keloidal) scar and non-diagnostic biopsy.    How should I care for my wound for the first 24 hours?  Keep the wound dry and covered for 24 hours  If it bleeds, hold direct pressure on the area for 15 minutes. If bleeding does not stop then go to the emergency room  Avoid strenuous exercise the first 1-2 days or as your doctor instructs you    How should I care for the wound after 24 hours?  After 24 hours, remove the bandage  You may bathe or shower as normal  If you had a scalp biopsy, you can shampoo as usual and can use shower water to clean the biopsy site daily  Clean the wound twice a day with gentle soap and water  Do not scrub, be gentle  Apply white petroleum/Vaseline after cleaning the wound with a cotton swab or a clean finger, and keep the site covered with a Bandaid /bandage. Bandages are not necessary with a scalp biopsy  If you are unable to cover the site with a Bandaid /bandage, re-apply ointment 2-3 times a day to keep the site moist. Moisture will help with healing  Avoid strenuous activity for first 1-2 days  Avoid lakes, rivers, pools, and oceans until the stitches are removed or the site is healed    How do I clean my wound?  Wash hands thoroughly with soap or use hand  before all wound care  Clean the  wound with gentle soap and water  Apply white petroleum/Vaseline  to wound after it is clean  Replace the Bandaid /bandage to keep the wound covered for the first few days or as instructed by your doctor  If you had a scalp biopsy, warm shower water to the area on a daily basis should suffice    What should I use to clean my wound?   Cotton-tipped applicators (Qtips )  White petroleum jelly (Vaseline ). Use a clean new container and use Q-tips to apply.  Bandaids   as needed  Gentle soap     How should I care for my wound long term?  Do not get your wound dirty  Keep up with wound care for one week or until the area is healed.  A small scab will form and fall off by itself when the area is completely healed. The area will be red and will become pink in color as it heals. Sun protection is very important for how your scar will turn out. Sunscreen with an SPF 30 or greater is recommended once the area is healed.    You should have some soreness but it should be mild and slowly go away over several days. Talk to your doctor about using tylenol for pain,    When should I call my doctor?  If you have increased:   Pain or swelling  Pus or drainage (clear or slightly yellow drainage is ok)  Temperature over 100F  Spreading redness or warmth around wound    When will I hear about my results?  The biopsy results can take 2 weeks to come back.  Your results will automatically release to [facility] before your provider has even reviewed them.  The clinic will call you with the results, send you a [facility] message, or have you schedule a follow-up clinic or phone time to discuss the results.  Contact our clinics if you do not hear from us in 2 weeks. If further treatment is needed for a skin cancer, this will be done at either our [place] or [place] office.    Who should I call with questions?  [facility]: [phone]  [facility]: [phone]  For urgent  needs outside of business hours call the [facility] at [phone] and ask for the dermatology resident on call

## 2025-05-19 NOTE — PROGRESS NOTES
[facility] Dermatology Note  Encounter Date: May 19, 2025  Office Visit     Reviewed patients past medical history and pertinent chart review prior to patients visit today.     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 Benign biopsies  - intradermal melanocytic nevi, right lower back A, right lower back B, and right posterior shoulder, s/p shave biopsy 5/12/2025   3. ISK, s/p cryo  4. Personal history of NMSC  - snBCC, left upper back, s/p bx 5/12/2025, scheduled for excision on 6/09/2025  ____________________________________________    Assessment & Plan:     # Neoplasm of uncertain behavior:  left lateral lower back A and left lateral lower back B.  DDx includes irritated intradermal nevi. Shave biopsy today.  Photograph obtained. Reassurance was provided today.  Patient is interested in removal as these sites are bothersome.  Shave biopsy as outlined below.  Procedure Note: Biopsy by shave technique  The risks and benefits of the procedure were described to the patient. These include but are not limited to bleeding, infection, scar, incomplete removal, and non-diagnostic biopsy. Verbal informed consent was obtained. The above site(s) was cleansed with an alcohol pad and injected with 1% lidocaine with epinephrine. Once anesthesia was obtained, a biopsy(ies) was performed with Gilette blade. The tissue(s) was placed in a labeled container(s) with formalin and sent to pathology. Hemostasis was achieved with aluminum chloride. Vaseline and a bandage were applied to the wound(s). The patient tolerated the procedure well and was given post biopsy care instructions.    Follow up: 6 month follow up for FBSE, prn for new or changing lesions or new concerns    All risks, benefits and alternatives  were discussed with patient.  Patient is in agreement and understands the assessment and plan.  All questions were answered.  Esther Baker PA-C  [facility] Dermatology  _______________________________________    CC: Derm Problem (- removal of mole on left side of back)     HPI:  Ms. Gudelia Dong is a(n) 60 year old female who presents today as a return patient for evaluation of a skin lesion involving the left lateral lower back region.  She was last seen in dermatology on 5/12/2025 at which time several biopsies were obtained. The biopsy from the left upper back revealed a superficial and nodular basal cell carcinoma.  She is scheduled to have this excised on 6/9/2025.  She also has had shave biopsy of intradermal melanocytic nevi removed from the right lower back A, right lower back B, and right posterior shoulder.    Today, she has 2 nevi at the left lateral lower back region that she would like evaluated.  She forgot to point these out during her last visit. As these do bother her, she is interested in having these removed today. She also requests evaluation of her back to ensure the prior biopsy sites and cryotherapy sites are healing well.    Patient is otherwise feeling well, without additional skin concerns.     Physical Exam:  SKIN: Focused examination of back only was performed per patient request.  - left lateral lower back A and left lateral lower back B, light pink to skin colored soft papules  - well healing biopsy sites and cryotherapy sites, back    - No other lesions of concern on areas examined.               Medications:  Current Outpatient Medications   Medication Sig Dispense Refill    acetaminophen (TYLENOL) 325 MG tablet Take 2 tablets (650 mg) by mouth every 4 hours as needed for other (mild pain). 100 tablet 0    ammonium lactate (AMLACTIN) 12 % external cream Apply topically daily as needed for dry skin 385 g 1    aspirin (ASA) 81 MG EC tablet Take 1 tablet (81 mg) by mouth 2  times daily. 60 tablet 0    aspirin 81 MG EC tablet Take 2 tablets (162 mg) by mouth daily for 14 days. 28 tablet 0    buPROPion (WELLBUTRIN XL) 150 MG 24 hr tablet Take 450 mg by mouth every morning      busPIRone HCl (BUSPAR) 30 MG tablet Take 1 tablet by mouth 2 times daily      doxepin HCl (SINEQUAN) 150 MG capsule Take 150 mg by mouth at bedtime.      escitalopram (LEXAPRO) 10 MG tablet Take 10 mg by mouth every morning      famotidine (PEPCID) 40 MG tablet Take 1 tablet (40 mg) by mouth 2 times daily. 180 tablet 3    ferrous sulfate 300 (60 Fe) MG/5ML solution Take 300 mg by mouth daily.      hydrOXYzine HCl (ATARAX) 25 MG tablet Take 1 tablet (25 mg) by mouth every 6 hours as needed for other (adjuvant pain). 25 tablet 0    lamoTRIgine (LAMICTAL) 100 MG tablet Take 100 mg by mouth every morning      levothyroxine (SYNTHROID/LEVOTHROID) 150 MCG tablet TAKE ONE TABLET BY MOUTH EVERY DAY 90 tablet 3    methocarbamol (ROBAXIN) 750 MG tablet Take 1 tablet (750 mg) by mouth every 6 hours as needed for muscle spasms. 20 tablet 0    order for DME Equipment being ordered: CPAP  Please dispense Cpap and its supply 1 Units prn    oxyCODONE (ROXICODONE) 5 MG tablet Take 1-2 tablets (5-10 mg) by mouth every 4 hours as needed for moderate pain. (Patient not taking: Reported on 5/6/2025) 30 tablet 0    polyethylene glycol (MIRALAX) 17 GM/Dose powder Take 17 g by mouth daily. 510 g 0    RABEprazole (ACIPHEX) 20 MG EC tablet Take 1 tablet (20 mg) by mouth 2 times daily (before meals). 180 tablet 1    REXULTI 2 MG tablet Take 2 mg by mouth every morning      senna-docusate (SENOKOT-S/PERICOLACE) 8.6-50 MG tablet Take 1 tablet by mouth 2 times daily. 60 tablet 0    topiramate (TOPAMAX) 25 MG tablet Take 2 tablets (50 mg) by mouth 2 times daily. 120 tablet 3    triamcinolone (KENALOG) 0.025 % external ointment Apply topically 2 times daily. To affected area in the groin as needed up to 2 weeks at a time. 30 g 3    Vonoprazan  Fumarate (VOQUEZNA) 20 MG TABS Take 20 mg by mouth daily. 90 tablet 1     No current facility-administered medications for this visit.      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)    CARDIOVASCULAR SCREENING; LDL GOAL LESS THAN 160    Major depressive disorder, recurrent episode, moderate (H)    Anemia    Tubular adenoma    Iron deficiency anemia    Hyponatremia    ADHD, predominantly inattentive type    overweight BMI>30    Periumbilical hernia    Psychosis (H)    Bipolar 2 disorder (H)    Gastroesophageal reflux disease without esophagitis    Prediabetes    Hypothyroidism due to medication    Hidradenitis suppurativa of right axilla    Vitamin D deficiency    Obesity (BMI 35.0-39.9) with comorbidity (H)    Numbness and tingling in left hand    Hiatal hernia    Insomnia, unspecified type    Stress fracture of knee, left-  Nondisplaced, subchondral fracture involving the weightbearing medial femoral condyle    Post-traumatic osteoarthritis of left knee, medial compartment    Status post total left knee replacement     Past Medical History:   Diagnosis Date    Anemia     Basal cell carcinoma     Depression     Erosive esophagitis     GERD (gastroesophageal reflux disease)     Hemorrhoid     Hiatal hernia     Hypothyroidism     Menstrual disorder     s/p D&C -12/2012    Obesity     ROMEO on CPAP     Other bipolar disorders     followed by Dr Collazo    Post menopausal syndrome     Prediabetes     Tobacco abuse        CC Referred Self, MD  No address on file on close of this encounter.

## 2025-05-19 NOTE — LETTER
5/19/2025      Gudelia Dong  [address]  [place] [address]      Dear Colleague,    Thank you for referring your patient, Gudelia Dong, to the [facility]. Please see a copy of my visit note below.    [facility] Dermatology Note  Encounter Date: May 19, 2025  Office Visit     Reviewed patients past medical history and pertinent chart review prior to patients visit today.     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 Benign biopsies  - intradermal melanocytic nevi, right lower back A, right lower back B, and right posterior shoulder, s/p shave biopsy 5/12/2025   3. ISK, s/p cryo  4. Personal history of NMSC  - snBCC, left upper back, s/p bx 5/12/2025, scheduled for excision on 6/09/2025  ____________________________________________    Assessment & Plan:     # Neoplasm of uncertain behavior:  left lateral lower back A and left lateral lower back B.  DDx includes irritated intradermal nevi. Shave biopsy today.  Photograph obtained. Reassurance was provided today.  Patient is interested in removal as these sites are bothersome.  Shave biopsy as outlined below.  Procedure Note: Biopsy by shave technique  The risks and benefits of the procedure were described to the patient. These include but are not limited to bleeding, infection, scar, incomplete removal, and non-diagnostic biopsy. Verbal informed consent was obtained. The above site(s) was cleansed with an alcohol pad and injected with 1% lidocaine with epinephrine. Once anesthesia was obtained, a biopsy(ies) was performed with Gilette blade. The tissue(s) was placed in a labeled container(s) with formalin and sent to pathology. Hemostasis was achieved with aluminum chloride. Vaseline and a bandage  were applied to the wound(s). The patient tolerated the procedure well and was given post biopsy care instructions.    Follow up: 6 month follow up for FBSE, prn for new or changing lesions or new concerns    All risks, benefits and alternatives were discussed with patient.  Patient is in agreement and understands the assessment and plan.  All questions were answered.  Esther Baker PA-C  [facility]  _______________________________________    CC: Derm Problem (- removal of mole on left side of back)     HPI:  Ms. Gudelia Dong is a(n) 60 year old female who presents today as a return patient for evaluation of a skin lesion involving the left lateral lower back region.  She was last seen in dermatology on 5/12/2025 at which time several biopsies were obtained. The biopsy from the left upper back revealed a superficial and nodular basal cell carcinoma.  She is scheduled to have this excised on 6/9/2025.  She also has had shave biopsy of intradermal melanocytic nevi removed from the right lower back A, right lower back B, and right posterior shoulder.    Today, she has 2 nevi at the left lateral lower back region that she would like evaluated.  She forgot to point these out during her last visit. As these do bother her, she is interested in having these removed today. She also requests evaluation of her back to ensure the prior biopsy sites and cryotherapy sites are healing well.    Patient is otherwise feeling well, without additional skin concerns.     Physical Exam:  SKIN: Focused examination of back only was performed per patient request.  - left lateral lower back A and left lateral lower back B, light pink to skin colored soft papules  - well healing biopsy sites and cryotherapy sites, back    - No other lesions of concern on areas examined.               Medications:  Current Outpatient Medications   Medication Sig Dispense Refill     acetaminophen (TYLENOL) 325 MG tablet Take 2 tablets (650  mg) by mouth every 4 hours as needed for other (mild pain). 100 tablet 0     ammonium lactate (AMLACTIN) 12 % external cream Apply topically daily as needed for dry skin 385 g 1     aspirin (ASA) 81 MG EC tablet Take 1 tablet (81 mg) by mouth 2 times daily. 60 tablet 0     aspirin 81 MG EC tablet Take 2 tablets (162 mg) by mouth daily for 14 days. 28 tablet 0     buPROPion (WELLBUTRIN XL) 150 MG 24 hr tablet Take 450 mg by mouth every morning       busPIRone HCl (BUSPAR) 30 MG tablet Take 1 tablet by mouth 2 times daily       doxepin HCl (SINEQUAN) 150 MG capsule Take 150 mg by mouth at bedtime.       escitalopram (LEXAPRO) 10 MG tablet Take 10 mg by mouth every morning       famotidine (PEPCID) 40 MG tablet Take 1 tablet (40 mg) by mouth 2 times daily. 180 tablet 3     ferrous sulfate 300 (60 Fe) MG/5ML solution Take 300 mg by mouth daily.       hydrOXYzine HCl (ATARAX) 25 MG tablet Take 1 tablet (25 mg) by mouth every 6 hours as needed for other (adjuvant pain). 25 tablet 0     lamoTRIgine (LAMICTAL) 100 MG tablet Take 100 mg by mouth every morning       levothyroxine (SYNTHROID/LEVOTHROID) 150 MCG tablet TAKE ONE TABLET BY MOUTH EVERY DAY 90 tablet 3     methocarbamol (ROBAXIN) 750 MG tablet Take 1 tablet (750 mg) by mouth every 6 hours as needed for muscle spasms. 20 tablet 0     order for DME Equipment being ordered: CPAP  Please dispense Cpap and its supply 1 Units prn     oxyCODONE (ROXICODONE) 5 MG tablet Take 1-2 tablets (5-10 mg) by mouth every 4 hours as needed for moderate pain. (Patient not taking: Reported on 5/6/2025) 30 tablet 0     polyethylene glycol (MIRALAX) 17 GM/Dose powder Take 17 g by mouth daily. 510 g 0     RABEprazole (ACIPHEX) 20 MG EC tablet Take 1 tablet (20 mg) by mouth 2 times daily (before meals). 180 tablet 1     REXULTI 2 MG tablet Take 2 mg by mouth every morning       senna-docusate (SENOKOT-S/PERICOLACE) 8.6-50 MG tablet Take 1 tablet by mouth 2 times daily. 60 tablet 0      topiramate (TOPAMAX) 25 MG tablet Take 2 tablets (50 mg) by mouth 2 times daily. 120 tablet 3     triamcinolone (KENALOG) 0.025 % external ointment Apply topically 2 times daily. To affected area in the groin as needed up to 2 weeks at a time. 30 g 3     Vonoprazan Fumarate (VOQUEZNA) 20 MG TABS Take 20 mg by mouth daily. 90 tablet 1     No current facility-administered medications for this visit.      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)     CARDIOVASCULAR SCREENING; LDL GOAL LESS THAN 160     Major depressive disorder, recurrent episode, moderate (H)     Anemia     Tubular adenoma     Iron deficiency anemia     Hyponatremia     ADHD, predominantly inattentive type     overweight BMI>30     Periumbilical hernia     Psychosis (H)     Bipolar 2 disorder (H)     Gastroesophageal reflux disease without esophagitis     Prediabetes     Hypothyroidism due to medication     Hidradenitis suppurativa of right axilla     Vitamin D deficiency     Obesity (BMI 35.0-39.9) with comorbidity (H)     Numbness and tingling in left hand     Hiatal hernia     Insomnia, unspecified type     Stress fracture of knee, left-  Nondisplaced, subchondral fracture involving the weightbearing medial femoral condyle     Post-traumatic osteoarthritis of left knee, medial compartment     Status post total left knee replacement     Past Medical History:   Diagnosis Date     Anemia      Basal cell carcinoma      Depression      Erosive esophagitis      GERD (gastroesophageal reflux disease)      Hemorrhoid      Hiatal hernia      Hypothyroidism      Menstrual disorder     s/p D&C -12/2012     Obesity      ROMEO on CPAP      Other bipolar disorders     followed by Dr Collazo     Post menopausal syndrome      Prediabetes      Tobacco abuse        CC Referred Charles, MD  No address on file on close of this encounter.     Again,  thank you for allowing me to participate in the care of your patient.        Sincerely,        Cheri Baker PA-C    Electronically signed

## 2025-05-21 ENCOUNTER — RESULTS FOLLOW-UP (OUTPATIENT)
Dept: DERMATOLOGY | Facility: CLINIC | Age: 60
End: 2025-05-21

## 2025-05-21 ENCOUNTER — THERAPY VISIT (OUTPATIENT)
Dept: PHYSICAL THERAPY | Facility: REHABILITATION | Age: 60
End: 2025-05-21
Payer: COMMERCIAL

## 2025-05-21 DIAGNOSIS — K22.10 EROSIVE ESOPHAGITIS: Primary | ICD-10-CM

## 2025-05-21 DIAGNOSIS — Z96.652 STATUS POST TOTAL LEFT KNEE REPLACEMENT: Primary | ICD-10-CM

## 2025-05-21 PROCEDURE — 94618 PULMONARY STRESS TESTING: CPT

## 2025-05-21 PROCEDURE — 97110 THERAPEUTIC EXERCISES: CPT | Mod: GP

## 2025-05-21 NOTE — RESULT ENCOUNTER NOTE
Spoke to patient and placed new orders for esophagram, provided scheduling number to patient     Thank you,  AISSATOU HorvathN RN  [facility]  [facility]

## 2025-05-27 ENCOUNTER — VIRTUAL VISIT (OUTPATIENT)
Dept: FAMILY MEDICINE | Facility: CLINIC | Age: 60
End: 2025-05-27
Payer: COMMERCIAL

## 2025-05-27 DIAGNOSIS — R21 RASH: Primary | ICD-10-CM

## 2025-05-27 PROCEDURE — 98005 SYNCH AUDIO-VIDEO EST LOW 20: CPT | Performed by: PHYSICIAN ASSISTANT

## 2025-05-27 RX ORDER — PREDNISONE 20 MG/1
TABLET ORAL
Qty: 20 TABLET | Refills: 0 | Status: SHIPPED | OUTPATIENT
Start: 2025-05-27

## 2025-05-27 NOTE — PROGRESS NOTES
"  If patient has telephone visit, have they been educated on video visit as preferred visit method and offered to change to video visit? N/A        Instructions Relayed to Patient by Virtual Roomer:     Patient is active on [employer]   Relayed following to patient: \"It looks like you are active on [employer] are you able to join the visit this way? If not, do you need us to send you a link now or would you like your provider to send a link via text or email when they are ready to initiate the visit?\"      Patient Confirmed they will join visit via: [employer]  Reminded patient to ensure they were logged on to virtual visit by arrival time listed.   Asked if patient has flexibility to initiate visit sooner than arrival time: patient is unable to initiate visit earlier than arrival time     If pediatric virtual visit, ensured pediatric patient along with parent/guardian will be present for video visit.     Patient offered the website [email or web address] and/or phone number to [employer] Help line: [phone]      Gudelia is a 60 year old who is being evaluated via a billable video visit.    How would you like to obtain your AVS? [employer]  If the video visit is dropped, the invitation should be resent by: Text to cell phone: [phone]  Will anyone else be joining your video visit? No      Assessment & Plan     Rash  Topical prednisone has been helpful.  Very pruritic.  Treat with burst and taper of prednisone - has tolerated in the past   Follow up with derm if unimproved- she has seen a dermatologist in recent past   - predniSONE (DELTASONE) 20 MG tablet  Dispense: 20 tablet; Refill: 0    Patient Instructions   Take burst and taper of prednisone  Follow up with your dermatologist if doesn't improve.  Return urgently if any change in symptoms like watery eyes, cough, fever, increasing rash or other change in symptoms.           BMI  Estimated body mass index is 41.16 kg/m  as calculated from the following:   " " Height as of 4/23/25: 1.702 m (5' 7\").    Weight as of 4/23/25: 119.2 kg (262 lb 12.6 oz).   Weight management plan: Discussed healthy diet and exercise guidelines      Follow-up    Follow-up Visit   Expected date:  Jun 03, 2025 (Approximate)      Follow Up Appointment Details:     Follow-up with whom?: PCP    Follow-Up for what?: Acute Issue Recheck    How?: In Person    Is this an as-needed follow-up?: Yes                 Subjective   Gudelia is a 60 year old, presenting for the following health issues:  Derm Problem      5/27/2025     8:25 AM   Additional Questions   Roomed by Elma ISLAS   Accompanied by Self     History of Present Illness       Reason for visit:  RASH all over body severe itching  Symptoms include:  Severe itching and bumps on skin opening bleading  Symptom progression:  Staying the same  Had these symptoms before:  No  What makes it worse:  No  What makes it better:  No   She is taking medications regularly.      Patient unfamiliar to me with history of Bipolar 2 disorder, sleep apnea, alcoholism in remission, insomnia, hypothyroidism , prediabetes, ADHD,  hidradenitis supprativa with bipolar disorder followed by psychiatry presents with a pruritic rash.  Complains of  Bumps like a little raised pimple all over body- arms, legs and trunk- scratches until bleed and then heal over and then  scratches open and they bleed  Just got a cat two weeks ago.  Nothing else has changed.  Has Used ivory soap because year ago had a \"crotch itch for a year that I couldn't get rid of it.\"  Saw dermatologist who prescribed Triamcinolone cream 0,25% and got rid of it. Dermatology had done a Biopsy and just really really dry skin and given her prescription for a year  Just saw her again and gave her another prescription - but didn't have these bumps at the time of derm appointment   Wears White cotton underwear and ivory soap  Always had pets all my life  Allergic to cats when she was young and improved with a " dimetap prescription   At that time had Watery eyes and scratchy nose from cat allergy and eventually grew out allergies and no problems being around cats   Has not had a pet for 10 yrs.   and I decided to get a cat two weeks ago and 12 year old feline from [place] -   When I put her in my lap- 10 minutes really itchy all over  Really want to scratch really bad.  She has been treated for everything - fleas and everything   She itches a little bit but nothing like she has fleas or something else.  Has used Triamcinolone on areas of rash and gets rid of it in the area.  Looked up on google and tried that route and did not do a thing.  Tried Allegra first spent $40 on allegra.  Called pharmacist- who recomemmended zyrtec and that didn't do anything either   Has Been on steroid inhaler when I smoked - doesn't smoke - since feb 2   Came off escitalopram - because hair falling out- psychiatry thought it was that araceli lamictal   Just had knee replacement April 23   No fever , sweats, chills.   No cough or sore throat. No itchy watery eyes.          Review of Systems  Constitutional, HEENT, cardiovascular, pulmonary, gi and gu systems are negative, except as otherwise noted.      Objective           Vitals:  No vitals were obtained today due to virtual visit.    Physical Exam   GENERAL: alert and no distress  EYES: Eyes grossly normal to inspection.  No discharge or erythema, or obvious scleral/conjunctival abnormalities.  RESP: No audible wheeze, cough, or visible cyanosis.    SKIN: diffuse very small erythematous papular lesions widespread appear approximately 5 mm diameter on video visit -significant excoriations without vesicles or scale   NEURO: Cranial nerves grossly intact.  Mentation and speech appropriate for age.  PSYCH: Appropriate affect, tone, and pace of words          Video-Visit Details    Type of service:  Video Visit   Originating Location (pt. Location): Home    Distant Location (provider  location):  On-site  Platform used for Video Visit: Irene  Signed Electronically by: Skye Noonan PA-C

## 2025-05-27 NOTE — PATIENT INSTRUCTIONS
Take burst and taper of prednisone  Follow up with your dermatologist if doesn't improve.  Return urgently if any change in symptoms like watery eyes, cough, fever, increasing rash or other change in symptoms.

## 2025-05-28 ENCOUNTER — THERAPY VISIT (OUTPATIENT)
Dept: PHYSICAL THERAPY | Facility: REHABILITATION | Age: 60
End: 2025-05-28
Payer: COMMERCIAL

## 2025-05-28 DIAGNOSIS — Z96.652 STATUS POST TOTAL LEFT KNEE REPLACEMENT: Primary | ICD-10-CM

## 2025-05-28 PROCEDURE — 97110 THERAPEUTIC EXERCISES: CPT | Mod: GP

## 2025-06-03 ASSESSMENT — KOOS JR
GOING UP OR DOWN STAIRS: MILD
TWISING OR PIVOTING ON KNEE: MILD
KOOS JR SCORING: 79.91
STRAIGHTENING KNEE FULLY: MILD

## 2025-06-03 NOTE — PROGRESS NOTES
Chief Complaint:   Follow up, DOS 4/23/25 with Dr. Carrera     Procedures:  Left total knee arthroplasty    History:  Gudelia Dong is a 60 year old patient 6 weeks status post above procedure, here for follow-up.  She has continued to do very well since surgery.  She states that she is doing fantastic and does not have any pain.  Does not take any pain medication.  She does arrive using a cane today, states this is due to a minor glutes strain due to her increased activity and strength training with physical therapy.  Otherwise she does not use an assistive device.  She does have more physical therapy visits scheduled, she works on her range of motion twice per day.  Gudelia works as a , her plan is to go back to work next week.  She is very happy with how she is doing, no specific questions or concerns today.      Exam:     General: Awake, Alert, and oriented. Articulates and communicates with a normal affect     Left Lower Extremity:  Incisions well healed without evidence of infection, no erythema, drainage, or wound dehiscence   Straight leg raise without extensor lag   Range of motion 5-126  TA/Gsc/EHL/FHL with 5/5 strength  Distal pulses palpable, toes are warm and well perfused   Gait: Antalgic with less time on the left     KOOS: 79.91 (42.28 pre op)  Imaging:  No new imaging     Medications:     Current Outpatient Medications:     acetaminophen (TYLENOL) 325 MG tablet, Take 2 tablets (650 mg) by mouth every 4 hours as needed for other (mild pain)., Disp: 100 tablet, Rfl: 0    ammonium lactate (AMLACTIN) 12 % external cream, Apply topically daily as needed for dry skin, Disp: 385 g, Rfl: 1    aspirin (ASA) 81 MG EC tablet, Take 1 tablet (81 mg) by mouth 2 times daily., Disp: 60 tablet, Rfl: 0    buPROPion (WELLBUTRIN XL) 150 MG 24 hr tablet, Take 450 mg by mouth every morning, Disp: , Rfl:     busPIRone HCl (BUSPAR) 30 MG tablet, Take 1 tablet by mouth 2 times daily, Disp: , Rfl:      doxepin HCl (SINEQUAN) 150 MG capsule, Take 150 mg by mouth at bedtime., Disp: , Rfl:     famotidine (PEPCID) 40 MG tablet, Take 1 tablet (40 mg) by mouth 2 times daily., Disp: 180 tablet, Rfl: 3    ferrous sulfate 300 (60 Fe) MG/5ML solution, Take 300 mg by mouth daily., Disp: , Rfl:     hydrOXYzine HCl (ATARAX) 25 MG tablet, Take 1 tablet (25 mg) by mouth every 6 hours as needed for other (adjuvant pain)., Disp: 25 tablet, Rfl: 0    levothyroxine (SYNTHROID/LEVOTHROID) 150 MCG tablet, TAKE ONE TABLET BY MOUTH EVERY DAY, Disp: 90 tablet, Rfl: 3    methocarbamol (ROBAXIN) 750 MG tablet, Take 1 tablet (750 mg) by mouth every 6 hours as needed for muscle spasms., Disp: 20 tablet, Rfl: 0    order for DME, Equipment being ordered: CPAP Please dispense Cpap and its supply, Disp: 1 Units, Rfl: prn    PERMETHRIN EX, Choice of mask ( or ) w/full face cushion () x1/mo, nasal cushion () x2/mo, or nasal pillows () x 2/mo; Length of Need: 99 months; Frequency of use: Daily., Disp: , Rfl:     polyethylene glycol (MIRALAX) 17 GM/Dose powder, Take 17 g by mouth daily., Disp: 510 g, Rfl: 0    predniSONE (DELTASONE) 20 MG tablet, Take 3 tabs by mouth daily x 3 days, then 2 tabs daily x 3 days, then 1 tab daily x 3 days, then 1/2 tab daily x 3 days., Disp: 20 tablet, Rfl: 0    RABEprazole (ACIPHEX) 20 MG EC tablet, Take 1 tablet (20 mg) by mouth 2 times daily (before meals)., Disp: 180 tablet, Rfl: 1    REXULTI 2 MG tablet, Take 2 mg by mouth every morning, Disp: , Rfl:     senna-docusate (SENOKOT-S/PERICOLACE) 8.6-50 MG tablet, Take 1 tablet by mouth 2 times daily., Disp: 60 tablet, Rfl: 0    topiramate (TOPAMAX) 25 MG tablet, Take 2 tablets (50 mg) by mouth 2 times daily., Disp: 120 tablet, Rfl: 3    triamcinolone (KENALOG) 0.025 % external ointment, Apply topically 2 times daily. To affected area in the groin as needed up to 2 weeks at a time., Disp: 30 g, Rfl: 3    Vonoprazan Fumarate (VOQUEZNA) 20 MG  TABS, Take 20 mg by mouth daily., Disp: 90 tablet, Rfl: 1    Assessment:  Doing well 6 weeks s/p left total knee arthroplasty with Dr. Carrera. I did not appreciate signs of infection. We discussed the plan below, all questions were answered to the best of my ability.     Plan:   -Weight bearing: WBAT   -Range of motion as tolerated   -Continue work with physical therapy  -Follow up: 12 weeks via phone visit with Dr. Deandre Stallworth PA-C 6/3/2025 9:22 AM  Orthopedic Surgery

## 2025-06-04 ENCOUNTER — THERAPY VISIT (OUTPATIENT)
Dept: PHYSICAL THERAPY | Facility: REHABILITATION | Age: 60
End: 2025-06-04
Payer: COMMERCIAL

## 2025-06-04 DIAGNOSIS — Z96.652 STATUS POST TOTAL LEFT KNEE REPLACEMENT: Primary | ICD-10-CM

## 2025-06-04 PROCEDURE — 97110 THERAPEUTIC EXERCISES: CPT | Mod: GP

## 2025-06-04 PROCEDURE — 97750 PHYSICAL PERFORMANCE TEST: CPT | Mod: GP

## 2025-06-05 ENCOUNTER — OFFICE VISIT (OUTPATIENT)
Dept: ORTHOPEDICS | Facility: CLINIC | Age: 60
End: 2025-06-05
Payer: COMMERCIAL

## 2025-06-05 DIAGNOSIS — Z48.89 ENCOUNTER FOR POSTOPERATIVE CARE: Primary | ICD-10-CM

## 2025-06-05 NOTE — LETTER
6/5/2025      Gudelia Dong  [address]  [place] [address]      Dear Colleague,    Thank you for referring your patient, Gudelia Dong, to the [facility] [place]. Please see a copy of my visit note below.    Chief Complaint:   Follow up, DOS 4/23/25 with Dr. Carrera     Procedures:  Left total knee arthroplasty    History:  Gudelia Dong is a 60 year old patient 6 weeks status post above procedure, here for follow-up.  She has continued to do very well since surgery.  She states that she is doing fantastic and does not have any pain.  Does not take any pain medication.  She does arrive using a cane today, states this is due to a minor glutes strain due to her increased activity and strength training with physical therapy.  Otherwise she does not use an assistive device.  She does have more physical therapy visits scheduled, she works on her range of motion twice per day.  Gudelia works as a , her plan is to go back to work next week.  She is very happy with how she is doing, no specific questions or concerns today.      Exam:     General: Awake, Alert, and oriented. Articulates and communicates with a normal affect     Left Lower Extremity:  Incisions well healed without evidence of infection, no erythema, drainage, or wound dehiscence   Straight leg raise without extensor lag   Range of motion 5-126  TA/Gsc/EHL/FHL with 5/5 strength  Distal pulses palpable, toes are warm and well perfused   Gait: Antalgic with less time on the left     KOOS: 79.91 (42.28 pre op)  Imaging:  No new imaging     Medications:     Current Outpatient Medications:      acetaminophen (TYLENOL) 325 MG tablet, Take 2 tablets (650 mg) by mouth every 4 hours as needed for other (mild pain)., Disp: 100 tablet, Rfl: 0     ammonium lactate (AMLACTIN) 12 % external cream, Apply topically daily as needed for dry skin, Disp: 385 g, Rfl: 1     aspirin (ASA) 81 MG EC tablet, Take 1 tablet  (81 mg) by mouth 2 times daily., Disp: 60 tablet, Rfl: 0     buPROPion (WELLBUTRIN XL) 150 MG 24 hr tablet, Take 450 mg by mouth every morning, Disp: , Rfl:      busPIRone HCl (BUSPAR) 30 MG tablet, Take 1 tablet by mouth 2 times daily, Disp: , Rfl:      doxepin HCl (SINEQUAN) 150 MG capsule, Take 150 mg by mouth at bedtime., Disp: , Rfl:      famotidine (PEPCID) 40 MG tablet, Take 1 tablet (40 mg) by mouth 2 times daily., Disp: 180 tablet, Rfl: 3     ferrous sulfate 300 (60 Fe) MG/5ML solution, Take 300 mg by mouth daily., Disp: , Rfl:      hydrOXYzine HCl (ATARAX) 25 MG tablet, Take 1 tablet (25 mg) by mouth every 6 hours as needed for other (adjuvant pain)., Disp: 25 tablet, Rfl: 0     levothyroxine (SYNTHROID/LEVOTHROID) 150 MCG tablet, TAKE ONE TABLET BY MOUTH EVERY DAY, Disp: 90 tablet, Rfl: 3     methocarbamol (ROBAXIN) 750 MG tablet, Take 1 tablet (750 mg) by mouth every 6 hours as needed for muscle spasms., Disp: 20 tablet, Rfl: 0     order for DME, Equipment being ordered: CPAP Please dispense Cpap and its supply, Disp: 1 Units, Rfl: prn     PERMETHRIN EX, Choice of mask ( or ) w/full face cushion () x1/mo, nasal cushion () x2/mo, or nasal pillows () x 2/mo; Length of Need: 99 months; Frequency of use: Daily., Disp: , Rfl:      polyethylene glycol (MIRALAX) 17 GM/Dose powder, Take 17 g by mouth daily., Disp: 510 g, Rfl: 0     predniSONE (DELTASONE) 20 MG tablet, Take 3 tabs by mouth daily x 3 days, then 2 tabs daily x 3 days, then 1 tab daily x 3 days, then 1/2 tab daily x 3 days., Disp: 20 tablet, Rfl: 0     RABEprazole (ACIPHEX) 20 MG EC tablet, Take 1 tablet (20 mg) by mouth 2 times daily (before meals)., Disp: 180 tablet, Rfl: 1     REXULTI 2 MG tablet, Take 2 mg by mouth every morning, Disp: , Rfl:      senna-docusate (SENOKOT-S/PERICOLACE) 8.6-50 MG tablet, Take 1 tablet by mouth 2 times daily., Disp: 60 tablet, Rfl: 0     topiramate (TOPAMAX) 25 MG tablet, Take 2 tablets (50  mg) by mouth 2 times daily., Disp: 120 tablet, Rfl: 3     triamcinolone (KENALOG) 0.025 % external ointment, Apply topically 2 times daily. To affected area in the groin as needed up to 2 weeks at a time., Disp: 30 g, Rfl: 3     Vonoprazan Fumarate (VOQUEZNA) 20 MG TABS, Take 20 mg by mouth daily., Disp: 90 tablet, Rfl: 1    Assessment:  Doing well 6 weeks s/p left total knee arthroplasty with Dr. Carrera. I did not appreciate signs of infection. We discussed the plan below, all questions were answered to the best of my ability.     Plan:   -Weight bearing: WBAT   -Range of motion as tolerated   -Continue work with physical therapy  -Follow up: 12 weeks via phone visit with Dr. Deandre Stallworth PA-C 6/3/2025 9:22 AM  Orthopedic Surgery      Again, thank you for allowing me to participate in the care of your patient.        Sincerely,        Yesenia Stallworth PA-C    Electronically signed

## 2025-06-05 NOTE — LETTER
[facility] [address]  4TH FLOOR  [facility] [address]  [phone]          June 5, 2025        RE:  Gudelia Dong                                                                                                                                                       [address]   [place] [address]              To whom it may concern:    Gudelia Dong is under my professional care for s/p total left knee replacement. She  may return to work with the following: The employee is ABLE to return to work on June 9th 2025 without restrictions except the opportunity to sit and rest as needed. Please contact the clinic with any further questions or concerns.         Sincerely,        Yesenia Stallworth PA-C

## 2025-06-05 NOTE — NURSING NOTE
Reason For Visit:   Chief Complaint   Patient presents with    Surgical Followup     DOS: 4/23/2025 - Left total knee arthroplasty       LMP 07/20/2006     Pain Assessment  Patient Currently in Pain: Candie Healy CMA

## 2025-06-08 ENCOUNTER — HOSPITAL ENCOUNTER (EMERGENCY)
Facility: CLINIC | Age: 60
Discharge: HOME | End: 2025-06-08
Attending: STUDENT IN AN ORGANIZED HEALTH CARE EDUCATION/TRAINING PROGRAM | Admitting: STUDENT IN AN ORGANIZED HEALTH CARE EDUCATION/TRAINING PROGRAM
Payer: COMMERCIAL

## 2025-06-08 VITALS
BODY MASS INDEX: 40.49 KG/M2 | DIASTOLIC BLOOD PRESSURE: 60 MMHG | TEMPERATURE: 98.1 F | SYSTOLIC BLOOD PRESSURE: 135 MMHG | WEIGHT: 258 LBS | OXYGEN SATURATION: 99 % | HEART RATE: 72 BPM | RESPIRATION RATE: 18 BRPM | HEIGHT: 67 IN

## 2025-06-08 DIAGNOSIS — M54.9 BACK PAIN, UNSPECIFIED BACK LOCATION, UNSPECIFIED BACK PAIN LATERALITY, UNSPECIFIED CHRONICITY: ICD-10-CM

## 2025-06-08 PROCEDURE — 99283 EMERGENCY DEPT VISIT LOW MDM: CPT | Performed by: STUDENT IN AN ORGANIZED HEALTH CARE EDUCATION/TRAINING PROGRAM

## 2025-06-08 ASSESSMENT — COLUMBIA-SUICIDE SEVERITY RATING SCALE - C-SSRS
2. HAVE YOU ACTUALLY HAD ANY THOUGHTS OF KILLING YOURSELF IN THE PAST MONTH?: NO
6. HAVE YOU EVER DONE ANYTHING, STARTED TO DO ANYTHING, OR PREPARED TO DO ANYTHING TO END YOUR LIFE?: NO
1. IN THE PAST MONTH, HAVE YOU WISHED YOU WERE DEAD OR WISHED YOU COULD GO TO SLEEP AND NOT WAKE UP?: NO

## 2025-06-08 ASSESSMENT — ACTIVITIES OF DAILY LIVING (ADL)
ADLS_ACUITY_SCORE: 45
ADLS_ACUITY_SCORE: 45

## 2025-06-08 NOTE — ED NOTES
This RN went to give patient discharge paperwork - was not in the bed. Left per [facility].

## 2025-06-08 NOTE — ED TRIAGE NOTES
Patient to triage with c/o back pain. Patient reports she had a knee replacement surgery last April 23 and everything went well until 2 weeks ago, when she saw a different physical therapist who was not her usual one, who pushed her too hard with exercises. Since then, patient started to have back pain which she describes as sharp and feels like a pinched nerve that is radiating down to her left foot. Patient contacted her Ortho doctor who prescribed her Diclofenac and Methocarbamol. Patient took the medication with minimal relief.      Triage Assessment (Adult)       Row Name 06/08/25 0601          Triage Assessment    Airway WDL WDL        Respiratory WDL    Respiratory WDL WDL        Skin Circulation/Temperature WDL    Skin Circulation/Temperature WDL WDL        Cardiac WDL    Cardiac WDL WDL        Peripheral/Neurovascular WDL    Peripheral Neurovascular WDL WDL        Cognitive/Neuro/Behavioral WDL    Cognitive/Neuro/Behavioral WDL WDL        [place] Coma Scale    Best Eye Response 4-->(E4) spontaneous     Best Motor Response 6-->(M6) obeys commands     Best Verbal Response 5-->(V5) oriented     [place] Coma Scale Score 15

## 2025-06-08 NOTE — DISCHARGE INSTRUCTIONS
If you have any new or worsening symptoms including but not limited to changing back pain, weakness, numbness, loss of bowel or bladder, please return immediately to the emergency department  Otherwise please continue to use the medications that you are already taking for back pain, continue gentle exercise and ice, and please call your primary care doctor for a follow-up visit in the next 2 to 3 days  I also recommend reaching out to physical therapy specifically for help with your back pain

## 2025-06-08 NOTE — ED PROVIDER NOTES
"ED Provider Note  [facility]      History     Chief Complaint   Patient presents with    Back Pain     HPI  Gudelia Dong is a 60 year old female with hx of ROMEO, bipolar, GERD, anemia, OA presenting for back pain  Reports that she recently had a total knee replacement and has been going to physical therapy  Reports that a week and a half ago at physical therapy she overexerted herself with a medicine ball and has since had low back pain  Reports that her low back pain had been worsening for the last week, and occasionally radiated down her left leg  Denies bowel or bladder, saddle anesthesia, fevers, chills, numbness or tingling or weakness  Reports that today she feels like her ambulation had improved some in comparison to the last few days, but that she wanted to get checked out just to make sure.          Physical Exam   BP: 135/60  Pulse: 72  Temp: 98.1  F (36.7  C)  Resp: 18  Height: 170.2 cm (5' 7\")  Weight: 117 kg (258 lb)  SpO2: 99 %  Physical Exam  Constitutional:       General: She is not in acute distress.     Appearance: Normal appearance. She is not diaphoretic.   HENT:      Head: Atraumatic.      Mouth/Throat:      Mouth: Mucous membranes are moist.   Eyes:      General: No scleral icterus.     Conjunctiva/sclera: Conjunctivae normal.   Cardiovascular:      Rate and Rhythm: Normal rate.      Heart sounds: Normal heart sounds.   Pulmonary:      Effort: No respiratory distress.      Breath sounds: Normal breath sounds.   Abdominal:      General: Abdomen is flat.   Musculoskeletal:      Cervical back: Neck supple.      Comments: No midline spine tenderness, 5 out of 5 strength and normal sensation bilateral lower extremities, ambulating with steady gait   Skin:     General: Skin is warm.      Findings: No rash.   Neurological:      Mental Status: She is alert.         ED Course, Procedures, & Data      Procedures                No results found for any visits on " 06/08/25.  Medications - No data to display  Labs Ordered and Resulted from Time of ED Arrival to Time of ED Departure - No data to display  No orders to display          Critical care was not performed.     Medical Decision Making  The patient's presentation was of high complexity (an acute health issue posing potential threat to life or bodily function).    The patient's evaluation involved:  review of external note(s) from 3+ sources (see separate area of note for details)  review of 3+ test result(s) ordered prior to this encounter (see separate area of note for details)  strong consideration of a test (CT spine, blood work, MRI spine) that was ultimately deferred    The patient's management necessitated moderate risk (review of prior notes and imaging, consideration of further workup, coordination of care and disposition home).    Assessment & Plan    Patient presented to the emergency room for back pain, but had a reassuring history and a reassuring physical exam with improving symptoms over the last few days  As she had 5 out of 5 strength and normal sensation bilateral lower extremities, no midline spine tenderness, reassuring gait, no saddle anesthesia, no loss of bowel or bladder, although we considered MRI CT scans, but deferred given otherwise reassuring history and physical exam  Considered possible observation admission for PT OT, but as patient reports that she is improving clinically, and has a reassuring physical exam with reassuring gait, and is able to complete tasks of daily living including eating and using the bathroom, and has no issue getting in and out of the bed, will defer observation admission at this point  Discussed with patient and she feels comfortable as well discharging home to through shared decision making we will discharge her home with strict return precautions to come back to the emergency room for any new or worsening symptoms as well as instructions to follow-up with her  primary care doctor in the next 1 to 2 days.    I have reviewed the nursing notes. I have reviewed the findings, diagnosis, plan and need for follow up with the patient.    New Prescriptions    No medications on file       Final diagnoses:   None     Nirav Fortune MD on 6/8/2025 at 8:21 AM   [facility] EMERGENCY DEPARTMENT  6/8/2025     Nirav Fortune MD  06/08/25 0821

## 2025-06-09 ENCOUNTER — OFFICE VISIT (OUTPATIENT)
Dept: DERMATOLOGY | Facility: CLINIC | Age: 60
End: 2025-06-09
Payer: COMMERCIAL

## 2025-06-09 DIAGNOSIS — C44.519 BASAL CELL CARCINOMA, TRUNK: ICD-10-CM

## 2025-06-09 DIAGNOSIS — D48.9 NEOPLASM OF UNCERTAIN BEHAVIOR: ICD-10-CM

## 2025-06-09 PROCEDURE — 13101 CMPLX RPR TRUNK 2.6-7.5 CM: CPT | Performed by: STUDENT IN AN ORGANIZED HEALTH CARE EDUCATION/TRAINING PROGRAM

## 2025-06-09 PROCEDURE — 11603 EXC TR-EXT MAL+MARG 2.1-3 CM: CPT | Performed by: STUDENT IN AN ORGANIZED HEALTH CARE EDUCATION/TRAINING PROGRAM

## 2025-06-09 NOTE — PATIENT INSTRUCTIONS
Excision Wound Care Instructions  I will experience scar, altered skin color, bleeding, swelling, pain, crusting and redness. I may experience altered sensation. Risks are excessive bleeding, infection, muscle weakness, thick (hypertrophic or keloidal) scar, and recurrence. A second procedure may be recommended to obtain the best cosmetic or functional result.  Possible complications of any surgical procedure are bleeding, infection, scarring, alteration in skin color and sensation, muscle weakness in the area, wound dehiscence or seperation, or recurrence of the lesion or disease. On occasion, after healing, a secondary procedure or revision may be recommended in order to obtain the best cosmetic or functional result.   After your surgery, a pressure bandage will be placed over the area that has sutures. This will help prevent bleeding. Please follow these instructions as they will help you to prevent complications as your wound heals.    For the First 24 hours After Surgery:  Leave the pressure bandage on and keep it dry. If it should come loose, you may retape it, but do not take it off.  Relax and take it easy. Do not do any vigorous exercise, heavy lifting, or bending forward. This could cause the wound to bleed.  Post-operative pain is usually mild. You may alternate between 1000 mg of Tylenol (acetaminophen) and 400 mg of Ibuprofen every 4 hours.  Do not take more than 4,000mg of acetaminophen in a 24 hour period or 3200 mg of Ibuprofen in a 24 hr period.  Avoid alcohol and vitamin E as these may increase your tendency to bleed.  You may put an ice pack around the bandaged area for 20 minutes every 2-3 hours. This may help reduce swelling, bruising, and pain. Make sure the ice pack is waterproof so that the pressure bandage does not get wet.   You may see a small amount of drainage or blood on your pressure bandage. This is normal. However, if drainage or bleeding continues or saturates the bandage, you will  need to apply firm pressure over the bandage with a washcloth for 15 minutes. If bleeding continues after applying pressure for 15 minutes then go to the nearest emergency room.    After the first 24 hours from Surgery  Carefully remove the bandage. Since your wound was closed with sutures underneath the skin and sealed with Derma-bond, you do not have to do daily wound care or dressing changes. The blue Derma-bond polymer takes the place of a bandage. It naturally detaches within a week. At that point the skin is knitted together and daily wound washing along with dressing changes are not required.     After the first 2 weeks from Surgery  At 2 weeks post surgery you can begin gently massaging the scar using light pressure applied in a circular motion. Do this for 5 minutes 3 times a day. Doing this consistently and regularly will improve the appearance of your final scar. You should continue to do this for AT LEAST 2 weeks, but ideally 4 in order to give your scar the best possible outcome        Call Us If:  You have pain that is not controlled with Tylenol/Ibuprofen  You have signs or symptoms of an infection, such as: fever over 100 degrees F, redness, warmth, or foul-smelling or yellow drainage from the wound.  Who should I call with questions?  [facility]: [phone]   [facility]: [phone]  [facility]: [phone]  For urgent needs outside of business hours call the [facility] at [phone] and ask to speak with the dermatology resident on call

## 2025-06-09 NOTE — LETTER
6/9/2025      Gudelia Dong  [address]  [place] [address]      Dear Colleague,    Thank you for referring your patient, Gudelia Dong, to the [facility]. Please see a copy of my visit note below.    DERMATOLOGIC SURGERY REPORT    NAME OF PROCEDURE:  EXCISION AND CLOSURE    Surgeon:  Pino Hinds MD    PREOPERATIVE DIAGNOSIS: nBCC  POSTOPERATIVE DIAGNOSIS: Same  Lesion Size: 16mm lesion with 4mm margins  Final excision size: 24 mm  Repair type: Complex  FINAL REPAIR LENGTH:  63mm  Location: L upper back  Prior Biopsy Accession #: [id number]     INDICATIONS:Excision was indicated for treatment. We discussed the principles of treatment and most likely complications including bleeding, infection, wound dehiscence, pain, nerve damage, and scarring. Informed consent was obtained and the patient underwent the procedure as follows.    PROCEDURE:  The patient was taken to the operative suite. The treatment area was anesthetized with 1% lidocaine with [id number] epinephrine buffered with bicarbonate. The area was washed with hibiclens, rinsed with saline and draped with sterile towels. The lesion was delineated and excised down to subcutaneous fat. Hemostasis was obtained by electrocoagulation.     REPAIR:  In order to repair this defect while maintaining the normal anatomic relations and function, we elected to utilize a linear closure. Closure was oriented so that the wound was in the patient's natural skin tension lines. Two redundant cones were removed by triangulation. Due to tightness of the surrounding skin deeper layers of the subcutaneous tissue were undermined extensively to a distance  greater than width of the defect on both sides by dissection in the subcutaneous plane until adequate tissue mobility was obtained. Deep layering suturing was performed using 3-0 monocryl deep, intradermal and subcutaneous sutures.  Final cutaneous approximation was achieved with 3-0  monocryl running subcuticular sutures.      A total of 5mL of anesthesia was administered for all surgical sites. Estimated blood loss was less than 5mL for all surgical sites. A sterile pressure dressing was applied and wound care instructions, with a written handout, were given. The patient was discharged alert and ambulatory.    Pino Hinds M.D.      Department of Dermatology       Again, thank you for allowing me to participate in the care of your patient.        Sincerely,        Pino Hinds MD    Electronically signed

## 2025-06-09 NOTE — PROGRESS NOTES
DERMATOLOGIC SURGERY REPORT    NAME OF PROCEDURE:  EXCISION AND CLOSURE    Surgeon:  Pino Hinds MD    PREOPERATIVE DIAGNOSIS: [facility]  POSTOPERATIVE DIAGNOSIS: Same  Lesion Size: 16mm lesion with 4mm margins  Final excision size: 24 mm  Repair type: Complex  FINAL REPAIR LENGTH:  63mm  Location: L upper back  Prior Biopsy Accession #: [id number]     INDICATIONS:Excision was indicated for treatment. We discussed the principles of treatment and most likely complications including bleeding, infection, wound dehiscence, pain, nerve damage, and scarring. Informed consent was obtained and the patient underwent the procedure as follows.    PROCEDURE:  The patient was taken to the operative suite. The treatment area was anesthetized with 1% lidocaine with [id number] epinephrine buffered with bicarbonate. The area was washed with hibiclens, rinsed with saline and draped with sterile towels. The lesion was delineated and excised down to subcutaneous fat. Hemostasis was obtained by electrocoagulation.     REPAIR:  In order to repair this defect while maintaining the normal anatomic relations and function, we elected to utilize a linear closure. Closure was oriented so that the wound was in the patient's natural skin tension lines. Two redundant cones were removed by triangulation. Due to tightness of the surrounding skin deeper layers of the subcutaneous tissue were undermined extensively to a distance  greater than width of the defect on both sides by dissection in the subcutaneous plane until adequate tissue mobility was obtained. Deep layering suturing was performed using 3-0 monocryl deep, intradermal and subcutaneous sutures.  Final cutaneous approximation was achieved with 3-0 monocryl running subcuticular sutures.      A total of 5mL of anesthesia was administered for all surgical sites. Estimated blood loss was less than 5mL for all surgical sites. A sterile pressure dressing was applied and wound care instructions, with  a written handout, were given. The patient was discharged alert and ambulatory.    Pino Hinds M.D.      Department of Dermatology

## 2025-06-11 ENCOUNTER — THERAPY VISIT (OUTPATIENT)
Dept: PHYSICAL THERAPY | Facility: REHABILITATION | Age: 60
End: 2025-06-11
Payer: COMMERCIAL

## 2025-06-11 DIAGNOSIS — Z96.652 STATUS POST TOTAL LEFT KNEE REPLACEMENT: Primary | ICD-10-CM

## 2025-06-11 PROCEDURE — 97110 THERAPEUTIC EXERCISES: CPT | Mod: GP

## 2025-06-11 PROCEDURE — 97140 MANUAL THERAPY 1/> REGIONS: CPT | Mod: GP

## 2025-06-12 LAB
PATH REPORT.COMMENTS IMP SPEC: ABNORMAL
PATH REPORT.COMMENTS IMP SPEC: ABNORMAL
PATH REPORT.COMMENTS IMP SPEC: YES
PATH REPORT.FINAL DX SPEC: ABNORMAL
PATH REPORT.GROSS SPEC: ABNORMAL
PATH REPORT.MICROSCOPIC SPEC OTHER STN: ABNORMAL
PATH REPORT.RELEVANT HX SPEC: ABNORMAL

## 2025-06-13 ENCOUNTER — RESULTS FOLLOW-UP (OUTPATIENT)
Dept: DERMATOLOGY | Facility: CLINIC | Age: 60
End: 2025-06-13

## 2025-06-14 ENCOUNTER — HEALTH MAINTENANCE LETTER (OUTPATIENT)
Age: 60
End: 2025-06-14

## 2025-06-16 ENCOUNTER — TELEPHONE (OUTPATIENT)
Dept: ORTHOPEDICS | Facility: CLINIC | Age: 60
End: 2025-06-16
Payer: COMMERCIAL

## 2025-06-16 NOTE — TELEPHONE ENCOUNTER
[facility] Call Center    Phone Message    May a detailed message be left on voicemail: yes     Reason for Call: Patient asking for Call. She Unable to Return to Work because of Pain.  She Need Extension for Letter for Work.   . Please call for Details Thanks     Action Taken: Message routed to:  [facility] mary jane    Travel Screening: Not Applicable     Date of Service:

## 2025-06-16 NOTE — TELEPHONE ENCOUNTER
Patient was contacted and communicated through [employer] regarding letter. Encounter closed.     Nic Healy CMA

## 2025-06-18 ENCOUNTER — DOCUMENTATION ONLY (OUTPATIENT)
Dept: PHYSICAL THERAPY | Facility: REHABILITATION | Age: 60
End: 2025-06-18

## 2025-06-18 NOTE — PROGRESS NOTES
Pt arrived to physical therapy appointment ~ 25 minutes late due to traffic. Therapy session held today due to time constraints, however, time was spent communicating with pt in person about her current status. Her left knee continues to do well, however left buttock pain continues to be limiting. Pain was noted the day after her therapy session 5/28/25. Although symptoms are slowly improving, pt is still unable to tolerate walking household distances and has to limit her work duties to desk only. She is planning on scheduling with primary care to further assess her left buttock pain. Writer recommended continuing with stretches gently, range of motion exercises for knee and hip, as well as trying heat over the affected area. Therapy appointment for next week confirmed with pt.     Ewa Arana PT

## 2025-06-23 ENCOUNTER — OFFICE VISIT (OUTPATIENT)
Dept: FAMILY MEDICINE | Facility: CLINIC | Age: 60
End: 2025-06-23
Payer: COMMERCIAL

## 2025-06-23 ENCOUNTER — ALLIED HEALTH/NURSE VISIT (OUTPATIENT)
Dept: DERMATOLOGY | Facility: CLINIC | Age: 60
End: 2025-06-23
Payer: COMMERCIAL

## 2025-06-23 ENCOUNTER — TELEPHONE (OUTPATIENT)
Dept: DERMATOLOGY | Facility: CLINIC | Age: 60
End: 2025-06-23

## 2025-06-23 VITALS
BODY MASS INDEX: 40.15 KG/M2 | TEMPERATURE: 97.9 F | RESPIRATION RATE: 18 BRPM | SYSTOLIC BLOOD PRESSURE: 138 MMHG | HEIGHT: 67 IN | OXYGEN SATURATION: 98 % | HEART RATE: 70 BPM | WEIGHT: 255.8 LBS | DIASTOLIC BLOOD PRESSURE: 81 MMHG

## 2025-06-23 DIAGNOSIS — T14.8XXA WOUND INFECTION: Primary | ICD-10-CM

## 2025-06-23 DIAGNOSIS — L08.9 WOUND INFECTION: Primary | ICD-10-CM

## 2025-06-23 DIAGNOSIS — R52 PAIN: ICD-10-CM

## 2025-06-23 DIAGNOSIS — S33.8XXA SPRAIN AND STRAIN OF SACRUM, INITIAL ENCOUNTER: Primary | ICD-10-CM

## 2025-06-23 DIAGNOSIS — T14.8XXA LOCAL INFECTION OF WOUND: ICD-10-CM

## 2025-06-23 DIAGNOSIS — L08.9 LOCAL INFECTION OF WOUND: ICD-10-CM

## 2025-06-23 PROCEDURE — 1125F AMNT PAIN NOTED PAIN PRSNT: CPT | Performed by: NURSE PRACTITIONER

## 2025-06-23 PROCEDURE — 87205 SMEAR GRAM STAIN: CPT | Performed by: STUDENT IN AN ORGANIZED HEALTH CARE EDUCATION/TRAINING PROGRAM

## 2025-06-23 PROCEDURE — 87070 CULTURE OTHR SPECIMN AEROBIC: CPT | Performed by: STUDENT IN AN ORGANIZED HEALTH CARE EDUCATION/TRAINING PROGRAM

## 2025-06-23 PROCEDURE — 3079F DIAST BP 80-89 MM HG: CPT | Performed by: NURSE PRACTITIONER

## 2025-06-23 PROCEDURE — 99213 OFFICE O/P EST LOW 20 MIN: CPT | Performed by: NURSE PRACTITIONER

## 2025-06-23 PROCEDURE — 3075F SYST BP GE 130 - 139MM HG: CPT | Performed by: NURSE PRACTITIONER

## 2025-06-23 RX ORDER — DOXYCYCLINE 100 MG/1
100 CAPSULE ORAL 2 TIMES DAILY
Qty: 14 CAPSULE | Refills: 0 | Status: SHIPPED | OUTPATIENT
Start: 2025-06-23

## 2025-06-23 ASSESSMENT — PAIN SCALES - GENERAL: PAINLEVEL_OUTOF10: MILD PAIN (3)

## 2025-06-23 NOTE — TELEPHONE ENCOUNTER
[facility] Call Center    Phone Message    May a detailed message be left on voicemail: yes     Reason for Call: Other: Patient seen her primary provider. Her primary stated that her procedure location is infected and she told patient to call clinic to advise  that the site is infected. Please call patient back to discuss. Thanks.      Action Taken: Message routed to:  Other: [facility]     Travel Screening: Not Applicable     Date of Service:

## 2025-06-23 NOTE — PROGRESS NOTES
"  Assessment & Plan     Sprain and strain of sacrum, initial encounter  Initially started after difficult PT exercises. Now is doing better. Instructed to keep moving to avoid stiffening up. Continue with PT.     Local infection of wound  Recent basal cell removal. Hasn't been keeping it covered. She lives alone and is hard to reach. Instructed to doing her best to keep it clean. Dressing applied today. She needs to contact her dermatologist for close follow-up       Subjective   Gudelia is a 60 year old, presenting for the following health issues:  No chief complaint on file.    History of Present Illness       Reason for visit:  Injury during physical therapy   She is taking medications regularly.        Likely had a strain in physical therapy. Had a lot of pain of her tail bone. This pain started after doing some hard exercises at PT working on her glutes.  Was able to walk today without a cane which is good progress     Had basal cell cancer removed 2 weeks ago that has been draining quite a bit. She hasn't been covering it recently. She lives alone and is hard to reach.          Review of Systems  Detailed as above         Objective    /81 (BP Location: Right arm, Patient Position: Sitting, Cuff Size: Adult Large)   Pulse 70   Temp 97.9  F (36.6  C)   Resp 18   Ht 1.702 m (5' 7.01\")   Wt 116 kg (255 lb 12.8 oz)   LMP 07/20/2006   SpO2 98%   BMI 40.05 kg/m    There is no height or weight on file to calculate BMI.  Physical Exam  Constitutional:       Appearance: Normal appearance.   Pulmonary:      Effort: Pulmonary effort is normal.   Skin:     Comments: Dehiscence of wound of left upper back 1.5x.5cm. Small amount of purulent drainage. Surrounding erythema extends up to 6cm.    Neurological:      Mental Status: She is alert.   Psychiatric:         Mood and Affect: Mood normal.                Signed Electronically by: INEZ Morelos CNP    "
no

## 2025-06-23 NOTE — PROGRESS NOTES
Former[facility] Dermatology Note    Encounter Date: Jun 23, 2025    Dermatology Problem List:    ______________________________________    Impression/Plan:  Diagnoses and all orders for this visit:    Wound infection  -     doxycycline monohydrate (MONODOX) 100 MG capsule; Take 1 capsule (100 mg) by mouth 2 times daily.  -     Skin Aerobic Bacterial Culture Routine With Gram Stain  Pain  - dehiscence w/ thick purulent discharge, but w/o sig surrounding bright erythema, more dull   - cx  - start empiric doxy.       Staff Involved:  Staff Only    Pino Hinds MD   of Dermatology  Department of Dermatology  [facility]      CC:   No chief complaint on file.      History of Present Illness:  Ms. Gudelia Dong is a 60 year old female who presents as a return patient.    Wound dehisced, draining purulent material. Feeling well, no chills or rigors or fever    Labs:      Physical exam:  Vitals: LMP 07/20/2006   GEN: well developed, well-nourished, in no acute distress, in a pleasant mood.     SKIN: Bernard phototype 1  - Focused examination of the back was performed.  - dehisced surgical area w/ purulent drainage  - No other lesions of concern on areas examined.     Past Medical History:   Past Medical History:   Diagnosis Date    Anemia     Basal cell carcinoma     Depression     Erosive esophagitis     GERD (gastroesophageal reflux disease)     Hemorrhoid     Hiatal hernia     Hypothyroidism     Menstrual disorder     s/p D&C -12/2012    Obesity     ROMEO on CPAP     Other bipolar disorders     followed by Dr Collazo    Post menopausal syndrome     Prediabetes     Tobacco abuse      Past Surgical History:   Procedure Laterality Date    ARTHROPLASTY KNEE Left 4/23/2025    Procedure: Left total knee arthroplasty;  Surgeon: Glenroy Carrera MD;  Location: [facility]    COLONOSCOPY  12/24/2013    Procedure: COLONOSCOPY;;  Surgeon: Guy Grant MD;   Location: [facility]    COLONOSCOPY  1/9/2014    Procedure: COMBINED COLONOSCOPY, SINGLE BIOPSY/POLYPECTOMY BY BIOPSY;;  Surgeon: Guy Grant MD;  Location: [facility]    COLONOSCOPY N/A 8/8/2023    Procedure: COLONOSCOPY, WITH POLYPECTOMY AND BIOPSY;  Surgeon: René Rodriguez DO;  Location:  GI    ESOPHAGOSCOPY, GASTROSCOPY, DUODENOSCOPY (EGD), COMBINED  12/24/2013    Procedure: COMBINED ESOPHAGOSCOPY, GASTROSCOPY, DUODENOSCOPY (EGD), BIOPSY SINGLE OR MULTIPLE;  ESOPHAGOSCOPY, GASTROSCOPY, DUODENOSCOPY, COLONOSCOPY;  Surgeon: Guy Grant MD;  Location: [facility]    ESOPHAGOSCOPY, GASTROSCOPY, DUODENOSCOPY (EGD), COMBINED N/A 8/8/2023    Procedure: ESOPHAGOGASTRODUODENOSCOPY, WITH BIOPSY;  Surgeon: René Rodriguez DO;  Location: [facility]    ESOPHAGOSCOPY, GASTROSCOPY, DUODENOSCOPY (EGD), COMBINED N/A 2/15/2024    Procedure: Esophagoscopy, gastroscopy, duodenoscopy (EGD), combined;  Surgeon: Sarmad Reed MD;  Location:  GI    ESOPHAGOSCOPY, GASTROSCOPY, DUODENOSCOPY (EGD), COMBINED N/A 8/13/2024    Procedure: Esophagoscopy, gastroscopy, duodenoscopy (EGD), combined;  Surgeon: René Rodriguez DO;  Location:  GI    ESOPHAGOSCOPY, GASTROSCOPY, DUODENOSCOPY (EGD), COMBINED N/A 4/8/2025    Procedure: Esophagoscopy, gastroscopy, duodenoscopy (EGD), combined;  Surgeon: René Rodriguez DO;  Location:  GI    GENITOURINARY SURGERY      urethra stretched    GYN SURGERY      d&c     LAPAROSCOPIC ASSISTED RECTOPEXY  3/14/2014    Procedure: LAPAROSCOPIC ASSISTED RECTOPEXY;  LAPAROSCOPIC  VENTRAL RECTOPEXY ;  Surgeon: Jennifer Connolly MD;  Location:  OR    ORTHOPEDIC SURGERY      surgery on clavicle,surgery for left leg fracture       Social History:   reports that she quit smoking about 18 months ago. Her smoking use included cigarettes. She has been exposed to tobacco smoke. She has never used smokeless tobacco. She reports that she does not currently use drugs. She reports that she does not drink alcohol.    Family  History:  Family History   Problem Relation Age of Onset    Cancer Mother         ovarian cancer    Hypertension Father     Breast Cancer No family hx of     Cancer - colorectal No family hx of     Anesthesia Reaction No family hx of     Venous thrombosis No family hx of        Medications:  Current Outpatient Medications   Medication Sig Dispense Refill    doxycycline monohydrate (MONODOX) 100 MG capsule Take 1 capsule (100 mg) by mouth 2 times daily. 14 capsule 0    acetaminophen (TYLENOL) 325 MG tablet Take 2 tablets (650 mg) by mouth every 4 hours as needed for other (mild pain). 100 tablet 0    ammonium lactate (AMLACTIN) 12 % external cream Apply topically daily as needed for dry skin 385 g 1    aspirin (ASA) 81 MG EC tablet Take 1 tablet (81 mg) by mouth 2 times daily. 60 tablet 0    buPROPion (WELLBUTRIN XL) 150 MG 24 hr tablet Take 450 mg by mouth every morning      busPIRone HCl (BUSPAR) 30 MG tablet Take 1 tablet by mouth 2 times daily      diclofenac (VOLTAREN) 75 MG EC tablet Take 1 tablet (75 mg) by mouth 2 times daily. 30 tablet 0    doxepin HCl (SINEQUAN) 150 MG capsule Take 150 mg by mouth at bedtime.      famotidine (PEPCID) 40 MG tablet Take 1 tablet (40 mg) by mouth 2 times daily. 180 tablet 3    ferrous sulfate 300 (60 Fe) MG/5ML solution Take 300 mg by mouth daily.      hydrOXYzine HCl (ATARAX) 25 MG tablet Take 1 tablet (25 mg) by mouth every 6 hours as needed for other (adjuvant pain). 25 tablet 0    levothyroxine (SYNTHROID/LEVOTHROID) 150 MCG tablet TAKE ONE TABLET BY MOUTH EVERY DAY 90 tablet 3    methocarbamol (ROBAXIN) 500 MG tablet Take 1 tablet (500 mg) by mouth 4 times daily as needed for muscle spasms. 30 tablet 0    methocarbamol (ROBAXIN) 750 MG tablet Take 1 tablet (750 mg) by mouth every 6 hours as needed for muscle spasms. 20 tablet 0    naltrexone (DEPADE/REVIA) 50 MG tablet Take 1/2 tablet once daily 1-2 hours prior to worst cravings for 1 week, then increase to 1 tablet daily  as directed if tolerating 30 tablet 3    order for DME Equipment being ordered: CPAP  Please dispense Cpap and its supply 1 Units prn    PERMETHRIN EX Choice of mask ( or ) w/full face cushion () x1/mo, nasal cushion () x2/mo, or nasal pillows () x 2/mo; Length of Need: 99 months; Frequency of use: Daily.      polyethylene glycol (MIRALAX) 17 GM/Dose powder Take 17 g by mouth daily. 510 g 0    predniSONE (DELTASONE) 20 MG tablet Take 3 tabs by mouth daily x 3 days, then 2 tabs daily x 3 days, then 1 tab daily x 3 days, then 1/2 tab daily x 3 days. 20 tablet 0    RABEprazole (ACIPHEX) 20 MG EC tablet Take 1 tablet (20 mg) by mouth 2 times daily (before meals). 180 tablet 1    REXULTI 2 MG tablet Take 2 mg by mouth every morning      senna-docusate (SENOKOT-S/PERICOLACE) 8.6-50 MG tablet Take 1 tablet by mouth 2 times daily. 60 tablet 0    topiramate (TOPAMAX) 25 MG tablet Take 2 tablets (50 mg) by mouth 2 times daily. 120 tablet 3    triamcinolone (KENALOG) 0.025 % external ointment Apply topically 2 times daily. To affected area in the groin as needed up to 2 weeks at a time. 30 g 3    Vonoprazan Fumarate (VOQUEZNA) 20 MG TABS Take 20 mg by mouth daily. 90 tablet 1     Allergies   Allergen Reactions    Contrast Dye Shortness Of Breath    Methylpyrrolidone Swelling    Iodine Swelling     Other reaction(s): Angioedema  Facial swelling.    Morphine Anxiety and Nausea and Vomiting     Other reaction(s): Behavioral Disturbances  Other reaction(s): Anxiety, vomiting

## 2025-06-25 ENCOUNTER — PATIENT OUTREACH (OUTPATIENT)
Dept: ONCOLOGY | Facility: HOSPITAL | Age: 60
End: 2025-06-25

## 2025-06-25 NOTE — PROGRESS NOTES
[facility]: Cancer Care                                                                                      RN Cancer Care Coordinator sent MyC message to patient reminding about labs to be done post Heme consult. Encouraged questions/calls.      Signature:  Blanquita Dang RN

## 2025-06-26 ENCOUNTER — PATIENT OUTREACH (OUTPATIENT)
Dept: ONCOLOGY | Facility: HOSPITAL | Age: 60
End: 2025-06-26
Payer: COMMERCIAL

## 2025-06-26 LAB
BACTERIA SKIN AEROBE CULT: ABNORMAL
GRAM STAIN RESULT: ABNORMAL
GRAM STAIN RESULT: ABNORMAL

## 2025-06-30 ENCOUNTER — RESULTS FOLLOW-UP (OUTPATIENT)
Dept: DERMATOLOGY | Facility: CLINIC | Age: 60
End: 2025-06-30

## 2025-07-05 ENCOUNTER — LAB (OUTPATIENT)
Dept: LAB | Facility: CLINIC | Age: 60
End: 2025-07-05
Payer: COMMERCIAL

## 2025-07-05 DIAGNOSIS — D50.0 IRON DEFICIENCY ANEMIA DUE TO CHRONIC BLOOD LOSS: ICD-10-CM

## 2025-07-05 LAB
BASOPHILS # BLD AUTO: 0 10E3/UL (ref 0–0.2)
BASOPHILS NFR BLD AUTO: 1 %
EOSINOPHIL # BLD AUTO: 0.2 10E3/UL (ref 0–0.7)
EOSINOPHIL NFR BLD AUTO: 4 %
ERYTHROCYTE [DISTWIDTH] IN BLOOD BY AUTOMATED COUNT: 15.4 % (ref 10–15)
FERRITIN SERPL-MCNC: 30 NG/ML (ref 11–328)
HCT VFR BLD AUTO: 32.2 % (ref 35–47)
HGB BLD-MCNC: 9.6 G/DL (ref 11.7–15.7)
IMM GRANULOCYTES # BLD: 0 10E3/UL
IMM GRANULOCYTES NFR BLD: 0 %
IRON BINDING CAPACITY (ROCHE): 281 UG/DL (ref 240–430)
IRON SATN MFR SERPL: 8 % (ref 15–46)
IRON SERPL-MCNC: 22 UG/DL (ref 37–145)
LYMPHOCYTES # BLD AUTO: 1.9 10E3/UL (ref 0.8–5.3)
LYMPHOCYTES NFR BLD AUTO: 41 %
MCH RBC QN AUTO: 24.6 PG (ref 26.5–33)
MCHC RBC AUTO-ENTMCNC: 29.8 G/DL (ref 31.5–36.5)
MCV RBC AUTO: 83 FL (ref 78–100)
MONOCYTES # BLD AUTO: 0.5 10E3/UL (ref 0–1.3)
MONOCYTES NFR BLD AUTO: 10 %
NEUTROPHILS # BLD AUTO: 2 10E3/UL (ref 1.6–8.3)
NEUTROPHILS NFR BLD AUTO: 44 %
NRBC # BLD AUTO: 0 10E3/UL
NRBC BLD AUTO-RTO: 0 /100
PLATELET # BLD AUTO: 310 10E3/UL (ref 150–450)
RBC # BLD AUTO: 3.9 10E6/UL (ref 3.8–5.2)
WBC # BLD AUTO: 4.7 10E3/UL (ref 4–11)

## 2025-07-05 PROCEDURE — 82728 ASSAY OF FERRITIN: CPT | Performed by: PATHOLOGY

## 2025-07-05 PROCEDURE — 36415 COLL VENOUS BLD VENIPUNCTURE: CPT | Performed by: PATHOLOGY

## 2025-07-05 PROCEDURE — 85025 COMPLETE CBC W/AUTO DIFF WBC: CPT | Performed by: PATHOLOGY

## 2025-07-05 PROCEDURE — 83550 IRON BINDING TEST: CPT | Performed by: PATHOLOGY

## 2025-07-05 PROCEDURE — 83540 ASSAY OF IRON: CPT | Performed by: PATHOLOGY

## 2025-07-14 ENCOUNTER — OFFICE VISIT (OUTPATIENT)
Dept: DERMATOLOGY | Facility: CLINIC | Age: 60
End: 2025-07-14
Payer: COMMERCIAL

## 2025-07-14 DIAGNOSIS — C44.519 BASAL CELL CARCINOMA (BCC) OF SKIN OF TRUNK: ICD-10-CM

## 2025-07-14 DIAGNOSIS — L08.9 WOUND INFECTION: Primary | ICD-10-CM

## 2025-07-14 DIAGNOSIS — T14.8XXA WOUND INFECTION: Primary | ICD-10-CM

## 2025-07-14 NOTE — PROGRESS NOTES
[facility] Dermatology Note    Encounter Date: Jul 14, 2025    Dermatology Problem List:    Social Hx:  ______________________________________    Impression/Plan:  Gudelia was seen today for derm problem.    Diagnoses and all orders for this visit:    Wound infection  - Proved with doxycycline  - She is unable to dress the area on her own at home and does not have any friends or family nearby that can help  - Seems to be healing appropriately all things considering    Basal cell carcinoma (BCC) of skin of trunk  -Incidental superficial multifocal basal cell found on the periphery of the excision  - Will monitor the area for now can treat with imiquimod or excise the area in the future if needed  - defers rx tx at this times      Follow-up in 2 mo.       Staff Involved:  Staff Only    Pino Hinds MD   of Dermatology  Department of Dermatology  [facility]      CC:   Chief Complaint   Patient presents with    Derm Problem     Follow up Wound infection           History of Present Illness:  Ms. Gudelia Dong is a 60 year old female who presents as a return patient.    Status post excision of basal cell carcinoma on the left upper back.  An incidental superficial multifocal basal cell was found at the periphery.  She subsequently had a dehiscence, she was evaluated in clinic the area was found to have signs of infection.  A culture grew staph.  She was treated with doxycycline and did well.  She presents today for follow-up.  There is no obvious area of superficial multifocal basal cell on exam.  Will continue to monitor going forward is possible that it could be resolved with the inflammation from healing.  Given that the superficial multifocal basal cell carcinoma is not aggressive monitoring in this scenario is reasonable.  We can excise the area or treat with imiquimod if needed    Labs:      Physical exam:  Vitals: LMP 07/20/2006   GEN: well  developed, well-nourished, in no acute distress, in a pleasant mood.     SKIN: Bernard phototype 1  - Waist-up skin, which includes the head/face, neck, both arms, chest, back, abdomen, digits and/or nails was examined.  - healing granulation tissue R back at site of excision  - No other lesions of concern on areas examined.     Past Medical History:   Past Medical History:   Diagnosis Date    Anemia     Basal cell carcinoma     Depression     Erosive esophagitis     GERD (gastroesophageal reflux disease)     Hemorrhoid     Hiatal hernia     Hypothyroidism     Menstrual disorder     s/p D&C -12/2012    Obesity     ROMEO on CPAP     Other bipolar disorders     followed by Dr Collazo    Post menopausal syndrome     Prediabetes     Tobacco abuse      Past Surgical History:   Procedure Laterality Date    ARTHROPLASTY KNEE Left 4/23/2025    Procedure: Left total knee arthroplasty;  Surgeon: Glenroy Carrera MD;  [facility]    COLONOSCOPY  12/24/2013    Procedure: COLONOSCOPY;;  Surgeon: Guy Grant MD;  Location:  GI    COLONOSCOPY  1/9/2014    Procedure: COMBINED COLONOSCOPY, SINGLE BIOPSY/POLYPECTOMY BY BIOPSY;;  Surgeon: Guy Grant MD;  Location:  GI    COLONOSCOPY N/A 8/8/2023    Procedure: COLONOSCOPY, WITH POLYPECTOMY AND BIOPSY;  Surgeon: René Rodriguez DO;  Location:  GI    ESOPHAGOSCOPY, GASTROSCOPY, DUODENOSCOPY (EGD), COMBINED  12/24/2013    Procedure: COMBINED ESOPHAGOSCOPY, GASTROSCOPY, DUODENOSCOPY (EGD), BIOPSY SINGLE OR MULTIPLE;  ESOPHAGOSCOPY, GASTROSCOPY, DUODENOSCOPY, COLONOSCOPY;  Surgeon: Guy Grant MD;  Location:  GI    ESOPHAGOSCOPY, GASTROSCOPY, DUODENOSCOPY (EGD), COMBINED N/A 8/8/2023    Procedure: ESOPHAGOGASTRODUODENOSCOPY, WITH BIOPSY;  Surgeon: René Rodriguez DO;  Location:  GI    ESOPHAGOSCOPY, GASTROSCOPY, DUODENOSCOPY (EGD), COMBINED N/A 2/15/2024    Procedure: Esophagoscopy, gastroscopy, duodenoscopy (EGD), combined;  Surgeon: Sarmad Reed MD;   Location:  GI    ESOPHAGOSCOPY, GASTROSCOPY, DUODENOSCOPY (EGD), COMBINED N/A 8/13/2024    Procedure: Esophagoscopy, gastroscopy, duodenoscopy (EGD), combined;  Surgeon: René Rodriguez DO;  Location:  GI    ESOPHAGOSCOPY, GASTROSCOPY, DUODENOSCOPY (EGD), COMBINED N/A 4/8/2025    Procedure: Esophagoscopy, gastroscopy, duodenoscopy (EGD), combined;  Surgeon: René Rodriguez DO;  Location:  GI    GENITOURINARY SURGERY      urethra stretched    GYN SURGERY      d&c     LAPAROSCOPIC ASSISTED RECTOPEXY  3/14/2014    Procedure: LAPAROSCOPIC ASSISTED RECTOPEXY;  LAPAROSCOPIC  VENTRAL RECTOPEXY ;  Surgeon: Jennifer Connolly MD;  Location:  OR    ORTHOPEDIC SURGERY      surgery on clavicle,surgery for left leg fracture       Social History:   reports that she quit smoking about 18 months ago. Her smoking use included cigarettes. She has been exposed to tobacco smoke. She has never used smokeless tobacco. She reports that she does not currently use drugs. She reports that she does not drink alcohol.    Family History:  Family History   Problem Relation Age of Onset    Cancer Mother         ovarian cancer    Hypertension Father     Breast Cancer No family hx of     Cancer - colorectal No family hx of     Anesthesia Reaction No family hx of     Venous thrombosis No family hx of        Medications:  Current Outpatient Medications   Medication Sig Dispense Refill    acetaminophen (TYLENOL) 325 MG tablet Take 2 tablets (650 mg) by mouth every 4 hours as needed for other (mild pain). 100 tablet 0    ammonium lactate (AMLACTIN) 12 % external cream Apply topically daily as needed for dry skin 385 g 1    aspirin (ASA) 81 MG EC tablet Take 1 tablet (81 mg) by mouth 2 times daily. 60 tablet 0    buPROPion (WELLBUTRIN XL) 150 MG 24 hr tablet Take 450 mg by mouth every morning      busPIRone HCl (BUSPAR) 30 MG tablet Take 1 tablet by mouth 2 times daily      diclofenac (VOLTAREN) 75 MG EC tablet Take 1 tablet (75 mg) by mouth 2  times daily. 30 tablet 0    doxepin HCl (SINEQUAN) 150 MG capsule Take 150 mg by mouth at bedtime.      doxycycline monohydrate (MONODOX) 100 MG capsule Take 1 capsule (100 mg) by mouth 2 times daily. 14 capsule 0    famotidine (PEPCID) 40 MG tablet Take 1 tablet (40 mg) by mouth 2 times daily. 180 tablet 3    ferrous sulfate 300 (60 Fe) MG/5ML solution Take 300 mg by mouth daily.      hydrOXYzine HCl (ATARAX) 25 MG tablet Take 1 tablet (25 mg) by mouth every 6 hours as needed for other (adjuvant pain). 25 tablet 0    levothyroxine (SYNTHROID/LEVOTHROID) 150 MCG tablet TAKE ONE TABLET BY MOUTH EVERY DAY 90 tablet 3    methocarbamol (ROBAXIN) 500 MG tablet Take 1 tablet (500 mg) by mouth 4 times daily as needed for muscle spasms. 30 tablet 0    methocarbamol (ROBAXIN) 750 MG tablet Take 1 tablet (750 mg) by mouth every 6 hours as needed for muscle spasms. 20 tablet 0    naltrexone (DEPADE/REVIA) 50 MG tablet Take 1/2 tablet once daily 1-2 hours prior to worst cravings for 1 week, then increase to 1 tablet daily as directed if tolerating 30 tablet 3    order for DME Equipment being ordered: CPAP  Please dispense Cpap and its supply 1 Units prn    PERMETHRIN EX Choice of mask ( or ) w/full face cushion () x1/mo, nasal cushion () x2/mo, or nasal pillows () x 2/mo; Length of Need: 99 months; Frequency of use: Daily.      polyethylene glycol (MIRALAX) 17 GM/Dose powder Take 17 g by mouth daily. 510 g 0    predniSONE (DELTASONE) 20 MG tablet Take 3 tabs by mouth daily x 3 days, then 2 tabs daily x 3 days, then 1 tab daily x 3 days, then 1/2 tab daily x 3 days. 20 tablet 0    RABEprazole (ACIPHEX) 20 MG EC tablet Take 1 tablet (20 mg) by mouth 2 times daily (before meals). 180 tablet 1    REXULTI 2 MG tablet Take 2 mg by mouth every morning      senna-docusate (SENOKOT-S/PERICOLACE) 8.6-50 MG tablet Take 1 tablet by mouth 2 times daily. 60 tablet 0    topiramate (TOPAMAX) 25 MG tablet Take 2 tablets  (50 mg) by mouth 2 times daily. 120 tablet 3    triamcinolone (KENALOG) 0.025 % external ointment Apply topically 2 times daily. To affected area in the groin as needed up to 2 weeks at a time. 30 g 3     Allergies   Allergen Reactions    Contrast Dye Shortness Of Breath    Methylpyrrolidone Swelling    Iodine Swelling     Other reaction(s): Angioedema  Facial swelling.    Morphine Anxiety and Nausea and Vomiting     Other reaction(s): Behavioral Disturbances  Other reaction(s): Anxiety, vomiting

## 2025-07-14 NOTE — LETTER
7/14/2025      Gudelia Dong  [address]  [place] [address]      Dear Colleague,    Thank you for referring your patient, Gudelia Dong, to the [facility]. Please see a copy of my visit note below.    [facility] Dermatology Note    Encounter Date: Jul 14, 2025    Dermatology Problem List:    Social Hx:  ______________________________________    Impression/Plan:  Gudelia was seen today for derm problem.    Diagnoses and all orders for this visit:    Wound infection  - Proved with doxycycline  - She is unable to dress the area on her own at home and does not have any friends or family nearby that can help  - Seems to be healing appropriately all things considering    Basal cell carcinoma (BCC) of skin of trunk  -Incidental superficial multifocal basal cell found on the periphery of the excision  - Will monitor the area for now can treat with imiquimod or excise the area in the future if needed  - defers rx tx at this times      Follow-up in 2 mo.       Staff Involved:  Staff Only    Pino Hinds MD   of Dermatology  Department of Dermatology  [facility]      CC:   Chief Complaint   Patient presents with     Derm Problem     Follow up Wound infection           History of Present Illness:  Ms. Gudelia Dong is a 60 year old female who presents as a return patient.    Status post excision of basal cell carcinoma on the left upper back.  An incidental superficial multifocal basal cell was found at the periphery.  She subsequently had a dehiscence, she was evaluated in clinic the area was found to have signs of infection.  A culture grew staph.  She was treated with doxycycline and did well.  She presents today for follow-up.  There is no obvious area of superficial multifocal basal cell on exam.  Will continue to monitor going forward is possible that it could be resolved with the inflammation from  healing.  Given that the superficial multifocal basal cell carcinoma is not aggressive monitoring in this scenario is reasonable.  We can excise the area or treat with imiquimod if needed    Labs:      Physical exam:  Vitals: LMP 07/20/2006   GEN: well developed, well-nourished, in no acute distress, in a pleasant mood.     SKIN: Bernard phototype 1  - Waist-up skin, which includes the head/face, neck, both arms, chest, back, abdomen, digits and/or nails was examined.  - healing granulation tissue R back at site of excision  - No other lesions of concern on areas examined.     Past Medical History:   Past Medical History:   Diagnosis Date     Anemia      Basal cell carcinoma      Depression      Erosive esophagitis      GERD (gastroesophageal reflux disease)      Hemorrhoid      Hiatal hernia      Hypothyroidism      Menstrual disorder     s/p D&C -12/2012     Obesity      ROMEO on CPAP      Other bipolar disorders     followed by Dr Collazo     Post menopausal syndrome      Prediabetes      Tobacco abuse      Past Surgical History:   Procedure Laterality Date     ARTHROPLASTY KNEE Left 4/23/2025    Procedure: Left total knee arthroplasty;  Surgeon: Glenroy Carrera MD;  Location: [facility]     COLONOSCOPY  12/24/2013    Procedure: COLONOSCOPY;;  Surgeon: Guy Grant MD;  Location: [facility]     COLONOSCOPY  1/9/2014    Procedure: COMBINED COLONOSCOPY, SINGLE BIOPSY/POLYPECTOMY BY BIOPSY;;  Surgeon: Guy Grant MD;  Location:  GI     COLONOSCOPY N/A 8/8/2023    Procedure: COLONOSCOPY, WITH POLYPECTOMY AND BIOPSY;  Surgeon: René Rodriguez DO;  Location:  GI     ESOPHAGOSCOPY, GASTROSCOPY, DUODENOSCOPY (EGD), COMBINED  12/24/2013    Procedure: COMBINED ESOPHAGOSCOPY, GASTROSCOPY, DUODENOSCOPY (EGD), BIOPSY SINGLE OR MULTIPLE;  ESOPHAGOSCOPY, GASTROSCOPY, DUODENOSCOPY, COLONOSCOPY;  Surgeon: Guy Grant MD;  Location: [facility]     ESOPHAGOSCOPY, GASTROSCOPY, DUODENOSCOPY (EGD), COMBINED N/A 8/8/2023     Procedure: ESOPHAGOGASTRODUODENOSCOPY, WITH BIOPSY;  Surgeon: René Rodriguez DO;  Location:  GI     ESOPHAGOSCOPY, GASTROSCOPY, DUODENOSCOPY (EGD), COMBINED N/A 2/15/2024    Procedure: Esophagoscopy, gastroscopy, duodenoscopy (EGD), combined;  Surgeon: Sarmad Reed MD;  Location:  GI     ESOPHAGOSCOPY, GASTROSCOPY, DUODENOSCOPY (EGD), COMBINED N/A 8/13/2024    Procedure: Esophagoscopy, gastroscopy, duodenoscopy (EGD), combined;  Surgeon: René Rodriguez DO;  Location:  GI     ESOPHAGOSCOPY, GASTROSCOPY, DUODENOSCOPY (EGD), COMBINED N/A 4/8/2025    Procedure: Esophagoscopy, gastroscopy, duodenoscopy (EGD), combined;  Surgeon: René Rodriguez DO;  Location:  GI     GENITOURINARY SURGERY      urethra stretched     GYN SURGERY      d&c      LAPAROSCOPIC ASSISTED RECTOPEXY  3/14/2014    Procedure: LAPAROSCOPIC ASSISTED RECTOPEXY;  LAPAROSCOPIC  VENTRAL RECTOPEXY ;  Surgeon: Jennifer Connolly MD;  Location:  OR     ORTHOPEDIC SURGERY      surgery on clavicle,surgery for left leg fracture       Social History:   reports that she quit smoking about 18 months ago. Her smoking use included cigarettes. She has been exposed to tobacco smoke. She has never used smokeless tobacco. She reports that she does not currently use drugs. She reports that she does not drink alcohol.    Family History:  Family History   Problem Relation Age of Onset     Cancer Mother         ovarian cancer     Hypertension Father      Breast Cancer No family hx of      Cancer - colorectal No family hx of      Anesthesia Reaction No family hx of      Venous thrombosis No family hx of        Medications:  Current Outpatient Medications   Medication Sig Dispense Refill     acetaminophen (TYLENOL) 325 MG tablet Take 2 tablets (650 mg) by mouth every 4 hours as needed for other (mild pain). 100 tablet 0     ammonium lactate (AMLACTIN) 12 % external cream Apply topically daily as needed for dry skin 385 g 1     aspirin (ASA) 81 MG EC tablet Take 1  tablet (81 mg) by mouth 2 times daily. 60 tablet 0     buPROPion (WELLBUTRIN XL) 150 MG 24 hr tablet Take 450 mg by mouth every morning       busPIRone HCl (BUSPAR) 30 MG tablet Take 1 tablet by mouth 2 times daily       diclofenac (VOLTAREN) 75 MG EC tablet Take 1 tablet (75 mg) by mouth 2 times daily. 30 tablet 0     doxepin HCl (SINEQUAN) 150 MG capsule Take 150 mg by mouth at bedtime.       doxycycline monohydrate (MONODOX) 100 MG capsule Take 1 capsule (100 mg) by mouth 2 times daily. 14 capsule 0     famotidine (PEPCID) 40 MG tablet Take 1 tablet (40 mg) by mouth 2 times daily. 180 tablet 3     ferrous sulfate 300 (60 Fe) MG/5ML solution Take 300 mg by mouth daily.       hydrOXYzine HCl (ATARAX) 25 MG tablet Take 1 tablet (25 mg) by mouth every 6 hours as needed for other (adjuvant pain). 25 tablet 0     levothyroxine (SYNTHROID/LEVOTHROID) 150 MCG tablet TAKE ONE TABLET BY MOUTH EVERY DAY 90 tablet 3     methocarbamol (ROBAXIN) 500 MG tablet Take 1 tablet (500 mg) by mouth 4 times daily as needed for muscle spasms. 30 tablet 0     methocarbamol (ROBAXIN) 750 MG tablet Take 1 tablet (750 mg) by mouth every 6 hours as needed for muscle spasms. 20 tablet 0     naltrexone (DEPADE/REVIA) 50 MG tablet Take 1/2 tablet once daily 1-2 hours prior to worst cravings for 1 week, then increase to 1 tablet daily as directed if tolerating 30 tablet 3     order for DME Equipment being ordered: CPAP  Please dispense Cpap and its supply 1 Units prn     PERMETHRIN EX Choice of mask ( or ) w/full face cushion () x1/mo, nasal cushion () x2/mo, or nasal pillows () x 2/mo; Length of Need: 99 months; Frequency of use: Daily.       polyethylene glycol (MIRALAX) 17 GM/Dose powder Take 17 g by mouth daily. 510 g 0     predniSONE (DELTASONE) 20 MG tablet Take 3 tabs by mouth daily x 3 days, then 2 tabs daily x 3 days, then 1 tab daily x 3 days, then 1/2 tab daily x 3 days. 20 tablet 0     RABEprazole (ACIPHEX)  20 MG EC tablet Take 1 tablet (20 mg) by mouth 2 times daily (before meals). 180 tablet 1     REXULTI 2 MG tablet Take 2 mg by mouth every morning       senna-docusate (SENOKOT-S/PERICOLACE) 8.6-50 MG tablet Take 1 tablet by mouth 2 times daily. 60 tablet 0     topiramate (TOPAMAX) 25 MG tablet Take 2 tablets (50 mg) by mouth 2 times daily. 120 tablet 3     triamcinolone (KENALOG) 0.025 % external ointment Apply topically 2 times daily. To affected area in the groin as needed up to 2 weeks at a time. 30 g 3     Allergies   Allergen Reactions     Contrast Dye Shortness Of Breath     Methylpyrrolidone Swelling     Iodine Swelling     Other reaction(s): Angioedema  Facial swelling.     Morphine Anxiety and Nausea and Vomiting     Other reaction(s): Behavioral Disturbances  Other reaction(s): Anxiety, vomiting               Again, thank you for allowing me to participate in the care of your patient.        Sincerely,        Pino Hinds MD    Electronically signed

## 2025-07-16 ENCOUNTER — THERAPY VISIT (OUTPATIENT)
Dept: PHYSICAL THERAPY | Facility: REHABILITATION | Age: 60
End: 2025-07-16
Payer: COMMERCIAL

## 2025-07-16 DIAGNOSIS — Z96.652 STATUS POST TOTAL LEFT KNEE REPLACEMENT: Primary | ICD-10-CM

## 2025-07-16 PROCEDURE — 97110 THERAPEUTIC EXERCISES: CPT | Mod: GP

## 2025-07-16 NOTE — PROGRESS NOTES
PLAN  Continue therapy per current plan of care.extended POC by 8 weeks with frequency of 1x/week    Beginning/End Dates of Progress Note Reporting Period:  4/30/25 to 07/16/2025    Referring Provider:  Glenroy Carrera       07/16/25 0500   Appointment Info   Signing clinician's name / credentials Ewa Arana, PT, DPT   Total/Authorized Visits up to 12   Visits Used 9   Medical Diagnosis Z96.652 (ICD-10-CM) - Status post total left knee replacement   PT Tx Diagnosis knee stiffness and weakness s/p LTKA   Progress Note/Certification   Onset of illness/injury or Date of Surgery 04/23/25   Therapy Frequency 1x/week   Predicted Duration up to 12 weeks   Progress Note Due Date 09/17/25   Progress Note Completed Date 07/16/25   GOALS   PT Goals 2;3   PT Goal 1   Goal Identifier HEP   Goal Description pt will demonstrate independence and report compliance with HEP to facilitate improved independent symptom mgmt.   Rationale to maximize safety and independence with performance of ADLs and functional tasks;to maximize safety and independence with self cares   Goal Progress in progress, not doing strengthening exercises at home   Target Date 09/17/25   PT Goal 2   Goal Identifier left knee AROM   Goal Description pt will demonstrate full knee extension AROM and greater than or equal to 120 degree so knee flexion.   Rationale to maximize safety and independence with performance of ADLs and functional tasks;to maximize safety and independence within the home;to maximize safety and independence within the community;to maximize safety and independence with self cares   Goal Progress meeting for flexion, lacking TKE   Target Date 07/23/25   PT Goal 3   Goal Identifier gait   Goal Description pt will ambulate without AD for greater than or equal to 15 minutes to faciliate improved tolerance of community ambulation.   Rationale to maximize safety and independence with performance of ADLs and functional tasks;to maximize safety  and independence within the home;to maximize safety and independence within the community;to maximize safety and independence with self cares   Goal Progress in progress, without AD but limited by stamina   Target Date 07/23/25   Subjective Report   Subjective Report No longer having left buttock pain. Returned to full duty at work- walking across the floor. No AD. Pain rate at 3-4/10 left knee. Think buttock pain was partially related to poorly supported couch. Has new couch and is sleeping in bed now. Still having diffculty with stairs (up>down). Having moderate knee stiffness in the morning, but feels she has maintained ROM. Hasn't been doing strengthening exercises at home.   Objective Measure 2   Objective Measure left knee flexion AROM   Details 0-3-125   Objective Measure 3   Objective Measure stairs   Details 1 UE support, marked time   Treatment Interventions (PT)   Interventions Therapeutic Procedure/Exercise   Therapeutic Procedure/Exercise   Therapeutic Procedures: strength, endurance, ROM, flexibility minutes (17269) 39   Therapeutic Procedures Ther Proc 2;Ther Proc 3;Ther Proc 4;Ther Proc 5;Ther Proc 6;Ther Proc 7;Ther Proc 8;Ther Proc 9   Ther Proc 1 nustep   Ther Proc 1 - Details L5 x 6 minutes for knee ROM   Ther Proc 2 resisted side stepping   Ther Proc 2 - Details L4 TB, 3 x 15 feet down and back, no UE support   Ther Proc 3 resisted walking   Ther Proc 3 - Details L4 TB, 3 x 15 feet down and back, no UE support   Ther Proc 4 Seated hamstring stretch   Ther Proc 4 - Details HEP   Ther Proc 5 Lat step up   Ther Proc 5 - Details HEP   Ther Proc 6 lumbar roll   Ther Proc 6 - Details HEP   Ther Proc 7 isometric hip ER   Ther Proc 7 - Details d/c   Ther Proc 8 prone on elbows   Ther Proc 8 - Details HEP   Ther Proc 9 piriformis pretzel stretch   Ther Proc 9 - Details d/c   PTRx Ther Proc 1 Heel Slides   PTRx Ther Proc 1 - Details continue to keep knee mobile with flexion   PTRx Ther Proc 2 Isometric  Quad   PTRx Ther Proc 2 - Details HEP   PTRx Ther Proc 3 Passive Knee Extension Stretch   PTRx Ther Proc 3 - Details work on extension more to get last few degrees   PTRx Ther Proc 4 Short Arc Knee Extension (Long Sitting)   PTRx Ther Proc 4 - Details HEP   PTRx Ther Proc 5 Hip Flexion Straight Leg Raise   PTRx Ther Proc 5 - Details HEP   PTRx Ther Proc 6 Seated Heel Slide with Foot on the Floor   PTRx Ther Proc 6 - Details HEP   PTRx Ther Proc 7 Sit to Stand Two Hands   PTRx Ther Proc 7 - Details HEP   PTRx Ther Proc 8 Towel Stretch Gastroc   PTRx Ther Proc 8 - Details HEP   PTRx Ther Proc 9 Toe Raises   PTRx Ther Proc 9 - Details HEP   PTRx Ther Proc 10 Standing Gastroc Stretch   PTRx Ther Proc 10 - Details HEP   PTRx Ther Proc 11 Functional Knee Extension with Tubing   PTRx Ther Proc 11 - Details HEP   PTRx Ther Proc 12 Side Stepping With Theraband   PTRx Ther Proc 12 - Details HEP   PTRx Ther Proc 13 Standing Soleus Stretch   PTRx Ther Proc 13 - Details HEP   PTRx Ther Proc 14 Prone Knee Hang   PTRx Ther Proc 14 - Details continue to work on to get last few degrees of knee extension   PTRx Ther Proc 15 Squats   PTRx Ther Proc 15 - Details tap down on taller surface (~ height of pt bed, lowest height of gray mat), 3 x 10 reps   PTRx Ther Proc 16 Step Up   PTRx Ther Proc 16 - Details VR- can use bottom stair at home, 3 x 10 reps, 1 UE support   Skilled Intervention Progressed functional strengthening and endurance to improve tolerance for return to work.   Patient Response/Progress SOB, increased time for rest breaks   Gait Training   Gait Training Gait 2   Gait 1 gait training   Gait 1 - Details with SPC, cues to emphasis heel strike with toes up on left, 2 x ~ 100 feet   Gait 2 stairs   Skilled Intervention gait and stair training   Manual Therapy   Manual Therapy 1 STM and trigger point release   Manual Therapy 1 - Details throughout left buttock- from sacral border to greater troch to ischial tuberosity.    Skilled Intervention manual to improve tissue mobility and reduce pain   Patient Response/Progress feels less tight and less painful walking after   Eval/Assessments   Assessments Physical Performance Test/Measures   Six Minute Walk Test   Six Minute Walk Test - Details with SPC, 204 meters, RPE 4/10, oxygen 99%, HR:132 bpm   Skilled Intervention 6 minute walk test with instructions pre-testing and post-test assessment   Physical Performance Test/measures   Physical Performance Test/Measurement Details assessment of new buttock/low back pain   Skilled Intervention see objective measurements   Education   Learner/Method Patient   Plan   Home program ptrx   Comments   Comments Pt has attended 9 physical therapy visits s/p L TKA. Pt with setback due to muscle related pain, however, is back on track. Pt demonstrate full knee flexion AROM, but is lacking TKE. Stamina and strength deficits are ongoing. Pt would benefit from additional skilled physical therapy.   Total Session Time   Timed Code Treatment Minutes 39   Total Treatment Time (sum of timed and untimed services) 39

## 2025-07-24 ENCOUNTER — PATIENT OUTREACH (OUTPATIENT)
Dept: ONCOLOGY | Facility: HOSPITAL | Age: 60
End: 2025-07-24
Payer: COMMERCIAL

## 2025-07-24 DIAGNOSIS — D50.0 IRON DEFICIENCY ANEMIA DUE TO CHRONIC BLOOD LOSS: Primary | ICD-10-CM

## 2025-07-24 DIAGNOSIS — D64.9 ANEMIA, UNSPECIFIED TYPE: ICD-10-CM

## 2025-07-24 RX ORDER — METHYLPREDNISOLONE SODIUM SUCCINATE 40 MG/ML
40 INJECTION INTRAMUSCULAR; INTRAVENOUS
Start: 2025-07-31

## 2025-07-24 RX ORDER — ALBUTEROL SULFATE 0.83 MG/ML
2.5 SOLUTION RESPIRATORY (INHALATION)
OUTPATIENT
Start: 2025-07-31

## 2025-07-24 RX ORDER — HEPARIN SODIUM (PORCINE) LOCK FLUSH IV SOLN 100 UNIT/ML 100 UNIT/ML
5 SOLUTION INTRAVENOUS
OUTPATIENT
Start: 2025-07-31

## 2025-07-24 RX ORDER — DIPHENHYDRAMINE HYDROCHLORIDE 50 MG/ML
25 INJECTION, SOLUTION INTRAMUSCULAR; INTRAVENOUS
Start: 2025-07-31

## 2025-07-24 RX ORDER — ALBUTEROL SULFATE 90 UG/1
1-2 INHALANT RESPIRATORY (INHALATION)
Start: 2025-07-31

## 2025-07-24 RX ORDER — HEPARIN SODIUM,PORCINE 10 UNIT/ML
5-20 VIAL (ML) INTRAVENOUS DAILY PRN
OUTPATIENT
Start: 2025-07-31

## 2025-07-24 RX ORDER — DIPHENHYDRAMINE HYDROCHLORIDE 50 MG/ML
50 INJECTION, SOLUTION INTRAMUSCULAR; INTRAVENOUS
Start: 2025-07-31

## 2025-07-24 RX ORDER — EPINEPHRINE 1 MG/ML
0.3 INJECTION, SOLUTION INTRAMUSCULAR; SUBCUTANEOUS EVERY 5 MIN PRN
OUTPATIENT
Start: 2025-07-31

## 2025-07-24 RX ORDER — MEPERIDINE HYDROCHLORIDE 25 MG/ML
25 INJECTION INTRAMUSCULAR; INTRAVENOUS; SUBCUTANEOUS
OUTPATIENT
Start: 2025-07-31

## 2025-07-26 ENCOUNTER — HEALTH MAINTENANCE LETTER (OUTPATIENT)
Age: 60
End: 2025-07-26

## 2025-08-06 ENCOUNTER — TELEPHONE (OUTPATIENT)
Dept: GASTROENTEROLOGY | Facility: CLINIC | Age: 60
End: 2025-08-06
Payer: COMMERCIAL

## 2025-08-08 PROBLEM — Z96.652 STATUS POST TOTAL LEFT KNEE REPLACEMENT: Status: RESOLVED | Noted: 2025-05-08 | Resolved: 2025-08-08

## 2025-08-13 ENCOUNTER — VIRTUAL VISIT (OUTPATIENT)
Dept: SURGERY | Facility: CLINIC | Age: 60
End: 2025-08-13
Payer: COMMERCIAL

## 2025-08-13 ENCOUNTER — ANESTHESIA EVENT (OUTPATIENT)
Dept: GASTROENTEROLOGY | Facility: CLINIC | Age: 60
End: 2025-08-13
Payer: COMMERCIAL

## 2025-08-13 VITALS — HEIGHT: 67 IN | BODY MASS INDEX: 38.61 KG/M2 | WEIGHT: 246 LBS

## 2025-08-13 DIAGNOSIS — E03.2 HYPOTHYROIDISM DUE TO MEDICATION: ICD-10-CM

## 2025-08-13 DIAGNOSIS — Z01.818 PRE-OP EVALUATION: Primary | ICD-10-CM

## 2025-08-13 DIAGNOSIS — R05.3 CHRONIC COUGH: ICD-10-CM

## 2025-08-13 PROCEDURE — 98006 SYNCH AUDIO-VIDEO EST MOD 30: CPT | Performed by: PHYSICIAN ASSISTANT

## 2025-08-13 RX ORDER — ESCITALOPRAM OXALATE 10 MG/1
1 TABLET ORAL EVERY MORNING
COMMUNITY
Start: 2025-07-27

## 2025-08-13 RX ORDER — LEVOTHYROXINE SODIUM 150 UG/1
150 TABLET ORAL DAILY
Qty: 90 TABLET | Refills: 3 | Status: SHIPPED | OUTPATIENT
Start: 2025-08-13

## 2025-08-13 ASSESSMENT — ENCOUNTER SYMPTOMS: DYSRHYTHMIAS: 0

## 2025-08-13 ASSESSMENT — PAIN SCALES - GENERAL: PAINLEVEL_OUTOF10: NO PAIN (0)

## 2025-08-13 ASSESSMENT — COPD QUESTIONNAIRES: COPD: 0

## 2025-08-13 ASSESSMENT — LIFESTYLE VARIABLES: TOBACCO_USE: 1

## 2025-08-19 ENCOUNTER — PATIENT OUTREACH (OUTPATIENT)
Dept: ONCOLOGY | Facility: HOSPITAL | Age: 60
End: 2025-08-19
Payer: COMMERCIAL

## 2025-08-22 ENCOUNTER — ANCILLARY PROCEDURE (OUTPATIENT)
Dept: GENERAL RADIOLOGY | Facility: CLINIC | Age: 60
End: 2025-08-22
Attending: PHYSICIAN ASSISTANT
Payer: COMMERCIAL

## 2025-08-22 DIAGNOSIS — K22.10 EROSIVE ESOPHAGITIS: ICD-10-CM

## 2025-08-22 PROCEDURE — 74221 X-RAY XM ESOPHAGUS 2CNTRST: CPT | Mod: GC | Performed by: STUDENT IN AN ORGANIZED HEALTH CARE EDUCATION/TRAINING PROGRAM

## 2025-08-25 ASSESSMENT — LIFESTYLE VARIABLES: TOBACCO_USE: 1

## 2025-08-25 ASSESSMENT — COPD QUESTIONNAIRES: COPD: 0

## 2025-08-25 ASSESSMENT — ENCOUNTER SYMPTOMS: DYSRHYTHMIAS: 0

## 2025-08-26 ENCOUNTER — TELEPHONE (OUTPATIENT)
Dept: GASTROENTEROLOGY | Facility: CLINIC | Age: 60
End: 2025-08-26
Payer: COMMERCIAL

## 2025-08-26 ENCOUNTER — ANESTHESIA (OUTPATIENT)
Dept: GASTROENTEROLOGY | Facility: CLINIC | Age: 60
End: 2025-08-26
Payer: COMMERCIAL

## (undated) DEVICE — VIAL DECANTER STERILE WHITE DYNJDEC06

## (undated) DEVICE — SPONGE LAP 18X18" X8435

## (undated) DEVICE — PREP CHLORAPREP 26ML TINTED HI-LITE ORANGE 930815

## (undated) DEVICE — SUCTION MANIFOLD NEPTUNE 2 SYS 4 PORT 0702-020-000

## (undated) DEVICE — BASIN SET MAJOR

## (undated) DEVICE — Device

## (undated) DEVICE — SUCTION IRR SYSTEM W/O TIP INTERPULSE HANDPIECE 0210-100-000

## (undated) DEVICE — BNDG ELASTIC 6" DBL LENGTH UNSTERILE 6611-16

## (undated) DEVICE — GOWN IMPERVIOUS SPECIALTY XLG/XLONG 32474

## (undated) DEVICE — GLOVE BIOGEL PI SZ 7.5 40875

## (undated) DEVICE — TOURNIQUET CUFF 34" REPRO BROWN 60-7070-106

## (undated) DEVICE — SOLUTION WATER 1000ML R5000-01

## (undated) DEVICE — SU DERMABOND ADVANCED .7ML DNX12

## (undated) DEVICE — DRSG TEGADERM 4X10" 1627

## (undated) DEVICE — BONE CLEANING TIP INTERPULSE  0210-010-000

## (undated) DEVICE — LINEN TOWEL PACK X5 5464

## (undated) DEVICE — SU VICRYL 0 CT-1 36" J946H

## (undated) DEVICE — ESU GROUND PAD ADULT W/CORD E7507

## (undated) DEVICE — HOOD SURG T7PLUS PEEL AWAY FACE SHIELD STRL LF 0416-801-100

## (undated) DEVICE — DRSG SILVERCEL 4.25X4.25" 900404

## (undated) DEVICE — BLADE KNIFE SURG 20 371120

## (undated) DEVICE — DRSG STERI STRIP 1/2X4" R1547

## (undated) DEVICE — BONE CEMENT MIXEVAC HI VAC W/CARTRIDGE 0306-536-000

## (undated) DEVICE — GLOVE BIOGEL PI MICRO INDICATOR UNDERGLOVE SZ 8.0 48980

## (undated) DEVICE — STRAP KNEE/BODY 31143004

## (undated) DEVICE — LINEN BACK PACK 5440

## (undated) DEVICE — SU MONOCRYL 3-0 PS-1 27" Y936H

## (undated) DEVICE — SU VICRYL 2-0 CT-1 27" UND J259H

## (undated) DEVICE — SOLUTION IRRIGATION 0.9% NACL 1000ML R5200-01

## (undated) DEVICE — BLADE SAW SAGITTAL STRK 18X90X1.27MM HD SYS 6 6118-127-090

## (undated) DEVICE — SOLUTION IV IRRIGATION 0.9% NACL 3L R8206

## (undated) RX ORDER — HYDROMORPHONE HYDROCHLORIDE 1 MG/ML
INJECTION, SOLUTION INTRAMUSCULAR; INTRAVENOUS; SUBCUTANEOUS
Status: DISPENSED
Start: 2025-04-23

## (undated) RX ORDER — ALBUTEROL SULFATE 90 UG/1
AEROSOL, METERED RESPIRATORY (INHALATION)
Status: DISPENSED
Start: 2024-02-15

## (undated) RX ORDER — ACETAMINOPHEN 325 MG/1
TABLET ORAL
Status: DISPENSED
Start: 2025-04-23

## (undated) RX ORDER — FENTANYL CITRATE 50 UG/ML
INJECTION, SOLUTION INTRAMUSCULAR; INTRAVENOUS
Status: DISPENSED
Start: 2025-04-23

## (undated) RX ORDER — ONDANSETRON 2 MG/ML
INJECTION INTRAMUSCULAR; INTRAVENOUS
Status: DISPENSED
Start: 2024-02-15

## (undated) RX ORDER — DIPHENHYDRAMINE HYDROCHLORIDE 50 MG/ML
INJECTION, SOLUTION INTRAMUSCULAR; INTRAVENOUS
Status: DISPENSED
Start: 2025-04-23

## (undated) RX ORDER — CEFAZOLIN SODIUM/WATER 2 G/20 ML
SYRINGE (ML) INTRAVENOUS
Status: DISPENSED
Start: 2025-04-23

## (undated) RX ORDER — FENTANYL CITRATE 50 UG/ML
INJECTION, SOLUTION INTRAMUSCULAR; INTRAVENOUS
Status: DISPENSED
Start: 2024-02-15

## (undated) RX ORDER — PROPOFOL 10 MG/ML
INJECTION, EMULSION INTRAVENOUS
Status: DISPENSED
Start: 2025-04-08

## (undated) RX ORDER — PROPOFOL 10 MG/ML
INJECTION, EMULSION INTRAVENOUS
Status: DISPENSED
Start: 2024-02-15

## (undated) RX ORDER — LIDOCAINE HYDROCHLORIDE 20 MG/ML
JELLY TOPICAL
Status: DISPENSED
Start: 2025-03-06

## (undated) RX ORDER — TRANEXAMIC ACID 650 MG/1
TABLET ORAL
Status: DISPENSED
Start: 2025-04-23

## (undated) RX ORDER — SODIUM CHLORIDE, SODIUM LACTATE, POTASSIUM CHLORIDE, CALCIUM CHLORIDE 600; 310; 30; 20 MG/100ML; MG/100ML; MG/100ML; MG/100ML
INJECTION, SOLUTION INTRAVENOUS
Status: DISPENSED
Start: 2025-04-23

## (undated) RX ORDER — FENTANYL CITRATE 50 UG/ML
INJECTION, SOLUTION INTRAMUSCULAR; INTRAVENOUS
Status: DISPENSED
Start: 2024-08-13

## (undated) RX ORDER — FENTANYL CITRATE 50 UG/ML
INJECTION, SOLUTION INTRAMUSCULAR; INTRAVENOUS
Status: DISPENSED
Start: 2023-08-08

## (undated) RX ORDER — MORPHINE SULFATE 2 MG/ML
INJECTION, SOLUTION INTRAMUSCULAR; INTRAVENOUS
Status: DISPENSED
Start: 2025-04-23